# Patient Record
Sex: MALE | Race: WHITE | Employment: FULL TIME | URBAN - METROPOLITAN AREA
[De-identification: names, ages, dates, MRNs, and addresses within clinical notes are randomized per-mention and may not be internally consistent; named-entity substitution may affect disease eponyms.]

---

## 2017-01-01 ENCOUNTER — HOSPITAL ENCOUNTER (OUTPATIENT)
Dept: INFUSION THERAPY | Age: 57
Discharge: HOME | End: 2017-01-31
Attending: PSYCHIATRY & NEUROLOGY | Admitting: PSYCHIATRY & NEUROLOGY

## 2017-01-30 ENCOUNTER — HOSPITAL ENCOUNTER (OUTPATIENT)
Dept: INFUSION THERAPY | Age: 57
Discharge: HOME | End: 2017-01-30
Attending: PSYCHIATRY & NEUROLOGY | Admitting: PSYCHIATRY & NEUROLOGY

## 2017-01-30 VITALS
HEART RATE: 74 BPM | RESPIRATION RATE: 17 BRPM | SYSTOLIC BLOOD PRESSURE: 123 MMHG | WEIGHT: 233 LBS | HEIGHT: 71 IN | TEMPERATURE: 97.7 F | BODY MASS INDEX: 32.62 KG/M2 | DIASTOLIC BLOOD PRESSURE: 82 MMHG | OXYGEN SATURATION: 96 %

## 2017-01-30 DIAGNOSIS — G70.00 MYASTHENIA GRAVIS (HCC): ICD-10-CM

## 2017-01-30 RX ORDER — 0.9 % SODIUM CHLORIDE 0.9 %
10 VIAL (ML) INJECTION PRN
Status: CANCELLED | OUTPATIENT
Start: 2017-01-31

## 2017-01-30 RX ORDER — 0.9 % SODIUM CHLORIDE 0.9 %
10 VIAL (ML) INJECTION PRN
Status: DISCONTINUED | OUTPATIENT
Start: 2017-01-30 | End: 2017-01-31 | Stop reason: HOSPADM

## 2017-01-31 ENCOUNTER — HOSPITAL ENCOUNTER (OUTPATIENT)
Dept: INFUSION THERAPY | Age: 57
Discharge: HOME | End: 2017-01-31
Attending: PSYCHIATRY & NEUROLOGY | Admitting: PSYCHIATRY & NEUROLOGY

## 2017-01-31 VITALS
SYSTOLIC BLOOD PRESSURE: 127 MMHG | OXYGEN SATURATION: 97 % | RESPIRATION RATE: 18 BRPM | DIASTOLIC BLOOD PRESSURE: 78 MMHG | HEART RATE: 82 BPM

## 2017-01-31 RX ORDER — 0.9 % SODIUM CHLORIDE 0.9 %
10 VIAL (ML) INJECTION PRN
Status: ACTIVE | OUTPATIENT
Start: 2017-01-31 | End: 2017-01-31

## 2017-02-01 ENCOUNTER — HOSPITAL ENCOUNTER (OUTPATIENT)
Dept: INFUSION THERAPY | Age: 57
Discharge: HOME | End: 2017-02-03
Attending: PSYCHIATRY & NEUROLOGY | Admitting: PSYCHIATRY & NEUROLOGY

## 2017-02-01 ENCOUNTER — HOSPITAL ENCOUNTER (OUTPATIENT)
Dept: INFUSION THERAPY | Age: 57
Discharge: HOME | End: 2017-02-01
Attending: PSYCHIATRY & NEUROLOGY | Admitting: PSYCHIATRY & NEUROLOGY

## 2017-02-01 VITALS
RESPIRATION RATE: 18 BRPM | TEMPERATURE: 98.5 F | DIASTOLIC BLOOD PRESSURE: 67 MMHG | HEIGHT: 71 IN | BODY MASS INDEX: 32.62 KG/M2 | HEART RATE: 64 BPM | WEIGHT: 233 LBS | SYSTOLIC BLOOD PRESSURE: 117 MMHG

## 2017-02-01 DIAGNOSIS — G70.00 MYASTHENIA GRAVIS (HCC): ICD-10-CM

## 2017-02-01 RX ORDER — 0.9 % SODIUM CHLORIDE 0.9 %
10 VIAL (ML) INJECTION PRN
Status: CANCELLED | OUTPATIENT
Start: 2017-02-02

## 2017-02-01 RX ORDER — 0.9 % SODIUM CHLORIDE 0.9 %
10 VIAL (ML) INJECTION PRN
Status: DISCONTINUED | OUTPATIENT
Start: 2017-02-01 | End: 2017-02-02 | Stop reason: HOSPADM

## 2017-02-01 RX ADMIN — Medication 10 ML: at 14:00

## 2017-02-01 RX ADMIN — Medication 10 ML: at 10:50

## 2017-02-02 ENCOUNTER — HOSPITAL ENCOUNTER (OUTPATIENT)
Dept: INFUSION THERAPY | Age: 57
Discharge: HOME | End: 2017-02-02
Attending: PSYCHIATRY & NEUROLOGY | Admitting: PSYCHIATRY & NEUROLOGY

## 2017-02-02 VITALS
DIASTOLIC BLOOD PRESSURE: 75 MMHG | OXYGEN SATURATION: 97 % | HEART RATE: 56 BPM | TEMPERATURE: 99 F | RESPIRATION RATE: 18 BRPM | SYSTOLIC BLOOD PRESSURE: 102 MMHG

## 2017-02-02 DIAGNOSIS — G70.00 MYASTHENIA GRAVIS (HCC): ICD-10-CM

## 2017-02-02 RX ORDER — 0.9 % SODIUM CHLORIDE 0.9 %
10 VIAL (ML) INJECTION PRN
Status: DISCONTINUED | OUTPATIENT
Start: 2017-02-02 | End: 2017-02-03 | Stop reason: HOSPADM

## 2017-02-02 RX ORDER — 0.9 % SODIUM CHLORIDE 0.9 %
10 VIAL (ML) INJECTION PRN
Status: CANCELLED | OUTPATIENT
Start: 2017-02-03

## 2017-02-02 RX ADMIN — Medication 10 ML: at 14:11

## 2017-02-02 RX ADMIN — Medication 10 ML: at 10:55

## 2017-02-03 ENCOUNTER — HOSPITAL ENCOUNTER (OUTPATIENT)
Dept: INFUSION THERAPY | Age: 57
Discharge: HOME | End: 2017-02-03
Attending: PSYCHIATRY & NEUROLOGY | Admitting: PSYCHIATRY & NEUROLOGY

## 2017-02-03 VITALS
RESPIRATION RATE: 16 BRPM | HEART RATE: 60 BPM | DIASTOLIC BLOOD PRESSURE: 70 MMHG | SYSTOLIC BLOOD PRESSURE: 104 MMHG | TEMPERATURE: 98.1 F | OXYGEN SATURATION: 97 %

## 2017-02-03 DIAGNOSIS — G70.00 MYASTHENIA GRAVIS (HCC): ICD-10-CM

## 2017-02-03 RX ORDER — 0.9 % SODIUM CHLORIDE 0.9 %
10 VIAL (ML) INJECTION PRN
Status: CANCELLED | OUTPATIENT
Start: 2017-02-03

## 2017-02-03 RX ORDER — 0.9 % SODIUM CHLORIDE 0.9 %
10 VIAL (ML) INJECTION PRN
Status: DISCONTINUED | OUTPATIENT
Start: 2017-02-03 | End: 2017-02-04 | Stop reason: HOSPADM

## 2017-02-03 RX ADMIN — Medication 10 ML: at 10:42

## 2017-02-03 RX ADMIN — Medication 10 ML: at 13:27

## 2017-04-03 ENCOUNTER — HOSPITAL ENCOUNTER (OUTPATIENT)
Dept: INFUSION THERAPY | Age: 57
Discharge: HOME | End: 2017-04-03
Attending: PSYCHIATRY & NEUROLOGY | Admitting: PSYCHIATRY & NEUROLOGY

## 2017-04-03 VITALS
DIASTOLIC BLOOD PRESSURE: 71 MMHG | OXYGEN SATURATION: 95 % | RESPIRATION RATE: 18 BRPM | TEMPERATURE: 98.4 F | BODY MASS INDEX: 32.5 KG/M2 | SYSTOLIC BLOOD PRESSURE: 132 MMHG | HEART RATE: 62 BPM | WEIGHT: 233 LBS

## 2017-04-03 DIAGNOSIS — G70.00 MYASTHENIA GRAVIS (HCC): ICD-10-CM

## 2017-04-03 RX ORDER — 0.9 % SODIUM CHLORIDE 0.9 %
10 VIAL (ML) INJECTION PRN
Status: DISCONTINUED | OUTPATIENT
Start: 2017-04-03 | End: 2017-04-04 | Stop reason: HOSPADM

## 2017-04-03 RX ORDER — SODIUM CHLORIDE 0.9 % (FLUSH) 0.9 %
10 SYRINGE (ML) INJECTION PRN
Status: DISCONTINUED | OUTPATIENT
Start: 2017-04-03 | End: 2017-04-04 | Stop reason: HOSPADM

## 2017-04-03 RX ORDER — 0.9 % SODIUM CHLORIDE 0.9 %
10 VIAL (ML) INJECTION PRN
Status: CANCELLED | OUTPATIENT
Start: 2017-04-03

## 2017-04-03 RX ADMIN — Medication 10 ML: at 10:54

## 2017-04-03 RX ADMIN — Medication 10 ML: at 13:40

## 2017-04-04 ENCOUNTER — HOSPITAL ENCOUNTER (OUTPATIENT)
Dept: INFUSION THERAPY | Age: 57
Discharge: HOME | End: 2017-04-30
Attending: PSYCHIATRY & NEUROLOGY | Admitting: PSYCHIATRY & NEUROLOGY

## 2017-04-04 VITALS
RESPIRATION RATE: 18 BRPM | HEART RATE: 59 BPM | WEIGHT: 233 LBS | DIASTOLIC BLOOD PRESSURE: 89 MMHG | BODY MASS INDEX: 32.5 KG/M2 | OXYGEN SATURATION: 97 % | SYSTOLIC BLOOD PRESSURE: 132 MMHG

## 2017-04-04 RX ORDER — SODIUM CHLORIDE 0.9 % (FLUSH) 0.9 %
10 SYRINGE (ML) INJECTION PRN
Status: ACTIVE | OUTPATIENT
Start: 2017-04-04 | End: 2017-04-04

## 2017-04-05 ENCOUNTER — HOSPITAL ENCOUNTER (OUTPATIENT)
Dept: INFUSION THERAPY | Age: 57
Discharge: HOME | End: 2017-04-05
Attending: PSYCHIATRY & NEUROLOGY | Admitting: PSYCHIATRY & NEUROLOGY

## 2017-04-05 VITALS
TEMPERATURE: 97.9 F | HEART RATE: 60 BPM | SYSTOLIC BLOOD PRESSURE: 125 MMHG | BODY MASS INDEX: 32.5 KG/M2 | OXYGEN SATURATION: 97 % | DIASTOLIC BLOOD PRESSURE: 89 MMHG | RESPIRATION RATE: 18 BRPM | WEIGHT: 233 LBS

## 2017-04-05 DIAGNOSIS — G70.00 MYASTHENIA GRAVIS (HCC): ICD-10-CM

## 2017-04-05 RX ORDER — 0.9 % SODIUM CHLORIDE 0.9 %
10 VIAL (ML) INJECTION PRN
Status: ACTIVE | OUTPATIENT
Start: 2017-04-05 | End: 2017-04-06

## 2017-04-05 RX ORDER — 0.9 % SODIUM CHLORIDE 0.9 %
10 VIAL (ML) INJECTION PRN
Status: CANCELLED | OUTPATIENT
Start: 2017-04-05

## 2017-04-05 RX ADMIN — Medication 10 ML: at 10:31

## 2017-04-05 RX ADMIN — Medication 10 ML: at 13:22

## 2017-04-06 ENCOUNTER — HOSPITAL ENCOUNTER (OUTPATIENT)
Dept: INFUSION THERAPY | Age: 57
Discharge: HOME | End: 2017-04-06
Attending: PSYCHIATRY & NEUROLOGY | Admitting: PSYCHIATRY & NEUROLOGY

## 2017-04-06 VITALS
RESPIRATION RATE: 16 BRPM | BODY MASS INDEX: 32.5 KG/M2 | WEIGHT: 233 LBS | SYSTOLIC BLOOD PRESSURE: 125 MMHG | HEART RATE: 60 BPM | DIASTOLIC BLOOD PRESSURE: 79 MMHG | TEMPERATURE: 97.7 F

## 2017-04-06 RX ORDER — 0.9 % SODIUM CHLORIDE 0.9 %
10 VIAL (ML) INJECTION PRN
Status: DISCONTINUED | OUTPATIENT
Start: 2017-04-06 | End: 2017-04-30 | Stop reason: HOSPADM

## 2017-04-06 RX ORDER — 0.9 % SODIUM CHLORIDE 0.9 %
10 VIAL (ML) INJECTION PRN
Status: CANCELLED | OUTPATIENT
Start: 2017-04-06

## 2017-04-06 RX ADMIN — Medication 10 ML: at 10:30

## 2017-04-06 RX ADMIN — Medication 10 ML: at 13:30

## 2017-04-07 ENCOUNTER — HOSPITAL ENCOUNTER (OUTPATIENT)
Dept: INFUSION THERAPY | Age: 57
Discharge: HOME | End: 2017-04-07
Attending: PSYCHIATRY & NEUROLOGY | Admitting: PSYCHIATRY & NEUROLOGY

## 2017-04-07 VITALS
TEMPERATURE: 98 F | HEART RATE: 62 BPM | RESPIRATION RATE: 16 BRPM | DIASTOLIC BLOOD PRESSURE: 71 MMHG | SYSTOLIC BLOOD PRESSURE: 130 MMHG

## 2017-04-07 RX ORDER — 0.9 % SODIUM CHLORIDE 0.9 %
10 VIAL (ML) INJECTION PRN
Status: CANCELLED | OUTPATIENT
Start: 2017-04-07

## 2017-04-07 RX ORDER — SODIUM CHLORIDE 0.9 % (FLUSH) 0.9 %
10 SYRINGE (ML) INJECTION 2 TIMES DAILY
Status: DISCONTINUED | OUTPATIENT
Start: 2017-04-07 | End: 2017-04-30 | Stop reason: HOSPADM

## 2017-05-01 ENCOUNTER — HOSPITAL ENCOUNTER (OUTPATIENT)
Dept: INFUSION THERAPY | Age: 57
Discharge: HOME | End: 2017-05-31
Attending: PSYCHIATRY & NEUROLOGY | Admitting: PSYCHIATRY & NEUROLOGY

## 2018-09-02 PROBLEM — L03.114 CELLULITIS OF LEFT UPPER EXTREMITY: Status: ACTIVE | Noted: 2018-09-02

## 2018-09-02 PROBLEM — L03.90 CELLULITIS: Status: ACTIVE | Noted: 2018-09-02

## 2018-12-20 ENCOUNTER — HOSPITAL ENCOUNTER (EMERGENCY)
Age: 58
Discharge: HOME | End: 2018-12-20
Attending: EMERGENCY MEDICINE

## 2018-12-20 VITALS
DIASTOLIC BLOOD PRESSURE: 89 MMHG | HEART RATE: 79 BPM | OXYGEN SATURATION: 94 % | SYSTOLIC BLOOD PRESSURE: 140 MMHG | TEMPERATURE: 97.6 F | BODY MASS INDEX: 42.7 KG/M2 | WEIGHT: 305 LBS | HEIGHT: 71 IN | RESPIRATION RATE: 25 BRPM

## 2018-12-20 DIAGNOSIS — I49.1 PREMATURE ATRIAL COMPLEXES: Primary | ICD-10-CM

## 2018-12-20 PROCEDURE — 99284 EMERGENCY DEPT VISIT MOD MDM: CPT

## 2018-12-20 PROCEDURE — 93005 ELECTROCARDIOGRAM TRACING: CPT | Performed by: PHYSICIAN ASSISTANT

## 2018-12-20 RX ORDER — ALBUTEROL SULFATE 90 UG/1
2 AEROSOL, METERED RESPIRATORY (INHALATION) EVERY 4 HOURS PRN
Qty: 1 INHALER | Refills: 1 | Status: ON HOLD | OUTPATIENT
Start: 2018-12-20 | End: 2021-09-09 | Stop reason: HOSPADM

## 2018-12-20 NOTE — ED PROVIDER NOTES
Interpretation  Interpreted by me  Compared to 9/4/18  Rhythm: normal sinus  Rate: normal 83  Axis: normal  Ectopy: frequent PACs  Conduction: normal  ST Segments: no acute change  T Waves: no acute change  Q Waves: none  Clinical Impression: normal sinus rhythm with frequent PACs    Cardiac Monitor Strip Interpretation  Interpreted by me  Monitor strip interpreted for greater than 10 seconds  Rhythm: normal sinus  Rate: normal  Ectopy: PACs  ST Segments: normal      Radiology / Procedures:  None    ED COURSE & MEDICAL DECISION MAKING:  Pertinent Labs & Imaging studies reviewed. (See chart for details)  On exam, the patient is afebrile and nontoxic appearing. He is hemodynamically stable and neurologically intact. EKG shows a normal sinus rhythm with no ST elevation or depression. There are frequent PACs. The patient had a similar appearing EKG with frequent PACs on a recent EKG from September 2018. He is asymptomatic. He has no sensation of an irregular heartbeat or palpitations. I suspect that the patient has a premature atrial complexes. I have a low suspicion for STEMI, arrhythmia or concerning abnormality on the EKG. The likelihood of other entities in the differential is insufficient to justify any further testing for them at this time. This was explained to the patient and they were advised that persistent or worsening symptoms would require further evaluation. I feel that the patient is stable for outpatient management follow up in 2-3 days. The patient is given return precautions. The patient verbalized understanding, was agreeable with plan, and the patient was discharged home in stable condition. Clinical Impression:  1. Premature atrial complexes        Disposition referral (if applicable):   [facility] - ADULT  [address]  [phone]  Go in 4 days      [facility] Emergency Department  [address] [place] [address]  [phone]  Go to   If symptoms worsen      Disposition medications (if applicable):  Discharge Medication List as of 12/20/2018  5:37 PM      START taking these medications    Details   albuterol sulfate HFA (PROVENTIL HFA) 108 (90 Base) MCG/ACT inhaler Inhale 2 puffs into the lungs every 4 hours as needed for Wheezing or Shortness of Breath With spacer (and mask if indicated). Thanks. , Disp-1 Inhaler, R-1Print               Comment: Please note this report has been produced using speech recognition software and may contain errors related to that system including errors in grammar, punctuation, and spelling, as well as words and phrases that may be inappropriate. If there are any questions or concerns please feel free to contact the dictating provider for clarification.         Dejah De La Paz MD  12/20/18 2481

## 2018-12-22 LAB
EKG ATRIAL RATE: 127 BPM
EKG DIAGNOSIS: NORMAL
EKG P-R INTERVAL: 120 MS
EKG Q-T INTERVAL: 400 MS
EKG QRS DURATION: 96 MS
EKG QTC CALCULATION (BAZETT): 470 MS
EKG R AXIS: 57 DEGREES
EKG T AXIS: 60 DEGREES
EKG VENTRICULAR RATE: 83 BPM

## 2019-06-04 ENCOUNTER — TELEPHONE (OUTPATIENT)
Dept: CARDIOLOGY CLINIC | Age: 59
End: 2019-06-04

## 2019-06-04 NOTE — TELEPHONE ENCOUNTER
Emailed a Notice of Chapter 7 Bankruptcy to [facility]- case# [id number]

## 2020-02-02 ENCOUNTER — APPOINTMENT (OUTPATIENT)
Dept: CT IMAGING | Age: 60
DRG: 392 | End: 2020-02-02

## 2020-02-02 ENCOUNTER — HOSPITAL ENCOUNTER (INPATIENT)
Age: 60
LOS: 1 days | Discharge: HOME | DRG: 392 | End: 2020-02-03
Attending: EMERGENCY MEDICINE | Admitting: INTERNAL MEDICINE

## 2020-02-02 PROBLEM — A08.4 VIRAL GASTROENTERITIS: Status: ACTIVE | Noted: 2020-02-02

## 2020-02-02 LAB
ADENOVIRUS F 40 41 PCR: NOT DETECTED
ALBUMIN SERPL-MCNC: 2.3 GM/DL (ref 3.4–5)
ALBUMIN SERPL-MCNC: 3.6 GM/DL (ref 3.4–5)
ALP BLD-CCNC: 31 IU/L (ref 40–128)
ALP BLD-CCNC: 45 IU/L (ref 40–129)
ALT SERPL-CCNC: 17 U/L (ref 10–40)
ALT SERPL-CCNC: 25 U/L (ref 10–40)
ANION GAP SERPL CALCULATED.3IONS-SCNC: 14 MMOL/L (ref 4–16)
ANION GAP SERPL CALCULATED.3IONS-SCNC: 14 MMOL/L (ref 4–16)
ANION GAP SERPL CALCULATED.3IONS-SCNC: 15 MMOL/L (ref 4–16)
AST SERPL-CCNC: 12 IU/L (ref 15–37)
AST SERPL-CCNC: 16 IU/L (ref 15–37)
ASTROVIRUS PCR: NOT DETECTED
BASE EXCESS: ABNORMAL (ref 0–3.3)
BASOPHILS ABSOLUTE: 0 K/CU MM
BASOPHILS ABSOLUTE: 0.1 K/CU MM
BASOPHILS RELATIVE PERCENT: 0.3 % (ref 0–1)
BASOPHILS RELATIVE PERCENT: 0.7 % (ref 0–1)
BILIRUB SERPL-MCNC: 0.4 MG/DL (ref 0–1)
BILIRUB SERPL-MCNC: 0.4 MG/DL (ref 0–1)
BUN BLDV-MCNC: 14 MG/DL (ref 6–23)
BUN BLDV-MCNC: 17 MG/DL (ref 6–23)
BUN BLDV-MCNC: 19 MG/DL (ref 6–23)
CALCIUM IONIZED: 4.8 MG/DL (ref 4.48–5.28)
CALCIUM SERPL-MCNC: 8.9 MG/DL (ref 8.3–10.6)
CALCIUM SERPL-MCNC: 9 MG/DL (ref 8.3–10.6)
CALCIUM SERPL-MCNC: ABNORMAL MG/DL (ref 8.3–10.6)
CAMPYLOBACTER PCR: NOT DETECTED
CHLORIDE BLD-SCNC: 100 MMOL/L (ref 99–110)
CHLORIDE BLD-SCNC: 104 MMOL/L (ref 99–110)
CHLORIDE BLD-SCNC: 115 MMOL/L (ref 99–110)
CLOSTRIDIUM DIFFICILE, PCR: NORMAL
CLOSTRIDIUM DIFFICILE, PCR: NORMAL
CO2: 12 MMOL/L (ref 21–32)
CO2: 17 MMOL/L (ref 21–32)
CO2: 18 MMOL/L (ref 21–32)
COMMENT: ABNORMAL
CREAT SERPL-MCNC: 0.9 MG/DL (ref 0.9–1.3)
CREAT SERPL-MCNC: 1.2 MG/DL (ref 0.9–1.3)
CREAT SERPL-MCNC: 1.3 MG/DL (ref 0.9–1.3)
CRYPTOSPORIDIUM PCR: NOT DETECTED
CYCLOSPORA CAYETANENSIS PCR: NOT DETECTED
DIFFERENTIAL TYPE: ABNORMAL
DIFFERENTIAL TYPE: ABNORMAL
E COLI 0157 PCR: NOT DETECTED
E COLI ENTEROAGGREGATIVE PCR: NOT DETECTED
E COLI ENTEROPATHOGENIC PCR: NOT DETECTED
E COLI ENTEROTOXIGENIC PCR: NOT DETECTED
E COLI SHIGA LIKE TOXIN PCR: NOT DETECTED
E COLI SHIGELLA/ENTEROINVASIVE PCR: NOT DETECTED
ENTAMOEBA HISTOLYTICA PCR: NOT DETECTED
EOSINOPHILS ABSOLUTE: 0.1 K/CU MM
EOSINOPHILS ABSOLUTE: 0.1 K/CU MM
EOSINOPHILS RELATIVE PERCENT: 0.9 % (ref 0–3)
EOSINOPHILS RELATIVE PERCENT: 1.6 % (ref 0–3)
GFR AFRICAN AMERICAN: >60 ML/MIN/1.73M2
GFR NON-AFRICAN AMERICAN: 56 ML/MIN/1.73M2
GFR NON-AFRICAN AMERICAN: >60 ML/MIN/1.73M2
GFR NON-AFRICAN AMERICAN: >60 ML/MIN/1.73M2
GIARDIA LAMBLIA PCR: NOT DETECTED
GLUCOSE BLD-MCNC: 198 MG/DL (ref 70–99)
GLUCOSE BLD-MCNC: 209 MG/DL (ref 70–99)
GLUCOSE BLD-MCNC: 244 MG/DL (ref 70–99)
HCO3 VENOUS: 16.4 MMOL/L (ref 19–25)
HCT VFR BLD CALC: 52.6 % (ref 42–52)
HCT VFR BLD CALC: 60.4 % (ref 42–52)
HEMOGLOBIN: 17 GM/DL (ref 13.5–18)
HEMOGLOBIN: 19.6 GM/DL (ref 13.5–18)
IMMATURE NEUTROPHIL %: 0.5 % (ref 0–0.43)
IMMATURE NEUTROPHIL %: 0.5 % (ref 0–0.43)
IONIZED CA: 1.2 MMOL/L (ref 1.12–1.32)
LACTATE: 2.2 MMOL/L (ref 0.4–2)
LACTATE: ABNORMAL MMOL/L (ref 0.4–2)
LYMPHOCYTES ABSOLUTE: 0.6 K/CU MM
LYMPHOCYTES ABSOLUTE: 0.9 K/CU MM
LYMPHOCYTES RELATIVE PERCENT: 6.3 % (ref 24–44)
LYMPHOCYTES RELATIVE PERCENT: 7.8 % (ref 24–44)
MAGNESIUM: 0.9 MG/DL (ref 1.8–2.4)
MAGNESIUM: 1.7 MG/DL (ref 1.8–2.4)
MCH RBC QN AUTO: 30.9 PG (ref 27–31)
MCH RBC QN AUTO: 31 PG (ref 27–31)
MCHC RBC AUTO-ENTMCNC: 32.3 % (ref 32–36)
MCHC RBC AUTO-ENTMCNC: 32.5 % (ref 32–36)
MCV RBC AUTO: 95.6 FL (ref 78–100)
MCV RBC AUTO: 95.6 FL (ref 78–100)
MONOCYTES ABSOLUTE: 0.7 K/CU MM
MONOCYTES ABSOLUTE: 0.8 K/CU MM
MONOCYTES RELATIVE PERCENT: 10.2 % (ref 0–4)
MONOCYTES RELATIVE PERCENT: 5.1 % (ref 0–4)
NOROVIRUS GI GII PCR: NOT DETECTED
NUCLEATED RBC %: 0 %
NUCLEATED RBC %: 0 %
O2 SAT, VEN: 95.1 % (ref 50–70)
PCO2, VEN: 35 MMHG (ref 38–52)
PDW BLD-RTO: 12.6 % (ref 11.7–14.9)
PDW BLD-RTO: 12.9 % (ref 11.7–14.9)
PH VENOUS: 7.28 (ref 7.32–7.42)
PHOSPHORUS: 3.7 MG/DL (ref 2.5–4.9)
PLATELET # BLD: 248 K/CU MM (ref 140–440)
PLATELET # BLD: 293 K/CU MM (ref 140–440)
PLESIOMONAS SHIGELLOIDES PCR: NOT DETECTED
PMV BLD AUTO: 10.1 FL (ref 7.5–11.1)
PMV BLD AUTO: 9.6 FL (ref 7.5–11.1)
PO2, VEN: 112 MMHG (ref 28–48)
POTASSIUM SERPL-SCNC: 4.7 MMOL/L (ref 3.5–5.1)
POTASSIUM SERPL-SCNC: 5.3 MMOL/L (ref 3.5–5.1)
POTASSIUM SERPL-SCNC: ABNORMAL MMOL/L (ref 3.5–5.1)
RBC # BLD: 5.5 M/CU MM (ref 4.6–6.2)
RBC # BLD: 6.32 M/CU MM (ref 4.6–6.2)
ROTAVIRUS A PCR: NOT DETECTED
SALMONELLA PCR: NOT DETECTED
SAPOVIRUS PCR: NOT DETECTED
SEGMENTED NEUTROPHILS ABSOLUTE COUNT: 12 K/CU MM
SEGMENTED NEUTROPHILS ABSOLUTE COUNT: 5.9 K/CU MM
SEGMENTED NEUTROPHILS RELATIVE PERCENT: 79.6 % (ref 36–66)
SEGMENTED NEUTROPHILS RELATIVE PERCENT: 86.5 % (ref 36–66)
SODIUM BLD-SCNC: 132 MMOL/L (ref 135–145)
SODIUM BLD-SCNC: 135 MMOL/L (ref 135–145)
SODIUM BLD-SCNC: 142 MMOL/L (ref 135–145)
TOTAL IMMATURE NEUTOROPHIL: 0.04 K/CU MM
TOTAL IMMATURE NEUTOROPHIL: 0.07 K/CU MM
TOTAL NUCLEATED RBC: 0 K/CU MM
TOTAL NUCLEATED RBC: 0 K/CU MM
TOTAL PROTEIN: 4.2 GM/DL (ref 6.4–8.2)
TOTAL PROTEIN: 6 GM/DL (ref 6.4–8.2)
VIBRIO CHOLERAE PCR: NOT DETECTED
VIBRIO PCR: NOT DETECTED
WBC # BLD: 13.8 K/CU MM (ref 4–10.5)
WBC # BLD: 7.4 K/CU MM (ref 4–10.5)
YERSINIA ENTEROCOLITICA PCR: NOT DETECTED

## 2020-02-02 PROCEDURE — 99285 EMERGENCY DEPT VISIT HI MDM: CPT

## 2020-02-02 PROCEDURE — 6370000000 HC RX 637 (ALT 250 FOR IP): Performed by: INTERNAL MEDICINE

## 2020-02-02 PROCEDURE — 36415 COLL VENOUS BLD VENIPUNCTURE: CPT

## 2020-02-02 PROCEDURE — 80048 BASIC METABOLIC PNL TOTAL CA: CPT

## 2020-02-02 PROCEDURE — 96365 THER/PROPH/DIAG IV INF INIT: CPT

## 2020-02-02 PROCEDURE — 80053 COMPREHEN METABOLIC PANEL: CPT

## 2020-02-02 PROCEDURE — 2580000003 HC RX 258: Performed by: EMERGENCY MEDICINE

## 2020-02-02 PROCEDURE — 83735 ASSAY OF MAGNESIUM: CPT

## 2020-02-02 PROCEDURE — 74177 CT ABD & PELVIS W/CONTRAST: CPT

## 2020-02-02 PROCEDURE — 94664 DEMO&/EVAL PT USE INHALER: CPT

## 2020-02-02 PROCEDURE — 6360000002 HC RX W HCPCS: Performed by: NURSE PRACTITIONER

## 2020-02-02 PROCEDURE — 87324 CLOSTRIDIUM AG IA: CPT

## 2020-02-02 PROCEDURE — 83605 ASSAY OF LACTIC ACID: CPT

## 2020-02-02 PROCEDURE — 87507 IADNA-DNA/RNA PROBE TQ 12-25: CPT

## 2020-02-02 PROCEDURE — 6360000002 HC RX W HCPCS: Performed by: EMERGENCY MEDICINE

## 2020-02-02 PROCEDURE — 6370000000 HC RX 637 (ALT 250 FOR IP): Performed by: FAMILY MEDICINE

## 2020-02-02 PROCEDURE — 2060000000 HC ICU INTERMEDIATE R&B

## 2020-02-02 PROCEDURE — 82805 BLOOD GASES W/O2 SATURATION: CPT

## 2020-02-02 PROCEDURE — 84100 ASSAY OF PHOSPHORUS: CPT

## 2020-02-02 PROCEDURE — 2580000003 HC RX 258: Performed by: INTERNAL MEDICINE

## 2020-02-02 PROCEDURE — 85025 COMPLETE CBC W/AUTO DIFF WBC: CPT

## 2020-02-02 PROCEDURE — 82330 ASSAY OF CALCIUM: CPT

## 2020-02-02 PROCEDURE — 96366 THER/PROPH/DIAG IV INF ADDON: CPT

## 2020-02-02 PROCEDURE — 93010 ELECTROCARDIOGRAM REPORT: CPT | Performed by: INTERNAL MEDICINE

## 2020-02-02 PROCEDURE — 6360000004 HC RX CONTRAST MEDICATION: Performed by: EMERGENCY MEDICINE

## 2020-02-02 PROCEDURE — 93005 ELECTROCARDIOGRAM TRACING: CPT | Performed by: EMERGENCY MEDICINE

## 2020-02-02 PROCEDURE — 6360000002 HC RX W HCPCS: Performed by: INTERNAL MEDICINE

## 2020-02-02 PROCEDURE — 2500000003 HC RX 250 WO HCPCS: Performed by: INTERNAL MEDICINE

## 2020-02-02 PROCEDURE — 6370000000 HC RX 637 (ALT 250 FOR IP): Performed by: EMERGENCY MEDICINE

## 2020-02-02 PROCEDURE — 96375 TX/PRO/DX INJ NEW DRUG ADDON: CPT

## 2020-02-02 RX ORDER — ACETAMINOPHEN 325 MG/1
650 TABLET ORAL EVERY 4 HOURS PRN
Status: DISCONTINUED | OUTPATIENT
Start: 2020-02-02 | End: 2020-02-03 | Stop reason: HOSPADM

## 2020-02-02 RX ORDER — POTASSIUM CHLORIDE 7.45 MG/ML
10 INJECTION INTRAVENOUS PRN
Status: DISCONTINUED | OUTPATIENT
Start: 2020-02-02 | End: 2020-02-03 | Stop reason: HOSPADM

## 2020-02-02 RX ORDER — POTASSIUM CHLORIDE 20 MEQ/1
40 TABLET, EXTENDED RELEASE ORAL 2 TIMES DAILY WITH MEALS
Status: DISPENSED | OUTPATIENT
Start: 2020-02-02 | End: 2020-02-03

## 2020-02-02 RX ORDER — POTASSIUM CHLORIDE 20 MEQ/1
60 TABLET, EXTENDED RELEASE ORAL ONCE
Status: COMPLETED | OUTPATIENT
Start: 2020-02-02 | End: 2020-02-02

## 2020-02-02 RX ORDER — MAGNESIUM SULFATE IN WATER 40 MG/ML
2 INJECTION, SOLUTION INTRAVENOUS ONCE
Status: COMPLETED | OUTPATIENT
Start: 2020-02-02 | End: 2020-02-02

## 2020-02-02 RX ORDER — LANOLIN ALCOHOL/MO/W.PET/CERES
1000 CREAM (GRAM) TOPICAL DAILY
Status: DISCONTINUED | OUTPATIENT
Start: 2020-02-02 | End: 2020-02-03 | Stop reason: HOSPADM

## 2020-02-02 RX ORDER — SODIUM CHLORIDE 0.9 % (FLUSH) 0.9 %
10 SYRINGE (ML) INJECTION PRN
Status: DISCONTINUED | OUTPATIENT
Start: 2020-02-02 | End: 2020-02-03 | Stop reason: HOSPADM

## 2020-02-02 RX ORDER — 0.9 % SODIUM CHLORIDE 0.9 %
1000 INTRAVENOUS SOLUTION INTRAVENOUS ONCE
Status: COMPLETED | OUTPATIENT
Start: 2020-02-02 | End: 2020-02-02

## 2020-02-02 RX ORDER — SODIUM CHLORIDE 0.9 % (FLUSH) 0.9 %
10 SYRINGE (ML) INJECTION EVERY 12 HOURS SCHEDULED
Status: DISCONTINUED | OUTPATIENT
Start: 2020-02-02 | End: 2020-02-03 | Stop reason: HOSPADM

## 2020-02-02 RX ORDER — DIPHENHYDRAMINE HCL 25 MG
50 TABLET ORAL ONCE
Status: COMPLETED | OUTPATIENT
Start: 2020-02-02 | End: 2020-02-02

## 2020-02-02 RX ORDER — ONDANSETRON 2 MG/ML
8 INJECTION INTRAMUSCULAR; INTRAVENOUS ONCE
Status: COMPLETED | OUTPATIENT
Start: 2020-02-02 | End: 2020-02-02

## 2020-02-02 RX ORDER — POTASSIUM CHLORIDE 20 MEQ/1
40 TABLET, EXTENDED RELEASE ORAL PRN
Status: DISCONTINUED | OUTPATIENT
Start: 2020-02-02 | End: 2020-02-03 | Stop reason: HOSPADM

## 2020-02-02 RX ORDER — ONDANSETRON 2 MG/ML
4 INJECTION INTRAMUSCULAR; INTRAVENOUS EVERY 6 HOURS PRN
Status: DISCONTINUED | OUTPATIENT
Start: 2020-02-02 | End: 2020-02-03 | Stop reason: HOSPADM

## 2020-02-02 RX ORDER — PYRIDOSTIGMINE BROMIDE 60 MG/1
60 TABLET ORAL
Status: DISCONTINUED | OUTPATIENT
Start: 2020-02-02 | End: 2020-02-02

## 2020-02-02 RX ORDER — PREDNISONE 10 MG/1
15 TABLET ORAL DAILY
Status: DISCONTINUED | OUTPATIENT
Start: 2020-02-08 | End: 2020-02-03 | Stop reason: HOSPADM

## 2020-02-02 RX ORDER — PYRIDOSTIGMINE BROMIDE 60 MG/1
60 TABLET ORAL EVERY 6 HOURS SCHEDULED
Status: DISCONTINUED | OUTPATIENT
Start: 2020-02-02 | End: 2020-02-03 | Stop reason: HOSPADM

## 2020-02-02 RX ORDER — PREDNISONE 20 MG/1
20 TABLET ORAL DAILY
Status: DISCONTINUED | OUTPATIENT
Start: 2020-02-06 | End: 2020-02-03 | Stop reason: HOSPADM

## 2020-02-02 RX ADMIN — MAGNESIUM SULFATE HEPTAHYDRATE 2 G: 40 INJECTION, SOLUTION INTRAVENOUS at 09:35

## 2020-02-02 RX ADMIN — ONDANSETRON 8 MG: 2 INJECTION INTRAMUSCULAR; INTRAVENOUS at 02:21

## 2020-02-02 RX ADMIN — SODIUM CHLORIDE 1000 ML: 9 INJECTION, SOLUTION INTRAVENOUS at 04:39

## 2020-02-02 RX ADMIN — POTASSIUM CHLORIDE 10 MEQ: 7.46 INJECTION, SOLUTION INTRAVENOUS at 07:03

## 2020-02-02 RX ADMIN — SODIUM BICARBONATE: 84 INJECTION, SOLUTION INTRAVENOUS at 11:14

## 2020-02-02 RX ADMIN — SODIUM CHLORIDE 1000 ML: 9 INJECTION, SOLUTION INTRAVENOUS at 02:21

## 2020-02-02 RX ADMIN — SODIUM CHLORIDE, PRESERVATIVE FREE 10 ML: 5 INJECTION INTRAVENOUS at 09:36

## 2020-02-02 RX ADMIN — POTASSIUM CHLORIDE 60 MEQ: 1500 TABLET, EXTENDED RELEASE ORAL at 12:50

## 2020-02-02 RX ADMIN — PYRIDOSTIGMINE BROMIDE 60 MG: 60 TABLET ORAL at 17:22

## 2020-02-02 RX ADMIN — POTASSIUM CHLORIDE 40 MEQ: 1500 TABLET, EXTENDED RELEASE ORAL at 09:35

## 2020-02-02 RX ADMIN — PREDNISONE 30 MG: 20 TABLET ORAL at 09:35

## 2020-02-02 RX ADMIN — CALCIUM GLUCONATE 2 G: 98 INJECTION, SOLUTION INTRAVENOUS at 04:45

## 2020-02-02 RX ADMIN — ENOXAPARIN SODIUM 40 MG: 40 INJECTION SUBCUTANEOUS at 09:36

## 2020-02-02 RX ADMIN — SODIUM CHLORIDE, PRESERVATIVE FREE 10 ML: 5 INJECTION INTRAVENOUS at 20:31

## 2020-02-02 RX ADMIN — PYRIDOSTIGMINE BROMIDE 60 MG: 60 TABLET ORAL at 07:03

## 2020-02-02 RX ADMIN — PYRIDOSTIGMINE BROMIDE 60 MG: 60 TABLET ORAL at 12:50

## 2020-02-02 RX ADMIN — IOPAMIDOL 75 ML: 755 INJECTION, SOLUTION INTRAVENOUS at 03:47

## 2020-02-02 RX ADMIN — DIPHENHYDRAMINE HCL 50 MG: 25 TABLET ORAL at 04:39

## 2020-02-02 RX ADMIN — CYANOCOBALAMIN TAB 1000 MCG 1000 MCG: 1000 TAB at 09:35

## 2020-02-02 RX ADMIN — FAMOTIDINE 20 MG: 10 INJECTION INTRAVENOUS at 20:31

## 2020-02-02 RX ADMIN — FAMOTIDINE 20 MG: 10 INJECTION INTRAVENOUS at 07:03

## 2020-02-02 RX ADMIN — SODIUM BICARBONATE: 84 INJECTION, SOLUTION INTRAVENOUS at 23:13

## 2020-02-02 ASSESSMENT — PULMONARY FUNCTION TESTS
PEFR_L/MIN: 300
PEFR_L/MIN: 450

## 2020-02-02 NOTE — PROGRESS NOTES
Peripheral pulses equal bilaterally and palpable. No peripheral edema. GI the ostomy bag with green watery stool, abdomen is soft without significant tenderness, masses, or guarding. Bowel sounds are normoactive. Rectal exam deferred.  Andres catheter is not present. MSK No gross joint deformities. Spontaneous movement of all extremities  SKIN Normal coloration, warm, dry.     Medications:   Medications:    vitamin B-12  1,000 mcg Oral Daily    sodium chloride flush  10 mL Intravenous 2 times per day    enoxaparin  40 mg Subcutaneous Daily    predniSONE  30 mg Oral Daily    Followed by   Pedro Bob ON 2/4/2020] predniSONE  25 mg Oral Daily    Followed by   Pedro Bob ON 2/6/2020] predniSONE  20 mg Oral Daily    Followed by   Pedro Bob ON 2/8/2020] predniSONE  15 mg Oral Daily    [START ON 2/10/2020] predniSONE  14 mg Oral Daily    famotidine (PEPCID) injection  20 mg Intravenous BID    magnesium sulfate  2 g Intravenous Once    potassium chloride  40 mEq Oral BID WC    pyridostigmine  60 mg Oral 4 times per day      Infusions:    sodium bicarbonate infusion       PRN Meds: potassium chloride, 40 mEq, PRN    Or  potassium alternative oral replacement, 40 mEq, PRN    Or  potassium chloride, 10 mEq, PRN  sodium chloride flush, 10 mL, PRN  magnesium hydroxide, 30 mL, Daily PRN  ondansetron, 4 mg, Q6H PRN  acetaminophen, 650 mg, Q4H PRN          Electronically signed by Trinidad Funes MD on 2/2/2020 at 10:35 AM

## 2020-02-02 NOTE — H&P
status:      Spouse name: None    Number of children: None    Years of education: None    Highest education level: None   Occupational History    None   Social Needs    Financial resource strain: None    Food insecurity:     Worry: None     Inability: None    Transportation needs:     Medical: None     Non-medical: None   Tobacco Use    Smoking status: Current Every Day Smoker     Packs/day: 0.25     Types: Cigarettes    Smokeless tobacco: Never Used   Substance and Sexual Activity    Alcohol use: Yes     Comment: occ    Drug use: No    Sexual activity: Yes     Partners: Female   Lifestyle    Physical activity:     Days per week: None     Minutes per session: None    Stress: None   Relationships    Social connections:     Talks on phone: None     Gets together: None     Attends Yarsani service: None     Active member of club or organization: None     Attends meetings of clubs or organizations: None     Relationship status: None    Intimate partner violence:     Fear of current or ex partner: None     Emotionally abused: None     Physically abused: None     Forced sexual activity: None   Other Topics Concern    None   Social History Narrative    None       MEDICATIONS   Medications Prior to Admission  Not in a hospital admission.     Current Medications  Current Facility-Administered Medications   Medication Dose Route Frequency Provider Last Rate Last Dose    0.9 % sodium chloride bolus  1,000 mL Intravenous Once Cuong Gonzales MD 1,000 mL/hr at 02/02/20 0439 1,000 mL at 02/02/20 0439    calcium gluconate 2 g in dextrose 5 % 100 mL IVPB  2 g Intravenous Once Cuong Gonzales MD 60 mL/hr at 02/02/20 0445 2 g at 02/02/20 0445    potassium chloride (KLOR-CON M) extended release tablet 40 mEq  40 mEq Oral PRN Cuong Gonzales MD        Or    potassium bicarb-citric acid (EFFER-K) effervescent tablet 40 mEq  40 mEq Oral PRN Cuong Gonzales MD        Or    potassium chloride 10 mEq/100 mL IVPB (Peripheral

## 2020-02-02 NOTE — ED NOTES
Pt arrived via EMS for n&v and for hives. Pt started to have n&v at 200 yesterday. Pt states he is not sure how he got the hives, states he has not changed any habits. Pt took two 50mg benadryl at home for the hives. Pt has ileostomy bag, pt states has had the bag since he was 11yrs old due to ulcerative colitis.      Sergio Iglesias RN  02/02/20 0064

## 2020-02-02 NOTE — ED PROVIDER NOTES
MCV 95.6 78 - 100 FL    MCH 31.0 27 - 31 PG    MCHC 32.5 32.0 - 36.0 %    RDW 12.6 11.7 - 14.9 %    Platelets 728 278 - 996 K/CU MM    MPV 9.6 7.5 - 11.1 FL    Differential Type AUTOMATED DIFFERENTIAL     Segs Relative 86.5 (H) 36 - 66 %    Lymphocytes % 6.3 (L) 24 - 44 %    Monocytes % 5.1 (H) 0 - 4 %    Eosinophils % 0.9 0 - 3 %    Basophils % 0.7 0 - 1 %    Segs Absolute 12.0 K/CU MM    Lymphocytes Absolute 0.9 K/CU MM    Monocytes Absolute 0.7 K/CU MM    Eosinophils Absolute 0.1 K/CU MM    Basophils Absolute 0.1 K/CU MM    Nucleated RBC % 0.0 %    Total Nucleated RBC 0.0 K/CU MM    Total Immature Neutrophil 0.07 K/CU MM    Immature Neutrophil % 0.5 (H) 0 - 0.43 %   Comprehensive Metabolic Panel w/ Reflex to MG   Result Value Ref Range    Sodium 142 135 - 145 MMOL/L    Potassium (LL) 3.5 - 5.1 MMOL/L     2.4  K CALLED TO CINDY FLETCHER 2/2 0311 BY YANELI VALLEJO   RESULTS READ BACK      Chloride 115 (H) 99 - 110 mMol/L    CO2 12 (L) 21 - 32 MMOL/L    BUN 14 6 - 23 MG/DL    CREATININE 0.9 0.9 - 1.3 MG/DL    Glucose 209 (H) 70 - 99 MG/DL    Calcium (LL) 8.3 - 10.6 MG/DL     5.6  CA CALLED TO CINDY FLETCHER 2/2 0311 BY YANELI VALLEJO   RESULTS READ BACK      Alb 2.3 (L) 3.4 - 5.0 GM/DL    Total Protein 4.2 (L) 6.4 - 8.2 GM/DL    Total Bilirubin 0.4 0.0 - 1.0 MG/DL    ALT 17 10 - 40 U/L    AST 12 (L) 15 - 37 IU/L    Alkaline Phosphatase 31 (L) 40 - 128 IU/L    GFR Non-African American >60 >60 mL/min/1.73m2    GFR African American >60 >60 mL/min/1.73m2    Anion Gap 15 4 - 16   Lactic Acid, Plasma   Result Value Ref Range    Lactate (HH) 0.4 - 2.0 mMOL/L     4.5  CALLED TO CINDY FLETCHER 2/2 0312 BY YANELI VALLEJO   RESULTS READ BACK     EKG 12 Lead   Result Value Ref Range    Ventricular Rate 104 BPM    Atrial Rate 104 BPM    P-R Interval 144 ms    QRS Duration 118 ms    Q-T Interval 364 ms    QTc Calculation (Bazett) 478 ms    P Axis 104 degrees    R Axis 111 degrees    T Axis 41 degrees    Diagnosis       **

## 2020-02-03 VITALS
HEIGHT: 71 IN | OXYGEN SATURATION: 95 % | HEART RATE: 84 BPM | TEMPERATURE: 98.7 F | RESPIRATION RATE: 35 BRPM | WEIGHT: 295.2 LBS | DIASTOLIC BLOOD PRESSURE: 86 MMHG | SYSTOLIC BLOOD PRESSURE: 143 MMHG | BODY MASS INDEX: 41.33 KG/M2

## 2020-02-03 LAB
ALBUMIN SERPL-MCNC: 3.7 GM/DL (ref 3.4–5)
ALP BLD-CCNC: 42 IU/L (ref 40–128)
ALT SERPL-CCNC: 22 U/L (ref 10–40)
ANION GAP SERPL CALCULATED.3IONS-SCNC: 11 MMOL/L (ref 4–16)
AST SERPL-CCNC: 17 IU/L (ref 15–37)
BASOPHILS ABSOLUTE: 0 K/CU MM
BASOPHILS RELATIVE PERCENT: 0.1 % (ref 0–1)
BILIRUB SERPL-MCNC: 0.3 MG/DL (ref 0–1)
BUN BLDV-MCNC: 14 MG/DL (ref 6–23)
CALCIUM SERPL-MCNC: 9 MG/DL (ref 8.3–10.6)
CHLORIDE BLD-SCNC: 107 MMOL/L (ref 99–110)
CO2: 22 MMOL/L (ref 21–32)
CREAT SERPL-MCNC: 1.1 MG/DL (ref 0.9–1.3)
DIFFERENTIAL TYPE: ABNORMAL
EOSINOPHILS ABSOLUTE: 0.3 K/CU MM
EOSINOPHILS RELATIVE PERCENT: 5.1 % (ref 0–3)
GFR AFRICAN AMERICAN: >60 ML/MIN/1.73M2
GFR NON-AFRICAN AMERICAN: >60 ML/MIN/1.73M2
GLUCOSE BLD-MCNC: 191 MG/DL (ref 70–99)
HCT VFR BLD CALC: 46.8 % (ref 42–52)
HEMOGLOBIN: 14.9 GM/DL (ref 13.5–18)
IMMATURE NEUTROPHIL %: 0.4 % (ref 0–0.43)
LYMPHOCYTES ABSOLUTE: 1.3 K/CU MM
LYMPHOCYTES RELATIVE PERCENT: 19.5 % (ref 24–44)
MAGNESIUM: 2.2 MG/DL (ref 1.8–2.4)
MCH RBC QN AUTO: 31 PG (ref 27–31)
MCHC RBC AUTO-ENTMCNC: 31.8 % (ref 32–36)
MCV RBC AUTO: 97.5 FL (ref 78–100)
MONOCYTES ABSOLUTE: 0.8 K/CU MM
MONOCYTES RELATIVE PERCENT: 12.2 % (ref 0–4)
NUCLEATED RBC %: 0 %
PDW BLD-RTO: 13.1 % (ref 11.7–14.9)
PLATELET # BLD: 214 K/CU MM (ref 140–440)
PMV BLD AUTO: 9.6 FL (ref 7.5–11.1)
POTASSIUM SERPL-SCNC: 4.7 MMOL/L (ref 3.5–5.1)
RBC # BLD: 4.8 M/CU MM (ref 4.6–6.2)
SEGMENTED NEUTROPHILS ABSOLUTE COUNT: 4.2 K/CU MM
SEGMENTED NEUTROPHILS RELATIVE PERCENT: 62.7 % (ref 36–66)
SODIUM BLD-SCNC: 140 MMOL/L (ref 135–145)
TOTAL IMMATURE NEUTOROPHIL: 0.03 K/CU MM
TOTAL NUCLEATED RBC: 0 K/CU MM
TOTAL PROTEIN: 6.1 GM/DL (ref 6.4–8.2)
WBC # BLD: 6.7 K/CU MM (ref 4–10.5)

## 2020-02-03 PROCEDURE — 6360000002 HC RX W HCPCS: Performed by: INTERNAL MEDICINE

## 2020-02-03 PROCEDURE — 2500000003 HC RX 250 WO HCPCS: Performed by: INTERNAL MEDICINE

## 2020-02-03 PROCEDURE — 85025 COMPLETE CBC W/AUTO DIFF WBC: CPT

## 2020-02-03 PROCEDURE — 80053 COMPREHEN METABOLIC PANEL: CPT

## 2020-02-03 PROCEDURE — 6370000000 HC RX 637 (ALT 250 FOR IP): Performed by: INTERNAL MEDICINE

## 2020-02-03 PROCEDURE — 6370000000 HC RX 637 (ALT 250 FOR IP): Performed by: EMERGENCY MEDICINE

## 2020-02-03 PROCEDURE — 83735 ASSAY OF MAGNESIUM: CPT

## 2020-02-03 PROCEDURE — 2580000003 HC RX 258: Performed by: INTERNAL MEDICINE

## 2020-02-03 RX ADMIN — PREDNISONE 30 MG: 20 TABLET ORAL at 09:50

## 2020-02-03 RX ADMIN — FAMOTIDINE 20 MG: 10 INJECTION INTRAVENOUS at 09:48

## 2020-02-03 RX ADMIN — ENOXAPARIN SODIUM 40 MG: 40 INJECTION SUBCUTANEOUS at 09:47

## 2020-02-03 RX ADMIN — POTASSIUM CHLORIDE 40 MEQ: 1500 TABLET, EXTENDED RELEASE ORAL at 09:48

## 2020-02-03 RX ADMIN — SODIUM CHLORIDE, PRESERVATIVE FREE 10 ML: 5 INJECTION INTRAVENOUS at 09:50

## 2020-02-03 RX ADMIN — CYANOCOBALAMIN TAB 1000 MCG 1000 MCG: 1000 TAB at 09:48

## 2020-02-03 RX ADMIN — PYRIDOSTIGMINE BROMIDE 60 MG: 60 TABLET ORAL at 06:33

## 2020-02-03 RX ADMIN — PYRIDOSTIGMINE BROMIDE 60 MG: 60 TABLET ORAL at 00:54

## 2020-02-03 ASSESSMENT — PAIN SCALES - GENERAL
PAINLEVEL_OUTOF10: 0
PAINLEVEL_OUTOF10: 0

## 2020-02-03 NOTE — DISCHARGE INSTR - COC
41.17 kg/m²     Last documented pain score (0-10 scale): Pain Level: 0  Last Weight:   Wt Readings from Last 1 Encounters:   02/03/20 295 lb 3.1 oz (133.9 kg)     Mental Status:  {IP PT MENTAL STATUS:20030}    IV Access:  { ERI IV ACCESS:939791128}    Nursing Mobility/ADLs:  Walking   {P DME HVPD:965834883}  Transfer  {P DME WSHD:989958978}  Bathing  {P DME MZQH:136863363}  Dressing  {CHP DME OCWW:809414928}  Toileting  {CHP DME OJRJ:739754091}  Feeding  {P DME RYRM:419074109}  Med Admin  {P DME JCJN:910920522}  Med Delivery   { ERI MED Delivery:103451941}    Wound Care Documentation and Therapy:        Elimination:  Continence:   · Bowel: {YES / BC:71902}  · Bladder: {YES / YC:66874}  Urinary Catheter: {Urinary Catheter:370280573}   Colostomy/Ileostomy/Ileal Conduit: {YES / NA:34985}  Ileostomy Ileostomy RLQ-Stomal Appliance: Clean, Dry, Intact  Ileostomy Ileostomy RLQ-Stoma  Assessment: Red  Ileostomy Ileostomy RLQ-Mucocutaneous Junction: Intact  Ileostomy Ileostomy RLQ-Peristomal Assessment: Intact, Pink, Red  Ileostomy Ileostomy RLQ-Stool Appearance: Loose  Ileostomy Ileostomy RLQ-Stool Color: Brown  Ileostomy Ileostomy RLQ-Stool Amount: Medium  Ileostomy Ileostomy RLQ-Output (mL): 400 ml    Date of Last BM: ***    Intake/Output Summary (Last 24 hours) at 2/3/2020 1141  Last data filed at 2/3/2020 0959  Gross per 24 hour   Intake 1345 ml   Output 400 ml   Net 945 ml     I/O last 3 completed shifts:   In: 1105 [I.V.:1105]  Out: 400 [Stool:400]    Safety Concerns:     [address] Safety Concerns:986564018}    Impairments/Disabilities:      [address] Impairments/Disabilities:112463474}    Nutrition Therapy:  Current Nutrition Therapy:   [address] Diet List:841040046}    Routes of Feeding: {CHP DME Other Feedings:590875028}  Liquids: {Slp liquid thickness:94094}  Daily Fluid Restriction: {CHP DME Yes amt example:977086518}  Last Modified Barium Swallow with Video (Video Swallowing Test): {Done Not Done

## 2020-02-05 LAB
EKG ATRIAL RATE: 104 BPM
EKG DIAGNOSIS: NORMAL
EKG P AXIS: 104 DEGREES
EKG P-R INTERVAL: 144 MS
EKG Q-T INTERVAL: 364 MS
EKG QRS DURATION: 118 MS
EKG QTC CALCULATION (BAZETT): 478 MS
EKG R AXIS: 111 DEGREES
EKG T AXIS: 41 DEGREES
EKG VENTRICULAR RATE: 104 BPM

## 2020-12-12 ENCOUNTER — APPOINTMENT (OUTPATIENT)
Dept: GENERAL RADIOLOGY | Age: 60
DRG: 174 | End: 2020-12-12
Payer: MEDICAID

## 2020-12-12 ENCOUNTER — HOSPITAL ENCOUNTER (INPATIENT)
Age: 60
LOS: 3 days | Discharge: HOME | DRG: 174 | End: 2020-12-15
Attending: EMERGENCY MEDICINE | Admitting: INTERNAL MEDICINE
Payer: MEDICAID

## 2020-12-12 PROBLEM — R06.02 SOB (SHORTNESS OF BREATH): Status: ACTIVE | Noted: 2020-12-12

## 2020-12-12 LAB
ADENOVIRUS DETECTION BY PCR: NOT DETECTED
ALBUMIN SERPL-MCNC: 3.8 GM/DL (ref 3.4–5)
ALP BLD-CCNC: 42 IU/L (ref 40–128)
ALT SERPL-CCNC: 109 U/L (ref 10–40)
ANION GAP SERPL CALCULATED.3IONS-SCNC: 12 MMOL/L (ref 4–16)
APTT: 35.8 SECONDS (ref 25.1–37.1)
AST SERPL-CCNC: 74 IU/L (ref 15–37)
BASOPHILS ABSOLUTE: 0.1 K/CU MM
BASOPHILS RELATIVE PERCENT: 0.7 % (ref 0–1)
BILIRUB SERPL-MCNC: 0.2 MG/DL (ref 0–1)
BORDETELLA PARAPERTUSSIS BY PCR: NOT DETECTED
BORDETELLA PERTUSSIS PCR: NOT DETECTED
BUN BLDV-MCNC: 10 MG/DL (ref 6–23)
CALCIUM SERPL-MCNC: 8.6 MG/DL (ref 8.3–10.6)
CHLAMYDOPHILA PNEUMONIA PCR: NOT DETECTED
CHLORIDE BLD-SCNC: 105 MMOL/L (ref 99–110)
CO2: 19 MMOL/L (ref 21–32)
CORONAVIRUS 229E PCR: NOT DETECTED
CORONAVIRUS HKU1 PCR: NOT DETECTED
CORONAVIRUS NL63 PCR: NOT DETECTED
CORONAVIRUS OC43 PCR: NOT DETECTED
CREAT SERPL-MCNC: 1 MG/DL (ref 0.9–1.3)
DIFFERENTIAL TYPE: ABNORMAL
EKG ATRIAL RATE: 104 BPM
EKG ATRIAL RATE: 187 BPM
EKG DIAGNOSIS: NORMAL
EKG DIAGNOSIS: NORMAL
EKG Q-T INTERVAL: 290 MS
EKG Q-T INTERVAL: 302 MS
EKG QRS DURATION: 108 MS
EKG QRS DURATION: 124 MS
EKG QTC CALCULATION (BAZETT): 447 MS
EKG QTC CALCULATION (BAZETT): 490 MS
EKG R AXIS: 215 DEGREES
EKG R AXIS: 262 DEGREES
EKG T AXIS: -1 DEGREES
EKG T AXIS: -21 DEGREES
EKG VENTRICULAR RATE: 132 BPM
EKG VENTRICULAR RATE: 172 BPM
EOSINOPHILS ABSOLUTE: 0.5 K/CU MM
EOSINOPHILS RELATIVE PERCENT: 6 % (ref 0–3)
GFR AFRICAN AMERICAN: >60 ML/MIN/1.73M2
GFR NON-AFRICAN AMERICAN: >60 ML/MIN/1.73M2
GLUCOSE BLD-MCNC: 164 MG/DL (ref 70–99)
HCT VFR BLD CALC: 45.4 % (ref 42–52)
HCT VFR BLD CALC: 46.9 % (ref 42–52)
HEMOGLOBIN: 14.5 GM/DL (ref 13.5–18)
HEMOGLOBIN: 15.3 GM/DL (ref 13.5–18)
HUMAN METAPNEUMOVIRUS PCR: NOT DETECTED
IMMATURE NEUTROPHIL %: 0.1 % (ref 0–0.43)
INFLUENZA A BY PCR: NOT DETECTED
INFLUENZA A H1 (2009) PCR: NOT DETECTED
INFLUENZA A H1 PANDEMIC PCR: NOT DETECTED
INFLUENZA A H3 PCR: NOT DETECTED
INFLUENZA B BY PCR: NOT DETECTED
LYMPHOCYTES ABSOLUTE: 2 K/CU MM
LYMPHOCYTES RELATIVE PERCENT: 26.3 % (ref 24–44)
MAGNESIUM: 2.1 MG/DL (ref 1.8–2.4)
MCH RBC QN AUTO: 31.6 PG (ref 27–31)
MCH RBC QN AUTO: 31.9 PG (ref 27–31)
MCHC RBC AUTO-ENTMCNC: 31.9 % (ref 32–36)
MCHC RBC AUTO-ENTMCNC: 32.6 % (ref 32–36)
MCV RBC AUTO: 96.9 FL (ref 78–100)
MCV RBC AUTO: 99.8 FL (ref 78–100)
MONOCYTES ABSOLUTE: 0.6 K/CU MM
MONOCYTES RELATIVE PERCENT: 7.6 % (ref 0–4)
MYCOPLASMA PNEUMONIAE PCR: NOT DETECTED
NUCLEATED RBC %: 0 %
PARAINFLUENZA 1 PCR: NOT DETECTED
PARAINFLUENZA 2 PCR: NOT DETECTED
PARAINFLUENZA 3 PCR: NOT DETECTED
PARAINFLUENZA 4 PCR: NOT DETECTED
PDW BLD-RTO: 12.8 % (ref 11.7–14.9)
PDW BLD-RTO: 13 % (ref 11.7–14.9)
PLATELET # BLD: 197 K/CU MM (ref 140–440)
PLATELET # BLD: 209 K/CU MM (ref 140–440)
PMV BLD AUTO: 10.5 FL (ref 7.5–11.1)
PMV BLD AUTO: 9.8 FL (ref 7.5–11.1)
POTASSIUM SERPL-SCNC: 4.2 MMOL/L (ref 3.5–5.1)
PRO-BNP: 1722 PG/ML
PROCALCITONIN: 0.05
RBC # BLD: 4.55 M/CU MM (ref 4.6–6.2)
RBC # BLD: 4.84 M/CU MM (ref 4.6–6.2)
RHINOVIRUS ENTEROVIRUS PCR: NOT DETECTED
RSV PCR: NOT DETECTED
SARS-COV-2, NAAT: NOT DETECTED
SARS-COV-2: NOT DETECTED
SEGMENTED NEUTROPHILS ABSOLUTE COUNT: 4.4 K/CU MM
SEGMENTED NEUTROPHILS RELATIVE PERCENT: 59.3 % (ref 36–66)
SODIUM BLD-SCNC: 136 MMOL/L (ref 135–145)
SOURCE: NORMAL
T4 FREE: 0.96 NG/DL (ref 0.9–1.8)
TOTAL IMMATURE NEUTOROPHIL: 0.01 K/CU MM
TOTAL NUCLEATED RBC: 0 K/CU MM
TOTAL PROTEIN: 6.3 GM/DL (ref 6.4–8.2)
TROPONIN T: 0.16 NG/ML
TROPONIN T: 0.23 NG/ML
TSH HIGH SENSITIVITY: 1 UIU/ML (ref 0.27–4.2)
WBC # BLD: 7.1 K/CU MM (ref 4–10.5)
WBC # BLD: 7.5 K/CU MM (ref 4–10.5)

## 2020-12-12 PROCEDURE — 6360000002 HC RX W HCPCS: Performed by: EMERGENCY MEDICINE

## 2020-12-12 PROCEDURE — 84443 ASSAY THYROID STIM HORMONE: CPT

## 2020-12-12 PROCEDURE — 6370000000 HC RX 637 (ALT 250 FOR IP): Performed by: INTERNAL MEDICINE

## 2020-12-12 PROCEDURE — 80053 COMPREHEN METABOLIC PANEL: CPT

## 2020-12-12 PROCEDURE — 2580000003 HC RX 258: Performed by: INTERNAL MEDICINE

## 2020-12-12 PROCEDURE — 0202U NFCT DS 22 TRGT SARS-COV-2: CPT

## 2020-12-12 PROCEDURE — 85025 COMPLETE CBC W/AUTO DIFF WBC: CPT

## 2020-12-12 PROCEDURE — 83735 ASSAY OF MAGNESIUM: CPT

## 2020-12-12 PROCEDURE — 6360000002 HC RX W HCPCS: Performed by: INTERNAL MEDICINE

## 2020-12-12 PROCEDURE — 96376 TX/PRO/DX INJ SAME DRUG ADON: CPT

## 2020-12-12 PROCEDURE — 99285 EMERGENCY DEPT VISIT HI MDM: CPT

## 2020-12-12 PROCEDURE — 6370000000 HC RX 637 (ALT 250 FOR IP): Performed by: HOSPITALIST

## 2020-12-12 PROCEDURE — 84439 ASSAY OF FREE THYROXINE: CPT

## 2020-12-12 PROCEDURE — 2140000000 HC CCU INTERMEDIATE R&B

## 2020-12-12 PROCEDURE — 93005 ELECTROCARDIOGRAM TRACING: CPT | Performed by: PHYSICIAN ASSISTANT

## 2020-12-12 PROCEDURE — U0002 COVID-19 LAB TEST NON-CDC: HCPCS

## 2020-12-12 PROCEDURE — 84484 ASSAY OF TROPONIN QUANT: CPT

## 2020-12-12 PROCEDURE — 85730 THROMBOPLASTIN TIME PARTIAL: CPT

## 2020-12-12 PROCEDURE — 71045 X-RAY EXAM CHEST 1 VIEW: CPT

## 2020-12-12 PROCEDURE — 36415 COLL VENOUS BLD VENIPUNCTURE: CPT

## 2020-12-12 PROCEDURE — 93010 ELECTROCARDIOGRAM REPORT: CPT | Performed by: INTERNAL MEDICINE

## 2020-12-12 PROCEDURE — 84145 PROCALCITONIN (PCT): CPT

## 2020-12-12 PROCEDURE — 2500000003 HC RX 250 WO HCPCS: Performed by: EMERGENCY MEDICINE

## 2020-12-12 PROCEDURE — 6360000002 HC RX W HCPCS: Performed by: HOSPITALIST

## 2020-12-12 PROCEDURE — 6370000000 HC RX 637 (ALT 250 FOR IP): Performed by: EMERGENCY MEDICINE

## 2020-12-12 PROCEDURE — 93005 ELECTROCARDIOGRAM TRACING: CPT | Performed by: EMERGENCY MEDICINE

## 2020-12-12 PROCEDURE — 94761 N-INVAS EAR/PLS OXIMETRY MLT: CPT

## 2020-12-12 PROCEDURE — 83880 ASSAY OF NATRIURETIC PEPTIDE: CPT

## 2020-12-12 PROCEDURE — 2580000003 HC RX 258: Performed by: EMERGENCY MEDICINE

## 2020-12-12 PROCEDURE — 96365 THER/PROPH/DIAG IV INF INIT: CPT

## 2020-12-12 PROCEDURE — 99254 IP/OBS CNSLTJ NEW/EST MOD 60: CPT | Performed by: INTERNAL MEDICINE

## 2020-12-12 PROCEDURE — 85027 COMPLETE CBC AUTOMATED: CPT

## 2020-12-12 PROCEDURE — 2500000003 HC RX 250 WO HCPCS: Performed by: HOSPITALIST

## 2020-12-12 RX ORDER — HEPARIN SODIUM 1000 [USP'U]/ML
2000 INJECTION, SOLUTION INTRAVENOUS; SUBCUTANEOUS PRN
Status: DISCONTINUED | OUTPATIENT
Start: 2020-12-12 | End: 2020-12-15

## 2020-12-12 RX ORDER — ASPIRIN 81 MG/1
81 TABLET, CHEWABLE ORAL DAILY
Status: DISCONTINUED | OUTPATIENT
Start: 2020-12-13 | End: 2020-12-14 | Stop reason: SDUPTHER

## 2020-12-12 RX ORDER — PYRIDOSTIGMINE BROMIDE 60 MG/1
60 TABLET ORAL
Status: DISCONTINUED | OUTPATIENT
Start: 2020-12-12 | End: 2020-12-15 | Stop reason: HOSPADM

## 2020-12-12 RX ORDER — HEPARIN SODIUM 1000 [USP'U]/ML
4000 INJECTION, SOLUTION INTRAVENOUS; SUBCUTANEOUS PRN
Status: DISCONTINUED | OUTPATIENT
Start: 2020-12-12 | End: 2020-12-15

## 2020-12-12 RX ORDER — DILTIAZEM HYDROCHLORIDE 5 MG/ML
10 INJECTION INTRAVENOUS ONCE
Status: COMPLETED | OUTPATIENT
Start: 2020-12-12 | End: 2020-12-12

## 2020-12-12 RX ORDER — PROMETHAZINE HYDROCHLORIDE 25 MG/1
12.5 TABLET ORAL EVERY 6 HOURS PRN
Status: DISCONTINUED | OUTPATIENT
Start: 2020-12-12 | End: 2020-12-15 | Stop reason: HOSPADM

## 2020-12-12 RX ORDER — SODIUM CHLORIDE 0.9 % (FLUSH) 0.9 %
10 SYRINGE (ML) INJECTION PRN
Status: DISCONTINUED | OUTPATIENT
Start: 2020-12-12 | End: 2020-12-15 | Stop reason: HOSPADM

## 2020-12-12 RX ORDER — DIGOXIN 0.25 MG/ML
500 INJECTION INTRAMUSCULAR; INTRAVENOUS ONCE
Status: COMPLETED | OUTPATIENT
Start: 2020-12-12 | End: 2020-12-12

## 2020-12-12 RX ORDER — SODIUM CHLORIDE 0.9 % (FLUSH) 0.9 %
10 SYRINGE (ML) INJECTION EVERY 12 HOURS SCHEDULED
Status: DISCONTINUED | OUTPATIENT
Start: 2020-12-12 | End: 2020-12-15 | Stop reason: HOSPADM

## 2020-12-12 RX ORDER — ATORVASTATIN CALCIUM 40 MG/1
40 TABLET, FILM COATED ORAL NIGHTLY
Status: DISCONTINUED | OUTPATIENT
Start: 2020-12-12 | End: 2020-12-14 | Stop reason: SDUPTHER

## 2020-12-12 RX ORDER — 0.9 % SODIUM CHLORIDE 0.9 %
1000 INTRAVENOUS SOLUTION INTRAVENOUS ONCE
Status: COMPLETED | OUTPATIENT
Start: 2020-12-12 | End: 2020-12-12

## 2020-12-12 RX ORDER — LANOLIN ALCOHOL/MO/W.PET/CERES
1000 CREAM (GRAM) TOPICAL DAILY
Status: DISCONTINUED | OUTPATIENT
Start: 2020-12-12 | End: 2020-12-15 | Stop reason: HOSPADM

## 2020-12-12 RX ORDER — ACETAMINOPHEN 500 MG
1000 TABLET ORAL ONCE
Status: COMPLETED | OUTPATIENT
Start: 2020-12-12 | End: 2020-12-12

## 2020-12-12 RX ORDER — HEPARIN SODIUM 10000 [USP'U]/100ML
1000 INJECTION, SOLUTION INTRAVENOUS CONTINUOUS
Status: DISCONTINUED | OUTPATIENT
Start: 2020-12-12 | End: 2020-12-15

## 2020-12-12 RX ORDER — ACETAMINOPHEN 325 MG/1
650 TABLET ORAL EVERY 6 HOURS PRN
Status: DISCONTINUED | OUTPATIENT
Start: 2020-12-12 | End: 2020-12-14 | Stop reason: SDUPTHER

## 2020-12-12 RX ORDER — HEPARIN SODIUM 1000 [USP'U]/ML
4000 INJECTION, SOLUTION INTRAVENOUS; SUBCUTANEOUS ONCE
Status: COMPLETED | OUTPATIENT
Start: 2020-12-12 | End: 2020-12-12

## 2020-12-12 RX ORDER — ALBUTEROL SULFATE 90 UG/1
2 AEROSOL, METERED RESPIRATORY (INHALATION) EVERY 4 HOURS PRN
Status: DISCONTINUED | OUTPATIENT
Start: 2020-12-12 | End: 2020-12-15 | Stop reason: HOSPADM

## 2020-12-12 RX ORDER — POLYETHYLENE GLYCOL 3350 17 G/17G
17 POWDER, FOR SOLUTION ORAL DAILY PRN
Status: DISCONTINUED | OUTPATIENT
Start: 2020-12-12 | End: 2020-12-15 | Stop reason: HOSPADM

## 2020-12-12 RX ORDER — ACETAMINOPHEN 650 MG/1
650 SUPPOSITORY RECTAL EVERY 6 HOURS PRN
Status: DISCONTINUED | OUTPATIENT
Start: 2020-12-12 | End: 2020-12-15 | Stop reason: HOSPADM

## 2020-12-12 RX ORDER — ONDANSETRON 2 MG/ML
4 INJECTION INTRAMUSCULAR; INTRAVENOUS EVERY 6 HOURS PRN
Status: DISCONTINUED | OUTPATIENT
Start: 2020-12-12 | End: 2020-12-15 | Stop reason: HOSPADM

## 2020-12-12 RX ADMIN — PREDNISONE 13 MG: 1 TABLET ORAL at 13:43

## 2020-12-12 RX ADMIN — HEPARIN SODIUM 4000 UNITS: 1000 INJECTION INTRAVENOUS; SUBCUTANEOUS at 08:59

## 2020-12-12 RX ADMIN — DEXTROSE MONOHYDRATE 5 MG/HR: 50 INJECTION, SOLUTION INTRAVENOUS at 05:51

## 2020-12-12 RX ADMIN — PYRIDOSTIGMINE BROMIDE 60 MG: 60 TABLET ORAL at 11:36

## 2020-12-12 RX ADMIN — ATORVASTATIN CALCIUM 40 MG: 40 TABLET, FILM COATED ORAL at 22:16

## 2020-12-12 RX ADMIN — DEXTROSE MONOHYDRATE 15 MG/HR: 50 INJECTION, SOLUTION INTRAVENOUS at 19:06

## 2020-12-12 RX ADMIN — ACETAMINOPHEN 1000 MG: 500 TABLET ORAL at 07:17

## 2020-12-12 RX ADMIN — PYRIDOSTIGMINE BROMIDE 60 MG: 60 TABLET ORAL at 19:00

## 2020-12-12 RX ADMIN — CYANOCOBALAMIN TAB 1000 MCG 1000 MCG: 1000 TAB at 13:43

## 2020-12-12 RX ADMIN — SODIUM CHLORIDE, PRESERVATIVE FREE 10 ML: 5 INJECTION INTRAVENOUS at 22:16

## 2020-12-12 RX ADMIN — SODIUM CHLORIDE 1000 ML: 9 INJECTION, SOLUTION INTRAVENOUS at 05:55

## 2020-12-12 RX ADMIN — DIGOXIN 500 MCG: 0.25 INJECTION INTRAMUSCULAR; INTRAVENOUS at 11:36

## 2020-12-12 RX ADMIN — HEPARIN SODIUM 2000 UNITS: 1000 INJECTION INTRAVENOUS; SUBCUTANEOUS at 16:32

## 2020-12-12 RX ADMIN — DEXTROSE MONOHYDRATE 15 MG/HR: 50 INJECTION, SOLUTION INTRAVENOUS at 06:59

## 2020-12-12 RX ADMIN — PYRIDOSTIGMINE BROMIDE 60 MG: 60 TABLET ORAL at 13:43

## 2020-12-12 RX ADMIN — PYRIDOSTIGMINE BROMIDE 60 MG: 60 TABLET ORAL at 22:16

## 2020-12-12 RX ADMIN — DILTIAZEM HYDROCHLORIDE 10 MG: 5 INJECTION INTRAVENOUS at 05:44

## 2020-12-12 RX ADMIN — DIGOXIN 500 MCG: 0.25 INJECTION INTRAMUSCULAR; INTRAVENOUS at 07:22

## 2020-12-12 RX ADMIN — HEPARIN SODIUM 10 ML/HR: 10000 INJECTION, SOLUTION INTRAVENOUS at 08:59

## 2020-12-12 ASSESSMENT — PAIN SCALES - GENERAL
PAINLEVEL_OUTOF10: 3
PAINLEVEL_OUTOF10: 6

## 2020-12-12 ASSESSMENT — PAIN DESCRIPTION - PAIN TYPE: TYPE: ACUTE PAIN

## 2020-12-12 ASSESSMENT — PAIN DESCRIPTION - ORIENTATION: ORIENTATION: LEFT

## 2020-12-12 ASSESSMENT — PAIN DESCRIPTION - LOCATION: LOCATION: CHEST

## 2020-12-12 ASSESSMENT — PAIN DESCRIPTION - DESCRIPTORS: DESCRIPTORS: PRESSURE

## 2020-12-12 NOTE — ED PROVIDER NOTES
colitis [facility])      Past Surgical History:   Procedure Laterality Date    CARDIAC SURGERY      ILEOSTOMY OR JEJUNOSTOMY      TONSILLECTOMY       No family history on file.   Social History     Socioeconomic History    Marital status:      Spouse name: Not on file    Number of children: Not on file    Years of education: Not on file    Highest education level: Not on file   Occupational History    Not on file   Social Needs    Financial resource strain: Not on file    Food insecurity     Worry: Not on file     Inability: Not on file    Transportation needs     Medical: Not on file     Non-medical: Not on file   Tobacco Use    Smoking status: Current Every Day Smoker     Packs/day: 0.25     Types: Cigarettes    Smokeless tobacco: Never Used   Substance and Sexual Activity    Alcohol use: Yes     Comment: occ    Drug use: No    Sexual activity: Yes     Partners: Female   Lifestyle    Physical activity     Days per week: Not on file     Minutes per session: Not on file    Stress: Not on file   Relationships    Social connections     Talks on phone: Not on file     Gets together: Not on file     Attends Mandaen service: Not on file     Active member of club or organization: Not on file     Attends meetings of clubs or organizations: Not on file     Relationship status: Not on file    Intimate partner violence     Fear of current or ex partner: Not on file     Emotionally abused: Not on file     Physically abused: Not on file     Forced sexual activity: Not on file   Other Topics Concern    Not on file   Social History Narrative    Not on file     Current Facility-Administered Medications   Medication Dose Route Frequency Provider Last Rate Last Admin    0.9 % sodium chloride bolus  1,000 mL Intravenous Once Daryn Monterroso  mL/hr at 12/12/20 0555 1,000 mL at 12/12/20 0555    dilTIAZem 100 mg in dextrose 5 % 100 mL infusion ([facility])  5 mg/hr Intravenous Continuous Daryn Monterroso MD 15 mL/hr at 12/12/20 0659 15 mg/hr at 12/12/20 0659    heparin (porcine) injection 8,170 Units  60 Units/kg Intravenous Once Aaron Ch MD        heparin (porcine) injection 8,170 Units  60 Units/kg Intravenous PRN Aaron hC MD        heparin (porcine) injection 4,080 Units  30 Units/kg Intravenous PRN Aaron Ch MD        heparin 25,000 units in dextrose 5% 250 mL infusion  12 Units/kg/hr Intravenous Continuous Aaron Ch MD        digoxin Luisito Person) injection 500 mcg  500 mcg Intravenous Once Aaron Ch MD         Current Outpatient Medications   Medication Sig Dispense Refill    albuterol sulfate HFA (PROVENTIL HFA) 108 (90 Base) MCG/ACT inhaler Inhale 2 puffs into the lungs every 4 hours as needed for Wheezing or Shortness of Breath With spacer (and mask if indicated). Thanks.  1 Inhaler 1    vitamin B-12 (CYANOCOBALAMIN) 500 MCG tablet Take 1,000 mcg by mouth daily Indications: 8am       predniSONE (DELTASONE) 5 MG tablet Take 10 mg by mouth daily Indications: Takes at  8am       methocarbamol (ROBAXIN) 750 MG tablet Take 750 mg by mouth daily Indications: Can have more than 1 if neck is stiff- uses when not driving       pyridostigmine (MESTINON) 60 MG tablet Take 1 tablet by mouth 4 times daily (Patient taking differently: Take 60 mg by mouth 6 times daily Indications: Takes at 0800, 1200, 1600, 2000 ) 120 tablet 0    acetaminophen (TYLENOL) 325 MG tablet Take 650 mg by mouth every 4 hours as needed for Pain or Fever       Facility-Administered Medications Ordered in Other Encounters   Medication Dose Route Frequency Provider Last Rate Last Admin    Immune Globulin (Human) solution 20 g  20 g Intravenous Once Adrienne Ponce MD        And    Immune Globulin (Human) solution 20 g  20 g Intravenous Once Adrienne Ponce MD        Immune Globulin (Human) solution 20 g  20 g Intravenous Once Adrienne Ponce MD        And    Immune Globulin (Human) solution 20 g  20 g Intravenous Once Jose Cobb MD         No Known Allergies    Nursing Notes Reviewed    Physical Exam:  ED Triage Vitals   Enc Vitals Group      BP 12/12/20 0528 (!) 144/101      Pulse 12/12/20 0528 175      Resp 12/12/20 0528 18      Temp --       Temp src --       SpO2 12/12/20 0528 96 %      Weight 12/12/20 0527 300 lb (136.1 kg)      Height 12/12/20 0527 5' 11\" (1.803 m)      Head Circumference --       Peak Flow --       Pain Score --       Pain Loc --       Pain Edu? --       Excl. in [address]? --        My pulse ox interpretation is -oxygen saturation in the mid 90s on 2 L per nasal cannula. General appearance: Appears deconditioned and older than stated age. Skin:  Warm. Dry. Eye:  Extraocular movements intact. Ears, nose, mouth and throat:  Oral mucosa moist   Neck:  Trachea midline. Extremity:  Normal ROM. No bilateral lower extremity edema. No calf tenderness to palpation. Heart: Tachycardic and irregular, normal S1 & S2, no extra heart sounds. Perfusion:  Intact. Strong symmetric bilateral radial pulses. Respiratory:  Lungs clear to auscultation bilaterally. Speaking clearly in full sentences. Some increased work of breathing is noted with patient being tachypneic. Abdominal:  Normal bowel sounds. Soft. Nontender. Non distended. Back:  No CVA tenderness to palpation     Neurological:  Alert and oriented times 3. No focal neuro deficits.              Psychiatric:  Appropriate    I have reviewed and interpreted all of the currently available lab results from this visit (if applicable):  Results for orders placed or performed during the hospital encounter of 12/12/20   CBC Auto Differential   Result Value Ref Range    WBC 7.5 4.0 - 10.5 K/CU MM    RBC 4.84 4.6 - 6.2 M/CU MM    Hemoglobin 15.3 13.5 - 18.0 GM/DL    Hematocrit 46.9 42 - 52 %    MCV 96.9 78 - 100 FL    MCH 31.6 (H) 27 - 31 PG    MCHC 32.6 32.0 - 36.0 %    RDW 12.8 11.7 - 14.9 %    Platelets 690 087 - 137 K/CU MM    MPV 9.8 7.5 - 11.1 FL    Differential Type AUTOMATED DIFFERENTIAL     Segs Relative 59.3 36 - 66 %    Lymphocytes % 26.3 24 - 44 %    Monocytes % 7.6 (H) 0 - 4 %    Eosinophils % 6.0 (H) 0 - 3 %    Basophils % 0.7 0 - 1 %    Segs Absolute 4.4 K/CU MM    Lymphocytes Absolute 2.0 K/CU MM    Monocytes Absolute 0.6 K/CU MM    Eosinophils Absolute 0.5 K/CU MM    Basophils Absolute 0.1 K/CU MM    Nucleated RBC % 0.0 %    Total Nucleated RBC 0.0 K/CU MM    Total Immature Neutrophil 0.01 K/CU MM    Immature Neutrophil % 0.1 0 - 0.43 %   Comprehensive Metabolic Panel w/ Reflex to MG   Result Value Ref Range    Sodium 136 135 - 145 MMOL/L    Potassium 4.2 3.5 - 5.1 MMOL/L    Chloride 105 99 - 110 mMol/L    CO2 19 (L) 21 - 32 MMOL/L    BUN 10 6 - 23 MG/DL    CREATININE 1.0 0.9 - 1.3 MG/DL    Glucose 164 (H) 70 - 99 MG/DL    Calcium 8.6 8.3 - 10.6 MG/DL    Alb 3.8 3.4 - 5.0 GM/DL    Total Protein 6.3 (L) 6.4 - 8.2 GM/DL    Total Bilirubin 0.2 0.0 - 1.0 MG/DL     (H) 10 - 40 U/L    AST 74 (H) 15 - 37 IU/L    Alkaline Phosphatase 42 40 - 128 IU/L    GFR Non-African American >60 >60 mL/min/1.73m2    GFR African American >60 >60 mL/min/1.73m2    Anion Gap 12 4 - 16   Brain Natriuretic Peptide   Result Value Ref Range    Pro-BNP 1,722 (H) <300 PG/ML   Troponin   Result Value Ref Range    Troponin T 0.164 (HH) <0.01 NG/ML   TSH without Reflex   Result Value Ref Range    TSH, High Sensitivity 0.996 0.270 - 4.20 uIu/ml   T4, free   Result Value Ref Range    T4 Free 0.96 0.9 - 1.8 NG/DL   Magnesium   Result Value Ref Range    Magnesium 2.1 1.8 - 2.4 mg/dl   COVID-19    Specimen: Nasopharynx/Oropharynx   Result Value Ref Range    Source UNKNOWN     SARS-CoV-2, NAAT NOT DETECTED       Radiographs (if obtained):  [] The following radiograph was interpreted by myself in the absence of a radiologist:   [x] Radiologist's Report Reviewed:  XR CHEST PORTABLE   Preliminary Result   Patchy parenchymal opacities are seen at the lung bases. Findings could be   related atelectasis versus infiltrates. EKG (if obtained): (All EKG's are interpreted by myself in the absence of a cardiologist)  12 lead EKG per my interpretation:  Atrial Fibrillation with RVR at 172  [place] is   Rightward axis  QTc is  slightly prolonged at 490  There is no specific T wave changes appreciated. There is specific ST wave changes appreciated; ST depressions in the anterior and lateral leads. No STEMI    Prior EKG to compare with was available and sinus tachycardia seen on prior with no clinically significant change in overall morphology. Chart review shows recent radiographs:  No results found. MDM:  Pt presents as above. Emergent conditions considered. Presentation prompted initial labs, EKG and imaging as above. EKG with atrial fibrillation with RVR as above. IVs established and IV fluids are initiated. IV Cardizem bolus is given and infusion started for rate control. CBC without clinically significant derangement. CMP is with mild metabolic acidosis and mild hyperglycemia mild transaminitis. BNP is elevated suggestive of some degree of volume overload. Troponin is abnormal and consistent with a non-ST elevation MI.  TSH is within normal limits. Free T4 within normal limits. Magnesium within normal meds. COVID-19 swab is sent and negative. Chest x-ray demonstrating patchy parenchymal opacities in the lung bases that I do believe are secondary to volume overload. I did consult cardiology in the emergency department and spoke with Dr. Koreen Sandifer who evaluated the patient in the ED. He would like digoxin bolus to be added on as patient's heart rate is still elevated into the 140s on the Cardizem infusion. Digoxin boluses given. Patient is also heparinized in the emergency department for his non-ST elevation MI and his atrial fibrillation. Patient is discussed with hospitalist and patient is admitted to medicine.     Questions sought and answered with the patient. They voice understanding and agree with plan. CRITICAL CARE NOTE:  There was a high probability of clinically significant life-threatening deterioration of the patient's condition requiring my urgent intervention due to AFIB with RVR and NSTEMI. IV heparinization, IV Cardizem bolus and infusion management, subspecialty consultation, telemetry monitoring and frequent reassessments for prolonged ED course was performed to address this. Total critical care time is  45 minutes. This includes vital sign monitoring, pulse oximetry monitoring, telemetry monitoring, clinical response to the IV medications, reviewing the nursing notes, consultation time, dictation/documentation time, and interpretation of the lab work. This time excludes time spent performing procedures and separately billable procedures and family discussion time. Clinical Impression:  1. Atrial fibrillation with rapid ventricular response ([address]    2. NSTEMI (non-ST elevated myocardial infarction) [facility])      Disposition referral (if applicable):  No follow-up provider specified. Disposition medications (if applicable):  New Prescriptions    No medications on file       Comment: Please note this report has been produced using speech recognition software and may contain errors related to that system including errors in grammar, punctuation, and spelling, as well as words and phrases that may be inappropriate. If there are any questions or concerns please feel free to contact the dictating provider for clarification.        Ruel Abraham MD  12/12/20 6907

## 2020-12-12 NOTE — CONSULTS
Rate Last Admin    0.9 % sodium chloride bolus  1,000 mL Intravenous Once Jorge Bowden  mL/hr at 12/12/20 0555 1,000 mL at 12/12/20 0555    dilTIAZem 100 mg in dextrose 5 % 100 mL infusion ([facility])  5 mg/hr Intravenous Continuous Jorge Bowden MD 15 mL/hr at 12/12/20 0659 15 mg/hr at 12/12/20 0659    heparin (porcine) injection 4,000 Units  4,000 Units Intravenous Once Jorge Bowden MD        heparin (porcine) injection 4,000 Units  4,000 Units Intravenous PRN Jorge Bowden MD        heparin (porcine) injection 2,000 Units  2,000 Units Intravenous PRN Jorge Bowden MD        heparin 25,000 units in dextrose 5% 250 mL infusion  1,000 Units/hr Intravenous Continuous Jorge Bowden MD         Current Outpatient Medications   Medication Sig Dispense Refill    albuterol sulfate HFA (PROVENTIL HFA) 108 (90 Base) MCG/ACT inhaler Inhale 2 puffs into the lungs every 4 hours as needed for Wheezing or Shortness of Breath With spacer (and mask if indicated). Thanks.  1 Inhaler 1    vitamin B-12 (CYANOCOBALAMIN) 500 MCG tablet Take 1,000 mcg by mouth daily Indications: 8am       predniSONE (DELTASONE) 5 MG tablet Take 10 mg by mouth daily Indications: Takes at  8am       methocarbamol (ROBAXIN) 750 MG tablet Take 750 mg by mouth daily Indications: Can have more than 1 if neck is stiff- uses when not driving       pyridostigmine (MESTINON) 60 MG tablet Take 1 tablet by mouth 4 times daily (Patient taking differently: Take 60 mg by mouth 6 times daily Indications: Takes at 0800, 1200, 1600, 2000 ) 120 tablet 0    acetaminophen (TYLENOL) 325 MG tablet Take 650 mg by mouth every 4 hours as needed for Pain or Fever       Facility-Administered Medications Ordered in Other Encounters   Medication Dose Route Frequency Provider Last Rate Last Admin    Immune Globulin (Human) solution 20 g  20 g Intravenous Once Fiona Yeager MD        And    Immune Globulin (Human) solution 20 g  20 g Intravenous Once Jessica SAAVEDRA MD Enio Siddiqui Immune Globulin (Human) solution 20 g  20 g Intravenous Once Roula Dick MD        And    Immune Globulin (Human) solution 20 g  20 g Intravenous Once Roula Dick MD         Review of Systems:   · Constitutional: No Fever or Weight Loss   · Eyes: No Decreased Vision  · ENT: No Headaches, Hearing Loss or Vertigo  · Cardiovascular: + chest pain, dyspnea on exertion, palpitations or loss of consciousness  · Respiratory: No cough or wheezing    · Gastrointestinal: No abdominal pain, appetite loss, blood in stools, constipation, diarrhea or heartburn  · Genitourinary: No dysuria, trouble voiding, or hematuria  · Musculoskeletal:  No gait disturbance, weakness or joint complaints  · Integumentary: No rash or pruritis  · Neurological: No TIA or stroke symptoms  · Psychiatric: No anxiety or depression  · Endocrine: No malaise, fatigue or temperature intolerance  · Hematologic/Lymphatic: No bleeding problems, blood clots or swollen lymph nodes  · Allergic/Immunologic: No nasal congestion or hives  All systems negative except as marked. ·   ·      Physical Examination:    Vitals:    12/12/20 0602   BP: 113/89   Pulse: 149   Resp: 13   Temp:    SpO2: 97%      Wt Readings from Last 3 Encounters:   12/12/20 300 lb (136.1 kg)   02/03/20 295 lb 3.1 oz (133.9 kg)   12/20/18 (!) 305 lb (138.3 kg)     Body mass index is 41.84 kg/m². General Appearance:  No distress, conversant    Constitutional:  Well developed, Well nourished, No acute distress, Non-toxic appearance. HENT:  Normocephalic, Atraumatic, Bilateral external ears normal, Oropharynx moist, No oral exudates, Nose normal. Neck- Normal range of motion, No tenderness, Supple, No stridor,no apical-carotid delay, no carotid bruit  Eyes:  PERRL, EOMI, Conjunctiva normal, No discharge. Respiratory:  Normal breath sounds, No respiratory distress, No wheezing, No chest tenderness. ,no use of accessory muscles, diaphragm movement is normal  Cardiovascular: (PMI) apex non displaced,no lifts no thrills, no s3,no s4, Normal heart rate, Normal rhythm, No murmurs, No rubs, No gallops. Carotid arteries pulse and amplitude are normal no bruit, no abdominal bruit noted ( normal abdominal aorta ausculation), femoral arteries pulse and amplitude are normal no bruit, pedal pulses are normal  GI:  Bowel sounds normal, Soft, No tenderness, No masses, No pulsatile masses, no hepatosplenomegally, no bruits  : External genitalia appear normal, No masses or lesions. No discharge. No CVA tenderness. Musculoskeletal:  Intact distal pulses, No edema, No tenderness, No cyanosis, No clubbing. Good range of motion in all major joints. No tenderness to palpation or major deformities noted. Back- No tenderness. Integument:  Warm, Dry, No erythema, No rash. Skin: no rash, no ulcers  Lymphatic:  No lymphadenopathy noted. Neurologic:  Alert & oriented x 3, Normal motor function, Normal sensory function, No focal deficits noted. Psychiatric:  Affect normal, Judgment normal, Mood normal.   Lab Review   Recent Labs     12/12/20  0530   WBC 7.5   HGB 15.3   HCT 46.9         Recent Labs     12/12/20  0530      K 4.2      CO2 19*   BUN 10   CREATININE 1.0     Recent Labs     12/12/20  0530   AST 74*   *   BILITOT 0.2   ALKPHOS 42     No results for input(s): TROPONINI in the last 72 hours. No results found for: BNP  No results found for: INR, PROTIME      EKG:afib RVR    Chest Xray:PATCHY opacities in lung bases    ECHO: pending  Labs, echo, meds reviewed  Assessment: 63 y. o.year old with PMH of  has a past medical history of Heart attack ([facility] [address] Heart disease, History of blood transfusion, Hx of blood clots, Hypertension, Myasthenia gravis ([address] TIA (transient ischemic attack), and Ulcerative colitis ([address] Recommendations:    1. Chest pain, elevated trop, AFIB RVR, ?  Lung opacities in the lung bases, will recommend to r/o COVID 19 also, admit to hosptial, will rate control his afib, with cardizem, dig, once his symptoms improve, will need ischemia work up ( stress test or LHC), HE WILL also need full dose anticoagulation  2. Sleep apnea: recommend treatment  3. H/o TIA  4. CAD: h/o stent in past, patient does not know details, he appears non complaint. 5. Myasthenia gravis\": stable  6. HTN: stable  7. Ulcerative colitis:s table  8. Health maintenance: exerise and diet  All labs, medications and tests reviewed, continue all other medications of all above medical condition listed as is.          Antonina Peacock MD, 12/12/2020 7:31 AM

## 2020-12-12 NOTE — ED TRIAGE NOTES
Pt. Presents to the ER from home with c/o of chest pain since last night at 10pm. Pt. States that it got slightly better with rest. Pt. Was given 324 baby aspirin in route as well as 2 nitro with relief from 10/10 to 6/10. Pt. Also has hx of myasthenia gravis.

## 2020-12-12 NOTE — H&P
[address] [place] [address]  HOSPITALIST HISTORY AND PHYSICAL     Name:  Ester Garcia /Age/Sex: 1960  (63 y.o. male)   MRN & CSN:  [id number] & [id number] Admission Date/Time: 2020  5:22 AM   Location:  01TR- Attending: Denys Hunter MD                                                  Chief compaint-Chest Pain (began at 10pm; slightly better with rest)    HPI  Ester Garcia is a 63 y.o. male who presents with substernal chest pain and worsening shortness of breath since last night. Denies any fever/chills/cough/nausea/vomiting. No abdominal pain. Work up in ED does show new onset atrial fib with RVR for which started on cardizem drip. Also elevated troponin for which started on heparin drip. Reports history of myasthenia gravis for which has chronic shortness of breath- takes prednisone, mestinon and new clinical trial medication from [facility]      10 point review of systems reviewed and negative unless noted above. ALLERGIES PCP    No Known Allergies No primary care provider on file.    PAST MEDICAL HISTORY SURGICAL HISTORY   Past Medical History:   Diagnosis Date    Heart attack ([address]     Heart disease     stint    History of blood transfusion     Hx of blood clots     Hypertension     [facility]henia gravis ([facility] [address]     TIA (transient ischemic attack)     Ulcerative colitis ([facility] [address]     Past Surgical History:   Procedure Laterality Date    CARDIAC SURGERY      ILEOSTOMY OR JEJUNOSTOMY      TONSILLECTOMY        SOCIAL HISTORY FAMILY HISTORY   Social History     Socioeconomic History    Marital status:      Spouse name: Not on file    Number of children: Not on file    Years of education: Not on file    Highest education level: Not on file   Occupational History    Not on file   Social Needs    Financial resource strain: Not on file    Food insecurity     Worry: Not on file     Inability: Not on file    Transportation needs     Medical: Not on file     Non-medical: Not on file Tobacco Use    Smoking status: Current Every Day Smoker     Packs/day: 0.25     Types: Cigarettes    Smokeless tobacco: Never Used   Substance and Sexual Activity    Alcohol use: Yes     Comment: occ    Drug use: No    Sexual activity: Yes     Partners: Female   Lifestyle    Physical activity     Days per week: Not on file     Minutes per session: Not on file    Stress: Not on file   Relationships    Social connections     Talks on phone: Not on file     Gets together: Not on file     Attends Yazdanism service: Not on file     Active member of club or organization: Not on file     Attends meetings of clubs or organizations: Not on file     Relationship status: Not on file    Intimate partner violence     Fear of current or ex partner: Not on file     Emotionally abused: Not on file     Physically abused: Not on file     Forced sexual activity: Not on file   Other Topics Concern    Not on file   Social History Narrative    Not on file    HTN    HOME MEDICATIONS    Prior to Admission medications    Medication Sig Start Date End Date Taking? Authorizing Provider   albuterol sulfate HFA (PROVENTIL HFA) 108 (90 Base) MCG/ACT inhaler Inhale 2 puffs into the lungs every 4 hours as needed for Wheezing or Shortness of Breath With spacer (and mask if indicated). Thanks.  12/20/18 1/19/19  Kaycee Palomo MD   vitamin B-12 (CYANOCOBALAMIN) 500 MCG tablet Take 1,000 mcg by mouth daily Indications: 8am     Historical Provider, MD   predniSONE (DELTASONE) 5 MG tablet Take 10 mg by mouth daily Indications: Takes at  [address] Provider, MD   methocarbamol (ROBAXIN) 750 MG tablet Take 750 mg by mouth daily Indications: Can have more than 1 if neck is stiff- uses when not driving     Historical Provider, MD   pyridostigmine (MESTINON) 60 MG tablet Take 1 tablet by mouth 4 times daily  Patient taking differently: Take 60 mg by mouth 6 times daily Indications: Takes at 0800, 1200, 1600, 2000  7/20/16   Eneida TRUJILLO Birdie Baker MD   acetaminophen (TYLENOL) 325 MG tablet Take 650 mg by mouth every 4 hours as needed for Pain or Fever    Historical Provider, MD          OBJECTIVE  Vitals:    12/12/20 0730   BP: (!) 121/93   Pulse: 142   Resp: 19   Temp:    SpO2: 96%     No intake or output data in the 24 hours ending 12/12/20 0920  No intake or output data in the 24 hours ending 12/12/20 0920          No intake/output data recorded. PHYSICAL EXAM   GEN Awake male, sitting upright in bed in no apparent distress. EYES Pupils are equally round. No scleral erythema, discharge, or conjunctivitis. HENT Mucous membranes are moist. Oral pharynx without exudates, no evidence of thrush. NECK Supple, no apparent thyromegaly or masses. RESP Clear to auscultation, no wheezes, rales or rhonchi. Symmetric chest movement  CARDIO/VASC S1/S2 auscultated. Regular rate without appreciable murmurs, rubs, or gallops. No JVD or carotid bruits. Peripheral pulses equal bilaterally and palpable. No peripheral edema. GI Abdomen is soft without significant tenderness, masses, or guarding. Bowel sounds are normoactive. Rectal exam deferred.  No costovertebral angle tenderness. Normal appearing external genitalia. F  HEME/LYMPH No palpable cervical lymphadenopathy and no hepatosplenomegaly. No petechiae or ecchymoses. MSK Spontaneous movement of all extremities. No gross joint deformities. SKIN Normal coloration, warm, dry. NEURO Cranial nerves appear grossly intact, normal speech, no lateralizing weakness. PSYCH Awake, alert, oriented x 4. Affect appropriate. LABS  Recent Labs     12/12/20  0530   WBC 7.5   HGB 15.3   HCT 46.9         Recent Labs     12/12/20  0530      K 4.2      CO2 19*   BUN 10   CREATININE 1.0     Recent Labs     12/12/20  0530   AST 74*   *   BILITOT 0.2   ALKPHOS 42     No results for input(s): INR in the last 72 hours.   Recent Labs     12/12/20  0530   TROPONINT 0.164* IMAGING      MEDS  Scheduled Meds:  Continuous Infusions:   diltiazem (CARDIZEM) 100 mg in dextrose 5% 100 mL (ADD-[place]) 15 mg/hr (12/12/20 0659)    heparin (PORCINE) Infusion 10 mL/hr (12/12/20 0859)     PRN Meds:heparin (porcine), heparin (porcine)    ASSESSMENT and [facility] Day: 1  1-New onset atrial fib with RVT with probable NSTEMI- started on cardizem drip and heparin drip in ED, on room air.  However, CXR showing some opacities- rapid covid negative, will get respiratory panel with PCR  2-Elevated AST/ALT- mild, monitor for now  3-Myasthenia gravis- he feels his symptoms at baseline, despite worsening shortness of breath which is due to #1- which now at baseline after controlling HR- confirmed home medications with him- mestinon, prednisone and new clinical trial medication which wife will bring it as not available on formulary yet        Diet No diet orders on file   DVT Prophylaxis [] Lovenox, []  Heparin, [] SCDs, [] Ambulation   GI Prophylaxis [] PPI,  [] H2 Blocker,  [] Carafate,  [] Diet/Tube Feeds   Code Status Prior   Disposition Patient requires continued admission due to atrial fib with RVR   CMS Level of Risk [] Low, [] Moderate,[x]  High  Patient's risk as above due to NSTEMI/atrial fib with RVR       Dr. Jason Tao MD 12/12/2020 9:20 AM

## 2020-12-13 LAB
ALBUMIN SERPL-MCNC: 4 GM/DL (ref 3.4–5)
ALP BLD-CCNC: 42 IU/L (ref 40–128)
ALT SERPL-CCNC: 78 U/L (ref 10–40)
ANION GAP SERPL CALCULATED.3IONS-SCNC: 12 MMOL/L (ref 4–16)
APTT: 34.5 SECONDS (ref 25.1–37.1)
APTT: 39.6 SECONDS (ref 25.1–37.1)
APTT: 40.8 SECONDS (ref 25.1–37.1)
APTT: 43.6 SECONDS (ref 25.1–37.1)
AST SERPL-CCNC: 33 IU/L (ref 15–37)
BILIRUB SERPL-MCNC: 0.4 MG/DL (ref 0–1)
BUN BLDV-MCNC: 7 MG/DL (ref 6–23)
CALCIUM SERPL-MCNC: 9.6 MG/DL (ref 8.3–10.6)
CHLORIDE BLD-SCNC: 103 MMOL/L (ref 99–110)
CO2: 21 MMOL/L (ref 21–32)
CREAT SERPL-MCNC: 1 MG/DL (ref 0.9–1.3)
GFR AFRICAN AMERICAN: >60 ML/MIN/1.73M2
GFR NON-AFRICAN AMERICAN: >60 ML/MIN/1.73M2
GLUCOSE BLD-MCNC: 160 MG/DL (ref 70–99)
HCT VFR BLD CALC: 44 % (ref 42–52)
HEMOGLOBIN: 14.7 GM/DL (ref 13.5–18)
MCH RBC QN AUTO: 32 PG (ref 27–31)
MCHC RBC AUTO-ENTMCNC: 33.4 % (ref 32–36)
MCV RBC AUTO: 95.7 FL (ref 78–100)
PDW BLD-RTO: 12.8 % (ref 11.7–14.9)
PLATELET # BLD: 203 K/CU MM (ref 140–440)
PMV BLD AUTO: 9.8 FL (ref 7.5–11.1)
POTASSIUM SERPL-SCNC: 4.3 MMOL/L (ref 3.5–5.1)
RBC # BLD: 4.6 M/CU MM (ref 4.6–6.2)
SODIUM BLD-SCNC: 136 MMOL/L (ref 135–145)
TOTAL PROTEIN: 6.3 GM/DL (ref 6.4–8.2)
WBC # BLD: 8.2 K/CU MM (ref 4–10.5)

## 2020-12-13 PROCEDURE — 6360000002 HC RX W HCPCS: Performed by: INTERNAL MEDICINE

## 2020-12-13 PROCEDURE — 94761 N-INVAS EAR/PLS OXIMETRY MLT: CPT

## 2020-12-13 PROCEDURE — 2580000003 HC RX 258: Performed by: INTERNAL MEDICINE

## 2020-12-13 PROCEDURE — 6370000000 HC RX 637 (ALT 250 FOR IP): Performed by: HOSPITALIST

## 2020-12-13 PROCEDURE — 85027 COMPLETE CBC AUTOMATED: CPT

## 2020-12-13 PROCEDURE — 2500000003 HC RX 250 WO HCPCS: Performed by: HOSPITALIST

## 2020-12-13 PROCEDURE — 85730 THROMBOPLASTIN TIME PARTIAL: CPT

## 2020-12-13 PROCEDURE — 80053 COMPREHEN METABOLIC PANEL: CPT

## 2020-12-13 PROCEDURE — 6360000002 HC RX W HCPCS: Performed by: HOSPITALIST

## 2020-12-13 PROCEDURE — 6370000000 HC RX 637 (ALT 250 FOR IP): Performed by: INTERNAL MEDICINE

## 2020-12-13 PROCEDURE — 36415 COLL VENOUS BLD VENIPUNCTURE: CPT

## 2020-12-13 PROCEDURE — 2140000000 HC CCU INTERMEDIATE R&B

## 2020-12-13 PROCEDURE — 99233 SBSQ HOSP IP/OBS HIGH 50: CPT | Performed by: INTERNAL MEDICINE

## 2020-12-13 RX ORDER — DIGOXIN 0.25 MG/ML
500 INJECTION INTRAMUSCULAR; INTRAVENOUS ONCE
Status: COMPLETED | OUTPATIENT
Start: 2020-12-13 | End: 2020-12-13

## 2020-12-13 RX ORDER — DIGOXIN 125 MCG
125 TABLET ORAL DAILY
Status: DISCONTINUED | OUTPATIENT
Start: 2020-12-14 | End: 2020-12-15 | Stop reason: HOSPADM

## 2020-12-13 RX ORDER — METOPROLOL SUCCINATE 25 MG/1
25 TABLET, EXTENDED RELEASE ORAL DAILY
Status: DISCONTINUED | OUTPATIENT
Start: 2020-12-13 | End: 2020-12-14 | Stop reason: SDUPTHER

## 2020-12-13 RX ORDER — SODIUM CHLORIDE 9 MG/ML
INJECTION, SOLUTION INTRAVENOUS CONTINUOUS
Status: DISCONTINUED | OUTPATIENT
Start: 2020-12-13 | End: 2020-12-15 | Stop reason: HOSPADM

## 2020-12-13 RX ADMIN — HEPARIN SODIUM 14.1 ML/HR: 10000 INJECTION, SOLUTION INTRAVENOUS at 06:01

## 2020-12-13 RX ADMIN — PYRIDOSTIGMINE BROMIDE 60 MG: 60 TABLET ORAL at 06:01

## 2020-12-13 RX ADMIN — PYRIDOSTIGMINE BROMIDE 60 MG: 60 TABLET ORAL at 20:32

## 2020-12-13 RX ADMIN — SODIUM CHLORIDE, PRESERVATIVE FREE 10 ML: 5 INJECTION INTRAVENOUS at 08:38

## 2020-12-13 RX ADMIN — PYRIDOSTIGMINE BROMIDE 60 MG: 60 TABLET ORAL at 08:38

## 2020-12-13 RX ADMIN — SODIUM CHLORIDE: 9 INJECTION, SOLUTION INTRAVENOUS at 21:25

## 2020-12-13 RX ADMIN — PREDNISONE 13 MG: 1 TABLET ORAL at 08:38

## 2020-12-13 RX ADMIN — ATORVASTATIN CALCIUM 40 MG: 40 TABLET, FILM COATED ORAL at 20:32

## 2020-12-13 RX ADMIN — ASPIRIN 81 MG CHEWABLE TABLET 81 MG: 81 TABLET CHEWABLE at 08:38

## 2020-12-13 RX ADMIN — PYRIDOSTIGMINE BROMIDE 60 MG: 60 TABLET ORAL at 18:14

## 2020-12-13 RX ADMIN — HEPARIN SODIUM 4000 UNITS: 1000 INJECTION, SOLUTION INTRAVENOUS; SUBCUTANEOUS at 20:36

## 2020-12-13 RX ADMIN — DEXTROSE MONOHYDRATE 15 MG/HR: 50 INJECTION, SOLUTION INTRAVENOUS at 10:55

## 2020-12-13 RX ADMIN — SODIUM CHLORIDE: 9 INJECTION, SOLUTION INTRAVENOUS at 11:44

## 2020-12-13 RX ADMIN — CYANOCOBALAMIN TAB 1000 MCG 1000 MCG: 1000 TAB at 08:38

## 2020-12-13 RX ADMIN — DEXTROSE MONOHYDRATE 15 MG/HR: 50 INJECTION, SOLUTION INTRAVENOUS at 02:45

## 2020-12-13 RX ADMIN — HEPARIN SODIUM 1810 UNITS/HR: 10000 INJECTION, SOLUTION INTRAVENOUS at 20:36

## 2020-12-13 RX ADMIN — DIGOXIN 500 MCG: 0.25 INJECTION INTRAMUSCULAR; INTRAVENOUS at 11:44

## 2020-12-13 RX ADMIN — PYRIDOSTIGMINE BROMIDE 60 MG: 60 TABLET ORAL at 15:52

## 2020-12-13 RX ADMIN — METOPROLOL SUCCINATE 25 MG: 25 TABLET, EXTENDED RELEASE ORAL at 11:45

## 2020-12-13 ASSESSMENT — PAIN SCALES - GENERAL
PAINLEVEL_OUTOF10: 0
PAINLEVEL_OUTOF10: 0

## 2020-12-13 NOTE — PROGRESS NOTES
Perfect serve dr. Flo Mckenzie about the trop went from 0.164 at 0530 am to 0.232 at 1459 pm. No new orders

## 2020-12-13 NOTE — PROGRESS NOTES
[address] [place] [address]  HOSPITALIST PROGRESS NOTE                       Name:  Norma Brooks /Age/Sex: 1960  (63 y.o. male)   MRN & CSN:  [id number] & [id number] Admission Date/Time: 2020  5:22 AM   Location:  [address] [place] [address] Attending:  Cristhian Barreto MD                                                  HPI  Norma Brooks is a 63 y.o. male who presents with atrial fib with RVR    SUBJECTIVE  - no chest pain, chronic shortness of breath at baseline    10 point review of systems reviewed and negative unless noted above. ALLERGIES: No Known Allergies    PCP: No primary care provider on file. PAST MEDICAL HISTORY, SURGICAL HISTORY, SOCIAL HISTORY and  HOME MEDICATIONS all reviewed. OBJECTIVE  Vitals:    20 1700 20 1800 20 2200 20 0837   BP: (!) 151/103 (!) 142/97     Pulse: 132 112     Resp: 24 15     Temp:   97.9 °F (36.6 °C) 98.5 °F (36.9 °C)   TempSrc:   Oral Oral   SpO2:   93%    Weight:       Height:           PHYSICAL EXAM   GEN Awake male, sitting upright in bed in no apparent distress. EYES Pupils are equally round. No scleral erythema, discharge, or conjunctivitis. HENT Mucous membranes are moist. Oral pharynx without exudates, no evidence of thrush. NECK Supple, no apparent thyromegaly or masses. RESP Clear to auscultation, no wheezes, rales or rhonchi. Symmetric chest movement while on room air. CARDIO/VASC S1/S2 auscultated. Regular rate without appreciable murmurs, rubs, or gallops. No JVD or carotid bruits. Peripheral pulses equal bilaterally and palpable. No peripheral edema. GI Abdomen is soft without significant tenderness, masses, or guarding. Bowel sounds are normoactive. Rectal exam deferred.  No costovertebral angle tenderness. Normal appearing external genitalia. Andres catheter is not present. HEME/LYMPH No palpable cervical lymphadenopathy and no hepatosplenomegaly. No petechiae or ecchymoses. MSK Spontaneous movement of all extremities.   No controlling regimen being adjusted, awaiting cardiac cath-   -echo pending  2-Elevated AST/ALT- mild, monitor for now  3-Myasthenia gravis- he feels his symptoms at baseline, on mestinon, prednisone and zirucuplan    Today's labs pending                Disp:     Diet DIET CARDIAC; No Caffeine   DVT Prophylaxis [] Lovenox, []  Heparin, [] SCDs, [] Ambulation   GI Prophylaxis [] PPI,  [] H2 Blocker,  [] Carafate,  [] Diet/Tube Feeds   Code Status Full Code   Disposition Patient requires continued admission due to NSTEMI   CMS Level of Risk [] Low, [] Moderate,[x]  High  Patient's risk as above due to NSTEMI     OBI CALVERT MD 12/13/2020 11:40 AM

## 2020-12-13 NOTE — PROGRESS NOTES
Today's plan: add 0.5mg IVP digoxin, start Oral toprol XL 25mg daily, start oral dig 01.25mg daily, start IVF 100cc/HR, ECHO ordered, will need [facility] once afib is controlled,       Admit Date:  12/12/2020    Subjective: no chest pain      Chief complaints on admission  Chief Complaint   Patient presents with    Chest Pain     began at 10pm; slightly better with rest         History of present illness:Aramis is a 63 y. o.year old who  presents with had concerns including Chest Pain (began at 10pm; slightly better with rest). Past medical history:    has a past medical history of Heart attack ([address] Heart disease, History of blood transfusion, Hx of blood clots, Hypertension, Myasthenia gravis ([address] TIA (transient ischemic attack), and Ulcerative colitis ([address] Past surgical history:   has a past surgical history that includes ileostomy or jejunostomy; Tonsillectomy; and Cardiac surgery. Social History:   reports that he has been smoking cigarettes. He has been smoking about 0.25 packs per day. He has never used smokeless tobacco. He reports current alcohol use. He reports that he does not use drugs. Family history:  family history is not on file. No Known Allergies      Objective:   BP (!) 142/97   Pulse 112   Temp 98.5 °F (36.9 °C) (Oral)   Resp 15   Ht 5' 11\" (1.803 m)   Wt 300 lb (136.1 kg)   SpO2 93%   BMI 41.84 kg/m²       Intake/Output Summary (Last 24 hours) at 12/13/2020 1105  Last data filed at 12/13/2020 0601  Gross per 24 hour   Intake 1569.99 ml   Output 925 ml   Net 644.99 ml       TELEMETRY: afib RVR    Physical Exam:  Constitutional:  Well developed, Well nourished, No acute distress, Non-toxic appearance. HENT:  Normocephalic, Atraumatic, Bilateral external ears normal, Oropharynx moist, No oral exudates, Nose normal. Neck- Normal range of motion, No tenderness, Supple, No stridor. Eyes:  PERRL, EOMI, Conjunctiva normal, No discharge.    Respiratory:  Normal breath sounds, No respiratory distress, No wheezing, No chest tenderness. ,no use of accessory muscles, diaphragm movement is normal  Cardiovascular: (PMI) apex non displaced,no lifts no thrills, no s3,no s4, Normal heart rate, Normal rhythm, No murmurs, No rubs, No gallops. Carotid arteries pulse and amplitude are normal no bruit, no abdominal bruit noted ( normal abdominal aorta ausculation), femoral arteries pulse and amplitude are normal no bruit, pedal pulses are normal  GI:  Bowel sounds normal, Soft, No tenderness, No masses, No pulsatile masses. : External genitalia appear normal, No masses or lesions. No discharge. No CVA tenderness. Musculoskeletal:  Intact distal pulses, No edema, No tenderness, No cyanosis, No clubbing. Good range of motion in all major joints. No tenderness to palpation or major deformities noted. Back- No tenderness. Integument:  Warm, Dry, No erythema, No rash. Lymphatic:  No lymphadenopathy noted. Neurologic:  Alert & oriented x 3, Normal motor function, Normal sensory function, No focal deficits noted.    Psychiatric:  Affect normal, Judgment normal, Mood normal.     Medications:    digoxin  500 mcg Intravenous Once    digoxin  125 mcg Oral Daily    metoprolol succinate  25 mg Oral Daily    sodium chloride flush  10 mL Intravenous 2 times per day    aspirin  81 mg Oral Daily    atorvastatin  40 mg Oral Nightly    predniSONE  13 mg Oral Daily    pyridostigmine  60 mg Oral 6x Daily    vitamin B-12  1,000 mcg Oral Daily    Zilucoplan 40mg Injection SQ (patient supplied)  40 mg Subcutaneous QPM      sodium chloride      diltiazem (CARDIZEM) 100 mg in dextrose 5% 100 mL ([facility]) 15 mg/hr (12/13/20 1055)    heparin (PORCINE) Infusion 14.1 mL/hr (12/13/20 0601)     heparin (porcine), heparin (porcine), albuterol sulfate HFA, sodium chloride flush, promethazine **OR** ondansetron, acetaminophen **OR** acetaminophen, polyethylene glycol    Lab Data:  CBC:   Recent Labs 12/12/20  0530 12/12/20  1459 12/13/20  0400   WBC 7.5 7.1 8.2   HGB 15.3 14.5 14.7   HCT 46.9 45.4 44.0   MCV 96.9 99.8 95.7    197 203     BMP:   Recent Labs     12/12/20  0530      K 4.2      CO2 19*   BUN 10   CREATININE 1.0     LIVER PROFILE:   Recent Labs     12/12/20  0530   AST 74*   *   BILITOT 0.2   ALKPHOS 42     PT/INR: No results for input(s): PROTIME, INR in the last 72 hours. APTT:   Recent Labs     12/12/20  2309 12/13/20  0411 12/13/20  0927   APTT 43.6* 40.8* 39.6*     BNP:  No results for input(s): BNP in the last 72 hours. TROPONIN: @TROPONINI:3@      Assessment:  63 y. o.year old who is admitted for          Plan:  nstemi AND CAD: Continue aspirin, continue statins, betablockers, WILL GET [facility] once afib is controlled  Paroxysmal afib: continue IV hep, and add 0.5mg IVP digoxin, start Oral toprol XL 25mg daily, start oral dig 01.25mg daily  Dyslipidemia: continue statins  HTN: stable,   H/o TIA  CAD: h/o stent in past, patient does not know details, he appears non complaint  myasthenia gravis\": stable  Ulcerative colitis:s table  Health maintenance: exerise and diet  All labs, medications and tests reviewed, continue all other medications of all above medical condition listed as is.       Poncho Julio MD 12/13/2020 11:05 AM

## 2020-12-14 LAB
ACTIVATED CLOTTING TIME, LOW RANGE: 266 SEC
ALBUMIN SERPL-MCNC: 3.6 GM/DL (ref 3.4–5)
ALP BLD-CCNC: 40 IU/L (ref 40–129)
ALT SERPL-CCNC: 59 U/L (ref 10–40)
ANION GAP SERPL CALCULATED.3IONS-SCNC: 8 MMOL/L (ref 4–16)
APTT: 30.8 SECONDS (ref 25.1–37.1)
APTT: 31 SECONDS (ref 25.1–37.1)
APTT: 60.5 SECONDS (ref 25.1–37.1)
APTT: 65.4 SECONDS (ref 25.1–37.1)
AST SERPL-CCNC: 22 IU/L (ref 15–37)
BILIRUB SERPL-MCNC: 0.4 MG/DL (ref 0–1)
BUN BLDV-MCNC: 6 MG/DL (ref 6–23)
CALCIUM SERPL-MCNC: 9.1 MG/DL (ref 8.3–10.6)
CHLORIDE BLD-SCNC: 109 MMOL/L (ref 99–110)
CO2: 21 MMOL/L (ref 21–32)
CREAT SERPL-MCNC: 0.9 MG/DL (ref 0.9–1.3)
GFR AFRICAN AMERICAN: >60 ML/MIN/1.73M2
GFR NON-AFRICAN AMERICAN: >60 ML/MIN/1.73M2
GLUCOSE BLD-MCNC: 118 MG/DL (ref 70–99)
HCT VFR BLD CALC: 45.5 % (ref 42–52)
HEMOGLOBIN: 14.9 GM/DL (ref 13.5–18)
LV EF: 43 %
LVEF MODALITY: NORMAL
MCH RBC QN AUTO: 31.4 PG (ref 27–31)
MCHC RBC AUTO-ENTMCNC: 32.7 % (ref 32–36)
MCV RBC AUTO: 96 FL (ref 78–100)
PDW BLD-RTO: 12.7 % (ref 11.7–14.9)
PLATELET # BLD: 212 K/CU MM (ref 140–440)
PMV BLD AUTO: 9.8 FL (ref 7.5–11.1)
POTASSIUM SERPL-SCNC: 4.1 MMOL/L (ref 3.5–5.1)
RBC # BLD: 4.74 M/CU MM (ref 4.6–6.2)
SODIUM BLD-SCNC: 138 MMOL/L (ref 135–145)
TOTAL PROTEIN: 6 GM/DL (ref 6.4–8.2)
WBC # BLD: 8.8 K/CU MM (ref 4–10.5)

## 2020-12-14 PROCEDURE — 2580000003 HC RX 258: Performed by: INTERNAL MEDICINE

## 2020-12-14 PROCEDURE — 85730 THROMBOPLASTIN TIME PARTIAL: CPT

## 2020-12-14 PROCEDURE — B2111ZZ FLUOROSCOPY OF MULTIPLE CORONARY ARTERIES USING LOW OSMOLAR CONTRAST: ICD-10-PCS | Performed by: INTERNAL MEDICINE

## 2020-12-14 PROCEDURE — 6360000002 HC RX W HCPCS: Performed by: HOSPITALIST

## 2020-12-14 PROCEDURE — 92928 PRQ TCAT PLMT NTRAC ST 1 LES: CPT | Performed by: INTERNAL MEDICINE

## 2020-12-14 PROCEDURE — 6370000000 HC RX 637 (ALT 250 FOR IP): Performed by: INTERNAL MEDICINE

## 2020-12-14 PROCEDURE — 36415 COLL VENOUS BLD VENIPUNCTURE: CPT

## 2020-12-14 PROCEDURE — C1887 CATHETER, GUIDING: HCPCS

## 2020-12-14 PROCEDURE — 80053 COMPREHEN METABOLIC PANEL: CPT

## 2020-12-14 PROCEDURE — 6360000004 HC RX CONTRAST MEDICATION

## 2020-12-14 PROCEDURE — 6360000002 HC RX W HCPCS

## 2020-12-14 PROCEDURE — 93306 TTE W/DOPPLER COMPLETE: CPT

## 2020-12-14 PROCEDURE — 6370000000 HC RX 637 (ALT 250 FOR IP)

## 2020-12-14 PROCEDURE — 93454 CORONARY ARTERY ANGIO S&I: CPT

## 2020-12-14 PROCEDURE — 99233 SBSQ HOSP IP/OBS HIGH 50: CPT | Performed by: INTERNAL MEDICINE

## 2020-12-14 PROCEDURE — 027034Z DILATION OF CORONARY ARTERY, ONE ARTERY WITH DRUG-ELUTING INTRALUMINAL DEVICE, PERCUTANEOUS APPROACH: ICD-10-PCS | Performed by: INTERNAL MEDICINE

## 2020-12-14 PROCEDURE — 94761 N-INVAS EAR/PLS OXIMETRY MLT: CPT

## 2020-12-14 PROCEDURE — 2140000000 HC CCU INTERMEDIATE R&B

## 2020-12-14 PROCEDURE — C1874 STENT, COATED/COV W/DEL SYS: HCPCS

## 2020-12-14 PROCEDURE — 92928 PRQ TCAT PLMT NTRAC ST 1 LES: CPT

## 2020-12-14 PROCEDURE — 6370000000 HC RX 637 (ALT 250 FOR IP): Performed by: HOSPITALIST

## 2020-12-14 PROCEDURE — 93454 CORONARY ARTERY ANGIO S&I: CPT | Performed by: INTERNAL MEDICINE

## 2020-12-14 PROCEDURE — 2709999900 HC NON-CHARGEABLE SUPPLY

## 2020-12-14 PROCEDURE — C1769 GUIDE WIRE: HCPCS

## 2020-12-14 PROCEDURE — C1894 INTRO/SHEATH, NON-LASER: HCPCS

## 2020-12-14 PROCEDURE — 85347 COAGULATION TIME ACTIVATED: CPT

## 2020-12-14 PROCEDURE — 85027 COMPLETE CBC AUTOMATED: CPT

## 2020-12-14 RX ORDER — ACETAMINOPHEN 325 MG/1
650 TABLET ORAL EVERY 4 HOURS PRN
Status: DISCONTINUED | OUTPATIENT
Start: 2020-12-14 | End: 2020-12-15 | Stop reason: HOSPADM

## 2020-12-14 RX ORDER — ASPIRIN 81 MG/1
81 TABLET ORAL DAILY
Status: DISCONTINUED | OUTPATIENT
Start: 2020-12-15 | End: 2020-12-15 | Stop reason: HOSPADM

## 2020-12-14 RX ORDER — METOPROLOL SUCCINATE 25 MG/1
25 TABLET, EXTENDED RELEASE ORAL DAILY
Status: DISCONTINUED | OUTPATIENT
Start: 2020-12-14 | End: 2020-12-15 | Stop reason: HOSPADM

## 2020-12-14 RX ORDER — SODIUM CHLORIDE 0.9 % (FLUSH) 0.9 %
10 SYRINGE (ML) INJECTION PRN
Status: DISCONTINUED | OUTPATIENT
Start: 2020-12-14 | End: 2020-12-15 | Stop reason: HOSPADM

## 2020-12-14 RX ORDER — CLOPIDOGREL BISULFATE 75 MG/1
75 TABLET ORAL DAILY
Status: DISCONTINUED | OUTPATIENT
Start: 2020-12-15 | End: 2020-12-15 | Stop reason: HOSPADM

## 2020-12-14 RX ORDER — SODIUM CHLORIDE 0.9 % (FLUSH) 0.9 %
10 SYRINGE (ML) INJECTION EVERY 12 HOURS SCHEDULED
Status: DISCONTINUED | OUTPATIENT
Start: 2020-12-14 | End: 2020-12-15 | Stop reason: HOSPADM

## 2020-12-14 RX ORDER — ATORVASTATIN CALCIUM 80 MG/1
80 TABLET, FILM COATED ORAL NIGHTLY
Status: DISCONTINUED | OUTPATIENT
Start: 2020-12-14 | End: 2020-12-15 | Stop reason: HOSPADM

## 2020-12-14 RX ADMIN — CYANOCOBALAMIN TAB 1000 MCG 1000 MCG: 1000 TAB at 09:17

## 2020-12-14 RX ADMIN — PYRIDOSTIGMINE BROMIDE 60 MG: 60 TABLET ORAL at 17:03

## 2020-12-14 RX ADMIN — SODIUM CHLORIDE, PRESERVATIVE FREE 10 ML: 5 INJECTION INTRAVENOUS at 20:12

## 2020-12-14 RX ADMIN — ATORVASTATIN CALCIUM 80 MG: 80 TABLET, FILM COATED ORAL at 20:11

## 2020-12-14 RX ADMIN — PYRIDOSTIGMINE BROMIDE 60 MG: 60 TABLET ORAL at 23:30

## 2020-12-14 RX ADMIN — SODIUM CHLORIDE: 9 INJECTION, SOLUTION INTRAVENOUS at 11:10

## 2020-12-14 RX ADMIN — ASPIRIN 81 MG CHEWABLE TABLET 81 MG: 81 TABLET CHEWABLE at 09:17

## 2020-12-14 RX ADMIN — PYRIDOSTIGMINE BROMIDE 60 MG: 60 TABLET ORAL at 09:17

## 2020-12-14 RX ADMIN — PREDNISONE 13 MG: 1 TABLET ORAL at 09:17

## 2020-12-14 RX ADMIN — METOPROLOL SUCCINATE 25 MG: 25 TABLET, EXTENDED RELEASE ORAL at 09:17

## 2020-12-14 RX ADMIN — METOPROLOL SUCCINATE 25 MG: 25 TABLET, EXTENDED RELEASE ORAL at 15:04

## 2020-12-14 RX ADMIN — HEPARIN SODIUM 1810 UNITS/HR: 10000 INJECTION, SOLUTION INTRAVENOUS at 00:15

## 2020-12-14 RX ADMIN — PYRIDOSTIGMINE BROMIDE 60 MG: 60 TABLET ORAL at 20:11

## 2020-12-14 RX ADMIN — DIGOXIN 125 MCG: 125 TABLET ORAL at 09:17

## 2020-12-14 ASSESSMENT — PAIN SCALES - GENERAL: PAINLEVEL_OUTOF10: 0

## 2020-12-14 NOTE — PROGRESS NOTES
Today's plan: [facility] perfromed today, patient had ramus PCI with HILARY, patient converted to sinus rhythm during [facility], loaded with plavix and aspirin, will change IV hep to xarelto    Admit Date:  12/12/2020    Subjective: no chest pain      Chief complaints on admission  Chief Complaint   Patient presents with    Chest Pain     began at 10pm; slightly better with rest         History of present illness:Aramis is a 63 y. o.year old who  presents with had concerns including Chest Pain (began at 10pm; slightly better with rest). Past medical history:    has a past medical history of Heart attack ([facility] [address] Heart disease, History of blood transfusion, Hx of blood clots, Hypertension, Myasthenia gravis ([facility] [address] TIA (transient ischemic attack), and Ulcerative colitis ([facility] [address] Past surgical history:   has a past surgical history that includes ileostomy or jejunostomy; Tonsillectomy; and Cardiac surgery. Social History:   reports that he has been smoking cigarettes. He has been smoking about 0.25 packs per day. He has never used smokeless tobacco. He reports current alcohol use. He reports that he does not use drugs. Family history:  family history is not on file. No Known Allergies      Objective:   BP (!) 136/106   Pulse 74   Temp 97.4 °F (36.3 °C) (Oral)   Resp 22   Ht 5' 11\" (1.803 m)   Wt 288 lb 12.8 oz (131 kg)   SpO2 97%   BMI 40.28 kg/m²       Intake/Output Summary (Last 24 hours) at 12/14/2020 1226  Last data filed at 12/14/2020 0421  Gross per 24 hour   Intake 0 ml   Output 0 ml   Net 0 ml       TELEMETRY: sinus    Physical Exam:  Constitutional:  Well developed, Well nourished, No acute distress, Non-toxic appearance. HENT:  Normocephalic, Atraumatic, Bilateral external ears normal, Oropharynx moist, No oral exudates, Nose normal. Neck- Normal range of motion, No tenderness, Supple, No stridor. Eyes:  PERRL, EOMI, Conjunctiva normal, No discharge.    Respiratory:  Normal breath sounds, No respiratory distress, No wheezing, No chest tenderness. ,no use of accessory muscles, diaphragm movement is normal  Cardiovascular: (PMI) apex non displaced,no lifts no thrills, no s3,no s4, Normal heart rate, Normal rhythm, No murmurs, No rubs, No gallops. Carotid arteries pulse and amplitude are normal no bruit, no abdominal bruit noted ( normal abdominal aorta ausculation), femoral arteries pulse and amplitude are normal no bruit, pedal pulses are normal  GI:  Bowel sounds normal, Soft, No tenderness, No masses, No pulsatile masses. : External genitalia appear normal, No masses or lesions. No discharge. No CVA tenderness. Musculoskeletal:  Intact distal pulses, No edema, No tenderness, No cyanosis, No clubbing. Good range of motion in all major joints. No tenderness to palpation or major deformities noted. Back- No tenderness. Integument:  Warm, Dry, No erythema, No rash. Lymphatic:  No lymphadenopathy noted. Neurologic:  Alert & oriented x 3, Normal motor function, Normal sensory function, No focal deficits noted.    Psychiatric:  Affect normal, Judgment normal, Mood normal.     Medications:    digoxin  125 mcg Oral Daily    metoprolol succinate  25 mg Oral Daily    sodium chloride flush  10 mL Intravenous 2 times per day    aspirin  81 mg Oral Daily    atorvastatin  40 mg Oral Nightly    predniSONE  13 mg Oral Daily    pyridostigmine  60 mg Oral 6x Daily    vitamin B-12  1,000 mcg Oral Daily    Zilucoplan 40mg Injection SQ (patient supplied)  40 mg Subcutaneous QPM      sodium chloride 100 mL/hr at 12/14/20 1110    heparin (PORCINE) Infusion 1,810 Units/hr (12/14/20 0601)     heparin (porcine), heparin (porcine), albuterol sulfate HFA, sodium chloride flush, promethazine **OR** ondansetron, acetaminophen **OR** acetaminophen, polyethylene glycol    Lab Data:  CBC:   Recent Labs     12/12/20  1459 12/13/20  0400 12/14/20  0220   WBC 7.1 8.2 8.8   HGB 14.5 14.7 14.9   HCT 45.4 44.0 45.5   MCV 99.8 95.7 96.0    203 212     BMP:   Recent Labs     12/12/20  0530 12/13/20  1358 12/14/20  0220    136 138   K 4.2 4.3 4.1    103 109   CO2 19* 21 21   BUN 10 7 6   CREATININE 1.0 1.0 0.9     LIVER PROFILE:   Recent Labs     12/12/20  0530 12/13/20  1358 12/14/20  0220   AST 74* 33 22   * 78* 59*   BILITOT 0.2 0.4 0.4   ALKPHOS 42 42 40     PT/INR: No results for input(s): PROTIME, INR in the last 72 hours. APTT:   Recent Labs     12/13/20  1835 12/14/20  0219 12/14/20  1051   APTT 34.5 60.5* 65.4*     BNP:  No results for input(s): BNP in the last 72 hours. TROPONIN: @TROPONINI:3@      Assessment:  63 y. o.year old who is admitted for          Plan:  nstemi AND CAD: as above, S/P PCI OF RAMUS  Paroxysmal afib NOW SINUS, will start xarelto  Dyslipidemia: continue statins  HTN: stable,   H/o TIA  CAD: h/o stent in past, patient does not know details, he appears non complaint  myasthenia gravis\": stable  Ulcerative colitis:s table  Health maintenance: exerise and diet  All labs, medications and tests reviewed, continue all other medications of all above medical condition listed as is.       Hayes Piedra MD 12/14/2020 12:26 PM

## 2020-12-14 NOTE — PROGRESS NOTES
This RN removed 4cc air from the TR band. Right radial site free from any active bleeding, oozing, or hematoma. Arm board intact. Fresh water given to the patient. New dressing applied to the peripheral IV site at the right forearm.

## 2020-12-14 NOTE — PROGRESS NOTES
hepatosplenomegaly. No petechiae or ecchymoses. MSK Spontaneous movement of all extremities. No gross joint deformities. SKIN Normal coloration, warm, dry. NEURO Cranial nerves appear grossly intact, normal speech, no lateralizing weakness. PSYCH Awake, alert, oriented x 4. Affect appropriate. INTAKE: No intake/output data recorded. OUTPUT: No intake/output data recorded. LABS  Recent Labs     12/12/20  1459 12/13/20  0400 12/14/20  0220   WBC 7.1 8.2 8.8   HGB 14.5 14.7 14.9   HCT 45.4 44.0 45.5    203 212      Recent Labs     12/12/20  0530 12/13/20  1358 12/14/20  0220    136 138   K 4.2 4.3 4.1    103 109   CO2 19* 21 21   BUN 10 7 6   CREATININE 1.0 1.0 0.9     Recent Labs     12/12/20  0530 12/13/20  1358 12/14/20  0220   AST 74* 33 22   * 78* 59*   BILITOT 0.2 0.4 0.4   ALKPHOS 42 42 40     No results for input(s): INR in the last 72 hours.   Recent Labs     12/12/20  0530 12/12/20  1459   TROPONINT 0.164* 0.232*          Abnormal labs for today      Imaging:     ECHO:    Microbiology:  Blood culture:    Urine culture:    Sputum culture:    Procedures done this admission:    MEDS  Scheduled Meds:   digoxin  125 mcg Oral Daily    metoprolol succinate  25 mg Oral Daily    sodium chloride flush  10 mL Intravenous 2 times per day    aspirin  81 mg Oral Daily    atorvastatin  40 mg Oral Nightly    predniSONE  13 mg Oral Daily    pyridostigmine  60 mg Oral 6x Daily    vitamin B-12  1,000 mcg Oral Daily    Zilucoplan 40mg Injection SQ (patient supplied)  40 mg Subcutaneous QPM     Continuous Infusions:   sodium chloride 100 mL/hr at 12/13/20 2125    heparin (PORCINE) Infusion 1,810 Units/hr (12/14/20 0601)     PRN Meds:heparin (porcine), heparin (porcine), albuterol sulfate HFA, sodium chloride flush, promethazine **OR** ondansetron, acetaminophen **OR** acetaminophen, polyethylene glycol        ASSESSMENT and [address] [facility] Day: 3    1-New onset atrial fib with RVT with probable NSTEMI- on cardizem drip and heparin drip.  On RA, covid ruled out  -rate controlling regimen being adjusted, awaiting cardiac cath-   -echo pending  2-Elevated AST/ALT- mild, monitor for now  3-Myasthenia gravis- he feels his symptoms at baseline, on mestinon, prednisone and zirucuplan    Awaiting cath and further cardio input                Disp:     Diet DIET CARDIAC; No Caffeine   DVT Prophylaxis [] Lovenox, []  Heparin, [] SCDs, [] Ambulation   GI Prophylaxis [] PPI,  [] H2 Blocker,  [] Carafate,  [] Diet/Tube Feeds   Code Status Full Code   Disposition Patient requires continued admission due to NSTEMI   CMS Level of Risk [] Low, [] Moderate,[x]  High  Patient's risk as above due to NSTEMI     OBI CALVERT MD 12/14/2020 11:05 AM

## 2020-12-14 NOTE — PROGRESS NOTES
This RN removed 4cc air from the TR band. Right radial site free from any active bleeding, oozing, or hematoma. Arm board intact.

## 2020-12-15 VITALS
OXYGEN SATURATION: 95 % | SYSTOLIC BLOOD PRESSURE: 129 MMHG | WEIGHT: 286.6 LBS | HEIGHT: 71 IN | HEART RATE: 65 BPM | TEMPERATURE: 98 F | BODY MASS INDEX: 40.12 KG/M2 | DIASTOLIC BLOOD PRESSURE: 89 MMHG | RESPIRATION RATE: 17 BRPM

## 2020-12-15 LAB
ALBUMIN SERPL-MCNC: 3.8 GM/DL (ref 3.4–5)
ALP BLD-CCNC: 38 IU/L (ref 40–128)
ALT SERPL-CCNC: 49 U/L (ref 10–40)
ANION GAP SERPL CALCULATED.3IONS-SCNC: 10 MMOL/L (ref 4–16)
AST SERPL-CCNC: 22 IU/L (ref 15–37)
BILIRUB SERPL-MCNC: 0.5 MG/DL (ref 0–1)
BUN BLDV-MCNC: 10 MG/DL (ref 6–23)
CALCIUM SERPL-MCNC: 9 MG/DL (ref 8.3–10.6)
CHLORIDE BLD-SCNC: 105 MMOL/L (ref 99–110)
CO2: 23 MMOL/L (ref 21–32)
CREAT SERPL-MCNC: 1 MG/DL (ref 0.9–1.3)
GFR AFRICAN AMERICAN: >60 ML/MIN/1.73M2
GFR NON-AFRICAN AMERICAN: >60 ML/MIN/1.73M2
GLUCOSE BLD-MCNC: 120 MG/DL (ref 70–99)
HCT VFR BLD CALC: 45.8 % (ref 42–52)
HEMOGLOBIN: 15.2 GM/DL (ref 13.5–18)
MCH RBC QN AUTO: 31.9 PG (ref 27–31)
MCHC RBC AUTO-ENTMCNC: 33.2 % (ref 32–36)
MCV RBC AUTO: 96.2 FL (ref 78–100)
PDW BLD-RTO: 12.8 % (ref 11.7–14.9)
PLATELET # BLD: 201 K/CU MM (ref 140–440)
PMV BLD AUTO: 9.6 FL (ref 7.5–11.1)
POTASSIUM SERPL-SCNC: 4.3 MMOL/L (ref 3.5–5.1)
RBC # BLD: 4.76 M/CU MM (ref 4.6–6.2)
SODIUM BLD-SCNC: 138 MMOL/L (ref 135–145)
TOTAL PROTEIN: 6.1 GM/DL (ref 6.4–8.2)
WBC # BLD: 8.8 K/CU MM (ref 4–10.5)

## 2020-12-15 PROCEDURE — 85027 COMPLETE CBC AUTOMATED: CPT

## 2020-12-15 PROCEDURE — 36415 COLL VENOUS BLD VENIPUNCTURE: CPT

## 2020-12-15 PROCEDURE — 85730 THROMBOPLASTIN TIME PARTIAL: CPT

## 2020-12-15 PROCEDURE — 2580000003 HC RX 258: Performed by: INTERNAL MEDICINE

## 2020-12-15 PROCEDURE — 99232 SBSQ HOSP IP/OBS MODERATE 35: CPT | Performed by: INTERNAL MEDICINE

## 2020-12-15 PROCEDURE — 80048 BASIC METABOLIC PNL TOTAL CA: CPT

## 2020-12-15 PROCEDURE — 6370000000 HC RX 637 (ALT 250 FOR IP): Performed by: HOSPITALIST

## 2020-12-15 PROCEDURE — 80053 COMPREHEN METABOLIC PANEL: CPT

## 2020-12-15 PROCEDURE — 6370000000 HC RX 637 (ALT 250 FOR IP): Performed by: INTERNAL MEDICINE

## 2020-12-15 RX ORDER — CLOPIDOGREL BISULFATE 75 MG/1
75 TABLET ORAL DAILY
Qty: 30 TABLET | Refills: 3 | Status: SHIPPED | OUTPATIENT
Start: 2020-12-16 | End: 2021-04-13 | Stop reason: SDUPTHER

## 2020-12-15 RX ORDER — DIGOXIN 125 MCG
125 TABLET ORAL DAILY
Qty: 30 TABLET | Refills: 3 | Status: SHIPPED | OUTPATIENT
Start: 2020-12-16 | End: 2021-04-13 | Stop reason: SDUPTHER

## 2020-12-15 RX ORDER — METOPROLOL SUCCINATE 25 MG/1
25 TABLET, EXTENDED RELEASE ORAL DAILY
Qty: 30 TABLET | Refills: 3 | Status: SHIPPED | OUTPATIENT
Start: 2020-12-16 | End: 2021-04-13 | Stop reason: SDUPTHER

## 2020-12-15 RX ORDER — ASPIRIN 81 MG/1
81 TABLET ORAL DAILY
Qty: 30 TABLET | Refills: 3 | Status: SHIPPED | OUTPATIENT
Start: 2020-12-16 | End: 2021-06-11

## 2020-12-15 RX ORDER — ATORVASTATIN CALCIUM 80 MG/1
80 TABLET, FILM COATED ORAL NIGHTLY
Qty: 30 TABLET | Refills: 3 | Status: SHIPPED | OUTPATIENT
Start: 2020-12-15 | End: 2021-06-11

## 2020-12-15 RX ORDER — HEPARIN SODIUM 10000 [USP'U]/100ML
1000 INJECTION, SOLUTION INTRAVENOUS CONTINUOUS
Status: DISCONTINUED | OUTPATIENT
Start: 2020-12-15 | End: 2020-12-15

## 2020-12-15 RX ADMIN — CYANOCOBALAMIN TAB 1000 MCG 1000 MCG: 1000 TAB at 09:09

## 2020-12-15 RX ADMIN — SODIUM CHLORIDE, PRESERVATIVE FREE 10 ML: 5 INJECTION INTRAVENOUS at 09:09

## 2020-12-15 RX ADMIN — PYRIDOSTIGMINE BROMIDE 60 MG: 60 TABLET ORAL at 12:09

## 2020-12-15 RX ADMIN — RIVAROXABAN 20 MG: 20 TABLET, FILM COATED ORAL at 09:09

## 2020-12-15 RX ADMIN — PYRIDOSTIGMINE BROMIDE 60 MG: 60 TABLET ORAL at 07:26

## 2020-12-15 RX ADMIN — METOPROLOL SUCCINATE 25 MG: 25 TABLET, EXTENDED RELEASE ORAL at 09:09

## 2020-12-15 RX ADMIN — DIGOXIN 125 MCG: 125 TABLET ORAL at 09:09

## 2020-12-15 RX ADMIN — ASPIRIN 81 MG: 81 TABLET, COATED ORAL at 09:09

## 2020-12-15 RX ADMIN — PREDNISONE 13 MG: 1 TABLET ORAL at 09:09

## 2020-12-15 RX ADMIN — CLOPIDOGREL BISULFATE 75 MG: 75 TABLET ORAL at 09:09

## 2020-12-15 NOTE — PROGRESS NOTES
Today's plan: start xarelto 20mg DAILY for PAF, continue aspirin for one month, and continue plavix, ok for discharge home    Admit Date:  12/12/2020    Subjective: no chest pain      Chief complaints on admission  Chief Complaint   Patient presents with    Chest Pain     began at 10pm; slightly better with rest         History of present illness:Aramis is a 63 y. o.year old who  presents with had concerns including Chest Pain (began at 10pm; slightly better with rest). Past medical history:    has a past medical history of Heart attack ([address] Heart disease, History of blood transfusion, Hx of blood clots, Hypertension, Myasthenia gravis ([address] TIA (transient ischemic attack), and Ulcerative colitis ([address] Past surgical history:   has a past surgical history that includes ileostomy or jejunostomy; Tonsillectomy; and Cardiac surgery. Social History:   reports that he has been smoking cigarettes. He has been smoking about 0.25 packs per day. He has never used smokeless tobacco. He reports current alcohol use. He reports that he does not use drugs. Family history:  family history is not on file. No Known Allergies      Objective:   /76   Pulse 59   Temp 98.2 °F (36.8 °C) (Oral)   Resp 20   Ht 5' 11\" (1.803 m)   Wt 286 lb 9.6 oz (130 kg)   SpO2 95%   BMI 39.97 kg/m²     No intake or output data in the 24 hours ending 12/15/20 0617    TELEMETRY: sinus    Physical Exam:  Constitutional:  Well developed, Well nourished, No acute distress, Non-toxic appearance. HENT:  Normocephalic, Atraumatic, Bilateral external ears normal, Oropharynx moist, No oral exudates, Nose normal. Neck- Normal range of motion, No tenderness, Supple, No stridor. Eyes:  PERRL, EOMI, Conjunctiva normal, No discharge. Respiratory:  Normal breath sounds, No respiratory distress, No wheezing, No chest tenderness. ,no use of accessory muscles, diaphragm movement is normal  Cardiovascular: (PMI) apex non displaced,no lifts no thrills, no s3,no s4, Normal heart rate, Normal rhythm, No murmurs, No rubs, No gallops. Carotid arteries pulse and amplitude are normal no bruit, no abdominal bruit noted ( normal abdominal aorta ausculation), femoral arteries pulse and amplitude are normal no bruit, pedal pulses are normal  GI:  Bowel sounds normal, Soft, No tenderness, No masses, No pulsatile masses. : External genitalia appear normal, No masses or lesions. No discharge. No CVA tenderness. Musculoskeletal:  Intact distal pulses, No edema, No tenderness, No cyanosis, No clubbing. Good range of motion in all major joints. No tenderness to palpation or major deformities noted. Back- No tenderness. Integument:  Warm, Dry, No erythema, No rash. Lymphatic:  No lymphadenopathy noted. Neurologic:  Alert & oriented x 3, Normal motor function, Normal sensory function, No focal deficits noted.    Psychiatric:  Affect normal, Judgment normal, Mood normal.     Medications:    sodium chloride flush  10 mL Intravenous 2 times per day    metoprolol succinate  25 mg Oral Daily    atorvastatin  80 mg Oral Nightly    clopidogrel  75 mg Oral Daily    aspirin  81 mg Oral Daily    digoxin  125 mcg Oral Daily    sodium chloride flush  10 mL Intravenous 2 times per day    predniSONE  13 mg Oral Daily    pyridostigmine  60 mg Oral 6x Daily    vitamin B-12  1,000 mcg Oral Daily    Zilucoplan 40mg Injection SQ (patient supplied)  40 mg Subcutaneous QPM      sodium chloride 100 mL/hr at 12/14/20 1110    heparin (PORCINE) Infusion 1,810 Units/hr (12/14/20 0601)     sodium chloride flush, acetaminophen, heparin (porcine), heparin (porcine), albuterol sulfate HFA, sodium chloride flush, promethazine **OR** ondansetron, [DISCONTINUED] acetaminophen **OR** acetaminophen, polyethylene glycol    Lab Data:  CBC:   Recent Labs     12/12/20  1459 12/13/20  0400 12/14/20  0220   WBC 7.1 8.2 8.8   HGB 14.5 14.7 14.9   HCT 45.4 44.0 45.5   MCV 99.8 95.7 96.0  203 212     BMP:   Recent Labs     12/13/20  1358 12/14/20  0220    138   K 4.3 4.1    109   CO2 21 21   BUN 7 6   CREATININE 1.0 0.9     LIVER PROFILE:   Recent Labs     12/13/20  1358 12/14/20  0220   AST 33 22   ALT 78* 59*   BILITOT 0.4 0.4   ALKPHOS 42 40     PT/INR: No results for input(s): PROTIME, INR in the last 72 hours. APTT:   Recent Labs     12/14/20  1051 12/14/20  1623 12/14/20  2217   APTT 65.4* 31.0 30.8     BNP:  No results for input(s): BNP in the last 72 hours. TROPONIN: @TROPONINI:3@      Assessment:  63 y. o.year old who is admitted for          Plan:  nstemi AND CAD: as above, S/P PCI OF RAMUS  Paroxysmal afib NOW SINUS, will start xarelto  Dyslipidemia: continue statins  HTN: stable,   H/o TIA  CAD: h/o stent in past, patient does not know details, he appears non complaint  myasthenia gravis\": stable  Ulcerative colitis:s table  Health maintenance: exerise and diet  All labs, medications and tests reviewed, continue all other medications of all above medical condition listed as is.       Gurwinder Sherwood MD 12/15/2020 6:17 AM

## 2020-12-15 NOTE — DISCHARGE SUMMARY
Discharge Summary    Name:  Tara Francis /Age/Sex: 1960  (63 y.o. male)   MRN & CSN:  [id number] & [id number] Admission Date/Time: 2020  5:22 AM   Attending:  No att. providers found Discharging Physician: Faustina Yo MD         Hospital Course:   Tara Francis is a 63 y.o.  male  who presents with chest pain, SOB and palpitations. · New onset atrial fib with RVT with probable NSTEMI- on cardizem drip and heparin drip.   -Echo shows EF 40-45%, hypokinetic lateral wall, with grade II diastolic dydfunction  -Cardiac cath- single vessel CAD of ramus, medical management  -Cardiology on board, started on xarelto, continue aspirin, plavix, follow up as OP    · Elevated AST/ALT- mild, monitor   · Myasthenia gravis- he feels his symptoms at baseline, on mestinon, prednisone and zirucuplan    Chronic medical condition  -CAD, on ASA, stating, plavix, BB  -HTN, on  Bb  -HLD , statin  -Morbid obesity, encourage , weight loss and exercise    The patient expressed appropriate understanding of and agreement with the discharge recommendations, medications, and plan. Consults this admission:  IP CONSULT TO [address] TO HOSPITALIST    Discharge Instruction:   Follow up appointments: Cardiology  Primary care physician:  within 2 weeks    Diet:  cardiac diet   Activity: activity as tolerated  Disposition: Discharged to:   [x[facility]Home, [][facility], []SNF, []Acute Rehab, []Hospice   Condition on discharge: Stable    Discharge Medications:      Helena Rdz   Home Medication Instructions IPD:[id number]    Printed on:12/15/20 1815   Medication Information                      acetaminophen (TYLENOL) 325 MG tablet  Take 650 mg by mouth every 4 hours as needed for Pain or Fever             albuterol sulfate HFA (PROVENTIL HFA) 108 (90 Base) MCG/ACT inhaler  Inhale 2 puffs into the lungs every 4 hours as needed for Wheezing or Shortness of Breath With spacer (and mask if indicated). Thanks. aspirin 81 MG EC tablet  Take 1 tablet by mouth daily             atorvastatin (LIPITOR) 80 MG tablet  Take 1 tablet by mouth nightly             clopidogrel (PLAVIX) 75 MG tablet  Take 1 tablet by mouth daily             digoxin (LANOXIN) 125 MCG tablet  Take 1 tablet by mouth daily             methocarbamol (ROBAXIN) 750 MG tablet  Take 750 mg by mouth daily Indications: Can have more than 1 if neck is stiff- uses when not driving              metoprolol succinate (TOPROL XL) 25 MG extended release tablet  Take 1 tablet by mouth daily             NONFORMULARY  Inject 40 mg/L into the skin every evening Zilucoplan  - patient provided experimental drug from [place] for Myasthenia Gravis             predniSONE (DELTASONE) 5 MG tablet  Take 10 mg by mouth daily Indications: Takes at  8am              pyridostigmine (MESTINON) 60 MG tablet  Take 1 tablet by mouth 4 times daily             rivaroxaban (XARELTO) 20 MG TABS tablet  Take 1 tablet by mouth daily (with breakfast)             vitamin B-12 (CYANOCOBALAMIN) 500 MCG tablet  Take 1,000 mcg by mouth daily Indications: 8am                  Objective Findings at Discharge:   /89   Pulse 65   Temp 98 °F (36.7 °C) (Oral)   Resp 17   Ht 5' 11\" (1.803 m)   Wt 286 lb 9.6 oz (130 kg)   SpO2 95%   BMI 39.97 kg/m²            PHYSICAL EXAM   GEN Awake male, sitting upright in bed in no apparent distress. Appears given age. EYES Pupils are equally round. No scleral erythema, discharge, or conjunctivitis. HENT Mucous membranes are moist.   NECK No apparent thyromegaly or masses. RESP Clear to auscultation, no wheezes, rales or rhonchi. Symmetric chest movement while on room air. CARDIO/VASC S1/S2 auscultated. Regular rate without appreciable murmurs, rubs, or gallops. Peripheral pulses equal bilaterally and palpable. No peripheral edema. GI Abdomen is soft without significant tenderness, masses, or guarding. Bowel sounds are normoactive.  Rectal exam hypokinetic. Mild left ventricular hypertrophy. Grade II diastolic dysfunction. Mild mitral regurgitation. No evidence of any pericardial effusion. Pericardial fat pad visualized. Signature   ------------------------------------------------------------------  Electronically signed by Aramis Vallejo MD  (Interpreting physician) on 12/14/2020 at 12:32 PM  ------------------------------------------------------------------   Findings   Left Ventricle  Left ventricular systolic function is Abnormal.  Ejection fraction is visually estimated at 40-45%. Lateral wall segments appear hypokinetic. Mild left ventricular hypertrophy. Grade II diastolic dysfunction. Left Atrium  Essentially normal left atrium. Right Atrium  Essentially normal right atrium. Right Ventricle  Essentially normal right ventricle. Aortic Valve  Structurally normal aortic valve. Mitral Valve  Mild mitral regurgitation. Tricuspid Valve  Trace tricuspid regurgitation; normal RVSP. Pulmonic Valve  The pulmonic valve was not well visualized. Pericardial Effusion  No evidence of any pericardial effusion. Pleural Effusion  No evidence of pleural effusion. Miscellaneous  Pericardial fat pad visualized.   M-Mode/2D Measurements & Calculations   LV Diastolic Dimension:  LV Systolic Dimension:  LA Dimension: 3.2 cmAO Root  5.42 cm                  4.19 cm                 Dimension: 3.3 cmLA Area:  LV FS:22.7 %             LV Volume Diastolic: 66 49.6 cm2  LV PW Diastolic: 3.19 cm ml  LV PW Systolic: 0.97 cm  LV Volume Systolic: 28  Septum Diastolic: 9.93   ml  cm                       LV EDV/LV EDV Index: 66  Septum Systolic: 7.09 cm XZ/87 Q1LJ ESV/LV ESV   LA/Aorta: 0.97  CO: 3.38 l/min           Index: 28 ml/11 m2  CI: 1.37 l/m*m2          EF Calculated (A4C):    LA volume/Index: 21 ml /9m2                           57.6 %  LV Area Diastolic: 86.0  EF Calculated (2D):  cm2                      45.4 %  LV Area Systolic: 15.1  cm2                      LV Length: 7.65 cm                            LVOT: 2.3 cm  Doppler Measurements & Calculations   MV Peak E-Wave: 96.7    AV Peak Velocity: 124 cm/s   LVOT Peak Velocity:  cm/s                    AV Peak Gradient: 6.15 mmHg  94.7 cm/s  MV Peak A-Wave: 36.5    AV Mean Velocity: 98.5 cm/s  LVOT Mean Velocity:  cm/s                    AV Mean Gradient: 4 mmHg     70.1 cm/s  MV E/A Ratio: 2.65      AV VTI: 21.5 cm              LVOT Peak Gradient: 4  MV Peak Gradient: 3.74  AV Area (Continuity):3.15    mmHgLVOT Mean Gradient:  mmHg                    cm2                          2 mmHg   MV P1/2t: 92 msec       LVOT VTI: 16.3 cm  MVA by PHT:2.39 cm2   MV E' Septal Velocity:  5.48 cm/s  MV E' Lateral Velocity:  6.58 cm/s  MV E/E' septal: 17.65  MV E/E' lateral: 14.7      Xr Chest Portable    Result Date: 12/12/2020  EXAMINATION: ONE XRAY VIEW OF THE CHEST 12/12/2020 6:08 am COMPARISON: Chest radiograph dated 11/1/2016 HISTORY: ORDERING SYSTEM PROVIDED HISTORY: cp TECHNOLOGIST PROVIDED HISTORY: Reason for exam:->cp Reason for Exam: CP/SOB Acuity: Acute Type of Exam: Initial FINDINGS: The cardiomediastinal silhouette is stable. Patchy parenchymal opacities are seen at the lung bases. There is no pneumothorax. There is no pleural effusion. Patchy parenchymal opacities are seen at the lung bases. Findings could be related to atelectasis versus infiltrates.      Discharge Time of 28 minutes    Electronically signed by Airam Perdomo MD on 12/15/2020 at 6:13 PM

## 2020-12-15 NOTE — PROGRESS NOTES
CLINICAL PHARMACY NOTE: MEDS TO [address] Select Patient?: No  Total # of Prescriptions Filled: 5   The following medications were delivered to the patient:  · Digoxin 125mcg  · Metoprolol succinate ER 25mg  · Xarelto 20mg  · Clopidogrel 75mg  · Atorvastatin 80mg  Total # of Interventions Completed: 2  Time Spent (min): 45    Additional Documentation:  We used a 1 time  coupon to cover the Xarelto today.  Med assist contacted and covered other medications today

## 2020-12-15 NOTE — CARE COORDINATION
Received call from [facility]. Patient ready for discharge and has no insurance. Approved 1x $100 voucher and faxed.   Patient placed on flagged list.

## 2021-02-01 ENCOUNTER — OFFICE VISIT (OUTPATIENT)
Dept: CARDIOLOGY CLINIC | Age: 61
End: 2021-02-01
Payer: MEDICAID

## 2021-02-01 VITALS
SYSTOLIC BLOOD PRESSURE: 150 MMHG | BODY MASS INDEX: 41.02 KG/M2 | WEIGHT: 293 LBS | HEART RATE: 64 BPM | DIASTOLIC BLOOD PRESSURE: 74 MMHG | HEIGHT: 71 IN

## 2021-02-01 DIAGNOSIS — Z95.820 S/P ANGIOPLASTY WITH STENT: Primary | ICD-10-CM

## 2021-02-01 PROCEDURE — 93000 ELECTROCARDIOGRAM COMPLETE: CPT | Performed by: INTERNAL MEDICINE

## 2021-02-01 PROCEDURE — 99214 OFFICE O/P EST MOD 30 MIN: CPT | Performed by: INTERNAL MEDICINE

## 2021-02-01 NOTE — LETTER
Huma Hunt  1960  [id number]    Have you had any Chest Pain that is not new? - No          Have you had any Shortness of Breath - Yes  If Yes - exertion and at rest    Have you had any dizziness - Yes  If Yes DO ORTHOSTATIC BP - when do you feel dizzy with position change   How long does it last .5  seconds     ? Sitting wait 5 minutes do supine (laying down) wait 5 minutes then do standing - log each in \"vitals\" area in Epic  ? Be sure to ask what symptoms they are having if they get dizzy while completing ortho stats such as room spinning, nausea, etc.    Have you had any palpitations that are not new?  - No    Is the patient on any of the following medications - no    Do you have any edema - swelling in No      Do you have a surgery or procedure scheduled in the near future - No

## 2021-02-01 NOTE — PROGRESS NOTES
Dandre Espinoza MD        OFFICE  FOLLOWUP NOTE    Chief complaints: patient is here for management of CAD, HTN, AFIB, DYSLPIDEMIA    Subjective: patient feels tired no chest pain, no shortness of breath, no dizziness, no palpitations    HPI Stella Jurado is a 57 y. o.year old who  has a past medical history of Heart attack ([facility] [address] Heart disease, History of blood transfusion, Hx of blood clots, Hypertension, Myasthenia gravis ([facility] [address] TIA (transient ischemic attack), and Ulcerative colitis ([facility] [address] and presents for management of CAD, DM, HTN, AFIB, which are well controlled    He had RAMUS 99% stenosis in the proximal segment, he presented 1 week after his symptoms started, lad mild L dysfunction of 40-45%  Current Outpatient Medications   Medication Sig Dispense Refill    clopidogrel (PLAVIX) 75 MG tablet Take 1 tablet by mouth daily 30 tablet 3    digoxin (LANOXIN) 125 MCG tablet Take 1 tablet by mouth daily 30 tablet 3    metoprolol succinate (TOPROL XL) 25 MG extended release tablet Take 1 tablet by mouth daily 30 tablet 3    atorvastatin (LIPITOR) 80 MG tablet Take 1 tablet by mouth nightly 30 tablet 3    NONFORMULARY Inject 40 mg/L into the skin every evening Zilucoplan  - patient provided experimental drug from [facility] for Myasthenia Gravis      albuterol sulfate HFA (PROVENTIL HFA) 108 (90 Base) MCG/ACT inhaler Inhale 2 puffs into the lungs every 4 hours as needed for Wheezing or Shortness of Breath With spacer (and mask if indicated). Thanks.  1 Inhaler 1    vitamin B-12 (CYANOCOBALAMIN) 500 MCG tablet Take 1,000 mcg by mouth daily Indications: 8am       predniSONE (DELTASONE) 5 MG tablet Take 10 mg by mouth daily Indications: Takes at  8am wtakes 1 1/2 tabs daily      pyridostigmine (MESTINON) 60 MG tablet Take 1 tablet by mouth 4 times daily (Patient taking differently: Take 60 mg by mouth 5 times daily Indications: Takes at 0800, 1200, 1600, 2000 ) 120 tablet 0    acetaminophen (TYLENOL) 325 MG tablet Take 650 mg by mouth every 4 hours as needed for Pain or Fever      rivaroxaban (XARELTO) 20 MG TABS tablet Take 1 tablet by mouth daily (with breakfast) (Patient not taking: Reported on 2/1/2021) 30 tablet 0    aspirin 81 MG EC tablet Take 1 tablet by mouth daily (Patient not taking: Reported on 2/1/2021) 30 tablet 3    methocarbamol (ROBAXIN) 750 MG tablet Take 750 mg by mouth daily Indications: Can have more than 1 if neck is stiff- uses when not driving        No current facility-administered medications for this visit. Facility-Administered Medications Ordered in Other Visits   Medication Dose Route Frequency Provider Last Rate Last Admin    Immune Globulin (Human) solution 20 g  20 g Intravenous Once Linda Lewis MD        And    Immune Globulin (Human) solution 20 g  20 g Intravenous Once Linda Lewis MD        Immune Globulin (Human) solution 20 g  20 g Intravenous Once Linda Lewis MD        And    Immune Globulin (Human) solution 20 g  20 g Intravenous Once Linda Lewis MD         Allergies: Patient has no known allergies. Past Medical History:   Diagnosis Date    Heart attack ([facility] [address]     Heart disease     stint    History of blood transfusion     Hx of blood clots     Hypertension     Myasthenia gravis ([facility] [address]     TIA (transient ischemic attack)     Ulcerative colitis ([facility] [address]      Past Surgical History:   Procedure Laterality Date    CARDIAC SURGERY      ILEOSTOMY OR JEJUNOSTOMY      TONSILLECTOMY       History reviewed. No pertinent family history. Social History     Tobacco Use    Smoking status: Current Every Day Smoker     Packs/day: 0.25     Types: Cigarettes    Smokeless tobacco: Never Used   Substance Use Topics    Alcohol use:  Yes     Alcohol/week: 1.0 standard drinks     Types: 1 Cans of beer per week     Comment: occ      [unfilled]  Review of Systems:   · Constitutional: No Fever or Weight Loss   · Eyes: No Decreased Vision  · ENT: No Headaches, Hearing Loss or Vertigo  · Cardiovascular: No chest pain, dyspnea on exertion, palpitations or loss of consciousness  · Respiratory: No cough or wheezing    · Gastrointestinal: No abdominal pain, appetite loss, blood in stools, constipation, diarrhea or heartburn  · Genitourinary: No dysuria, trouble voiding, or hematuria  · Musculoskeletal:  No gait disturbance, weakness or joint complaints  · Integumentary: No rash or pruritis  · Neurological: No TIA or stroke symptoms  · Psychiatric: No anxiety or depression  · Endocrine: No malaise, fatigue or temperature intolerance  · Hematologic/Lymphatic: No bleeding problems, blood clots or swollen lymph nodes  · Allergic/Immunologic: No nasal congestion or hives  All systems negative except as marked. Objective:  BP (!) 150/74 (Position: Supine)   Pulse 64   Ht 5' 11\" (1.803 m)   Wt 293 lb (132.9 kg)   BMI 40.87 kg/m²   Wt Readings from Last 3 Encounters:   02/01/21 293 lb (132.9 kg)   12/15/20 286 lb 9.6 oz (130 kg)   02/03/20 295 lb 3.1 oz (133.9 kg)     Body mass index is 40.87 kg/m². GENERAL - Alert, oriented, pleasant, in no apparent distress,normal grooming  HEENT - pupils are intact, cornea intact, conjunctive normal color, ears are normal in exam,  Neck - Supple. No jugular venous distention noted. No carotid bruits, no apical -carotid delay  Respiratory:  Normal breath sounds, No respiratory distress, No wheezing, No chest tenderness. ,no use of accessory muscles, diaphragm movement is normal  Cardiovascular: (PMI) apex non displaced,no lifts no thrills, no s3,no s4, Normal heart rate, Normal rhythm, No murmurs, No rubs, No gallops.  Carotid arteries pulse and amplitude are normal no bruit, no abdominal bruit noted ( normal abdominal aorta ausculation),   Extremities - No cyanosis, clubbing, or significant edema, no varicose veins    Abdomen - No masses, tenderness, or organomegaly, no hepato-splenomegally, no bruits  Musculoskeletal - No significant edema, no kyphosis or scoliosis, no deformity in any extremity noted, muscle strength and tone are normal  Skin: no ulcer,no scar,no stasis dermatitis   Neurologic - alert oriented times 3,Cranial nerves II through XII are grossly intact. There were no gross focal neurologic abnormalities. Psychiatric: normal mood and affect    No results found for: CKTOTAL, CKMB, CKMBINDEX, TROPONINI  BNP:  No results found for: BNP  PT/INR:  No results found for: PTINR  No results found for: LABA1C  No results found for: CHOL, TRIG, HDL, LDLCALC, LDLDIRECT  Lab Results   Component Value Date    ALT 49 (H) 12/15/2020    AST 22 12/15/2020     TSH:  No results found for: TSH    Impression:  Amanda Amor is a 57 y. o.year old who  has a past medical history of Heart attack ([facility] [address] Heart disease, History of blood transfusion, Hx of blood clots, Hypertension, Myasthenia gravis ([facility] [address] TIA (transient ischemic attack), and Ulcerative colitis ([facility] [address] and presents with     Plan:  1. nstemi AND CAD: as above, S/P PCI OF SHELIAUS, he does not have time for cardiac rehab, will get stress test to check for now, he was non compliant before  2. Tiredness: could be due to medications and combination of Low LVEF AND MYASTHENIS GRAVIS. 3. LV dysfinction with NSTEMI, recheck echo to follow up on LVEF, will need to add ace inhibitor or ARB if LVEF is 40-45%  4. Paroxysmal afib NOW SINUS, will refill xarelto, continue plavix, and ok to to stop aspirin( he is not on aspirin)  5. Dyslipidemia: continue statins  6. HTN: stable,   7. H/o TIA  8. myasthenia gravis\": stable  9. Ulcerative colitis:stable  10. Health maintenance: exerise and diet  All labs, medications and tests reviewed, continue all other medications of all above medical condition listed as is.     @[facility]@

## 2021-02-02 ENCOUNTER — PROCEDURE VISIT (OUTPATIENT)
Dept: CARDIOLOGY CLINIC | Age: 61
End: 2021-02-02
Payer: MEDICAID

## 2021-02-02 DIAGNOSIS — Z95.820 S/P ANGIOPLASTY WITH STENT: ICD-10-CM

## 2021-02-02 PROCEDURE — 93308 TTE F-UP OR LMTD: CPT | Performed by: INTERNAL MEDICINE

## 2021-02-04 ENCOUNTER — TELEPHONE (OUTPATIENT)
Dept: CARDIOLOGY CLINIC | Age: 61
End: 2021-02-04

## 2021-02-04 NOTE — TELEPHONE ENCOUNTER
Left VM for pt to call me for results. F/U 2/26/21 @ 11:30. Summary   Limited study due to patients body habitus. This is a limited echo to assess ejection fraction and regional wall motion. Left ventricular function is normal, EF is estimated at 50%. presence of PVC   affected LVEF estimation   Lateral wall segments appear hypokinetic . Mild tricuspid regurgitation with RVSP 24mmHg. No evidence of any pericardial effusion.

## 2021-03-05 ENCOUNTER — PROCEDURE VISIT (OUTPATIENT)
Dept: CARDIOLOGY CLINIC | Age: 61
End: 2021-03-05
Payer: MEDICAID

## 2021-03-05 VITALS
HEIGHT: 71 IN | RESPIRATION RATE: 16 BRPM | DIASTOLIC BLOOD PRESSURE: 78 MMHG | BODY MASS INDEX: 41.02 KG/M2 | HEART RATE: 65 BPM | WEIGHT: 293 LBS | SYSTOLIC BLOOD PRESSURE: 136 MMHG

## 2021-03-05 DIAGNOSIS — Z95.820 S/P ANGIOPLASTY WITH STENT: ICD-10-CM

## 2021-03-05 PROCEDURE — 93015 CV STRESS TEST SUPVJ I&R: CPT | Performed by: INTERNAL MEDICINE

## 2021-03-15 ENCOUNTER — OFFICE VISIT (OUTPATIENT)
Dept: CARDIOLOGY CLINIC | Age: 61
End: 2021-03-15
Payer: MEDICAID

## 2021-03-15 VITALS
HEART RATE: 64 BPM | SYSTOLIC BLOOD PRESSURE: 120 MMHG | BODY MASS INDEX: 40.6 KG/M2 | DIASTOLIC BLOOD PRESSURE: 70 MMHG | HEIGHT: 71 IN | WEIGHT: 290 LBS

## 2021-03-15 DIAGNOSIS — M79.89 LEG SWELLING: Primary | ICD-10-CM

## 2021-03-15 PROCEDURE — G8484 FLU IMMUNIZE NO ADMIN: HCPCS | Performed by: INTERNAL MEDICINE

## 2021-03-15 PROCEDURE — 3017F COLORECTAL CA SCREEN DOC REV: CPT | Performed by: INTERNAL MEDICINE

## 2021-03-15 PROCEDURE — G8417 CALC BMI ABV UP PARAM F/U: HCPCS | Performed by: INTERNAL MEDICINE

## 2021-03-15 PROCEDURE — G8427 DOCREV CUR MEDS BY ELIG CLIN: HCPCS | Performed by: INTERNAL MEDICINE

## 2021-03-15 PROCEDURE — 4004F PT TOBACCO SCREEN RCVD TLK: CPT | Performed by: INTERNAL MEDICINE

## 2021-03-15 PROCEDURE — 99214 OFFICE O/P EST MOD 30 MIN: CPT | Performed by: INTERNAL MEDICINE

## 2021-03-15 NOTE — LETTER
Gaby Forman  1960  [id number]    Have you had any Chest Pain that is not new? - No    Have you had any Shortness of Breath - Yes, but states always has had that    Have you had any dizziness - No    Have you had any palpitations that are not new? - No    Is the patient on any of the following medications -no    Do you have any edema - swelling in No      Vein \"LEG PROBLEM Questionnaire\"  1. Do you have prominent leg veins? No   2. Do you have any skin discoloration? Yes  3. Do you have any healed or active sores? How Long  4. Do you have swelling of the legs? No  5. Do you have a family history of varicose veins? No  6. Does your profession involve pro-longed        standing or heavy lifting? No  7. Have you been fighting overweight problems? Yes  8. Do you have restless legs? Yes  9. Do you have any night time cramps? Yes  10.  Do you have any of the following in your legs:        Aching     Do you have a surgery or procedure scheduled in the near future - No

## 2021-03-15 NOTE — PATIENT INSTRUCTIONS
Please be informed that if you contact our office outside of normal business hours the physician on call cannot help with any scheduling or rescheduling issues, procedure instruction questions or any type of medication issue. We advise you for any urgent/emergency that you go to the nearest emergency room!     PLEASE CALL OUR OFFICE DURING NORMAL BUSINESS HOURS    Monday - Friday   8 am to 5 pm    [place]: [address] [address]: [phone]    [place]:  [phone]

## 2021-03-15 NOTE — PROGRESS NOTES
Alvaro Wheeler MD        OFFICE  FOLLOWUP NOTE    Chief complaints: patient is here for management of CAD, HTN, AFIB, DYSLPIDEMIA, myasthenia gravis    Subjective: patient feels tired, + LEG SWELLING no chest pain, no shortness of breath, no dizziness, no palpitations    HPI Ashley Bullock is a 57 y. o.year old who  has a past medical history of H/O echocardiogram, Heart attack ([facility] [address] Heart disease, History of blood transfusion, Hx of blood clots, Hypertension, Myasthenia gravis ([facility] [address] TIA (transient ischemic attack), and Ulcerative colitis ([facility] [address] and presents for management of CAD, HTN, AFIB, DYSLPIDEMIA which are well controlled  He had 1.51 seconds treadmill and did not reach target heart rate, had increase ventricular ectopy, he feels tired from myasthenia gravis, his [facility] reviewed with him, HE FEEL LEG SWELLINGS    Current Outpatient Medications   Medication Sig Dispense Refill    clopidogrel (PLAVIX) 75 MG tablet Take 1 tablet by mouth daily 30 tablet 3    digoxin (LANOXIN) 125 MCG tablet Take 1 tablet by mouth daily 30 tablet 3    metoprolol succinate (TOPROL XL) 25 MG extended release tablet Take 1 tablet by mouth daily 30 tablet 3    atorvastatin (LIPITOR) 80 MG tablet Take 1 tablet by mouth nightly 30 tablet 3    NONFORMULARY Inject 40 mg/L into the skin every evening Zilucoplan  - patient provided experimental drug from [facility] for Myasthenia Gravis      albuterol sulfate HFA (PROVENTIL HFA) 108 (90 Base) MCG/ACT inhaler Inhale 2 puffs into the lungs every 4 hours as needed for Wheezing or Shortness of Breath With spacer (and mask if indicated). Thanks.  1 Inhaler 1    vitamin B-12 (CYANOCOBALAMIN) 500 MCG tablet Take 1,000 mcg by mouth daily Indications: 8am       predniSONE (DELTASONE) 5 MG tablet Take 10 mg by mouth daily Indications: Takes at  8am wtakes 1 1/2 tabs daily      pyridostigmine (MESTINON) 60 MG tablet Take 1 tablet by mouth 4 times daily (Patient taking differently: Take 60 mg by mouth 5 times daily Indications: Takes at 0800, 1200, 1600, 2000 ) 120 tablet 0    acetaminophen (TYLENOL) 325 MG tablet Take 650 mg by mouth every 4 hours as needed for Pain or Fever      rivaroxaban (XARELTO) 20 MG TABS tablet Take 1 tablet by mouth daily (with breakfast) (Patient not taking: Reported on 3/15/2021) 30 tablet 0    aspirin 81 MG EC tablet Take 1 tablet by mouth daily (Patient not taking: Reported on 2/1/2021) 30 tablet 3    methocarbamol (ROBAXIN) 750 MG tablet Take 750 mg by mouth daily Indications: Can have more than 1 if neck is stiff- uses when not driving        No current facility-administered medications for this visit. Facility-Administered Medications Ordered in Other Visits   Medication Dose Route Frequency Provider Last Rate Last Admin    Immune Globulin (Human) solution 20 g  20 g Intravenous Once Coni Choudhary MD        And    Immune Globulin (Human) solution 20 g  20 g Intravenous Once Coni Choudhary MD        Immune Globulin (Human) solution 20 g  20 g Intravenous Once Coni Choudhary MD        And    Immune Globulin (Human) solution 20 g  20 g Intravenous Once Coni Choudhary MD         Allergies: Patient has no known allergies. Past Medical History:   Diagnosis Date    H/O echocardiogram 02/02/2021    EF is estimated at 50%. presence of PVC affected LVEF estimation, Mild TR.    Heart attack ([address]     Heart disease     stint    History of blood transfusion     Hx of blood clots     Hypertension     Myasthenia gravis ([address]     TIA (transient ischemic attack)     Ulcerative colitis ([address]      Past Surgical History:   Procedure Laterality Date    CARDIAC SURGERY      ILEOSTOMY OR JEJUNOSTOMY      TONSILLECTOMY       History reviewed. No pertinent family history.   Social History     Tobacco Use    Smoking status: Current Some Day Smoker     Packs/day: 0.25     Types: Cigarettes    Smokeless tobacco: Never Used   Substance Use Topics    Alcohol use: Yes     Alcohol/week: 1.0 standard drinks     Types: 1 Cans of beer per week     Comment: occ      [unfilled]  Review of Systems:   · Constitutional: No Fever or Weight Loss   · Eyes: No Decreased Vision  · ENT: No Headaches, Hearing Loss or Vertigo  · Cardiovascular: No chest pain, dyspnea on exertion, palpitations or loss of consciousness  · Respiratory: No cough or wheezing    · Gastrointestinal: No abdominal pain, appetite loss, blood in stools, constipation, diarrhea or heartburn  · Genitourinary: No dysuria, trouble voiding, or hematuria  · Musculoskeletal:  No gait disturbance, weakness or joint complaints  · Integumentary: No rash or pruritis  · Neurological: No TIA or stroke symptoms  · Psychiatric: No anxiety or depression  · Endocrine: No malaise, fatigue or temperature intolerance  · Hematologic/Lymphatic: No bleeding problems, blood clots or swollen lymph nodes  · Allergic/Immunologic: No nasal congestion or hives  All systems negative except as marked. Objective:  /70   Pulse 64   Ht 5' 11\" (1.803 m)   Wt 290 lb (131.5 kg)   BMI 40.45 kg/m²   Wt Readings from Last 3 Encounters:   03/15/21 290 lb (131.5 kg)   03/05/21 293 lb (132.9 kg)   02/01/21 293 lb (132.9 kg)     Body mass index is 40.45 kg/m². GENERAL - Alert, oriented, pleasant, in no apparent distress,normal grooming  HEENT - pupils are intact, cornea intact, conjunctive normal color, ears are normal in exam,  Neck - Supple. No jugular venous distention noted. No carotid bruits, no apical -carotid delay  Respiratory:  Normal breath sounds, No respiratory distress, No wheezing, No chest tenderness. ,no use of accessory muscles, diaphragm movement is normal  Cardiovascular: (PMI) apex non displaced,no lifts no thrills, no s3,no s4, Normal heart rate, Normal rhythm, No murmurs, No rubs, No gallops.  Carotid arteries pulse and amplitude are normal no bruit, no abdominal bruit noted ( normal abdominal aorta ausculation),   Extremities - No cyanosis, clubbing, or significant edema, no varicose veins    Abdomen - No masses, tenderness, or organomegaly, no hepato-splenomegally, no bruits  Musculoskeletal + edema, no kyphosis or scoliosis, no deformity in any extremity noted, muscle strength and tone are normal  Skin: no ulcer,no scar,+ stasis dermatitis   Neurologic - alert oriented times 3,Cranial nerves II through XII are grossly intact. There were no gross focal neurologic abnormalities. Psychiatric: normal mood and affect    No results found for: CKTOTAL, CKMB, CKMBINDEX, TROPONINI  BNP:  No results found for: BNP  PT/INR:  No results found for: PTINR  No results found for: LABA1C  No results found for: CHOL, TRIG, HDL, LDLCALC, LDLDIRECT  Lab Results   Component Value Date    ALT 49 (H) 12/15/2020    AST 22 12/15/2020     TSH:  No results found for: TSH    Impression:  Julia Brown is a 57 y. o.year old who  has a past medical history of H/O echocardiogram, Heart attack ([facility] [address] Heart disease, History of blood transfusion, Hx of blood clots, Hypertension, Myasthenia gravis ([facility] [address] TIA (transient ischemic attack), and Ulcerative colitis ([facility] [address] and presents with     Plan:  1. nstemi AND CAD: as above, S/P PCI OF RAMUS, he does not have time for cardiac rehab, stress test was fot 1.51 sec and he was tired and did not reach THR, HE did have increased ectopies, he is reluctant to have cardiac rehab,  2. Tiredness: could be due to medications and combination of Low LVEF AND MYASTHENIS GRAVIS. 3. LV dysfinction with NSTEMI, echo is checked and LVEF is 50% now,karen hold off on ace inhibitor or ARB  4. Paroxysmal afib NOW SINUS, will refill xarelto, continue plavix, and ok to to stop aspirin( he is not on aspirin)  5. Dyslipidemia: continue statins  6. LEG SWELLING and stasis dermatitis: venous doppler ordered, recommend compression socks  7. HTN: stable,   8. H/o TIA  9. myasthenia gravis\": stable  1.  Ulcerative colitis:stableHealth maintenance: exerise and diet  All labs, medications and tests reviewed, continue all other medications of all above medical condition listed as is.     [unfilled]

## 2021-04-09 ENCOUNTER — PROCEDURE VISIT (OUTPATIENT)
Dept: CARDIOLOGY CLINIC | Age: 61
End: 2021-04-09
Payer: MEDICAID

## 2021-04-09 DIAGNOSIS — M79.89 LEG SWELLING: ICD-10-CM

## 2021-04-09 PROCEDURE — 93970 EXTREMITY STUDY: CPT | Performed by: INTERNAL MEDICINE

## 2021-04-13 RX ORDER — METOPROLOL SUCCINATE 25 MG/1
25 TABLET, EXTENDED RELEASE ORAL DAILY
Qty: 90 TABLET | Refills: 3 | Status: ON HOLD | OUTPATIENT
Start: 2021-04-13 | End: 2021-07-09 | Stop reason: SDUPTHER

## 2021-04-13 RX ORDER — DIGOXIN 125 MCG
125 TABLET ORAL DAILY
Qty: 90 TABLET | Refills: 3 | Status: ON HOLD | OUTPATIENT
Start: 2021-04-13 | End: 2021-09-09 | Stop reason: HOSPADM

## 2021-04-13 RX ORDER — CLOPIDOGREL BISULFATE 75 MG/1
75 TABLET ORAL DAILY
Qty: 90 TABLET | Refills: 3 | Status: ON HOLD | OUTPATIENT
Start: 2021-04-13 | End: 2021-07-09 | Stop reason: HOSPADM

## 2021-04-14 ENCOUNTER — TELEPHONE (OUTPATIENT)
Dept: CARDIOLOGY CLINIC | Age: 61
End: 2021-04-14

## 2021-04-14 NOTE — TELEPHONE ENCOUNTER
Conclusions        Summary        Left distal SSV shows occlusive chronic SVT.    No evidence of significant venous insufficiency noted in the bilateral lower    extremities .        Recommendations        COMPRESSION socks recommended     Spoke with patient regarding results of lower extremity doppler. Pt voiced understanding.

## 2021-04-19 ENCOUNTER — OFFICE VISIT (OUTPATIENT)
Dept: CARDIOLOGY CLINIC | Age: 61
End: 2021-04-19
Payer: MEDICAID

## 2021-04-19 VITALS
BODY MASS INDEX: 40.6 KG/M2 | SYSTOLIC BLOOD PRESSURE: 120 MMHG | DIASTOLIC BLOOD PRESSURE: 86 MMHG | HEART RATE: 63 BPM | WEIGHT: 290 LBS | HEIGHT: 71 IN

## 2021-04-19 DIAGNOSIS — R06.02 SHORTNESS OF BREATH: Primary | ICD-10-CM

## 2021-04-19 PROCEDURE — G8427 DOCREV CUR MEDS BY ELIG CLIN: HCPCS | Performed by: INTERNAL MEDICINE

## 2021-04-19 PROCEDURE — 3017F COLORECTAL CA SCREEN DOC REV: CPT | Performed by: INTERNAL MEDICINE

## 2021-04-19 PROCEDURE — 1036F TOBACCO NON-USER: CPT | Performed by: INTERNAL MEDICINE

## 2021-04-19 PROCEDURE — G8417 CALC BMI ABV UP PARAM F/U: HCPCS | Performed by: INTERNAL MEDICINE

## 2021-04-19 PROCEDURE — 99214 OFFICE O/P EST MOD 30 MIN: CPT | Performed by: INTERNAL MEDICINE

## 2021-04-19 NOTE — PROGRESS NOTES
Marielos Queen, MD        OFFICE  FOLLOWUP NOTE    Chief complaints: patient is here for management of CAD, HTN, AFIB, DYSLPIDEMIA, myasthenia gravis    Subjective: patient has dyspnea on exertion    HPI Juan Laureano is a 57 y. o.year old who  has a past medical history of H/O echocardiogram, Heart attack ([facility] [address] Heart disease, History of blood transfusion, Hx of blood clots, Hx of Doppler ultrasound, Hypertension, Myasthenia gravis ([facility] [address] TIA (transient ischemic attack), and Ulcerative colitis ([facility] [address] and presents for management of CAD, HTN, AFIB, DYSLPIDEMIA, myasthenia gravis, which are well controlled    He felt better after ramus Pci and started to feel worse again in January, he never felt full of energy despite PCI, he quit smoking, has myasthenia gravis, he gets mad as he could not do much.   His LVEF is improved to 50%  Current Outpatient Medications   Medication Sig Dispense Refill    clopidogrel (PLAVIX) 75 MG tablet Take 1 tablet by mouth daily 90 tablet 3    metoprolol succinate (TOPROL XL) 25 MG extended release tablet Take 1 tablet by mouth daily 90 tablet 3    rivaroxaban (XARELTO) 20 MG TABS tablet Take 1 tablet by mouth daily (with breakfast) 90 tablet 3    digoxin (LANOXIN) 125 MCG tablet Take 1 tablet by mouth daily 90 tablet 3    vitamin B-12 (CYANOCOBALAMIN) 500 MCG tablet Take 1,000 mcg by mouth daily Indications: 8am       predniSONE (DELTASONE) 5 MG tablet Take 10 mg by mouth daily Indications: Takes at  8am wtakes 1 1/2 tabs daily      pyridostigmine (MESTINON) 60 MG tablet Take 1 tablet by mouth 4 times daily (Patient taking differently: Take 60 mg by mouth 5 times daily Indications: Takes at 0800, 1200, 1600, 2000 ) 120 tablet 0    Elastic Bandages & Supports (MEDICAL COMPRESSION THIGH HIGH) MISC Wear daily and remove nightly 20 - 30 mmgh 2 each 0    atorvastatin (LIPITOR) 80 MG tablet Take 1 tablet by mouth nightly (Patient not taking: Reported on 4/19/2021) 30 tablet 3  aspirin 81 MG EC tablet Take 1 tablet by mouth daily (Patient not taking: Reported on 2/1/2021) 30 tablet 3    NONFORMULARY Inject 40 mg/L into the skin every evening Zilucoplan  - patient provided experimental drug from [place] for Myasthenia Gravis      albuterol sulfate HFA (PROVENTIL HFA) 108 (90 Base) MCG/ACT inhaler Inhale 2 puffs into the lungs every 4 hours as needed for Wheezing or Shortness of Breath With spacer (and mask if indicated). Thanks. 1 Inhaler 1    acetaminophen (TYLENOL) 325 MG tablet Take 650 mg by mouth every 4 hours as needed for Pain or Fever       No current facility-administered medications for this visit. Facility-Administered Medications Ordered in Other Visits   Medication Dose Route Frequency Provider Last Rate Last Admin    Immune Globulin (Human) solution 20 g  20 g Intravenous Once Loney Duverney, MD        And    Immune Globulin (Human) solution 20 g  20 g Intravenous Once Loney Duverney, MD        Immune Globulin (Human) solution 20 g  20 g Intravenous Once Loney Duverney, MD        And    Immune Globulin (Human) solution 20 g  20 g Intravenous Once Loney Duverney, MD         Allergies: Patient has no known allergies. Past Medical History:   Diagnosis Date    H/O echocardiogram 02/02/2021    EF is estimated at 50%. presence of PVC affected LVEF estimation, Mild TR.    Heart attack ([address]     Heart disease     stint    History of blood transfusion     Hx of blood clots     Hx of Doppler ultrasound 04/09/2021    Left distal SSV shows occlusive chronic SVT.  Hypertension     Myasthenia gravis ([address]     TIA (transient ischemic attack)     Ulcerative colitis ([address]      Past Surgical History:   Procedure Laterality Date    CARDIAC SURGERY      ILEOSTOMY OR JEJUNOSTOMY      TONSILLECTOMY       History reviewed. No pertinent family history.   Social History     Tobacco Use    Smoking status: Former Smoker     Packs/day: 0.25     Types: Carotid arteries pulse and amplitude are normal no bruit, no abdominal bruit noted ( normal abdominal aorta ausculation),   Extremities - No cyanosis, clubbing, or significant edema, no varicose veins    Abdomen - No masses, tenderness, or organomegaly, no hepato-splenomegally, no bruits  Musculoskeletal - No significant edema, no kyphosis or scoliosis, no deformity in any extremity noted, muscle strength and tone are normal  Skin: no ulcer,no scar,no stasis dermatitis   Neurologic - alert oriented times 3,Cranial nerves II through XII are grossly intact. There were no gross focal neurologic abnormalities. Psychiatric: normal mood and affect    No results found for: CKTOTAL, CKMB, CKMBINDEX, TROPONINI  BNP:  No results found for: BNP  PT/INR:  No results found for: PTINR  No results found for: LABA1C  No results found for: CHOL, TRIG, HDL, LDLCALC, LDLDIRECT  Lab Results   Component Value Date    ALT 49 (H) 12/15/2020    AST 22 12/15/2020     TSH:  No results found for: TSH    Impression:  Todd Montero is a 57 y. o.year old who  has a past medical history of H/O echocardiogram, Heart attack ([facility] [address] Heart disease, History of blood transfusion, Hx of blood clots, Hx of Doppler ultrasound, Hypertension, Myasthenia gravis ([address] TIA (transient ischemic attack), and Ulcerative colitis ([address] and presents with     Plan:  1. Dyspnea on exerion, with history ofnstemi AND CAD:will get stress test. Has h/oPCI OF RAMUS,he did  Not do cardiac rehab,  2. Tiredness: could be due to medications and combination of Low LVEF AND MYASTHENIS GRAVIS, recommend to get TSH  3. LV dysfinction with NSTEMI, echo is checked and LVEF is 50% now,karen hold off on ace inhibitor or ARB  4. Paroxysmal afib NOW SINUS, will refill xarelto, continue plavix, and ok to to stop aspirin( he is not on aspirin)  5. Dyslipidemia: continue statins  6. LEG SWELLING and stasis dermatitis: venous doppler ordered, recommend compression socks  7. HTN: stable,   8.  H/o TIA  9. myasthenia gravis\": stable  10. Ulcerative colitis:stable  11. Health maintenance: exerise and diet  All labs, medications and tests reviewed, continue all other medications of all above medical condition listed as is.     [unfilled]

## 2021-04-19 NOTE — LETTER
[employer] GRIFFITHS Álvaro Smallwoodde  1960  [id number]    Have you had any Chest Pain that is not new? - No     DO EKG IF: Patient has a Heart Rate above 100 or below 40     CAD (Coronary Artery Disease) patient should have one on file every 6 months        Have you had any Shortness of Breath - Yes  If Yes - When at rest and on exertion    Have you had any dizziness - No       Sitting wait 5 minutes do supine (laying down) wait 5 minutes then do standing - log each in \"vitals\" area in Epic   Be sure to ask what symptoms they are having if they get dizzy while completing ortho stats such as: room spinning, nausea, etc.    Have you had any palpitations that are not new?  - No      Is the patient on any of the following medications -    Do you have any edema - swelling in No      Do you have a surgery or procedure scheduled in the near future - No per pt

## 2021-04-26 ENCOUNTER — TELEPHONE (OUTPATIENT)
Dept: CARDIOLOGY CLINIC | Age: 61
End: 2021-04-26

## 2021-04-26 NOTE — TELEPHONE ENCOUNTER
Dr mendez and states saw pt for first time on 4/23/21. Pt states he was not on juanis meds, but is is listed in chart. She would like pt meds to be checked and let pt know he is on a juanis med.

## 2021-05-06 ENCOUNTER — TELEPHONE (OUTPATIENT)
Dept: CARDIOLOGY CLINIC | Age: 61
End: 2021-05-06

## 2021-05-12 NOTE — TELEPHONE ENCOUNTER
Patient advised that no PA is needed and that the RX is ready for pick per the pharmacy. He voiced understanding.

## 2021-06-03 ENCOUNTER — TELEPHONE (OUTPATIENT)
Dept: CARDIOLOGY CLINIC | Age: 61
End: 2021-06-03

## 2021-06-11 ENCOUNTER — APPOINTMENT (OUTPATIENT)
Dept: GENERAL RADIOLOGY | Age: 61
DRG: 201 | End: 2021-06-11
Payer: MEDICAID

## 2021-06-11 ENCOUNTER — TELEPHONE (OUTPATIENT)
Dept: CARDIOLOGY CLINIC | Age: 61
End: 2021-06-11

## 2021-06-11 ENCOUNTER — HOSPITAL ENCOUNTER (INPATIENT)
Age: 61
LOS: 5 days | Discharge: HOME | DRG: 201 | End: 2021-06-16
Attending: EMERGENCY MEDICINE | Admitting: INTERNAL MEDICINE
Payer: MEDICAID

## 2021-06-11 ENCOUNTER — CLINICAL DOCUMENTATION (OUTPATIENT)
Dept: CARDIOLOGY CLINIC | Age: 61
End: 2021-06-11

## 2021-06-11 ENCOUNTER — PROCEDURE VISIT (OUTPATIENT)
Dept: CARDIOLOGY CLINIC | Age: 61
End: 2021-06-11
Payer: MEDICAID

## 2021-06-11 DIAGNOSIS — R06.02 SHORTNESS OF BREATH: ICD-10-CM

## 2021-06-11 DIAGNOSIS — R07.9 CHEST PAIN, UNSPECIFIED TYPE: Primary | ICD-10-CM

## 2021-06-11 DIAGNOSIS — E87.5 HYPERKALEMIA: ICD-10-CM

## 2021-06-11 DIAGNOSIS — I48.91 ATRIAL FIBRILLATION WITH RVR (HCC): Primary | ICD-10-CM

## 2021-06-11 PROBLEM — I21.4 NSTEMI (NON-ST ELEVATED MYOCARDIAL INFARCTION) (HCC): Status: ACTIVE | Noted: 2020-12-01

## 2021-06-11 PROBLEM — Z98.61 POST PTCA: Status: ACTIVE | Noted: 2020-12-14

## 2021-06-11 LAB
ALBUMIN SERPL-MCNC: 3.8 GM/DL (ref 3.4–5)
ALP BLD-CCNC: 56 IU/L (ref 40–129)
ALT SERPL-CCNC: 38 U/L (ref 10–40)
ANION GAP SERPL CALCULATED.3IONS-SCNC: 15 MMOL/L (ref 4–16)
AST SERPL-CCNC: 31 IU/L (ref 15–37)
BASOPHILS ABSOLUTE: 0 K/CU MM
BASOPHILS RELATIVE PERCENT: 0.3 % (ref 0–1)
BILIRUB SERPL-MCNC: 0.5 MG/DL (ref 0–1)
BUN BLDV-MCNC: 14 MG/DL (ref 6–23)
CALCIUM SERPL-MCNC: 8.7 MG/DL (ref 8.3–10.6)
CHLORIDE BLD-SCNC: 108 MMOL/L (ref 99–110)
CO2: 17 MMOL/L (ref 21–32)
CREAT SERPL-MCNC: 1.5 MG/DL (ref 0.9–1.3)
DIFFERENTIAL TYPE: ABNORMAL
DIGOXIN LEVEL: 0.6 NG/ML (ref 0.8–2)
EKG DIAGNOSIS: NORMAL
EKG Q-T INTERVAL: 314 MS
EKG QRS DURATION: 120 MS
EKG QTC CALCULATION (BAZETT): 512 MS
EKG R AXIS: 250 DEGREES
EKG T AXIS: -19 DEGREES
EKG VENTRICULAR RATE: 160 BPM
EOSINOPHILS ABSOLUTE: 0.1 K/CU MM
EOSINOPHILS RELATIVE PERCENT: 1.3 % (ref 0–3)
GFR AFRICAN AMERICAN: 57 ML/MIN/1.73M2
GFR NON-AFRICAN AMERICAN: 47 ML/MIN/1.73M2
GLUCOSE BLD-MCNC: 171 MG/DL (ref 70–99)
HCT VFR BLD CALC: 43.9 % (ref 42–52)
HEMOGLOBIN: 13.9 GM/DL (ref 13.5–18)
IMMATURE NEUTROPHIL %: 0.3 % (ref 0–0.43)
LYMPHOCYTES ABSOLUTE: 0.7 K/CU MM
LYMPHOCYTES RELATIVE PERCENT: 7.7 % (ref 24–44)
MAGNESIUM: 2.1 MG/DL (ref 1.8–2.4)
MCH RBC QN AUTO: 30.4 PG (ref 27–31)
MCHC RBC AUTO-ENTMCNC: 31.7 % (ref 32–36)
MCV RBC AUTO: 96.1 FL (ref 78–100)
MONOCYTES ABSOLUTE: 0.6 K/CU MM
MONOCYTES RELATIVE PERCENT: 6.1 % (ref 0–4)
NUCLEATED RBC %: 0 %
PDW BLD-RTO: 14.6 % (ref 11.7–14.9)
PLATELET # BLD: 272 K/CU MM (ref 140–440)
PMV BLD AUTO: 10.1 FL (ref 7.5–11.1)
POTASSIUM SERPL-SCNC: 4.8 MMOL/L (ref 3.5–5.1)
PRO-BNP: 2814 PG/ML
RBC # BLD: 4.57 M/CU MM (ref 4.6–6.2)
SEGMENTED NEUTROPHILS ABSOLUTE COUNT: 7.6 K/CU MM
SEGMENTED NEUTROPHILS RELATIVE PERCENT: 84.3 % (ref 36–66)
SODIUM BLD-SCNC: 140 MMOL/L (ref 135–145)
TOTAL IMMATURE NEUTOROPHIL: 0.03 K/CU MM
TOTAL NUCLEATED RBC: 0 K/CU MM
TOTAL PROTEIN: 5.5 GM/DL (ref 6.4–8.2)
TROPONIN T: 0.01 NG/ML
TROPONIN T: 0.02 NG/ML
TROPONIN T: 0.02 NG/ML
TSH HIGH SENSITIVITY: 0.64 UIU/ML (ref 0.27–4.2)
WBC # BLD: 9.1 K/CU MM (ref 4–10.5)

## 2021-06-11 PROCEDURE — 2500000003 HC RX 250 WO HCPCS: Performed by: INTERNAL MEDICINE

## 2021-06-11 PROCEDURE — 80162 ASSAY OF DIGOXIN TOTAL: CPT

## 2021-06-11 PROCEDURE — 78452 HT MUSCLE IMAGE SPECT MULT: CPT | Performed by: INTERNAL MEDICINE

## 2021-06-11 PROCEDURE — A9500 TC99M SESTAMIBI: HCPCS | Performed by: INTERNAL MEDICINE

## 2021-06-11 PROCEDURE — 83880 ASSAY OF NATRIURETIC PEPTIDE: CPT

## 2021-06-11 PROCEDURE — 2500000003 HC RX 250 WO HCPCS: Performed by: EMERGENCY MEDICINE

## 2021-06-11 PROCEDURE — 71045 X-RAY EXAM CHEST 1 VIEW: CPT

## 2021-06-11 PROCEDURE — 84443 ASSAY THYROID STIM HORMONE: CPT

## 2021-06-11 PROCEDURE — 80053 COMPREHEN METABOLIC PANEL: CPT

## 2021-06-11 PROCEDURE — 6370000000 HC RX 637 (ALT 250 FOR IP): Performed by: EMERGENCY MEDICINE

## 2021-06-11 PROCEDURE — 93005 ELECTROCARDIOGRAM TRACING: CPT | Performed by: EMERGENCY MEDICINE

## 2021-06-11 PROCEDURE — 85025 COMPLETE CBC W/AUTO DIFF WBC: CPT

## 2021-06-11 PROCEDURE — 2140000000 HC CCU INTERMEDIATE R&B

## 2021-06-11 PROCEDURE — 6370000000 HC RX 637 (ALT 250 FOR IP): Performed by: INTERNAL MEDICINE

## 2021-06-11 PROCEDURE — 83735 ASSAY OF MAGNESIUM: CPT

## 2021-06-11 PROCEDURE — 93010 ELECTROCARDIOGRAM REPORT: CPT | Performed by: INTERNAL MEDICINE

## 2021-06-11 PROCEDURE — 99285 EMERGENCY DEPT VISIT HI MDM: CPT

## 2021-06-11 PROCEDURE — 2580000003 HC RX 258: Performed by: INTERNAL MEDICINE

## 2021-06-11 PROCEDURE — 93015 CV STRESS TEST SUPVJ I&R: CPT | Performed by: INTERNAL MEDICINE

## 2021-06-11 PROCEDURE — 84484 ASSAY OF TROPONIN QUANT: CPT

## 2021-06-11 PROCEDURE — 36415 COLL VENOUS BLD VENIPUNCTURE: CPT

## 2021-06-11 PROCEDURE — 96374 THER/PROPH/DIAG INJ IV PUSH: CPT

## 2021-06-11 RX ORDER — DIGOXIN 125 MCG
125 TABLET ORAL DAILY
Status: DISCONTINUED | OUTPATIENT
Start: 2021-06-12 | End: 2021-06-14

## 2021-06-11 RX ORDER — METOPROLOL SUCCINATE 25 MG/1
25 TABLET, EXTENDED RELEASE ORAL DAILY
Status: DISCONTINUED | OUTPATIENT
Start: 2021-06-12 | End: 2021-06-16 | Stop reason: HOSPADM

## 2021-06-11 RX ORDER — DILTIAZEM HYDROCHLORIDE 5 MG/ML
10 INJECTION INTRAVENOUS ONCE
Status: COMPLETED | OUTPATIENT
Start: 2021-06-11 | End: 2021-06-11

## 2021-06-11 RX ORDER — SODIUM CHLORIDE 0.9 % (FLUSH) 0.9 %
5-40 SYRINGE (ML) INJECTION EVERY 12 HOURS SCHEDULED
Status: DISCONTINUED | OUTPATIENT
Start: 2021-06-11 | End: 2021-06-13 | Stop reason: SDUPTHER

## 2021-06-11 RX ORDER — ACETAMINOPHEN 500 MG
1000 TABLET ORAL ONCE
Status: COMPLETED | OUTPATIENT
Start: 2021-06-11 | End: 2021-06-11

## 2021-06-11 RX ORDER — PYRIDOSTIGMINE BROMIDE 60 MG/1
60 TABLET ORAL
Status: DISCONTINUED | OUTPATIENT
Start: 2021-06-11 | End: 2021-06-12

## 2021-06-11 RX ORDER — SODIUM CHLORIDE 0.9 % (FLUSH) 0.9 %
5-40 SYRINGE (ML) INJECTION PRN
Status: DISCONTINUED | OUTPATIENT
Start: 2021-06-11 | End: 2021-06-13 | Stop reason: SDUPTHER

## 2021-06-11 RX ORDER — POLYETHYLENE GLYCOL 3350 17 G/17G
17 POWDER, FOR SOLUTION ORAL DAILY PRN
Status: DISCONTINUED | OUTPATIENT
Start: 2021-06-11 | End: 2021-06-16 | Stop reason: HOSPADM

## 2021-06-11 RX ORDER — ATORVASTATIN CALCIUM 40 MG/1
40 TABLET, FILM COATED ORAL NIGHTLY
Status: DISCONTINUED | OUTPATIENT
Start: 2021-06-11 | End: 2021-06-16 | Stop reason: HOSPADM

## 2021-06-11 RX ORDER — ALBUTEROL SULFATE 90 UG/1
2 AEROSOL, METERED RESPIRATORY (INHALATION) EVERY 4 HOURS PRN
Status: DISCONTINUED | OUTPATIENT
Start: 2021-06-11 | End: 2021-06-16 | Stop reason: HOSPADM

## 2021-06-11 RX ORDER — SODIUM CHLORIDE 9 MG/ML
INJECTION, SOLUTION INTRAVENOUS CONTINUOUS
Status: ACTIVE | OUTPATIENT
Start: 2021-06-11 | End: 2021-06-12

## 2021-06-11 RX ORDER — ACETAMINOPHEN 325 MG/1
650 TABLET ORAL EVERY 6 HOURS PRN
Status: DISCONTINUED | OUTPATIENT
Start: 2021-06-11 | End: 2021-06-16 | Stop reason: HOSPADM

## 2021-06-11 RX ORDER — MECLIZINE HCL 12.5 MG/1
12.5 TABLET ORAL 3 TIMES DAILY PRN
Status: DISCONTINUED | OUTPATIENT
Start: 2021-06-11 | End: 2021-06-16 | Stop reason: HOSPADM

## 2021-06-11 RX ORDER — LANOLIN ALCOHOL/MO/W.PET/CERES
1000 CREAM (GRAM) TOPICAL DAILY
Status: DISCONTINUED | OUTPATIENT
Start: 2021-06-12 | End: 2021-06-16 | Stop reason: HOSPADM

## 2021-06-11 RX ORDER — ACETAMINOPHEN 650 MG/1
650 SUPPOSITORY RECTAL EVERY 6 HOURS PRN
Status: DISCONTINUED | OUTPATIENT
Start: 2021-06-11 | End: 2021-06-16 | Stop reason: HOSPADM

## 2021-06-11 RX ORDER — SODIUM CHLORIDE 9 MG/ML
25 INJECTION, SOLUTION INTRAVENOUS PRN
Status: DISCONTINUED | OUTPATIENT
Start: 2021-06-11 | End: 2021-06-13 | Stop reason: SDUPTHER

## 2021-06-11 RX ORDER — CLOPIDOGREL BISULFATE 75 MG/1
75 TABLET ORAL DAILY
Status: DISCONTINUED | OUTPATIENT
Start: 2021-06-12 | End: 2021-06-16 | Stop reason: HOSPADM

## 2021-06-11 RX ADMIN — DEXTROSE MONOHYDRATE 15 MG/HR: 50 INJECTION, SOLUTION INTRAVENOUS at 17:35

## 2021-06-11 RX ADMIN — ACETAMINOPHEN 1000 MG: 500 TABLET, FILM COATED ORAL at 12:46

## 2021-06-11 RX ADMIN — ATORVASTATIN CALCIUM 40 MG: 40 TABLET, FILM COATED ORAL at 21:10

## 2021-06-11 RX ADMIN — PYRIDOSTIGMINE BROMIDE 60 MG: 60 TABLET ORAL at 15:55

## 2021-06-11 RX ADMIN — DILTIAZEM HYDROCHLORIDE 10 MG: 5 INJECTION INTRAVENOUS at 12:02

## 2021-06-11 RX ADMIN — PYRIDOSTIGMINE BROMIDE 60 MG: 60 TABLET ORAL at 21:10

## 2021-06-11 RX ADMIN — PYRIDOSTIGMINE BROMIDE 60 MG: 60 TABLET ORAL at 23:58

## 2021-06-11 RX ADMIN — SODIUM CHLORIDE: 9 INJECTION, SOLUTION INTRAVENOUS at 14:49

## 2021-06-11 RX ADMIN — DEXTROSE MONOHYDRATE 5 MG/HR: 50 INJECTION, SOLUTION INTRAVENOUS at 12:02

## 2021-06-11 ASSESSMENT — PAIN SCALES - GENERAL: PAINLEVEL_OUTOF10: 8

## 2021-06-11 NOTE — TELEPHONE ENCOUNTER
Called [facility] to inform them that this pt's HR was elevated prior to him having his NM stress test done this am. This nurse was transferred twice & call was disconnected.

## 2021-06-11 NOTE — PROGRESS NOTES
12/20/18 3/15/21  Jimmy Hernandez MD   acetaminophen (TYLENOL) 325 MG tablet Take 650 mg by mouth every 4 hours as needed for Pain or Fever    Historical Provider, MD     Medications added or changed (ex. new medication, dosage change, interval change, formulation change):  Prednisone dosage clarified    Medications removed from list (include reason, ex. noncompliance, medication cost, therapy complete etc.):   Aspirin no longer taking  Atorvastatin no longer taking    Comments:  Medication list reviewed with patient and insurance claims verified. Patient states he has taken first dose of medications today. Patient receives an experimental injection at . Johnny Still and family to supply.     To my knowledge the above medication history is accurate as of 6/11/2021 11:20 AM.   Betty Wang CPhT   6/11/2021 11:20 AM

## 2021-06-11 NOTE — H&P
Hospital Medicine History and Physical    6/11/2021    Date of Admission: 6/11/2021    Date of Service: Pt seen/examined on 6/11/2021 and admitted to inpatient. Assessment/plan:  1. Atrial fibrillation with rapid ventricular response. TSH is normal.  Continue Cardizem infusion. Continue home dose of Xarelto, digoxin, Toprol-XL. Monitor on telemetry. Cardiology has been consulted from the emergency room. 2. Acute kidney injury. Likely prerenal azotemia secondary to hypoperfusion from rapid heart rate. Start gentle normal saline infusion and monitor renal function closely. Avoid nephrotoxic medications. 3. Exertional dyspnea. Suspect etiology is multifactorial - LENIN, myasthenia gravis, morbid obesity. If no clear findings on stress test, consider neurology evaluation. 4. Nonzero troponin/NSTEMI type II in the setting of rapid atrial fibrillation. Likely demand ischemia. Continue serial troponin. Continue antiplatelet therapy, beta-blocker, statin. Monitor on telemetry. Patient had stress test today, result pending. Await cardiology evaluation and recommendation. 5. Non-anion gap metabolic acidosis. Likely secondary to underlying kidney injury. Continue intravenous fluid and recheck electrolytes in the morning. 6. Prolonged QTc. Monitor on telemetry. Correct underlying electrolyte disorders. Avoid QT-prolonging medications. 7. Other comorbidities:  history of CAD status post PCI (on Plavix, beta-blocker), atrial fibrillation (on Xarelto, Toprol-XL, digoxin), history of thrombosis (on Xarelto), myasthenia gravis (on Mestinon), ulcerative colitis (on prednisone), TIA. Activities: Up with assist  Prophylaxis: Xarelto  Code status: Full code    ==========================================================  Chief complaint:  Chief Complaint   Patient presents with    Irregular Heart Beat     afib after doing a stress test       History of Presenting Illness: This is a pleasant 57 y.o. male with history of CAD status post PCI (on Plavix, beta-blocker), atrial fibrillation (on Xarelto, Toprol-XL, digoxin), history of thrombosis (on Xarelto), myasthenia gravis (on Mestinon), ulcerative colitis (on prednisone), TIA, who presents to cardiology office for outpatient stress test (for evaluation of shortness of breath), noted to have atrial fibrillation with rate in the 140s prior to stress test.  He completed stress test, remained in persistent atrial fibrillation he was sent to the emergency room for further evaluation where he was noted to have EKG with heart rate in the 160s. He does not have any chest pain. He continues to have exertional dyspnea, has difficulty ambulating more than a few steps before getting winded. He does have orthopnea but also states he gets short of breath even with sitting up. He received IV Cardizem bolus and infusion stated in the emergency room. Patient has been admitted for further evaluation. Troponin in the emergency room is 0.017, bicarb 17, anion gap of 15, creatinine 1.5. Past Medical History:      Diagnosis Date    Atrial fibrillation, chronic ([address] 12/2020    H/O echocardiogram 02/02/2021    EF is estimated at 50%. presence of PVC affected LVEF estimation, Mild TR.    Heart disease     stint    History of blood transfusion     Hx of blood clots     Hx of Doppler ultrasound 04/09/2021    Left distal SSV shows occlusive chronic SVT.  Hypertension     Myasthenia gravis ([address]     NSTEMI (non-ST elevated myocardial infarction) ([address] 12/2020    Post PTCA 12/14/2020    Ramus Stented.  TIA (transient ischemic attack)     Ulcerative colitis ([address]        Past Surgical History:      Procedure Laterality Date    CARDIAC SURGERY      ILEOSTOMY OR JEJUNOSTOMY      PTCA  12/14/2020    Ramus Stented.  TONSILLECTOMY         Medications (prior to admission):  Prior to Admission medications    Medication Sig Start Date End Date Taking?  Authorizing Provider   clopidogrel (PLAVIX) 75 MG tablet Take 1 tablet by mouth daily 4/13/21  Yes JAMIE Harley CNP   metoprolol succinate (TOPROL XL) 25 MG extended release tablet Take 1 tablet by mouth daily 4/13/21  Yes JAMIE Harley CNP   rivaroxaban (XARELTO) 20 MG TABS tablet Take 1 tablet by mouth daily (with breakfast) 4/13/21 6/11/21 Yes JAMIE Harley CNP   digoxin (LANOXIN) 125 MCG tablet Take 1 tablet by mouth daily 4/13/21  Yes JAMIE Harley CNP   NONFORMULARY Inject 40 mg/L into the skin every evening Zilucoplan  - patient provided experimental drug from [place] for Myasthenia Gravis   Yes Historical Provider, MD   vitamin B-12 (CYANOCOBALAMIN) 500 MCG tablet Take 1,000 mcg by mouth daily Indications: 8am    Yes Historical Provider, MD   PREDNISONE PO Take 13 mg by mouth daily Indications: Takes at  8am    Yes Historical Provider, MD   pyridostigmine (MESTINON) 60 MG tablet Take 1 tablet by mouth 4 times daily  Patient taking differently: Take 60 mg by mouth 5 times daily Indications: Takes at 0800, 1200, 1600, 2000  7/20/16  Yes Lyle Moore MD   Elastic Bandages & Supports (MEDICAL COMPRESSION THIGH HIGH) MISC Wear daily and remove nightly 20 - 30 mmgh 4/14/21   Shaun Castro MD   albuterol sulfate HFA (PROVENTIL HFA) 108 (90 Base) MCG/ACT inhaler Inhale 2 puffs into the lungs every 4 hours as needed for Wheezing or Shortness of Breath With spacer (and mask if indicated). Thanks. 12/20/18 3/15/21  Yogi Fuller MD   acetaminophen (TYLENOL) 325 MG tablet Take 650 mg by mouth every 4 hours as needed for Pain or Fever    Historical Provider, MD       Allergy(ies):  Patient has no known allergies. Social History:  TOBACCO:  reports that he quit smoking about 2 months ago. His smoking use included cigarettes. He smoked 0.25 packs per day.  He has never used smokeless tobacco.  ETOH:  reports current alcohol use of about 1.0 standard drinks of alcohol per week.    Family History:      Family history unknown: Yes       Review of Systems:  Pertinent positives are listed in HPI. At least 10-point ROS reviewed and were negative. Vitals and physical examination:  /82   Pulse 119   Temp 98.1 °F (36.7 °C) (Oral)   Resp 20   Ht 5' 11\" (1.803 m)   Wt 285 lb (129.3 kg)   SpO2 96%   BMI 39.75 kg/m²   Gen/overall appearance: Not in acute distress. Alert. Oriented x3. Head: Normocephalic, atraumatic  Eyes: EOMI, good acuity  ENT: Oral mucosa moist  Neck: No JVD, thyromegaly  CVS: Nml S1S2, no MRG. Irregular, tachycardic. Pulm: Clear bilaterally. No crackles/wheezes  Gastrointestinal: Soft, NT/ND, +BS  Musculoskeletal: No edema. Warm  Neuro: No focal deficit. Moves extremity spontaneously. Psychiatry: Appropriate affect. Not agitated. Skin: Warm, dry with normal turgor. No rash  Capillary refill: Brisk,< 3 seconds   Peripheral Pulses: +2 palpable, equal bilaterally       Labs/imaging/EKG:  CBC:   Recent Labs     06/11/21  1040   WBC 9.1   HGB 13.9        BMP:    Recent Labs     06/11/21  1040      K 4.8      CO2 17*   BUN 14   CREATININE 1.5*   GLUCOSE 171*     Hepatic:   Recent Labs     06/11/21  1040   AST 31   ALT 38   BILITOT 0.5   ALKPHOS 56       XR CHEST PORTABLE    Result Date: 6/11/2021  EXAMINATION: ONE XRAY VIEW OF THE CHEST 6/11/2021 9:42 am COMPARISON: December 12, 2020. HISTORY: ORDERING SYSTEM PROVIDED HISTORY: chest pain TECHNOLOGIST PROVIDED HISTORY: Reason for exam:->chest pain Reason for Exam: chest pain Acuity: Acute Type of Exam: Initial FINDINGS: A portable upright frontal view chest radiograph was obtained. The heart is enlarged. The mediastinal contour and pleural spaces are otherwise within normal limits. The lungs are grossly clear. There is no focal consolidation or pneumothorax. The pulmonary vascular pattern is within normal limits. No acute thoracic osseous abnormality. Clear lungs. Cardiomegaly.  No

## 2021-06-11 NOTE — ED PROVIDER NOTES
Triage Chief Complaint:   Irregular Heart Beat (afib after doing a stress test)    [place]:  Pradeep Grace is a 57 y.o. male that presents with A. fib with RVR. States he was in Dr. Chong Story office today, was getting a stress test done. States during the stress test his heart rate went up, he went into A. fib with RVR and it did not return to normal.  They sent him here for further evaluation. He does not feel the palpitations, has no feeling of heart racing or chest pain. He states he has had some chest pain recently, none currently. Does admit to being very fatigued, and short of breath recently. Does state that is one of the reasons they were doing the stress test.  States he has had lower extremity edema, but reports that is normal for him. No change from typical.  Denies cough, fever, chills. No recent illness. ROS:  General:  No fevers, no chills  Eyes:  no vision change. ENT:  No sore throat, no nasal congestion  Cardiovascular:  + chest pain, no palpitations  Respiratory:  + shortness of breath, no cough, no wheezing  Gastrointestinal:  No pain, no nausea, no vomiting, no diarrhea  Musculoskeletal:  No muscle pain, no joint pain  Skin:  No rash, no pruritis, no easy bruising  Neurologic:  No speech problems, no headache, no weakness, numbness or tingling. Psychiatric:  No anxiety  Extremities:  no edema, no muscle pain    Past Medical History:   Diagnosis Date    Atrial fibrillation, chronic ([address] 12/2020    H/O echocardiogram 02/02/2021    EF is estimated at 50%. presence of PVC affected LVEF estimation, Mild TR.    Heart disease     stint    History of blood transfusion     Hx of blood clots     Hx of Doppler ultrasound 04/09/2021    Left distal SSV shows occlusive chronic SVT.  Hypertension     Myasthenia gravis ([address]     NSTEMI (non-ST elevated myocardial infarction) ([address] 12/2020    Post PTCA 12/14/2020    Ramus Stented.     TIA (transient ischemic attack)     Ulcerative colitis [facility])      Past Surgical History:   Procedure Laterality Date    CARDIAC SURGERY      ILEOSTOMY OR JEJUNOSTOMY      PTCA  2020    Ramus Stented.  TONSILLECTOMY       Family History   Family history unknown: Yes     Social History     Socioeconomic History    Marital status:      Spouse name: Not on file    Number of children: Not on file    Years of education: Not on file    Highest education level: Not on file   Occupational History    Not on file   Tobacco Use    Smoking status: Former Smoker     Packs/day: 0.25     Types: Cigarettes     Quit date: 2021     Years since quittin.1    Smokeless tobacco: Never Used   Substance and Sexual Activity    Alcohol use: Yes     Alcohol/week: 1.0 standard drinks     Types: 1 Cans of beer per week     Comment: occ    Drug use: No    Sexual activity: Yes     Partners: Female   Other Topics Concern    Not on file   Social History Narrative    Not on file     Social Determinants of Health     Financial Resource Strain:     Difficulty of Paying Living Expenses:    Food Insecurity:     Worried About Running Out of Food in the Last Year:     [address] in the Last Year:    Transportation Needs:     Lack of Transportation (Medical):      Lack of Transportation (Non-Medical):    Physical Activity:     Days of Exercise per Week:     Minutes of Exercise per Session:    Stress:     Feeling of Stress :    Social Connections:     Frequency of Communication with Friends and Family:     Frequency of Social Gatherings with Friends and Family:     Attends Jainism Services:     Active Member of Clubs or Organizations:     Attends Club or Organization Meetings:     Marital Status:    Intimate Partner Violence:     Fear of Current or Ex-Partner:     Emotionally Abused:     Physically Abused:     Sexually Abused:      Current Facility-Administered Medications   Medication Dose Route Frequency Provider Last Rate Last Admin    dilTIAZem MM    RBC 4.57 (L) 4.6 - 6.2 M/CU MM    Hemoglobin 13.9 13.5 - 18.0 GM/DL    Hematocrit 43.9 42 - 52 %    MCV 96.1 78 - 100 FL    MCH 30.4 27 - 31 PG    MCHC 31.7 (L) 32.0 - 36.0 %    RDW 14.6 11.7 - 14.9 %    Platelets 036 100 - 379 K/CU MM    MPV 10.1 7.5 - 11.1 FL    Differential Type AUTOMATED DIFFERENTIAL     Segs Relative 84.3 (H) 36 - 66 %    Lymphocytes % 7.7 (L) 24 - 44 %    Monocytes % 6.1 (H) 0 - 4 %    Eosinophils % 1.3 0 - 3 %    Basophils % 0.3 0 - 1 %    Segs Absolute 7.6 K/CU MM    Lymphocytes Absolute 0.7 K/CU MM    Monocytes Absolute 0.6 K/CU MM    Eosinophils Absolute 0.1 K/CU MM    Basophils Absolute 0.0 K/CU MM    Nucleated RBC % 0.0 %    Total Nucleated RBC 0.0 K/CU MM    Total Immature Neutrophil 0.03 K/CU MM    Immature Neutrophil % 0.3 0 - 0.43 %   CMP   Result Value Ref Range    Sodium 140 135 - 145 MMOL/L    Potassium 4.8 3.5 - 5.1 MMOL/L    Chloride 108 99 - 110 mMol/L    CO2 17 (L) 21 - 32 MMOL/L    BUN 14 6 - 23 MG/DL    CREATININE 1.5 (H) 0.9 - 1.3 MG/DL    Glucose 171 (H) 70 - 99 MG/DL    Calcium 8.7 8.3 - 10.6 MG/DL    Albumin 3.8 3.4 - 5.0 GM/DL    Total Protein 5.5 (L) 6.4 - 8.2 GM/DL    Total Bilirubin 0.5 0.0 - 1.0 MG/DL    ALT 38 10 - 40 U/L    AST 31 15 - 37 IU/L    Alkaline Phosphatase 56 40 - 129 IU/L    GFR Non- 47 (L) >60 mL/min/1.73m2    GFR  57 (L) >60 mL/min/1.73m2    Anion Gap 15 4 - 16   Brain Natriuretic Peptide   Result Value Ref Range    Pro-BNP 2,814 (H) <300 PG/ML   Magnesium   Result Value Ref Range    Magnesium 2.1 1.8 - 2.4 mg/dl   TSH without Reflex   Result Value Ref Range    TSH, High Sensitivity 0.635 0.270 - 4.20 uIu/ml   Digoxin Level   Result Value Ref Range    Digoxin Lvl 0.6 (L) 0.8 - 2.0 ng/mL   Troponin   Result Value Ref Range    Troponin T 0.017 (H) <0.01 NG/ML   EKG 12 Lead   Result Value Ref Range    Ventricular Rate 160 BPM    Atrial Rate 133 BPM    QRS Duration 120 ms    Q-T Interval 314 ms    QTc Calculation (Bazett) 512 ms    R Axis 250 degrees    T Axis -19 degrees    Diagnosis       Atrial fibrillation with rapid ventricular response with premature ventricular or aberrantly conducted complexes  Low voltage QRS  Right bundle branch block  Abnormal ECG  When compared with ECG of 12-DEC-2020 08:57,  No significant change was found            EKG (if obtained): (All EKG's are interpreted by myself in the absence of a cardiologist) This EKG was interpreted by me. Rate is 160, rhythm is A. fib with RVR. Bundle branch block present. [place] and QT intervals are within normal limits. There is no ST segment or T wave changes. Chart review shows recent radiographs:  XR CHEST PORTABLE    Result Date: 6/11/2021  EXAMINATION: ONE XRAY VIEW OF THE CHEST 6/11/2021 9:42 am COMPARISON: December 12, 2020. HISTORY: ORDERING SYSTEM PROVIDED HISTORY: chest pain TECHNOLOGIST PROVIDED HISTORY: Reason for exam:->chest pain Reason for Exam: chest pain Acuity: Acute Type of Exam: Initial FINDINGS: A portable upright frontal view chest radiograph was obtained. The heart is enlarged. The mediastinal contour and pleural spaces are otherwise within normal limits. The lungs are grossly clear. There is no focal consolidation or pneumothorax. The pulmonary vascular pattern is within normal limits. No acute thoracic osseous abnormality. Clear lungs. Cardiomegaly. No acute cardiopulmonary abnormality. MDM:  Patient presented with chest pain. Presentation does not appear consistent with aortic dissection or pulmonary embolus. Given history and presentation cardiac workup initiated. He was found to be in A. fib with RVR. Heart rate in the 160s. He was started on Cardizem, given a bolus, then a drip. Heart rate came down into the 1 teens. He seemed to respond well to the Cardizem. Given the A. fib with RVR we will plan for admission to the hospital for further evaluation and care.     Due to the immediate potential for life-threatening deterioration due to a fib with rvr, I spent 30 minutes providing critical care. This time is excluding time spent performing procedures. Clinical Impression:  1.  Atrial fibrillation with RVR ([address]          (Please note that portions of this note may have been completed with a voice recognition program. Efforts were made to edit the dictations but occasionally words are mis-transcribed.)      Elouise Spurling,   06/11/21 4952

## 2021-06-11 NOTE — ED NOTES
Called and gave report to Javi Kuo on [facility]; pt will be going to room [facility]9 [facility].  Herson Porras RN  06/11/21 8112

## 2021-06-11 NOTE — LETTER
[facility] [address]  [address] [place] [address]  Phone: [phone]    No name on file. June 16, 2021     Patient: Fatoumata Valdovinos   YOB: 1960   Date of Visit: 6/11/2021       To Whom It May Concern: It is my medical opinion that Olga Knutson may return to work on 06/21/21. If you have any questions or concerns, please don't hesitate to call. Sincerely,        No name on file.

## 2021-06-11 NOTE — PROGRESS NOTES
Pt was here for scheduled [address] stress test @ 0830. His Resting-baseline HR was @ 146 bpm-A. Fib prior to his test. Pt does have a H/O A.Fib. Notified his cardiologist  of pt's increased HR. Per Michaelle Garibay, wants pt to still have his test done today, along w/his first set of NM pictures. If pt's HR is still elevated, to send pt out to ER. Informed pt & his wife about 's orders. They both voiced understanding. Pt became upset & emotional support given. Stress test completed & pt's HR was 146-176 bpm during testing. Did give Amiophylline d/t pt's BP dropped to 90/60 & was having some chest tightness. After pictures completed, pt's HR remains in the 130-140's. Dialed 911 @ 1001 & informed dispatch about above. Again informed pt & wife that this nurse called the squad. They again voiced understanding. Squad arrived & report given, along w/pt's demographic sheet & EKG's from [address] [place] [address],[facility]- stress test. Again gave emotional support to pt & his wife.

## 2021-06-11 NOTE — LETTER
[address] [address] [place] [address]  Phone: [phone]    No name on file. 2021     Patient: Prabhakar Cavanaugh   YOB: 1960   Date of Visit: 2021       To Whom It May Concern: It is my medical opinion that Patricia Chávez may return to work on 21. If you have any questions or concerns, please don't hesitate to call. Sincerely,        No name on file.

## 2021-06-11 NOTE — ED NOTES
Bed: ED-21  Expected date:   Expected time:   Means of arrival:   Comments:  EMS- high heart rate after stress test, afib     Sil Hunter RN  06/11/21 9164

## 2021-06-12 LAB
ALBUMIN SERPL-MCNC: 3.2 GM/DL (ref 3.4–5)
ALP BLD-CCNC: 42 IU/L (ref 40–128)
ALT SERPL-CCNC: 34 U/L (ref 10–40)
ANION GAP SERPL CALCULATED.3IONS-SCNC: 9 MMOL/L (ref 4–16)
AST SERPL-CCNC: 23 IU/L (ref 15–37)
BASOPHILS ABSOLUTE: 0 K/CU MM
BASOPHILS RELATIVE PERCENT: 0.4 % (ref 0–1)
BILIRUB SERPL-MCNC: 0.7 MG/DL (ref 0–1)
BUN BLDV-MCNC: 14 MG/DL (ref 6–23)
CALCIUM SERPL-MCNC: 8.3 MG/DL (ref 8.3–10.6)
CHLORIDE BLD-SCNC: 111 MMOL/L (ref 99–110)
CO2: 21 MMOL/L (ref 21–32)
CREAT SERPL-MCNC: 1 MG/DL (ref 0.9–1.3)
DIFFERENTIAL TYPE: ABNORMAL
EOSINOPHILS ABSOLUTE: 0.2 K/CU MM
EOSINOPHILS RELATIVE PERCENT: 2.4 % (ref 0–3)
GFR AFRICAN AMERICAN: >60 ML/MIN/1.73M2
GFR NON-AFRICAN AMERICAN: >60 ML/MIN/1.73M2
GLUCOSE BLD-MCNC: 122 MG/DL (ref 70–99)
HCT VFR BLD CALC: 41.7 % (ref 42–52)
HEMOGLOBIN: 13 GM/DL (ref 13.5–18)
IMMATURE NEUTROPHIL %: 0.3 % (ref 0–0.43)
LYMPHOCYTES ABSOLUTE: 2 K/CU MM
LYMPHOCYTES RELATIVE PERCENT: 22 % (ref 24–44)
MAGNESIUM: 2 MG/DL (ref 1.8–2.4)
MCH RBC QN AUTO: 30.6 PG (ref 27–31)
MCHC RBC AUTO-ENTMCNC: 31.2 % (ref 32–36)
MCV RBC AUTO: 98.1 FL (ref 78–100)
MONOCYTES ABSOLUTE: 0.7 K/CU MM
MONOCYTES RELATIVE PERCENT: 7.9 % (ref 0–4)
NUCLEATED RBC %: 0 %
PDW BLD-RTO: 14.6 % (ref 11.7–14.9)
PHOSPHORUS: 3.1 MG/DL (ref 2.5–4.9)
PLATELET # BLD: 224 K/CU MM (ref 140–440)
PMV BLD AUTO: 9.8 FL (ref 7.5–11.1)
POTASSIUM SERPL-SCNC: 4.5 MMOL/L (ref 3.5–5.1)
RBC # BLD: 4.25 M/CU MM (ref 4.6–6.2)
SEGMENTED NEUTROPHILS ABSOLUTE COUNT: 6.2 K/CU MM
SEGMENTED NEUTROPHILS RELATIVE PERCENT: 67 % (ref 36–66)
SODIUM BLD-SCNC: 141 MMOL/L (ref 135–145)
TOTAL IMMATURE NEUTOROPHIL: 0.03 K/CU MM
TOTAL NUCLEATED RBC: 0 K/CU MM
TOTAL PROTEIN: 5.2 GM/DL (ref 6.4–8.2)
WBC # BLD: 9.3 K/CU MM (ref 4–10.5)

## 2021-06-12 PROCEDURE — 99254 IP/OBS CNSLTJ NEW/EST MOD 60: CPT | Performed by: INTERNAL MEDICINE

## 2021-06-12 PROCEDURE — 84100 ASSAY OF PHOSPHORUS: CPT

## 2021-06-12 PROCEDURE — 2580000003 HC RX 258: Performed by: INTERNAL MEDICINE

## 2021-06-12 PROCEDURE — 85025 COMPLETE CBC W/AUTO DIFF WBC: CPT

## 2021-06-12 PROCEDURE — 2500000003 HC RX 250 WO HCPCS: Performed by: INTERNAL MEDICINE

## 2021-06-12 PROCEDURE — 94761 N-INVAS EAR/PLS OXIMETRY MLT: CPT

## 2021-06-12 PROCEDURE — 6370000000 HC RX 637 (ALT 250 FOR IP): Performed by: INTERNAL MEDICINE

## 2021-06-12 PROCEDURE — 6360000002 HC RX W HCPCS: Performed by: INTERNAL MEDICINE

## 2021-06-12 PROCEDURE — 83735 ASSAY OF MAGNESIUM: CPT

## 2021-06-12 PROCEDURE — 36415 COLL VENOUS BLD VENIPUNCTURE: CPT

## 2021-06-12 PROCEDURE — 6370000000 HC RX 637 (ALT 250 FOR IP): Performed by: FAMILY MEDICINE

## 2021-06-12 PROCEDURE — 2140000000 HC CCU INTERMEDIATE R&B

## 2021-06-12 PROCEDURE — 80053 COMPREHEN METABOLIC PANEL: CPT

## 2021-06-12 RX ORDER — PYRIDOSTIGMINE BROMIDE 60 MG/1
60 TABLET ORAL
Status: DISCONTINUED | OUTPATIENT
Start: 2021-06-12 | End: 2021-06-16 | Stop reason: HOSPADM

## 2021-06-12 RX ADMIN — METOPROLOL SUCCINATE 25 MG: 25 TABLET, EXTENDED RELEASE ORAL at 09:20

## 2021-06-12 RX ADMIN — SODIUM CHLORIDE, PRESERVATIVE FREE 10 ML: 5 INJECTION INTRAVENOUS at 09:23

## 2021-06-12 RX ADMIN — AMIODARONE HYDROCHLORIDE 150 MG: 50 INJECTION, SOLUTION INTRAVENOUS at 14:07

## 2021-06-12 RX ADMIN — DEXTROSE MONOHYDRATE 15 MG/HR: 50 INJECTION, SOLUTION INTRAVENOUS at 10:53

## 2021-06-12 RX ADMIN — PYRIDOSTIGMINE BROMIDE 60 MG: 60 TABLET ORAL at 20:52

## 2021-06-12 RX ADMIN — CYANOCOBALAMIN TAB 1000 MCG 1000 MCG: 1000 TAB at 09:21

## 2021-06-12 RX ADMIN — CLOPIDOGREL BISULFATE 75 MG: 75 TABLET ORAL at 09:21

## 2021-06-12 RX ADMIN — ATORVASTATIN CALCIUM 40 MG: 40 TABLET, FILM COATED ORAL at 20:52

## 2021-06-12 RX ADMIN — ACETAMINOPHEN 650 MG: 325 TABLET ORAL at 23:26

## 2021-06-12 RX ADMIN — PYRIDOSTIGMINE BROMIDE 60 MG: 60 TABLET ORAL at 10:51

## 2021-06-12 RX ADMIN — DIGOXIN 125 MCG: 125 TABLET ORAL at 09:21

## 2021-06-12 RX ADMIN — PYRIDOSTIGMINE BROMIDE 60 MG: 60 TABLET ORAL at 23:26

## 2021-06-12 RX ADMIN — PREDNISONE 8 MG: 1 TABLET ORAL at 09:21

## 2021-06-12 RX ADMIN — PYRIDOSTIGMINE BROMIDE 60 MG: 60 TABLET ORAL at 06:16

## 2021-06-12 RX ADMIN — SODIUM CHLORIDE: 9 INJECTION, SOLUTION INTRAVENOUS at 06:18

## 2021-06-12 RX ADMIN — AMIODARONE HYDROCHLORIDE 1 MG/MIN: 50 INJECTION, SOLUTION INTRAVENOUS at 14:26

## 2021-06-12 RX ADMIN — RIVAROXABAN 20 MG: 20 TABLET, FILM COATED ORAL at 09:21

## 2021-06-12 RX ADMIN — PYRIDOSTIGMINE BROMIDE 60 MG: 60 TABLET ORAL at 16:40

## 2021-06-12 ASSESSMENT — PAIN SCALES - GENERAL: PAINLEVEL_OUTOF10: 3

## 2021-06-12 NOTE — PROGRESS NOTES
Hospitalist Progress Note      Name:  Rosa Luna /Age/Sex: 1960  (57 y.o. male)   MRN & CSN:  [id number] & [id number] Admission Date/Time: 2021 10:40 AM   [facility]  [facility] PCP: No primary care provider on file. Hospital Day: 2    Assessment and Plan:   Rosa Luna is a 57 y.o.  male  who presents with <principal problem not specified>    1. ATRIAL FIBRILLATION WITH RVR  -recurrent in patient with established Afib. Patient states he has been having ongoing issues with this. He is currently comfortable and hemodynamically stable. Will continue diltiazem drip and his other medications and await cardiology evaluation    2. ELEVATED TROPONIN  -Only mildly elevated and most likely represents strain from Afib. This is not NSTEMI. Further deferral to cardiology    3. ACUTE KIDNEY INJURY  -suspect sec to above illness, appears to have already resolved, creat 1.5-1.0. will monitor    4. CAD  -see above will monitor    5. MYASTHENIA GRAVIS  -no issues, continue home medication    Diet ADULT DIET; Regular; Low Fat/Low Chol/High Fiber/2 gm Na   DVT Prophylaxis [] Lovenox, []  Heparin, [] SCDs, [] Ambulation   GI Prophylaxis [] PPI,  [] H2 Blocker,  [] Carafate,  [] Diet/Tube Feeds   Code Status Full Code   Disposition Patient requires continued admission due to afib   afib [] Low, [x] Moderate,[]  High  Patient's risk as above due to afib     History of Present Illness:     Chief Complaint: <principal problem not specified>  Rosa Luna is a 57 y.o.  male  who presents with H/O Afib who comes in with increasing SOB. He was at cardiology office for stress test and prior noted to have afib with HR in 140s. He was sent in for admission and treatment. There were no reported issues overnight. Currently he is lying in bed comfortable in no distress. He states that he has been getting dyspnea with any type of exertion, going on for awhile.  Currently denies chest pain or N/V/D or SOB at rest. Cardiac monitor HR ranginf from 110s-130s based on his activity during our visit. Ten point ROS reviewed negative, unless as noted above    Objective: Intake/Output Summary (Last 24 hours) at 6/12/2021 1048  Last data filed at 6/12/2021 5590  Gross per 24 hour   Intake 1241 ml   Output --   Net 1241 ml      Vitals:   Vitals:    06/12/21 0920   BP: (!) 145/115   Pulse: 132   Resp:    Temp:    SpO2:      Physical Exam:   GEN Awake male, sitting upright in bed in no apparent distress. Appears given age. EYES Pupils are equally round. No scleral erythema, discharge, or conjunctivitis. HENT Mucous membranes are moist. Oral pharynx without exudates, no evidence of thrush. NECK Supple, no apparent thyromegaly or masses. RESP Clear to auscultation, no wheezes, rales or rhonchi. Symmetric chest movement while on room air. CARDIO/VASC S1/S2 auscultated. Iregular rate without appreciable murmurs, rubs, or gallops. No JVD or carotid bruits. Peripheral pulses equal bilaterally and palpable. No peripheral edema. GI Abdomen is soft without significant tenderness, masses, or guarding. Bowel sounds are normoactive. Rectal exam deferred. HEME/LYMPH No palpable cervical lymphadenopathy and no hepatosplenomegaly. No petechiae or ecchymoses. MSK No gross joint deformities. SKIN Normal coloration, warm, dry. NEURO Cranial nerves appear grossly intact, normal speech, no lateralizing weakness. PSYCH Awake, alert, oriented x 4. Affect appropriate.     Medications:   Medications:    clopidogrel  75 mg Oral Daily    digoxin  125 mcg Oral Daily    metoprolol succinate  25 mg Oral Daily    pyridostigmine  60 mg Oral 5x Daily    rivaroxaban  20 mg Oral Daily with breakfast    vitamin B-12  1,000 mcg Oral Daily    sodium chloride flush  5-40 mL Intravenous 2 times per day    atorvastatin  40 mg Oral Nightly    predniSONE  8 mg Oral Daily      Infusions:    dilTIAZem (CARDIZEM) 100 mg in dextrose 5% 100 mL (ADD-[place]) 15 mg/hr (06/12/21 0920)    sodium chloride      sodium chloride 75 mL/hr at 06/12/21 0618     PRN Meds: albuterol sulfate HFA, 2 puff, Q4H PRN  sodium chloride flush, 5-40 mL, PRN  sodium chloride, 25 mL, PRN  polyethylene glycol, 17 g, Daily PRN  acetaminophen, 650 mg, Q6H PRN   Or  acetaminophen, 650 mg, Q6H PRN  meclizine, 12.5 mg, TID PRN

## 2021-06-12 NOTE — PROGRESS NOTES
Patient got up to the bathroom and became dizzy, he stated that this is the 2nd time he got dizzy getting up and this only started after we started the amiodarone gtt. B/P 133/98, HR 97  Dr. Mis paulino, no new orders at this time, observe patient.

## 2021-06-12 NOTE — CONSULTS
INPATIENT CARDIOLOGY CONSULT NOTE       Reason for consultation:  afib  Referring physician:  Shemar Benavidez MD      Dear Shemar Benavidez MD Thank you for the consult    Chief Complaint   Patient presents with    Irregular Heart Beat     afib after doing a stress test       History of present illness:Armais is a 57 y. o.year old who  presents with  Chief Complaint   Patient presents with    Irregular Heart Beat     afib after doing a stress test     Patient is a pleasant 60-year-old gentleman with prior medical history significant for coronary disease status post PCI to LAD and recent PCI to ramus intermedius, history of morbid obesity, hypertension, hyperlipidemia, history of paroxysmal atrial fibrillation has been feeling fatigued and tired over the past several months. Patient underwent exercise stress test in March which showed poor functional capacity. Patient was then scheduled for stress MPI yesterday however during the procedure patient was noted to be in A. fib RVR for which she was sent to the ER. Patient was subsequently admitted for atrial fibrillation with rapid ventricular response    Patient denies any current chest pain palpitations. EKG shows A. fib RVR  Telemetry shows atrial fibrillation with RVR, heart rate variable between 110 to 160 bpm      Past medical history:    has a past medical history of Atrial fibrillation, chronic ([address] H/O echocardiogram, Heart disease, History of blood transfusion, Hx of blood clots, Hx of Doppler ultrasound, Hypertension, Myasthenia gravis ([address] NSTEMI (non-ST elevated myocardial infarction) ([address] Post PTCA, TIA (transient ischemic attack), and Ulcerative colitis ([address] Past surgical history:   has a past surgical history that includes ileostomy or jejunostomy; Tonsillectomy; Cardiac surgery; and Percutaneous Transluminal Coronary Angio (12/14/2020). Social History:   reports that he quit smoking about 2 months ago.  His smoking use included cigarettes. He smoked 0.25 packs per day. He has never used smokeless tobacco. He reports current alcohol use of about 1.0 standard drinks of alcohol per week. He reports that he does not use drugs.   Family history:   no family history of CAD, STROKE of DM    No Known Allergies    pyridostigmine (MESTINON) tablet 60 mg, 6 times per day  [START ON 6/13/2021] predniSONE (DELTASONE) tablet 13 mg, Daily  amiodarone (CORDARONE) 150 mg in dextrose 5 % 100 mL bolus, Once   Followed by  amiodarone (CORDARONE) 450 mg in dextrose 5 % 250 mL infusion, Continuous   Followed by  amiodarone (CORDARONE) 450 mg in dextrose 5 % 250 mL infusion, Continuous  dilTIAZem 100 mg in dextrose 5 % 100 mL infusion (ADD-[place]), Continuous  albuterol sulfate  (90 Base) MCG/ACT inhaler 2 puff, Q4H PRN  clopidogrel (PLAVIX) tablet 75 mg, Daily  digoxin (LANOXIN) tablet 125 mcg, Daily  metoprolol succinate (TOPROL XL) extended release tablet 25 mg, Daily  rivaroxaban (XARELTO) tablet 20 mg, Daily with breakfast  vitamin B-12 (CYANOCOBALAMIN) tablet 1,000 mcg, Daily  sodium chloride flush 0.9 % injection 5-40 mL, 2 times per day  sodium chloride flush 0.9 % injection 5-40 mL, PRN  0.9 % sodium chloride infusion, PRN  polyethylene glycol (GLYCOLAX) packet 17 g, Daily PRN  acetaminophen (TYLENOL) tablet 650 mg, Q6H PRN   Or  acetaminophen (TYLENOL) suppository 650 mg, Q6H PRN  atorvastatin (LIPITOR) tablet 40 mg, Nightly  0.9 % sodium chloride infusion, Continuous  meclizine (ANTIVERT) tablet 12.5 mg, TID PRN    Immune Globulin (Human) solution 20 g, Once   And  Immune Globulin (Human) solution 20 g, Once  Immune Globulin (Human) solution 20 g, Once   And  Immune Globulin (Human) solution 20 g, Once      Current Facility-Administered Medications   Medication Dose Route Frequency Provider Last Rate Last Admin    pyridostigmine (MESTINON) tablet 60 mg  60 mg Oral 6 times per day Lesleigh Boast, MD        [START ON 6/13/2021] predniSONE (DELTASONE) tablet 13 mg  13 mg Oral Daily Sandra Fung MD        amiodarone (CORDARONE) 150 mg in dextrose 5 % 100 mL bolus  150 mg Intravenous Once Kailey Colon MD        Followed by   Kane Cartagena amiodarone (CORDARONE) 450 mg in dextrose 5 % 250 mL infusion  1 mg/min Intravenous Continuous Kailey Colon MD        Followed by   Kane Cartagena amiodarone (CORDARONE) 450 mg in dextrose 5 % 250 mL infusion  0.5 mg/min Intravenous Continuous Kailey Colon MD        dilTIAZem 100 mg in dextrose 5 % 100 mL infusion (ADD-[place])  5-15 mg/hr Intravenous Continuous Michelle Steward MD 15 mL/hr at 06/12/21 1053 15 mg/hr at 06/12/21 1053    albuterol sulfate  (90 Base) MCG/ACT inhaler 2 puff  2 puff Inhalation Q4H PRN Michelle Steward MD        clopidogrel (PLAVIX) tablet 75 mg  75 mg Oral Daily Michelle Steward MD   75 mg at 06/12/21 1995    digoxin (LANOXIN) tablet 125 mcg  125 mcg Oral Daily Michelle Steward MD   125 mcg at 06/12/21 5405    metoprolol succinate (TOPROL XL) extended release tablet 25 mg  25 mg Oral Daily Michelle Steward MD   25 mg at 06/12/21 0920    rivaroxaban (XARELTO) tablet 20 mg  20 mg Oral Daily with breakfast Michelle Steward MD   20 mg at 06/12/21 0431    vitamin B-12 (CYANOCOBALAMIN) tablet 1,000 mcg  1,000 mcg Oral Daily Michelle Steward MD   1,000 mcg at 06/12/21 0270    sodium chloride flush 0.9 % injection 5-40 mL  5-40 mL Intravenous 2 times per day Michelle Steward MD   10 mL at 06/12/21 9737    sodium chloride flush 0.9 % injection 5-40 mL  5-40 mL Intravenous PRN Michelle Steward MD        0.9 % sodium chloride infusion  25 mL Intravenous PRN Michelle Steward MD        polyethylene glycol (GLYCOLAX) packet 17 g  17 g Oral Daily PRN Michelle Steward MD        acetaminophen (TYLENOL) tablet 650 mg  650 mg Oral Q6H PRN Michelle Steward MD        Or    acetaminophen (TYLENOL) suppository 650 mg  650 mg Rectal Q6H PRN Michelle Steward MD        atorvastatin (LIPITOR) tablet 40 mg  40 mg Oral Nightly Willie Hilton MD   40 mg at 06/11/21 2110    0.9 % sodium chloride infusion   Intravenous Continuous Willie Hilton MD 75 mL/hr at 06/12/21 0618 New Bag at 06/12/21 0618    meclizine (ANTIVERT) tablet 12.5 mg  12.5 mg Oral TID PRN Willie Hilton MD         Facility-Administered Medications Ordered in Other Encounters   Medication Dose Route Frequency Provider Last Rate Last Admin    Immune Globulin (Human) solution 20 g  20 g Intravenous Once Lorena Haynes MD        And    Immune Globulin (Human) solution 20 g  20 g Intravenous Once Lorena Haynes MD        Immune Globulin (Human) solution 20 g  20 g Intravenous Once Lorena Haynes MD        And    Immune Globulin (Human) solution 20 g  20 g Intravenous Once Lorena Haynes MD             Review of Systems:     · Constitutional: No Fever or Weight Loss   · Eyes: No Decreased Vision  · ENT: No Headaches, Hearing Loss or Vertigo  · Cardiovascular: No chest pain, dyspnea on exertion, + palpitations or loss of consciousness  · Respiratory: No cough or wheezing    · Gastrointestinal: No abdominal pain, appetite loss, blood in stools, constipation, diarrhea or heartburn  · Genitourinary: No dysuria, trouble voiding, or hematuria  · Musculoskeletal:  No gait disturbance, weakness or joint complaints  · Integumentary: No rash or pruritis  · Neurological: No TIA or stroke symptoms  · Psychiatric: No anxiety or depression  · Endocrine: No malaise, fatigue or temperature intolerance  · Hematologic/Lymphatic: No bleeding problems, blood clots or swollen lymph nodes  · Allergic/Immunologic: No nasal congestion or hives    All other systems were reviewed and were negative otherwise.          Physical Examination:      Vitals:    06/12/21 1203   BP: 109/69   Pulse: 139   Resp: 18   Temp: 97.7 °F (36.5 °C)   SpO2:       Wt Readings from Last 3 Encounters:   06/12/21 290 lb 11.2 oz (131.9 kg) not seem to respond well to IV Cardizem. Start patient on IV amiodarone drip after bolus. Continue with digoxin/metoprolol. We will consider TONIO/cardioversion Monday if the patient does not convert spontaneously. Continue with Xarelto for oral anticoagulation/stroke prophylaxis    2. History of coronary disease s/p PCI. Left heart cath images reviewed personally. Stable. Denies any current chest pain. Troponins are trivially elevated, likely secondary to A. fib. We will schedule patient for stress MPI prior to discharge. Continue with Xarelto/Plavix/atorvastatin/beta-blocker. 3. Essential hypertension: Blood pressure stable. Continue with metoprolol. 25 mg p.o. daily  4. History of myasthenia gravis: Patient is on Mestinon and prednisone.   Management per primary team.  5. Morbid obesity: Advised low-salt low-fat diet, respect modification      Thank you for the consult    Dr. Magnolia Campos  6/12/2021 12:12 PM

## 2021-06-12 NOTE — PROGRESS NOTES
Patient is on Cardizem gtt at 15mg/hr. While in bed his heart rate is afib in the low 100's but when he gets up to the restroom or with any movement his heart rate goes up to 160's b/p 145/111. He received PO metoprolol and digoxin this am, Dr. Dennis Rutledge paged.

## 2021-06-13 LAB
ALBUMIN SERPL-MCNC: 3.1 GM/DL (ref 3.4–5)
ALP BLD-CCNC: 42 IU/L (ref 40–128)
ALT SERPL-CCNC: 39 U/L (ref 10–40)
ANION GAP SERPL CALCULATED.3IONS-SCNC: 8 MMOL/L (ref 4–16)
AST SERPL-CCNC: 29 IU/L (ref 15–37)
BASOPHILS ABSOLUTE: 0 K/CU MM
BASOPHILS RELATIVE PERCENT: 0.4 % (ref 0–1)
BILIRUB SERPL-MCNC: 0.8 MG/DL (ref 0–1)
BUN BLDV-MCNC: 18 MG/DL (ref 6–23)
CALCIUM SERPL-MCNC: 8.5 MG/DL (ref 8.3–10.6)
CHLORIDE BLD-SCNC: 106 MMOL/L (ref 99–110)
CO2: 19 MMOL/L (ref 21–32)
CREAT SERPL-MCNC: 0.9 MG/DL (ref 0.9–1.3)
DIFFERENTIAL TYPE: ABNORMAL
EOSINOPHILS ABSOLUTE: 0.1 K/CU MM
EOSINOPHILS RELATIVE PERCENT: 0.9 % (ref 0–3)
GFR AFRICAN AMERICAN: >60 ML/MIN/1.73M2
GFR NON-AFRICAN AMERICAN: >60 ML/MIN/1.73M2
GLUCOSE BLD-MCNC: 133 MG/DL (ref 70–99)
HCT VFR BLD CALC: 37.8 % (ref 42–52)
HEMOGLOBIN: 11.8 GM/DL (ref 13.5–18)
IMMATURE NEUTROPHIL %: 0.4 % (ref 0–0.43)
LYMPHOCYTES ABSOLUTE: 1.6 K/CU MM
LYMPHOCYTES RELATIVE PERCENT: 16.9 % (ref 24–44)
MAGNESIUM: 2 MG/DL (ref 1.8–2.4)
MCH RBC QN AUTO: 30.4 PG (ref 27–31)
MCHC RBC AUTO-ENTMCNC: 31.2 % (ref 32–36)
MCV RBC AUTO: 97.4 FL (ref 78–100)
MONOCYTES ABSOLUTE: 0.8 K/CU MM
MONOCYTES RELATIVE PERCENT: 8 % (ref 0–4)
NUCLEATED RBC %: 0.2 %
PDW BLD-RTO: 14.5 % (ref 11.7–14.9)
PHOSPHORUS: 3 MG/DL (ref 2.5–4.9)
PLATELET # BLD: 248 K/CU MM (ref 140–440)
PMV BLD AUTO: 10 FL (ref 7.5–11.1)
POTASSIUM SERPL-SCNC: 4.6 MMOL/L (ref 3.5–5.1)
RBC # BLD: 3.88 M/CU MM (ref 4.6–6.2)
SEGMENTED NEUTROPHILS ABSOLUTE COUNT: 7 K/CU MM
SEGMENTED NEUTROPHILS RELATIVE PERCENT: 73.4 % (ref 36–66)
SODIUM BLD-SCNC: 133 MMOL/L (ref 135–145)
TOTAL IMMATURE NEUTOROPHIL: 0.04 K/CU MM
TOTAL NUCLEATED RBC: 0 K/CU MM
TOTAL PROTEIN: 5.4 GM/DL (ref 6.4–8.2)
WBC # BLD: 9.5 K/CU MM (ref 4–10.5)

## 2021-06-13 PROCEDURE — 2140000000 HC CCU INTERMEDIATE R&B

## 2021-06-13 PROCEDURE — 6370000000 HC RX 637 (ALT 250 FOR IP): Performed by: FAMILY MEDICINE

## 2021-06-13 PROCEDURE — G0328 FECAL BLOOD SCRN IMMUNOASSAY: HCPCS

## 2021-06-13 PROCEDURE — 2580000003 HC RX 258: Performed by: NURSE PRACTITIONER

## 2021-06-13 PROCEDURE — 6360000002 HC RX W HCPCS: Performed by: INTERNAL MEDICINE

## 2021-06-13 PROCEDURE — 6370000000 HC RX 637 (ALT 250 FOR IP): Performed by: INTERNAL MEDICINE

## 2021-06-13 PROCEDURE — 36415 COLL VENOUS BLD VENIPUNCTURE: CPT

## 2021-06-13 PROCEDURE — 80053 COMPREHEN METABOLIC PANEL: CPT

## 2021-06-13 PROCEDURE — 94761 N-INVAS EAR/PLS OXIMETRY MLT: CPT

## 2021-06-13 PROCEDURE — 2500000003 HC RX 250 WO HCPCS: Performed by: INTERNAL MEDICINE

## 2021-06-13 PROCEDURE — 85025 COMPLETE CBC W/AUTO DIFF WBC: CPT

## 2021-06-13 PROCEDURE — 83735 ASSAY OF MAGNESIUM: CPT

## 2021-06-13 PROCEDURE — 99233 SBSQ HOSP IP/OBS HIGH 50: CPT | Performed by: INTERNAL MEDICINE

## 2021-06-13 PROCEDURE — 2580000003 HC RX 258: Performed by: INTERNAL MEDICINE

## 2021-06-13 PROCEDURE — 84100 ASSAY OF PHOSPHORUS: CPT

## 2021-06-13 RX ORDER — SODIUM CHLORIDE 0.9 % (FLUSH) 0.9 %
5-40 SYRINGE (ML) INJECTION PRN
Status: DISCONTINUED | OUTPATIENT
Start: 2021-06-13 | End: 2021-06-16 | Stop reason: HOSPADM

## 2021-06-13 RX ORDER — SODIUM CHLORIDE 9 MG/ML
INJECTION, SOLUTION INTRAVENOUS CONTINUOUS
Status: DISCONTINUED | OUTPATIENT
Start: 2021-06-13 | End: 2021-06-16

## 2021-06-13 RX ORDER — FAMOTIDINE 20 MG/1
20 TABLET, FILM COATED ORAL 2 TIMES DAILY
Status: DISCONTINUED | OUTPATIENT
Start: 2021-06-13 | End: 2021-06-14

## 2021-06-13 RX ORDER — SODIUM CHLORIDE 9 MG/ML
25 INJECTION, SOLUTION INTRAVENOUS PRN
Status: DISCONTINUED | OUTPATIENT
Start: 2021-06-13 | End: 2021-06-16 | Stop reason: HOSPADM

## 2021-06-13 RX ORDER — SODIUM CHLORIDE 0.9 % (FLUSH) 0.9 %
5-40 SYRINGE (ML) INJECTION EVERY 12 HOURS SCHEDULED
Status: DISCONTINUED | OUTPATIENT
Start: 2021-06-13 | End: 2021-06-16 | Stop reason: HOSPADM

## 2021-06-13 RX ADMIN — AMIODARONE HYDROCHLORIDE 0.5 MG/MIN: 50 INJECTION, SOLUTION INTRAVENOUS at 16:54

## 2021-06-13 RX ADMIN — FAMOTIDINE 20 MG: 20 TABLET ORAL at 20:52

## 2021-06-13 RX ADMIN — DIGOXIN 125 MCG: 125 TABLET ORAL at 08:25

## 2021-06-13 RX ADMIN — FAMOTIDINE 20 MG: 20 TABLET ORAL at 17:31

## 2021-06-13 RX ADMIN — DEXTROSE MONOHYDRATE 12.5 MG/HR: 50 INJECTION, SOLUTION INTRAVENOUS at 16:54

## 2021-06-13 RX ADMIN — DEXTROSE MONOHYDRATE 15 MG/HR: 50 INJECTION, SOLUTION INTRAVENOUS at 02:33

## 2021-06-13 RX ADMIN — CLOPIDOGREL BISULFATE 75 MG: 75 TABLET ORAL at 08:25

## 2021-06-13 RX ADMIN — ATORVASTATIN CALCIUM 40 MG: 40 TABLET, FILM COATED ORAL at 20:52

## 2021-06-13 RX ADMIN — PYRIDOSTIGMINE BROMIDE 60 MG: 60 TABLET ORAL at 16:09

## 2021-06-13 RX ADMIN — CYANOCOBALAMIN TAB 1000 MCG 1000 MCG: 1000 TAB at 08:26

## 2021-06-13 RX ADMIN — AMIODARONE HYDROCHLORIDE 0.5 MG/MIN: 50 INJECTION, SOLUTION INTRAVENOUS at 02:33

## 2021-06-13 RX ADMIN — PYRIDOSTIGMINE BROMIDE 60 MG: 60 TABLET ORAL at 20:52

## 2021-06-13 RX ADMIN — PREDNISONE 13 MG: 1 TABLET ORAL at 08:25

## 2021-06-13 RX ADMIN — PYRIDOSTIGMINE BROMIDE 60 MG: 60 TABLET ORAL at 08:26

## 2021-06-13 RX ADMIN — METOPROLOL SUCCINATE 25 MG: 25 TABLET, EXTENDED RELEASE ORAL at 08:25

## 2021-06-13 RX ADMIN — PYRIDOSTIGMINE BROMIDE 60 MG: 60 TABLET ORAL at 03:48

## 2021-06-13 RX ADMIN — PYRIDOSTIGMINE BROMIDE 60 MG: 60 TABLET ORAL at 12:24

## 2021-06-13 RX ADMIN — RIVAROXABAN 20 MG: 20 TABLET, FILM COATED ORAL at 08:26

## 2021-06-13 RX ADMIN — Medication 10 ML: at 20:53

## 2021-06-13 ASSESSMENT — PAIN SCALES - GENERAL
PAINLEVEL_OUTOF10: 0
PAINLEVEL_OUTOF10: 0

## 2021-06-13 NOTE — PROGRESS NOTES
Patient is feeling extremely weak, and \"worn out\" and states he has not slept in a couple of days. He has been going to the bathroom to urinate and to empty his ileostomy. He just reported to this nurse that his ileostomy is draining black stool, he states that it is normally brown depending on what he eats. Vital signs are stable and he continues to be on Amiodarone and Cardizem gtts. Dr. Karen Keith paged to see if will come and evaluate patient.

## 2021-06-13 NOTE — PROGRESS NOTES
CARDIOLOGY PROGRESS NOTE                                                  Name:  Armani Adam /Age/Sex: 1960  (57 y.o. male)   MRN & CSN:  [id number] & [id number] Admission Date/Time: 2021 10:40 AM   Location:  [address] [place] [address] PCP: No primary care provider on file. Admit Date:  2021  Hospital Day: 3      SUBJECTIVE:   Seen patient as follow up as consultation for AFIB     No chest pain. No shortness of breath  No palpations    TELEMETRY: Atrial fibrillation       Intake/Output Summary (Last 24 hours) at 2021 1035  Last data filed at 2021 0834  Gross per 24 hour   Intake 1576 ml   Output 300 ml   Net 1276 ml       Assessment/Plan:         1. A. fib RVR. Heart rate  overnight  2. Continue with IV Cardizem drip. Continue IV amiodarone drip after bolus. Continue with digoxin/metoprolol. TONIO/cardioversion Monday   Continue with Xarelto for oral anticoagulation/stroke prophylaxis  Discussed with nursing staff    2. History of coronary disease s/p PCI. Left heart cath images reviewed personally. Stable. Denies any current chest pain. Troponins are trivially elevated, likely secondary to A. fib. We will schedule patient for stress MPI prior to discharge. Continue with Xarelto/Plavix/atorvastatin/beta-blocker. 3. Essential hypertension: Blood pressure stable. Continue with metoprolol. 25 mg p.o. daily  4. History of myasthenia gravis: Patient is on Mestinon and prednisone. Management per primary team.  5. Morbid obesity: Advised low-salt low-fat diet, respect modification          Past medical history:    has a past medical history of Atrial fibrillation, chronic ([facility] [address] H/O echocardiogram, Heart disease, History of blood transfusion, Hx of blood clots, Hx of Doppler ultrasound, Hypertension, Myasthenia gravis ([facility] [address] NSTEMI (non-ST elevated myocardial infarction) ([facility] [address] Post PTCA, TIA (transient ischemic attack), and Ulcerative colitis ([facility] [address]   Past surgical history:   has a past surgical history that includes ileostomy or jejunostomy; Tonsillectomy; Cardiac surgery; and Percutaneous Transluminal Coronary Angio (12/14/2020). Social History:   reports that he quit smoking about 2 months ago. His smoking use included cigarettes. He smoked 0.25 packs per day. He has never used smokeless tobacco. He reports current alcohol use of about 1.0 standard drinks of alcohol per week. He reports that he does not use drugs. Family history:  Family history is unknown by patient. OBJECTIVE:     BP (!) 125/105   Pulse 114   Temp 97.6 °F (36.4 °C) (Oral)   Resp 22   Ht 5' 11\" (1.803 m)   Wt 290 lb 11.2 oz (131.9 kg)   SpO2 98%   BMI 40.54 kg/m²       Intake/Output Summary (Last 24 hours) at 6/13/2021 1035  Last data filed at 6/13/2021 0834  Gross per 24 hour   Intake 1576 ml   Output 300 ml   Net 1276 ml       Physical Exam:    Constitutional:  Well developed, Well nourished, No acute distress, Non-toxic appearance. HENT:  Normocephalic, Atraumatic, Bilateral external ears normal, Oropharynx moist, No oral exudates, Nose normal. Neck- Normal range of motion, No tenderness, Supple, No stridor. Eyes:  EOMI, Conjunctiva normal, No discharge. Respiratory:  Normal breath sounds, No respiratory distress, No wheezing, No chest tenderness. ,no use of accessory muscles, diaphragm movement is normal  Cardiovascular S1-S2 No Murmurs, added sounds. Normal rate. Irregularly irregular rhythm. No rubs gallops. Carotid pulses and amplitude are normal no bruit noted. Pedal pulses normal femoral pulses normal.  No pedal edema  GI:  Bowel sounds normal, Soft, No tenderness  : No CVA tenderness. Musculoskeletal: No edema, No tenderness, No cyanosis, No clubbing. Back- No tenderness. Integument:  Warm, Dry, No erythema, No rash. Lymphatic:  No lymphadenopathy noted. Neurologic:  Alert & oriented x 3, No focal deficits noted.    Psychiatric:  Affect normal, Judgment normal, Mood normal.           MEDICATIONS:     pyridostigmine  60 mg Oral 6 times per day    predniSONE  13 mg Oral Daily    clopidogrel  75 mg Oral Daily    digoxin  125 mcg Oral Daily    metoprolol succinate  25 mg Oral Daily    rivaroxaban  20 mg Oral Daily with breakfast    vitamin B-12  1,000 mcg Oral Daily    sodium chloride flush  5-40 mL Intravenous 2 times per day    atorvastatin  40 mg Oral Nightly      amiodarone 0.5 mg/min (06/13/21 0233)    dilTIAZem (CARDIZEM) 100 mg in dextrose 5% 100 mL (ADD-[place]) 15 mg/hr (06/13/21 0233)    sodium chloride       albuterol sulfate HFA, sodium chloride flush, sodium chloride, polyethylene glycol, acetaminophen **OR** acetaminophen, meclizine  No Known Allergies    Lab Data:  CBC:   Recent Labs     06/11/21  1040 06/12/21  0704 06/13/21  0559   WBC 9.1 9.3 9.5   HGB 13.9 13.0* 11.8*   HCT 43.9 41.7* 37.8*   MCV 96.1 98.1 97.4    224 248     BMP:   Recent Labs     06/11/21  1040 06/12/21  0704 06/13/21  0559    141 133*   K 4.8 4.5 4.6    111* 106   CO2 17* 21 19*   PHOS  --  3.1 3.0   BUN 14 14 18   CREATININE 1.5* 1.0 0.9     LIVER PROFILE:   Recent Labs     06/11/21  1040 06/12/21  0704 06/13/21  0559   AST 31 23 29   ALT 38 34 39   BILITOT 0.5 0.7 0.8   ALKPHOS 56 [id number] Castillo Mcgraw MD, MD 6/13/2021 10:35 AM

## 2021-06-13 NOTE — PROGRESS NOTES
Temp: 97.6 °F (36.4 °C)   SpO2:      Physical Exam:   GEN Awake male, sitting upright in bed in no apparent distress. Appears given age. EYES Pupils are equally round. No scleral erythema, discharge, or conjunctivitis. HENT Mucous membranes are moist. Oral pharynx without exudates, no evidence of thrush. NECK Supple, no apparent thyromegaly or masses. RESP Bibasilar crackles, no wheezes or rhonchi. Symmetric chest movement while on room air. CARDIO/VASC S1/S2 auscultated. Iregular rate without appreciable murmurs, rubs, or gallops. No JVD or carotid bruits. Peripheral pulses equal bilaterally and palpable. No peripheral edema. GI Abdomen is soft without significant tenderness, masses, or guarding. Bowel sounds are normoactive. Rectal exam deferred. HEME/LYMPH No palpable cervical lymphadenopathy and no hepatosplenomegaly. No petechiae or ecchymoses. MSK No gross joint deformities. SKIN Normal coloration, warm, dry. NEURO Cranial nerves appear grossly intact, normal speech, no lateralizing weakness. PSYCH Awake, alert, oriented x 4. Affect appropriate.     Medications:   Medications:    pyridostigmine  60 mg Oral 6 times per day    predniSONE  13 mg Oral Daily    clopidogrel  75 mg Oral Daily    digoxin  125 mcg Oral Daily    metoprolol succinate  25 mg Oral Daily    rivaroxaban  20 mg Oral Daily with breakfast    vitamin B-12  1,000 mcg Oral Daily    sodium chloride flush  5-40 mL Intravenous 2 times per day    atorvastatin  40 mg Oral Nightly      Infusions:    amiodarone 0.5 mg/min (06/13/21 0233)    dilTIAZem (CARDIZEM) 100 mg in dextrose 5% 100 mL (ADD-[place]) 15 mg/hr (06/13/21 0233)    sodium chloride       PRN Meds: albuterol sulfate HFA, 2 puff, Q4H PRN  sodium chloride flush, 5-40 mL, PRN  sodium chloride, 25 mL, PRN  polyethylene glycol, 17 g, Daily PRN  acetaminophen, 650 mg, Q6H PRN   Or  acetaminophen, 650 mg, Q6H PRN  meclizine, 12.5 mg, TID PRN

## 2021-06-14 ENCOUNTER — TELEPHONE (OUTPATIENT)
Dept: CARDIOLOGY CLINIC | Age: 61
End: 2021-06-14

## 2021-06-14 ENCOUNTER — ANESTHESIA EVENT (OUTPATIENT)
Dept: ENDOSCOPY | Age: 61
DRG: 201 | End: 2021-06-14
Payer: MEDICAID

## 2021-06-14 LAB
ALBUMIN SERPL-MCNC: 3.3 GM/DL (ref 3.4–5)
ALP BLD-CCNC: 42 IU/L (ref 40–128)
ALT SERPL-CCNC: 53 U/L (ref 10–40)
ANION GAP SERPL CALCULATED.3IONS-SCNC: 9 MMOL/L (ref 4–16)
AST SERPL-CCNC: 40 IU/L (ref 15–37)
BASOPHILS ABSOLUTE: 0.1 K/CU MM
BASOPHILS RELATIVE PERCENT: 0.5 % (ref 0–1)
BILIRUB SERPL-MCNC: 1 MG/DL (ref 0–1)
BUN BLDV-MCNC: 15 MG/DL (ref 6–23)
CALCIUM SERPL-MCNC: 8.6 MG/DL (ref 8.3–10.6)
CHLORIDE BLD-SCNC: 109 MMOL/L (ref 99–110)
CO2: 19 MMOL/L (ref 21–32)
CREAT SERPL-MCNC: 0.8 MG/DL (ref 0.9–1.3)
DIFFERENTIAL TYPE: ABNORMAL
EOSINOPHILS ABSOLUTE: 0.1 K/CU MM
EOSINOPHILS RELATIVE PERCENT: 1.1 % (ref 0–3)
GFR AFRICAN AMERICAN: >60 ML/MIN/1.73M2
GFR NON-AFRICAN AMERICAN: >60 ML/MIN/1.73M2
GLUCOSE BLD-MCNC: 139 MG/DL (ref 70–99)
HCT VFR BLD CALC: 35.9 % (ref 42–52)
HEMOCCULT SP1 STL QL: POSITIVE
HEMOGLOBIN: 11.2 GM/DL (ref 13.5–18)
IMMATURE NEUTROPHIL %: 0.5 % (ref 0–0.43)
LYMPHOCYTES ABSOLUTE: 1.5 K/CU MM
LYMPHOCYTES RELATIVE PERCENT: 15.5 % (ref 24–44)
MAGNESIUM: 1.9 MG/DL (ref 1.8–2.4)
MCH RBC QN AUTO: 29.3 PG (ref 27–31)
MCHC RBC AUTO-ENTMCNC: 31.2 % (ref 32–36)
MCV RBC AUTO: 94 FL (ref 78–100)
MONOCYTES ABSOLUTE: 0.9 K/CU MM
MONOCYTES RELATIVE PERCENT: 8.5 % (ref 0–4)
NUCLEATED RBC %: 0 %
PDW BLD-RTO: 14.4 % (ref 11.7–14.9)
PHOSPHORUS: 2.7 MG/DL (ref 2.5–4.9)
PLATELET # BLD: 248 K/CU MM (ref 140–440)
PMV BLD AUTO: 10.4 FL (ref 7.5–11.1)
POTASSIUM SERPL-SCNC: 4.7 MMOL/L (ref 3.5–5.1)
RBC # BLD: 3.82 M/CU MM (ref 4.6–6.2)
SARS-COV-2, NAAT: NOT DETECTED
SEGMENTED NEUTROPHILS ABSOLUTE COUNT: 7.4 K/CU MM
SEGMENTED NEUTROPHILS RELATIVE PERCENT: 73.9 % (ref 36–66)
SODIUM BLD-SCNC: 137 MMOL/L (ref 135–145)
SOURCE: NORMAL
TOTAL IMMATURE NEUTOROPHIL: 0.05 K/CU MM
TOTAL NUCLEATED RBC: 0 K/CU MM
TOTAL PROTEIN: 5.3 GM/DL (ref 6.4–8.2)
WBC # BLD: 10 K/CU MM (ref 4–10.5)

## 2021-06-14 PROCEDURE — 6370000000 HC RX 637 (ALT 250 FOR IP): Performed by: INTERNAL MEDICINE

## 2021-06-14 PROCEDURE — 36415 COLL VENOUS BLD VENIPUNCTURE: CPT

## 2021-06-14 PROCEDURE — 85025 COMPLETE CBC W/AUTO DIFF WBC: CPT

## 2021-06-14 PROCEDURE — 7100000001 HC PACU RECOVERY - ADDTL 15 MIN

## 2021-06-14 PROCEDURE — 6360000002 HC RX W HCPCS: Performed by: INTERNAL MEDICINE

## 2021-06-14 PROCEDURE — 80053 COMPREHEN METABOLIC PANEL: CPT

## 2021-06-14 PROCEDURE — 2580000003 HC RX 258: Performed by: NURSE PRACTITIONER

## 2021-06-14 PROCEDURE — B24BZZ4 ULTRASONOGRAPHY OF HEART WITH AORTA, TRANSESOPHAGEAL: ICD-10-PCS | Performed by: INTERNAL MEDICINE

## 2021-06-14 PROCEDURE — 2140000000 HC CCU INTERMEDIATE R&B

## 2021-06-14 PROCEDURE — 87635 SARS-COV-2 COVID-19 AMP PRB: CPT

## 2021-06-14 PROCEDURE — 93312 ECHO TRANSESOPHAGEAL: CPT | Performed by: INTERNAL MEDICINE

## 2021-06-14 PROCEDURE — 6370000000 HC RX 637 (ALT 250 FOR IP): Performed by: FAMILY MEDICINE

## 2021-06-14 PROCEDURE — 2500000003 HC RX 250 WO HCPCS: Performed by: INTERNAL MEDICINE

## 2021-06-14 PROCEDURE — 99233 SBSQ HOSP IP/OBS HIGH 50: CPT | Performed by: INTERNAL MEDICINE

## 2021-06-14 PROCEDURE — C9113 INJ PANTOPRAZOLE SODIUM, VIA: HCPCS | Performed by: FAMILY MEDICINE

## 2021-06-14 PROCEDURE — 83735 ASSAY OF MAGNESIUM: CPT

## 2021-06-14 PROCEDURE — 92960 CARDIOVERSION ELECTRIC EXT: CPT | Performed by: INTERNAL MEDICINE

## 2021-06-14 PROCEDURE — 7100000000 HC PACU RECOVERY - FIRST 15 MIN

## 2021-06-14 PROCEDURE — APPSS60 APP SPLIT SHARED TIME 46-60 MINUTES: Performed by: NURSE PRACTITIONER

## 2021-06-14 PROCEDURE — 5A2204Z RESTORATION OF CARDIAC RHYTHM, SINGLE: ICD-10-PCS | Performed by: INTERNAL MEDICINE

## 2021-06-14 PROCEDURE — 84100 ASSAY OF PHOSPHORUS: CPT

## 2021-06-14 PROCEDURE — 93005 ELECTROCARDIOGRAM TRACING: CPT | Performed by: INTERNAL MEDICINE

## 2021-06-14 PROCEDURE — 99253 IP/OBS CNSLTJ NEW/EST LOW 45: CPT | Performed by: INTERNAL MEDICINE

## 2021-06-14 PROCEDURE — 93312 ECHO TRANSESOPHAGEAL: CPT

## 2021-06-14 PROCEDURE — 92960 CARDIOVERSION ELECTRIC EXT: CPT

## 2021-06-14 PROCEDURE — 94761 N-INVAS EAR/PLS OXIMETRY MLT: CPT

## 2021-06-14 PROCEDURE — 6360000002 HC RX W HCPCS: Performed by: FAMILY MEDICINE

## 2021-06-14 PROCEDURE — 93325 DOPPLER ECHO COLOR FLOW MAPG: CPT | Performed by: INTERNAL MEDICINE

## 2021-06-14 RX ORDER — PANTOPRAZOLE SODIUM 40 MG/10ML
40 INJECTION, POWDER, LYOPHILIZED, FOR SOLUTION INTRAVENOUS DAILY
Status: DISCONTINUED | OUTPATIENT
Start: 2021-06-14 | End: 2021-06-15

## 2021-06-14 RX ORDER — NALOXONE HYDROCHLORIDE 0.4 MG/ML
0.4 INJECTION, SOLUTION INTRAMUSCULAR; INTRAVENOUS; SUBCUTANEOUS ONCE
Status: COMPLETED | OUTPATIENT
Start: 2021-06-14 | End: 2021-06-14

## 2021-06-14 RX ORDER — HEPARIN SODIUM 5000 [USP'U]/ML
5000 INJECTION, SOLUTION INTRAVENOUS; SUBCUTANEOUS ONCE
Status: DISCONTINUED | OUTPATIENT
Start: 2021-06-14 | End: 2021-06-14

## 2021-06-14 RX ORDER — FLUMAZENIL 0.1 MG/ML
0.2 INJECTION, SOLUTION INTRAVENOUS ONCE
Status: COMPLETED | OUTPATIENT
Start: 2021-06-14 | End: 2021-06-14

## 2021-06-14 RX ORDER — HEPARIN SODIUM 5000 [USP'U]/ML
5000 INJECTION, SOLUTION INTRAVENOUS; SUBCUTANEOUS ONCE
Status: COMPLETED | OUTPATIENT
Start: 2021-06-14 | End: 2021-06-14

## 2021-06-14 RX ORDER — AMIODARONE HYDROCHLORIDE 200 MG/1
200 TABLET ORAL 2 TIMES DAILY
Status: DISCONTINUED | OUTPATIENT
Start: 2021-06-14 | End: 2021-06-15

## 2021-06-14 RX ADMIN — METOPROLOL SUCCINATE 25 MG: 25 TABLET, EXTENDED RELEASE ORAL at 09:55

## 2021-06-14 RX ADMIN — CYANOCOBALAMIN TAB 1000 MCG 1000 MCG: 1000 TAB at 14:34

## 2021-06-14 RX ADMIN — PANTOPRAZOLE SODIUM 40 MG: 40 INJECTION, POWDER, FOR SOLUTION INTRAVENOUS at 14:37

## 2021-06-14 RX ADMIN — ENOXAPARIN SODIUM 135 MG: 150 INJECTION SUBCUTANEOUS at 14:38

## 2021-06-14 RX ADMIN — ENOXAPARIN SODIUM 135 MG: 150 INJECTION SUBCUTANEOUS at 22:20

## 2021-06-14 RX ADMIN — NALOXONE HYDROCHLORIDE 0.4 MG: 0.4 INJECTION, SOLUTION INTRAMUSCULAR; INTRAVENOUS; SUBCUTANEOUS at 11:35

## 2021-06-14 RX ADMIN — PYRIDOSTIGMINE BROMIDE 60 MG: 60 TABLET ORAL at 01:47

## 2021-06-14 RX ADMIN — PYRIDOSTIGMINE BROMIDE 60 MG: 60 TABLET ORAL at 04:30

## 2021-06-14 RX ADMIN — SODIUM CHLORIDE: 9 INJECTION, SOLUTION INTRAVENOUS at 01:48

## 2021-06-14 RX ADMIN — PYRIDOSTIGMINE BROMIDE 60 MG: 60 TABLET ORAL at 22:19

## 2021-06-14 RX ADMIN — AMIODARONE HYDROCHLORIDE 200 MG: 200 TABLET ORAL at 14:37

## 2021-06-14 RX ADMIN — DEXTROSE MONOHYDRATE 10 MG/HR: 50 INJECTION, SOLUTION INTRAVENOUS at 01:47

## 2021-06-14 RX ADMIN — ATORVASTATIN CALCIUM 40 MG: 40 TABLET, FILM COATED ORAL at 22:19

## 2021-06-14 RX ADMIN — FLUMAZENIL 0.2 MG: 0.1 INJECTION, SOLUTION INTRAVENOUS at 11:35

## 2021-06-14 RX ADMIN — PREDNISONE 13 MG: 1 TABLET ORAL at 14:34

## 2021-06-14 RX ADMIN — HEPARIN SODIUM 5000 UNITS: 5000 INJECTION, SOLUTION INTRAVENOUS; SUBCUTANEOUS at 11:30

## 2021-06-14 RX ADMIN — PYRIDOSTIGMINE BROMIDE 60 MG: 60 TABLET ORAL at 09:55

## 2021-06-14 RX ADMIN — AMIODARONE HYDROCHLORIDE 200 MG: 200 TABLET ORAL at 22:19

## 2021-06-14 RX ADMIN — PYRIDOSTIGMINE BROMIDE 60 MG: 60 TABLET ORAL at 14:37

## 2021-06-14 RX ADMIN — DIGOXIN 125 MCG: 125 TABLET ORAL at 09:55

## 2021-06-14 RX ADMIN — Medication 10 ML: at 14:37

## 2021-06-14 ASSESSMENT — PAIN SCALES - GENERAL
PAINLEVEL_OUTOF10: 0

## 2021-06-14 NOTE — CARE COORDINATION
.CM met with pt's wife for d/c planning d/t pt is sleeping. Introduced self and updated white board. Pt lives with spouse and is independent with ADL's. Pt drives, has a PCP, has insurance, and is able to afford his medication. Pt does not use any DME. [facility] offered and pt's wife declined. Pt's wife denies any d/c needs at this time. D/c plan is home with spouse, no needs. Notify CM if any new d/c needs arise.   TE

## 2021-06-14 NOTE — PLAN OF CARE
Problem: Pain:  Description: Pain management should include both nonpharmacologic and pharmacologic interventions.   Goal: Pain level will decrease  Description: Pain level will decrease  1/13/1516 [id number] by Ramez Montelongo RN  Outcome: Ongoing  6/13/2021 0819 by Anirudh Gerardo RN  Outcome: Ongoing  Goal: Control of acute pain  Description: Control of acute pain  9/09/3040 9010 by Ramez Montelongo RN  Outcome: Ongoing  6/13/2021 0819 by Anirudh Gerardo RN  Outcome: Ongoing  Goal: Control of chronic pain  Description: Control of chronic pain  3/69/2426 7631 by Ramez Montelongo RN  Outcome: Ongoing  6/13/2021 0819 by Anirudh Gerardo RN  Outcome: Ongoing

## 2021-06-14 NOTE — PROGRESS NOTES
Hospitalist Progress Note      Name:  Shilo Henderson /Age/Sex: 1960  (57 y.o. male)   MRN & CSN:  [id number] & [id number] Admission Date/Time: 2021 10:40 AM   Location:  [facility] PCP: No primary care provider on file. Hospital Day: 4    Assessment and Plan:   Shilo Henderson is a 57 y.o.  male  who presents with <principal problem not specified>    1. ATRIAL FIBRILLATION WITH RVR  -No change, patient still symptomatic and HR has been up and down. Continue medications, For TONIO cardioversion today    2. MELENA  Stool was dark, sample from ileostomy was occult blood positive however his H/H has remained stable and vitals are stable. He was originally placed on pepcid last night, will change to protonix IV and request GI consult requested , will monitor until then Secure texted cardiology to discuss. Given current situation he is at increased risk for stroke which is greater than GI bleed. Will continue heparin for now and closely monitor GI status, can restart apixaban once GI issues resolved. 3. ELEVATED TROPONIN  -no issues suspect strain from above. Monitor     4. ACUTE KIDNEY INJURY  -resolved will monitor as needed     5. CAD  -see above will monitor     6. MYASTHENIA GRAVIS  -stable no issues    Diet Diet NPO Exceptions are: Sips of Water with Meds   DVT Prophylaxis [] Lovenox, []  Heparin, [] SCDs, [] Ambulation   GI Prophylaxis [] PPI,  [] H2 Blocker,  [] Carafate,  [] Diet/Tube Feeds   Code Status Full Code   Disposition Patient requires continued admission due to Afib    [] Low, [] Moderate,[]  High  Patient's risk as above due to Afib     History of Present Illness:     Chief Complaint: <principal problem not specified>  Shilo Henderson is a 57 y.o.  male  who presents with Afib RVR. No issues overnight. Yesterday late afternoon I was contacted because noted that stool in ileostomy bag was dark. I evaluated patient he was stable and started PO pepcid.  Stool occult blood was ordered and positive, his H/H remains stable. His BP has been stable, he is still unchanged with same symptoms. 2 family members are present at bedside at the time of my visit, he has no other specific complaints       Ten point ROS reviewed negative, unless as noted above    Objective: Intake/Output Summary (Last 24 hours) at 6/14/2021 1024  Last data filed at 6/14/2021 0746  Gross per 24 hour   Intake 1364 ml   Output 750 ml   Net 614 ml      Vitals:   Vitals:    06/14/21 0707   BP:    Pulse:    Resp:    Temp:    SpO2: 97%     Physical Exam:   GEN Awake male, sitting upright in bed in no apparent distress. Appears given age. EYES Pupils are equally round. No scleral erythema, discharge, or conjunctivitis. HENT Mucous membranes are moist. Oral pharynx without exudates, no evidence of thrush. NECK Supple, no apparent thyromegaly or masses. RESP Clear to auscultation, no wheezes, rales or rhonchi. Symmetric chest movement while on room air. CARDIO/VASC S1/S2 auscultated. Iregular rate without appreciable murmurs, rubs, or gallops. No JVD or carotid bruits. Peripheral pulses equal bilaterally and palpable. No peripheral edema. GI Abdomen is soft without significant tenderness, masses, or guarding. Bowel sounds are normoactive. Rectal exam deferred. Ileostomy bag with dark stool   No costovertebral angle tenderness. Normal appearing external genitalia. Andres catheter is not present. HEME/LYMPH No palpable cervical lymphadenopathy and no hepatosplenomegaly. No petechiae or ecchymoses. MSK No gross joint deformities. SKIN Normal coloration, warm, dry. NEURO Cranial nerves appear grossly intact, normal speech, no lateralizing weakness. PSYCH Awake, alert, oriented x 4. Affect appropriate.     Medications:   Medications:    pantoprazole  40 mg Intravenous Daily    sodium chloride flush  5-40 mL Intravenous 2 times per day    pyridostigmine  60 mg Oral 6 times per day    predniSONE  13 mg Oral Daily    clopidogrel  75 mg Oral Daily    digoxin  125 mcg Oral Daily    metoprolol succinate  25 mg Oral Daily    rivaroxaban  20 mg Oral Daily with breakfast    vitamin B-12  1,000 mcg Oral Daily    atorvastatin  40 mg Oral Nightly      Infusions:    sodium chloride 75 mL/hr at 06/14/21 0148    sodium chloride      amiodarone 0.5 mg/min (06/13/21 1654)    dilTIAZem (CARDIZEM) 100 mg in dextrose 5% 100 mL (ADD-[place]) 10 mg/hr (06/14/21 0147)     PRN Meds: sodium chloride flush, 5-40 mL, PRN  sodium chloride, 25 mL, PRN  albuterol sulfate HFA, 2 puff, Q4H PRN  polyethylene glycol, 17 g, Daily PRN  acetaminophen, 650 mg, Q6H PRN   Or  acetaminophen, 650 mg, Q6H PRN  meclizine, 12.5 mg, TID PRN

## 2021-06-14 NOTE — PROGRESS NOTES
Cardiology Progress Note     Today's Plan:TONIO/cardioversion     Admit Date:  6/11/2021    Consult reason/ Seen today for: Afib RVR     Subjective and  Overnight Events: Continues to deny any chest pain or SOB. Assessment / Plan / Recommendation:       1. Afib RVR: TONIO/cardioversion today, continue with Xarelto for anticoagulation and digoxin, Toprol for rate control. Remains on amiodarone and cardizem gtt. 2. History of coronary disease s/p PCI.  Left heart cath images reviewed personally. Mariela Rojas  Denies any current chest pain.  Troponins are trivially elevated, likely secondary to A. fib.  We will schedule patient for stress MPI prior to discharge.  Continue with Xarelto/Plavix/atorvastatin/beta-blocker. 3. Essential hypertension: Blood pressure stable.  Continue with metoprolol.  25 mg p.o. daily  4. History of myasthenia gravis: Patient is on Mestinon and prednisone.  Management per primary team.  5. Morbid obesity: Advised low-salt low-fat diet, respect modification      History of Presenting Illness:    Chief complain on admission : 57 y. o.year old who is admitted for  Chief Complaint   Patient presents with    Irregular Heart Beat     afib after doing a stress test        Past medical history:    has a past medical history of Atrial fibrillation, chronic ([address] H/O echocardiogram, Heart disease, History of blood transfusion, Hx of blood clots, Hx of Doppler ultrasound, Hypertension, Myasthenia gravis ([address] NSTEMI (non-ST elevated myocardial infarction) ([facility] [address] Post PTCA, TIA (transient ischemic attack), and Ulcerative colitis ([facility] [address] Past surgical history:   has a past surgical history that includes ileostomy or jejunostomy; Tonsillectomy; Cardiac surgery; and Percutaneous Transluminal Coronary Angio (12/14/2020). Social History:   reports that he quit smoking about 2 months ago. His smoking use included cigarettes. 1,000 mcg Oral Daily    atorvastatin  40 mg Oral Nightly      sodium chloride 75 mL/hr at 06/14/21 0148    sodium chloride      amiodarone 0.5 mg/min (06/13/21 1654)    dilTIAZem (CARDIZEM) 100 mg in dextrose 5% 100 mL (ADD-[place]) 10 mg/hr (06/14/21 0147)     sodium chloride flush, sodium chloride, albuterol sulfate HFA, polyethylene glycol, acetaminophen **OR** acetaminophen, meclizine    Lab Data:  CBC:   Recent Labs     06/12/21  0704 06/13/21  0559 06/14/21  0631   WBC 9.3 9.5 10.0   HGB 13.0* 11.8* 11.2*   HCT 41.7* 37.8* 35.9*   MCV 98.1 97.4 94.0    248 248     BMP:   Recent Labs     06/12/21  0704 06/13/21  0559 06/14/21  0631    133* 137   K 4.5 4.6 4.7   * 106 109   CO2 21 19* 19*   PHOS 3.1 3.0 2.7   BUN 14 18 15   CREATININE 1.0 0.9 0.8*     PT/INR: No results for input(s): PROTIME, INR in the last 72 hours. BNP:    Recent Labs     06/11/21  1040   PROBNP 2,814*     TROPONIN:   Recent Labs     06/11/21  1040 06/11/21  1728 06/11/21  2131   TROPONINT 0.017* 0.013* 0.016*            Impression:  Active Problems:    Atrial fibrillation with rapid ventricular response (HCC)  Resolved Problems:    * No resolved hospital problems. *       All labs, medications and tests reviewed by myself, continue all other medications of all above medical condition listed as is except for changes mentioned above. Thank you   Please call with questions. Electronically signed by Talya Simpson. JAMIE Mehta CNP on 6/14/2021 at 9:20 AM           CARDIOLOGY ATTENDING ADDENDUM    I have seen, spoken to and examined this patient personally, independently of the nurse practitioner. I have reviewed the hospital care given to date and reviewed all pertinent labs and imaging. The plan was developed mutually at the time of the visit with the patient,  NP   and myself. I have spoken with patient, nursing staff and provided written and verbal instructions . The above note has been reviewed and I agree with the assessment, diagnosis, and treatment plan with changes made by me as follows       HPI:  I have reviewed the above HPI  And agree with above   Please review addendum/changes made to note above     Interval history: For TONIO Cardioversion today      Physical Exam:  General:  Awake, alert, NAD  Head:normal  Eye:normal  Neck:  No JVD   Chest:  Clear to auscultation, respiration easy  Cardiovascular:  s1s2 IRIR  Abdomen:   nontender  Extremities:  no edema  Pulses; palpable  Neuro: grossly normal      MEDICAL DECISION MAKING;    I agree with the above plan, which was planned by myself and discussed with NP.         For TONIO Cardioversion today    Dr. Lavell Matias MD

## 2021-06-14 NOTE — TELEPHONE ENCOUNTER
Summary    Supervising physician Dr. Ranjana Swann stress test, Myocardial perfusion scan shows small size,    severeintensity,perfusion defect in apical wall, rest images were not    available to compare    When obtaining rest EKG for Nuclear Stress Test patients heart rate was    elevated.  Tracy checked with Dr Ezekiel Renae and he stated if the patients heart    rate did not come down by the end of the stress test to send him to the ER.    Stress pictures were done before patient went to the ER.        Recommendation    recommend office visit and rest images if possible to compare perfusion    images     Left message on voice mail for patient to return call regarding results of stress test.

## 2021-06-14 NOTE — CONSULTS
Department of Internal Medicine  Gastroenterology Consult Note  Yazan Chan MD      Reason for Consult:  Melena    Primary Care Physician:  No primary care provider on file. History Obtained From:  patient    HISTORY OF PRESENT ILLNESS:              The patient is a 57 y.o.  male who was admitted with atrial fibrillation, on xarelto. He noted melena the day after he was admitted and it has continued. He has a history of ulcerative colitis and did undergo a total colectomy in the past.  He denies ever having had a ulcer before. He denies abdominal pain. He denies the use of anti-inflammatory drugs. Past Medical History:        Diagnosis Date    Atrial fibrillation, chronic ([address] 12/2020    H/O echocardiogram 02/02/2021    EF is estimated at 50%. presence of PVC affected LVEF estimation, Mild TR.    Heart disease     stint    History of blood transfusion     Hx of blood clots     Hx of cardiovascular stress test 06/11/2021    Small size severe intensity,perfusion defect in apical wall.  Hx of Doppler ultrasound 04/09/2021    Left distal SSV shows occlusive chronic SVT.  Hypertension     Myasthenia gravis ([address]     NSTEMI (non-ST elevated myocardial infarction) ([address] 12/2020    Post PTCA 12/14/2020    Ramus Stented.  TIA (transient ischemic attack)     Ulcerative colitis ([address]        Past Surgical History:        Procedure Laterality Date    CARDIAC SURGERY      ILEOSTOMY OR JEJUNOSTOMY      PTCA  12/14/2020    Ramus Stented.  TONSILLECTOMY         Medications Prior to Admission:    Prior to Admission medications    Medication Sig Start Date End Date Taking? Authorizing Provider   clopidogrel (PLAVIX) 75 MG tablet Take 1 tablet by mouth daily 4/13/21  Yes JAMIE Harley - CNP   metoprolol succinate (TOPROL XL) 25 MG extended release tablet Take 1 tablet by mouth daily 4/13/21  Yes JAMIE Harley - CNP   rivaroxaban (XARELTO) 20 MG TABS tablet Take 1 tablet by mouth daily (with breakfast) 4/13/21 6/11/21 Yes JAMIE Harley CNP   digoxin (LANOXIN) 125 MCG tablet Take 1 tablet by mouth daily 4/13/21  Yes JAMIE Harley CNP   NONFORMULARY Inject 40 mg/L into the skin every evening Zilucoplan  - patient provided experimental drug from [place] for Myasthenia Gravis   Yes Historical Provider, MD   vitamin B-12 (CYANOCOBALAMIN) 500 MCG tablet Take 1,000 mcg by mouth daily Indications: 8am    Yes Historical Provider, MD   PREDNISONE PO Take 13 mg by mouth daily Indications: Takes at  8am    Yes Historical Provider, MD   pyridostigmine (MESTINON) 60 MG tablet Take 1 tablet by mouth 4 times daily  Patient taking differently: Take 60 mg by mouth 5 times daily Indications: Takes at 0800, 1200, 1600, 2000  7/20/16  Yes Heather Farah MD   Elastic Bandages & Supports (MEDICAL COMPRESSION THIGH HIGH) MISC Wear daily and remove nightly 20 - 30 mmgh 4/14/21   Bonnie Christian MD   albuterol sulfate HFA (PROVENTIL HFA) 108 (90 Base) MCG/ACT inhaler Inhale 2 puffs into the lungs every 4 hours as needed for Wheezing or Shortness of Breath With spacer (and mask if indicated). Thanks. 12/20/18 3/15/21  Kasey Murillo MD   acetaminophen (TYLENOL) 325 MG tablet Take 650 mg by mouth every 4 hours as needed for Pain or Fever    Historical Provider, MD       Allergies:  No Known Allergies. Social History:    TOBACCO:  No  ETOH:  No    Family History:   Family History   Family history unknown: Yes       REVIEW OF SYSTEMS: (POSITIVES WILL BE UNDERLINED)  CONSTITUTIONAL:    Weight change,fatigue, fever, chills  EYES:  Diplopia, change in vision  EARS:  hearing loss, tinnitus, vertigo  NOSE:   epistaxis  MOUTH/THROAT:     hoarseness, sore throat. RESPIRATORY:  SOB,  cough, sputum, hemoptysis  CARDIOVASCULAR : chest pain,palpitations, dyspnea exertion, orthopnea, paroxysmal nocturnal dyspnea, pedal edema.   GASTROINTESTINAL:  See HPI  GENITOURINARY:   dysuria, hematuria,

## 2021-06-14 NOTE — PROCEDURES
TONIO/cardioversion    Indication: Atrial fibrillation  ASA Mallampati 3/3    After obtaining the informed consent pt was sedated  With  Versed   and  Fentanyl     . A    200 J synchronised  shock was given. Pt converted to  Sinus rythym       Pt remained clinically and hemodynamically stable. TONIO before procedure did not show any atrial or appendage clot . Patient was given 5000Units of IV heparin before procedure. Cont with present medical therapy.         Successful cardioversion    Plan:    Continue with metoprolol   Stop IV amiodarone  Stop IV Cardizem drip  Start patient on oral amiodarone 200 twice daily  Start patient on therapeutic Lovenox and hold off Eliquis, patient being worked up for possible melena with stable hemoglobin hematocrit    Cont present therapy  F/U in 2 weeks

## 2021-06-14 NOTE — ANESTHESIA PRE PROCEDURE
Department of Anesthesiology  Preprocedure Note       Name:  Shelley Roberson   Age:  57 y.o.  :  1960                                          MRN:  [id number]         Date:  2021      Surgeon: Sujey Rodriguez):  Skyler Foster MD    Procedure: Procedure(s):  EGD ESOPHAGOGASTRODUODENOSCOPY    Medications prior to admission:   Prior to Admission medications    Medication Sig Start Date End Date Taking? Authorizing Provider   clopidogrel (PLAVIX) 75 MG tablet Take 1 tablet by mouth daily 21  Yes JAMIE Harley CNP   metoprolol succinate (TOPROL XL) 25 MG extended release tablet Take 1 tablet by mouth daily 21  Yes JAMIE Harley CNP   rivaroxaban (XARELTO) 20 MG TABS tablet Take 1 tablet by mouth daily (with breakfast) 21 Yes JAMIE Harley CNP   digoxin (LANOXIN) 125 MCG tablet Take 1 tablet by mouth daily 21  Yes JAMIE Harley CNP   NONFORMULARY Inject 40 mg/L into the skin every evening Zilucoplan  - patient provided experimental drug from [place] for Myasthenia Gravis   Yes Historical Provider, MD   vitamin B-12 (CYANOCOBALAMIN) 500 MCG tablet Take 1,000 mcg by mouth daily Indications: 8am    Yes Historical Provider, MD   PREDNISONE PO Take 13 mg by mouth daily Indications: Takes at  8am    Yes Historical Provider, MD   pyridostigmine (MESTINON) 60 MG tablet Take 1 tablet by mouth 4 times daily  Patient taking differently: Take 60 mg by mouth 5 times daily Indications: Takes at 0800, 1200, 1600, 2000  16  Yes Shemar Benavidez MD   Elastic Bandages & Supports (MEDICAL COMPRESSION THIGH HIGH) MISC Wear daily and remove nightly 20 - 30 mmgh 21   Manjinder Resendiz MD   albuterol sulfate HFA (PROVENTIL HFA) 108 (90 Base) MCG/ACT inhaler Inhale 2 puffs into the lungs every 4 hours as needed for Wheezing or Shortness of Breath With spacer (and mask if indicated). Thanks.  12/20/18 3/15/21  Prakash Bolivar MD   acetaminophen (TYLENOL) 325 MG tablet Take 650 mg by mouth every 4 hours as needed for Pain or Fever    Historical Provider, MD       Current medications:    Current Facility-Administered Medications   Medication Dose Route Frequency Provider Last Rate Last Admin    pantoprazole (PROTONIX) injection 40 mg  40 mg Intravenous Daily Rosalie Sargent MD   40 mg at 06/14/21 1437    enoxaparin (LOVENOX) injection 135 mg  1 mg/kg Subcutaneous BID Dilshad Reilly MD   135 mg at 06/14/21 1438    amiodarone (CORDARONE) tablet 200 mg  200 mg Oral BID Dilshad Reilly MD   200 mg at 06/14/21 1437    0.9 % sodium chloride infusion   Intravenous Continuous JAMIE Harley - CNP 75 mL/hr at 06/14/21 0148 New Bag at 06/14/21 0148    sodium chloride flush 0.9 % injection 5-40 mL  5-40 mL Intravenous 2 times per day Cirilo Coleman APRN - CNP   10 mL at 06/14/21 1437    sodium chloride flush 0.9 % injection 5-40 mL  5-40 mL Intravenous PRN Cirilo Coleman APRN - CNP        0.9 % sodium chloride infusion  25 mL Intravenous PRN Cirilo Coleman, APRN - CNP        pyridostigmine (MESTINON) tablet 60 mg  60 mg Oral 6 times per day Rosalie Sargent MD   60 mg at 06/14/21 1437    predniSONE (DELTASONE) tablet 13 mg  13 mg Oral Daily Rosalie Sargent MD   13 mg at 06/14/21 1434    albuterol sulfate  (90 Base) MCG/ACT inhaler 2 puff  2 puff Inhalation Q4H PRN Willie Hilton MD        clopidogrel (PLAVIX) tablet 75 mg  75 mg Oral Daily Willie Hilton MD   75 mg at 06/13/21 0825    metoprolol succinate (TOPROL XL) extended release tablet 25 mg  25 mg Oral Daily Willie Hilton MD   25 mg at 06/14/21 0955    [Held by provider] rivaroxaban (XARELTO) tablet 20 mg  20 mg Oral Daily with breakfast Willie Hilton MD   20 mg at 06/13/21 0826    vitamin B-12 (CYANOCOBALAMIN) tablet 1,000 mcg  1,000 mcg Oral Daily Willie Hilton MD   1,000 mcg at 06/14/21 1434    polyethylene glycol (GLYCOLAX) packet 17 g  17 g Oral Hypertension     Myasthenia gravis ([facility]     NSTEMI (non-ST elevated myocardial infarction) ([facility] 2020    Post PTCA 2020    Ramus Stented.  TIA (transient ischemic attack)     Ulcerative colitis ([facility]        Past Surgical History:        Procedure Laterality Date    CARDIAC SURGERY      ILEOSTOMY OR JEJUNOSTOMY      PTCA  2020    Ramus Stented.  TONSILLECTOMY         Social History:    Social History     Tobacco Use    Smoking status: Former Smoker     Packs/day: 0.25     Types: Cigarettes     Quit date: 2021     Years since quittin.1    Smokeless tobacco: Never Used   Substance Use Topics    Alcohol use: Yes     Alcohol/week: 1.0 standard drinks     Types: 1 Cans of beer per week     Comment: occ                                Counseling given: Not Answered      Vital Signs (Current):   Vitals:    21 1140 21 1153 21 1210 21 1300   BP: 130/74 120/76 117/63 121/79   Pulse: 52 51 80 51   Resp: 24 20 16 17   Temp:    94.5 °F (34.7 °C)   TempSrc:    Axillary   SpO2: 95% 95% 98% 96%   Weight:       Height:                                                  BP Readings from Last 3 Encounters:   21 121/79   21 120/86   03/15/21 120/70       NPO Status:                                                                                 BMI:   Wt Readings from Last 3 Encounters:   21 292 lb 12.8 oz (132.8 kg)   21 290 lb (131.5 kg)   03/15/21 290 lb (131.5 kg)     Body mass index is 40.84 kg/m².     CBC:   Lab Results   Component Value Date    WBC 10.0 2021    RBC 3.82 2021    HGB 11.2 2021    HCT 35.9 2021    MCV 94.0 2021    RDW 14.4 2021     2021       CMP:   Lab Results   Component Value Date     2021    K 4.7 2021     2021    CO2 19 2021    BUN 15 2021    CREATININE 0.8 2021    GFRAA >60 2021    LABGLOM >60 2021    GLUCOSE 139 06/14/2021    PROT 5.3 06/14/2021    CALCIUM 8.6 06/14/2021    BILITOT 1.0 06/14/2021    ALKPHOS 42 06/14/2021    AST 40 06/14/2021    ALT 53 06/14/2021       POC Tests: No results for input(s): POCGLU, POCNA, POCK, POCCL, POCBUN, POCHEMO, POCHCT in the last 72 hours. Coags:   Lab Results   Component Value Date    APTT 30.8 12/14/2020       HCG (If Applicable): No results found for: PREGTESTUR, PREGSERUM, HCG, HCGQUANT     ABGs:   Lab Results   Component Value Date    PO2ART 72 11/02/2016    HAI6DND 33.0 11/02/2016    ZNF8VFA 24.6 11/02/2016        Type & Screen (If Applicable):  No results found for: LABABO, LABRH    Drug/Infectious Status (If Applicable):  No results found for: HIV, HEPCAB    COVID-19 Screening (If Applicable):   Lab Results   Component Value Date    COVID19 NOT DETECTED 06/14/2021    COVID19 NOT DETECTED 12/12/2020           Anesthesia Evaluation  Patient summary reviewed  Airway:         Dental:          Pulmonary:                             ROS comment: Former smoker   Cardiovascular:    (+) hypertension:, past MI:, dysrhythmias: atrial fibrillation,                   Neuro/Psych:   (+) neuromuscular disease: myasthenia gravis, TIA,             GI/Hepatic/Renal:   (+) PUD, morbid obesity (bmi 40.9)         ROS comment: Ulcerative colitis. Endo/Other:    (+) blood dyscrasia: anticoagulation therapy and anemia:., .                 Abdominal:           Vascular:   + DVT, . Anesthesia Plan      general     ASA 3       Induction: intravenous. MIPS: Postoperative opioids intended and Prophylactic antiemetics administered. Pre Anesthesia Assessment complete. Chart reviewed on 6/14/2021. This is a chart review only.         Tamika Real DO   6/14/2021

## 2021-06-15 ENCOUNTER — ANESTHESIA (OUTPATIENT)
Dept: ENDOSCOPY | Age: 61
DRG: 201 | End: 2021-06-15
Payer: MEDICAID

## 2021-06-15 ENCOUNTER — APPOINTMENT (OUTPATIENT)
Dept: ULTRASOUND IMAGING | Age: 61
DRG: 201 | End: 2021-06-15
Payer: MEDICAID

## 2021-06-15 VITALS
OXYGEN SATURATION: 91 % | RESPIRATION RATE: 20 BRPM | SYSTOLIC BLOOD PRESSURE: 127 MMHG | DIASTOLIC BLOOD PRESSURE: 87 MMHG

## 2021-06-15 PROBLEM — J44.9 COPD (CHRONIC OBSTRUCTIVE PULMONARY DISEASE) (HCC): Status: ACTIVE | Noted: 2021-06-15

## 2021-06-15 PROBLEM — K26.9 DUODENAL ULCER: Status: ACTIVE | Noted: 2021-06-15

## 2021-06-15 PROBLEM — I21.A1 TYPE 2 MI (MYOCARDIAL INFARCTION) (HCC): Status: ACTIVE | Noted: 2021-06-15

## 2021-06-15 PROBLEM — K29.90 GASTRITIS AND DUODENITIS: Status: ACTIVE | Noted: 2021-06-15

## 2021-06-15 PROBLEM — E87.5 HYPERKALEMIA: Status: ACTIVE | Noted: 2021-06-15

## 2021-06-15 PROBLEM — I25.10 CORONARY ARTERY DISEASE INVOLVING NATIVE CORONARY ARTERY OF NATIVE HEART WITHOUT ANGINA PECTORIS: Status: ACTIVE | Noted: 2021-06-15

## 2021-06-15 LAB
ALBUMIN SERPL-MCNC: 3.3 GM/DL (ref 3.4–5)
ALP BLD-CCNC: 44 IU/L (ref 40–128)
ALT SERPL-CCNC: 233 U/L (ref 10–40)
ANION GAP SERPL CALCULATED.3IONS-SCNC: 11 MMOL/L (ref 4–16)
AST SERPL-CCNC: 225 IU/L (ref 15–37)
BASOPHILS ABSOLUTE: 0 K/CU MM
BASOPHILS RELATIVE PERCENT: 0.4 % (ref 0–1)
BILIRUB SERPL-MCNC: 1.1 MG/DL (ref 0–1)
BUN BLDV-MCNC: 16 MG/DL (ref 6–23)
CALCIUM SERPL-MCNC: 8.5 MG/DL (ref 8.3–10.6)
CHLORIDE BLD-SCNC: 108 MMOL/L (ref 99–110)
CO2: 19 MMOL/L (ref 21–32)
CREAT SERPL-MCNC: 1.1 MG/DL (ref 0.9–1.3)
DIFFERENTIAL TYPE: ABNORMAL
EOSINOPHILS ABSOLUTE: 0 K/CU MM
EOSINOPHILS RELATIVE PERCENT: 0.2 % (ref 0–3)
GFR AFRICAN AMERICAN: >60 ML/MIN/1.73M2
GFR NON-AFRICAN AMERICAN: >60 ML/MIN/1.73M2
GLUCOSE BLD-MCNC: 132 MG/DL (ref 70–99)
HAV IGM SER IA-ACNC: NON REACTIVE
HCT VFR BLD CALC: 36.7 % (ref 42–52)
HCT VFR BLD CALC: 37 % (ref 42–52)
HCT VFR BLD CALC: 38.1 % (ref 42–52)
HCT VFR BLD CALC: 39.4 % (ref 42–52)
HEMOGLOBIN: 10.8 GM/DL (ref 13.5–18)
HEMOGLOBIN: 11.1 GM/DL (ref 13.5–18)
HEMOGLOBIN: 11.2 GM/DL (ref 13.5–18)
HEMOGLOBIN: 11.6 GM/DL (ref 13.5–18)
HEPATITIS B CORE IGM ANTIBODY: NON REACTIVE
HEPATITIS B SURFACE ANTIGEN: ABNORMAL
HEPATITIS C ANTIBODY: NON REACTIVE
IMMATURE NEUTROPHIL %: 0.5 % (ref 0–0.43)
LACTATE: 1.4 MMOL/L (ref 0.4–2)
LYMPHOCYTES ABSOLUTE: 1.2 K/CU MM
LYMPHOCYTES RELATIVE PERCENT: 11.4 % (ref 24–44)
MAGNESIUM: 2 MG/DL (ref 1.8–2.4)
MCH RBC QN AUTO: 30.4 PG (ref 27–31)
MCHC RBC AUTO-ENTMCNC: 30.5 % (ref 32–36)
MCV RBC AUTO: 99.5 FL (ref 78–100)
MONOCYTES ABSOLUTE: 0.9 K/CU MM
MONOCYTES RELATIVE PERCENT: 9.1 % (ref 0–4)
NUCLEATED RBC %: 0.2 %
PDW BLD-RTO: 14.7 % (ref 11.7–14.9)
PHOSPHORUS: 3.3 MG/DL (ref 2.5–4.9)
PLATELET # BLD: 236 K/CU MM (ref 140–440)
PMV BLD AUTO: 10.2 FL (ref 7.5–11.1)
POTASSIUM SERPL-SCNC: 5.2 MMOL/L (ref 3.5–5.1)
RBC # BLD: 3.69 M/CU MM (ref 4.6–6.2)
SEGMENTED NEUTROPHILS ABSOLUTE COUNT: 8 K/CU MM
SEGMENTED NEUTROPHILS RELATIVE PERCENT: 78.4 % (ref 36–66)
SODIUM BLD-SCNC: 138 MMOL/L (ref 135–145)
TOTAL IMMATURE NEUTOROPHIL: 0.05 K/CU MM
TOTAL NUCLEATED RBC: 0 K/CU MM
TOTAL PROTEIN: 5.2 GM/DL (ref 6.4–8.2)
WBC # BLD: 10.2 K/CU MM (ref 4–10.5)

## 2021-06-15 PROCEDURE — 76705 ECHO EXAM OF ABDOMEN: CPT

## 2021-06-15 PROCEDURE — 94664 DEMO&/EVAL PT USE INHALER: CPT

## 2021-06-15 PROCEDURE — 2140000000 HC CCU INTERMEDIATE R&B

## 2021-06-15 PROCEDURE — 87341 HEP B SURFACE AG NEUTRLZJ IA: CPT

## 2021-06-15 PROCEDURE — 80074 ACUTE HEPATITIS PANEL: CPT

## 2021-06-15 PROCEDURE — 84100 ASSAY OF PHOSPHORUS: CPT

## 2021-06-15 PROCEDURE — 94660 CPAP INITIATION&MGMT: CPT

## 2021-06-15 PROCEDURE — 85014 HEMATOCRIT: CPT

## 2021-06-15 PROCEDURE — 83735 ASSAY OF MAGNESIUM: CPT

## 2021-06-15 PROCEDURE — 6360000002 HC RX W HCPCS: Performed by: INTERNAL MEDICINE

## 2021-06-15 PROCEDURE — C9113 INJ PANTOPRAZOLE SODIUM, VIA: HCPCS | Performed by: INTERNAL MEDICINE

## 2021-06-15 PROCEDURE — 3700000000 HC ANESTHESIA ATTENDED CARE: Performed by: INTERNAL MEDICINE

## 2021-06-15 PROCEDURE — 6370000000 HC RX 637 (ALT 250 FOR IP): Performed by: FAMILY MEDICINE

## 2021-06-15 PROCEDURE — 94640 AIRWAY INHALATION TREATMENT: CPT

## 2021-06-15 PROCEDURE — 2580000003 HC RX 258: Performed by: INTERNAL MEDICINE

## 2021-06-15 PROCEDURE — 85018 HEMOGLOBIN: CPT

## 2021-06-15 PROCEDURE — 6370000000 HC RX 637 (ALT 250 FOR IP): Performed by: INTERNAL MEDICINE

## 2021-06-15 PROCEDURE — 99233 SBSQ HOSP IP/OBS HIGH 50: CPT | Performed by: INTERNAL MEDICINE

## 2021-06-15 PROCEDURE — 85025 COMPLETE CBC W/AUTO DIFF WBC: CPT

## 2021-06-15 PROCEDURE — 83605 ASSAY OF LACTIC ACID: CPT

## 2021-06-15 PROCEDURE — 94761 N-INVAS EAR/PLS OXIMETRY MLT: CPT

## 2021-06-15 PROCEDURE — 3609017100 HC EGD: Performed by: INTERNAL MEDICINE

## 2021-06-15 PROCEDURE — 3700000001 HC ADD 15 MINUTES (ANESTHESIA): Performed by: INTERNAL MEDICINE

## 2021-06-15 PROCEDURE — 0DJ08ZZ INSPECTION OF UPPER INTESTINAL TRACT, VIA NATURAL OR ARTIFICIAL OPENING ENDOSCOPIC: ICD-10-PCS | Performed by: INTERNAL MEDICINE

## 2021-06-15 PROCEDURE — 36415 COLL VENOUS BLD VENIPUNCTURE: CPT

## 2021-06-15 PROCEDURE — 94010 BREATHING CAPACITY TEST: CPT

## 2021-06-15 PROCEDURE — 99222 1ST HOSP IP/OBS MODERATE 55: CPT | Performed by: INTERNAL MEDICINE

## 2021-06-15 PROCEDURE — 6360000002 HC RX W HCPCS: Performed by: NURSE ANESTHETIST, CERTIFIED REGISTERED

## 2021-06-15 PROCEDURE — 94150 VITAL CAPACITY TEST: CPT

## 2021-06-15 PROCEDURE — 2580000003 HC RX 258: Performed by: NURSE PRACTITIONER

## 2021-06-15 PROCEDURE — 2500000003 HC RX 250 WO HCPCS: Performed by: NURSE ANESTHETIST, CERTIFIED REGISTERED

## 2021-06-15 PROCEDURE — 2709999900 HC NON-CHARGEABLE SUPPLY: Performed by: INTERNAL MEDICINE

## 2021-06-15 PROCEDURE — APPSS60 APP SPLIT SHARED TIME 46-60 MINUTES: Performed by: NURSE PRACTITIONER

## 2021-06-15 PROCEDURE — 80053 COMPREHEN METABOLIC PANEL: CPT

## 2021-06-15 RX ORDER — BUDESONIDE 0.25 MG/2ML
0.5 INHALANT ORAL 2 TIMES DAILY
Status: DISCONTINUED | OUTPATIENT
Start: 2021-06-15 | End: 2021-06-15 | Stop reason: SDUPTHER

## 2021-06-15 RX ORDER — PROPOFOL 10 MG/ML
INJECTION, EMULSION INTRAVENOUS PRN
Status: DISCONTINUED | OUTPATIENT
Start: 2021-06-15 | End: 2021-06-15 | Stop reason: SDUPTHER

## 2021-06-15 RX ORDER — IPRATROPIUM BROMIDE AND ALBUTEROL SULFATE 2.5; .5 MG/3ML; MG/3ML
1 SOLUTION RESPIRATORY (INHALATION)
Status: DISCONTINUED | OUTPATIENT
Start: 2021-06-15 | End: 2021-06-16

## 2021-06-15 RX ORDER — METHYLPREDNISOLONE SODIUM SUCCINATE 40 MG/ML
40 INJECTION, POWDER, LYOPHILIZED, FOR SOLUTION INTRAMUSCULAR; INTRAVENOUS EVERY 8 HOURS
Status: DISCONTINUED | OUTPATIENT
Start: 2021-06-15 | End: 2021-06-16 | Stop reason: HOSPADM

## 2021-06-15 RX ORDER — IPRATROPIUM BROMIDE AND ALBUTEROL SULFATE 2.5; .5 MG/3ML; MG/3ML
1 SOLUTION RESPIRATORY (INHALATION) EVERY 4 HOURS PRN
Status: DISCONTINUED | OUTPATIENT
Start: 2021-06-15 | End: 2021-06-16 | Stop reason: HOSPADM

## 2021-06-15 RX ORDER — LIDOCAINE HYDROCHLORIDE 20 MG/ML
INJECTION, SOLUTION EPIDURAL; INFILTRATION; INTRACAUDAL; PERINEURAL PRN
Status: DISCONTINUED | OUTPATIENT
Start: 2021-06-15 | End: 2021-06-15 | Stop reason: SDUPTHER

## 2021-06-15 RX ORDER — BUDESONIDE AND FORMOTEROL FUMARATE DIHYDRATE 160; 4.5 UG/1; UG/1
2 AEROSOL RESPIRATORY (INHALATION) 2 TIMES DAILY
Status: DISCONTINUED | OUTPATIENT
Start: 2021-06-15 | End: 2021-06-16 | Stop reason: HOSPADM

## 2021-06-15 RX ORDER — PANTOPRAZOLE SODIUM 40 MG/10ML
40 INJECTION, POWDER, LYOPHILIZED, FOR SOLUTION INTRAVENOUS 2 TIMES DAILY
Status: DISCONTINUED | OUTPATIENT
Start: 2021-06-15 | End: 2021-06-16 | Stop reason: HOSPADM

## 2021-06-15 RX ADMIN — LIDOCAINE HYDROCHLORIDE 100 MG: 20 INJECTION, SOLUTION EPIDURAL; INFILTRATION; INTRACAUDAL; PERINEURAL at 08:14

## 2021-06-15 RX ADMIN — AMIODARONE HYDROCHLORIDE 200 MG: 200 TABLET ORAL at 09:53

## 2021-06-15 RX ADMIN — IPRATROPIUM BROMIDE AND ALBUTEROL SULFATE 1 AMPULE: .5; 3 SOLUTION RESPIRATORY (INHALATION) at 20:29

## 2021-06-15 RX ADMIN — PROPOFOL 20 MG: 10 INJECTION, EMULSION INTRAVENOUS at 08:18

## 2021-06-15 RX ADMIN — IPRATROPIUM BROMIDE AND ALBUTEROL SULFATE 1 AMPULE: .5; 3 SOLUTION RESPIRATORY (INHALATION) at 14:14

## 2021-06-15 RX ADMIN — PANTOPRAZOLE SODIUM 40 MG: 40 INJECTION, POWDER, FOR SOLUTION INTRAVENOUS at 20:04

## 2021-06-15 RX ADMIN — CYANOCOBALAMIN TAB 1000 MCG 1000 MCG: 1000 TAB at 09:53

## 2021-06-15 RX ADMIN — PYRIDOSTIGMINE BROMIDE 60 MG: 60 TABLET ORAL at 23:45

## 2021-06-15 RX ADMIN — PROPOFOL 40 MG: 10 INJECTION, EMULSION INTRAVENOUS at 08:14

## 2021-06-15 RX ADMIN — PROPOFOL 20 MG: 10 INJECTION, EMULSION INTRAVENOUS at 08:19

## 2021-06-15 RX ADMIN — ENOXAPARIN SODIUM 135 MG: 150 INJECTION SUBCUTANEOUS at 20:03

## 2021-06-15 RX ADMIN — PYRIDOSTIGMINE BROMIDE 60 MG: 60 TABLET ORAL at 20:04

## 2021-06-15 RX ADMIN — METHYLPREDNISOLONE SODIUM SUCCINATE 40 MG: 40 INJECTION, POWDER, FOR SOLUTION INTRAMUSCULAR; INTRAVENOUS at 23:45

## 2021-06-15 RX ADMIN — PANTOPRAZOLE SODIUM 40 MG: 40 INJECTION, POWDER, FOR SOLUTION INTRAVENOUS at 09:54

## 2021-06-15 RX ADMIN — ENOXAPARIN SODIUM 135 MG: 150 INJECTION SUBCUTANEOUS at 09:53

## 2021-06-15 RX ADMIN — PYRIDOSTIGMINE BROMIDE 60 MG: 60 TABLET ORAL at 09:53

## 2021-06-15 RX ADMIN — Medication 10 ML: at 09:54

## 2021-06-15 RX ADMIN — PYRIDOSTIGMINE BROMIDE 60 MG: 60 TABLET ORAL at 00:49

## 2021-06-15 RX ADMIN — SODIUM CHLORIDE: 9 INJECTION, SOLUTION INTRAVENOUS at 17:52

## 2021-06-15 RX ADMIN — CLOPIDOGREL BISULFATE 75 MG: 75 TABLET ORAL at 09:53

## 2021-06-15 RX ADMIN — PROPOFOL 20 MG: 10 INJECTION, EMULSION INTRAVENOUS at 08:16

## 2021-06-15 RX ADMIN — Medication 10 ML: at 20:04

## 2021-06-15 RX ADMIN — METHYLPREDNISOLONE SODIUM SUCCINATE 40 MG: 40 INJECTION, POWDER, FOR SOLUTION INTRAMUSCULAR; INTRAVENOUS at 14:51

## 2021-06-15 RX ADMIN — PREDNISONE 13 MG: 1 TABLET ORAL at 09:53

## 2021-06-15 RX ADMIN — ATORVASTATIN CALCIUM 40 MG: 40 TABLET, FILM COATED ORAL at 20:04

## 2021-06-15 RX ADMIN — PYRIDOSTIGMINE BROMIDE 60 MG: 60 TABLET ORAL at 04:01

## 2021-06-15 RX ADMIN — PYRIDOSTIGMINE BROMIDE 60 MG: 60 TABLET ORAL at 17:52

## 2021-06-15 RX ADMIN — BUDESONIDE AND FORMOTEROL FUMARATE DIHYDRATE 2 PUFF: 160; 4.5 AEROSOL RESPIRATORY (INHALATION) at 20:29

## 2021-06-15 RX ADMIN — PYRIDOSTIGMINE BROMIDE 60 MG: 60 TABLET ORAL at 12:01

## 2021-06-15 ASSESSMENT — PAIN SCALES - GENERAL
PAINLEVEL_OUTOF10: 0

## 2021-06-15 NOTE — RT PROTOCOL NOTE

## 2021-06-15 NOTE — ANESTHESIA PRE PROCEDURE
Department of Anesthesiology  Preprocedure Note       Name:  Vinh Roque   Age:  57 y.o.  :  1960                                          MRN:  [id number]         Date:  6/15/2021      Surgeon: Keturah Ames):  Todd Urban MD    Procedure: Procedure(s):  EGD ESOPHAGOGASTRODUODENOSCOPY    Medications prior to admission:   Prior to Admission medications    Medication Sig Start Date End Date Taking? Authorizing Provider   Elastic Bandages & Supports (MEDICAL COMPRESSION THIGH HIGH) MISC Wear daily and remove nightly 20 - 30 mmgh 21   Sherron Snell MD   clopidogrel (PLAVIX) 75 MG tablet Take 1 tablet by mouth daily 21   JAMIE Harley CNP   metoprolol succinate (TOPROL XL) 25 MG extended release tablet Take 1 tablet by mouth daily 21   JAMIE Harley CNP   rivaroxaban (XARELTO) 20 MG TABS tablet Take 1 tablet by mouth daily (with breakfast) 21  JAMIE Harley CNP   digoxin (LANOXIN) 125 MCG tablet Take 1 tablet by mouth daily 21   JAMIE Harley CNP   NONFORMULARY Inject 40 mg/L into the skin every evening Zilucoplan  - patient provided experimental drug from [facility] for Myasthenia Gravis    Historical Provider, MD   albuterol sulfate HFA (PROVENTIL HFA) 108 (90 Base) MCG/ACT inhaler Inhale 2 puffs into the lungs every 4 hours as needed for Wheezing or Shortness of Breath With spacer (and mask if indicated). Thanks.  12/20/18 3/15/21  Rachna Villela MD   vitamin B-12 (CYANOCOBALAMIN) 500 MCG tablet Take 1,000 mcg by mouth daily Indications: 8am     Historical Provider, MD   PREDNISONE PO Take 13 mg by mouth daily Indications: Takes at  [facility] Provider, MD   pyridostigmine (MESTINON) 60 MG tablet Take 1 tablet by mouth 4 times daily  Patient taking differently: Take 60 mg by mouth 5 times daily Indications: Takes at 0800, 1200, 1600, 2000  16   Courtney Sen MD   acetaminophen (TYLENOL) 325 MG tablet Take 650 mg by mouth every 4 hours as needed for Pain or Fever    Historical Provider, MD       Current medications:    No current facility-administered medications for this visit. No current outpatient medications on file. Facility-Administered Medications Ordered in Other Visits   Medication Dose Route Frequency Provider Last Rate Last Admin    pantoprazole (PROTONIX) injection 40 mg  40 mg Intravenous Daily Amanda Pinto MD   40 mg at 06/14/21 1437    enoxaparin (LOVENOX) injection 135 mg  1 mg/kg Subcutaneous BID Savi Samson MD   135 mg at 06/14/21 2220    amiodarone (CORDARONE) tablet 200 mg  200 mg Oral BID Savi Samson MD   200 mg at 06/14/21 2219    0.9 % sodium chloride infusion   Intravenous Continuous JAMIE Harley - CNP 75 mL/hr at 06/14/21 0148 New Bag at 06/14/21 0148    sodium chloride flush 0.9 % injection 5-40 mL  5-40 mL Intravenous 2 times per day JAMIE Harley - CNP   10 mL at 06/14/21 1437    sodium chloride flush 0.9 % injection 5-40 mL  5-40 mL Intravenous PRN JAMIE Harley - CNP        0.9 % sodium chloride infusion  25 mL Intravenous PRN Cirilo Coleman APRN - CNP        pyridostigmine (MESTINON) tablet 60 mg  60 mg Oral 6 times per day Amanda Pinto MD   60 mg at 06/15/21 0401    predniSONE (DELTASONE) tablet 13 mg  13 mg Oral Daily Amanda Pinto MD   13 mg at 06/14/21 1434    albuterol sulfate  (90 Base) MCG/ACT inhaler 2 puff  2 puff Inhalation Q4H PRN Kedar Andersen MD        clopidogrel (PLAVIX) tablet 75 mg  75 mg Oral Daily Kedar Andersen MD   75 mg at 06/13/21 0825    metoprolol succinate (TOPROL XL) extended release tablet 25 mg  25 mg Oral Daily Kedar Andersen MD   25 mg at 06/14/21 0955    [Held by provider] rivaroxaban (XARELTO) tablet 20 mg  20 mg Oral Daily with breakfast Kedar Andersen MD   20 mg at 06/13/21 0826    vitamin B-12 (CYANOCOBALAMIN) tablet 1,000 mcg  1,000 mcg Oral Daily Va Patton MD   1,000 mcg at 06/14/21 1434    polyethylene glycol (GLYCOLAX) packet 17 g  17 g Oral Daily PRN Va Patton MD        acetaminophen (TYLENOL) tablet 650 mg  650 mg Oral Q6H PRN Va Patton MD   650 mg at 06/12/21 2326    Or    acetaminophen (TYLENOL) suppository 650 mg  650 mg Rectal Q6H PRN Va Patton MD        atorvastatin (LIPITOR) tablet 40 mg  40 mg Oral Nightly Va Patton MD   40 mg at 06/14/21 2219    meclizine (ANTIVERT) tablet 12.5 mg  12.5 mg Oral TID PRN Va Patton MD        Immune Globulin (Human) solution 20 g  20 g Intravenous Once Romayne Rothman, MD        And    Immune Globulin (Human) solution 20 g  20 g Intravenous Once Romayne Rothman, MD        Immune Globulin (Human) solution 20 g  20 g Intravenous Once Romayne Rothman, MD        And    Immune Globulin (Human) solution 20 g  20 g Intravenous Once Romayne Rothman, MD           Allergies:  No Known Allergies    Problem List:    Patient Active Problem List   Diagnosis Code    [facility]asthenia gravis ([facility] G70.00    Unspecified ptosis of bilateral eyelids H02.403    Cellulitis of left hand L03.114    Viral gastroenteritis A08.4    SOB (shortness of breath) R06.02    Atrial fibrillation, chronic (HCC) I48.20    Post PTCA Z98.61    NSTEMI (non-ST elevated myocardial infarction) ([address] I21.4    Atrial fibrillation with rapid ventricular response ([address] I48.91       Past Medical History:        Diagnosis Date    Atrial fibrillation, chronic ([address] 12/2020    H/O echocardiogram 02/02/2021    EF is estimated at 50%. presence of PVC affected LVEF estimation, Mild TR.    Heart disease     stint    History of blood transfusion     Hx of blood clots     Hx of cardiovascular stress test 06/11/2021    Small size severe intensity,perfusion defect in apical wall.  Hx of Doppler ultrasound 04/09/2021    Left distal SSV shows occlusive chronic SVT.     Hypertension     [facility]     NSTEMI (non-ST elevated myocardial infarction) ([facility] 2020    Post PTCA 2020    Ramus Stented.  TIA (transient ischemic attack)     Ulcerative colitis ([facility]        Past Surgical History:        Procedure Laterality Date    CARDIAC SURGERY      ILEOSTOMY OR JEJUNOSTOMY      PTCA  2020    Ramus Stented.  TONSILLECTOMY         Social History:    Social History     Tobacco Use    Smoking status: Former Smoker     Packs/day: 0.25     Types: Cigarettes     Quit date: 2021     Years since quittin.1    Smokeless tobacco: Never Used   Substance Use Topics    Alcohol use: Yes     Alcohol/week: 1.0 standard drinks     Types: 1 Cans of beer per week     Comment: occ                                Counseling given: Not Answered      Vital Signs (Current): There were no vitals filed for this visit.                                            BP Readings from Last 3 Encounters:   06/15/21 (!) 157/108   21 120/86   03/15/21 120/70       NPO Status:                                                                                 BMI:   Wt Readings from Last 3 Encounters:   21 292 lb 12.8 oz (132.8 kg)   21 290 lb (131.5 kg)   03/15/21 290 lb (131.5 kg)     There is no height or weight on file to calculate BMI.    CBC:   Lab Results   Component Value Date    WBC 10.2 06/15/2021    RBC 3.69 06/15/2021    HGB 11.2 06/15/2021    HCT 36.7 06/15/2021    MCV 99.5 06/15/2021    RDW 14.7 06/15/2021     06/15/2021       CMP:   Lab Results   Component Value Date     06/15/2021    K 5.2 06/15/2021     06/15/2021    CO2 19 06/15/2021    BUN 16 06/15/2021    CREATININE 1.1 06/15/2021    GFRAA >60 06/15/2021    LABGLOM >60 06/15/2021    GLUCOSE 132 06/15/2021    PROT 5.2 06/15/2021    CALCIUM 8.5 06/15/2021    BILITOT 1.1 06/15/2021    ALKPHOS 44 06/15/2021     06/15/2021     06/15/2021       POC Tests: No results for input(s): POCGLU, POCNA, POCK, POCCL, POCBUN, POCHEMO, POCHCT in the last 72 hours. Coags:   Lab Results   Component Value Date    APTT 30.8 12/14/2020       HCG (If Applicable): No results found for: PREGTESTUR, PREGSERUM, HCG, HCGQUANT     ABGs:   Lab Results   Component Value Date    PO2ART 72 11/02/2016    ECP6DQJ 33.0 11/02/2016    [id number] 24.6 11/02/2016        Type & Screen (If Applicable):  No results found for: LABABO, LABRH    Drug/Infectious Status (If Applicable):  No results found for: HIV, HEPCAB    COVID-19 Screening (If Applicable):   Lab Results   Component Value Date    COVID19 NOT DETECTED 06/14/2021    COVID19 NOT DETECTED 12/12/2020           Anesthesia Evaluation  Patient summary reviewed  Airway: Mallampati: III  TM distance: >3 FB   Neck ROM: full  Mouth opening: > = 3 FB Dental:          Pulmonary:normal exam                              ROS comment: Former smoker   Cardiovascular:  Exercise tolerance: poor (<4 METS),   (+) hypertension:, past MI:, dysrhythmias: atrial fibrillation,         Rhythm: regular  Rate: normal                    Neuro/Psych:   (+) neuromuscular disease: myasthenia gravis, TIA,             GI/Hepatic/Renal:   (+) PUD, morbid obesity (bmi 40.9)         ROS comment: Ulcerative colitis. Endo/Other:    (+) blood dyscrasia: anticoagulation therapy and anemia:., .                 Abdominal:   (+) obese,         Vascular:   + DVT, . Anesthesia Plan      TIVA and MAC     ASA 3       Induction: intravenous. Anesthetic plan and risks discussed with patient. Plan discussed with CRNA and attending. Pre Anesthesia Assessment complete. Chart reviewed on 6/15/2021. This is a chart review only.         JAMIE Sheehan - CRNA   6/15/2021

## 2021-06-15 NOTE — PROGRESS NOTES
Patient instructed and educated on [employer]. Patient able to do 1000 ml. Vital capacity  Patient's goal is 3000ml.  Electronically signed by Eagle Bunch RCP on 6/15/2021 at 2:07 PM

## 2021-06-15 NOTE — BRIEF OP NOTE
Brief Postoperative Note      Patient: Fuad Roth  YOB: 1960  MRN: [id number]    Date of Procedure: 6/15/2021    Pre-Op Diagnosis: melena    Post-Op Diagnosis: Gastritis of stomach, small duodenal bulb ulcer, no active bleeding. Procedure(s):  EGD ESOPHAGOGASTRODUODENOSCOPY    Surgeon(s):  Baron Mahajan MD    Assistant:  * No surgical staff found *    Anesthesia: Monitor Anesthesia Care    Estimated Blood Loss (mL): Minimal    Complications: None    Specimens:   * No specimens in log *    Implants:  * No implants in log *      Drains:   Ileostomy Ileostomy RLQ (Active)   Stomal Appliance 2 piece 06/14/21 2015   Stoma  Assessment Pink;Protrudes 06/15/21 0200   Peristomal Assessment Intact 06/15/21 0200   Stool Color Brown 06/15/21 0605   Stool Appearance Loose 06/15/21 0605   Stool Amount Large 06/15/21 0605       Findings: As above, patient became apneic during procedure so could not do biopsy to check for  h pylori. Should be ok to resume all of anticoagulants. Would check stool for h pylori antigen.      Electronically signed by Baron Mahajan MD on 6/15/2021 at 8:24 AM

## 2021-06-15 NOTE — CONSULTS
Pulmonary Consult Note      Reason for Consult: h/o smoking with worsening SOB and wheezing  Requesting Physician: Skylar Ballard  Subjective:   CHIEF COMPLAINT :SOB    Patient Active Problem List    Diagnosis Date Noted    Atrial fibrillation with rapid ventricular response ([facility] 06/11/2021    Atrial fibrillation, chronic ([facility]     Post PTCA 12/14/2020     Overview Note:     Ramus Stented.  SOB (shortness of breath) 12/12/2020    NSTEMI (non-ST elevated myocardial infarction) ([facility] 12/2020    Viral gastroenteritis 02/02/2020    Cellulitis of left hand 09/02/2018    Unspecified ptosis of bilateral eyelids 07/19/2016    Myasthenia gravis ([facility] 07/16/2016        HPI:                The patient is a 57 y.o. male with significant past medical history of afib,HTN, Myasthenia Gravis, Morbid obesity, CAD, TIA, Anemia, Gastric ulcer, Ulcerative Colitis  presents with complaints of SOB and Rapid afib from Cardiology office to ED. His CXR shwoed no acute abnormality. His ECHO showed MR. He was found to have non AG metabolic acidosis. He has h/o smoking 1 pk x 2 weeks x 15 yrs which he quit in December 2020 and drinks 1 standard drink per week. His LFT's were elevated though. At this time he is lying in the bed. He is in mild resp distress. He snores and not sure if he stops breathing. He wakes up many times in the night to go to the bathroom. He never woke up choking or gasping for air, woke up with dry mouth, no palpitations. He is tired on waking up sometimes. He is less sleepy during the day time. Past Medical History:      Diagnosis Date    Atrial fibrillation, chronic ([address] 12/2020    H/O echocardiogram 02/02/2021    EF is estimated at 50%. presence of PVC affected LVEF estimation, Mild TR.    Heart disease     stint    History of blood transfusion     Hx of blood clots     Hx of cardiovascular stress test 06/11/2021    Small size severe intensity,perfusion defect in apical wall.     Hx of Doppler ultrasound 04/09/2021    Left distal SSV shows occlusive chronic SVT.  Hypertension     Myasthenia gravis ([address]     NSTEMI (non-ST elevated myocardial infarction) ([address] 12/2020    Post PTCA 12/14/2020    Ramus Stented.  TIA (transient ischemic attack)     Ulcerative colitis ([address]       Past Surgical History:        Procedure Laterality Date    CARDIAC SURGERY      ILEOSTOMY OR JEJUNOSTOMY      PTCA  12/14/2020    Ramus Stented.  TONSILLECTOMY       Current Medications:     pantoprazole  40 mg Intravenous BID    ipratropium-albuterol  1 ampule Inhalation Q4H WA    budesonide-formoterol  2 puff Inhalation BID    enoxaparin  1 mg/kg Subcutaneous BID    amiodarone  200 mg Oral BID    sodium chloride flush  5-40 mL Intravenous 2 times per day    pyridostigmine  60 mg Oral 6 times per day    predniSONE  13 mg Oral Daily    clopidogrel  75 mg Oral Daily    metoprolol succinate  25 mg Oral Daily    [Held by provider] rivaroxaban  20 mg Oral Daily with breakfast    vitamin B-12  1,000 mcg Oral Daily    atorvastatin  40 mg Oral Nightly     Allergies:    Social History:    TOBACCO:   reports that he quit smoking about 2 months ago. His smoking use included cigarettes. He smoked 0.25 packs per day. He has never used smokeless tobacco.  ETOH:   reports current alcohol use of about 1.0 standard drinks of alcohol per week. Patient currently lives independently    Family History:       Family history unknown: Yes       REVIEW OF SYSTEMS:    CONSTITUTIONAL:  negative for fevers, chills, diaphoresis, activity change, appetite change, fatigue, night sweats and unexpected weight change.    EYES:  negative for blurred vision, eye discharge, visual disturbance and icterus  HEENT:  negative for hearing loss, tinnitus, ear drainage, sinus pressure, nasal congestion, epistaxis and snoring  RESPIRATORY:  See HPI  CARDIOVASCULAR:  negative for chest pain, palpitations, exertional chest pressure/discomfort, edema, syncope  GASTROINTESTINAL:  negative for nausea, vomiting, diarrhea, constipation, blood in stool and abdominal pain  GENITOURINARY:  negative for frequency, dysuria, urinary incontinence, decreased urine volume, and hematuria  HEMATOLOGIC/LYMPHATIC:  negative for easy bruising, bleeding and lymphadenopathy  ALLERGIC/IMMUNOLOGIC:  negative for recurrent infections, angioedema, anaphylaxis and drug reactions  ENDOCRINE:  negative for weight changes and diabetic symptoms including polyuria, polydipsia and polyphagia  MUSCULOSKELETAL:  negative for  pain, joint swelling, decreased range of motion and muscle weakness  NEUROLOGICAL:  negative for headaches, slurred speech, unilateral weakness  PSYCHIATRIC/BEHAVIORAL: negative for hallucinations, behavioral problems, confusion and agitation. Objective:   PHYSICAL EXAM:      VITALS:  BP (!) 139/98   Pulse 54   Temp 97.4 °F (36.3 °C) (Oral)   Resp 23   Ht 5' 11\" (1.803 m)   Wt 292 lb 12.8 oz (132.8 kg)   SpO2 93%   BMI 40.84 kg/m²   24HR INTAKE/OUTPUT:      Intake/Output Summary (Last 24 hours) at 6/15/2021 1324  Last data filed at 6/15/2021 0390  Gross per 24 hour   Intake 1872 ml   Output --   Net 1872 ml     CURRENT PULSE OXIMETRY:  SpO2: 93 %  24HR PULSE OXIMETRY RANGE:  SpO2  Av.5 %  Min: 74 %  Max: 100 %    CONSTITUTIONAL:  awake, alert, cooperative, no apparent distress, and appears stated age  NECK:  Supple, symmetrical, trachea midline, no adenopathy, thyroid symmetric, not enlarged and no tenderness, skin normal  LUNGS: Occasional basal crackles  CARDIOVASCULAR:  normal S1 and S2, no edema and no JVD  ABDOMEN:  normal bowel sounds, non-distended and no masses palpated, and no tenderness to palpation. No hepatospleenomegaly  LYMPHADENOPATHY:  no axillary or supraclavicular adenopathy. No cervical adnenopathy  PSYCHIATRIC: Oriented to person place and time. No obvious depression or anxiety.   MUSCULOSKELETAL: No obvious misalignment or effusion of the joints. No clubbing, cyanosis of the digits. SKIN:  normal skin color, texture, turgor and no redness, warmth, or swelling. No palpable nodules    DATA:    Old records have been reviewed  CBC with Differential:    Lab Results   Component Value Date    WBC 10.2 06/15/2021    RBC 3.69 06/15/2021    HGB 10.8 06/15/2021    HCT 37.0 06/15/2021     06/15/2021    MCV 99.5 06/15/2021    MCH 30.4 06/15/2021    MCHC 30.5 06/15/2021    RDW 14.7 06/15/2021    SEGSPCT 78.4 06/15/2021    LYMPHOPCT 11.4 06/15/2021    MONOPCT 9.1 06/15/2021    BASOPCT 0.4 06/15/2021    MONOSABS 0.9 06/15/2021    LYMPHSABS 1.2 06/15/2021    EOSABS 0.0 06/15/2021    BASOSABS 0.0 06/15/2021    DIFFTYPE AUTOMATED DIFFERENTIAL 06/15/2021     BMP:    Lab Results   Component Value Date     06/15/2021    K 5.2 06/15/2021     06/15/2021    CO2 19 06/15/2021    BUN 16 06/15/2021    CREATININE 1.1 06/15/2021    CALCIUM 8.5 06/15/2021    GFRAA >60 06/15/2021    LABGLOM >60 06/15/2021    GLUCOSE 132 06/15/2021     Hepatic Function Panel:    Lab Results   Component Value Date    ALKPHOS 44 06/15/2021     06/15/2021     06/15/2021    PROT 5.2 06/15/2021    BILITOT 1.1 06/15/2021     ABG:    Lab Results   Component Value Date    YFW5SHO 24.6 11/02/2016    UZJ8NXX 33.0 11/02/2016    PO2ART 72 11/02/2016       Cultures:   Pending    Radiology Review:    Clear lungs. Cardiomegaly. No acute cardiopulmonary abnormality      Assessment/Plan       Patient Active Problem List    Diagnosis Date Noted    Atrial fibrillation with rapid ventricular response ([address] 06/11/2021    Atrial fibrillation, chronic ([address]     Post PTCA 12/14/2020     Overview Note:     Ramus Stented.       SOB (shortness of breath) 12/12/2020    NSTEMI (non-ST elevated myocardial infarction) ([facility] 12/2020    Viral gastroenteritis 02/02/2020    Cellulitis of left hand 09/02/2018    Unspecified ptosis of bilateral eyelids 07/19/2016    Myasthenia gravis ([facility] 07/16/2016     Morbid Obesity  LENIN  Moderate to Severe MR  Increased LFT's  Afib  Non AG Metabolic Acidosis  Ex smoker      PLAN  1. IV fluids  2. CMP in am  3. Inhalers  4. BIPAP qhs and prn  5. Keep sats > 92%  6. DVT and GI Prophylaxis  7.c/w present management  8. OP PFT's  9. ICS  10.  OOB      Electronically signed by Dori Moraes MD on 6/15/2021 at 1:24 PM

## 2021-06-15 NOTE — PLAN OF CARE
Problem: Pain:  Goal: Pain level will decrease  Description: Pain level will decrease  6/14/2021 2021 by Kelby Hart RN  Outcome: Ongoing  6/14/2021 2021 by Kelby Hart RN  Outcome: Ongoing  Goal: Control of acute pain  Description: Control of acute pain  6/14/2021 2021 by Kelby Hart RN  Outcome: Ongoing  6/14/2021 2021 by Kelby Hart RN  Outcome: Ongoing  Goal: Control of chronic pain  Description: Control of chronic pain  6/14/2021 2021 by Kelby Hart RN  Outcome: Ongoing  6/14/2021 2021 by Kelby Hart RN  Outcome: Ongoing     Problem: Pain:  Goal: Pain level will decrease  Description: Pain level will decrease  6/14/2021 2021 by Kelby Hart RN  Outcome: Ongoing  6/14/2021 2021 by Kelby Hart RN  Outcome: Ongoing  Goal: Control of acute pain  Description: Control of acute pain  6/14/2021 2021 by Kelby Hart RN  Outcome: Ongoing  6/14/2021 2021 by Kelby Hart RN  Outcome: Ongoing  Goal: Control of chronic pain  Description: Control of chronic pain  6/14/2021 2021 by Kelby Hart RN  Outcome: Ongoing  6/14/2021 2021 by Kelby Hart RN  Outcome: Ongoing

## 2021-06-15 NOTE — PROGRESS NOTES
Calcium:  Recent Labs     06/15/21  0508   CALCIUM 8.5     Ionized Calcium:No results for input(s): IONCA in the last 72 hours. Magnesium:  Recent Labs     06/15/21  0508   MG 2.0     Phosphorus:  Recent Labs     06/15/21  0508   PHOS 3.3     BNP:No results for input(s): BNP in the last 72 hours. Glucose:No results for input(s): POCGLU in the last 72 hours. HgbA1C: No results for input(s): LABA1C in the last 72 hours. INR: No results for input(s): INR in the last 72 hours. Hepatic:   Recent Labs     06/15/21  0508   ALKPHOS 44   *   *   PROT 5.2*   BILITOT 1.1*   LABALBU 3.3*     Amylase and Lipase:No results for input(s): LACTA, AMYLASE in the last 72 hours. Lactic Acid: No results for input(s): LACTA in the last 72 hours. Troponin: No results for input(s): CKTOTAL, CKMB, TROPONINT in the last 72 hours. BNP: No results for input(s): BNP in the last 72 hours. Lipids: No results for input(s): CHOL, TRIG, HDL, LDLCALC in the last 72 hours. Invalid input(s): LDL  ABGs:   Lab Results   Component Value Date    PH 7.48 11/02/2016    O2SAT 94.0 11/02/2016       Radiology reports as per the Radiologist  Radiology: XR CHEST PORTABLE    Result Date: 6/11/2021  EXAMINATION: ONE XRAY VIEW OF THE CHEST 6/11/2021 9:42 am COMPARISON: December 12, 2020. HISTORY: ORDERING SYSTEM PROVIDED HISTORY: chest pain TECHNOLOGIST PROVIDED HISTORY: Reason for exam:->chest pain Reason for Exam: chest pain Acuity: Acute Type of Exam: Initial FINDINGS: A portable upright frontal view chest radiograph was obtained. The heart is enlarged. The mediastinal contour and pleural spaces are otherwise within normal limits. The lungs are grossly clear. There is no focal consolidation or pneumothorax. The pulmonary vascular pattern is within normal limits. No acute thoracic osseous abnormality. Clear lungs. Cardiomegaly. No acute cardiopulmonary abnormality.      NM MYOCARDIAL SPECT REST EXERCISE OR RX    Result Date: 6/14/2021  Cardiac Perfusion Imaging   Demographics   Patient Name      Rimma GRIFFITHS     Date of study        06/11/2021   Date of Birth     1960         Gender               Male   Age               57 year(s)         Race                    Patient Number    [id number]           Room Number   Visit Number      [id number]          Height               71 inches   Corporate ID      [id number]           Weight               290 pounds   Accession Number  [id number]                                        NM Technologist      Vini Garcia, [facility]   Ordering          Og Baker   Interpreting         Pietro Arndt, [id number]         Inocencia Weeks MD      Cardiologist         Inocencia Weeks MD   The procedure was explained in detail to the patient. Risks,  complications and alternative treatments were reviewed. Written consent  was obtained. Conclusions   Summary  Supervising physician Dr. Pietro Arndt .  abnromal stress test, Myocardial perfusion scan shows small size,  severeintensity,perfusion defect in apical wall, rest images were not  available to compare  When obtaining rest EKG for Nuclear Stress Test patients heart rate was  elevated. Tracy checked with Dr Pietro Arndt and he stated if the patients heart  rate did not come down by the end of the stress test to send him to the ER. Stress pictures were done before patient went to the ER. Recommendation  recommend office visit and rest images if possible to compare perfusion  images   Signatures   ------------------------------------------------------------------  Electronically signed by Sindy Gracia MD  (Interpreting cardiologist) on 06/14/2021 at 09:32  ------------------------------------------------------------------  Procedure Procedure Type:   Limited Nuclear Stress Test  Indications: Dyspnea, Chest pain, abnormal rest ECG and CAD.   Risk Factors   The patient risk factors include:prior PCI;obesity, former tobacco use,  family history of premature CAD and prior MI . Stress Protocols   Resting ECG  Abnormal @ Rest.;  Atrial fibrillation; Resting HR:146 bpm  Resting BP:120/76 mmHg  Stress Protocol:Pharmacologic - Lexiscan  Peak HR:176 bpm                           HR response: Appropriate  Peak BP:90/60 mmHg                        BP response: Normal resting BP -  Predicted HR: 158 bpm                     appropriate response  % of predicted HR: 111                    HR/BP product:[id number]   Exercise duration: 01:01 min  Reason for termination:Reached diagnostic  endpoint   ECG Findings  afib RVR   Arrhythmias  ventricular premature beats-isolated; Symptoms  Shortness of breath. Dizziness. Chest pain. 5 out of 10   Complications  Procedure complication was none. Procedure Medications   - Lexiscan I.V. bolus (over 15sec.) 0.4 mg admininstered @ 06/11/2021 08:55.   - Aminophylline I.V. 50 mg admininstered @ 06/11/2021 08:57. Imaging Protocols           Stress           Isotope: Sestamibi 99mTc          Isotope dose:9.6 mCi          Administration route: I.V. Rt. arm          Injection Date:06/11/2021 08:56          Scan Date:06/11/2021 09:26           Technique:          SPECT   Perfusion Interpretation   Myocardial perfusion scan shows small size, severeintensity,perfusion defect  in apical wall, rest images were not available to compare  Imaging Results Visualization: Good Medical History   Accession#:  [id number]  Admission Data Admission date: 06/11/2021 Admission Time: 08:24 Hospital Status: Outpatient. Physical Exam:  Vitals: BP (!) 157/108   Pulse 61   Temp 97.4 °F (36.3 °C) (Oral)   Resp 19   Ht 5' 11\" (1.803 m)   Wt 292 lb 12.8 oz (132.8 kg)   SpO2 93%   BMI 40.84 kg/m²   24 hour intake/output:    Intake/Output Summary (Last 24 hours) at 6/15/2021 1015  Last data filed at 6/15/2021 9382  Gross per 24 hour   Intake 1872 ml   Output --   Net 1872 ml     Last 3 weights:   Wt Readings from Last 3 Encounters:   06/14/21 292 lb 12.8

## 2021-06-15 NOTE — PROGRESS NOTES
Cardiology Progress Note     Today's Plan: stop amiodarone     Admit Date:  6/11/2021    Consult reason/ Seen today for: Afib RVR     Subjective and  Overnight Events: Patient remains in NSR. Assessment / Plan / Recommendation:       1. Afib RVR: TONIO/cardioversion today, continue with Xarelto for anticoagulation and digoxin, Toprol for rate control. LFTs have more than tripled in last 24 hours, stop amiodarone. 2. History of coronary disease s/p PCI.  Left heart cath images reviewed personally. Kristina Esperanza.  Denies any current chest pain.  Troponins are trivially elevated, likely secondary to A. fib. Continue with Xarelto/Plavix/atorvastatin/beta-blocker. Will get stress test tomorrow  3. Essential hypertension: Blood pressure stable.  Continue with metoprolol.  25 mg p.o. daily  4. History of myasthenia gravis: Patient is on Mestinon and prednisone.  Management per primary team.  5. Morbid obesity: Advised low-salt low-fat diet, respect modification      History of Presenting Illness:    Chief complain on admission : 57 y. o.year old who is admitted for  Chief Complaint   Patient presents with    Irregular Heart Beat     afib after doing a stress test        Past medical history:    has a past medical history of Atrial fibrillation, chronic ([address] COPD (chronic obstructive pulmonary disease) ([address] Coronary artery disease involving native coronary artery of native heart without angina pectoris, H/O echocardiogram, Heart disease, History of blood transfusion, Hx of blood clots, Hx of cardiovascular stress test, Hx of Doppler ultrasound, Hypertension, Myasthenia gravis ([address] NSTEMI (non-ST elevated myocardial infarction) ([address] Post PTCA, TIA (transient ischemic attack), and Ulcerative colitis ([address] Past surgical history:   has a past surgical history that includes ileostomy or jejunostomy;  Tonsillectomy; Cardiac surgery; and Percutaneous Transluminal Coronary Angio (12/14/2020). Social History:   reports that he quit smoking about 2 months ago. His smoking use included cigarettes. He smoked 0.25 packs per day. He has never used smokeless tobacco. He reports current alcohol use of about 1.0 standard drinks of alcohol per week. He reports that he does not use drugs. Family history:  Family history is unknown by patient. No Known Allergies    Review of Systems:  Review of Systems   Cardiovascular: Negative for chest pain, palpitations and leg swelling. Musculoskeletal: Negative. Skin: Negative. Neurological: Negative for dizziness and weakness. All other systems reviewed and are negative. BP (!) 139/98   Pulse 54   Temp 97.4 °F (36.3 °C) (Oral)   Resp 23   Ht 5' 11\" (1.803 m)   Wt 292 lb 12.8 oz (132.8 kg)   SpO2 93%   BMI 40.84 kg/m²       Intake/Output Summary (Last 24 hours) at 6/15/2021 1401  Last data filed at 6/15/2021 6888  Gross per 24 hour   Intake 1872 ml   Output --   Net 1872 ml       Physical Exam:  Constitutional:  Well developed, Well nourished, No acute distress  HENT:  Normocephalic, Atraumatic, Bilateral external ears normal,  Nose normal.   Neck- trachea is midline  Eyes:  PERRL, Conjunctiva normal  Respiratory:  Normal breath sounds, No respiratory distress, No wheezing, No chest tenderness. Cardiovascular:  Normal heart rate, Normal rhythm, no murmurs appreciated, No rubs appreciated, No gallops appreciated, JVP not elevated  Abdomen/GI:  Soft, No tenderness  Musculoskeletal:  Intact distal pulses, no edema, No tenderness, No cyanosis, No clubbing. Integument:  Warm, Dry  Lymphatic:  No lymphadenopathy noted.    Neurologic:  Alert & oriented  Psychiatric:  Affect and Mood :pleasant     Medications:    pantoprazole  40 mg Intravenous BID    ipratropium-albuterol  1 ampule Inhalation Q4H WA    budesonide-formoterol  2 puff Inhalation BID    budesonide  0.5 mg Nebulization BID    methylPREDNISolone  40 mg Intravenous Q8H    enoxaparin  1 mg/kg Subcutaneous BID    sodium chloride flush  5-40 mL Intravenous 2 times per day    pyridostigmine  60 mg Oral 6 times per day    predniSONE  13 mg Oral Daily    clopidogrel  75 mg Oral Daily    metoprolol succinate  25 mg Oral Daily    [Held by provider] rivaroxaban  20 mg Oral Daily with breakfast    vitamin B-12  1,000 mcg Oral Daily    atorvastatin  40 mg Oral Nightly      sodium chloride 125 mL/hr at 06/15/21 0950    sodium chloride       ipratropium-albuterol, sodium chloride flush, sodium chloride, albuterol sulfate HFA, polyethylene glycol, acetaminophen **OR** acetaminophen, meclizine    Lab Data:  CBC:   Recent Labs     06/13/21  0559 06/14/21  0631 06/15/21  0508 06/15/21  1120   WBC 9.5 10.0 10.2  --    HGB 11.8* 11.2* 11.2* 10.8*   HCT 37.8* 35.9* 36.7* 37.0*   MCV 97.4 94.0 99.5  --     248 236  --      BMP:   Recent Labs     06/13/21  0559 06/14/21  0631 06/15/21  0508   * 137 138   K 4.6 4.7 5.2*    109 108   CO2 19* 19* 19*   PHOS 3.0 2.7 3.3   BUN 18 15 16   CREATININE 0.9 0.8* 1.1     PT/INR: No results for input(s): PROTIME, INR in the last 72 hours. BNP:    No results for input(s): PROBNP in the last 72 hours. TROPONIN:   No results for input(s): TROPONINT in the last 72 hours. Impression:  Principal Problem:    Atrial fibrillation with RVR ([facility])  Active Problems:    Myasthenia gravis ([facility])    Hyperkalemia    Type 2 MI (myocardial infarction) ([facility])    Gastritis and duodenitis    Duodenal ulcer    Coronary artery disease involving native coronary artery of native heart without angina pectoris    COPD (chronic obstructive pulmonary disease) ([facility])  Resolved Problems:    * No resolved hospital problems. *       All labs, medications and tests reviewed by myself, continue all other medications of all above medical condition listed as is except for changes mentioned above.     Thank you   Please call with questions. Electronically signed by Annabel Avitia. Laurence Salena, APRN - CNP on 6/15/2021 at 2:01 PM         [address] [facility]    I have seen, spoken to and examined this patient personally, independently of the nurse practitioner. I have reviewed the hospital care given to date and reviewed all pertinent labs and imaging. The plan was developed mutually at the time of the visit with the patient,  NP   and myself. I have spoken with patient, nursing staff and provided written and verbal instructions . The above note has been reviewed and I agree with the assessment, diagnosis, and treatment plan with changes made by me as follows       HPI:  I have reviewed the above HPI  And agree with above   Please review addendum/changes made to note above     Interval history:      Patient doing well  Denies any chest pain shortness of breath or palpitation  Telemetry shows patient remains in sinus rhythm        Physical Exam:  General:  Awake, alert, NAD  Head:normal  Eye:normal  Neck:  No JVD   Chest:  Clear to auscultation, respiration easy  Cardiovascular:  s1s2  Abdomen:   nontender  Extremities:  0 edema  Pulses; palpable  Neuro: grossly normal      MEDICAL DECISION MAKING;    I agree with the above plan, which was planned by myself and discussed with NP. Stop amiodarone due to deranged LFTs. Follow-up LFTs closely  History of CAD. Elevated troponin with chest discomfort, plan for stress MPI in the morning. Patient was due to have stress test as outpatient however was put off due to A. fib RVR.       Dr. Geo Ulloa MD

## 2021-06-15 NOTE — ANESTHESIA POSTPROCEDURE EVALUATION
Department of Anesthesiology  Postprocedure Note    Patient: Fatoumata Valdovinos  MRN: [id number]  YOB: 1960  Date of evaluation: 6/15/2021  Time:  8:33 AM     Procedure Summary     Date: 06/15/21 Room / Location: [address] [place] [address]    Anesthesia Start: 3378 Anesthesia Stop: 3942    Procedure: EGD ESOPHAGOGASTRODUODENOSCOPY (N/A ) Diagnosis: (melena)    Surgeons: Shyann Moss MD Responsible Provider: Kristyn Avila MD    Anesthesia Type: TIVA, MAC ASA Status: 3          Anesthesia Type: TIVA, MAC    Miguel Phase I:      Miguel Phase II:      Last vitals: Reviewed and per EMR flowsheets.        Anesthesia Post Evaluation    Patient location during evaluation: bedside  Patient participation: complete - patient participated  Level of consciousness: awake and alert  Pain score: 0  Airway patency: patent  Nausea & Vomiting: no vomiting and no nausea  Complications: no  Cardiovascular status: blood pressure returned to baseline and hemodynamically stable  Respiratory status: acceptable, room air, spontaneous ventilation and nonlabored ventilation  Hydration status: stable

## 2021-06-16 ENCOUNTER — APPOINTMENT (OUTPATIENT)
Dept: NUCLEAR MEDICINE | Age: 61
DRG: 201 | End: 2021-06-16
Payer: MEDICAID

## 2021-06-16 VITALS
BODY MASS INDEX: 40.77 KG/M2 | HEART RATE: 61 BPM | DIASTOLIC BLOOD PRESSURE: 82 MMHG | SYSTOLIC BLOOD PRESSURE: 132 MMHG | RESPIRATION RATE: 17 BRPM | WEIGHT: 291.2 LBS | OXYGEN SATURATION: 96 % | TEMPERATURE: 97.9 F | HEIGHT: 71 IN

## 2021-06-16 LAB
ALBUMIN SERPL-MCNC: 3.3 GM/DL (ref 3.4–5)
ALP BLD-CCNC: 45 IU/L (ref 40–128)
ALT SERPL-CCNC: 203 U/L (ref 10–40)
ANION GAP SERPL CALCULATED.3IONS-SCNC: 9 MMOL/L (ref 4–16)
AST SERPL-CCNC: 101 IU/L (ref 15–37)
BASOPHILS ABSOLUTE: 0 K/CU MM
BASOPHILS RELATIVE PERCENT: 0 % (ref 0–1)
BILIRUB SERPL-MCNC: 0.6 MG/DL (ref 0–1)
BUN BLDV-MCNC: 13 MG/DL (ref 6–23)
CALCIUM SERPL-MCNC: 8.3 MG/DL (ref 8.3–10.6)
CHLORIDE BLD-SCNC: 110 MMOL/L (ref 99–110)
CO2: 21 MMOL/L (ref 21–32)
CREAT SERPL-MCNC: 0.9 MG/DL (ref 0.9–1.3)
DIFFERENTIAL TYPE: ABNORMAL
EOSINOPHILS ABSOLUTE: 0 K/CU MM
EOSINOPHILS RELATIVE PERCENT: 0 % (ref 0–3)
GFR AFRICAN AMERICAN: >60 ML/MIN/1.73M2
GFR NON-AFRICAN AMERICAN: >60 ML/MIN/1.73M2
GLUCOSE BLD-MCNC: 186 MG/DL (ref 70–99)
HCT VFR BLD CALC: 37.4 % (ref 42–52)
HCT VFR BLD CALC: 37.4 % (ref 42–52)
HCT VFR BLD CALC: 39.8 % (ref 42–52)
HEMOGLOBIN: 11 GM/DL (ref 13.5–18)
HEMOGLOBIN: 11.5 GM/DL (ref 13.5–18)
HEMOGLOBIN: 11.6 GM/DL (ref 13.5–18)
IMMATURE NEUTROPHIL %: 0.4 % (ref 0–0.43)
LV EF: 44 %
LVEF MODALITY: NORMAL
LYMPHOCYTES ABSOLUTE: 0.4 K/CU MM
LYMPHOCYTES RELATIVE PERCENT: 5.3 % (ref 24–44)
MAGNESIUM: 2.2 MG/DL (ref 1.8–2.4)
MCH RBC QN AUTO: 30.7 PG (ref 27–31)
MCHC RBC AUTO-ENTMCNC: 30.7 % (ref 32–36)
MCV RBC AUTO: 99.7 FL (ref 78–100)
MONOCYTES ABSOLUTE: 0.2 K/CU MM
MONOCYTES RELATIVE PERCENT: 2.7 % (ref 0–4)
NUCLEATED RBC %: 0 %
PDW BLD-RTO: 14.7 % (ref 11.7–14.9)
PHOSPHORUS: 2.4 MG/DL (ref 2.5–4.9)
PLATELET # BLD: 223 K/CU MM (ref 140–440)
PMV BLD AUTO: 9.8 FL (ref 7.5–11.1)
POTASSIUM SERPL-SCNC: 4.4 MMOL/L (ref 3.5–5.1)
POTASSIUM SERPL-SCNC: 5.2 MMOL/L (ref 3.5–5.1)
POTASSIUM SERPL-SCNC: 5.7 MMOL/L (ref 3.5–5.1)
RBC # BLD: 3.75 M/CU MM (ref 4.6–6.2)
SEGMENTED NEUTROPHILS ABSOLUTE COUNT: 7.1 K/CU MM
SEGMENTED NEUTROPHILS RELATIVE PERCENT: 91.6 % (ref 36–66)
SODIUM BLD-SCNC: 140 MMOL/L (ref 135–145)
TOTAL IMMATURE NEUTOROPHIL: 0.03 K/CU MM
TOTAL NUCLEATED RBC: 0 K/CU MM
TOTAL PROTEIN: 5.5 GM/DL (ref 6.4–8.2)
WBC # BLD: 7.7 K/CU MM (ref 4–10.5)

## 2021-06-16 PROCEDURE — 6360000002 HC RX W HCPCS: Performed by: INTERNAL MEDICINE

## 2021-06-16 PROCEDURE — 6370000000 HC RX 637 (ALT 250 FOR IP): Performed by: INTERNAL MEDICINE

## 2021-06-16 PROCEDURE — 87338 HPYLORI STOOL AG IA: CPT

## 2021-06-16 PROCEDURE — 94660 CPAP INITIATION&MGMT: CPT

## 2021-06-16 PROCEDURE — 84100 ASSAY OF PHOSPHORUS: CPT

## 2021-06-16 PROCEDURE — 36415 COLL VENOUS BLD VENIPUNCTURE: CPT

## 2021-06-16 PROCEDURE — A9500 TC99M SESTAMIBI: HCPCS | Performed by: NURSE PRACTITIONER

## 2021-06-16 PROCEDURE — 99232 SBSQ HOSP IP/OBS MODERATE 35: CPT | Performed by: INTERNAL MEDICINE

## 2021-06-16 PROCEDURE — 99233 SBSQ HOSP IP/OBS HIGH 50: CPT | Performed by: INTERNAL MEDICINE

## 2021-06-16 PROCEDURE — 85018 HEMOGLOBIN: CPT

## 2021-06-16 PROCEDURE — 2580000003 HC RX 258: Performed by: INTERNAL MEDICINE

## 2021-06-16 PROCEDURE — 94640 AIRWAY INHALATION TREATMENT: CPT

## 2021-06-16 PROCEDURE — 6370000000 HC RX 637 (ALT 250 FOR IP): Performed by: FAMILY MEDICINE

## 2021-06-16 PROCEDURE — 85014 HEMATOCRIT: CPT

## 2021-06-16 PROCEDURE — 78452 HT MUSCLE IMAGE SPECT MULT: CPT

## 2021-06-16 PROCEDURE — APPSS60 APP SPLIT SHARED TIME 46-60 MINUTES: Performed by: NURSE PRACTITIONER

## 2021-06-16 PROCEDURE — 94761 N-INVAS EAR/PLS OXIMETRY MLT: CPT

## 2021-06-16 PROCEDURE — 80053 COMPREHEN METABOLIC PANEL: CPT

## 2021-06-16 PROCEDURE — C9113 INJ PANTOPRAZOLE SODIUM, VIA: HCPCS | Performed by: INTERNAL MEDICINE

## 2021-06-16 PROCEDURE — 93308 TTE F-UP OR LMTD: CPT

## 2021-06-16 PROCEDURE — 3430000000 HC RX DIAGNOSTIC RADIOPHARMACEUTICAL: Performed by: NURSE PRACTITIONER

## 2021-06-16 PROCEDURE — 93017 CV STRESS TEST TRACING ONLY: CPT

## 2021-06-16 PROCEDURE — 85025 COMPLETE CBC W/AUTO DIFF WBC: CPT

## 2021-06-16 PROCEDURE — 94150 VITAL CAPACITY TEST: CPT

## 2021-06-16 PROCEDURE — 84132 ASSAY OF SERUM POTASSIUM: CPT

## 2021-06-16 PROCEDURE — 83735 ASSAY OF MAGNESIUM: CPT

## 2021-06-16 RX ORDER — LACTULOSE 10 G/15ML
30 SOLUTION ORAL ONCE
Status: COMPLETED | OUTPATIENT
Start: 2021-06-16 | End: 2021-06-16

## 2021-06-16 RX ORDER — CALCIUM GLUCONATE 20 MG/ML
2000 INJECTION, SOLUTION INTRAVENOUS ONCE
Status: COMPLETED | OUTPATIENT
Start: 2021-06-16 | End: 2021-06-16

## 2021-06-16 RX ORDER — DEXTROSE MONOHYDRATE 25 G/50ML
25 INJECTION, SOLUTION INTRAVENOUS PRN
Status: DISCONTINUED | OUTPATIENT
Start: 2021-06-16 | End: 2021-06-16 | Stop reason: HOSPADM

## 2021-06-16 RX ORDER — ATORVASTATIN CALCIUM 40 MG/1
40 TABLET, FILM COATED ORAL NIGHTLY
Qty: 30 TABLET | Refills: 3 | Status: SHIPPED | OUTPATIENT
Start: 2021-06-16 | End: 2022-09-12 | Stop reason: SDUPTHER

## 2021-06-16 RX ORDER — FUROSEMIDE 10 MG/ML
40 INJECTION INTRAMUSCULAR; INTRAVENOUS ONCE
Status: COMPLETED | OUTPATIENT
Start: 2021-06-16 | End: 2021-06-16

## 2021-06-16 RX ORDER — BUDESONIDE AND FORMOTEROL FUMARATE DIHYDRATE 160; 4.5 UG/1; UG/1
2 AEROSOL RESPIRATORY (INHALATION) 2 TIMES DAILY
Qty: 1 INHALER | Refills: 3 | Status: SHIPPED | OUTPATIENT
Start: 2021-06-16 | End: 2022-09-20 | Stop reason: SDUPTHER

## 2021-06-16 RX ORDER — PANTOPRAZOLE SODIUM 40 MG/1
40 TABLET, DELAYED RELEASE ORAL 2 TIMES DAILY
Qty: 60 TABLET | Refills: 1 | Status: SHIPPED | OUTPATIENT
Start: 2021-06-16 | End: 2022-09-20 | Stop reason: SDUPTHER

## 2021-06-16 RX ORDER — TIOTROPIUM BROMIDE 18 UG/1
18 CAPSULE ORAL; RESPIRATORY (INHALATION) DAILY
Qty: 90 CAPSULE | Refills: 1 | Status: SHIPPED | OUTPATIENT
Start: 2021-06-16 | End: 2022-09-20 | Stop reason: SDUPTHER

## 2021-06-16 RX ADMIN — PREDNISONE 13 MG: 1 TABLET ORAL at 10:42

## 2021-06-16 RX ADMIN — PANTOPRAZOLE SODIUM 40 MG: 40 INJECTION, POWDER, FOR SOLUTION INTRAVENOUS at 10:42

## 2021-06-16 RX ADMIN — KIT FOR THE PREPARATION OF TECHNETIUM TC99M SESTAMIBI 30 MILLICURIE: 1 INJECTION, POWDER, LYOPHILIZED, FOR SOLUTION PARENTERAL at 09:15

## 2021-06-16 RX ADMIN — CLOPIDOGREL BISULFATE 75 MG: 75 TABLET ORAL at 10:42

## 2021-06-16 RX ADMIN — PREDNISONE 13 MG: 1 TABLET ORAL at 12:09

## 2021-06-16 RX ADMIN — REGADENOSON 0.4 MG: 0.08 INJECTION, SOLUTION INTRAVENOUS at 09:12

## 2021-06-16 RX ADMIN — INSULIN HUMAN 10 UNITS: 100 INJECTION, SOLUTION PARENTERAL at 15:16

## 2021-06-16 RX ADMIN — CALCIUM GLUCONATE 2000 MG: 20 INJECTION, SOLUTION INTRAVENOUS at 15:16

## 2021-06-16 RX ADMIN — ENOXAPARIN SODIUM 135 MG: 150 INJECTION SUBCUTANEOUS at 10:42

## 2021-06-16 RX ADMIN — PYRIDOSTIGMINE BROMIDE 60 MG: 60 TABLET ORAL at 05:32

## 2021-06-16 RX ADMIN — PYRIDOSTIGMINE BROMIDE 60 MG: 60 TABLET ORAL at 17:14

## 2021-06-16 RX ADMIN — PYRIDOSTIGMINE BROMIDE 60 MG: 60 TABLET ORAL at 10:41

## 2021-06-16 RX ADMIN — BUDESONIDE AND FORMOTEROL FUMARATE DIHYDRATE 2 PUFF: 160; 4.5 AEROSOL RESPIRATORY (INHALATION) at 07:07

## 2021-06-16 RX ADMIN — METHYLPREDNISOLONE SODIUM SUCCINATE 40 MG: 40 INJECTION, POWDER, FOR SOLUTION INTRAMUSCULAR; INTRAVENOUS at 17:13

## 2021-06-16 RX ADMIN — METOPROLOL SUCCINATE 25 MG: 25 TABLET, EXTENDED RELEASE ORAL at 10:43

## 2021-06-16 RX ADMIN — LACTULOSE 30 G: 10 SOLUTION ORAL at 15:04

## 2021-06-16 RX ADMIN — IPRATROPIUM BROMIDE AND ALBUTEROL SULFATE 1 AMPULE: .5; 3 SOLUTION RESPIRATORY (INHALATION) at 07:07

## 2021-06-16 RX ADMIN — KIT FOR THE PREPARATION OF TECHNETIUM TC99M SESTAMIBI 10 MILLICURIE: 1 INJECTION, POWDER, LYOPHILIZED, FOR SOLUTION PARENTERAL at 07:20

## 2021-06-16 RX ADMIN — SODIUM CHLORIDE: 9 INJECTION, SOLUTION INTRAVENOUS at 02:30

## 2021-06-16 RX ADMIN — CYANOCOBALAMIN TAB 1000 MCG 1000 MCG: 1000 TAB at 10:41

## 2021-06-16 RX ADMIN — METHYLPREDNISOLONE SODIUM SUCCINATE 40 MG: 40 INJECTION, POWDER, FOR SOLUTION INTRAMUSCULAR; INTRAVENOUS at 05:33

## 2021-06-16 RX ADMIN — PYRIDOSTIGMINE BROMIDE 60 MG: 60 TABLET ORAL at 15:05

## 2021-06-16 RX ADMIN — SODIUM ZIRCONIUM CYCLOSILICATE 10 G: 10 POWDER, FOR SUSPENSION ORAL at 15:15

## 2021-06-16 RX ADMIN — FUROSEMIDE 40 MG: 10 INJECTION, SOLUTION INTRAVENOUS at 10:41

## 2021-06-16 ASSESSMENT — PAIN SCALES - GENERAL
PAINLEVEL_OUTOF10: 0

## 2021-06-16 ASSESSMENT — PAIN SCALES - WONG BAKER
WONGBAKER_NUMERICALRESPONSE: 0

## 2021-06-16 NOTE — PROGRESS NOTES
Pt potassium level 5.7. Dr Caterina Villarreal notified and orders placed. Re-draw will be at 5pm to return to normal levels and  Time of discharge. Will continue to monitor. Funmilayo Dickinson RN.

## 2021-06-16 NOTE — PLAN OF CARE
Problem: Pain:  Goal: Pain level will decrease  Description: Pain level will decrease  6/16/2021 0840 by Oneyda Foote RN  Outcome: Ongoing  6/16/2021 0157 by Mario Powers LPN  Outcome: Ongoing  Goal: Control of acute pain  Description: Control of acute pain  6/16/2021 0840 by Oneyda Foote RN  Outcome: Ongoing  6/16/2021 0157 by Mario Powers LPN  Outcome: Ongoing  Goal: Control of chronic pain  Description: Control of chronic pain  6/16/2021 0840 by Oneyda Foote RN  Outcome: Ongoing  6/16/2021 0157 by Mario Powers LPN  Outcome: Ongoing

## 2021-06-16 NOTE — PROGRESS NOTES
Cardiology Progress Note     Today's Plan: stress test/echo     Admit Date:  6/11/2021    Consult reason/ Seen today for: Afib RVR     Subjective and  Overnight Events: Patient remains in NSR. Assessment / Plan / Recommendation:       1. Afib RVR: TONIO/cardioversion on 6/14/21, continue with Xarelto for anticoagulation and digoxin, Toprol for rate control. LFTs have more than tripled and amiodarone stopped. Slight decrease in LFTs today. 2. History of coronary disease s/p PCI.  Left heart cath images reviewed personally. Gabby Bains  Denies any current chest pain.  Troponins are trivially elevated, likely secondary to A. fib. Continue with Xarelto/Plavix/atorvastatin/beta-blocker. Stress test today shows decrease EF and diaphragmatic artifact. Repeat echo. 3. Essential hypertension: Blood pressure stable.  Continue with metoprolol.  25 mg p.o. daily  4. History of myasthenia gravis: Patient is on Mestinon and prednisone.  Management per primary team.  5. Morbid obesity: Advised low-salt low-fat diet, respect modification      History of Presenting Illness:    Chief complain on admission : 57 y. o.year old who is admitted for  Chief Complaint   Patient presents with    Irregular Heart Beat     afib after doing a stress test        Past medical history:    has a past medical history of Atrial fibrillation, chronic ([address] COPD (chronic obstructive pulmonary disease) ([address] Coronary artery disease involving native coronary artery of native heart without angina pectoris, H/O echocardiogram, Heart disease, History of blood transfusion, Hx of blood clots, Hx of cardiovascular stress test, Hx of Doppler ultrasound, Hypertension, Myasthenia gravis ([address] NSTEMI (non-ST elevated myocardial infarction) ([address] Post PTCA, TIA (transient ischemic attack), and Ulcerative colitis ([address]   Past surgical history:   has a past surgical history that Inhalation BID    methylPREDNISolone  40 mg Intravenous Q8H    enoxaparin  1 mg/kg Subcutaneous BID    sodium chloride flush  5-40 mL Intravenous 2 times per day    pyridostigmine  60 mg Oral 6 times per day    predniSONE  13 mg Oral Daily    clopidogrel  75 mg Oral Daily    metoprolol succinate  25 mg Oral Daily    [Held by provider] rivaroxaban  20 mg Oral Daily with breakfast    vitamin B-12  1,000 mcg Oral Daily    atorvastatin  40 mg Oral Nightly      sodium chloride       ipratropium-albuterol, sodium chloride flush, sodium chloride, albuterol sulfate HFA, polyethylene glycol, acetaminophen **OR** acetaminophen, meclizine    Lab Data:  CBC:   Recent Labs     06/14/21  0631 06/15/21  0508 06/15/21  2247 06/16/21  0504 06/16/21  1110   WBC 10.0 10.2  --  7.7  --    HGB 11.2* 11.2* 11.1* 11.5* 11.6*   HCT 35.9* 36.7* 38.1* 37.4* 39.8*   MCV 94.0 99.5  --  99.7  --     236  --  223  --      BMP:   Recent Labs     06/14/21  0631 06/15/21  0508 06/16/21  0504    138 140   K 4.7 5.2* 5.2*    108 110   CO2 19* 19* 21   PHOS 2.7 3.3 2.4*   BUN 15 16 13   CREATININE 0.8* 1.1 0.9     PT/INR: No results for input(s): PROTIME, INR in the last 72 hours. BNP:    No results for input(s): PROBNP in the last 72 hours. TROPONIN:   No results for input(s): TROPONINT in the last 72 hours. Impression:  Principal Problem:    Atrial fibrillation with rapid ventricular response ([facility])  Active Problems:    Myasthenia gravis ([facility])    Hyperkalemia    Type 2 MI (myocardial infarction) ([facility])    Gastritis and duodenitis    Duodenal ulcer    Coronary artery disease involving native coronary artery of native heart without angina pectoris    COPD (chronic obstructive pulmonary disease) ([facility])  Resolved Problems:    * No resolved hospital problems.  *       All labs, medications and tests reviewed by myself, continue all other medications of all above medical condition listed as is except for changes mentioned above. Thank you   Please call with questions. Electronically signed by Aye Downs. Keke Comer, APRN - CNP on 6/16/2021 at 12:05 PM         [address] [facility]    I have seen, spoken to and examined this patient personally, independently of the nurse practitioner. I have reviewed the hospital care given to date and reviewed all pertinent labs and imaging. The plan was developed mutually at the time of the visit with the patient,  NP   and myself. I have spoken with patient, nursing staff and provided written and verbal instructions . The above note has been reviewed and I agree with the assessment, diagnosis, and treatment plan with changes made by me as follows       HPI:  I have reviewed the above HPI  And agree with above   Please review addendum/changes made to note above     Interval history:            Physical Exam:  General:  Awake, alert, NAD  Head:normal  Eye:normal  Neck:  No JVD   Chest:  Clear to auscultation, respiration easy  Cardiovascular:  s1s2  Abdomen:   nontender  Extremities:  no edema  Pulses; palpable  Neuro: grossly normal      MEDICAL DECISION MAKING;    I agree with the above plan, which was planned by myself and discussed with NP. Stress test does not show any ischemia, borderline low EF. Repeat limited echocardiogram shows preserved EF.   Patient remains in sinus rhythm  Patient can be discharged home in stable condition, follow-up with cardiology as outpatient      Dr. Cordell Boogie MD

## 2021-06-16 NOTE — PROGRESS NOTES
**See Addendum**
History of Present Illness
 
General
Chief Complaint: ETOH/Drug Related Complaint
Stated Complaint: OPIATE WITHDRAWLS
Source: patient, old records
Exam Limitations: no limitations
 
Vital Signs & Intake/Output
Vital Signs & Intake/Output
 Vital Signs
 
 
Date Time Temp Pulse Resp B/P B/P Pulse O2 O2 Flow FiO2
 
     Mean Ox Delivery Rate 
 
04/29 1728    148/92     
 
04/29 1556 98.2 86 16   100   
 
 
 
Allergies
Coded Allergies:
NO KNOWN ALLERGIES (06/13/11)
 
Reconcile Medications
Ondansetron (Zofran Odt) 4 MG TAB.RAPDIS   1 TAB SL Q8HR PRN NAUSEA
 
Triage Note:
PT STATES HE IS HERE HAVING WITHDRAWS FROM PAIN
 KILLERS.  PT STATES HE TAKES 10 TRAMADOL TABS
 EVERY 2 HOURS FOR LAST 1-2 YEARS.. PT STATES THAT
 HE BOUGHT THEM ONLINE.. PT STATES HE STOLE FRIENDS
 BENZO'S AND STARTED TO "WACK OUT"  PT JITTERY IN
 TRIAGE.  PT STATES LAST TIME HE TOOK ANY PILLS WAS
 2 DAYS AGO.  PT STATES I FEEL LIKE I HAVE THE FLU
 REALLY BAD.
 PT STATES HE HAS THOUGHTS OF WANTING TO HARM
 HIMSELF, PT STATES HE WAS LOOKING UP HOW TO KILL
 HIMSELF ONLINE.. PT STATES HE WAS GOING TO FILL
 WATER BOTTLE WITH GRAIN ALCOHOL AND INJECT IT UP
 HIS ANUS, HE READ THAT IT WILL MAKE YOU DIE ON THE
 SPOT.  PT DENIES HI..
Triage Nurses Notes Reviewed? yes
Onset: several days
Duration: day(s):
Timing: recent history
Severity: moderate
Associated Symptoms: impaired concentration, insomnia, suicidal ideation
HPI:
Several days prior to admission patient decided he wanted to stop abusing 
tramadol.
1 day prior to admission he was seen at [facility] and discharged with 
outpatient referrals.
He now considers suicide by gregoria injection of alcohol into his anus.
He feels these withdrawing from tramadol with body aches nausea chills.
He denies fever vomiting diarrhea chest pain cough shortness breath headache 
dysuria rash bleeding.
(Ely CALHOUN,Nba)
 
Past History
 
Travel History
Traveled to [place] past 21 day No
 
Medical History
Any Pertinent Medical History? see below for history
Neurological: NONE
EENT: NONE
Cardiovascular: NONE
Respiratory: NONE
Gastrointestinal: NONE
Hepatic: NONE
Renal: KIDNEY STONES
Musculoskeletal: NONE
Psychiatric: NONE
Endocrine: NONE
Blood Disorders: NONE
Cancer(s): NONE
GYN/Reproductive: NONE
Tetanus Vaccine: 11/01/03
 
Surgical History
Surgical History: non-contributory
 
Psychosocial History
Who do you live with Family
What is your primary language English
Tobacco Use: Current Daily Use
Daily Tobacco Use Amount/Type: => 5 Cigarettes daily
 
Family History
Hx Contributory? No
(Nba Graves MD)
 
Review of Systems
 
Review of Systems
Constitutional:
Reports: see HPI, chills. 
EENTM:
Reports: no symptoms. 
Respiratory:
Reports: no symptoms. 
Cardiovascular:
Reports: no symptoms. 
GI:
Reports: no symptoms. 
Genitourinary:
Reports: no symptoms. 
Musculoskeletal:
Reports: see HPI, muscle pain. 
Skin:
Reports: no symptoms. 
Neurological/Psychological:
Reports: no symptoms. 
Hematologic/Endocrine:
Reports: no symptoms. 
Immunologic/Allergic:
Reports: no symptoms. 
All Other Systems: Reviewed and Negative
(Nba Graves MD)
 
Physical Exam
 
Physical Exam
General Appearance: well developed/nourished, alert, awake, anxious, mild 
distress
Head: atraumatic, normal appearance
Eyes:
Bilateral: PERRL, EOMI. 
Ears, Nose, Throat: normal pharynx, normal ENT inspection, hearing grossly 
normal
Neck: normal inspection, supple
Respiratory: normal breath sounds
Cardiovascular: regular rate/rhythm
Gastrointestinal: soft, non-tender
Extremities: normal range of motion
Neurological/Psychiatric: no motor/sensory deficits, awake, alert, anxious, CNs 
II-XII nml as tested
Appearance/Memory/Insight: disheveled, impaired insight
Behavoir/Eye Contact/Speech: cooperative, normal speech
Thoughts/Hallucinations: no apparent hallucination
Skin: intact, normal color, warm/dry
SAD PERSONS
 
 
SAD PERSONS Response Value
 
Male Sex? yes 1
 
Age <19 or >45 years? yes 1
 
Depression/Hopelessness? yes 2
 
Previous Attempts/Psych Care yes 1
 
Excessive Ethanol/Drug Use? yes 1
 
Rational Thinking Loss? yes 2
 
Single//? yes 1
 
Social Support? has no support 1
 
Stated Future Intent? yes 2
 
Total   12
 
 
SAD PERSONS Done? yes
(Nba Graves MD)
 
Progress
Differential Diagnosis: drug intoxication, drug overdose, drug withdrawal, 
electrolyte abnormality, hypoglycemia
Plan of Care:
 Orders
 
 
Procedure Date/time Status
 
Regular Diet 04/30 B Active
 
ED CRISIS PSYCH CONSULT 04/29 1625 Active
 
Continuous Observation Monitor 04/29 1614 Active
 
URINE DRUG SCREEN FOR ER ONLY 04/29 1614 Complete
 
ETHANOL 04/29 1614 Active
 
COMPREHENSIVE METABOLIC PANEL 04/29 1614 Active
 
CBC WITHOUT DIFFERENTIAL 04/29 1614 Complete
 
 
 Current Medications
 
 
  Sig/Husam Start time  Last
 
Medication Dose  Stop Time Status Admin
 
Acetaminophen 650 MG Q4P PRN 04/29 2145 UNVr 
 
(Tylenol)     
 
Ibuprofen 600 MG Q6P PRN 04/29 1630 AC 04/29
 
(Motrin)     1728
 
Clonidine 0.1 MG TID 04/29 1624 UNVr 04/29
 
(Catapres)     1728
 
Gabapentin 300 MG Q8 04/29 1624 UNVr 04/29
 
(Neurontin)     1728
 
 
 Laboratory Tests
 
 
 
04/29/18 2107:
Sodium Pending, Potassium Pending, Chloride Pending, Carbon Dioxide Pending, 
Anion Gap Pending, BUN Pending, Creatinine Pending, BUN/Creatinine Ratio Pending
, Glucose Pending, Calcium Pending, Total Bilirubin Pending, AST Pending, ALT 
Pending, Alkaline Phosphatase Pending, Total Protein Pending, Albumin Pending, 
Globulin Pending, Albumin/Globulin Ratio Pending, CBC w Diff NO MAN DIFF REQ, 
RBC 4.80, MCV 95.0  H, MCH 31.8  H, MCHC 33.4, RDW 14.2, MPV 8.1, Gran % 67.7, 
Lymphocytes % 19.9  L, Monocytes % 11.3  H, Eosinophils % 0.7, Basophils % 0.4, 
Absolute Granulocytes 7.3  H, Absolute Lymphocytes 2.1, Absolute Monocytes 1.2  
H, Absolute Eosinophils 0.1, Absolute Basophils 0, Serum Alcohol Pending
 
04/29/18 1653:
Urine Opiates Screen < 100, Methadone Screen < 40, Barbiturate Screen < 60, Ur 
Phencyclidine Scrn < 6.00, Amphetamines Screen 105, U Benzodiazepines Scrn 514  
H, Urine Cocaine Screen < 50, Urine Cannabis Screen < 5.00
 
Hand-Off
   Endorsed To:
Philippe Walter MD
   Endorsed Time: 1920
   Pending: consult, labs
(Nba Graves MD)
Hand-Off
   Endorsed To:
Bradley Ellison MD
   Endorsed Time: 0700
   Pending: consult
(Philippe Walter MD)
 
Departure
 
Departure
Disposition: STILL A PATIENT
Condition: Stable
Clinical Impression
Primary Impression: Suicidal ideation
Secondary Impressions: Drug abuse and dependence
Referrals:
George Simmons MD (PCP/Family)
 
Departure Forms:
Customer Survey
General Discharge Information
(Nba Graves MD) Pulmonary and Critical Care  Progress Note      VITALS:  BP (!) 143/87   Pulse 61   Temp 97 °F (36.1 °C) (Oral)   Resp 17   Ht 5' 11\" (1.803 m)   Wt 291 lb 3.2 oz (132.1 kg)   SpO2 96%   BMI 40.61 kg/m²     Subjective:   CHIEF COMPLAINT :SOB     HPI:                The patient is lying in the bed. He is in mild reps distress    Objective:   PHYSICAL EXAM:    LUNGS:Decreased air entry bilateral bases  Abd-distended, BS+,NT  Ext- 1 + pedal edema  CVS-s1s2, no murmurs      DATA:    CBC:  Recent Labs     06/14/21  0631 06/15/21  0508 06/15/21  2247 06/16/21  0504 06/16/21  1110   WBC 10.0 10.2  --  7.7  --    RBC 3.82* 3.69*  --  3.75*  --    HGB 11.2* 11.2* 11.1* 11.5* 11.6*   HCT 35.9* 36.7* 38.1* 37.4* 39.8*    236  --  223  --    MCV 94.0 99.5  --  99.7  --    MCH 29.3 30.4  --  30.7  --    MCHC 31.2* 30.5*  --  30.7*  --    RDW 14.4 14.7  --  14.7  --    SEGSPCT 73.9* 78.4*  --  91.6*  --       BMP:  Recent Labs     06/14/21  0631 06/15/21  0508 06/16/21  0504    138 140   K 4.7 5.2* 5.2*    108 110   CO2 19* 19* 21   BUN 15 16 13   CREATININE 0.8* 1.1 0.9   CALCIUM 8.6 8.5 8.3   GLUCOSE 139* 132* 186*      ABG:  No results for input(s): PH, PO2ART, KMR5SSR, HCO3, BEART, O2SAT in the last 72 hours. BNP  No results found for: BNP   D-Dimer:  No results found for: DDIMER   1. Radiology: None      Assessment/Plan     Patient Active Problem List    Diagnosis Date Noted    Hyperkalemia 06/15/2021    Type 2 MI (myocardial infarction) ([facility] 06/15/2021    Gastritis and duodenitis 06/15/2021    Duodenal ulcer 06/15/2021    Coronary artery disease involving native coronary artery of native heart without angina pectoris 06/15/2021    COPD (chronic obstructive pulmonary disease) ([address] 06/15/2021    Atrial fibrillation with rapid ventricular response ([address] 06/11/2021    Atrial fibrillation, chronic ([address]     Post PTCA 12/14/2020     Overview Note:     Ramus Stented.       SOB (shortness of breath) 12/12/2020    NSTEMI (non-ST elevated myocardial infarction) ([facility] 12/2020    Viral gastroenteritis 02/02/2020    Cellulitis of left hand 09/02/2018    Unspecified ptosis of bilateral eyelids 07/19/2016    Myasthenia gravis ([facility] 07/16/2016   Morbid Obesity  LENIN  Moderate to Severe MR  Increased LFT's- improving  Afib  Non AG Metabolic Acidosis  Ex smoker       1. CMP in am  2. Inhalers  3. BIPAP  4. ICS  5. OOB  6. Keep sats > 92%  7. C.w present management  No follow-ups on file.     Electronically signed by Bennie Corral MD on 6/16/2021 at 12:29 PM

## 2021-06-16 NOTE — FLOWSHEET NOTE
Attempted visit. Patient was not in the room at the time of visit. Pastoral care will continue to follow up as needed.

## 2021-06-16 NOTE — DISCHARGE SUMMARY
HOSPITALIST DISCHARGE SUMMARY     Patient ID: Ann Marie Ho 1960                 [id number]      PCP:  No primary care provider on file. Admit date: 6/11/2021   Discharge date: 6/16/2021      Admitting Physician: Aaron Mart MD  Attending Physician(s): Trinidad Naik MD, MD;    Discharge Physician: Trinidad Naik MD, MD.  Consultant(s): Delfina Suarez MD; Kym German MD; Meghna Hickey MD.    Admitting Diagnoses: Atrial fibrillation with RVR  Discharge Diagnoses: Principal Problem:    Atrial fibrillation with RVR ([facility]  Active Problems:    Myasthenia gravis ([facility]    Hyperkalemia    Type 2 MI (myocardial infarction) ([facility]    Gastritis and duodenitis    Duodenal ulcer    Coronary artery disease involving native coronary artery of native heart without angina pectoris    COPD (chronic obstructive pulmonary disease) ([facility]  Resolved Problems:    * No resolved hospital problems. *    Discharged Condition: good  Disposition: home    Procedures: EGD by GI consult  Complications: None    Brief History: This is a pleasant 57 y.o. male with history of CAD status post PCI (on Plavix, beta-blocker), atrial fibrillation (on Xarelto, Toprol-XL, digoxin), history of thrombosis (on Xarelto), myasthenia gravis (on Mestinon), ulcerative colitis (on prednisone), TIA, who presents to cardiology office for outpatient stress test (for evaluation of shortness of breath), noted to have atrial fibrillation with rate in the 140s prior to stress test.  He completed stress test, remained in persistent atrial fibrillation he was sent to the emergency room for further evaluation where he was noted to have EKG with heart rate in the 160s. He does not have any chest pain. He continues to have exertional dyspnea, has difficulty ambulating more than a few steps before getting winded. He does have orthopnea but also states he gets short of breath even with sitting up.   He received IV Cardizem bolus and infusion stated in the emergency room. Patient has been admitted for further evaluation.     Hospital Course: Admitted as above, Xarelto kept on hold due to melena, patient maintained on heparin and Cardizem drip. Patient was seen by cardiology consult with further recommendation with reference to adjustment of his rate controlling agents. GI consult was called in and patient had EGD which showed gastritis and a small duodenal ulcer which was not bleeding. However biopsy could not be obtained because patient had become apneic and therefore was scheduled for stool H. pylori. Patient had been short winded, examination had shown scattered wheezes and patient did have a long history of tobacco abuse. Pulmonology consult was called and patient was seen with recommendations. He has been cleared for discharge by all consultants, however he was found to have worsening hyperkalemia and a repeat potassium prior to discharge was 5.7. Discharge was therefore kept on hold and patient was given a dose of each of the following: Regular insulin 10 units IV, D50 25 g, calcium gluconate 2 g and Lokelma 10 g as well as lactulose 30 g. Repeat potassium is 4.4. Patient is therefore being discharged home in a stable state and will be following up with GI for further recommendations, cardiology for their input and as well pulmonology who will schedule the patient for sleep study to rule out LENIN, and as well PFTs to rule in COPD. Patient has been discharged in a stable state.   Prognosis: Good    Physical Examination:    General appearance - oriented to person, place, and time, in mild to moderate distress and ill-appearing  HEENT: Normocephalic, Atraumatic, Conjuctiva pink, PERRL, Oral mucosa normal, Lips, teeth and gums normal, Trachea midline, Thyroid normal and No noted lymphadenopathy  Chest - wheezing noted in all lung fields with no crackles  Cardiovascular - normal rate and regular rhythm, S1 and S2 normal  Abdomen - soft, nontender, nondistended, no masses or organomegaly   Neurological - Alert and oriented, Normal speech, No focal findings or movement disorder noted and Motor and sensory grossly normal bilaterally  Integumentary - Skin color, texture, turgor normal. No Rashes or lesions  Musculoskeletal -Full ROM times 4 extremities, No clubbing or cyanosis and No peripheral edema    Significant Diagnostic Studies: None  Pending Investigation/labs: None    Recent Labs:  CBC with Differential:    Lab Results   Component Value Date    WBC 7.7 06/16/2021    RBC 3.75 06/16/2021    HGB 11.0 06/16/2021    HCT 37.4 06/16/2021     06/16/2021    MCV 99.7 06/16/2021    MCH 30.7 06/16/2021    MCHC 30.7 06/16/2021    RDW 14.7 06/16/2021    SEGSPCT 91.6 06/16/2021    LYMPHOPCT 5.3 06/16/2021    MONOPCT 2.7 06/16/2021    BASOPCT 0.0 06/16/2021    MONOSABS 0.2 06/16/2021    LYMPHSABS 0.4 06/16/2021    EOSABS 0.0 06/16/2021    BASOSABS 0.0 06/16/2021    DIFFTYPE AUTOMATED DIFFERENTIAL 06/16/2021     CMP:    Lab Results   Component Value Date     06/16/2021    K 4.4 06/16/2021     06/16/2021    CO2 21 06/16/2021    BUN 13 06/16/2021    CREATININE 0.9 06/16/2021    GFRAA >60 06/16/2021    LABGLOM >60 06/16/2021    GLUCOSE 186 06/16/2021    PROT 5.5 06/16/2021    LABALBU 3.3 06/16/2021    CALCIUM 8.3 06/16/2021    BILITOT 0.6 06/16/2021    ALKPHOS 45 06/16/2021     06/16/2021     06/16/2021     Magnesium:    Lab Results   Component Value Date    MG 2.2 06/16/2021     Phosphorus:    Lab Results   Component Value Date    PHOS 2.4 06/16/2021       Patient Instructions  Medications:   Katarzyna Hanley   Home Medication Instructions Z:[id number]    Printed on:06/16/21 2006   Medication Information                      acetaminophen (TYLENOL) 325 MG tablet  Take 650 mg by mouth every 4 hours as needed for Pain or Fever             albuterol sulfate HFA (PROVENTIL HFA) 108 (90 Base) MCG/ACT inhaler  Inhale 2 puffs into the lungs every 4 hours as needed for Wheezing or Shortness of Breath With spacer (and mask if indicated). Thanks. atorvastatin (LIPITOR) 40 MG tablet  Take 1 tablet by mouth nightly START 6/25/2021             budesonide-formoterol (SYMBICORT) 160-4.5 MCG/ACT AERO  Inhale 2 puffs into the lungs 2 times daily             clopidogrel (PLAVIX) 75 MG tablet  Take 1 tablet by mouth daily             digoxin (LANOXIN) 125 MCG tablet  Take 1 tablet by mouth daily             Elastic Bandages & Supports (MEDICAL COMPRESSION THIGH HIGH) MISC  Wear daily and remove nightly 20 - 30 mmgh             metoprolol succinate (TOPROL XL) 25 MG extended release tablet  Take 1 tablet by mouth daily             NONFORMULARY  Inject 40 mg/L into the skin every evening Zilucoplan  - patient provided experimental drug from [facility] for Myasthenia Gravis             pantoprazole (PROTONIX) 40 MG tablet  Take 1 tablet by mouth 2 times daily             PREDNISONE PO  Take 13 mg by mouth daily Indications: Takes at  8am              pyridostigmine (MESTINON) 60 MG tablet  Take 1 tablet by mouth 4 times daily             rivaroxaban (XARELTO) 20 MG TABS tablet  Take 1 tablet by mouth daily (with breakfast)             tiotropium (SPIRIVA HANDIHALER) 18 MCG inhalation capsule  Inhale 1 capsule into the lungs daily             vitamin B-12 (CYANOCOBALAMIN) 500 MCG tablet  Take 1,000 mcg by mouth daily Indications: 8am                   Activity: activity as tolerated  Diet: cardiac diet  Wound Care: none needed  Follow-up with: Cardiology consult, pulmonology consult, GI consult.   Services: None      Electronically Signed by: Vero Sharp MD, MD.    Time spent on discharge/dictation: 50 minutes

## 2021-06-16 NOTE — RT PROTOCOL NOTE
RT Inhaler-Nebulizer Bronchodilator Protocol Note    There is a bronchodilator order in the chart from a provider indicating to follow the RT Bronchodilator Protocol and there is an Initiate RT Bronchodilator Protocol order as well (see protocol at bottom of note). The findings from the last RT Protocol Assessment were as follows:  Smoking: >15 Pack years  Surgical Status: No surgery  Xray: Clear  Respiratory Pattern: RR 12-20  Mental Status: Alert and Oriented  Breath Sounds: Diminished and/or crackles  Cough: Strong, spontaneous, non-productive  Activity Level: Walking unassisted  Oxygen Requirement: Room Air - 2LNC/28% or home setting  Indication for Bronchodilator Therapy:    Bronchodilator Assessment Score: 3    Aerosolized bronchodilator medication orders have been revised according to the RT Bronchodilator Protocol. RT Inhaler-Nebulizer Bronchodilator Protocol:    Respiratory Therapist to perform RT Therapy Protocol Assessment then follow the protocol. No Indications - adjust the frequency to every 6 hours PRN wheezing or bronchospasm, if no treatments needed after 48 hours then discontinue using Per Protocol order mode. If indication present, adjust the RT bronchodilator orders based on the Bronchodilator Assessment Score as follows:    0-6 - enter or revise RT bronchodilator order to Albuterol Inhaler order with frequency of every 2 hours PRN for wheezing or increased work of breathing using Per Protocol order mode. If Albuterol Inhaler not tolerated or not effective, then discontinue the Albuterol Inhaler order and enter Albuterol Nebulizer order with same frequency and PRN reasons. Repeat RT Therapy Protocol Assessment as needed.     7-10 - discontinue any other Inpatient aerosolized bronchodilator medication orders and enter or revise two Albuterol Inhaler orders, one with BID frequency and one with frequency of every 2 hours PRN wheezing or increased work of breathing using Per Protocol no arthritis

## 2021-06-17 LAB
EKG ATRIAL RATE: 60 BPM
EKG DIAGNOSIS: NORMAL
EKG Q-T INTERVAL: 466 MS
EKG QRS DURATION: 128 MS
EKG QTC CALCULATION (BAZETT): 424 MS
EKG R AXIS: 11 DEGREES
EKG T AXIS: -71 DEGREES
EKG VENTRICULAR RATE: 50 BPM
HEPATITIS B SURFACE ANTIGEN CONFIRMATION: NORMAL

## 2021-06-17 PROCEDURE — 93010 ELECTROCARDIOGRAM REPORT: CPT | Performed by: INTERNAL MEDICINE

## 2021-06-18 LAB — H PYLORI ANTIGEN STOOL: NEGATIVE

## 2021-07-02 ENCOUNTER — OUTSIDE SERVICES (OUTPATIENT)
Dept: CARDIOLOGY CLINIC | Age: 61
End: 2021-07-02

## 2021-07-02 NOTE — PROGRESS NOTES
Received call from Dr. Brianna Jerome @ [facility] neurology research. Patient in her office with -150. Advised patient needs to be admitted back to hospital.   She discussed with patient and patient is going to come to [facility] for evaluation.   ED nurse made aware patient will be coming in

## 2021-07-03 ENCOUNTER — HOSPITAL ENCOUNTER (INPATIENT)
Age: 61
LOS: 6 days | Discharge: HOME | DRG: 171 | End: 2021-07-09
Attending: EMERGENCY MEDICINE | Admitting: INTERNAL MEDICINE
Payer: MEDICAID

## 2021-07-03 ENCOUNTER — APPOINTMENT (OUTPATIENT)
Dept: GENERAL RADIOLOGY | Age: 61
DRG: 171 | End: 2021-07-03
Payer: MEDICAID

## 2021-07-03 DIAGNOSIS — R53.1 GENERALIZED WEAKNESS: ICD-10-CM

## 2021-07-03 DIAGNOSIS — D64.9 ANEMIA, UNSPECIFIED TYPE: Primary | ICD-10-CM

## 2021-07-03 DIAGNOSIS — K92.2 GASTROINTESTINAL HEMORRHAGE, UNSPECIFIED GASTROINTESTINAL HEMORRHAGE TYPE: ICD-10-CM

## 2021-07-03 LAB
ANION GAP SERPL CALCULATED.3IONS-SCNC: 11 MMOL/L (ref 4–16)
BASOPHILS ABSOLUTE: 0 K/CU MM
BASOPHILS RELATIVE PERCENT: 0.3 % (ref 0–1)
BUN BLDV-MCNC: 19 MG/DL (ref 6–23)
CALCIUM SERPL-MCNC: 8.5 MG/DL (ref 8.3–10.6)
CHLORIDE BLD-SCNC: 106 MMOL/L (ref 99–110)
CO2: 21 MMOL/L (ref 21–32)
CREAT SERPL-MCNC: 1.1 MG/DL (ref 0.9–1.3)
DIFFERENTIAL TYPE: ABNORMAL
DIGOXIN LEVEL: 0.8 NG/ML (ref 0.8–2)
DOSE AMOUNT: NORMAL
DOSE TIME: NORMAL
EKG ATRIAL RATE: 136 BPM
EKG DIAGNOSIS: NORMAL
EKG P AXIS: 47 DEGREES
EKG P-R INTERVAL: 208 MS
EKG Q-T INTERVAL: 294 MS
EKG QRS DURATION: 114 MS
EKG QTC CALCULATION (BAZETT): 442 MS
EKG R AXIS: -10 DEGREES
EKG T AXIS: -54 DEGREES
EKG VENTRICULAR RATE: 136 BPM
EOSINOPHILS ABSOLUTE: 0.1 K/CU MM
EOSINOPHILS RELATIVE PERCENT: 2 % (ref 0–3)
GFR AFRICAN AMERICAN: >60 ML/MIN/1.73M2
GFR NON-AFRICAN AMERICAN: >60 ML/MIN/1.73M2
GLUCOSE BLD-MCNC: 201 MG/DL (ref 70–99)
HCT VFR BLD CALC: 18.5 % (ref 42–52)
HEMOGLOBIN: 5.2 GM/DL (ref 13.5–18)
IMMATURE NEUTROPHIL %: 0.3 % (ref 0–0.43)
INR BLD: 1.64 INDEX
LYMPHOCYTES ABSOLUTE: 0.8 K/CU MM
LYMPHOCYTES RELATIVE PERCENT: 11.6 % (ref 24–44)
MCH RBC QN AUTO: 26.5 PG (ref 27–31)
MCHC RBC AUTO-ENTMCNC: 28.1 % (ref 32–36)
MCV RBC AUTO: 94.4 FL (ref 78–100)
MONOCYTES ABSOLUTE: 0.5 K/CU MM
MONOCYTES RELATIVE PERCENT: 6.8 % (ref 0–4)
NUCLEATED RBC %: 0.4 %
PDW BLD-RTO: 16.6 % (ref 11.7–14.9)
PLATELET # BLD: 266 K/CU MM (ref 140–440)
PMV BLD AUTO: 9.9 FL (ref 7.5–11.1)
POTASSIUM SERPL-SCNC: 4.5 MMOL/L (ref 3.5–5.1)
PRO-BNP: 1879 PG/ML
PROTHROMBIN TIME: 21.3 SECONDS (ref 11.7–14.5)
RBC # BLD: 1.96 M/CU MM (ref 4.6–6.2)
SARS-COV-2, NAAT: NOT DETECTED
SEGMENTED NEUTROPHILS ABSOLUTE COUNT: 5.7 K/CU MM
SEGMENTED NEUTROPHILS RELATIVE PERCENT: 79 % (ref 36–66)
SODIUM BLD-SCNC: 138 MMOL/L (ref 135–145)
SOURCE: NORMAL
TOTAL IMMATURE NEUTOROPHIL: 0.02 K/CU MM
TOTAL NUCLEATED RBC: 0 K/CU MM
TROPONIN T: 0.02 NG/ML
WBC # BLD: 7.2 K/CU MM (ref 4–10.5)

## 2021-07-03 PROCEDURE — 83880 ASSAY OF NATRIURETIC PEPTIDE: CPT

## 2021-07-03 PROCEDURE — 36415 COLL VENOUS BLD VENIPUNCTURE: CPT

## 2021-07-03 PROCEDURE — 2500000003 HC RX 250 WO HCPCS: Performed by: INTERNAL MEDICINE

## 2021-07-03 PROCEDURE — 36430 TRANSFUSION BLD/BLD COMPNT: CPT

## 2021-07-03 PROCEDURE — 99223 1ST HOSP IP/OBS HIGH 75: CPT | Performed by: SPECIALIST

## 2021-07-03 PROCEDURE — 2580000003 HC RX 258: Performed by: INTERNAL MEDICINE

## 2021-07-03 PROCEDURE — 2000000000 HC ICU R&B

## 2021-07-03 PROCEDURE — 86901 BLOOD TYPING SEROLOGIC RH(D): CPT

## 2021-07-03 PROCEDURE — 6360000002 HC RX W HCPCS: Performed by: INTERNAL MEDICINE

## 2021-07-03 PROCEDURE — 99285 EMERGENCY DEPT VISIT HI MDM: CPT

## 2021-07-03 PROCEDURE — 96374 THER/PROPH/DIAG INJ IV PUSH: CPT

## 2021-07-03 PROCEDURE — 87635 SARS-COV-2 COVID-19 AMP PRB: CPT

## 2021-07-03 PROCEDURE — C9132 KCENTRA, PER I.U.: HCPCS | Performed by: EMERGENCY MEDICINE

## 2021-07-03 PROCEDURE — 6370000000 HC RX 637 (ALT 250 FOR IP): Performed by: INTERNAL MEDICINE

## 2021-07-03 PROCEDURE — 86900 BLOOD TYPING SEROLOGIC ABO: CPT

## 2021-07-03 PROCEDURE — 6360000002 HC RX W HCPCS: Performed by: EMERGENCY MEDICINE

## 2021-07-03 PROCEDURE — 94640 AIRWAY INHALATION TREATMENT: CPT

## 2021-07-03 PROCEDURE — 86850 RBC ANTIBODY SCREEN: CPT

## 2021-07-03 PROCEDURE — 93010 ELECTROCARDIOGRAM REPORT: CPT | Performed by: INTERNAL MEDICINE

## 2021-07-03 PROCEDURE — 96361 HYDRATE IV INFUSION ADD-ON: CPT

## 2021-07-03 PROCEDURE — 85610 PROTHROMBIN TIME: CPT

## 2021-07-03 PROCEDURE — 80048 BASIC METABOLIC PNL TOTAL CA: CPT

## 2021-07-03 PROCEDURE — 2580000003 HC RX 258: Performed by: EMERGENCY MEDICINE

## 2021-07-03 PROCEDURE — 84484 ASSAY OF TROPONIN QUANT: CPT

## 2021-07-03 PROCEDURE — 93005 ELECTROCARDIOGRAM TRACING: CPT | Performed by: EMERGENCY MEDICINE

## 2021-07-03 PROCEDURE — 85027 COMPLETE CBC AUTOMATED: CPT

## 2021-07-03 PROCEDURE — 80162 ASSAY OF DIGOXIN TOTAL: CPT

## 2021-07-03 PROCEDURE — P9016 RBC LEUKOCYTES REDUCED: HCPCS

## 2021-07-03 PROCEDURE — 85025 COMPLETE CBC W/AUTO DIFF WBC: CPT

## 2021-07-03 PROCEDURE — C9113 INJ PANTOPRAZOLE SODIUM, VIA: HCPCS | Performed by: EMERGENCY MEDICINE

## 2021-07-03 PROCEDURE — 71045 X-RAY EXAM CHEST 1 VIEW: CPT

## 2021-07-03 PROCEDURE — 86922 COMPATIBILITY TEST ANTIGLOB: CPT

## 2021-07-03 RX ORDER — SODIUM CHLORIDE 0.9 % (FLUSH) 0.9 %
5-40 SYRINGE (ML) INJECTION PRN
Status: DISCONTINUED | OUTPATIENT
Start: 2021-07-03 | End: 2021-07-04 | Stop reason: SDUPTHER

## 2021-07-03 RX ORDER — PANTOPRAZOLE SODIUM 40 MG/10ML
40 INJECTION, POWDER, LYOPHILIZED, FOR SOLUTION INTRAVENOUS EVERY 12 HOURS
Status: DISCONTINUED | OUTPATIENT
Start: 2021-07-04 | End: 2021-07-03

## 2021-07-03 RX ORDER — IPRATROPIUM BROMIDE AND ALBUTEROL SULFATE 2.5; .5 MG/3ML; MG/3ML
1 SOLUTION RESPIRATORY (INHALATION)
Status: DISCONTINUED | OUTPATIENT
Start: 2021-07-03 | End: 2021-07-06

## 2021-07-03 RX ORDER — SODIUM CHLORIDE 9 MG/ML
INJECTION, SOLUTION INTRAVENOUS PRN
Status: DISCONTINUED | OUTPATIENT
Start: 2021-07-03 | End: 2021-07-06

## 2021-07-03 RX ORDER — PANTOPRAZOLE SODIUM 40 MG/10ML
40 INJECTION, POWDER, LYOPHILIZED, FOR SOLUTION INTRAVENOUS ONCE
Status: COMPLETED | OUTPATIENT
Start: 2021-07-03 | End: 2021-07-03

## 2021-07-03 RX ORDER — ACETAMINOPHEN 650 MG/1
650 SUPPOSITORY RECTAL EVERY 6 HOURS PRN
Status: DISCONTINUED | OUTPATIENT
Start: 2021-07-03 | End: 2021-07-09 | Stop reason: HOSPADM

## 2021-07-03 RX ORDER — ONDANSETRON 2 MG/ML
4 INJECTION INTRAMUSCULAR; INTRAVENOUS EVERY 6 HOURS PRN
Status: DISCONTINUED | OUTPATIENT
Start: 2021-07-03 | End: 2021-07-09 | Stop reason: HOSPADM

## 2021-07-03 RX ORDER — SODIUM CHLORIDE 9 MG/ML
25 INJECTION, SOLUTION INTRAVENOUS PRN
Status: DISCONTINUED | OUTPATIENT
Start: 2021-07-03 | End: 2021-07-06

## 2021-07-03 RX ORDER — SODIUM CHLORIDE 9 MG/ML
50 INJECTION, SOLUTION INTRAVENOUS ONCE
Status: COMPLETED | OUTPATIENT
Start: 2021-07-03 | End: 2021-07-03

## 2021-07-03 RX ORDER — SODIUM CHLORIDE 9 MG/ML
10 INJECTION INTRAVENOUS EVERY 12 HOURS
Status: DISCONTINUED | OUTPATIENT
Start: 2021-07-04 | End: 2021-07-03

## 2021-07-03 RX ORDER — SODIUM CHLORIDE 0.9 % (FLUSH) 0.9 %
5-40 SYRINGE (ML) INJECTION EVERY 12 HOURS SCHEDULED
Status: DISCONTINUED | OUTPATIENT
Start: 2021-07-03 | End: 2021-07-04 | Stop reason: SDUPTHER

## 2021-07-03 RX ORDER — SODIUM CHLORIDE 9 MG/ML
INJECTION, SOLUTION INTRAVENOUS CONTINUOUS
Status: DISCONTINUED | OUTPATIENT
Start: 2021-07-03 | End: 2021-07-03

## 2021-07-03 RX ORDER — PANTOPRAZOLE SODIUM 40 MG/10ML
40 INJECTION, POWDER, LYOPHILIZED, FOR SOLUTION INTRAVENOUS DAILY
Status: DISCONTINUED | OUTPATIENT
Start: 2021-07-03 | End: 2021-07-03

## 2021-07-03 RX ORDER — PANTOPRAZOLE SODIUM 40 MG/10ML
40 INJECTION, POWDER, LYOPHILIZED, FOR SOLUTION INTRAVENOUS DAILY
Status: DISCONTINUED | OUTPATIENT
Start: 2021-07-04 | End: 2021-07-08

## 2021-07-03 RX ORDER — 0.9 % SODIUM CHLORIDE 0.9 %
500 INTRAVENOUS SOLUTION INTRAVENOUS ONCE
Status: COMPLETED | OUTPATIENT
Start: 2021-07-03 | End: 2021-07-03

## 2021-07-03 RX ORDER — MORPHINE SULFATE 2 MG/ML
2 INJECTION, SOLUTION INTRAMUSCULAR; INTRAVENOUS EVERY 4 HOURS PRN
Status: DISCONTINUED | OUTPATIENT
Start: 2021-07-03 | End: 2021-07-09 | Stop reason: HOSPADM

## 2021-07-03 RX ORDER — ONDANSETRON 4 MG/1
4 TABLET, ORALLY DISINTEGRATING ORAL EVERY 8 HOURS PRN
Status: DISCONTINUED | OUTPATIENT
Start: 2021-07-03 | End: 2021-07-09 | Stop reason: HOSPADM

## 2021-07-03 RX ORDER — ACETAMINOPHEN 325 MG/1
650 TABLET ORAL EVERY 6 HOURS PRN
Status: DISCONTINUED | OUTPATIENT
Start: 2021-07-03 | End: 2021-07-09 | Stop reason: HOSPADM

## 2021-07-03 RX ORDER — DIGOXIN 0.25 MG/ML
125 INJECTION INTRAMUSCULAR; INTRAVENOUS DAILY
Status: DISCONTINUED | OUTPATIENT
Start: 2021-07-03 | End: 2021-07-05

## 2021-07-03 RX ORDER — DILTIAZEM HYDROCHLORIDE 5 MG/ML
5 INJECTION INTRAVENOUS ONCE
Status: DISCONTINUED | OUTPATIENT
Start: 2021-07-03 | End: 2021-07-03

## 2021-07-03 RX ADMIN — SODIUM CHLORIDE, PRESERVATIVE FREE 10 ML: 5 INJECTION INTRAVENOUS at 20:55

## 2021-07-03 RX ADMIN — PROTHROMBIN, COAGULATION FACTOR VII HUMAN, COAGULATION FACTOR IX HUMAN, COAGULATION FACTOR X HUMAN, PROTEIN C, PROTEIN S HUMAN, AND WATER 5000 UNITS: KIT at 20:52

## 2021-07-03 RX ADMIN — METOPROLOL TARTRATE 5 MG: 1 INJECTION, SOLUTION INTRAVENOUS at 18:54

## 2021-07-03 RX ADMIN — SODIUM CHLORIDE 50 ML: 9 INJECTION, SOLUTION INTRAVENOUS at 20:59

## 2021-07-03 RX ADMIN — PANTOPRAZOLE SODIUM 40 MG: 40 INJECTION, POWDER, FOR SOLUTION INTRAVENOUS at 15:26

## 2021-07-03 RX ADMIN — DIGOXIN 125 MCG: 0.25 INJECTION INTRAMUSCULAR; INTRAVENOUS at 18:47

## 2021-07-03 RX ADMIN — ACETAMINOPHEN 650 MG: 325 TABLET ORAL at 20:03

## 2021-07-03 RX ADMIN — IPRATROPIUM BROMIDE AND ALBUTEROL SULFATE 1 AMPULE: .5; 3 SOLUTION RESPIRATORY (INHALATION) at 20:13

## 2021-07-03 RX ADMIN — SODIUM CHLORIDE 500 ML: 9 INJECTION, SOLUTION INTRAVENOUS at 14:08

## 2021-07-03 ASSESSMENT — PAIN SCALES - GENERAL
PAINLEVEL_OUTOF10: 0
PAINLEVEL_OUTOF10: 3
PAINLEVEL_OUTOF10: 0

## 2021-07-03 NOTE — PROGRESS NOTES
Pt arrived to room at this time. Pt alert and oriented x4 with complaints of fatigue and mild shortness of breath. States he has not had dark stools in at least 5 days. Denies nausea, vomiting. Ileostomy present which pt emptied on arrival. Medium brown soft stool noted. Pt has 1 unit PRBCs infusing, 2 additional ordered. 2 RN skin assessment with Matti Snell RN shows blanchable redness to buttocks and scattered bruising. Pt HR and BP elevated. Notified Dr. Bejarano Smoker. See MAR orders. Oriented pt to room and call light.

## 2021-07-03 NOTE — CARE COORDINATION
Pt identified as potential readmission. Last admission 6/11/2021 - 6/16/2021 for Atrial fibrillation with RVR. Pt here today for Fatigue. Abnormal Labs: Hemoglobin 5.2  Hematocrit: 18.5    Pt is requiring readmission and there were no alternatives to admission to explore at this time.

## 2021-07-03 NOTE — ED PROVIDER NOTES
CHIEF COMPLAINT  Chief Complaint   Patient presents with    Fatigue     told yesterday he is back in a-fib, denies SOB or chest pain        HPI  Norma Brooks is a 57 y.o. male with history of CAD on Plavix, atrial fibrillation on Xarelto, myasthenia gravis on trial medication who presents fatigue over the past week. He was seen at neurologist yesterday and they referred him to the department at that time because they thought he was back in A. fib secondary to his heart rate. He states he was \"just too tired of being stuck by doctors\" to come in at that time. He presents today as he is not feeling any improvement. He denies any chest pain but intermittently feels fatigued, feels like even walking across the room causes him to be short of breath. He had been having dark, black stool ostomy output earlier in the week, states that improved. He denies any evidence of blood in his ostomy bag, yesterday he had blue discoloration after eating a slushy. He denies any fevers, chills, change in other medications. Symptoms are moderate and persistent. He has had generalized weakness that is been present over the past week and he wonders if it is related to recent cardiac evaluation as he was in the hospital last month.       REVIEW OF SYSTEMS  Review of Systems   History obtained from chart review, the patient and wife at bedside  General ROS: positive for  - fatigue, negative for fever chills  Ophthalmic ROS: negative for - double vision  ENT ROS: negative for - headaches  Hematological and Lymphatic ROS: positive for -on Plavix  Endocrine ROS: negative for - unexpected weight changes  Respiratory ROS: positive for - shortness of breath  Cardiovascular ROS: positive for - dyspnea on exertion  Gastrointestinal ROS: positive for -diverting colostomy after surgery at age 14  Genito-Urinary ROS: no dysuria, trouble voiding, or hematuria  Musculoskeletal ROS: negative for - joint pain or joint stiffness  Neurological ROS: no TIA or stroke symptoms      PAST MEDICAL HISTORY  Past Medical History:   Diagnosis Date    Atrial fibrillation, chronic ([facility] 2020    COPD (chronic obstructive pulmonary disease) ([facility] 6/15/2021    Coronary artery disease involving native coronary artery of native heart without angina pectoris 6/15/2021    H/O echocardiogram 2021    EF is estimated at 50%. presence of PVC affected LVEF estimation, Mild TR.    Heart disease     stint    History of blood transfusion     Hx of blood clots     Hx of cardiovascular stress test 2021    Small size severe intensity,perfusion defect in apical wall.  Hx of Doppler ultrasound 2021    Left distal SSV shows occlusive chronic SVT.  Hypertension     Myasthenia gravis ([facility]     NSTEMI (non-ST elevated myocardial infarction) ([facility] 2020    Post PTCA 2020    Ramus Stented.  TIA (transient ischemic attack)     Ulcerative colitis ([facility]        FAMILY HISTORY  Family History   Family history unknown: Yes       SOCIAL HISTORY  Social History     Socioeconomic History    Marital status:      Spouse name: None    Number of children: None    Years of education: None    Highest education level: None   Occupational History    None   Tobacco Use    Smoking status: Former Smoker     Packs/day: 0.25     Types: Cigarettes     Quit date: 2021     Years since quittin.2    Smokeless tobacco: Never Used   Substance and Sexual Activity    Alcohol use:  Yes     Alcohol/week: 1.0 standard drinks     Types: 1 Cans of beer per week     Comment: occ    Drug use: No    Sexual activity: Yes     Partners: Female   Other Topics Concern    None   Social History Narrative    None     Social Determinants of Health     Financial Resource Strain:     Difficulty of Paying Living Expenses:    Food Insecurity:     Worried About Running Out of Food in the Last Year:     Ran Out of Food in the Last Year:    Transportation Needs:     Lack of Transportation (Medical):  Lack of Transportation (Non-Medical):    Physical Activity:     Days of Exercise per Week:     Minutes of Exercise per Session:    Stress:     Feeling of Stress :    Social Connections:     Frequency of Communication with Friends and Family:     Frequency of Social Gatherings with Friends and Family:     Attends [employer] Services:     Active Member of Clubs or Organizations:     Attends Club or Organization Meetings:     Marital Status:    Intimate Partner Violence:     Fear of Current or Ex-Partner:     Emotionally Abused:     Physically Abused:     Sexually Abused:        SURGICAL HISTORY  Past Surgical History:   Procedure Laterality Date    [place] PTCA  12/14/2020    Ramus Stented.     TONSILLECTOMY      UPPER GASTROINTESTINAL ENDOSCOPY N/A 6/15/2021    EGD DIAGNOSTIC ONLY performed by Tiffanie Ventura MD at [address] [place] [address]  Current Facility-Administered Medications on File Prior to Encounter   Medication Dose Route Frequency Provider Last Rate Last Admin    Immune Globulin (Human) solution 20 g  20 g Intravenous Once Victoria Colón MD        And    Immune Globulin (Human) solution 20 g  20 g Intravenous Once Victoria Colón MD        Immune Globulin (Human) solution 20 g  20 g Intravenous Once Victoria Colón MD        And    Immune Globulin (Human) solution 20 g  20 g Intravenous Once Victoria Colón MD         Current Outpatient Medications on File Prior to Encounter   Medication Sig Dispense Refill    budesonide-formoterol (SYMBICORT) 160-4.5 MCG/ACT AERO Inhale 2 puffs into the lungs 2 times daily 1 Inhaler 3    atorvastatin (LIPITOR) 40 MG tablet Take 1 tablet by mouth nightly START 6/25/2021 30 tablet 3    tiotropium (SPIRIVA HANDIHALER) 18 MCG inhalation capsule Inhale 1 capsule into the lungs daily 90 capsule 1    pantoprazole (PROTONIX) 40 MG tablet Take 1 tablet by mouth 2 times daily 60 tablet 1    Elastic Bandages & Supports (MEDICAL COMPRESSION THIGH HIGH) MISC Wear daily and remove nightly 20 - 30 mmgh 2 each 0    clopidogrel (PLAVIX) 75 MG tablet Take 1 tablet by mouth daily 90 tablet 3    metoprolol succinate (TOPROL XL) 25 MG extended release tablet Take 1 tablet by mouth daily 90 tablet 3    rivaroxaban (XARELTO) 20 MG TABS tablet Take 1 tablet by mouth daily (with breakfast) 90 tablet 3    digoxin (LANOXIN) 125 MCG tablet Take 1 tablet by mouth daily 90 tablet 3    NONFORMULARY Inject 40 mg/L into the skin every evening Zilucoplan  - patient provided experimental drug from [facility] for Myasthenia Gravis      vitamin B-12 (CYANOCOBALAMIN) 500 MCG tablet Take 1,000 mcg by mouth daily Indications: 8am       PREDNISONE PO Take 13 mg by mouth daily Indications: Takes at  8am       pyridostigmine (MESTINON) 60 MG tablet Take 1 tablet by mouth 4 times daily (Patient taking differently: Take 60 mg by mouth 5 times daily Indications: Takes at 0800, 1200, 1600, 2000 ) 120 tablet 0    acetaminophen (TYLENOL) 325 MG tablet Take 650 mg by mouth every 4 hours as needed for Pain or Fever      GLUCOSAMINE-CALCIUM-VIT D PO Take by mouth daily      albuterol sulfate HFA (PROVENTIL HFA) 108 (90 Base) MCG/ACT inhaler Inhale 2 puffs into the lungs every 4 hours as needed for Wheezing or Shortness of Breath With spacer (and mask if indicated). Thanks. 1 Inhaler 1         ALLERGIES  No Known Allergies    PHYSICAL EXAM  VITAL SIGNS: /71   Pulse 134   Temp 97.7 °F (36.5 °C) (Oral)   Resp 18   Ht 5' 11\" (1.803 m)   Wt 278 lb (126.1 kg)   SpO2 98%   BMI 38.77 kg/m²   Constitutional: Resting in bed, family at bedside  HENT: Normocephalic, Atraumatic, Bilateral external ears normal, Oropharynx moist, No oral exudates, Nose normal.   Eyes: PERRL, EOMI, Conjunctiva normal, No discharge. Neck: Normal range of motion, Supple, No stridor.    Cardiovascular: Tachycardic heart rate, slightly irregular rhythm, No murmurs, No rubs, No gallops. Thorax & Lungs: Normal breath sounds, No respiratory distress, No wheezing, No chest tenderness. Abdomen: Bowel sounds normal, Soft, No tenderness, no guarding, no rebound, No masses, No pulsatile masses. Diverting ostomy with jana brown stool, no evidence of blood  Skin: Warm, Dry, No erythema, No rash. Extremities: Intact distal pulses, No edema, No tenderness, No cyanosis, No clubbing. Musculoskeletal: Good gross range of motion in all major joints. No major deformities noted. Neurologic: Alert & oriented x 3, Normal gross motor function, Normal gross sensory function, No focal deficits noted. Psychiatric: Affect normal    EKG  EKG Interpretation    Interpreted by emergency department physician from July 3 at 1204    Rhythm: Sinus tachycardia  Rate: tachycardia  Axis: normal  Ectopy: none  Conduction: nonspecific interventricular conduction block  ST Segments: nonspecific changes  T Waves: non specific changes  Q Waves: none    Clinical Impression: Sinus tachycardia with a rate of 136 and diffuse ST changes, likely rate related, inversions and depressions diffusely    Raymundo Swift MD      RADIOLOGY/PROCEDURES/LABS  Last Imaging results   XR CHEST PORTABLE   Final Result   No acute abnormality.              Imaging reviewed by myself    Labs Reviewed   CBC WITH AUTO DIFFERENTIAL - Abnormal; Notable for the following components:       Result Value    RBC 1.96 (*)     Hemoglobin 5.2 (*)     Hematocrit 18.5 (*)     MCH 26.5 (*)     MCHC 28.1 (*)     RDW 16.6 (*)     Segs Relative 79.0 (*)     Lymphocytes % 11.6 (*)     Monocytes % 6.8 (*)     All other components within normal limits   BASIC METABOLIC PANEL W/ REFLEX TO MG FOR LOW K - Abnormal; Notable for the following components:    Glucose 201 (*)     All other components within normal limits   TROPONIN - Abnormal; Notable for the following components:    Troponin T laboratory studies was performed to address this. Total critical care time is 32 minutes. This includes vital sign monitoring, pulse oximetry monitoring, telemetry monitoring, clinical response to the IV medications, reviewing the nursing notes, consultation time, dictation/documentation time, and interpretation of the lab work. This time excludes time spent performing procedures and separately billable procedures and family discussion time. FINAL IMPRESSION  Problem List Items Addressed This Visit     None      Visit Diagnoses     Anemia, unspecified type    -  Primary    Gastrointestinal hemorrhage, unspecified gastrointestinal hemorrhage type        Generalized weakness          1.    2.   3.    Patient gave me permission to discuss medical history, care, and plan with those present in the room.   Electronically signed by: Tashi Cavanaugh MD, 7/4/2021  MD Tashi Dickerson MD  07/04/21 MD Chet[place]  07/04/21 1015

## 2021-07-03 NOTE — H&P
Hospitalist H&P Note:   Date of Service: 7/3/2021   ? CHIEF COMPLAINT:   Shortness of breath, generalized weakness    HISTORY OF PRESENT ILLNESS (HPI):   Mr. Fernando Ventura, a 57y.o. year old male who  has a past medical history of Atrial fibrillation, chronic (HCC), COPD (chronic obstructive pulmonary disease) ([facility] Coronary artery disease involving native coronary artery of native heart without angina pectoris, H/O echocardiogram, Heart disease, History of blood transfusion, Hx of blood clots, Hx of cardiovascular stress test, Hx of Doppler ultrasound, Hypertension, Myasthenia gravis ([facility] NSTEMI (non-ST elevated myocardial infarction) ([facility] Post PTCA, TIA (transient ischemic attack), and Ulcerative colitis ([facility] Presented with complaints of generalized weakness and shortness of breath. Patient states 2 weeks back he was discharged from the hospital after cardiac issues were being addressed. Immediately post discharge he noticed black tarry stools in his ileostomy bag which became normal colored after 4 days. Currently he does not have any concerns with stool color but however he continues to have worsening shortness of breath, generalized weakness and lightheadedness. He complains of being pale and some very nonspecific abdominal pain right at ileostomy bag. Otherwise denied any chest pain, fever/chills, PND/orthopnea. However he endorses for on and off leg swellings. Average appetite. Denies nausea,vomiting, diarrhea or constipation. Denies headache,vision changes, numbness or tingling in extremities. Denies dysuria, frequent urination. On presentation, Blood pressure (!) 161/143, pulse 132, temperature 97.7 °F (36.5 °C), temperature source Oral, resp. rate 16, height 5' 11\" (1.803 m), weight 271 lb 9.7 oz (123.2 kg), SpO2 98 %.      PAST MEDICAL HISTORY   Past Medical History:   Diagnosis Date    Atrial fibrillation, chronic ([facility] 12/2020    COPD (chronic obstructive pulmonary disease) ([facility] 6/15/2021    Coronary artery disease involving native coronary artery of native heart without angina pectoris 6/15/2021    H/O echocardiogram 02/02/2021    EF is estimated at 50%. presence of PVC affected LVEF estimation, Mild TR.    Heart disease     stint    History of blood transfusion     Hx of blood clots     Hx of cardiovascular stress test 06/11/2021    Small size severe intensity,perfusion defect in apical wall.  Hx of Doppler ultrasound 04/09/2021    Left distal SSV shows occlusive chronic SVT.  Hypertension     Myasthenia gravis ([facility]     NSTEMI (non-ST elevated myocardial infarction) ([facility] 12/2020    Post PTCA 12/14/2020    Ramus Stented.  TIA (transient ischemic attack)     Ulcerative colitis ([facility]           SURGICAL HISTORY:   Past Surgical History:   Procedure Laterality Date    CARDIAC SURGERY      ILEOSTOMY OR JEJUNOSTOMY      PTCA  12/14/2020    Ramus Stented.  TONSILLECTOMY      UPPER GASTROINTESTINAL ENDOSCOPY N/A 6/15/2021    EGD DIAGNOSTIC ONLY performed by Berenice Kessler MD at [address]:   Patient has no known allergies. ?     SOCIAL HISTORY:    reports that he quit smoking about 2 months ago. His smoking use included cigarettes. He smoked 0.25 packs per day. He has never used smokeless tobacco. He reports current alcohol use of about 1.0 standard drinks of alcohol per week. He reports that he does not use drugs. ?     FAMILY HISTORY:   Family History   Family history unknown: Yes      ?     MEDICATIONS:   Current Facility-Administered Medications   Medication Dose Route Frequency Provider Last Rate Last Admin    0.9 % sodium chloride infusion   Intravenous PRN Nimco Bhakta MD        prothrombin complex concentrate (human) (KCENTRA) infusion 5,000 Units  5,000 Units Intravenous Once Nimco Bhakta MD        0.9 % sodium chloride infusion  50 mL Intravenous Once Nimco Bhakta MD        0.9 % sodium chloride infusion   Intravenous PRN JAMIE Pollack CNP         Facility-Administered Medications Ordered in Other Encounters   Medication Dose Route Frequency Provider Last Rate Last Admin    Immune Globulin (Human) solution 20 g  20 g Intravenous Once Jose Cobb MD        And    Immune Globulin (Human) solution 20 g  20 g Intravenous Once Jose Cobb MD        Immune Globulin (Human) solution 20 g  20 g Intravenous Once Jose Cobb MD        And    Immune Globulin (Human) solution 20 g  20 g Intravenous Once Jose Cobb MD          ?   ? REVIEW OF SYSTEMS:   All systems were reviewed and all were negative except for those mentioned in HPI. PHYSICAL EXAM:   Blood pressure (!) 161/143, pulse 132, temperature 97.7 °F (36.5 °C), temperature source Oral, resp. rate 16, height 5' 11\" (1.803 m), weight 271 lb 9.7 oz (123.2 kg), SpO2 98 %. . Body mass index is 37.88 kg/m². CONSTITUTIONAL: Mild respiratory distress +, pallor +  HENT: NC/AT Ear: normal, patent without effusion Nose: no deformities, nares patent  EYES:Conjunctiva normal. No discharge. NECK: Neck supple,No JVD /Thyromegaly/LAD   RESP:No chest wall deformities or tenderness. No wheezing or rales. B/L air entry positive+  CVS: Tachycardia, RRR, S1 and S2 normal, no murmurs, clicks, gallops or rubs. GI: Soft, ND/NT, BS +, ileostomy bag +  MUSCULAR/EXT:  no pedal edema, no clubbing or cyanosis,Pulses 2+ B/L  CNS: Awake, alert. Cranial nerves intact, no focal neurological deficits.    MOOD/PSYCH: Normal mood and affect  SKIN: Warm and dry,No rashes    Lab results:   Results for orders placed or performed during the hospital encounter of 07/03/21   CBC Auto Differential   Result Value Ref Range    WBC 7.2 4.0 - 10.5 K/CU MM    RBC 1.96 (L) 4.6 - 6.2 M/CU MM    Hemoglobin 5.2 (LL) 13.5 - 18.0 GM/DL    Hematocrit 18.5 (LL) 42 - 52 %    MCV 94.4 78 - 100 FL    MCH 26.5 (L) 27 - 31 PG    MCHC 28.1 (L) 32.0 - 36.0 %    RDW 16.6 (H) 11.7 - 14.9 %    Platelets 924 489 - 535 K/CU MM    MPV 9.9 7.5 - 11.1 FL    Differential Type AUTOMATED DIFFERENTIAL     Segs Relative 79.0 (H) 36 - 66 %    Lymphocytes % 11.6 (L) 24 - 44 %    Monocytes % 6.8 (H) 0 - 4 %    Eosinophils % 2.0 0 - 3 %    Basophils % 0.3 0 - 1 %    Segs Absolute 5.7 K/CU MM    Lymphocytes Absolute 0.8 K/CU MM    Monocytes Absolute 0.5 K/CU MM    Eosinophils Absolute 0.1 K/CU MM    Basophils Absolute 0.0 K/CU MM    Nucleated RBC % 0.4 %    Total Nucleated RBC 0.0 K/CU MM    Total Immature Neutrophil 0.02 K/CU MM    Immature Neutrophil % 0.3 0 - 0.43 %   Basic Metabolic Panel w/ Reflex to MG   Result Value Ref Range    Sodium 138 135 - 145 MMOL/L    Potassium 4.5 3.5 - 5.1 MMOL/L    Chloride 106 99 - 110 mMol/L    CO2 21 21 - 32 MMOL/L    Anion Gap 11 4 - 16    BUN 19 6 - 23 MG/DL    CREATININE 1.1 0.9 - 1.3 MG/DL    Glucose 201 (H) 70 - 99 MG/DL    Calcium 8.5 8.3 - 10.6 MG/DL    GFR Non-African American >60 >60 mL/min/1.73m2    GFR African American >60 >60 mL/min/1.73m2   Troponin   Result Value Ref Range    Troponin T 0.021 (H) <0.01 NG/ML   Brain Natriuretic Peptide   Result Value Ref Range    Pro-BNP 1,879 (H) <300 PG/ML   Digoxin Level   Result Value Ref Range    Digoxin Lvl 0.8 0.8 - 2.0 ng/mL    DOSE AMOUNT DOSE AMT.  GIVEN - UNKNOWN     DOSE TIME DOSE TIME GIVEN - UNKNOWN    PT - INR   Result Value Ref Range    Protime 21.3 (H) 11.7 - 14.5 SECONDS    INR 1.64 INDEX   EKG 12 Lead   Result Value Ref Range    Ventricular Rate 136 BPM    Atrial Rate 136 BPM    P-R Interval 208 ms    QRS Duration 114 ms    Q-T Interval 294 ms    QTc Calculation (Bazett) 442 ms    P Axis 47 degrees    R Axis -10 degrees    T Axis -54 degrees    Diagnosis       Sinus tachycardia  Incomplete right bundle branch block  Septal infarct , age undetermined  Marked ST abnormality, possible inferior subendocardial injury  Abnormal ECG  When compared with ECG of 14-JUN-2021 11:36,  Significant changes have occurred TYPE AND SCREEN   Result Value Ref Range    ABO/Rh A POSITIVE     Antibody Screen NEGATIVE     Unit Number [id number]     Component LEUKO-POOR RED CELLS     Unit Divison 00     Status ALLOCATED     Transfusion Status OK TO TRANSFUSE     Crossmatch Result COMPATIBLE     Unit Number [id number]     Component LEUKO-POOR RED CELLS     Unit Divison 00     Status ISSUED     Transfusion Status OK TO TRANSFUSE     Crossmatch Result COMPATIBLE      XR CHEST PORTABLE   Final Result   No acute abnormality. ASSESSMENT/IMPRESSION:     -Acute blood loss anemia and possible upper GI bleed: Protonix IV, IVF and NPO. Hold home Xarelto and administer Kcentra. Will transfuse 3 units PRBC total.   Trend H&H and transfuse to maintain Hb 8-9. Admit to ICU. -CAD/atrial fibrillation continue hold home Plavix and Xarelto. Patient is currently in sinus tachycardia. Metoprolol IV ordered. Holding digoxin p.o. and consult cardiology for additional help. -COPD exacerbation continue duo nebs. Monitor  -Myasthenia gravis holding p.o. meds-Mestinon. Restart when appropriate. -Morbid obesity diet and exercise counseling  ? DVT prophylaxis: SCDs only due to bleeding risk    Old records reviewed. Medications reviewed with patient. Patient is a Full Code-discussed     All questions and concerns addressed at this time, and patient is in agreement with current treatment plan.      Liliana Lee MD   Hospitalist at [facility]

## 2021-07-03 NOTE — PROGRESS NOTES
Telephone orders from Dr. Albin Chacon for clear liquid diet until midnight and then NPO after midnight.

## 2021-07-03 NOTE — CONSULTS
Department of Internal Medicine  Gastroenterology Consult Note  Aminta Schaeffer. Rossy DUMONT      Reason for Consult:  Severe anemia    Primary Care Physician:  No primary care provider on file. History Obtained From:  patient    HISTORY OF PRESENT ILLNESS:              The patient is a 57 y.o.  male with a past history of CAD S/P PCI, atrial fib on Xarelto, TIA, and prior ulcerative colitis S/P total colectomy. He was recently admitted with melena and found to have a clean-based duodenal ulcer on EGD by Dr Yeison Julian. At the time of discharge 6/16/21 his Hb was 11.0. His stool was negative for H pylori antigen. At that time his initial HBsAg was positive but was negative on confirmation. His transaminases were normal on his admission 6/11/21 but did rise during the admission. After discharge he states that his ileostomy output remained dark for almost a weak, then cleared up a few days ago. He was weak and orthostatic, which he attributed to his myasthenia gravis, but when seeing his neurologist he was referred to the ED where his Hb was 5. 2. he has noted some RLQ abd pain that is positional. He denies upper abd pain, nausea or vomiting    Past Medical History:        Diagnosis Date    Atrial fibrillation, chronic ([address] 12/2020    COPD (chronic obstructive pulmonary disease) ([address] 6/15/2021    Coronary artery disease involving native coronary artery of native heart without angina pectoris 6/15/2021    H/O echocardiogram 02/02/2021    EF is estimated at 50%. presence of PVC affected LVEF estimation, Mild TR.    Heart disease     stint    History of blood transfusion     Hx of blood clots     Hx of cardiovascular stress test 06/11/2021    Small size severe intensity,perfusion defect in apical wall.  Hx of Doppler ultrasound 04/09/2021    Left distal SSV shows occlusive chronic SVT.     Hypertension     Myasthenia gravis ([address]     NSTEMI (non-ST elevated myocardial infarction) ([address] 12/2020    Post PTCA 12/14/2020 Ramus Stented.  TIA (transient ischemic attack)     Ulcerative colitis ([facility]        Past Surgical History:        Procedure Laterality Date    CARDIAC SURGERY      ILEOSTOMY OR JEJUNOSTOMY      PTCA  12/14/2020    Ramus Stented.  TONSILLECTOMY      UPPER GASTROINTESTINAL ENDOSCOPY N/A 6/15/2021    EGD DIAGNOSTIC ONLY performed by Devante Rosales MD at [facility] ENDOSCOPY       Medications Prior to Admission:    Prior to Admission medications    Medication Sig Start Date End Date Taking? Authorizing Provider   budesonide-formoterol (SYMBICORT) 160-4.5 MCG/ACT AERO Inhale 2 puffs into the lungs 2 times daily 6/16/21   William Lino MD   atorvastatin (LIPITOR) 40 MG tablet Take 1 tablet by mouth nightly START 6/25/2021 6/16/21   William Lino MD   tiotropium (SPIRIVA HANDIHALER) 18 MCG inhalation capsule Inhale 1 capsule into the lungs daily 6/16/21   William Lino MD   pantoprazole (PROTONIX) 40 MG tablet Take 1 tablet by mouth 2 times daily 6/16/21   William Lino MD   Elastic Bandages & Supports (MEDICAL COMPRESSION THIGH HIGH) MISC Wear daily and remove nightly 20 - 30 mmgh 4/14/21   Carol Dent MD   clopidogrel (PLAVIX) 75 MG tablet Take 1 tablet by mouth daily 4/13/21   JAMIE Harley CNP   metoprolol succinate (TOPROL XL) 25 MG extended release tablet Take 1 tablet by mouth daily 4/13/21   JAMIE Harley CNP   rivaroxaban (XARELTO) 20 MG TABS tablet Take 1 tablet by mouth daily (with breakfast) 4/13/21 6/11/21  JAMIE Harley CNP   digoxin (LANOXIN) 125 MCG tablet Take 1 tablet by mouth daily 4/13/21   JAMIE Harley CNP   NONFORMULARY Inject 40 mg/L into the skin every evening Zilucoplan  - patient provided experimental drug from [place] for Myasthenia Gravis    Historical Provider, MD   albuterol sulfate HFA (PROVENTIL HFA) 108 (90 Base) MCG/ACT inhaler Inhale 2 puffs into the lungs every 4 hours as needed for Wheezing or Shortness of Breath With spacer (and mask if indicated). Thanks. 12/20/18 3/15/21  Michael Aquino MD   vitamin B-12 (CYANOCOBALAMIN) 500 MCG tablet Take 1,000 mcg by mouth daily Indications: 8am     Historical Provider, MD   PREDNISONE PO Take 13 mg by mouth daily Indications: Takes at  [address] [facility] MD Aurea   pyridostigmine (MESTINON) 60 MG tablet Take 1 tablet by mouth 4 times daily  Patient taking differently: Take 60 mg by mouth 5 times daily Indications: Takes at 0800, 1200, 1600, 2000  7/20/16   Sandrita Clifford MD   acetaminophen (TYLENOL) 325 MG tablet Take 650 mg by mouth every 4 hours as needed for Pain or Fever    Historical Provider, MD       Allergies:  No Known Allergies. Social History:    TOBACCO:  No  ETOH:  Yes    Family History:   Family History   Family history unknown: Yes       REVIEW OF SYSTEMS: (POSITIVES WILL BE UNDERLINED)  CONSTITUTIONAL:    Weight change,fatigue, fever, chills  EYES:  Diplopia, change in vision  EARS:  hearing loss, tinnitus, vertigo  NOSE:   epistaxis  MOUTH/THROAT:     hoarseness, sore throat. RESPIRATORY:  SOB,  cough, sputum, hemoptysis  CARDIOVASCULAR : chest pain,palpitations, dyspnea exertion, orthopnea, paroxysmal nocturnal dyspnea, pedal edema. GASTROINTESTINAL:  See HPI  GENITOURINARY:   dysuria, hematuria, . HEMATOLOGIC/LYMPHATIC:   Anemia, bleeding tendencies.   MUSCULOSKELETAL:    myalgias,  joint pain  NEUROLOGICAL:   Loss of Consciousness, paresthesias, anesthesias, focal weakness  SKIN :   History of dermatitis, rashes  PSYCHIATRIC:  depression, , anxiety past psychosis  ENDOCRINE:  History of diabetes, thyroid disease  ALL/IMM : allergies, rashes    PHYSICAL EXAM:    Vitals:  BP 98/68   Pulse 132   Temp 98 °F (36.7 °C) (Oral)   Resp 18   Ht 5' 11\" (1.803 m)   Wt 278 lb (126.1 kg)   SpO2 100%   BMI 38.77 kg/m²   CONSTITUTIONAL: alert, cooperative, no apparent distress,   EYES:  pupils equal, round and reactive to light and sclera clear  ENT: normocepalic, without obvious abnormality  NECK:  supple, symmetrical, trachea midline  HEMATOLOGIC/LYMPHATICS:  no cervical lymphadenopathy and no supraclavicular lymphadenopathy  LUNGS:  clear to auscultation  CARDIOVASCULAR:  regular rate and rhythm and no murmur noted  ABDOMEN:  Soft, non-tender with normal bowel sounds. No organomegaly or masses  NEUROLOGIC: no focal deficit detected  SKIN:  no lesions  EXTREMITIES: no clubbing, cyanosis, or edema    IMPRESSION:  1) history of total colectomy with ileostomy  2) recent bleeding duodenal ulcer- prob with re-bleed now resolved  3) history of myasthenia gravis, CAD,PCI,COPD    RECOMMENDATIONS:  1) EGD today        Erik Hernandez M.D

## 2021-07-04 ENCOUNTER — ANESTHESIA EVENT (OUTPATIENT)
Dept: ENDOSCOPY | Age: 61
DRG: 171 | End: 2021-07-04
Payer: MEDICAID

## 2021-07-04 ENCOUNTER — ANESTHESIA (OUTPATIENT)
Dept: ENDOSCOPY | Age: 61
DRG: 171 | End: 2021-07-04
Payer: MEDICAID

## 2021-07-04 VITALS — DIASTOLIC BLOOD PRESSURE: 76 MMHG | OXYGEN SATURATION: 97 % | SYSTOLIC BLOOD PRESSURE: 100 MMHG

## 2021-07-04 LAB
ANION GAP SERPL CALCULATED.3IONS-SCNC: 9 MMOL/L (ref 4–16)
BUN BLDV-MCNC: 13 MG/DL (ref 6–23)
CALCIUM SERPL-MCNC: 8.8 MG/DL (ref 8.3–10.6)
CHLORIDE BLD-SCNC: 107 MMOL/L (ref 99–110)
CO2: 22 MMOL/L (ref 21–32)
CREAT SERPL-MCNC: 1 MG/DL (ref 0.9–1.3)
GFR AFRICAN AMERICAN: >60 ML/MIN/1.73M2
GFR NON-AFRICAN AMERICAN: >60 ML/MIN/1.73M2
GLUCOSE BLD-MCNC: 102 MG/DL (ref 70–99)
HCT VFR BLD CALC: 24.1 % (ref 42–52)
HCT VFR BLD CALC: 25.6 % (ref 42–52)
HCT VFR BLD CALC: 28.3 % (ref 42–52)
HEMOGLOBIN: 7.6 GM/DL (ref 13.5–18)
HEMOGLOBIN: 7.8 GM/DL (ref 13.5–18)
HEMOGLOBIN: 8.8 GM/DL (ref 13.5–18)
IRON: 23 UG/DL (ref 59–158)
PCT TRANSFERRIN: 5 % (ref 10–44)
POTASSIUM SERPL-SCNC: 4.4 MMOL/L (ref 3.5–5.1)
SODIUM BLD-SCNC: 138 MMOL/L (ref 135–145)
TOTAL IRON BINDING CAPACITY: 450 UG/DL (ref 250–450)
UNSATURATED IRON BINDING CAPACITY: 427 UG/DL (ref 110–370)

## 2021-07-04 PROCEDURE — 94761 N-INVAS EAR/PLS OXIMETRY MLT: CPT

## 2021-07-04 PROCEDURE — 6360000002 HC RX W HCPCS: Performed by: INTERNAL MEDICINE

## 2021-07-04 PROCEDURE — 85014 HEMATOCRIT: CPT

## 2021-07-04 PROCEDURE — 2580000003 HC RX 258: Performed by: INTERNAL MEDICINE

## 2021-07-04 PROCEDURE — C9113 INJ PANTOPRAZOLE SODIUM, VIA: HCPCS | Performed by: SPECIALIST

## 2021-07-04 PROCEDURE — 2000000000 HC ICU R&B

## 2021-07-04 PROCEDURE — 94664 DEMO&/EVAL PT USE INHALER: CPT

## 2021-07-04 PROCEDURE — 83540 ASSAY OF IRON: CPT

## 2021-07-04 PROCEDURE — 3700000001 HC ADD 15 MINUTES (ANESTHESIA): Performed by: SPECIALIST

## 2021-07-04 PROCEDURE — 80048 BASIC METABOLIC PNL TOTAL CA: CPT

## 2021-07-04 PROCEDURE — 83550 IRON BINDING TEST: CPT

## 2021-07-04 PROCEDURE — 43235 EGD DIAGNOSTIC BRUSH WASH: CPT | Performed by: SPECIALIST

## 2021-07-04 PROCEDURE — 2500000003 HC RX 250 WO HCPCS: Performed by: INTERNAL MEDICINE

## 2021-07-04 PROCEDURE — 6360000002 HC RX W HCPCS: Performed by: NURSE ANESTHETIST, CERTIFIED REGISTERED

## 2021-07-04 PROCEDURE — 3609017100 HC EGD: Performed by: SPECIALIST

## 2021-07-04 PROCEDURE — 89220 SPUTUM SPECIMEN COLLECTION: CPT

## 2021-07-04 PROCEDURE — 6370000000 HC RX 637 (ALT 250 FOR IP): Performed by: INTERNAL MEDICINE

## 2021-07-04 PROCEDURE — 99254 IP/OBS CNSLTJ NEW/EST MOD 60: CPT | Performed by: INTERNAL MEDICINE

## 2021-07-04 PROCEDURE — P9016 RBC LEUKOCYTES REDUCED: HCPCS

## 2021-07-04 PROCEDURE — 6360000002 HC RX W HCPCS: Performed by: SPECIALIST

## 2021-07-04 PROCEDURE — 3700000000 HC ANESTHESIA ATTENDED CARE: Performed by: SPECIALIST

## 2021-07-04 PROCEDURE — 2709999900 HC NON-CHARGEABLE SUPPLY: Performed by: SPECIALIST

## 2021-07-04 PROCEDURE — 2500000003 HC RX 250 WO HCPCS: Performed by: NURSE ANESTHETIST, CERTIFIED REGISTERED

## 2021-07-04 PROCEDURE — 94640 AIRWAY INHALATION TREATMENT: CPT

## 2021-07-04 PROCEDURE — 85018 HEMOGLOBIN: CPT

## 2021-07-04 PROCEDURE — 36415 COLL VENOUS BLD VENIPUNCTURE: CPT

## 2021-07-04 PROCEDURE — 0DJ08ZZ INSPECTION OF UPPER INTESTINAL TRACT, VIA NATURAL OR ARTIFICIAL OPENING ENDOSCOPIC: ICD-10-PCS | Performed by: SPECIALIST

## 2021-07-04 RX ORDER — LIDOCAINE HYDROCHLORIDE 20 MG/ML
INJECTION, SOLUTION EPIDURAL; INFILTRATION; INTRACAUDAL; PERINEURAL PRN
Status: DISCONTINUED | OUTPATIENT
Start: 2021-07-04 | End: 2021-07-04 | Stop reason: SDUPTHER

## 2021-07-04 RX ORDER — SODIUM CHLORIDE 0.9 % (FLUSH) 0.9 %
5-40 SYRINGE (ML) INJECTION EVERY 12 HOURS SCHEDULED
Status: DISCONTINUED | OUTPATIENT
Start: 2021-07-04 | End: 2021-07-09 | Stop reason: HOSPADM

## 2021-07-04 RX ORDER — SODIUM CHLORIDE 9 MG/ML
25 INJECTION, SOLUTION INTRAVENOUS PRN
Status: DISCONTINUED | OUTPATIENT
Start: 2021-07-04 | End: 2021-07-09 | Stop reason: HOSPADM

## 2021-07-04 RX ORDER — SODIUM CHLORIDE 9 MG/ML
INJECTION, SOLUTION INTRAVENOUS PRN
Status: DISCONTINUED | OUTPATIENT
Start: 2021-07-04 | End: 2021-07-09 | Stop reason: HOSPADM

## 2021-07-04 RX ORDER — PYRIDOSTIGMINE BROMIDE 60 MG/1
60 TABLET ORAL
Status: DISCONTINUED | OUTPATIENT
Start: 2021-07-04 | End: 2021-07-09 | Stop reason: HOSPADM

## 2021-07-04 RX ORDER — SODIUM CHLORIDE 9 MG/ML
INJECTION, SOLUTION INTRAVENOUS CONTINUOUS PRN
Status: DISCONTINUED | OUTPATIENT
Start: 2021-07-04 | End: 2021-07-04 | Stop reason: SDUPTHER

## 2021-07-04 RX ORDER — LIDOCAINE HYDROCHLORIDE 10 MG/ML
5 INJECTION, SOLUTION EPIDURAL; INFILTRATION; INTRACAUDAL; PERINEURAL ONCE
Status: DISCONTINUED | OUTPATIENT
Start: 2021-07-04 | End: 2021-07-09 | Stop reason: HOSPADM

## 2021-07-04 RX ORDER — PROPOFOL 10 MG/ML
INJECTION, EMULSION INTRAVENOUS PRN
Status: DISCONTINUED | OUTPATIENT
Start: 2021-07-04 | End: 2021-07-04 | Stop reason: SDUPTHER

## 2021-07-04 RX ORDER — SODIUM CHLORIDE 0.9 % (FLUSH) 0.9 %
5-40 SYRINGE (ML) INJECTION PRN
Status: DISCONTINUED | OUTPATIENT
Start: 2021-07-04 | End: 2021-07-09 | Stop reason: HOSPADM

## 2021-07-04 RX ADMIN — METOPROLOL TARTRATE 5 MG: 1 INJECTION, SOLUTION INTRAVENOUS at 12:37

## 2021-07-04 RX ADMIN — IPRATROPIUM BROMIDE AND ALBUTEROL SULFATE 1 AMPULE: .5; 3 SOLUTION RESPIRATORY (INHALATION) at 16:00

## 2021-07-04 RX ADMIN — PROPOFOL 50 MG: 10 INJECTION, EMULSION INTRAVENOUS at 10:48

## 2021-07-04 RX ADMIN — PROPOFOL 30 MG: 10 INJECTION, EMULSION INTRAVENOUS at 10:55

## 2021-07-04 RX ADMIN — METOPROLOL TARTRATE 5 MG: 1 INJECTION, SOLUTION INTRAVENOUS at 18:32

## 2021-07-04 RX ADMIN — PROPOFOL 30 MG: 10 INJECTION, EMULSION INTRAVENOUS at 10:49

## 2021-07-04 RX ADMIN — METOPROLOL TARTRATE 5 MG: 1 INJECTION, SOLUTION INTRAVENOUS at 05:21

## 2021-07-04 RX ADMIN — AMIODARONE HYDROCHLORIDE 1 MG/MIN: 50 INJECTION, SOLUTION INTRAVENOUS at 11:44

## 2021-07-04 RX ADMIN — PYRIDOSTIGMINE BROMIDE 60 MG: 60 TABLET ORAL at 15:35

## 2021-07-04 RX ADMIN — IPRATROPIUM BROMIDE AND ALBUTEROL SULFATE 1 AMPULE: .5; 3 SOLUTION RESPIRATORY (INHALATION) at 12:10

## 2021-07-04 RX ADMIN — SODIUM CHLORIDE, PRESERVATIVE FREE 10 ML: 5 INJECTION INTRAVENOUS at 20:33

## 2021-07-04 RX ADMIN — LIDOCAINE HYDROCHLORIDE 100 MG: 20 INJECTION, SOLUTION EPIDURAL; INFILTRATION; INTRACAUDAL; PERINEURAL at 10:48

## 2021-07-04 RX ADMIN — AMIODARONE HYDROCHLORIDE 150 MG: 50 INJECTION, SOLUTION INTRAVENOUS at 11:19

## 2021-07-04 RX ADMIN — PROPOFOL 30 MG: 10 INJECTION, EMULSION INTRAVENOUS at 10:58

## 2021-07-04 RX ADMIN — IPRATROPIUM BROMIDE AND ALBUTEROL SULFATE 1 AMPULE: .5; 3 SOLUTION RESPIRATORY (INHALATION) at 20:08

## 2021-07-04 RX ADMIN — PROPOFOL 30 MG: 10 INJECTION, EMULSION INTRAVENOUS at 10:52

## 2021-07-04 RX ADMIN — DIGOXIN 125 MCG: 0.25 INJECTION INTRAMUSCULAR; INTRAVENOUS at 08:15

## 2021-07-04 RX ADMIN — SODIUM CHLORIDE: 9 INJECTION, SOLUTION INTRAVENOUS at 10:48

## 2021-07-04 RX ADMIN — PANTOPRAZOLE SODIUM 40 MG: 40 INJECTION, POWDER, FOR SOLUTION INTRAVENOUS at 05:18

## 2021-07-04 RX ADMIN — AMIODARONE HYDROCHLORIDE 0.5 MG/MIN: 50 INJECTION, SOLUTION INTRAVENOUS at 21:37

## 2021-07-04 RX ADMIN — PYRIDOSTIGMINE BROMIDE 60 MG: 60 TABLET ORAL at 21:49

## 2021-07-04 RX ADMIN — SODIUM CHLORIDE, PRESERVATIVE FREE 10 ML: 5 INJECTION INTRAVENOUS at 08:15

## 2021-07-04 RX ADMIN — IPRATROPIUM BROMIDE AND ALBUTEROL SULFATE 1 AMPULE: .5; 3 SOLUTION RESPIRATORY (INHALATION) at 08:08

## 2021-07-04 RX ADMIN — METOPROLOL TARTRATE 5 MG: 1 INJECTION, SOLUTION INTRAVENOUS at 00:04

## 2021-07-04 RX ADMIN — PROPOFOL 30 MG: 10 INJECTION, EMULSION INTRAVENOUS at 10:50

## 2021-07-04 RX ADMIN — PYRIDOSTIGMINE BROMIDE 60 MG: 60 TABLET ORAL at 18:32

## 2021-07-04 ASSESSMENT — PAIN SCALES - GENERAL
PAINLEVEL_OUTOF10: 0

## 2021-07-04 NOTE — H&P
I have examined the patient within 24 hours  before the procedure and there is no change in the previous history and physical exam,which has been reviewed. There is no history of sleep apnea, snoring, or stridor. There has been no  previous adverse experience with sedation/anesthesia. There is no increased risk for aspiration of gastric contents. The patient has been instructed that all resuscitative measures (during the operative and immediate perioperative period) will be instituted in the unlikely event that they will be needed. The patient has no pertinent past surgical or FH other than listed in the original H&P.     ASA Class: 4E  AIRWAY Class: 1    Consent form signed, witnessed and in soft chart

## 2021-07-04 NOTE — PROGRESS NOTES
Hospitalist ICU Progress Note      Name:  Yumiko Rios /Age/Sex: 1960  (57 y.o. male)   MRN & CSN:  [id number] & [id number] Admission Date/Time: 7/3/2021 12:10 PM   Location:  -A PCP: No primary care provider on file. Hospital Day: 2    Assessment and Plan:   58 y/o M that presented with acute blood loss anemia    Acute blood loss anemia  Hx of Duodenal Ulcer  Hx Ulcerative Colitis s/p Total Abdominal Colectomy  - Hgb stable this AM; slight drop from earlier this morning  - s/p 3 upRBC overnight from 5.2 to 7.8 on 7/3; was also given [facility] as patient is on Xarelto at home for atrial fibrillation  - Likely Upper GIB based on patient history  - Holding Xarelto  - Trending Hgb  - Protonix  - NPO; planning for EGD with GI today; appreciate GI recs    Hx of CAD  Hx of Paroxysmal atrial fibrillation  Sinus Tachycardia  - Started on Metoprolol IV yesterday q6; holding Dig PO for now  - Holding Xarelto and Plavix  - Cardiology following, appreciate recs    Hx of COPD  - Continue neb treatments  - Resting comfortably on room air currently    Myasthenia gravis   - Holding p.o. meds-Mestinon; will restart when able    Morbid obesity diet and exercise counseling  ? Diet Diet NPO Exceptions are: Ice Chips   DVT Prophylaxis [] Lovenox, []  Heparin, [] SCDs, [] Ambulation   GI Prophylaxis [] PPI,  [] H2 Blocker,  [] Carafate,  [] Diet/Tube Feeds   Code Status Full Code   Disposition Patient requires continued admission due to    MDM [] Low, [] Moderate,[]  High  Patient's risk as above due to      History of Present Illness:     Remains tachycardic; denies any abdominal pain or dark output from ostomy bag; no chills, no chest pain or SOB this morning    Objective:        Intake/Output Summary (Last 24 hours) at 2021 0936  Last data filed at 2021 0602  Gross per 24 hour   Intake 1492.92 ml   Output --   Net 1492.92 ml      Vitals:   Vitals:    21 0808   BP:    Pulse:    Resp: 17 Temp:    SpO2:      Physical Exam:   GEN Awake male, sitting upright in bed in no apparent distress. Appears given age. EYES Pupils are equally round. No scleral erythema, discharge, or conjunctivitis. NECK Supple, no apparent thyromegaly or masses. RESP Clear to auscultation anteriorly; no wheezing this morning  CARDIO/VASC S1/S2 auscultated. Regular rate without appreciable murmurs, rubs, or gallops. GI Abdomen is soft without significant tenderness, masses, or guarding. Ostomy pink and viable, no dark output noted in ostomy bag this morning   HEME/LYMPH No petechiae or ecchymoses. MSK No gross joint deformities. SKIN Normal coloration, warm, dry. NEURO Cranial nerves appear grossly intact, normal speech  PSYCH Awake, alert, oriented x 4. Affect appropriate.     Medications:   Medications:    ipratropium-albuterol  1 ampule Inhalation Q4H WA    sodium chloride flush  5-40 mL Intravenous 2 times per day    digoxin  125 mcg Intravenous Daily    metoprolol (LOPRESSOR) ivpb  5 mg Intravenous Q6H    pantoprazole  40 mg Intravenous Daily      Infusions:    sodium chloride      sodium chloride      sodium chloride       PRN Meds: sodium chloride, , PRN  sodium chloride flush, 5-40 mL, PRN  sodium chloride, 25 mL, PRN  ondansetron, 4 mg, Q8H PRN   Or  ondansetron, 4 mg, Q6H PRN  acetaminophen, 650 mg, Q6H PRN   Or  acetaminophen, 650 mg, Q6H PRN  morphine, 2 mg, Q4H PRN  sodium chloride, , PRN          Electronically signed by Benjamin Gonzalez MD on 7/4/2021 at 9:36 AM

## 2021-07-04 NOTE — PROGRESS NOTES
REPORT GIVEN TO [address] [place] [address] RN, PT STABLE, ALERT AND ORIENTED, FINDINGS GIVEN

## 2021-07-04 NOTE — ANESTHESIA POSTPROCEDURE EVALUATION
Department of Anesthesiology  Postprocedure Note    Patient: Brea Cano  MRN: [id number]  YOB: 1960  Date of evaluation: 7/4/2021  Time:  11:10 AM     Procedure Summary     Date: 07/04/21 Room / Location: [address]    Anesthesia Start: 3447 Anesthesia Stop: 1110    Procedure: EGD DIAGNOSTIC ONLY (N/A ) Diagnosis: (GI BLEED)    Surgeons: Irving Begum MD Responsible Provider: Eric Adan MD    Anesthesia Type: TIVA, MAC, general ASA Status: 3          Anesthesia Type: TIVA, MAC, general    Miguel Phase I:      Miguel Phase II:      Last vitals: Reviewed and per EMR flowsheets.        Anesthesia Post Evaluation    Patient location during evaluation: bedside  Patient participation: complete - patient participated  Level of consciousness: awake and alert  Pain score: 0  Airway patency: patent  Nausea & Vomiting: no vomiting and no nausea  Complications: no  Cardiovascular status: blood pressure returned to baseline and hemodynamically stable  Respiratory status: acceptable, room air, spontaneous ventilation and nonlabored ventilation  Hydration status: stable

## 2021-07-04 NOTE — PLAN OF CARE
Problem: Bleeding:  Goal: Will show no signs and symptoms of excessive bleeding  Description: Will show no signs and symptoms of excessive bleeding  7/3/2021 2128 by Ada Werner RN  Outcome: Ongoing  7/3/2021 2001 by Fantasma Abad RN  Outcome: Ongoing     Problem: Falls - Risk of:  Goal: Will remain free from falls  Description: Will remain free from falls  Outcome: Ongoing  Goal: Absence of physical injury  Description: Absence of physical injury  Outcome: Ongoing

## 2021-07-04 NOTE — PLAN OF CARE
Problem: Bleeding:  Goal: Will show no signs and symptoms of excessive bleeding  Description: Will show no signs and symptoms of excessive bleeding  Outcome: Ongoing

## 2021-07-04 NOTE — BRIEF OP NOTE
BRIEF EGD REPORT:     Photos and full EGD report available by going to [facility] review\" then \"procedures\" then  \"EGD\" then \"View Endoscopy Report\"     IMPRESSION :   1) no fresh or old blood noted at any point  2) 6 mm superficial, clean-based ulcer in the duodenal bulb at site of previous ulcer- no viosible vessel, adherent clot, or active bleeding  3 ) large gurpreet-ampullary divertic  4) otherwise normal    PLAN :  1) advance diet   2) monitor in-house today- if stable discharge tomorrow

## 2021-07-04 NOTE — PROGRESS NOTES
RECEIVED REPORT FROM [facility] RN PRIOR TO TRANSFER TO UNIT.  PT ALERT AND ORIENTED, NPO SINCE MIDNIGHT

## 2021-07-04 NOTE — CONSULTS
drinks of alcohol per week. He reports that he does not use drugs.   Family history:   no family history of CAD, STROKE of DM at early age    No Known Allergies    0.9 % sodium chloride infusion, PRN  lidocaine PF 1 % injection 5 mL, Once  sodium chloride flush 0.9 % injection 5-40 mL, 2 times per day  sodium chloride flush 0.9 % injection 5-40 mL, PRN  0.9 % sodium chloride infusion, PRN  amiodarone (CORDARONE) 450 mg in dextrose 5 % 250 mL infusion, Continuous   Followed by  amiodarone (CORDARONE) 450 mg in dextrose 5 % 250 mL infusion, Continuous  0.9 % sodium chloride infusion, PRN  ipratropium-albuterol (DUONEB) nebulizer solution 1 ampule, Q4H WA  0.9 % sodium chloride infusion, PRN  ondansetron (ZOFRAN-ODT) disintegrating tablet 4 mg, Q8H PRN   Or  ondansetron (ZOFRAN) injection 4 mg, Q6H PRN  acetaminophen (TYLENOL) tablet 650 mg, Q6H PRN   Or  acetaminophen (TYLENOL) suppository 650 mg, Q6H PRN  morphine (PF) injection 2 mg, Q4H PRN  0.9 % sodium chloride infusion, PRN  digoxin (LANOXIN) injection 125 mcg, Daily  metoprolol (LOPRESSOR) 5 mg in sodium chloride 0.9 % 50 mL IVPB, Q6H  pantoprazole (PROTONIX) injection 40 mg, Daily    Immune Globulin (Human) solution 20 g, Once   And  Immune Globulin (Human) solution 20 g, Once  Immune Globulin (Human) solution 20 g, Once   And  Immune Globulin (Human) solution 20 g, Once      Current Facility-Administered Medications   Medication Dose Route Frequency Provider Last Rate Last Admin    0.9 % sodium chloride infusion   Intravenous PRN Reece Aceves MD        lidocaine PF 1 % injection 5 mL  5 mL Intradermal Once Reece Aceves MD        sodium chloride flush 0.9 % injection 5-40 mL  5-40 mL Intravenous 2 times per day Reece Aceves MD        sodium chloride flush 0.9 % injection 5-40 mL  5-40 mL Intravenous PRN Reece Aceves MD        0.9 % sodium chloride infusion  25 mL Intravenous PRN Reece Aceves MD        amiodarone (CORDARONE) 450 mg in dextrose 5 % 250 mL infusion  1 mg/min Intravenous Continuous Hans Soriano MD 33.3 mL/hr at 07/04/21 1144 1 mg/min at 07/04/21 1144    Followed by   Diana Hilton amiodarone (CORDARONE) 450 mg in dextrose 5 % 250 mL infusion  0.5 mg/min Intravenous Continuous Hans Soriano MD        0.9 % sodium chloride infusion   Intravenous PRN Ramya Jules MD        ipratropium-albuterol (DUONEB) nebulizer solution 1 ampule  1 ampule Inhalation Q4H WA Danae Holland MD   1 ampule at 07/04/21 1210    0.9 % sodium chloride infusion  25 mL Intravenous PRN Danae Holland MD        ondansetron (ZOFRAN-ODT) disintegrating tablet 4 mg  4 mg Oral Q8H PRN Danae Holland MD        Or    ondansetron [facility]) injection 4 mg  4 mg Intravenous Q6H PRN Danae Cough, MD        acetaminophen (TYLENOL) tablet 650 mg  650 mg Oral Q6H PRN Danae Holland MD   650 mg at 07/03/21 2003    Or    acetaminophen (TYLENOL) suppository 650 mg  650 mg Rectal Q6H PRN Danae Cough, MD        morphine (PF) injection 2 mg  2 mg Intravenous Q4H PRN Bonnyocaanthony Holland MD        0.9 % sodium chloride infusion   Intravenous PRN Eufemia Chen APRN - CNP        digoxin (LANOXIN) injection 125 mcg  125 mcg Intravenous Daily Danae Holland MD   125 mcg at 07/04/21 0815    metoprolol (LOPRESSOR) 5 mg in sodium chloride 0.9 % 50 mL IVPB  5 mg Intravenous Q6H Danae Holland MD   Stopped at 07/04/21 0551    pantoprazole (PROTONIX) injection 40 mg  40 mg Intravenous Daily Karolina Lauren MD   40 mg at 07/04/21 0518     Facility-Administered Medications Ordered in Other Encounters   Medication Dose Route Frequency Provider Last Rate Last Admin    Immune Globulin (Human) solution 20 g  20 g Intravenous Once Mariola Ivory MD        And    Immune Globulin (Human) solution 20 g  20 g Intravenous Once Mariola Ivory MD        Immune Globulin (Human) solution 20 g  20 g Intravenous Once Mariola Ivory MD        And Written consent  was obtained. Procedure Type of Study   TONIO procedure:ECHOCARDIOGRAM TRANSESOPHAGEAL. Procedure Date Date: 06/14/2021 Start: 11:08 AM Study Location: [address] [place] [address] - Echo Lab Technical Quality: Fair visualization Indications:Atrial fibrillation. Patient Status: Routine Height: 71 inches Weight: 290 pounds BSA: 2.47 m2 BMI: 40.45 kg/m2 HR: 128 bpm BP: 147/118 mmHg TONIO Performed By: the attending and the sonographer  Procedure Informed Consent  Procedure consent form obtained. Type of Anesthesia: Moderate sedation   Conclusions   Summary  No evidence of thrombus within the left atrial appendage. Negative bubble study; no ASD or PFO noted. Sclerotic, but non-stenotic aortic valve. Moderate to severe mitral regurgitation. Signature   ------------------------------------------------------------------  Electronically signed by Domenic Hill MD (Interpreting  physician) on 06/14/2021 at 01:53 PM  ------------------------------------------------------------------   Procedure Description   This is a 57year old male patient brought to the non-invasive laboratory  today. Informed consent was obtained. Xylocaine viscous local anesthesia was  utilized. Sedation was given through IV. The patient received 3 mg of versed  and 25 mcg of fentanyl. A mouth guard was placed. Transesophageal  echocardiogram probe with lubricant was advanced to the posterior pharynx,  mid esophagus without difficulty. Transesophageal echocardiographic study  was performed utilizing the standard technique with an omniplane probe. Echocardiographic images were obtained in the upper, middle and lower  esophagus. There was not a thrombus seen in the SUSANNE, therefore we proceeded with the  cardioversion. The patient was shocked once using 200 synchronized joules and was  successfully converted to sinus rhythm.    Findings   Left Ventricle  Left ventricular systolic function is normal.   Left Atrium  No evidence of thrombus within the left atrial appendage. Negative bubble study; no ASD or PFO noted. Right Atrium  No evidence of thrombus or mass in the right atrium. Right Ventricle  Normal right ventricular size and function. Aortic Valve  Sclerotic, but non-stenotic aortic valve. Mitral Valve  Moderate to severe mitral regurgitation. Tricuspid Valve  Mild tricuspid regurgitation. Pulmonic Valve  No evidence of any pulmonic regurgitation. Pericardial Effusion  There is no evidence of right atrial or right ventricular collapse. Pleural Effusion  No evidence of pleural effusion. Miscellaneous  Shadowing and dropout due to patients COPD. Echocardiogram limited    Result Date: 6/16/2021  Transthoracic Echocardiography Report (TTE)  Demographics   Patient Name       Catherine GRIFFITHS     Date of Study       06/16/2021   Date of Birth      1960         Gender              Male   Age                57 year(s)         Race                   Patient Number     [id number]         Room Number         [address] [place] [address]   Visit Number       [id number]   Corporate ID       [id number]   Accession Number   [id number]         Ted Dent,                                                            RVT, RDMS   Ordering Physician Junior Bobby MD                 Physician           MD  Procedure Type of Study   TTE procedure:ECHOCARDIOGRAM LIMITED. Procedure Date Date: 06/16/2021 Start: 12:10 PM Study Location: Portable Technical Quality: Technically difficult study Indications:Atrial fibrillation. Patient Status: Routine Height: 71 inches Weight: 290 pounds BSA: 2.47 m2 BMI: 40.45 kg/m2 HR: 65 bpm BP: 132/82 mmHg  Conclusions   Summary  This is a limited echocardiogram.  Left ventricular systolic function is normal  Ejection fraction is visually estimated at 50%. Moderate mitral regurgitation.   Mild tricuspid regurgitation; RVSP: 45 mmHg consistent with mild to moderate  PHTN. No evidence of any pericardial effusion. Signature   ------------------------------------------------------------------  Electronically signed by Gustavus Scheuermann MD (Interpreting  physician) on 06/16/2021 at 02:02 PM  ------------------------------------------------------------------   Findings   Left Ventricle  Left ventricular systolic function is normal  Ejection fraction is visually estimated at 50%. Mild left ventricular hypertrophy. Mitral Valve  Moderate mitral regurgitation. Tricuspid Valve  Mild tricuspid regurgitation; RVSP: 45 mmHg consistent with mild to moderate  PHTN. Pericardial Effusion  No evidence of any pericardial effusion. Pleural Effusion  No evidence of pleural effusion. M-Mode/2D Measurements & Calculations   LV Diastolic Dimension: 5.9 cm LV Systolic Dimension: 3.22 cm  LV FS:26.1 %                   LV Volume Diastolic: 335 ml  LV PW Diastolic: 9.72 cm       LV Volume Systolic: 79 ml  LV PW Systolic: 7.99 cm        LV EDV/LV EDV Index: 186 ml/75 m2LV ESV/LV  Septum Diastolic: 6.56 cm      ESV Index: 79 ml/32 m2  Septum Systolic: 1.43 cm       EF Calculated (A4C): 57.5 %                                 EF Calculated (2D): 50.4 %   LV Area Diastolic: 97.9 cm2    LV Length: 9.8 cm  LV Area Systolic: 43.2 cm2  Doppler Measurements & Calculations                               Estimated RVSP: 40 mmHg                              Estimated RAP:3 mmHg    Estimated PASP: 30.46 mmHg TR Velocity:262 cm/s                              TR Gradient:27.46 mmHg      XR CHEST PORTABLE    Result Date: 7/3/2021  EXAMINATION: ONE XRAY VIEW OF THE CHEST 7/3/2021 1:57 pm COMPARISON: 06/11/2021 HISTORY: ORDERING SYSTEM PROVIDED HISTORY: sob TECHNOLOGIST PROVIDED HISTORY: Reason for exam:->sob Reason for Exam: SOB fatigue Acuity: Acute Type of Exam: Initial FINDINGS: Exam is rotated. Mild stable cardiomegaly. No lung infiltrate or consolidation.  No definite pneumothorax or pleural effusion. No acute abnormality. XR CHEST PORTABLE    Result Date: 6/11/2021  EXAMINATION: ONE XRAY VIEW OF THE CHEST 6/11/2021 9:42 am COMPARISON: December 12, 2020. HISTORY: ORDERING SYSTEM PROVIDED HISTORY: chest pain TECHNOLOGIST PROVIDED HISTORY: Reason for exam:->chest pain Reason for Exam: chest pain Acuity: Acute Type of Exam: Initial FINDINGS: A portable upright frontal view chest radiograph was obtained. The heart is enlarged. The mediastinal contour and pleural spaces are otherwise within normal limits. The lungs are grossly clear. There is no focal consolidation or pneumothorax. The pulmonary vascular pattern is within normal limits. No acute thoracic osseous abnormality. Clear lungs. Cardiomegaly. No acute cardiopulmonary abnormality. US ABDOMEN LIMITED Specify organ? LIVER    Result Date: 6/15/2021  EXAMINATION: RIGHT UPPER QUADRANT ULTRASOUND 6/15/2021 10:24 am COMPARISON: None. HISTORY: ORDERING SYSTEM PROVIDED HISTORY: transaminitis TECHNOLOGIST PROVIDED HISTORY: Reason for exam:->transaminitis Specify organ?->LIVER Reason for Exam: abnormal labs Type of Exam: Subsequent/Follow-up FINDINGS: LIVER:  The liver demonstrates normal echogenicity without evidence of intrahepatic biliary ductal dilatation. BILIARY SYSTEM:  Gallbladder is unremarkable without evidence of pericholecystic fluid, wall thickening or stones. Negative sonographic Lamas's sign. Common bile duct is within normal limits measuring 3 mm. RIGHT KIDNEY: The right kidney is grossly unremarkable without evidence of hydronephrosis. Right kidney measures 11.4 cm in length. PANCREAS:  Visualized portions of the pancreas are unremarkable. OTHER: No evidence of right upper quadrant ascites. Unremarkable right upper quadrant ultrasound.      NM MYOCARDIAL SPECT REST EXERCISE OR RX    Result Date: 6/16/2021  Cardiac Perfusion Imaging   Demographics   Patient Name      Familia GRIFFITHS     Date of study        06/16/2021 Date of Birth     1960         Gender               Male   Age               57 year(s)         Race                    Patient Number    [id number]         Room Number          0532   Visit Number      [id number]          Height               71 inches   Corporate ID      [id number]           Weight               290 pounds   Accession Number  [id number]                                        [address] [facility]   Ordering          Molly Mcdaniels KANIKA Interpreting         Abi Jones  Physician                            Cardiologist         MD   Conclusions   Summary  Decreased uptake inferiorly due to diaphragmatic artifact, otherwise  Normal tracer uptake in all segments of myocardium on stress and rest  images. Apical thinning noted  No infarct or ischemia noted. EF 44 %   Recommendation  Borderline low LVEF  Will obtain TTE  Normal Stress test otherwise  Medical Management   Signatures   ------------------------------------------------------------------  Electronically signed by Abi Jones MD (Interpreting  cardiologist) on 06/16/2021 at 11:55  ------------------------------------------------------------------  Procedure Procedure Type:   Nuclear Stress Test:Pharmacological, Myocardial Perfusion Imaging with  Pharm, NM MYOCARDIAL SPECT REST EXERCISE OR RX  Indications: Abnormal rest ECG. Risk Factors   The patient risk factors include:prior PCI;obesity, hypertension, diabetes  mellitus and chronic lung disease. Stress Protocols   Resting ECG  sinus rhythm with premature supraventricular complexes   Resting HR:77 bpm         Resting BP:148/85 mmHg  Stress Protocol:Pharmacologic - Lexiscan  Peak HR:81 bpm                   HR/BP product:73042  Peak BP:129/90 mmHg  Predicted HR: 158 bpm  % of predicted HR: 51   Exercise duration: 02:39 min  Reason for termination:Completed   ECG Findings  Normal sinus rhythm. Symptoms  No symptoms with Lexiscan infusion.    Stress Interpretation  Appropriate hemodynamic response to Lexiscan. No  significant ST T wave changes with adenosine. ECG  portion is negative for ischemia by diagnostic  criteria. Procedure Medications   - Lexiscan I.V. bolus (over 15sec.) 0.4 mg admininstered @ 06/16/2021 09:15. Imaging Protocols   Rest                             Stress   Isotope:Sestamibi 99mTc          Isotope: Sestamibi 99mTc  Isotope dose:11 mCi              Isotope dose:32.2 mCi  Administration route: I.V. Administration route: I.V. Injection Date:06/16/2021 07:20  Injection Date:06/16/2021 09:15  Scan Date:06/16/2021 08:05       Scan Date:06/16/2021 10:00   Technique:        SPECT          Technique:        Gated                                                     SPECT   Procedure Description   Upon patient arrival, the patient is identified using two identifiers and  the physician order is verified. An IV is established and 8-11mCi of 99mTc  Sestamibi is intravenously injected and followed with 10mL 0.9% Normal  Saline flush. A circulation period of 45 minutes occurs prior to resting  SPECT imaging. After imaging is complete the patient is escorted to the  stress lab. The patient is connected to the ECG and blood pressure is  measured. The RN starts the stress portion of the exam and rapidly  intravenously injects Lexiscan (regadenosine) 0.4mg over a period of 10 to15  seconds and follows with 5mL 0.9% Normal Saline flush. Immediately following  the Nuclear Technologist intravenously injects 22-33mCi of 99mTc Sestamibi  and 5mL 0.9% Normal Saline flush. After completion, recovery, and removal of  the IV, the patient rests during the second circulation period of 45  minutes. Final stress SPECT gated imaging is performed. The patient may  return home or to their room after stress imaging. The images are processed  and final charting is completed and sent to the appropriate cardiologist for  interpretation and reporting.    Perfusion Interpretation   Decreased uptake inferiorly due to diaphragmatic artifact, otherwise  Normal tracer uptake in all segments of myocardium on stress and rest  images. No infarct or ischemia noted. EF 44 %  Imaging Results    Summed scores     - Summed stress score: 14     - Summed rest score: 7     - Summed difference score:    5   Rest ejection  Ejection fraction:44 %  EDV :187 ml  ESV :104 ml  Stroke volume :83 ml  Medical History   Accession#:  [id number]  Admission Data Admission date: 06/11/2021 Admission Time: 10:40 Hospital Status: Inpatient. NM MYOCARDIAL SPECT REST EXERCISE OR RX    Result Date: 6/14/2021  Cardiac Perfusion Imaging   Demographics   Patient Name      Oz GRIFFITHS     Date of study        06/11/2021   Date of Birth     1960         Gender               Male   Age               57 year(s)         Race                    Patient Number    [id number]           Room Number   Visit Number      [id number]          Height               71 inches   Corporate ID      [id number]           Weight               290 pounds   Accession Number  [id number]                                        NM Technologist      Vesna Medley, VIOLETTAMT   Ordering          [facility]  Physician         Damien Roberts MD      Cardiologist         Damien Roberts MD   The procedure was explained in detail to the patient. Risks,  complications and alternative treatments were reviewed. Written consent  was obtained. Conclusions   Summary  Supervising physician Dr. Steffen Abreu .  abnromal stress test, Myocardial perfusion scan shows small size,  severeintensity,perfusion defect in apical wall, rest images were not  available to compare  When obtaining rest EKG for Nuclear Stress Test patients heart rate was  elevated. Tracy checked with Dr Steffen Abreu and he stated if the patients heart  rate did not come down by the end of the stress test to send him to the ER.   Stress pictures were done before patient went to the ER. Recommendation  recommend office visit and rest images if possible to compare perfusion  images   Signatures   ------------------------------------------------------------------  Electronically signed by Mac Palma MD  (Interpreting cardiologist) on 06/14/2021 at 09:32  ------------------------------------------------------------------  Procedure Procedure Type:   Limited Nuclear Stress Test  Indications: Dyspnea, Chest pain, abnormal rest ECG and CAD. Risk Factors   The patient risk factors include:prior PCI;obesity, former tobacco use,  family history of premature CAD and prior MI . Stress Protocols   Resting ECG  Abnormal @ Rest.;  Atrial fibrillation; Resting HR:146 bpm  Resting BP:120/76 mmHg  Stress Protocol:Pharmacologic - Lexiscan  Peak HR:176 bpm                           HR response: Appropriate  Peak BP:90/60 mmHg                        BP response: Normal resting BP -  Predicted HR: 158 bpm                     appropriate response  % of predicted HR: 111                    HR/BP product:22351   Exercise duration: 01:01 min  Reason for termination:Reached diagnostic  endpoint   ECG Findings  afib RVR   Arrhythmias  ventricular premature beats-isolated; Symptoms  Shortness of breath. Dizziness. Chest pain. 5 out of 10   Complications  Procedure complication was none. Procedure Medications   - Lexiscan I.V. bolus (over 15sec.) 0.4 mg admininstered @ 06/11/2021 08:55.   - Aminophylline I.V. 50 mg admininstered @ 06/11/2021 08:57.   Imaging Protocols           Stress           Isotope: Sestamibi 99mTc          Isotope dose:9.6 mCi          Administration route: I.V. Rt. arm          Injection Date:06/11/2021 08:56          Scan Date:06/11/2021 09:26           Technique:          SPECT   Perfusion Interpretation   Myocardial perfusion scan shows small size, severeintensity,perfusion defect  in apical wall, rest images were not available to compare Imaging Results Visualization: Good Medical History   Accession#:  [id number]  Admission Data Admission date: 06/11/2021 Admission Time: 08:24 Hospital Status: Outpatient. All labs, medications and tests reviewed by myself including data  from outside source , patient and available family . Continue all other medications of all above medical condition listed as is. Impression:  Active Problems:    Atrial fibrillation with rapid ventricular response (HCC)    Coronary artery disease involving native coronary artery of native heart without angina pectoris    GI bleed    History of myasthenia gravis  Resolved Problems:    * No resolved hospital problems. *      Assessment: 57 y. o.year old with PMH of  has a past medical history of Atrial fibrillation, chronic (HCC), COPD (chronic obstructive pulmonary disease) ([facility] [address] Coronary artery disease involving native coronary artery of native heart without angina pectoris, H/O echocardiogram, Heart disease, History of blood transfusion, Hx of blood clots, Hx of cardiovascular stress test, Hx of Doppler ultrasound, Hypertension, Myasthenia gravis ([facility] [address] NSTEMI (non-ST elevated myocardial infarction) ([facility] [address] Post PTCA, TIA (transient ischemic attack), and Ulcerative colitis ([facility] [address] Plan and Recommendations:    A. fib continue rate control for now start amiodarone drip continue IV digoxin and metoprolol hold anticoagulation due to concerns for GI bleeding, give 1 more unit of blood to get hemoglobin above 8  Start amiodarone drip may convert into sinus rhythm? Coronary artery disease history of stent 6 more than 6 months ago , now presenting with GI bleeding anemia stop Plavix and switch to aspirin once his hematocrit is stable and cleared by CV surgery  Myasthenia gravis work-up as per primary team  Start on iron replacement  DVT prophylaxis if no contraindication  6.    Dyslipidemia: continue statins           Thank you  much for consult and giving us the opportunity in contributing in the care of this patient. Please feel free to call me for any questions.        Gifty Mcneil MD, 7/4/2021 12:28 PM

## 2021-07-05 ENCOUNTER — APPOINTMENT (OUTPATIENT)
Dept: CT IMAGING | Age: 61
DRG: 171 | End: 2021-07-05
Payer: MEDICAID

## 2021-07-05 LAB
ABO/RH: NORMAL
ANION GAP SERPL CALCULATED.3IONS-SCNC: 10 MMOL/L (ref 4–16)
ANION GAP SERPL CALCULATED.3IONS-SCNC: 11 MMOL/L (ref 4–16)
ANTIBODY SCREEN: NEGATIVE
BACTERIA: NEGATIVE /HPF
BASOPHILS ABSOLUTE: 0 K/CU MM
BASOPHILS RELATIVE PERCENT: 0.5 % (ref 0–1)
BILIRUBIN URINE: NEGATIVE MG/DL
BLOOD, URINE: ABNORMAL
BUN BLDV-MCNC: 10 MG/DL (ref 6–23)
BUN BLDV-MCNC: 8 MG/DL (ref 6–23)
CALCIUM SERPL-MCNC: 8.5 MG/DL (ref 8.3–10.6)
CALCIUM SERPL-MCNC: 8.9 MG/DL (ref 8.3–10.6)
CHLORIDE BLD-SCNC: 106 MMOL/L (ref 99–110)
CHLORIDE BLD-SCNC: 109 MMOL/L (ref 99–110)
CLARITY: CLEAR
CO2: 21 MMOL/L (ref 21–32)
CO2: 22 MMOL/L (ref 21–32)
COLOR: YELLOW
COMPONENT: NORMAL
CREAT SERPL-MCNC: 1 MG/DL (ref 0.9–1.3)
CREAT SERPL-MCNC: 1 MG/DL (ref 0.9–1.3)
CROSSMATCH RESULT: NORMAL
DIFFERENTIAL TYPE: ABNORMAL
EKG ATRIAL RATE: 78 BPM
EKG DIAGNOSIS: NORMAL
EKG Q-T INTERVAL: 336 MS
EKG QRS DURATION: 130 MS
EKG QTC CALCULATION (BAZETT): 496 MS
EKG R AXIS: 55 DEGREES
EKG T AXIS: 4 DEGREES
EKG VENTRICULAR RATE: 131 BPM
EOSINOPHILS ABSOLUTE: 0.2 K/CU MM
EOSINOPHILS RELATIVE PERCENT: 2.5 % (ref 0–3)
GFR AFRICAN AMERICAN: >60 ML/MIN/1.73M2
GFR AFRICAN AMERICAN: >60 ML/MIN/1.73M2
GFR NON-AFRICAN AMERICAN: >60 ML/MIN/1.73M2
GFR NON-AFRICAN AMERICAN: >60 ML/MIN/1.73M2
GLUCOSE BLD-MCNC: 139 MG/DL (ref 70–99)
GLUCOSE BLD-MCNC: 159 MG/DL (ref 70–99)
GLUCOSE, URINE: NEGATIVE MG/DL
HCT VFR BLD CALC: 28.1 % (ref 42–52)
HCT VFR BLD CALC: 29.2 % (ref 42–52)
HCT VFR BLD CALC: 29.6 % (ref 42–52)
HEMOGLOBIN: 8.8 GM/DL (ref 13.5–18)
HEMOGLOBIN: 9 GM/DL (ref 13.5–18)
HEMOGLOBIN: 9.3 GM/DL (ref 13.5–18)
HYALINE CASTS: 0 /LPF
IMMATURE NEUTROPHIL %: 0.3 % (ref 0–0.43)
KETONES, URINE: NEGATIVE MG/DL
LEUKOCYTE ESTERASE, URINE: NEGATIVE
LYMPHOCYTES ABSOLUTE: 1 K/CU MM
LYMPHOCYTES RELATIVE PERCENT: 13.8 % (ref 24–44)
MCH RBC QN AUTO: 27.4 PG (ref 27–31)
MCH RBC QN AUTO: 27.8 PG (ref 27–31)
MCHC RBC AUTO-ENTMCNC: 30.8 % (ref 32–36)
MCHC RBC AUTO-ENTMCNC: 31.3 % (ref 32–36)
MCV RBC AUTO: 88.6 FL (ref 78–100)
MCV RBC AUTO: 88.8 FL (ref 78–100)
MONOCYTES ABSOLUTE: 0.6 K/CU MM
MONOCYTES RELATIVE PERCENT: 8.6 % (ref 0–4)
MUCUS: ABNORMAL HPF
NITRITE URINE, QUANTITATIVE: NEGATIVE
NUCLEATED RBC %: 0.3 %
PDW BLD-RTO: 17.2 % (ref 11.7–14.9)
PDW BLD-RTO: 17.3 % (ref 11.7–14.9)
PH, URINE: 5 (ref 5–8)
PLATELET # BLD: 241 K/CU MM (ref 140–440)
PLATELET # BLD: 241 K/CU MM (ref 140–440)
PMV BLD AUTO: 9.5 FL (ref 7.5–11.1)
PMV BLD AUTO: 9.7 FL (ref 7.5–11.1)
POTASSIUM SERPL-SCNC: 4.1 MMOL/L (ref 3.5–5.1)
POTASSIUM SERPL-SCNC: 4.1 MMOL/L (ref 3.5–5.1)
PROTEIN UA: NEGATIVE MG/DL
RBC # BLD: 3.17 M/CU MM (ref 4.6–6.2)
RBC # BLD: 3.29 M/CU MM (ref 4.6–6.2)
RBC URINE: <1 /HPF (ref 0–3)
SEGMENTED NEUTROPHILS ABSOLUTE COUNT: 5.5 K/CU MM
SEGMENTED NEUTROPHILS RELATIVE PERCENT: 74.3 % (ref 36–66)
SODIUM BLD-SCNC: 137 MMOL/L (ref 135–145)
SODIUM BLD-SCNC: 142 MMOL/L (ref 135–145)
SPECIFIC GRAVITY UA: 1.02 (ref 1–1.03)
SQUAMOUS EPITHELIAL: <1 /HPF
STATUS: NORMAL
TOTAL IMMATURE NEUTOROPHIL: 0.02 K/CU MM
TOTAL NUCLEATED RBC: 0 K/CU MM
TRANSFUSION STATUS: NORMAL
TRICHOMONAS: ABNORMAL /HPF
UNIT DIVISION: 0
UNIT NUMBER: NORMAL
UROBILINOGEN, URINE: NEGATIVE MG/DL (ref 0.2–1)
WBC # BLD: 7.1 K/CU MM (ref 4–10.5)
WBC # BLD: 7.5 K/CU MM (ref 4–10.5)
WBC UA: 1 /HPF (ref 0–2)

## 2021-07-05 PROCEDURE — 2500000003 HC RX 250 WO HCPCS: Performed by: INTERNAL MEDICINE

## 2021-07-05 PROCEDURE — C9113 INJ PANTOPRAZOLE SODIUM, VIA: HCPCS | Performed by: SPECIALIST

## 2021-07-05 PROCEDURE — 74176 CT ABD & PELVIS W/O CONTRAST: CPT

## 2021-07-05 PROCEDURE — 94640 AIRWAY INHALATION TREATMENT: CPT

## 2021-07-05 PROCEDURE — 85014 HEMATOCRIT: CPT

## 2021-07-05 PROCEDURE — 99231 SBSQ HOSP IP/OBS SF/LOW 25: CPT | Performed by: SPECIALIST

## 2021-07-05 PROCEDURE — 6370000000 HC RX 637 (ALT 250 FOR IP): Performed by: INTERNAL MEDICINE

## 2021-07-05 PROCEDURE — 80048 BASIC METABOLIC PNL TOTAL CA: CPT

## 2021-07-05 PROCEDURE — 94761 N-INVAS EAR/PLS OXIMETRY MLT: CPT

## 2021-07-05 PROCEDURE — 85027 COMPLETE CBC AUTOMATED: CPT

## 2021-07-05 PROCEDURE — 6360000002 HC RX W HCPCS: Performed by: SPECIALIST

## 2021-07-05 PROCEDURE — 2580000003 HC RX 258: Performed by: INTERNAL MEDICINE

## 2021-07-05 PROCEDURE — 93010 ELECTROCARDIOGRAM REPORT: CPT | Performed by: INTERNAL MEDICINE

## 2021-07-05 PROCEDURE — 76937 US GUIDE VASCULAR ACCESS: CPT

## 2021-07-05 PROCEDURE — 99255 IP/OBS CONSLTJ NEW/EST HI 80: CPT | Performed by: SURGERY

## 2021-07-05 PROCEDURE — 6360000002 HC RX W HCPCS: Performed by: INTERNAL MEDICINE

## 2021-07-05 PROCEDURE — 36410 VNPNXR 3YR/> PHY/QHP DX/THER: CPT

## 2021-07-05 PROCEDURE — C1751 CATH, INF, PER/CENT/MIDLINE: HCPCS

## 2021-07-05 PROCEDURE — 36415 COLL VENOUS BLD VENIPUNCTURE: CPT

## 2021-07-05 PROCEDURE — 2060000000 HC ICU INTERMEDIATE R&B

## 2021-07-05 PROCEDURE — 85018 HEMOGLOBIN: CPT

## 2021-07-05 PROCEDURE — 85025 COMPLETE CBC W/AUTO DIFF WBC: CPT

## 2021-07-05 PROCEDURE — 93005 ELECTROCARDIOGRAM TRACING: CPT | Performed by: INTERNAL MEDICINE

## 2021-07-05 PROCEDURE — 81001 URINALYSIS AUTO W/SCOPE: CPT

## 2021-07-05 PROCEDURE — 99233 SBSQ HOSP IP/OBS HIGH 50: CPT | Performed by: INTERNAL MEDICINE

## 2021-07-05 RX ORDER — METOPROLOL SUCCINATE 25 MG/1
25 TABLET, EXTENDED RELEASE ORAL 2 TIMES DAILY
Status: DISCONTINUED | OUTPATIENT
Start: 2021-07-05 | End: 2021-07-08

## 2021-07-05 RX ORDER — DIGOXIN 125 MCG
125 TABLET ORAL DAILY
Status: DISCONTINUED | OUTPATIENT
Start: 2021-07-06 | End: 2021-07-09 | Stop reason: HOSPADM

## 2021-07-05 RX ADMIN — PREDNISONE 13 MG: 1 TABLET ORAL at 17:06

## 2021-07-05 RX ADMIN — PANTOPRAZOLE SODIUM 40 MG: 40 INJECTION, POWDER, FOR SOLUTION INTRAVENOUS at 05:55

## 2021-07-05 RX ADMIN — PYRIDOSTIGMINE BROMIDE 60 MG: 60 TABLET ORAL at 22:00

## 2021-07-05 RX ADMIN — IPRATROPIUM BROMIDE AND ALBUTEROL SULFATE 1 AMPULE: .5; 3 SOLUTION RESPIRATORY (INHALATION) at 20:42

## 2021-07-05 RX ADMIN — SODIUM CHLORIDE, PRESERVATIVE FREE 10 ML: 5 INJECTION INTRAVENOUS at 08:25

## 2021-07-05 RX ADMIN — DEXTROSE MONOHYDRATE 5 MG/HR: 50 INJECTION, SOLUTION INTRAVENOUS at 17:42

## 2021-07-05 RX ADMIN — METOPROLOL TARTRATE 5 MG: 1 INJECTION, SOLUTION INTRAVENOUS at 00:25

## 2021-07-05 RX ADMIN — PYRIDOSTIGMINE BROMIDE 60 MG: 60 TABLET ORAL at 11:13

## 2021-07-05 RX ADMIN — METOPROLOL TARTRATE 5 MG: 1 INJECTION, SOLUTION INTRAVENOUS at 05:55

## 2021-07-05 RX ADMIN — IPRATROPIUM BROMIDE AND ALBUTEROL SULFATE 1 AMPULE: .5; 3 SOLUTION RESPIRATORY (INHALATION) at 11:51

## 2021-07-05 RX ADMIN — RIVAROXABAN 20 MG: 20 TABLET, FILM COATED ORAL at 18:32

## 2021-07-05 RX ADMIN — IPRATROPIUM BROMIDE AND ALBUTEROL SULFATE 1 AMPULE: .5; 3 SOLUTION RESPIRATORY (INHALATION) at 15:52

## 2021-07-05 RX ADMIN — DIGOXIN 125 MCG: 0.25 INJECTION INTRAMUSCULAR; INTRAVENOUS at 08:26

## 2021-07-05 RX ADMIN — METOPROLOL TARTRATE 5 MG: 1 INJECTION, SOLUTION INTRAVENOUS at 13:10

## 2021-07-05 RX ADMIN — PYRIDOSTIGMINE BROMIDE 60 MG: 60 TABLET ORAL at 16:30

## 2021-07-05 RX ADMIN — PYRIDOSTIGMINE BROMIDE 60 MG: 60 TABLET ORAL at 05:55

## 2021-07-05 RX ADMIN — METOPROLOL SUCCINATE 25 MG: 25 TABLET, EXTENDED RELEASE ORAL at 22:00

## 2021-07-05 RX ADMIN — AMIODARONE HYDROCHLORIDE 0.5 MG/MIN: 50 INJECTION, SOLUTION INTRAVENOUS at 16:30

## 2021-07-05 RX ADMIN — IPRATROPIUM BROMIDE AND ALBUTEROL SULFATE 1 AMPULE: .5; 3 SOLUTION RESPIRATORY (INHALATION) at 07:46

## 2021-07-05 ASSESSMENT — PAIN SCALES - GENERAL
PAINLEVEL_OUTOF10: 0

## 2021-07-05 NOTE — PROGRESS NOTES
Ileostomy output not bloody  Still complaining of RLQ pain when he bends over, etc  Abdomen soft, non-tender, non-distended- RLQ palpated - no hernia, mass, or focal tenderness  H/H and BUN stable  IMPRESSION:   1) history of total colectomy with ileostomy   2) recent drop in Hb but no overt bleeding noted now-- ? Duodenal ulcer bled previously and rebled but now stopped? --  no poor prognostic signs at EGD      3) RLQ pain- ?  Hernia that I cant feel  Plan:   1) prob can go back on cautious anticoag   2) continue PPI   3) may want surgical consult to exclude hernia RLQ

## 2021-07-05 NOTE — PROGRESS NOTES
Hospitalist ICU Progress Note      Name:  Peter Fernandes /Age/Sex: 1960  (57 y.o. male)   MRN & CSN:  [id number] & [id number] Admission Date/Time: 7/3/2021 12:10 PM   Location:  -A PCP: No primary care provider on file. Hospital Day: 3    Assessment and Plan:   58 y/o M that presented with acute blood loss anemia    Acute blood loss anemia  Hx of Duodenal Ulcer  Hx Ulcerative Colitis s/p Total Abdominal Colectomy  - Hgb stable this AM  - s/p 3 upRBC overnight from 5.2 to 7.8 on 7/3; was also given [facility] as patient is on Xarelto at home for atrial fibrillation  - EGD on  with no active bleeding or visible vessel  - Trending Hgb  - Protonix  - Restart Xarelto today and monitor Hgb closely    Hx of CAD  Afib with RVR  Hx of Paroxysmal Atrial Fibrillatin  - Noted to be in afib/flutter in office prior to coming to ED  - Continue IV Amiodarone, Metoprolol and Digoxin  - Holding Plavix; plan to restart ASA if hgb remains stable after Xarelto resumed this morning  - Cardiology following, appreciate recs; may need TONIO Cardioversion; failed cardioversion less than a month ago    Hx of COPD  - Continue neb treatments  - Resting comfortably on room air currently    Hernia?  - Patient states he has a history of hernia; CT in feb showed a parastomal hernia  - Today told both GI and myself that he has been having continued pain around this site since admission (did not mention yesterday). General Surgery consulted, appreciate recs    Myasthenia gravis   - Home meds resumed including IM med that is undergoing trials at [facility]; patient takes daily at home so sent to pharmacy; follows closely with [facility]    Morbid obesity diet and exercise counseling  ? Diet ADULT DIET;  Regular   DVT Prophylaxis [] Lovenox, []  Heparin, [] SCDs, [] Ambulation   GI Prophylaxis [] PPI,  [] H2 Blocker,  [] Carafate,  [] Diet/Tube Feeds   Code Status Full Code   Disposition Patient requires continued admission due to    MDM

## 2021-07-05 NOTE — PROGRESS NOTES
0915: Perfect serve consult sent to general surgery Navdeep Miller MD \"Dr. Nani Grady consulted you after GI assessed patient, consult for hx parastomal hernia per patient; with continued RLQ abdominal pain\"    1025: Navdeep Miller MD at bedside, orders CT w/o contrast to r/o hernia    1029: \"Patient continues to be tachycardic, he appears to be in sinus tach. Digoxin was given and had no effect on the heart rate. Patient still receiving IV amio and metoprolol. Is there anything else you would like to try? \" perfect serve sent to Brenda Conway MD, no orders received at this time     [id number]: [phone]: Called CT to see if we can get patient into CT, Shy Casanova in CT states \"swamped, attempting to get ER CT completed and will call when we have a time slot for patient\"

## 2021-07-05 NOTE — PROGRESS NOTES
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 o.year old who is admitted for  Chief Complaint   Patient presents with    Fatigue     told yesterday he is back in a-fib, denies SOB or chest pain       Assessment / Plan:  A. fib flutter heart rate in 130s s/p cardioversion but failed therapy restart anticoagulation now if cleared by gastroenterology may need TONIO cardioversion again we will request EPS evaluation  Failed cardioversion he was cardioverted less than a month ago. Heart rate in 130s noted at neurology office but he is not symptomatic  Elevated Troponin : will continue medical management for now.    Echo shows EF of 50% with moderate MR  S/p PCI in December stop Plavix can start aspirin if he is tolerating anticoagulation  GI bleeding has gastric ulcer but not actively bleeding on Protonix  History of myasthenia gravis on prednisone and pyridostigmine  HTN: stable, continue To titrate up medication as needed  DVT Prophylaxis if no contraindication    Past medical history:    has a past medical history of Atrial fibrillation, chronic (HCC), COPD (chronic obstructive pulmonary disease) ([facility] [address] Coronary artery disease involving native coronary artery of native heart without angina pectoris, H/O echocardiogram, Heart disease, History of blood transfusion, Hx of blood clots, Hx of cardiovascular stress test, Hx of Doppler ultrasound, Hypertension, Myasthenia gravis ([facility] [address] NSTEMI (non-ST elevated myocardial deformities noted. Back- No tenderness. Integument:  Warm, Dry, No erythema, No rash. Lymphatic:  No lymphadenopathy noted. Neurologic:  Alert & oriented x 3, Normal motor function, Normal sensory function, No focal deficits noted. Psychiatric:  Affect  and  Mood :no change    Medications:    predniSONE  13 mg Oral Daily    lidocaine PF  5 mL Intradermal Once    sodium chloride flush  5-40 mL Intravenous 2 times per day    pyridostigmine  60 mg Oral 5x Daily    Investigational - Injection  32.4 mg Subcutaneous Daily    ipratropium-albuterol  1 ampule Inhalation Q4H WA    digoxin  125 mcg Intravenous Daily    metoprolol (LOPRESSOR) ivpb  5 mg Intravenous Q6H    pantoprazole  40 mg Intravenous Daily      sodium chloride      sodium chloride      sodium chloride      sodium chloride      sodium chloride       sodium chloride, sodium chloride flush, sodium chloride, sodium chloride, sodium chloride, ondansetron **OR** ondansetron, acetaminophen **OR** acetaminophen, morphine, sodium chloride    Lab Data:  CBC:   Recent Labs     07/03/21  1238 07/04/21  0210 07/04/21  1540 07/05/21  0012 07/05/21  0945   WBC 7.2  --   --  7.1 7.5   HGB 5.2*   < > 8.8* 8.8* 9.0*   HCT 18.5*   < > 28.3* 28.1* 29.2*   MCV 94.4  --   --  88.6 88.8     --   --  241 241    < > = values in this interval not displayed. BMP:   Recent Labs     07/04/21  0845 07/05/21  0012 07/05/21  0945    137 142   K 4.4 4.1 4.1    106 109   CO2 22 21 22   BUN 13 10 8   CREATININE 1.0 1.0 1.0     PT/INR:   Recent Labs     07/03/21  1238   PROTIME 21.3*   INR 1.64     BNP:    Recent Labs     07/03/21  1238   PROBNP 1,879*     TROPONIN:   Recent Labs     07/03/21  1238   TROPONINT 0.021*        ECHO :   echocardiogram    Assessment:  57 y. o.year old who is admitted for  Chief Complaint   Patient presents with    Fatigue     told yesterday he is back in a-fib, denies SOB or chest pain     , active issues as noted below:  Impression:  Active Problems:    Atrial fibrillation with rapid ventricular response (HCC)    Coronary artery disease involving native coronary artery of native heart without angina pectoris    GI bleed    History of myasthenia gravis  Resolved Problems:    * No resolved hospital problems. *            All labs, medications and tests reviewed by myself , continue all other medications of all above medical condition listed as is except for changes mentioned above. Thank you very much for consult , please call with questions.     Praneeth Estrada MD, MD 7/5/2021 3:10 PM

## 2021-07-06 LAB
ANION GAP SERPL CALCULATED.3IONS-SCNC: 10 MMOL/L (ref 4–16)
BASOPHILS ABSOLUTE: 0 K/CU MM
BASOPHILS RELATIVE PERCENT: 0.3 % (ref 0–1)
BUN BLDV-MCNC: 6 MG/DL (ref 6–23)
CALCIUM SERPL-MCNC: 8.8 MG/DL (ref 8.3–10.6)
CHLORIDE BLD-SCNC: 108 MMOL/L (ref 99–110)
CO2: 22 MMOL/L (ref 21–32)
CREAT SERPL-MCNC: 0.9 MG/DL (ref 0.9–1.3)
DIFFERENTIAL TYPE: ABNORMAL
EKG ATRIAL RATE: 44 BPM
EKG DIAGNOSIS: NORMAL
EKG Q-T INTERVAL: 478 MS
EKG QRS DURATION: 128 MS
EKG QTC CALCULATION (BAZETT): 408 MS
EKG R AXIS: 144 DEGREES
EKG T AXIS: 269 DEGREES
EKG VENTRICULAR RATE: 44 BPM
EOSINOPHILS ABSOLUTE: 0.1 K/CU MM
EOSINOPHILS RELATIVE PERCENT: 1.6 % (ref 0–3)
GFR AFRICAN AMERICAN: >60 ML/MIN/1.73M2
GFR NON-AFRICAN AMERICAN: >60 ML/MIN/1.73M2
GLUCOSE BLD-MCNC: 144 MG/DL (ref 70–99)
HCT VFR BLD CALC: 29.1 % (ref 42–52)
HCT VFR BLD CALC: 31.4 % (ref 42–52)
HCT VFR BLD CALC: 35.2 % (ref 42–52)
HEMOGLOBIN: 10.7 GM/DL (ref 13.5–18)
HEMOGLOBIN: 8.9 GM/DL (ref 13.5–18)
HEMOGLOBIN: 9.5 GM/DL (ref 13.5–18)
IMMATURE NEUTROPHIL %: 0.3 % (ref 0–0.43)
LV EF: 50 %
LVEF MODALITY: NORMAL
LYMPHOCYTES ABSOLUTE: 1 K/CU MM
LYMPHOCYTES RELATIVE PERCENT: 11.3 % (ref 24–44)
MCH RBC QN AUTO: 27.4 PG (ref 27–31)
MCHC RBC AUTO-ENTMCNC: 30.6 % (ref 32–36)
MCV RBC AUTO: 89.5 FL (ref 78–100)
MONOCYTES ABSOLUTE: 0.7 K/CU MM
MONOCYTES RELATIVE PERCENT: 8.4 % (ref 0–4)
NUCLEATED RBC %: 0 %
PDW BLD-RTO: 17.1 % (ref 11.7–14.9)
PLATELET # BLD: 231 K/CU MM (ref 140–440)
PMV BLD AUTO: 9.6 FL (ref 7.5–11.1)
POTASSIUM SERPL-SCNC: 4.5 MMOL/L (ref 3.5–5.1)
RBC # BLD: 3.25 M/CU MM (ref 4.6–6.2)
SEGMENTED NEUTROPHILS ABSOLUTE COUNT: 6.7 K/CU MM
SEGMENTED NEUTROPHILS RELATIVE PERCENT: 78.1 % (ref 36–66)
SODIUM BLD-SCNC: 140 MMOL/L (ref 135–145)
TOTAL IMMATURE NEUTOROPHIL: 0.03 K/CU MM
TOTAL NUCLEATED RBC: 0 K/CU MM
WBC # BLD: 8.6 K/CU MM (ref 4–10.5)

## 2021-07-06 PROCEDURE — 93005 ELECTROCARDIOGRAM TRACING: CPT | Performed by: INTERNAL MEDICINE

## 2021-07-06 PROCEDURE — 7100000000 HC PACU RECOVERY - FIRST 15 MIN

## 2021-07-06 PROCEDURE — 6360000002 HC RX W HCPCS: Performed by: INTERNAL MEDICINE

## 2021-07-06 PROCEDURE — 94761 N-INVAS EAR/PLS OXIMETRY MLT: CPT

## 2021-07-06 PROCEDURE — 85018 HEMOGLOBIN: CPT

## 2021-07-06 PROCEDURE — 92960 CARDIOVERSION ELECTRIC EXT: CPT

## 2021-07-06 PROCEDURE — 2500000003 HC RX 250 WO HCPCS: Performed by: INTERNAL MEDICINE

## 2021-07-06 PROCEDURE — 99231 SBSQ HOSP IP/OBS SF/LOW 25: CPT | Performed by: SPECIALIST

## 2021-07-06 PROCEDURE — 80048 BASIC METABOLIC PNL TOTAL CA: CPT

## 2021-07-06 PROCEDURE — 6370000000 HC RX 637 (ALT 250 FOR IP): Performed by: INTERNAL MEDICINE

## 2021-07-06 PROCEDURE — 5A2204Z RESTORATION OF CARDIAC RHYTHM, SINGLE: ICD-10-PCS | Performed by: INTERNAL MEDICINE

## 2021-07-06 PROCEDURE — 6370000000 HC RX 637 (ALT 250 FOR IP): Performed by: HOSPITALIST

## 2021-07-06 PROCEDURE — 85025 COMPLETE CBC W/AUTO DIFF WBC: CPT

## 2021-07-06 PROCEDURE — 93312 ECHO TRANSESOPHAGEAL: CPT

## 2021-07-06 PROCEDURE — 93010 ELECTROCARDIOGRAM REPORT: CPT | Performed by: INTERNAL MEDICINE

## 2021-07-06 PROCEDURE — 99233 SBSQ HOSP IP/OBS HIGH 50: CPT | Performed by: SURGERY

## 2021-07-06 PROCEDURE — 85027 COMPLETE CBC AUTOMATED: CPT

## 2021-07-06 PROCEDURE — 93312 ECHO TRANSESOPHAGEAL: CPT | Performed by: INTERNAL MEDICINE

## 2021-07-06 PROCEDURE — 2060000000 HC ICU INTERMEDIATE R&B

## 2021-07-06 PROCEDURE — 99291 CRITICAL CARE FIRST HOUR: CPT | Performed by: INTERNAL MEDICINE

## 2021-07-06 PROCEDURE — 7100000001 HC PACU RECOVERY - ADDTL 15 MIN

## 2021-07-06 PROCEDURE — 36415 COLL VENOUS BLD VENIPUNCTURE: CPT

## 2021-07-06 PROCEDURE — 6360000002 HC RX W HCPCS: Performed by: SPECIALIST

## 2021-07-06 PROCEDURE — 2580000003 HC RX 258: Performed by: INTERNAL MEDICINE

## 2021-07-06 PROCEDURE — C9113 INJ PANTOPRAZOLE SODIUM, VIA: HCPCS | Performed by: SPECIALIST

## 2021-07-06 PROCEDURE — 92960 CARDIOVERSION ELECTRIC EXT: CPT | Performed by: INTERNAL MEDICINE

## 2021-07-06 PROCEDURE — 94640 AIRWAY INHALATION TREATMENT: CPT

## 2021-07-06 PROCEDURE — 6360000002 HC RX W HCPCS

## 2021-07-06 PROCEDURE — 85014 HEMATOCRIT: CPT

## 2021-07-06 RX ORDER — DOPAMINE HYDROCHLORIDE 160 MG/100ML
INJECTION, SOLUTION INTRAVENOUS
Status: COMPLETED
Start: 2021-07-06 | End: 2021-07-06

## 2021-07-06 RX ORDER — DOPAMINE HYDROCHLORIDE 160 MG/100ML
2-20 INJECTION, SOLUTION INTRAVENOUS CONTINUOUS
Status: DISCONTINUED | OUTPATIENT
Start: 2021-07-06 | End: 2021-07-08

## 2021-07-06 RX ORDER — ASPIRIN 81 MG/1
81 TABLET, CHEWABLE ORAL DAILY
Status: DISCONTINUED | OUTPATIENT
Start: 2021-07-06 | End: 2021-07-09 | Stop reason: HOSPADM

## 2021-07-06 RX ORDER — NALOXONE HYDROCHLORIDE 0.4 MG/ML
0.4 INJECTION, SOLUTION INTRAMUSCULAR; INTRAVENOUS; SUBCUTANEOUS PRN
Status: DISCONTINUED | OUTPATIENT
Start: 2021-07-06 | End: 2021-07-09 | Stop reason: HOSPADM

## 2021-07-06 RX ORDER — IPRATROPIUM BROMIDE AND ALBUTEROL SULFATE 2.5; .5 MG/3ML; MG/3ML
1 SOLUTION RESPIRATORY (INHALATION) EVERY 4 HOURS PRN
Status: DISCONTINUED | OUTPATIENT
Start: 2021-07-06 | End: 2021-07-09 | Stop reason: HOSPADM

## 2021-07-06 RX ORDER — FLUMAZENIL 0.1 MG/ML
0.2 INJECTION, SOLUTION INTRAVENOUS ONCE
Status: COMPLETED | OUTPATIENT
Start: 2021-07-06 | End: 2021-07-06

## 2021-07-06 RX ADMIN — PYRIDOSTIGMINE BROMIDE 60 MG: 60 TABLET ORAL at 10:55

## 2021-07-06 RX ADMIN — ASPIRIN 81 MG: 81 TABLET, CHEWABLE ORAL at 19:02

## 2021-07-06 RX ADMIN — DOPAMINE HYDROCHLORIDE IN DEXTROSE 10 MCG/KG/MIN: 1.6 INJECTION, SOLUTION INTRAVENOUS at 17:33

## 2021-07-06 RX ADMIN — PYRIDOSTIGMINE BROMIDE 60 MG: 60 TABLET ORAL at 04:58

## 2021-07-06 RX ADMIN — DOPAMINE HYDROCHLORIDE IN DEXTROSE 5 MCG/KG/MIN: 1.6 INJECTION, SOLUTION INTRAVENOUS at 13:40

## 2021-07-06 RX ADMIN — PREDNISONE 13 MG: 1 TABLET ORAL at 08:01

## 2021-07-06 RX ADMIN — PYRIDOSTIGMINE BROMIDE 60 MG: 60 TABLET ORAL at 22:59

## 2021-07-06 RX ADMIN — METOPROLOL SUCCINATE 25 MG: 25 TABLET, EXTENDED RELEASE ORAL at 08:01

## 2021-07-06 RX ADMIN — DOPAMINE HYDROCHLORIDE IN DEXTROSE 10 MCG/KG/MIN: 1.6 INJECTION, SOLUTION INTRAVENOUS at 23:02

## 2021-07-06 RX ADMIN — IPRATROPIUM BROMIDE AND ALBUTEROL SULFATE 1 AMPULE: .5; 3 SOLUTION RESPIRATORY (INHALATION) at 07:43

## 2021-07-06 RX ADMIN — DIGOXIN 125 MCG: 125 TABLET ORAL at 08:01

## 2021-07-06 RX ADMIN — SODIUM CHLORIDE, PRESERVATIVE FREE 10 ML: 5 INJECTION INTRAVENOUS at 08:03

## 2021-07-06 RX ADMIN — AMIODARONE HYDROCHLORIDE 0.5 MG/MIN: 50 INJECTION, SOLUTION INTRAVENOUS at 04:52

## 2021-07-06 RX ADMIN — DEXTROSE MONOHYDRATE 15 MG/HR: 50 INJECTION, SOLUTION INTRAVENOUS at 01:00

## 2021-07-06 RX ADMIN — FLUMAZENIL 0.2 MG: 0.1 INJECTION, SOLUTION INTRAVENOUS at 13:28

## 2021-07-06 RX ADMIN — RIVAROXABAN 20 MG: 20 TABLET, FILM COATED ORAL at 08:01

## 2021-07-06 RX ADMIN — PYRIDOSTIGMINE BROMIDE 60 MG: 60 TABLET ORAL at 15:36

## 2021-07-06 RX ADMIN — PYRIDOSTIGMINE BROMIDE 60 MG: 60 TABLET ORAL at 19:02

## 2021-07-06 RX ADMIN — DEXTROSE MONOHYDRATE 15 MG/HR: 50 INJECTION, SOLUTION INTRAVENOUS at 07:53

## 2021-07-06 RX ADMIN — NALXONE HYDROCHLORIDE 0.4 MG: 0.4 INJECTION INTRAMUSCULAR; INTRAVENOUS; SUBCUTANEOUS at 13:28

## 2021-07-06 RX ADMIN — PANTOPRAZOLE SODIUM 40 MG: 40 INJECTION, POWDER, FOR SOLUTION INTRAVENOUS at 04:51

## 2021-07-06 ASSESSMENT — PAIN SCALES - GENERAL
PAINLEVEL_OUTOF10: 0

## 2021-07-06 NOTE — FLOWSHEET NOTE
Upon entering patient's room to assess, patient's wife up out of seat and over by Alaris pump. She then states that the machine was beeping therefore she turned it off. Informed wife that she is never to touch machine and the appropriate thing to do is to let RN know. Informed patient to put on his call light if the machine is beeping and nurse does not come in to assess. Pt voices understanding. New Dopamine bag hung at previous rate. HR rebounding. RN to continue to monitor.

## 2021-07-06 NOTE — RT PROTOCOL NOTE
RT Inhaler-Nebulizer Bronchodilator Protocol Note    There is a bronchodilator order in the chart from a provider indicating to follow the RT Bronchodilator Protocol and there is an Initiate RT Bronchodilator Protocol order as well (see protocol at bottom of note). The findings from the last RT Protocol Assessment were as follows:  Smoking: <15 Pack years  Surgical Status: No surgery  Xray: Clear  Respiratory Pattern: RR 12-20  Mental Status: Alert and Oriented  Breath Sounds: Diminished and/or crackles  Cough: Strong, productive  Activity Level: Walking unassisted  Oxygen Requirement: Room Air - 2LNC/28% or home setting  Indication for Bronchodilator Therapy: On home bronchodilators  Bronchodilator Assessment Score: 3    Aerosolized bronchodilator medication orders have been revised according to the RT Bronchodilator Protocol. RT Inhaler-Nebulizer Bronchodilator Protocol:    Respiratory Therapist to perform RT Therapy Protocol Assessment then follow the protocol. No Indications - adjust the frequency to every 6 hours PRN wheezing or bronchospasm, if no treatments needed after 48 hours then discontinue using Per Protocol order mode. If indication present, adjust the RT bronchodilator orders based on the Bronchodilator Assessment Score as follows:    0-6 - enter or revise RT bronchodilator order to Albuterol Inhaler order with frequency of every 2 hours PRN for wheezing or increased work of breathing using Per Protocol order mode. If Albuterol Inhaler not tolerated or not effective, then discontinue the Albuterol Inhaler order and enter Albuterol Nebulizer order with same frequency and PRN reasons. Repeat RT Therapy Protocol Assessment as needed.     7-10 - discontinue any other Inpatient aerosolized bronchodilator medication orders and enter or revise two Albuterol Inhaler orders, one with BID frequency and one with frequency of every 2 hours PRN wheezing or increased work of breathing using Per

## 2021-07-06 NOTE — PROGRESS NOTES
07/06/21 0747   RT Protocol   Smoking Status 1   Surgical status 0   Xray 0   Respiratory pattern 0   Breath sounds 1   Cough 1   Activity level 0   Oxygen Requirement 0   Indications for Bronchodilator Therapy On home bronchodilators   Bronchodilator Assessment Score 3   Indications for Bronchial Hygiene None   Bronchial Hygiene Assessment Score 3   Indications for Volume Expansion None   pt takes MDI PRN

## 2021-07-06 NOTE — PLAN OF CARE
Problem: Bleeding:  Goal: Will show no signs and symptoms of excessive bleeding  Description: Will show no signs and symptoms of excessive bleeding  Outcome: Ongoing     Problem: Falls - Risk of:  Goal: Will remain free from falls  Description: Will remain free from falls  Outcome: Ongoing  Goal: Absence of physical injury  Description: Absence of physical injury  Outcome: Ongoing     Problem: Cardiac Output - Decreased:  Goal: Hemodynamic stability will improve  Description: Hemodynamic stability will improve  Outcome: Ongoing     Problem: Infection:  Goal: Will remain free from infection  Description: Will remain free from infection  Outcome: Ongoing     Problem: Safety:  Goal: Free from accidental physical injury  Description: Free from accidental physical injury  Outcome: Ongoing  Goal: Free from intentional harm  Description: Free from intentional harm  Outcome: Ongoing     Problem: Daily Care:  Goal: Daily care needs are met  Description: Daily care needs are met  Outcome: Ongoing     Problem: Pain:  Goal: Patient's pain/discomfort is manageable  Description: Patient's pain/discomfort is manageable  Outcome: Ongoing     Problem: Skin Integrity:  Goal: Skin integrity will stabilize  Description: Skin integrity will stabilize  Outcome: Ongoing     Problem: Discharge Planning:  Goal: Patients continuum of care needs are met  Description: Patients continuum of care needs are met  Outcome: Ongoing

## 2021-07-06 NOTE — PROCEDURES
Indication: Atrial fibrillation      After obtaining the informed consent pt was sedated  With  Versed 3mg and  Fentanyl   100mcg  . A    200 J synchronised  shock was given. Pt converted to escape junctional rhythm after prolonged pauses of more than 3 seconds      Pt remained clinically and hemodynamically stable. But blood pressure is in 90s TONIO before procedure did not show any atrial or appendage clot .     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

## 2021-07-06 NOTE — FLOWSHEET NOTE
Pt s/p TONIO with cardioversion and now pending pacemaker placement with Cardiothoracic surgeon. Dr. Stovall Letters at bedside orders for Dopamine received.

## 2021-07-06 NOTE — PROGRESS NOTES
Hospitalist ICU Progress Note      Name:  Cathy Mora /Age/Sex: 1960  (57 y.o. male)   MRN & CSN:  [id number] & [id number] Admission Date/Time: 7/3/2021 12:10 PM   Location:  -A PCP: No primary care provider on file. Hospital Day: 4    Assessment and Plan:   56 y/o M that presented with acute blood loss anemia    Acute blood loss anemia  Hx of Duodenal Ulcer  Hx Ulcerative Colitis s/p Total Abdominal Colectomy  - Hgb stable this AM  - s/p 3 upRBC overnight from 5.2 to 7.8 on 7/3; was also given [facility] as patient is on Xarelto at home for atrial fibrillation  - EGD on  with no active bleeding or visible vessel  - Trending Hgb  - Protonix-  -restarted on Xarelto. Hemoglobin 8.9. CT A/P: No acute findings. Hx of CAD  Afib with RVR  Hx of Paroxysmal Atrial Fibrillatin  Sick sinus syndrome  - Noted to be in afib/flutter in office prior to coming to ED  - Continue IV Amiodarone, Metoprolol and Digoxin  - Holding Plavix;    - Cardiology following  -Resume aspirin since hemoglobin stable. Patient received cardioversion and is now bradycardic in junctional rhythm. Started on dopamine. Holding amiodarone and Cardizem. May need pacemaker. CT surgery holding Xarelto for possible procedure. Hx of COPD  - Continue neb treatments  - Resting comfortably on room air currently    Hernia?  - Patient states he has a history of hernia; CT in feb showed a parastomal hernia  - he has been having continued pain around this site since admission (did not mention yesterday). General Surgery consulted, appreciate recs  -CT abdomen pelvis: Moderate-sized fat-containing parastomal hernia. Pain appears improved as long as patient is not bending. Myasthenia gravis   - Home meds resumed including IM med that is undergoing trials at [facility]; patient takes daily at home so sent to pharmacy; follows closely with [facility]    Morbid obesity diet and exercise counseling      ?       Diet Diet NPO Exceptions are: Sips of Water with Meds   DVT Prophylaxis [] Lovenox, []  Heparin, [] SCDs, [] Ambulation   GI Prophylaxis [] PPI,  [] H2 Blocker,  [] Carafate,  [] Diet/Tube Feeds   Code Status Full Code   Disposition  patient needs heart rate controlled. May need pacemaker. MDM [] Low, [] Moderate,[]  High  Patient's risk as above due to      History of Present Illness:     Going for TONIO cardioversion. No abdominal pain right now. He says he mostly feels it when bending in his RLQ. Since getting blood he has been feeling better. He denies any chest pain, palpitations, lightheadedness or dizziness right now. Objective: Intake/Output Summary (Last 24 hours) at 7/6/2021 1035  Last data filed at 7/6/2021 0612  Gross per 24 hour   Intake 808.48 ml   Output --   Net 808.48 ml      Vitals:   Vitals:    07/06/21 0900   BP: 85/61   Pulse: 89   Resp: 15   Temp:    SpO2:      Physical Exam:   GEN Awake male, laying in bed. EYES No discharge. .  RESP CTA b/l bs+  CARDIO/VASC S1/S2 tachycardic  GI Soft, nontender, has ostomey in RLQ  MSK No gross joint deformities. SKIN Normal coloration, warm, dry. NEURO Cranial nerves appear grossly intact, normal speech  PSYCH Awake, alert, oriented. Affect appropriate.     Medications:   Medications:    predniSONE  13 mg Oral Daily    metoprolol succinate  25 mg Oral BID    digoxin  125 mcg Oral Daily    rivaroxaban  20 mg Oral Daily with breakfast    lidocaine PF  5 mL Intradermal Once    sodium chloride flush  5-40 mL Intravenous 2 times per day    pyridostigmine  60 mg Oral 5x Daily    Investigational - Injection  32.4 mg Subcutaneous Daily    ipratropium-albuterol  1 ampule Inhalation Q4H WA    pantoprazole  40 mg Intravenous Daily      Infusions:    dilTIAZem (CARDIZEM) 100 mg in dextrose 5% 100 mL (ADD-Burt) 15 mg/hr (07/06/21 3029)    amiodarone 450mg/250ml D5W infusion 0.5 mg/min (07/06/21 9179)    sodium chloride      sodium chloride      sodium chloride  sodium chloride      sodium chloride       PRN Meds: sodium chloride, , PRN  sodium chloride flush, 5-40 mL, PRN  sodium chloride, 25 mL, PRN  sodium chloride, , PRN  sodium chloride, 25 mL, PRN  ondansetron, 4 mg, Q8H PRN   Or  ondansetron, 4 mg, Q6H PRN  acetaminophen, 650 mg, Q6H PRN   Or  acetaminophen, 650 mg, Q6H PRN  morphine, 2 mg, Q4H PRN  sodium chloride, , PRN      Recent Labs     07/05/21  0012 07/05/21  0012 07/05/21  0945 07/05/21  1815 07/06/21  0632 07/06/21  1159 07/06/21  1647   WBC 7.1  --  7.5  --  8.6  --   --    HGB 8.8*   < > 9.0*   < > 8.9* 9.5* 10.7*   HCT 28.1*   < > 29.2*   < > 29.1* 31.4* 35.2*     --  241  --  231  --   --     < > = values in this interval not displayed. Recent Labs     07/05/21  0012 07/05/21  0945 07/06/21  0632    142 140   K 4.1 4.1 4.5    109 108   CO2 21 22 22   BUN 10 8 6   CREATININE 1.0 1.0 0.9     No results for input(s): AST, ALT, ALB, BILIDIR, BILITOT, ALKPHOS in the last 72 hours. No results for input(s): INR in the last 72 hours. No results for input(s): CKTOTAL, CKMB, CKMBINDEX, TROPONINT in the last 72 hours.       Electronically signed by Belva Najjar, MD on 7/6/2021 at 10:35 AM

## 2021-07-06 NOTE — PROGRESS NOTES
Hb and BUN are stable on anticoag so far  Abdomen soft, non-tender, non-distended  CT showed para-stomal hernia- likely the source of his chronic RLQ pain but likely not symptomatic enough for surgery ( which likely would entail creating new stoma)  Continue to follow H/H

## 2021-07-06 NOTE — CONSULTS
Years of education: Not on file    Highest education level: Not on file   Occupational History    Not on file   Tobacco Use    Smoking status: Former Smoker     Packs/day: 0.25     Types: Cigarettes     Quit date: 2021     Years since quittin.2    Smokeless tobacco: Never Used   Substance and Sexual Activity    Alcohol use: Yes     Alcohol/week: 1.0 standard drinks     Types: 1 Cans of beer per week     Comment: occ    Drug use: No    Sexual activity: Yes     Partners: Female   Other Topics Concern    Not on file   Social History Narrative    Not on file     Social Determinants of Health     Financial Resource Strain:     Difficulty of Paying Living Expenses:    Food Insecurity:     Worried About Running Out of Food in the Last Year:     [address] in the Last Year:    Transportation Needs:     Lack of Transportation (Medical):      Lack of Transportation (Non-Medical):    Physical Activity:     Days of Exercise per Week:     Minutes of Exercise per Session:    Stress:     Feeling of Stress :    Social Connections:     Frequency of Communication with Friends and Family:     Frequency of Social Gatherings with Friends and Family:     Attends [employer] Services:     Active Member of Clubs or Organizations:     Attends Club or Organization Meetings:     Marital Status:    Intimate Partner Violence:     Fear of Current or Ex-Partner:     Emotionally Abused:     Physically Abused:     Sexually Abused:        No Known Allergies    Current Facility-Administered Medications on File Prior to Encounter   Medication Dose Route Frequency Provider Last Rate Last Admin    Immune Globulin (Human) solution 20 g  20 g Intravenous Once Sonja Bee MD        And    Immune Globulin (Human) solution 20 g  20 g Intravenous Once Sonja Bee MD        Immune Globulin (Human) solution 20 g  20 g Intravenous Once Sonja Bee MD        And    Immune Globulin (Human) solution 20 g  20 g Intravenous Once Chantel García MD         Current Outpatient Medications on File Prior to Encounter   Medication Sig Dispense Refill    budesonide-formoterol (SYMBICORT) 160-4.5 MCG/ACT AERO Inhale 2 puffs into the lungs 2 times daily 1 Inhaler 3    atorvastatin (LIPITOR) 40 MG tablet Take 1 tablet by mouth nightly START 6/25/2021 30 tablet 3    tiotropium (SPIRIVA HANDIHALER) 18 MCG inhalation capsule Inhale 1 capsule into the lungs daily 90 capsule 1    pantoprazole (PROTONIX) 40 MG tablet Take 1 tablet by mouth 2 times daily 60 tablet 1    Elastic Bandages & Supports (MEDICAL COMPRESSION THIGH HIGH) MISC Wear daily and remove nightly 20 - 30 mmgh 2 each 0    clopidogrel (PLAVIX) 75 MG tablet Take 1 tablet by mouth daily 90 tablet 3    metoprolol succinate (TOPROL XL) 25 MG extended release tablet Take 1 tablet by mouth daily 90 tablet 3    rivaroxaban (XARELTO) 20 MG TABS tablet Take 1 tablet by mouth daily (with breakfast) 90 tablet 3    digoxin (LANOXIN) 125 MCG tablet Take 1 tablet by mouth daily 90 tablet 3    NONFORMULARY Inject 40 mg/L into the skin every evening Zilucoplan  - patient provided experimental drug from [facility] for Myasthenia Gravis      vitamin B-12 (CYANOCOBALAMIN) 500 MCG tablet Take 1,000 mcg by mouth daily Indications: 8am       PREDNISONE PO Take 13 mg by mouth daily Indications: Takes at  8am       pyridostigmine (MESTINON) 60 MG tablet Take 1 tablet by mouth 4 times daily (Patient taking differently: Take 60 mg by mouth 5 times daily Indications: Takes at 0800, 1200, 1600, 2000 ) 120 tablet 0    acetaminophen (TYLENOL) 325 MG tablet Take 650 mg by mouth every 4 hours as needed for Pain or Fever      GLUCOSAMINE-CALCIUM-VIT D PO Take by mouth daily      albuterol sulfate HFA (PROVENTIL HFA) 108 (90 Base) MCG/ACT inhaler Inhale 2 puffs into the lungs every 4 hours as needed for Wheezing or Shortness of Breath With spacer (and mask if indicated). Thanks. 1 Inhaler 1       ROS: Is as noted above in HPI. Complete system review is done and is negative except as noted above. EXAM:  Blood pressure 104/69, pulse 93, temperature 98 °F (36.7 °C), temperature source Oral, resp. rate 16, height 5' 11\" (1.803 m), weight 270 lb 15.1 oz (122.9 kg), SpO2 99 %. General appearance: No apparent distress, appears stated age and cooperative. Skin: unremarkable  HEENT Normocephalic, atraumatic without obvious deformity. Neck: Supple, Trachea midline   Lungs: Good respiratory effort. Clear to auscultation, bilaterally  Heart: bradycardic   Abdomen: Soft, non-tender or non-distended   Extremities: min edema warm well perfused  Neurologic: Alert, grossly intact  Mental status: normal affect    Diagnostic Testing: Labs/EKG     IMPRESSION:  Sick sinus syndrome     PLAN: Discussed permanent pacemaker at length including risks, benefits, and alternatives of the procedures. All questions were answered. Patient verbalizes understanding and consents to procedure. Starting on dopamine gtt now. Hopefully can hold off on PPM until tomorrow to allow xarelto to washout. Maintain NPO for now.

## 2021-07-06 NOTE — PROGRESS NOTES
Plan TONIO cardioversion patient's heart rate is in 120s 130s in spite of being on amiodarone drip, Cardizem drip blood pressure in 90s getting digoxin and metoprolol  Alternates and risk of the procedure were dicussed in detail cleared by gastroenterology to be on anticoagulation tolerating Xarelto  Patient is in agreement to proceed  Mallampati is 3 ASA is 3

## 2021-07-06 NOTE — PROGRESS NOTES
Cardiology Progress Note     Admit Date:  7/3/2021    Consult reason/ Seen today for :   Afib  CAD  Cardiomyopathy     Subjective and  Overnight Events :  Afib/ flutter Hr has been in 130's in spite of being on amio gtt , digoxin metoprolol , Cardizem hence he was cardioverted today post cardioversion heart rate is in 40s with junctional rhythm concerning for sick sinus and tachybradycardia syndrome  Blood pressure has been in 90s and 80s  He would literally like to return back to work as soon as possible he is a [facility]      Chief complain on admission : 57 y. o.year old who is admitted for  Chief Complaint   Patient presents with    Fatigue     told yesterday he is back in a-fib, denies SOB or chest pain       Assessment / Plan:  A. fib flutter heart rate in 130s s/p cardioversion but failed therapy restart anticoagulation now if cleared by gastroenterology , cardioverted today but developed junctional rhythm this is second time he is failed cardioversion tachybradycardia syndrome  Discussed with CTS surgery about urgently needing a pacemaker. Elevated Troponin : will continue medical management for now.    Echo shows EF of 50% with moderate MR  S/p PCI in December stop Plavix can start aspirin if he is tolerating anticoagulation  GI bleeding has gastric ulcer but not actively bleeding on Protonix  History of myasthenia gravis on prednisone and pyridostigmine  HTN: stable, continue To titrate up medication as needed  DVT Prophylaxis if no contraindication  Proceed with pacemaker placement hold amiodarone hold Cardizem until heart rate is better    Past medical history:    has a past medical history of Atrial fibrillation, chronic ([address] COPD (chronic obstructive pulmonary disease) ([facility] [address] Coronary artery disease involving native coronary artery of native heart without angina pectoris, H/O echocardiogram, Heart disease, History of blood transfusion, Hx of blood clots, Hx of cardiovascular stress test, Hx of Doppler ultrasound, Hypertension, Myasthenia gravis ([facility] [address] NSTEMI (non-ST elevated myocardial infarction) ([facility] [address] Post PTCA, TIA (transient ischemic attack), and Ulcerative colitis ([facility] [address] Past surgical history:   has a past surgical history that includes ileostomy or jejunostomy; Tonsillectomy; Cardiac surgery; Percutaneous Transluminal Coronary Angio (12/14/2020); Upper gastrointestinal endoscopy (N/A, 6/15/2021); and Upper gastrointestinal endoscopy (N/A, 7/4/2021). Social History:   reports that he quit smoking about 3 months ago. His smoking use included cigarettes. He smoked 0.25 packs per day. He has never used smokeless tobacco. He reports current alcohol use of about 1.0 standard drinks of alcohol per week. He reports that he does not use drugs. Family history:  Family history is unknown by patient. No Known Allergies    Review of Systems:    All 14 systems were reviewed and are negative  Except for the positive findings  which as documented     BP 80/62   Pulse (!) 48   Temp 98 °F (36.7 °C) (Oral)   Resp 15   Ht 5' 11\" (1.803 m)   Wt 270 lb 15.1 oz (122.9 kg)   SpO2 100%   BMI 37.79 kg/m²       Intake/Output Summary (Last 24 hours) at 7/6/2021 1350  Last data filed at 7/6/2021 6058  Gross per 24 hour   Intake 808.48 ml   Output --   Net 808.48 ml     Physical Exam:  Constitutional:  Well developed, Well nourished, No acute distress, Non-toxic appearance. HENT:  Normocephalic, Atraumatic, Bilateral external ears normal, Oropharynx moist, No oral exudates, Nose normal. Neck- Normal range of motion, No tenderness, Supple, No stridor. Eyes:  PERRL, EOMI, Conjunctiva normal, No discharge. Respiratory:  Normal breath sounds, No respiratory distress, No wheezing, No chest tenderness.    Cardiovascular:  Normal heart rate, Normal rhythm, No murmurs, No rubs, No gallops, JVP not elevated  Abdomen/GI:  Bowel sounds normal, Soft, No tenderness, No masses, No pulsatile masses. Musculoskeletal:  Intact distal pulses, No edema, No tenderness, No cyanosis, No clubbing. Good range of motion in all major joints. No tenderness to palpation or major deformities noted. Back- No tenderness. Integument:  Warm, Dry, No erythema, No rash. Lymphatic:  No lymphadenopathy noted. Neurologic:  Alert & oriented x 3, Normal motor function, Normal sensory function, No focal deficits noted. Psychiatric:  Affect  and  Mood :no change    Medications:    predniSONE  13 mg Oral Daily    metoprolol succinate  25 mg Oral BID    digoxin  125 mcg Oral Daily    [Held by provider] rivaroxaban  20 mg Oral Daily with breakfast    lidocaine PF  5 mL Intradermal Once    sodium chloride flush  5-40 mL Intravenous 2 times per day    pyridostigmine  60 mg Oral 5x Daily    Investigational - Injection  32.4 mg Subcutaneous Daily    pantoprazole  40 mg Intravenous Daily      DOPamine 5 mcg/kg/min (07/06/21 1340)    dilTIAZem (CARDIZEM) 100 mg in dextrose 5% 100 mL ([facility]) Stopped (07/06/21 1341)    amiodarone 450mg/250ml D5W infusion Stopped (07/06/21 1341)    sodium chloride      sodium chloride       ipratropium-albuterol, naloxone, sodium chloride, sodium chloride flush, sodium chloride, ondansetron **OR** ondansetron, acetaminophen **OR** acetaminophen, morphine    Lab Data:  CBC:   Recent Labs     07/05/21  0012 07/05/21  0012 07/05/21  0945 07/05/21  0945 07/05/21  1815 07/06/21  0632 07/06/21  1159   WBC 7.1  --  7.5  --   --  8.6  --    HGB 8.8*   < > 9.0*   < > 9.3* 8.9* 9.5*   HCT 28.1*   < > 29.2*   < > 29.6* 29.1* 31.4*   MCV 88.6  --  88.8  --   --  89.5  --      --  241  --   --  231  --     < > = values in this interval not displayed.      BMP:   Recent Labs     07/05/21  0012 07/05/21  0945 07/06/21  0632    142 140   K 4.1 4.1 4.5    109 108   CO2 21 22 22   BUN 10 8 6   CREATININE 1.0 1.0 0.9 PT/INR:   No results for input(s): PROTIME, INR in the last 72 hours. BNP:    No results for input(s): PROBNP in the last 72 hours. TROPONIN:   No results for input(s): TROPONINT in the last 72 hours. ECHO :   echocardiogram    Assessment:  57 y. o.year old who is admitted for  Chief Complaint   Patient presents with    Fatigue     told yesterday he is back in a-fib, denies SOB or chest pain     , active issues as noted below:  Impression:  Active Problems:    Atrial fibrillation with rapid ventricular response (HCC)    Coronary artery disease involving native coronary artery of native heart without angina pectoris    GI bleed    History of myasthenia gravis    Parastomal hernia without obstruction or gangrene  Resolved Problems:    * No resolved hospital problems. *    Critical care time managing patient 45 minutes besides procedure time        All labs, medications and tests reviewed by myself , continue all other medications of all above medical condition listed as is except for changes mentioned above. Thank you very much for consult , please call with questions.     Shine Booker MD, MD 7/6/2021 1:49 PM

## 2021-07-07 ENCOUNTER — ANESTHESIA (OUTPATIENT)
Dept: OPERATING ROOM | Age: 61
DRG: 171 | End: 2021-07-07
Payer: MEDICAID

## 2021-07-07 ENCOUNTER — ANESTHESIA EVENT (OUTPATIENT)
Dept: OPERATING ROOM | Age: 61
DRG: 171 | End: 2021-07-07
Payer: MEDICAID

## 2021-07-07 ENCOUNTER — APPOINTMENT (OUTPATIENT)
Dept: GENERAL RADIOLOGY | Age: 61
DRG: 171 | End: 2021-07-07
Payer: MEDICAID

## 2021-07-07 VITALS — SYSTOLIC BLOOD PRESSURE: 98 MMHG | DIASTOLIC BLOOD PRESSURE: 57 MMHG | OXYGEN SATURATION: 99 %

## 2021-07-07 LAB
ANION GAP SERPL CALCULATED.3IONS-SCNC: 10 MMOL/L (ref 4–16)
BASOPHILS ABSOLUTE: 0 K/CU MM
BASOPHILS RELATIVE PERCENT: 0.4 % (ref 0–1)
BUN BLDV-MCNC: 11 MG/DL (ref 6–23)
CALCIUM SERPL-MCNC: 9.4 MG/DL (ref 8.3–10.6)
CHLORIDE BLD-SCNC: 106 MMOL/L (ref 99–110)
CO2: 23 MMOL/L (ref 21–32)
CREAT SERPL-MCNC: 1.1 MG/DL (ref 0.9–1.3)
DIFFERENTIAL TYPE: ABNORMAL
EKG ATRIAL RATE: 58 BPM
EKG DIAGNOSIS: NORMAL
EKG P AXIS: 75 DEGREES
EKG P-R INTERVAL: 150 MS
EKG Q-T INTERVAL: 436 MS
EKG QRS DURATION: 130 MS
EKG QTC CALCULATION (BAZETT): 428 MS
EKG R AXIS: 56 DEGREES
EKG T AXIS: -7 DEGREES
EKG VENTRICULAR RATE: 58 BPM
EOSINOPHILS ABSOLUTE: 0.2 K/CU MM
EOSINOPHILS RELATIVE PERCENT: 2.5 % (ref 0–3)
GFR AFRICAN AMERICAN: >60 ML/MIN/1.73M2
GFR NON-AFRICAN AMERICAN: >60 ML/MIN/1.73M2
GLUCOSE BLD-MCNC: 139 MG/DL (ref 70–99)
HCT VFR BLD CALC: 27.2 % (ref 42–52)
HCT VFR BLD CALC: 28.3 % (ref 42–52)
HCT VFR BLD CALC: 30.1 % (ref 42–52)
HEMOGLOBIN: 8.2 GM/DL (ref 13.5–18)
HEMOGLOBIN: 8.2 GM/DL (ref 13.5–18)
HEMOGLOBIN: 8.8 GM/DL (ref 13.5–18)
IMMATURE NEUTROPHIL %: 0.4 % (ref 0–0.43)
LYMPHOCYTES ABSOLUTE: 1.5 K/CU MM
LYMPHOCYTES RELATIVE PERCENT: 15.2 % (ref 24–44)
MCH RBC QN AUTO: 27 PG (ref 27–31)
MCHC RBC AUTO-ENTMCNC: 29.2 % (ref 32–36)
MCV RBC AUTO: 92.3 FL (ref 78–100)
MONOCYTES ABSOLUTE: 0.7 K/CU MM
MONOCYTES RELATIVE PERCENT: 7.1 % (ref 0–4)
NUCLEATED RBC %: 0 %
PDW BLD-RTO: 17.1 % (ref 11.7–14.9)
PLATELET # BLD: 256 K/CU MM (ref 140–440)
PMV BLD AUTO: 9.9 FL (ref 7.5–11.1)
POTASSIUM SERPL-SCNC: 4.9 MMOL/L (ref 3.5–5.1)
RBC # BLD: 3.26 M/CU MM (ref 4.6–6.2)
SEGMENTED NEUTROPHILS ABSOLUTE COUNT: 7.1 K/CU MM
SEGMENTED NEUTROPHILS RELATIVE PERCENT: 74.4 % (ref 36–66)
SODIUM BLD-SCNC: 139 MMOL/L (ref 135–145)
TOTAL IMMATURE NEUTOROPHIL: 0.04 K/CU MM
TOTAL NUCLEATED RBC: 0 K/CU MM
WBC # BLD: 9.6 K/CU MM (ref 4–10.5)

## 2021-07-07 PROCEDURE — 2580000003 HC RX 258: Performed by: PHYSICIAN ASSISTANT

## 2021-07-07 PROCEDURE — 93010 ELECTROCARDIOGRAM REPORT: CPT | Performed by: INTERNAL MEDICINE

## 2021-07-07 PROCEDURE — 2500000003 HC RX 250 WO HCPCS: Performed by: THORACIC SURGERY (CARDIOTHORACIC VASCULAR SURGERY)

## 2021-07-07 PROCEDURE — 6360000002 HC RX W HCPCS: Performed by: NURSE ANESTHETIST, CERTIFIED REGISTERED

## 2021-07-07 PROCEDURE — 80048 BASIC METABOLIC PNL TOTAL CA: CPT

## 2021-07-07 PROCEDURE — 76000 FLUOROSCOPY <1 HR PHYS/QHP: CPT

## 2021-07-07 PROCEDURE — 3700000001 HC ADD 15 MINUTES (ANESTHESIA): Performed by: THORACIC SURGERY (CARDIOTHORACIC VASCULAR SURGERY)

## 2021-07-07 PROCEDURE — 6360000002 HC RX W HCPCS: Performed by: INTERNAL MEDICINE

## 2021-07-07 PROCEDURE — P9045 ALBUMIN (HUMAN), 5%, 250 ML: HCPCS | Performed by: THORACIC SURGERY (CARDIOTHORACIC VASCULAR SURGERY)

## 2021-07-07 PROCEDURE — 02HK3JZ INSERTION OF PACEMAKER LEAD INTO RIGHT VENTRICLE, PERCUTANEOUS APPROACH: ICD-10-PCS | Performed by: INTERNAL MEDICINE

## 2021-07-07 PROCEDURE — 2580000003 HC RX 258: Performed by: SPECIALIST

## 2021-07-07 PROCEDURE — 6360000002 HC RX W HCPCS: Performed by: PHYSICIAN ASSISTANT

## 2021-07-07 PROCEDURE — 3600000003 HC SURGERY LEVEL 3 BASE: Performed by: THORACIC SURGERY (CARDIOTHORACIC VASCULAR SURGERY)

## 2021-07-07 PROCEDURE — 2500000003 HC RX 250 WO HCPCS: Performed by: NURSE ANESTHETIST, CERTIFIED REGISTERED

## 2021-07-07 PROCEDURE — 99231 SBSQ HOSP IP/OBS SF/LOW 25: CPT | Performed by: SPECIALIST

## 2021-07-07 PROCEDURE — 2580000003 HC RX 258: Performed by: THORACIC SURGERY (CARDIOTHORACIC VASCULAR SURGERY)

## 2021-07-07 PROCEDURE — C1898 LEAD, PMKR, OTHER THAN TRANS: HCPCS | Performed by: THORACIC SURGERY (CARDIOTHORACIC VASCULAR SURGERY)

## 2021-07-07 PROCEDURE — 6360000002 HC RX W HCPCS: Performed by: THORACIC SURGERY (CARDIOTHORACIC VASCULAR SURGERY)

## 2021-07-07 PROCEDURE — 2580000003 HC RX 258: Performed by: NURSE ANESTHETIST, CERTIFIED REGISTERED

## 2021-07-07 PROCEDURE — 2709999900 HC NON-CHARGEABLE SUPPLY: Performed by: THORACIC SURGERY (CARDIOTHORACIC VASCULAR SURGERY)

## 2021-07-07 PROCEDURE — 71045 X-RAY EXAM CHEST 1 VIEW: CPT

## 2021-07-07 PROCEDURE — 85014 HEMATOCRIT: CPT

## 2021-07-07 PROCEDURE — 85018 HEMOGLOBIN: CPT

## 2021-07-07 PROCEDURE — 36415 COLL VENOUS BLD VENIPUNCTURE: CPT

## 2021-07-07 PROCEDURE — 3600000013 HC SURGERY LEVEL 3 ADDTL 15MIN: Performed by: THORACIC SURGERY (CARDIOTHORACIC VASCULAR SURGERY)

## 2021-07-07 PROCEDURE — 99233 SBSQ HOSP IP/OBS HIGH 50: CPT | Performed by: INTERNAL MEDICINE

## 2021-07-07 PROCEDURE — C9113 INJ PANTOPRAZOLE SODIUM, VIA: HCPCS | Performed by: SPECIALIST

## 2021-07-07 PROCEDURE — 6370000000 HC RX 637 (ALT 250 FOR IP): Performed by: PHYSICIAN ASSISTANT

## 2021-07-07 PROCEDURE — 0JH606Z INSERTION OF PACEMAKER, DUAL CHAMBER INTO CHEST SUBCUTANEOUS TISSUE AND FASCIA, OPEN APPROACH: ICD-10-PCS | Performed by: INTERNAL MEDICINE

## 2021-07-07 PROCEDURE — 6360000002 HC RX W HCPCS: Performed by: SPECIALIST

## 2021-07-07 PROCEDURE — 02H63JZ INSERTION OF PACEMAKER LEAD INTO RIGHT ATRIUM, PERCUTANEOUS APPROACH: ICD-10-PCS | Performed by: INTERNAL MEDICINE

## 2021-07-07 PROCEDURE — 2060000000 HC ICU INTERMEDIATE R&B

## 2021-07-07 PROCEDURE — 3700000000 HC ANESTHESIA ATTENDED CARE: Performed by: THORACIC SURGERY (CARDIOTHORACIC VASCULAR SURGERY)

## 2021-07-07 PROCEDURE — C1785 PMKR, DUAL, RATE-RESP: HCPCS | Performed by: THORACIC SURGERY (CARDIOTHORACIC VASCULAR SURGERY)

## 2021-07-07 PROCEDURE — 94761 N-INVAS EAR/PLS OXIMETRY MLT: CPT

## 2021-07-07 PROCEDURE — 85025 COMPLETE CBC W/AUTO DIFF WBC: CPT

## 2021-07-07 DEVICE — LEAD PACE 6FR L52CM RNG ELECTRD DIA2MM PLATINIZED HELIX SIL: Type: IMPLANTABLE DEVICE | Site: CHEST | Status: FUNCTIONAL

## 2021-07-07 DEVICE — PACEMAKER CARD 22.5GM W50.8XH46.6MM D7.4MM TI POLYUR SIL: Type: IMPLANTABLE DEVICE | Site: CHEST | Status: FUNCTIONAL

## 2021-07-07 DEVICE — CABLE PACE LNG L12IN CHN A OR V SM CLP EPG TEMP DISP: Type: IMPLANTABLE DEVICE | Site: CHEST | Status: FUNCTIONAL

## 2021-07-07 RX ORDER — KETAMINE HCL 50MG/ML(1)
SYRINGE (ML) INTRAVENOUS PRN
Status: DISCONTINUED | OUTPATIENT
Start: 2021-07-07 | End: 2021-07-07 | Stop reason: SDUPTHER

## 2021-07-07 RX ORDER — PROPOFOL 10 MG/ML
INJECTION, EMULSION INTRAVENOUS CONTINUOUS PRN
Status: DISCONTINUED | OUTPATIENT
Start: 2021-07-07 | End: 2021-07-07 | Stop reason: SDUPTHER

## 2021-07-07 RX ORDER — BUPIVACAINE HYDROCHLORIDE AND EPINEPHRINE 5; 5 MG/ML; UG/ML
INJECTION, SOLUTION EPIDURAL; INTRACAUDAL; PERINEURAL
Status: COMPLETED | OUTPATIENT
Start: 2021-07-07 | End: 2021-07-07

## 2021-07-07 RX ORDER — SODIUM CHLORIDE 0.9 % (FLUSH) 0.9 %
5-40 SYRINGE (ML) INJECTION PRN
Status: DISCONTINUED | OUTPATIENT
Start: 2021-07-07 | End: 2021-07-09 | Stop reason: HOSPADM

## 2021-07-07 RX ORDER — SODIUM CHLORIDE 0.9 % (FLUSH) 0.9 %
5-40 SYRINGE (ML) INJECTION EVERY 12 HOURS SCHEDULED
Status: DISCONTINUED | OUTPATIENT
Start: 2021-07-07 | End: 2021-07-09 | Stop reason: HOSPADM

## 2021-07-07 RX ORDER — ALBUMIN, HUMAN INJ 5% 5 %
12.5 SOLUTION INTRAVENOUS ONCE
Status: COMPLETED | OUTPATIENT
Start: 2021-07-07 | End: 2021-07-07

## 2021-07-07 RX ORDER — SODIUM CHLORIDE 9 MG/ML
25 INJECTION, SOLUTION INTRAVENOUS PRN
Status: DISCONTINUED | OUTPATIENT
Start: 2021-07-07 | End: 2021-07-09 | Stop reason: HOSPADM

## 2021-07-07 RX ORDER — MIDODRINE HYDROCHLORIDE 5 MG/1
5 TABLET ORAL
Status: DISCONTINUED | OUTPATIENT
Start: 2021-07-07 | End: 2021-07-08

## 2021-07-07 RX ORDER — SODIUM CHLORIDE 9 MG/ML
INJECTION, SOLUTION INTRAVENOUS CONTINUOUS PRN
Status: DISCONTINUED | OUTPATIENT
Start: 2021-07-07 | End: 2021-07-07 | Stop reason: SDUPTHER

## 2021-07-07 RX ORDER — CEFAZOLIN SODIUM 2 G/50ML
SOLUTION INTRAVENOUS PRN
Status: DISCONTINUED | OUTPATIENT
Start: 2021-07-07 | End: 2021-07-07 | Stop reason: SDUPTHER

## 2021-07-07 RX ORDER — LIDOCAINE HYDROCHLORIDE 20 MG/ML
INJECTION, SOLUTION INTRAVENOUS PRN
Status: DISCONTINUED | OUTPATIENT
Start: 2021-07-07 | End: 2021-07-07

## 2021-07-07 RX ADMIN — ALBUMIN (HUMAN) 12.5 G: 12.5 INJECTION, SOLUTION INTRAVENOUS at 10:57

## 2021-07-07 RX ADMIN — PYRIDOSTIGMINE BROMIDE 60 MG: 60 TABLET ORAL at 23:39

## 2021-07-07 RX ADMIN — ASPIRIN 81 MG: 81 TABLET, CHEWABLE ORAL at 10:29

## 2021-07-07 RX ADMIN — DEXTROSE MONOHYDRATE 2000 MG: 50 INJECTION, SOLUTION INTRAVENOUS at 23:39

## 2021-07-07 RX ADMIN — PANTOPRAZOLE SODIUM 40 MG: 40 INJECTION, POWDER, FOR SOLUTION INTRAVENOUS at 06:45

## 2021-07-07 RX ADMIN — DEXTROSE MONOHYDRATE 2000 MG: 50 INJECTION, SOLUTION INTRAVENOUS at 16:41

## 2021-07-07 RX ADMIN — SODIUM CHLORIDE, PRESERVATIVE FREE 10 ML: 5 INJECTION INTRAVENOUS at 20:07

## 2021-07-07 RX ADMIN — SODIUM CHLORIDE: 900 INJECTION INTRAVENOUS at 07:46

## 2021-07-07 RX ADMIN — METOPROLOL SUCCINATE 25 MG: 25 TABLET, EXTENDED RELEASE ORAL at 20:01

## 2021-07-07 RX ADMIN — CEFAZOLIN SODIUM 2000 MG: 2 SOLUTION INTRAVENOUS at 08:07

## 2021-07-07 RX ADMIN — PYRIDOSTIGMINE BROMIDE 60 MG: 60 TABLET ORAL at 10:29

## 2021-07-07 RX ADMIN — PREDNISONE 13 MG: 1 TABLET ORAL at 10:29

## 2021-07-07 RX ADMIN — SODIUM CHLORIDE, PRESERVATIVE FREE 10 ML: 5 INJECTION INTRAVENOUS at 10:49

## 2021-07-07 RX ADMIN — DOPAMINE HYDROCHLORIDE IN DEXTROSE 5 MCG/KG/MIN: 1.6 INJECTION, SOLUTION INTRAVENOUS at 07:34

## 2021-07-07 RX ADMIN — PYRIDOSTIGMINE BROMIDE 60 MG: 60 TABLET ORAL at 20:01

## 2021-07-07 RX ADMIN — PYRIDOSTIGMINE BROMIDE 60 MG: 60 TABLET ORAL at 15:37

## 2021-07-07 RX ADMIN — Medication 25 MG: at 07:52

## 2021-07-07 RX ADMIN — PROPOFOL 60 MCG/KG/MIN: 10 INJECTION, EMULSION INTRAVENOUS at 07:52

## 2021-07-07 RX ADMIN — PROPOFOL 50 MG: 10 INJECTION, EMULSION INTRAVENOUS at 07:50

## 2021-07-07 RX ADMIN — SODIUM CHLORIDE, PRESERVATIVE FREE 10 ML: 5 INJECTION INTRAVENOUS at 10:50

## 2021-07-07 ASSESSMENT — PULMONARY FUNCTION TESTS
PIF_VALUE: 1
PIF_VALUE: 0
PIF_VALUE: 2
PIF_VALUE: 1
PIF_VALUE: 0
PIF_VALUE: 1
PIF_VALUE: 0
PIF_VALUE: 1
PIF_VALUE: 0
PIF_VALUE: 1
PIF_VALUE: 1
PIF_VALUE: 0
PIF_VALUE: 1
PIF_VALUE: 0
PIF_VALUE: 1
PIF_VALUE: 0
PIF_VALUE: 1
PIF_VALUE: 0
PIF_VALUE: 1
PIF_VALUE: 1

## 2021-07-07 ASSESSMENT — PAIN SCALES - WONG BAKER
WONGBAKER_NUMERICALRESPONSE: 0
WONGBAKER_NUMERICALRESPONSE: 0

## 2021-07-07 ASSESSMENT — ENCOUNTER SYMPTOMS
EYE REDNESS: 0
STRIDOR: 0
PHOTOPHOBIA: 0
ANAL BLEEDING: 0
BACK PAIN: 0
APNEA: 0
RECTAL PAIN: 0
EYE ITCHING: 0
ABDOMINAL PAIN: 1
SORE THROAT: 0
CHOKING: 0
COLOR CHANGE: 0
CONSTIPATION: 0

## 2021-07-07 ASSESSMENT — PAIN SCALES - GENERAL
PAINLEVEL_OUTOF10: 0

## 2021-07-07 NOTE — PROGRESS NOTES
Had cardioversion yesterday  BUN went up a few points ad Hb down a little- but no melena or hematochezia  Abdomen soft, non-tender, non-distended    For pacemaker insertion today

## 2021-07-07 NOTE — ANESTHESIA PRE PROCEDURE
Department of Anesthesiology  Preprocedure Note       Name:  Mu Richard   Age:  57 y.o.  :  1960                                          MRN:  [id number]         Date:  2021      Surgeon: Juan Castillo):  Neyda Morin MD    Procedure: Procedure(s):  PACEMAKER INSERTION PERMANENT    Medications prior to admission:   Prior to Admission medications    Medication Sig Start Date End Date Taking? Authorizing Provider   GLUCOSAMINE-CALCIUM-VIT D PO Take by mouth daily    Historical Provider, MD   budesonide-formoterol (SYMBICORT) 160-4.5 MCG/ACT AERO Inhale 2 puffs into the lungs 2 times daily 21   William Lino MD   atorvastatin (LIPITOR) 40 MG tablet Take 1 tablet by mouth nightly START 2021   William Lino MD   tiotropium (SPIRIVA HANDIHALER) 18 MCG inhalation capsule Inhale 1 capsule into the lungs daily 21   William Lino MD   pantoprazole (PROTONIX) 40 MG tablet Take 1 tablet by mouth 2 times daily 21   William Lino MD   Elastic Bandages & Supports (MEDICAL COMPRESSION THIGH HIGH) MISC Wear daily and remove nightly 20 - 30 mmgh 21   Patricia Seth MD   clopidogrel (PLAVIX) 75 MG tablet Take 1 tablet by mouth daily 21   JAMIE Harley CNP   metoprolol succinate (TOPROL XL) 25 MG extended release tablet Take 1 tablet by mouth daily 21   JAMIE Harley CNP   rivaroxaban (XARELTO) 20 MG TABS tablet Take 1 tablet by mouth daily (with breakfast) 4/13/21 7/3/21  JAMIE Harley CNP   digoxin (LANOXIN) 125 MCG tablet Take 1 tablet by mouth daily 21   JAMIE Harley CNP   NONFORMULARY Inject 40 mg/L into the skin every evening Zilucoplan  - patient provided experimental drug from [place] for Myasthenia Gravis    Historical Provider, MD   albuterol sulfate HFA (PROVENTIL HFA) 108 (90 Base) MCG/ACT inhaler Inhale 2 puffs into the lungs every 4 hours as needed for Wheezing or Shortness of Breath With spacer (and mask if indicated). Thanks. 12/20/18 3/15/21  Korey Garcia MD   vitamin B-12 (CYANOCOBALAMIN) 500 MCG tablet Take 1,000 mcg by mouth daily Indications: 8am     Historical Provider, MD   PREDNISONE PO Take 13 mg by mouth daily Indications: Takes at  [address] [facility] MD Aurea   pyridostigmine (MESTINON) 60 MG tablet Take 1 tablet by mouth 4 times daily  Patient taking differently: Take 60 mg by mouth 5 times daily Indications: Takes at 0800, 1200, 1600, 2000  7/20/16   Kyree Israel MD   acetaminophen (TYLENOL) 325 MG tablet Take 650 mg by mouth every 4 hours as needed for Pain or Fever    Historical Provider, MD       Current medications:    No current facility-administered medications for this visit. No current outpatient medications on file.      Facility-Administered Medications Ordered in Other Visits   Medication Dose Route Frequency Provider Last Rate Last Admin    ipratropium-albuterol (DUONEB) nebulizer solution 1 ampule  1 ampule Inhalation Q4H PRN Daniela Saunders MD        naloxone [facility]) injection 0.4 mg  0.4 mg Intravenous PRN Robert Cohen MD   0.4 mg at 07/06/21 1328    DOPamine (INTROPIN) 400 mg in dextrose 5 % 250 mL infusion  2-20 mcg/kg/min Intravenous Continuous Robert Cohen MD 23 mL/hr at 07/07/21 0053 5 mcg/kg/min at 07/07/21 0053    aspirin chewable tablet 81 mg  81 mg Oral Daily Shanthi Langston MD   81 mg at 07/06/21 1902    predniSONE (DELTASONE) tablet 13 mg  13 mg Oral Daily Martha Null MD   13 mg at 07/06/21 0801    metoprolol succinate (TOPROL XL) extended release tablet 25 mg  25 mg Oral BID Robert Cohen MD   25 mg at 07/06/21 0801    digoxin (LANOXIN) tablet 125 mcg  125 mcg Oral Daily Robert Cohen MD   125 mcg at 07/06/21 0801    [Held by provider] rivaroxaban (XARELTO) tablet 20 mg  20 mg Oral Daily with breakfast Martha Null MD   20 mg at 07/06/21 0801    0.9 % sodium chloride infusion   Intravenous PRN MD Stella Dale lidocaine PF 1 % injection 5 mL  5 mL Intradermal Once Deneen Morris MD        sodium chloride flush 0.9 % injection 5-40 mL  5-40 mL Intravenous 2 times per day Deneen Morris MD   10 mL at 07/06/21 0803    sodium chloride flush 0.9 % injection 5-40 mL  5-40 mL Intravenous PRN Deneen Morris MD        0.9 % sodium chloride infusion  25 mL Intravenous PRN Deneen Morris MD        pyridostigmine (MESTINON) tablet 60 mg  60 mg Oral 5x Daily Ruth Gilbert MD   60 mg at 07/06/21 2259    Investigational - Injection  32.4 mg Subcutaneous Daily Ruth Gilbert MD   32.4 mg at 07/06/21 1536    ondansetron (ZOFRAN-ODT) disintegrating tablet 4 mg  4 mg Oral Q8H PRN Nathalia Faye MD        Or    ondansetron [facility]) injection 4 mg  4 mg Intravenous Q6H PRN Nathalia Faye MD        acetaminophen (TYLENOL) tablet 650 mg  650 mg Oral Q6H PRN Nathalia Faye MD   650 mg at 07/03/21 2003    Or    acetaminophen (TYLENOL) suppository 650 mg  650 mg Rectal Q6H PRN Nathalia Faye MD        morphine (PF) injection 2 mg  2 mg Intravenous Q4H PRN Nathalia Faye MD        pantoprazole (PROTONIX) injection 40 mg  40 mg Intravenous Daily Carlos Rowland MD   40 mg at 07/07/21 0645    Immune Globulin (Human) solution 20 g  20 g Intravenous Once Bobby Rhodes MD        And    Immune Globulin (Human) solution 20 g  20 g Intravenous Once Bobby Rhodes MD        Immune Globulin (Human) solution 20 g  20 g Intravenous Once Bobby Rhodes MD        And    Immune Globulin (Human) solution 20 g  20 g Intravenous Once Bobby Rhodes MD           Allergies:  No Known Allergies    Problem List:    Patient Active Problem List   Diagnosis Code    Myasthenia gravis ([facility] [address] G70.00    Unspecified ptosis of bilateral eyelids H02.403    Cellulitis of left hand L03.114    Viral gastroenteritis A08.4    SOB (shortness of breath) R06.02    Atrial fibrillation, chronic (HCC) I48.20    Post PTCA Z80.64    NSTEMI (non-ST elevated myocardial infarction) ([facility]) I21.4    Atrial fibrillation with rapid ventricular response ([facility]) I48.91    Hyperkalemia E87.5    Type 2 MI (myocardial infarction) ([facility] [address] I21. A1    Gastritis and duodenitis K29.90    Duodenal ulcer K26.9    Coronary artery disease involving native coronary artery of native heart without angina pectoris I25.10    COPD (chronic obstructive pulmonary disease) ([facility]) J44.9    GI bleed K92.2    History of myasthenia gravis Z86.69    Parastomal hernia without obstruction or gangrene K43.5       Past Medical History:        Diagnosis Date    Atrial fibrillation, chronic ([facility]) 12/2020    COPD (chronic obstructive pulmonary disease) ([facility] [address] 6/15/2021    Coronary artery disease involving native coronary artery of native heart without angina pectoris 6/15/2021    H/O echocardiogram 02/02/2021    EF is estimated at 50%. presence of PVC affected LVEF estimation, Mild TR.    Heart disease     stint    History of blood transfusion     Hx of blood clots     Hx of cardiovascular stress test 06/11/2021    Small size severe intensity,perfusion defect in apical wall.  Hx of Doppler ultrasound 04/09/2021    Left distal SSV shows occlusive chronic SVT.  Hypertension     Myasthenia gravis ([address]     NSTEMI (non-ST elevated myocardial infarction) ([address] 12/2020    Post PTCA 12/14/2020    Ramus Stented.  TIA (transient ischemic attack)     Ulcerative colitis ([address]        Past Surgical History:        Procedure Laterality Date    CARDIAC SURGERY      ILEOSTOMY OR JEJUNOSTOMY      PTCA  12/14/2020    Ramus Stented.     TONSILLECTOMY      UPPER GASTROINTESTINAL ENDOSCOPY N/A 6/15/2021    EGD DIAGNOSTIC ONLY performed by Sujit Merrill MD at [address] N/A 7/4/2021    EGD DIAGNOSTIC ONLY performed by Monda Koyanagi, MD at [facility] ENDOSCOPY       Social History:    Social History     Tobacco Use    Smoking status: Former Smoker     Packs/day: 0.25     Types: Cigarettes     Quit date: 2021     Years since quittin.2    Smokeless tobacco: Never Used   Substance Use Topics    Alcohol use: Yes     Alcohol/week: 1.0 standard drinks     Types: 1 Cans of beer per week     Comment: occ                                Counseling given: Not Answered      Vital Signs (Current): There were no vitals filed for this visit. BP Readings from Last 3 Encounters:   21 111/73   21 100/76   21 132/82       NPO Status:                                                                                 BMI:   Wt Readings from Last 3 Encounters:   21 270 lb 15.1 oz (122.9 kg)   21 291 lb 3.2 oz (132.1 kg)   21 290 lb (131.5 kg)     There is no height or weight on file to calculate BMI.    CBC:   Lab Results   Component Value Date    WBC 9.6 2021    RBC 3.26 2021    HGB 8.8 2021    HCT 30.1 2021    MCV 92.3 2021    RDW 17.1 2021     2021       CMP:   Lab Results   Component Value Date     2021    K 4.9 2021     2021    CO2 23 2021    BUN 11 2021    CREATININE 1.1 2021    GFRAA >60 2021    LABGLOM >60 2021    GLUCOSE 139 2021    PROT 5.5 2021    CALCIUM 9.4 2021    BILITOT 0.6 2021    ALKPHOS 45 2021     2021     2021       POC Tests: No results for input(s): POCGLU, POCNA, POCK, POCCL, POCBUN, POCHEMO, POCHCT in the last 72 hours. Coags:   Lab Results   Component Value Date    PROTIME 21.3 2021    INR 1.64 2021    APTT 30.8 2020       HCG (If Applicable): No results found for: PREGTESTUR, PREGSERUM, HCG, HCGQUANT     ABGs:   Lab Results   Component Value Date    PO2ART 72 2016    HRT3JTW 33.0 2016    IIS6JCA 24.6 2016        Type & Screen (If Applicable):   No results found for: Prasanna Boles    Drug/Infectious Status (If Applicable):  Lab Results   Component Value Date    HEPCAB NON REACTIVE 06/15/2021       COVID-19 Screening (If Applicable):   Lab Results   Component Value Date    COVID19 NOT DETECTED 07/03/2021    COVID19 NOT DETECTED 12/12/2020           Anesthesia Evaluation  Patient summary reviewed  Airway: Mallampati: III  TM distance: >3 FB   Neck ROM: full  Mouth opening: > = 3 FB Dental:          Pulmonary:normal exam    (+) COPD:                            ROS comment: Former smoker   Cardiovascular:  Exercise tolerance: poor (<4 METS),   (+) hypertension:, past MI:, dysrhythmias: atrial fibrillation,         Rhythm: irregular  Rate: abnormal                   PE comment: afib rvr   Neuro/Psych:   (+) neuromuscular disease: myasthenia gravis, TIA,             GI/Hepatic/Renal:   (+) PUD, morbid obesity (bmi 40.9)         ROS comment: Ulcerative colitis. Endo/Other:    (+) blood dyscrasia: anticoagulation therapy and anemia:., .                 Abdominal:   (+) obese,           Vascular:   + DVT, . Other Findings:               Anesthesia Plan      MAC     ASA 4       Induction: intravenous. Anesthetic plan and risks discussed with patient. Plan discussed with attending. Pre Anesthesia Assessment complete. Chart reviewed on 7/7/2021. This is a chart review only.         JAMIE Gomez - MINE   7/7/2021

## 2021-07-07 NOTE — ANESTHESIA POSTPROCEDURE EVALUATION
Department of Anesthesiology  Postprocedure Note    Patient: Michelle Miramontes  MRN: [id number]  YOB: 1960  Date of evaluation: 7/7/2021  Time:  10:15 AM     Procedure Summary     Date: 07/07/21 Room / Location: [address] [place] [address]    Anesthesia Start: 4150 Anesthesia Stop: 6854    Procedure: PACEMAKER INSERTION PERMANENT (N/A Chest) Diagnosis: (-)    Surgeons: Camilo Ernandez MD Responsible Provider: Earnest Adams MD    Anesthesia Type: MAC ASA Status: 4          Anesthesia Type: MAC    Miguel Phase I:      Miguel Phase II:      Last vitals: Reviewed and per EMR flowsheets.        Anesthesia Post Evaluation    Patient location during evaluation: ICU  Patient participation: complete - patient participated  Level of consciousness: awake  Pain score: 0  Airway patency: patent  Nausea & Vomiting: no nausea and no vomiting  Complications: no  Cardiovascular status: blood pressure returned to baseline  Respiratory status: acceptable  Hydration status: euvolemic

## 2021-07-07 NOTE — PROGRESS NOTES
Cardiology Progress Note     Admit Date:  7/3/2021    Consult reason/ Seen today for :   Afib  CAD  Cardiomyopathy     Subjective and  Overnight Events :  Overnight has been on dopamine gtt , In sinus now  HE was cardioverted due to  Afib/ flutter Hr has been in 130's in spite of being on amio gtt , digoxin metoprolol , Cardizem hence he was cardioverted but  post cardioversion heart rate is in 40s with junctional rhythm concerning for sick sinus and tachybradycardia syndrome  Blood pressure has been in 90s and 80s  He would literally like to return back to work as soon as possible he is a [address] [facility]      Chief complain on admission : 57 y. o.year old who is admitted for  Chief Complaint   Patient presents with    Fatigue     told yesterday he is back in a-fib, denies SOB or chest pain       Assessment / Plan:  A. fib flutter heart rate in 130s s/p cardioversion but failed therapy restart anticoagulation now if cleared by gastroenterology , cardioverted  but developed junctional rhythm this is second time he is failed cardioversion tachybradycardia syndrome  Discussed with CTS surgery about urgently needing a pacemaker. Elevated Troponin : will continue medical management for now.    Echo shows EF of 50% with moderate MR  S/p PCI in December stop Plavix can start aspirin if he is tolerating anticoagulation  GI bleeding has gastric ulcer but not actively bleeding on Protonix not tolerating Xarelto  History of myasthenia gravis on prednisone and pyridostigmine  HTN: stable, continue To titrate up medication as needed  DVT Prophylaxis if no contraindication  Proceed with pacemaker placement hold amiodarone hold Cardizem until heart rate is better    Past medical history:    has a past medical history of Atrial fibrillation, chronic ([address] COPD (chronic obstructive pulmonary disease) ([facility] [address] Coronary artery disease involving native coronary artery of native heart without angina pectoris, H/O echocardiogram, Heart disease, History of blood transfusion, Hx of blood clots, Hx of cardiovascular stress test, Hx of Doppler ultrasound, Hypertension, Myasthenia gravis ([facility] [address] NSTEMI (non-ST elevated myocardial infarction) ([facility] [address] Post PTCA, TIA (transient ischemic attack), and Ulcerative colitis ([facility] [address] Past surgical history:   has a past surgical history that includes ileostomy or jejunostomy; Tonsillectomy; Cardiac surgery; Percutaneous Transluminal Coronary Angio (12/14/2020); Upper gastrointestinal endoscopy (N/A, 6/15/2021); and Upper gastrointestinal endoscopy (N/A, 7/4/2021). Social History:   reports that he quit smoking about 3 months ago. His smoking use included cigarettes. He smoked 0.25 packs per day. He has never used smokeless tobacco. He reports current alcohol use of about 1.0 standard drinks of alcohol per week. He reports that he does not use drugs. Family history:  Family history is unknown by patient. No Known Allergies    Review of Systems:    All 14 systems were reviewed and are negative  Except for the positive findings  which as documented     /73   Pulse 71   Temp 98.1 °F (36.7 °C) (Oral)   Resp 23   Ht 5' 11\" (1.803 m)   Wt 270 lb 15.1 oz (122.9 kg)   SpO2 98%   BMI 37.79 kg/m²       Intake/Output Summary (Last 24 hours) at 7/7/2021 9042  Last data filed at 7/7/2021 0545  Gross per 24 hour   Intake 444 ml   Output 700 ml   Net -256 ml     Physical Exam:  Constitutional:  Well developed, Well nourished, No acute distress, Non-toxic appearance. HENT:  Normocephalic, Atraumatic, Bilateral external ears normal, Oropharynx moist, No oral exudates, Nose normal. Neck- Normal range of motion, No tenderness, Supple, No stridor. Eyes:  PERRL, EOMI, Conjunctiva normal, No discharge. Respiratory:  Normal breath sounds, No respiratory distress, No wheezing, No chest tenderness.    Cardiovascular:  Normal heart rate,

## 2021-07-07 NOTE — CARE COORDINATION
Cm met with pt and spouse to initiate discharge planning. Pt had a pacemaker placed today. Pt, spouse and 15 y.o grandaughter reside together. Pt works as a  and is home daily. Pt's wife does not work due to previous stroke. Debo Barillas has health concerns and is followed by [facility] regularly. Debo Barillas is home schooled. Pt as insuance to assist with cost of Rxs. No PCP listed. List of PCPs that may be accepting pt's included in pt discharge instructions. Pt's concerns are primarily financial should he be unable to work. CM advised that if he finds that he is unable to work due to his pacemaker or physician direction CM discussed how to apply for disability, foodstamps and [employer]. Plan home with no immediate needs.

## 2021-07-07 NOTE — PROGRESS NOTES
General Surgery-Dr [facility] Day: 5    ChiefComplaint on Admission: parastomal hernia      Subjective:     Ann Vincent is a 57 y.o. male with parastomal hernia. CT was consistent with this finding. No bowel incarceration is noted . Patient reports abdominal pain. Tolerating ADULT DIET; Regular; Low Sodium (2 gm). + BM.     ROS:  Review of Systems   Constitutional: Negative for chills and fever. HENT: Negative for ear pain, mouth sores, sore throat and tinnitus. Eyes: Negative for photophobia, redness and itching. Respiratory: Negative for apnea, choking and stridor. Cardiovascular: Negative for chest pain and palpitations. Gastrointestinal: Positive for abdominal pain. Negative for anal bleeding, constipation and rectal pain. Endocrine: Negative for polydipsia. Genitourinary: Negative for enuresis, flank pain and hematuria. Musculoskeletal: Negative for back pain, joint swelling and myalgias. Skin: Negative for color change and pallor. Allergic/Immunologic: Negative for environmental allergies. Neurological: Negative for syncope and speech difficulty. Psychiatric/Behavioral: Negative for confusion and hallucinations. Allergies  Patient has no known allergies. Diagnosis Date    Atrial fibrillation, chronic ([address] 12/2020    COPD (chronic obstructive pulmonary disease) ([address] 6/15/2021    Coronary artery disease involving native coronary artery of native heart without angina pectoris 6/15/2021    H/O echocardiogram 02/02/2021    EF is estimated at 50%. presence of PVC affected LVEF estimation, Mild TR.    Heart disease     stint    History of blood transfusion     Hx of blood clots     Hx of cardiovascular stress test 06/11/2021    Small size severe intensity,perfusion defect in apical wall.  Hx of Doppler ultrasound 04/09/2021    Left distal SSV shows occlusive chronic SVT.     Hypertension     Myasthenia gravis ([address]     NSTEMI (non-ST elevated [facility] infarction) [facility] 2020    Post PTCA 2020    Ramus Stented.  TIA (transient ischemic attack)     Ulcerative colitis ([facility]        Objective:     Vitals:    21 1400   BP: 98/61   Pulse: 72   Resp: 13   Temp:    SpO2: 98%       TEMPERATURE:  Current - Temp: 97.8 °F (36.6 °C); Max - Temp  Av °F (36.7 °C)  Min: 97.6 °F (36.4 °C)  Max: 98.2 °F (36.8 °C)    No intake/output data recorded. I/O last 3 completed shifts: In: 444 [I.V.:444]  Out: 600 [Urine:600]      Physical Exam:  Physical Exam  Constitutional:       Appearance: He is well-developed. HENT:      Head: Normocephalic. Eyes:      Pupils: Pupils are equal, round, and reactive to light. Cardiovascular:      Rate and Rhythm: Normal rate. Pulmonary:      Effort: Pulmonary effort is normal.   Abdominal:      General: There is no distension. Palpations: Abdomen is soft. There is no mass. Tenderness: There is abdominal tenderness. There is no guarding or rebound. Musculoskeletal:         General: Normal range of motion. Cervical back: Normal range of motion and neck supple. Skin:     General: Skin is warm. Neurological:      Mental Status: He is alert and oriented to person, place, and time.              Scheduled Meds:   sodium chloride flush  5-40 mL Intravenous 2 times per day    ceFAZolin (ANCEF) IVPB  2,000 mg Intravenous Q8H    midodrine  5 mg Oral TID     aspirin  81 mg Oral Daily    predniSONE  13 mg Oral Daily    metoprolol succinate  25 mg Oral BID    digoxin  125 mcg Oral Daily    [Held by provider] rivaroxaban  20 mg Oral Daily with breakfast    lidocaine PF  5 mL Intradermal Once    sodium chloride flush  5-40 mL Intravenous 2 times per day    pyridostigmine  60 mg Oral 5x Daily    Investigational - Injection  32.4 mg Subcutaneous Daily    pantoprazole  40 mg Intravenous Daily     Continuous Infusions:   sodium chloride      DOPamine Stopped (21 0915)    sodium chloride      sodium chloride       PRN Meds:sodium chloride flush, sodium chloride, ipratropium-albuterol, naloxone, sodium chloride, sodium chloride flush, sodium chloride, ondansetron **OR** ondansetron, acetaminophen **OR** acetaminophen, morphine      Labs/Imaging Results:   Lab Results   Component Value Date    WBC 9.6 07/07/2021    HGB 8.2 (L) 07/07/2021    HCT 28.3 (L) 07/07/2021    MCV 92.3 07/07/2021     07/07/2021     Lab Results   Component Value Date     07/07/2021    K 4.9 07/07/2021     07/07/2021    CO2 23 07/07/2021    BUN 11 07/07/2021    CREATININE 1.1 07/07/2021    GLUCOSE 139 (H) 07/07/2021    CALCIUM 9.4 07/07/2021    PROT 5.5 (L) 06/16/2021    LABALBU 3.3 (L) 06/16/2021    BILITOT 0.6 06/16/2021    ALKPHOS 45 06/16/2021     (H) 06/16/2021     (H) 06/16/2021    LABGLOM >60 07/07/2021    GFRAA >60 07/07/2021       Assessment:     Patient Active Problem List:     [facility]     Unspecified ptosis of bilateral eyelids     Cellulitis of left hand     Viral gastroenteritis     SOB (shortness of breath)     Atrial fibrillation, chronic (HCC)     Post PTCA     NSTEMI (non-ST elevated myocardial infarction) ([facility])     Atrial fibrillation with rapid ventricular response ([facility])     Hyperkalemia     Type 2 MI (myocardial infarction) ([facility]     Gastritis and duodenitis     Duodenal ulcer     Coronary artery disease involving native coronary artery of native heart without angina pectoris     COPD (chronic obstructive pulmonary disease) ([facility])     GI bleed     History of myasthenia gravis     Parastomal hernia without obstruction or gangrene     Anemia      Plan: Will f/u as out pt for the hernia repair. Pt will cardiac clearance as well.        Elizabeth Black MD

## 2021-07-07 NOTE — PROGRESS NOTES
Hospitalist ICU Progress Note      Name:  Ronit Gaxiola /Age/Sex: 1960  (57 y.o. male)   MRN & CSN:  [id number] & [id number] Admission Date/Time: 7/3/2021 12:10 PM   Location:  OR/NONE PCP: No primary care provider on file. Hospital Day: 5    Assessment and Plan:   56 y/o M that presented with acute blood loss anemia    Acute blood loss anemia  Hx of Duodenal Ulcer  Hx Ulcerative Colitis s/p Total Abdominal Colectomy  S/p 3 unit PRBC transfusion. Hemoglobin stable. EGD  no active bleeding identified. Continue Protonix and  AC with Xarelto needs cautious monitoring. Hx of CAD/Afib with RVR/hx of Paroxysmal A. fib/Tachybradycardia syndrome    S/p cardioversion-converted to escape junctional rhythm. Patient went into bradycardia with IV amiodarone, metoprolol and digoxin. Eventually started on dopamine drip to maintain heart rate. Planned pacemaker for tachybradycardia syndrome. CTS on board.    -Hx of COPD  Currently on nebs and O2 support as needed. Stable    -Hernia? CT abdomen pelvis: Moderate-sized fat-containing parastomal hernia. Pain appears improved   as long as patient is not bending. Evaluated by general surgery. -Myasthenia gravis   Home meds resumed including IM med that is undergoing trials at [facility]; patient takes daily  at home so sent to pharmacy; follows closely with [facility]    Morbid obesity diet and exercise counseling    Diet Diet NPO Exceptions are: Sips of Water with Meds   DVT Prophylaxis [] Lovenox, []  Heparin, [] SCDs, [] Ambulation   GI Prophylaxis [] PPI,  [] H2 Blocker,  [] Carafate,  [] Diet/Tube Feeds   Code Status Full Code   Disposition  patient needs heart rate controlled. May need pacemaker. MDM [] Low, [] Moderate,[]  High  Patient's risk as above due to      History of Present Illness:     Patient denied any significant complaints overnight. N.p.o. for pacemaker placement. Currently on dopamine drip for bradycardia.   No acute overnight events. Objective: Intake/Output Summary (Last 24 hours) at 7/7/2021 0809  Last data filed at 7/7/2021 0545  Gross per 24 hour   Intake 444 ml   Output 700 ml   Net -256 ml      Vitals:   Vitals:    07/07/21 0545   BP:    Pulse: 71   Resp: 23   Temp:    SpO2:      Physical Exam:     GEN awake alert, not in distress. Lungs B/L occasional wheeze +  Heart RRR, S1/S2 heard, no M/R/G  Abdominal/NT/ND/BS +, ileostomy bag +  Extremities no edema/clubbing/cyanosis  Neuro nonfocal exam    Medications:   Medications:    aspirin  81 mg Oral Daily    predniSONE  13 mg Oral Daily    metoprolol succinate  25 mg Oral BID    digoxin  125 mcg Oral Daily    [Held by provider] rivaroxaban  20 mg Oral Daily with breakfast    lidocaine PF  5 mL Intradermal Once    sodium chloride flush  5-40 mL Intravenous 2 times per day    pyridostigmine  60 mg Oral 5x Daily    Investigational - Injection  32.4 mg Subcutaneous Daily    pantoprazole  40 mg Intravenous Daily      Infusions:    DOPamine 5 mcg/kg/min (07/07/21 0734)    sodium chloride      sodium chloride       PRN Meds: ipratropium-albuterol, 1 ampule, Q4H PRN  naloxone, 0.4 mg, PRN  sodium chloride, , PRN  sodium chloride flush, 5-40 mL, PRN  sodium chloride, 25 mL, PRN  ondansetron, 4 mg, Q8H PRN   Or  ondansetron, 4 mg, Q6H PRN  acetaminophen, 650 mg, Q6H PRN   Or  acetaminophen, 650 mg, Q6H PRN  morphine, 2 mg, Q4H PRN      Recent Labs     07/05/21  0945 07/05/21  1815 07/06/21  0632 07/06/21  0632 07/06/21  1159 07/06/21  1647 07/07/21  0445   WBC 7.5  --  8.6  --   --   --  9.6   HGB 9.0*   < > 8.9*   < > 9.5* 10.7* 8.8*   HCT 29.2*   < > 29.1*   < > 31.4* 35.2* 30.1*     --  231  --   --   --  256    < > = values in this interval not displayed.       Recent Labs     07/05/21  0945 07/06/21  0632 07/07/21  0445    140 139   K 4.1 4.5 4.9    108 106   CO2 22 22 23   BUN 8 6 11   CREATININE 1.0 0.9 1.1     No results for input(s): AST, ALT, ALB, BILIDIR, BILITOT, ALKPHOS in the last 72 hours. No results for input(s): INR in the last 72 hours. No results for input(s): CKTOTAL, CKMB, CKMBINDEX, TROPONINT in the last 72 hours.       Electronically signed by Yamil Freeman MD on 7/7/2021 at 8:09 AM

## 2021-07-07 NOTE — FLOWSHEET NOTE
Pt noted to have O2 saturation in the 60's while on RA sleeping. Applied NC at 3L and pt satting 95%.

## 2021-07-07 NOTE — CONSULTS
Department of GeneralSurgery   Surgical Service Dr Kirby Patricia   Consult Note    Date of Consult: 7/5/21    Reason for Consult:  Parastomal hernia  Requesting Physician:  Dr [address]:  ***    History Obtained From:  {HISTORY SOURCE:[id number]::\"patient\"}    HISTORY OF PRESENT ILLNESS:                The patient is a 57 y.o. male who presents with ***. Pain is described as {quality:618}. Pain level is ***/10. Past Medical History:    Past Medical History:   Diagnosis Date    Atrial fibrillation, chronic ([address] 12/2020    COPD (chronic obstructive pulmonary disease) ([address] 6/15/2021    Coronary artery disease involving native coronary artery of native heart without angina pectoris 6/15/2021    H/O echocardiogram 02/02/2021    EF is estimated at 50%. presence of PVC affected LVEF estimation, Mild TR.    Heart disease     stint    History of blood transfusion     Hx of blood clots     Hx of cardiovascular stress test 06/11/2021    Small size severe intensity,perfusion defect in apical wall.  Hx of Doppler ultrasound 04/09/2021    Left distal SSV shows occlusive chronic SVT.  Hypertension     Myasthenia gravis ([address]     NSTEMI (non-ST elevated myocardial infarction) ([address] 12/2020    Post PTCA 12/14/2020    Ramus Stented.  TIA (transient ischemic attack)     Ulcerative colitis ([facility]        Past Surgical History:    Past Surgical History:   Procedure Laterality Date    CARDIAC SURGERY      ILEOSTOMY OR JEJUNOSTOMY      PTCA  12/14/2020    Ramus Stented.     TONSILLECTOMY      UPPER GASTROINTESTINAL ENDOSCOPY N/A 6/15/2021    EGD DIAGNOSTIC ONLY performed by Beto Ramachandran MD at [facility] N/A 7/4/2021    EGD DIAGNOSTIC ONLY performed by Bertrand Meigs, MD at [facility] ENDOSCOPY       Current Medications:   Current Facility-Administered Medications   Medication Dose Route Frequency Provider Last Rate Last Admin    sodium chloride flush 0.9 % injection 5-40 mL  5-40 mL Intravenous 2 times per day Talha Martin PA-C   10 mL at 07/07/21 1049    sodium chloride flush 0.9 % injection 5-40 mL  5-40 mL Intravenous PRN Talha Martin PA-C        0.9 % sodium chloride infusion  25 mL Intravenous PRN Talha Martin PA-C        ceFAZolin (ANCEF) 2000 mg in dextrose 5 % 100 mL IVPB  2,000 mg Intravenous Q8H Talha Martin PA-C        midodrine (PROAMATINE) tablet 5 mg  5 mg Oral TID  Dilshad Garibay MD        ipratropium-albuterol (DUONEB) nebulizer solution 1 ampule  1 ampule Inhalation Q4H PRN Talha Martin PA-C        naloxone [facility]) injection 0.4 mg  0.4 mg Intravenous PRN Talha Martin PA-C   0.4 mg at 07/06/21 1328    DOPamine (INTROPIN) 400 mg in dextrose 5 % 250 mL infusion  2-20 mcg/kg/min Intravenous Continuous Talha Martin PA-C   Stopped at 07/07/21 0915    aspirin chewable tablet 81 mg  81 mg Oral Daily Talha Martin PA-C   81 mg at 07/07/21 1029    predniSONE (DELTASONE) tablet 13 mg  13 mg Oral Daily Talha Martin PA-C   13 mg at 07/07/21 1029    metoprolol succinate (TOPROL XL) extended release tablet 25 mg  25 mg Oral BID Talha Martin PA-C   25 mg at 07/06/21 0801    digoxin (LANOXIN) tablet 125 mcg  125 mcg Oral Daily Talha Martin PA-C   125 mcg at 07/06/21 0801    [Held by provider] rivaroxaban (XARELTO) tablet 20 mg  20 mg Oral Daily with breakfast Talha Martin PA-C   20 mg at 07/06/21 0801    0.9 % sodium chloride infusion   Intravenous PRN Jahaira Aponte MD        lidocaine PF 1 % injection 5 mL  5 mL Intradermal Once Jahaira Aponte MD        sodium chloride flush 0.9 % injection 5-40 mL  5-40 mL Intravenous 2 times per day Jahaira Aponte MD   10 mL at 07/07/21 1050    sodium chloride flush 0.9 % injection 5-40 mL  5-40 mL Intravenous PRN Jahaira Aponte MD        0.9 % sodium chloride infusion  25 mL Intravenous PRN Jahaira Aponte MD        pyridostigmine (MESTINON) tablet 60 mg  60 mg Oral 5x Daily Talha Martin PA-C   60 mg at 21 1537    Investigational - Injection  32.4 mg Subcutaneous Daily Estrada Randhawa PA-C   32.4 mg at 21 1536    ondansetron (ZOFRAN-ODT) disintegrating tablet 4 mg  4 mg Oral Q8H PRN Irwin Gr MD        Or    ondansetron [facility]) injection 4 mg  4 mg Intravenous Q6H PRN Irwin Gr MD        acetaminophen (TYLENOL) tablet 650 mg  650 mg Oral Q6H PRN Irwin Gr MD   650 mg at 21    Or    acetaminophen (TYLENOL) suppository 650 mg  650 mg Rectal Q6H PRN Irwin Gr MD        morphine (PF) injection 2 mg  2 mg Intravenous Q4H PRN Irwin Gr MD        pantoprazole (PROTONIX) injection 40 mg  40 mg Intravenous Daily Irwin Gr MD   40 mg at 21 0645     Facility-Administered Medications Ordered in Other Encounters   Medication Dose Route Frequency Provider Last Rate Last Admin    Immune Globulin (Human) solution 20 g  20 g Intravenous Once Adrienne Ponce MD        And    Immune Globulin (Human) solution 20 g  20 g Intravenous Once Adrienne Ponce MD        Immune Globulin (Human) solution 20 g  20 g Intravenous Once Adrienne Ponce MD        And    Immune Globulin (Human) solution 20 g  20 g Intravenous Once Adrienne Ponce MD           Allergies:  Patient has no known allergies. Social History:   Social History     Socioeconomic History    Marital status:      Spouse name: None    Number of children: None    Years of education: None    Highest education level: None   Occupational History    None   Tobacco Use    Smoking status: Former Smoker     Packs/day: 0.25     Types: Cigarettes     Quit date: 2021     Years since quittin.2    Smokeless tobacco: Never Used   Substance and Sexual Activity    Alcohol use:  Yes     Alcohol/week: 1.0 standard drinks     Types: 1 Cans of beer per week     Comment: occ    Drug use: No    Sexual activity: Yes     Partners: Female   Other Topics Concern    None   Social History Narrative    None     Social Determinants of Health     Financial Resource Strain:     Difficulty of Paying Living Expenses:    Food Insecurity:     Worried About Running Out of Food in the Last Year:     [address] in the Last Year:    Transportation Needs:     Lack of Transportation (Medical):      Lack of Transportation (Non-Medical):    Physical Activity:     Days of Exercise per Week:     Minutes of Exercise per Session:    Stress:     Feeling of Stress :    Social Connections:     Frequency of Communication with Friends and Family:     Frequency of Social Gatherings with Friends and Family:     Attends Rastafarian Services:     Active Member of Clubs or Organizations:     Attends Club or Organization Meetings:     Marital Status:    Intimate Partner Violence:     Fear of Current or Ex-Partner:     Emotionally Abused:     Physically Abused:     Sexually Abused:        Family History:   Family History   Family history unknown: Yes       REVIEW OFSYSTEMS:    Review of Systems    PHYSICAL EXAM:  Vitals:    07/07/21 0954 07/07/21 1200 07/07/21 1300 07/07/21 1400   BP: (!) 90/56 101/68 88/69 98/61   Pulse: 60 70 71 72   Resp: 18 18  13   Temp: 97.6 °F (36.4 °C) 97.8 °F (36.6 °C)     TempSrc: Oral Oral     SpO2: 98% 100% 97% 98%   Weight:       Height:           Physical Exam      DATA:    {TJ:[id number]}    IMPRESSION:        Patient Active Problem List:     [facility]     Unspecified ptosis of bilateral eyelids     Cellulitis of left hand     Viral gastroenteritis     SOB (shortness of breath)     Atrial fibrillation, chronic (HCC)     Post PTCA     NSTEMI (non-ST elevated myocardial infarction) ([facility])     Atrial fibrillation with rapid ventricular response ([facility])     Hyperkalemia     Type 2 MI (myocardial infarction) ([address]     Gastritis and duodenitis     Duodenal ulcer     Coronary artery disease involving native coronary artery of native heart without angina pectoris     COPD (chronic obstructive pulmonary disease) (HCC)     GI bleed     History of myasthenia gravis     Parastomal hernia without obstruction or gangrene     Anemia      Parastomal hernia    PLAN:    Will obtain CT a/p to further evaluated parastomal hernia.          Baldomero Antoine MD

## 2021-07-08 LAB
ANION GAP SERPL CALCULATED.3IONS-SCNC: 12 MMOL/L (ref 4–16)
BASOPHILS ABSOLUTE: 0 K/CU MM
BASOPHILS RELATIVE PERCENT: 0.2 % (ref 0–1)
BUN BLDV-MCNC: 12 MG/DL (ref 6–23)
CALCIUM SERPL-MCNC: 8.6 MG/DL (ref 8.3–10.6)
CHLORIDE BLD-SCNC: 104 MMOL/L (ref 99–110)
CO2: 20 MMOL/L (ref 21–32)
CREAT SERPL-MCNC: 0.9 MG/DL (ref 0.9–1.3)
DIFFERENTIAL TYPE: ABNORMAL
EKG ATRIAL RATE: 81 BPM
EKG DIAGNOSIS: NORMAL
EKG P AXIS: 86 DEGREES
EKG P-R INTERVAL: 180 MS
EKG Q-T INTERVAL: 384 MS
EKG QRS DURATION: 114 MS
EKG QTC CALCULATION (BAZETT): 446 MS
EKG R AXIS: 64 DEGREES
EKG T AXIS: -43 DEGREES
EKG VENTRICULAR RATE: 81 BPM
EOSINOPHILS ABSOLUTE: 0.1 K/CU MM
EOSINOPHILS RELATIVE PERCENT: 1 % (ref 0–3)
GFR AFRICAN AMERICAN: >60 ML/MIN/1.73M2
GFR NON-AFRICAN AMERICAN: >60 ML/MIN/1.73M2
GLUCOSE BLD-MCNC: 180 MG/DL (ref 70–99)
HCT VFR BLD CALC: 29.2 % (ref 42–52)
HEMOGLOBIN: 8.5 GM/DL (ref 13.5–18)
IMMATURE NEUTROPHIL %: 0.4 % (ref 0–0.43)
LYMPHOCYTES ABSOLUTE: 0.6 K/CU MM
LYMPHOCYTES RELATIVE PERCENT: 7.3 % (ref 24–44)
MAGNESIUM: 2 MG/DL (ref 1.8–2.4)
MCH RBC QN AUTO: 26.2 PG (ref 27–31)
MCHC RBC AUTO-ENTMCNC: 29.1 % (ref 32–36)
MCV RBC AUTO: 90.1 FL (ref 78–100)
MONOCYTES ABSOLUTE: 0.5 K/CU MM
MONOCYTES RELATIVE PERCENT: 6.3 % (ref 0–4)
NUCLEATED RBC %: 0.2 %
PDW BLD-RTO: 16.6 % (ref 11.7–14.9)
PLATELET # BLD: 201 K/CU MM (ref 140–440)
PMV BLD AUTO: 10.2 FL (ref 7.5–11.1)
POTASSIUM SERPL-SCNC: 4.7 MMOL/L (ref 3.5–5.1)
RBC # BLD: 3.24 M/CU MM (ref 4.6–6.2)
SEGMENTED NEUTROPHILS ABSOLUTE COUNT: 6.8 K/CU MM
SEGMENTED NEUTROPHILS RELATIVE PERCENT: 84.8 % (ref 36–66)
SODIUM BLD-SCNC: 136 MMOL/L (ref 135–145)
TOTAL IMMATURE NEUTOROPHIL: 0.03 K/CU MM
TOTAL NUCLEATED RBC: 0 K/CU MM
WBC # BLD: 8.1 K/CU MM (ref 4–10.5)

## 2021-07-08 PROCEDURE — 83735 ASSAY OF MAGNESIUM: CPT

## 2021-07-08 PROCEDURE — 92960 CARDIOVERSION ELECTRIC EXT: CPT

## 2021-07-08 PROCEDURE — 93010 ELECTROCARDIOGRAM REPORT: CPT | Performed by: INTERNAL MEDICINE

## 2021-07-08 PROCEDURE — 7100000001 HC PACU RECOVERY - ADDTL 15 MIN

## 2021-07-08 PROCEDURE — 80048 BASIC METABOLIC PNL TOTAL CA: CPT

## 2021-07-08 PROCEDURE — 6370000000 HC RX 637 (ALT 250 FOR IP): Performed by: INTERNAL MEDICINE

## 2021-07-08 PROCEDURE — 85025 COMPLETE CBC W/AUTO DIFF WBC: CPT

## 2021-07-08 PROCEDURE — 2580000003 HC RX 258: Performed by: SPECIALIST

## 2021-07-08 PROCEDURE — 6370000000 HC RX 637 (ALT 250 FOR IP): Performed by: PHYSICIAN ASSISTANT

## 2021-07-08 PROCEDURE — 85014 HEMATOCRIT: CPT

## 2021-07-08 PROCEDURE — 2580000003 HC RX 258: Performed by: INTERNAL MEDICINE

## 2021-07-08 PROCEDURE — 2580000003 HC RX 258: Performed by: PHYSICIAN ASSISTANT

## 2021-07-08 PROCEDURE — 85018 HEMOGLOBIN: CPT

## 2021-07-08 PROCEDURE — 6370000000 HC RX 637 (ALT 250 FOR IP): Performed by: SPECIALIST

## 2021-07-08 PROCEDURE — 6360000002 HC RX W HCPCS: Performed by: INTERNAL MEDICINE

## 2021-07-08 PROCEDURE — 93005 ELECTROCARDIOGRAM TRACING: CPT | Performed by: INTERNAL MEDICINE

## 2021-07-08 PROCEDURE — 99231 SBSQ HOSP IP/OBS SF/LOW 25: CPT | Performed by: SPECIALIST

## 2021-07-08 PROCEDURE — 7100000000 HC PACU RECOVERY - FIRST 15 MIN

## 2021-07-08 PROCEDURE — C9113 INJ PANTOPRAZOLE SODIUM, VIA: HCPCS | Performed by: SPECIALIST

## 2021-07-08 PROCEDURE — 36415 COLL VENOUS BLD VENIPUNCTURE: CPT

## 2021-07-08 PROCEDURE — 94761 N-INVAS EAR/PLS OXIMETRY MLT: CPT

## 2021-07-08 PROCEDURE — 92960 CARDIOVERSION ELECTRIC EXT: CPT | Performed by: INTERNAL MEDICINE

## 2021-07-08 PROCEDURE — 2060000000 HC ICU INTERMEDIATE R&B

## 2021-07-08 PROCEDURE — 6360000002 HC RX W HCPCS: Performed by: SPECIALIST

## 2021-07-08 PROCEDURE — 85027 COMPLETE CBC AUTOMATED: CPT

## 2021-07-08 PROCEDURE — 99233 SBSQ HOSP IP/OBS HIGH 50: CPT | Performed by: INTERNAL MEDICINE

## 2021-07-08 RX ORDER — PANTOPRAZOLE SODIUM 40 MG/1
40 TABLET, DELAYED RELEASE ORAL
Status: DISCONTINUED | OUTPATIENT
Start: 2021-07-08 | End: 2021-07-09 | Stop reason: HOSPADM

## 2021-07-08 RX ORDER — METOPROLOL SUCCINATE 50 MG/1
50 TABLET, EXTENDED RELEASE ORAL DAILY
Status: DISCONTINUED | OUTPATIENT
Start: 2021-07-08 | End: 2021-07-08

## 2021-07-08 RX ORDER — METOPROLOL SUCCINATE 50 MG/1
50 TABLET, EXTENDED RELEASE ORAL 2 TIMES DAILY
Status: DISCONTINUED | OUTPATIENT
Start: 2021-07-08 | End: 2021-07-09 | Stop reason: HOSPADM

## 2021-07-08 RX ADMIN — SODIUM CHLORIDE, PRESERVATIVE FREE 10 ML: 5 INJECTION INTRAVENOUS at 08:44

## 2021-07-08 RX ADMIN — SODIUM CHLORIDE, PRESERVATIVE FREE 10 ML: 5 INJECTION INTRAVENOUS at 20:52

## 2021-07-08 RX ADMIN — AMIODARONE HYDROCHLORIDE 1 MG/MIN: 50 INJECTION, SOLUTION INTRAVENOUS at 03:58

## 2021-07-08 RX ADMIN — AMIODARONE HYDROCHLORIDE 150 MG: 50 INJECTION, SOLUTION INTRAVENOUS at 03:40

## 2021-07-08 RX ADMIN — AMIODARONE HYDROCHLORIDE 0.5 MG/MIN: 50 INJECTION, SOLUTION INTRAVENOUS at 10:03

## 2021-07-08 RX ADMIN — PYRIDOSTIGMINE BROMIDE 60 MG: 60 TABLET ORAL at 22:44

## 2021-07-08 RX ADMIN — PYRIDOSTIGMINE BROMIDE 60 MG: 60 TABLET ORAL at 06:47

## 2021-07-08 RX ADMIN — PANTOPRAZOLE SODIUM 40 MG: 40 INJECTION, POWDER, FOR SOLUTION INTRAVENOUS at 06:47

## 2021-07-08 RX ADMIN — PYRIDOSTIGMINE BROMIDE 60 MG: 60 TABLET ORAL at 15:20

## 2021-07-08 RX ADMIN — MORPHINE SULFATE 2 MG: 2 INJECTION, SOLUTION INTRAMUSCULAR; INTRAVENOUS at 22:44

## 2021-07-08 RX ADMIN — ASPIRIN 81 MG: 81 TABLET, CHEWABLE ORAL at 08:43

## 2021-07-08 RX ADMIN — PANTOPRAZOLE SODIUM 40 MG: 40 TABLET, DELAYED RELEASE ORAL at 08:46

## 2021-07-08 RX ADMIN — DIGOXIN 125 MCG: 125 TABLET ORAL at 08:43

## 2021-07-08 RX ADMIN — METOPROLOL SUCCINATE 50 MG: 50 TABLET, EXTENDED RELEASE ORAL at 20:49

## 2021-07-08 RX ADMIN — PYRIDOSTIGMINE BROMIDE 60 MG: 60 TABLET ORAL at 12:03

## 2021-07-08 RX ADMIN — SODIUM CHLORIDE, PRESERVATIVE FREE 10 ML: 5 INJECTION INTRAVENOUS at 20:51

## 2021-07-08 RX ADMIN — METOPROLOL SUCCINATE 50 MG: 50 TABLET, EXTENDED RELEASE ORAL at 08:43

## 2021-07-08 RX ADMIN — PYRIDOSTIGMINE BROMIDE 60 MG: 60 TABLET ORAL at 20:49

## 2021-07-08 RX ADMIN — MORPHINE SULFATE 2 MG: 2 INJECTION, SOLUTION INTRAMUSCULAR; INTRAVENOUS at 17:11

## 2021-07-08 RX ADMIN — PREDNISONE 13 MG: 1 TABLET ORAL at 08:44

## 2021-07-08 ASSESSMENT — PAIN SCALES - WONG BAKER
WONGBAKER_NUMERICALRESPONSE: 0

## 2021-07-08 ASSESSMENT — PAIN DESCRIPTION - ORIENTATION: ORIENTATION: LEFT

## 2021-07-08 ASSESSMENT — PAIN SCALES - GENERAL
PAINLEVEL_OUTOF10: 0
PAINLEVEL_OUTOF10: 2
PAINLEVEL_OUTOF10: 0
PAINLEVEL_OUTOF10: 6
PAINLEVEL_OUTOF10: 0
PAINLEVEL_OUTOF10: 0
PAINLEVEL_OUTOF10: 6
PAINLEVEL_OUTOF10: 7

## 2021-07-08 ASSESSMENT — PAIN DESCRIPTION - LOCATION: LOCATION: ARM;SHOULDER

## 2021-07-08 ASSESSMENT — PAIN - FUNCTIONAL ASSESSMENT: PAIN_FUNCTIONAL_ASSESSMENT: ACTIVITIES ARE NOT PREVENTED

## 2021-07-08 ASSESSMENT — PAIN DESCRIPTION - PROGRESSION: CLINICAL_PROGRESSION: NOT CHANGED

## 2021-07-08 ASSESSMENT — PAIN DESCRIPTION - DESCRIPTORS: DESCRIPTORS: ACHING;DISCOMFORT

## 2021-07-08 ASSESSMENT — PAIN DESCRIPTION - PAIN TYPE: TYPE: SURGICAL PAIN

## 2021-07-08 ASSESSMENT — PAIN DESCRIPTION - FREQUENCY: FREQUENCY: CONTINUOUS

## 2021-07-08 ASSESSMENT — PAIN DESCRIPTION - ONSET: ONSET: ON-GOING

## 2021-07-08 NOTE — PROGRESS NOTES
PATIENT NAME: Freda Knight    TODAY'S DATE: 07/08/21    SUBJECTIVE:    Pt is POD # 1 s/p PPM placment. Pt feeling well, minimal pain and swelling. OBJECTIVE:   VITALS:    Vitals:    07/08/21 1000   BP: 99/76   Pulse: 72   Resp: 18   Temp:    SpO2: 100%     INTAKE/OUTPUT:    Date 07/08/21 0000 - 07/08/21 2359   Shift 8150-03192 6380-9820 4535-2734 24 Hour Total   INTAKE   I.V.(mL/kg/hr)  10  10   Shift Total(mL/kg)  10(0.1)  10(0.1)   OUTPUT   Shift Total(mL/kg)       Weight (kg) 122.9 122.9 122.9 122.9      Patient Vitals for the past 96 hrs (Last 3 readings):   Weight   07/06/21 0430 270 lb 15.1 oz (122.9 kg)   07/05/21 0602 269 lb 13.5 oz (122.4 kg)       EXAM:  Blood pressure 99/76, pulse 72, temperature 98.3 °F (36.8 °C), temperature source Oral, resp. rate 18, height 5' 11\" (1.803 m), weight 270 lb 15.1 oz (122.9 kg), SpO2 100 %. General appearance: No apparent distress, appears stated age and cooperative. Skin: unremarkable  HEENT Normocephalic, atraumatic without obvious deformity. Neck: Supple, Trachea midline   Lungs: Good respiratory effort. Clear to auscultation, bilaterally  Heart: Regular rate/ rhythm inc c/d/i no hematoma noted  Abdomen: Soft, non-tender or non-distended   Extremities: min edema warm well perfused  Neurologic: Alert, grossly intact  Mental status: normal affect      Data:  CBC:   Recent Labs     07/06/21  0632 07/06/21  1159 07/07/21  0445 07/07/21  1433 07/07/21  2047   WBC 8.6  --  9.6  --   --    HGB 8.9*   < > 8.8* 8.2* 8.2*   HCT 29.1*   < > 30.1* 28.3* 27.2*     --  256  --   --     < > = values in this interval not displayed. BMP:    Recent Labs     07/06/21  0632 07/07/21  0445    139   K 4.5 4.9    106   CO2 22 23   BUN 6 11   CREATININE 0.9 1.1   GLUCOSE 144* 139*     Hepatic:   No results for input(s): AST, ALT, ALB, BILITOT, ALKPHOS in the last 72 hours. Mag:      No results for input(s): MG in the last 72 hours.    Phos:   No results for

## 2021-07-08 NOTE — PROGRESS NOTES
hemoglobin stable  ileostomy output brown   Abdomen soft, non-tender, non-distended  Impression:    1) duodenal ulcer with prior bleed- now stable    2) SEBASTIAN S/P total colectomy/ileostomy         Plan:   1) switch to oral Protonix once daily   2) I will see in follow up in the office in 1-2 months    I will sign off now- call if need to see again

## 2021-07-08 NOTE — PROGRESS NOTES
Hospitalist ICU Progress Note      Name:  Annita Real /Age/Sex: 1960  (57 y.o. male)   MRN & CSN:  [id number] & [id number] Admission Date/Time: 7/3/2021 12:10 PM   Location:  -A PCP: No primary care provider on file. Hospital Day: 6    Assessment and Plan:   58 y/o M that presented with acute blood loss anemia    Acute blood loss anemia  Hx of Duodenal Ulcer  Hx Ulcerative Colitis s/p Total Abdominal Colectomy  S/p 3 unit PRBC transfusion. Hemoglobin stable. EGD  no active bleeding identified. Continue Protonix and  AC with Xarelto needs cautious monitoring. Hx of CAD/Afib with RVR/hx of Paroxysmal A. fib/Tachybradycardia syndrome    S/p cardioversion-converted to escape junctional rhythm. Patient went into bradycardia with IV amiodarone, metoprolol and digoxin. Eventually started on dopamine drip to maintain heart rate. S/p  pacemaker for tachybradycardia syndrome on . However patient went into A. fib last night-cardioverted again and started on amio loading. EP planning for outpatient ablation. Maintain K >4 and mag >2. Continue monitor    -Hx of COPD  Currently on nebs and O2 support as needed. Stable    -Hernia? CT abdomen pelvis: Moderate-sized fat-containing parastomal hernia. Pain appears improved   as long as patient is not bending. Evaluated by general surgery. -Myasthenia gravis   Home meds resumed including IM med that is undergoing trials at [address] [place] [address]; patient takes daily  at home so sent to pharmacy; follows closely with [facility]    Morbid obesity diet and exercise counseling    Diet Diet NPO Exceptions are: Sips of Water with Meds   DVT Prophylaxis [] Lovenox, []  Heparin, [] SCDs, [] Ambulation   GI Prophylaxis [] PPI,  [] H2 Blocker,  [] Carafate,  [] Diet/Tube Feeds   Code Status Full Code   Disposition  patient needs heart rate controlled. May need pacemaker.    MDM [] Low, [] Moderate,[]  High  Patient's risk as above due to      History of Present Illness:     Noted events of A. fib again last night. Currently on amiodarone drip. Otherwise patient denied any chest pain. Currently off of O2 support. Tolerating diet. Objective:     No intake or output data in the 24 hours ending 07/08/21 0820   Vitals:   Vitals:    07/08/21 0515   BP: (!) 129/99   Pulse: 120   Resp: 18   Temp:    SpO2:      Physical Exam:     GEN awake alert, not in distress.   Chest left chest pacemaker +  Lungs mild B/L occasional wheeze +  Heart irregular rate and rhythm, S1/S2 heard, no M/R/G  Abdominal/NT/ND/BS +, ileostomy bag +  Extremities no edema/clubbing/cyanosis  Neuro nonfocal exam    Medications:   Medications:    metoprolol succinate  50 mg Oral Daily    pantoprazole  40 mg Oral QAM AC    sodium chloride flush  5-40 mL Intravenous 2 times per day    midodrine  5 mg Oral TID WC    aspirin  81 mg Oral Daily    predniSONE  13 mg Oral Daily    digoxin  125 mcg Oral Daily    [Held by provider] rivaroxaban  20 mg Oral Daily with breakfast    lidocaine PF  5 mL Intradermal Once    sodium chloride flush  5-40 mL Intravenous 2 times per day    pyridostigmine  60 mg Oral 5x Daily    Investigational - Injection  32.4 mg Subcutaneous Daily      Infusions:    amiodarone 1 mg/min (07/08/21 0358)    Followed by   Cristina Curl amiodarone      sodium chloride      DOPamine Stopped (07/07/21 0915)    sodium chloride      sodium chloride       PRN Meds: sodium chloride flush, 5-40 mL, PRN  sodium chloride, 25 mL, PRN  ipratropium-albuterol, 1 ampule, Q4H PRN  naloxone, 0.4 mg, PRN  sodium chloride, , PRN  sodium chloride flush, 5-40 mL, PRN  sodium chloride, 25 mL, PRN  ondansetron, 4 mg, Q8H PRN   Or  ondansetron, 4 mg, Q6H PRN  acetaminophen, 650 mg, Q6H PRN   Or  acetaminophen, 650 mg, Q6H PRN  morphine, 2 mg, Q4H PRN      Recent Labs     07/05/21  0945 07/05/21  1815 07/06/21  0632 07/06/21  1159 07/07/21  0445 07/07/21  1433 07/07/21  2047   WBC 7.5  --  8.6  --  9.6  --   --

## 2021-07-08 NOTE — PROCEDURES
Indication: Atrial fibrillation      After obtaining the informed consent pt was sedated  With  Versed 4m and  Fentanyl   100mcg  . A   200J synchronised  shock  X 2 was given. Pt converted to  Sinus rythym       Pt remained clinically and hemodynamically stable. Pt is on anticoagulation      Cont with present medical therapy.   Continue xarelto     I:  Successful cardioversion    Plan:  Cont present therapy  F/U in 2 weeks

## 2021-07-08 NOTE — PROGRESS NOTES
Cardiology Progress Note     Admit Date:  7/3/2021    Consult reason/ Seen today for :   Afib  CAD  Cardiomyopathy     Subjective and  Overnight Events :  UNFORTUNATELY BACK IN AFIB [facility] ELAST NIGHT  . Got a pacer yesterday   frustrated and wants to go home Hct stable  HE was cardioverted due to  Afib/ flutter Hr has been in 130's in spite of being on amio gtt , digoxin metoprolol , Cardizem hence he was cardioverted but  post cardioversion heart rate is in 40s with junctional rhythm concerning for sick sinus and tachybradycardia syndrome  Blood pressure has been in 90s and 80s  He would literally like to return back to work as soon as possible he is a [address]      Chief complain on admission : 57 y. o.year old who is admitted for  Chief Complaint   Patient presents with    Fatigue     told yesterday he is back in a-fib, denies SOB or chest pain       Assessment / Plan:  A. fib flutter heart rate in 130s s/p cardioversion but failed therapy restart anticoagulation now if cleared by gastroenterology , cardioverted  but developed junctional rhythm this is second time he is failed cardioversion tachybradycardia syndrome, back in afib since last night   Plan for cardioversion and then continue amio   S/p pacer by ct surgery, appreciate your help   Elevated Troponin : will continue medical management for now.    Echo shows EF of 50% with moderate MR  S/p PCI in December stop Plavix can start aspirin if he is tolerating anticoagulation  GI bleeding has gastric ulcer but not actively bleeding on Protonix not tolerating Xarelto  History of myasthenia gravis on prednisone and pyridostigmine  HTN: stable, continue To titrate up medication as needed  DVT Prophylaxis if no contraindication  Alternates and risk of the procedure were dicussed in detail  Patient is in agreement to proceed  Mallampati is 3 ASA is  3  Past medical history:    has a past medical history of Atrial fibrillation, chronic (HCC), COPD (chronic obstructive pulmonary disease) ([facility] Coronary artery disease involving native coronary artery of native heart without angina pectoris, H/O echocardiogram, Heart disease, History of blood transfusion, Hx of blood clots, Hx of cardiovascular stress test, Hx of Doppler ultrasound, Hypertension, Myasthenia gravis ([facility] [address] NSTEMI (non-ST elevated myocardial infarction) ([facility] Post PTCA, TIA (transient ischemic attack), and Ulcerative colitis ([facility] Past surgical history:   has a past surgical history that includes ileostomy or jejunostomy; Tonsillectomy; Cardiac surgery; Percutaneous Transluminal Coronary Angio (12/14/2020); Upper gastrointestinal endoscopy (N/A, 6/15/2021); Upper gastrointestinal endoscopy (N/A, 7/4/2021); and Pacemaker insertion (N/A, 7/7/2021). Social History:   reports that he quit smoking about 3 months ago. His smoking use included cigarettes. He smoked 0.25 packs per day. He has never used smokeless tobacco. He reports current alcohol use of about 1.0 standard drinks of alcohol per week. He reports that he does not use drugs. Family history:  Family history is unknown by patient. No Known Allergies    Review of Systems:    All 14 systems were reviewed and are negative  Except for the positive findings  which as documented     BP (!) 129/99   Pulse 120   Temp 98.1 °F (36.7 °C) (Oral)   Resp 18   Ht 5' 11\" (1.803 m)   Wt 270 lb 15.1 oz (122.9 kg)   SpO2 96%   BMI 37.79 kg/m²     No intake or output data in the 24 hours ending 07/08/21 0839  Physical Exam:  Constitutional:  Well developed, Well nourished, No acute distress, Non-toxic appearance. HENT:  Normocephalic, Atraumatic, Bilateral external ears normal, Oropharynx moist, No oral exudates, Nose normal. Neck- Normal range of motion, No tenderness, Supple, No stridor. Eyes:  PERRL, EOMI, Conjunctiva normal, No discharge.    Respiratory:  Normal breath sounds, No respiratory distress, No wheezing, No chest tenderness. Cardiovascular:  Normal heart rate, Normal rhythm, No murmurs, No rubs, No gallops, JVP not elevated  Abdomen/GI:  Bowel sounds normal, Soft, No tenderness, No masses, No pulsatile masses. Musculoskeletal:  Intact distal pulses, No edema, No tenderness, No cyanosis, No clubbing. Good range of motion in all major joints. No tenderness to palpation or major deformities noted. Back- No tenderness. Integument:  Warm, Dry, No erythema, No rash. Lymphatic:  No lymphadenopathy noted. Neurologic:  Alert & oriented x 3, Normal motor function, Normal sensory function, No focal deficits noted.    Psychiatric:  Affect  and  Mood :no change    Medications:    metoprolol succinate  50 mg Oral Daily    pantoprazole  40 mg Oral QAM AC    sodium chloride flush  5-40 mL Intravenous 2 times per day    midodrine  5 mg Oral TID WC    aspirin  81 mg Oral Daily    predniSONE  13 mg Oral Daily    digoxin  125 mcg Oral Daily    [Held by provider] rivaroxaban  20 mg Oral Daily with breakfast    lidocaine PF  5 mL Intradermal Once    sodium chloride flush  5-40 mL Intravenous 2 times per day    pyridostigmine  60 mg Oral 5x Daily    Investigational - Injection  32.4 mg Subcutaneous Daily      amiodarone 1 mg/min (07/08/21 0358)    Followed by   [facility] amiodarone      sodium chloride      DOPamine Stopped (07/07/21 0915)    sodium chloride      sodium chloride       sodium chloride flush, sodium chloride, ipratropium-albuterol, naloxone, sodium chloride, sodium chloride flush, sodium chloride, ondansetron **OR** ondansetron, acetaminophen **OR** acetaminophen, morphine    Lab Data:  CBC:   Recent Labs     07/05/21  0945 07/05/21  1815 07/06/21  0632 07/06/21  1159 07/07/21  0445 07/07/21  1433 07/07/21  2047   WBC 7.5  --  8.6  --  9.6  --   --    HGB 9.0*   < > 8.9*   < > 8.8* 8.2* 8.2*   HCT 29.2*   < > 29.1*   < > 30.1* 28.3* 27.2*   MCV 88.8  --  89.5  -- 92.3  --   --      --  231  --  256  --   --     < > = values in this interval not displayed. BMP:   Recent Labs     07/05/21  0945 07/06/21  0632 07/07/21  0445    140 139   K 4.1 4.5 4.9    108 106   CO2 22 22 23   BUN 8 6 11   CREATININE 1.0 0.9 1.1     PT/INR:   No results for input(s): PROTIME, INR in the last 72 hours. BNP:    No results for input(s): PROBNP in the last 72 hours. TROPONIN:   No results for input(s): TROPONINT in the last 72 hours. ECHO :   echocardiogram    Assessment:  57 y. o.year old who is admitted for  Chief Complaint   Patient presents with    Fatigue     told yesterday he is back in a-fib, denies SOB or chest pain     , active issues as noted below:  Impression:  Active Problems:    Atrial fibrillation with rapid ventricular response (HCC)    Coronary artery disease involving native coronary artery of native heart without angina pectoris    GI bleed    History of myasthenia gravis    Parastomal hernia without obstruction or gangrene    Anemia  Resolved Problems:    * No resolved hospital problems. *    Critical care time managing patient 45 minutes besides procedure time        All labs, medications and tests reviewed by myself , continue all other medications of all above medical condition listed as is except for changes mentioned above. Thank you very much for consult , please call with questions.     Norman Rust MD, MD 7/8/2021 8:39 AM

## 2021-07-09 VITALS
SYSTOLIC BLOOD PRESSURE: 123 MMHG | BODY MASS INDEX: 37.93 KG/M2 | HEART RATE: 70 BPM | OXYGEN SATURATION: 97 % | WEIGHT: 270.95 LBS | RESPIRATION RATE: 20 BRPM | DIASTOLIC BLOOD PRESSURE: 80 MMHG | TEMPERATURE: 98.2 F | HEIGHT: 71 IN

## 2021-07-09 LAB
ANION GAP SERPL CALCULATED.3IONS-SCNC: 10 MMOL/L (ref 4–16)
BASOPHILS ABSOLUTE: 0 K/CU MM
BASOPHILS RELATIVE PERCENT: 0.4 % (ref 0–1)
BUN BLDV-MCNC: 11 MG/DL (ref 6–23)
CALCIUM SERPL-MCNC: 8.9 MG/DL (ref 8.3–10.6)
CHLORIDE BLD-SCNC: 106 MMOL/L (ref 99–110)
CO2: 22 MMOL/L (ref 21–32)
CREAT SERPL-MCNC: 0.9 MG/DL (ref 0.9–1.3)
DIFFERENTIAL TYPE: ABNORMAL
EOSINOPHILS ABSOLUTE: 0.3 K/CU MM
EOSINOPHILS RELATIVE PERCENT: 2.9 % (ref 0–3)
GFR AFRICAN AMERICAN: >60 ML/MIN/1.73M2
GFR NON-AFRICAN AMERICAN: >60 ML/MIN/1.73M2
GLUCOSE BLD-MCNC: 103 MG/DL (ref 70–99)
HCT VFR BLD CALC: 29 % (ref 42–52)
HEMOGLOBIN: 8.8 GM/DL (ref 13.5–18)
IMMATURE NEUTROPHIL %: 0.5 % (ref 0–0.43)
LYMPHOCYTES ABSOLUTE: 1.6 K/CU MM
LYMPHOCYTES RELATIVE PERCENT: 17.2 % (ref 24–44)
MCH RBC QN AUTO: 27 PG (ref 27–31)
MCHC RBC AUTO-ENTMCNC: 30.3 % (ref 32–36)
MCV RBC AUTO: 89 FL (ref 78–100)
MONOCYTES ABSOLUTE: 0.9 K/CU MM
MONOCYTES RELATIVE PERCENT: 9.9 % (ref 0–4)
NUCLEATED RBC %: 0.2 %
PDW BLD-RTO: 16.2 % (ref 11.7–14.9)
PLATELET # BLD: 186 K/CU MM (ref 140–440)
PMV BLD AUTO: 10 FL (ref 7.5–11.1)
POTASSIUM SERPL-SCNC: 4.6 MMOL/L (ref 3.5–5.1)
RBC # BLD: 3.26 M/CU MM (ref 4.6–6.2)
SEGMENTED NEUTROPHILS ABSOLUTE COUNT: 6.4 K/CU MM
SEGMENTED NEUTROPHILS RELATIVE PERCENT: 69.1 % (ref 36–66)
SODIUM BLD-SCNC: 138 MMOL/L (ref 135–145)
TOTAL IMMATURE NEUTOROPHIL: 0.05 K/CU MM
TOTAL NUCLEATED RBC: 0 K/CU MM
WBC # BLD: 9.3 K/CU MM (ref 4–10.5)

## 2021-07-09 PROCEDURE — 6370000000 HC RX 637 (ALT 250 FOR IP): Performed by: INTERNAL MEDICINE

## 2021-07-09 PROCEDURE — 36415 COLL VENOUS BLD VENIPUNCTURE: CPT

## 2021-07-09 PROCEDURE — 2580000003 HC RX 258: Performed by: PHYSICIAN ASSISTANT

## 2021-07-09 PROCEDURE — 6370000000 HC RX 637 (ALT 250 FOR IP): Performed by: PHYSICIAN ASSISTANT

## 2021-07-09 PROCEDURE — 6360000002 HC RX W HCPCS: Performed by: SPECIALIST

## 2021-07-09 PROCEDURE — 99233 SBSQ HOSP IP/OBS HIGH 50: CPT | Performed by: INTERNAL MEDICINE

## 2021-07-09 PROCEDURE — 2580000003 HC RX 258: Performed by: SPECIALIST

## 2021-07-09 PROCEDURE — 85025 COMPLETE CBC W/AUTO DIFF WBC: CPT

## 2021-07-09 PROCEDURE — 6370000000 HC RX 637 (ALT 250 FOR IP): Performed by: SPECIALIST

## 2021-07-09 PROCEDURE — 85027 COMPLETE CBC AUTOMATED: CPT

## 2021-07-09 PROCEDURE — 85014 HEMATOCRIT: CPT

## 2021-07-09 PROCEDURE — 80048 BASIC METABOLIC PNL TOTAL CA: CPT

## 2021-07-09 PROCEDURE — 85018 HEMOGLOBIN: CPT

## 2021-07-09 RX ORDER — ASPIRIN 81 MG/1
81 TABLET, CHEWABLE ORAL DAILY
Qty: 30 TABLET | Refills: 3 | Status: ON HOLD | OUTPATIENT
Start: 2021-07-10 | End: 2022-03-09 | Stop reason: HOSPADM

## 2021-07-09 RX ORDER — AMIODARONE HYDROCHLORIDE 200 MG/1
200 TABLET ORAL DAILY
Qty: 90 TABLET | Refills: 0 | Status: ON HOLD | OUTPATIENT
Start: 2021-07-09 | End: 2022-03-09 | Stop reason: HOSPADM

## 2021-07-09 RX ORDER — LANOLIN ALCOHOL/MO/W.PET/CERES
325 CREAM (GRAM) TOPICAL 2 TIMES DAILY WITH MEALS
Qty: 90 TABLET | Refills: 1 | Status: SHIPPED | OUTPATIENT
Start: 2021-07-09 | End: 2022-06-01

## 2021-07-09 RX ORDER — AMIODARONE HYDROCHLORIDE 200 MG/1
200 TABLET ORAL DAILY
Status: DISCONTINUED | OUTPATIENT
Start: 2021-07-09 | End: 2021-07-09 | Stop reason: HOSPADM

## 2021-07-09 RX ORDER — METOPROLOL SUCCINATE 25 MG/1
50 TABLET, EXTENDED RELEASE ORAL 2 TIMES DAILY
Qty: 90 TABLET | Refills: 3 | Status: SHIPPED | OUTPATIENT
Start: 2021-07-09 | End: 2021-08-11

## 2021-07-09 RX ADMIN — MORPHINE SULFATE 2 MG: 2 INJECTION, SOLUTION INTRAMUSCULAR; INTRAVENOUS at 06:47

## 2021-07-09 RX ADMIN — METOPROLOL SUCCINATE 50 MG: 50 TABLET, EXTENDED RELEASE ORAL at 08:16

## 2021-07-09 RX ADMIN — PYRIDOSTIGMINE BROMIDE 60 MG: 60 TABLET ORAL at 06:46

## 2021-07-09 RX ADMIN — DIGOXIN 125 MCG: 125 TABLET ORAL at 08:17

## 2021-07-09 RX ADMIN — ASPIRIN 81 MG: 81 TABLET, CHEWABLE ORAL at 08:18

## 2021-07-09 RX ADMIN — SODIUM CHLORIDE, PRESERVATIVE FREE 10 ML: 5 INJECTION INTRAVENOUS at 08:19

## 2021-07-09 RX ADMIN — PANTOPRAZOLE SODIUM 40 MG: 40 TABLET, DELAYED RELEASE ORAL at 06:46

## 2021-07-09 RX ADMIN — MORPHINE SULFATE 2 MG: 2 INJECTION, SOLUTION INTRAMUSCULAR; INTRAVENOUS at 02:50

## 2021-07-09 RX ADMIN — RIVAROXABAN 20 MG: 20 TABLET, FILM COATED ORAL at 08:17

## 2021-07-09 RX ADMIN — PREDNISONE 13 MG: 1 TABLET ORAL at 08:18

## 2021-07-09 ASSESSMENT — PAIN SCALES - GENERAL
PAINLEVEL_OUTOF10: 0
PAINLEVEL_OUTOF10: 7
PAINLEVEL_OUTOF10: 7
PAINLEVEL_OUTOF10: 0

## 2021-07-09 ASSESSMENT — PAIN DESCRIPTION - ORIENTATION
ORIENTATION: LEFT
ORIENTATION: LEFT

## 2021-07-09 ASSESSMENT — PAIN - FUNCTIONAL ASSESSMENT
PAIN_FUNCTIONAL_ASSESSMENT: ACTIVITIES ARE NOT PREVENTED
PAIN_FUNCTIONAL_ASSESSMENT: ACTIVITIES ARE NOT PREVENTED

## 2021-07-09 ASSESSMENT — PAIN DESCRIPTION - PAIN TYPE
TYPE: SURGICAL PAIN
TYPE: SURGICAL PAIN

## 2021-07-09 ASSESSMENT — PAIN DESCRIPTION - ONSET
ONSET: ON-GOING
ONSET: ON-GOING

## 2021-07-09 ASSESSMENT — PAIN DESCRIPTION - LOCATION
LOCATION: ARM;SHOULDER
LOCATION: ARM;SHOULDER

## 2021-07-09 ASSESSMENT — PAIN SCALES - WONG BAKER: WONGBAKER_NUMERICALRESPONSE: 0

## 2021-07-09 ASSESSMENT — PAIN DESCRIPTION - DESCRIPTORS
DESCRIPTORS: ACHING;DISCOMFORT
DESCRIPTORS: ACHING;DISCOMFORT

## 2021-07-09 ASSESSMENT — PAIN DESCRIPTION - FREQUENCY
FREQUENCY: CONTINUOUS
FREQUENCY: CONTINUOUS

## 2021-07-09 ASSESSMENT — PAIN DESCRIPTION - PROGRESSION
CLINICAL_PROGRESSION: NOT CHANGED
CLINICAL_PROGRESSION: NOT CHANGED

## 2021-07-09 NOTE — OP NOTE
Operative Note    Date of Procedure: 07/07/2021    Surgeon: Suzette Gold MD    Anesthesia: MAC    Preoperative Diagnosis: GI bleed [K92.2]  Generalized weakness [R53.1]  Gastrointestinal hemorrhage, unspecified gastrointestinal hemorrhage type [K92.2]  Anemia, unspecified type [D64.9]  Active Hospital Problems    Diagnosis Date Noted    Anemia [D64.9]     Parastomal hernia without obstruction or gangrene [K43.5]     History of myasthenia gravis [Z86.69]     GI bleed [K92.2] 07/03/2021    Coronary artery disease involving native coronary artery of native heart without angina pectoris [I25.10] 06/15/2021    Atrial fibrillation with rapid ventricular response ([address] [I48.91] 06/11/2021   junctional rhythm after cardioversion. Tachy alirio syndrome      Postoperative Diagnosis: GI bleed [K92.2]  Generalized weakness [R53.1]  Gastrointestinal hemorrhage, unspecified gastrointestinal hemorrhage type [K92.2]  Anemia, unspecified type [D64.9]   Active Hospital Problems    Diagnosis Date Noted    Anemia [D64.9]     Parastomal hernia without obstruction or gangrene [K43.5]     History of myasthenia gravis [Z86.69]     GI bleed [K92.2] 07/03/2021    Coronary artery disease involving native coronary artery of native heart without angina pectoris [I25.10] 06/15/2021    Atrial fibrillation with rapid ventricular response ([address] [I48.91] 06/11/2021   same    Operation:  Permanent Dual Chamber Pacemaker (DDD)     Details of Procedure:  Patient was brought to the operating room after request from cardiology and preoperative evaluations and discussions with patient and family. Time out per checklist confirmed. Left infraclavicular area was prepared and draped. Local anesthesia was infiltrated with 1% Lidocaine. Incision was made below the clavicle. Left subclavian vein access was achieved using Seldinger technique and confirmed under fluoroscopy. Two 9 [place] sheaths were placed over the wire.   Both atrial and ventricle leads were introduced into the sheaths. Atrial and ventricle leads were placed in the right atrial appendage and RV apex under fluoroscopy guidance. Thresholds as recorded were obtained. There was no diaphragmatic pacing at 10V on either lead. Leads were secured to the pectoralis fascia. Leads were connected to their respective locations on the Medtronic generator. The generator was placed in a pocked under the lower skin flap and anchored to the pectoralis fascia. Wound was irrigated with antibiotic solution and closed in layers. Blood loss was minimal. Sponge and instrument count was correct before closure. The lead thresholds were rechecked after closure and a lateral view of the chest was obtained to confirm the position of the leads. Patient tolerated the procedure well.

## 2021-07-09 NOTE — PROGRESS NOTES
Cardiology Progress Note     Admit Date:  7/3/2021    Consult reason/ Seen today for :   Afib  CAD  Cardiomyopathy     Subjective and  Overnight Events : In paced rhythm now consistently in 70s feeling better      Chief complain on admission : 57 y. o.year old who is admitted for  Chief Complaint   Patient presents with    Fatigue     told yesterday he is back in a-fib, denies SOB or chest pain       Assessment / Plan:  A. fib flutter heart rate in 130s s/p cardioversion but failed therapy restart anticoagulation now if cleared by gastroenterology , cardioverted  but developed junctional rhythm this is second time he is failed cardioversion tachybradycardia syndrome, back in afib , cardioverted with second time in sinus now  Changed to p.o. amiodarone 200 mg daily  Continue Toprol-XL 50 twice daily and digoxin  S/p pacer by ct surgery, appreciate your help   Elevated Troponin : will continue medical management for now.    Echo shows EF of 50% with moderate MR  S/p PCI in December stop Plavix can start aspirin if he is tolerating anticoagulation  GI bleeding has gastric ulcer but not actively bleeding on Protonix  tolerating Xarelto  History of myasthenia gravis on prednisone and pyridostigmine  HTN: stable, continue To titrate up medication as needed  DVT Prophylaxis if no contraindication  Discharged today will sign off follow-up in office thank you  Will need A. fib flutter ablation by Dr. J Luis Prajapati  follow-up in office with Dr. J Luis Prajapati  Past medical history:    has a past medical history of Atrial fibrillation, chronic ([facility] COPD (chronic obstructive pulmonary disease) ([facility] Coronary artery disease involving native coronary artery of native heart without angina pectoris, H/O echocardiogram, Heart disease, History of blood transfusion, Hx of blood clots, Hx of cardiovascular stress test, Hx of Doppler ultrasound, Hypertension, Myasthenia gravis [facility]), NSTEMI (non-ST elevated myocardial infarction) ([facility] Post PTCA, TIA (transient ischemic attack), and Ulcerative colitis ([facility] Past surgical history:   has a past surgical history that includes ileostomy or jejunostomy; Tonsillectomy; Cardiac surgery; Percutaneous Transluminal Coronary Angio (12/14/2020); Upper gastrointestinal endoscopy (N/A, 6/15/2021); Upper gastrointestinal endoscopy (N/A, 7/4/2021); and Pacemaker insertion (N/A, 7/7/2021). Social History:   reports that he quit smoking about 3 months ago. His smoking use included cigarettes. He smoked 0.25 packs per day. He has never used smokeless tobacco. He reports current alcohol use of about 1.0 standard drinks of alcohol per week. He reports that he does not use drugs. Family history:  Family history is unknown by patient. No Known Allergies    Review of Systems:    All 14 systems were reviewed and are negative  Except for the positive findings  which as documented     /80   Pulse 70   Temp 98.2 °F (36.8 °C) (Oral)   Resp 20   Ht 5' 11\" (1.803 m)   Wt 270 lb 15.1 oz (122.9 kg)   SpO2 97%   BMI 37.79 kg/m²       Intake/Output Summary (Last 24 hours) at 7/9/2021 0912  Last data filed at 7/9/2021 0346  Gross per 24 hour   Intake 770 ml   Output --   Net 770 ml     Physical Exam:  Constitutional:  Well developed, Well nourished, No acute distress, Non-toxic appearance. HENT:  Normocephalic, Atraumatic, Bilateral external ears normal, Oropharynx moist, No oral exudates, Nose normal. Neck- Normal range of motion, No tenderness, Supple, No stridor. Eyes:  PERRL, EOMI, Conjunctiva normal, No discharge. Respiratory:  Normal breath sounds, No respiratory distress, No wheezing, No chest tenderness. Cardiovascular:  Normal heart rate, Normal rhythm, No murmurs, No rubs, No gallops, JVP not elevated  Abdomen/GI:  Bowel sounds normal, Soft, No tenderness, No masses, No pulsatile masses.      Musculoskeletal:  Intact distal pulses, No edema, No tenderness, No cyanosis, No clubbing. Good range of motion in all major joints. No tenderness to palpation or major deformities noted. Back- No tenderness. Integument:  Warm, Dry, No erythema, No rash. Lymphatic:  No lymphadenopathy noted. Neurologic:  Alert & oriented x 3, Normal motor function, Normal sensory function, No focal deficits noted. Psychiatric:  Affect  and  Mood :no change    Medications:    pantoprazole  40 mg Oral QAM AC    metoprolol succinate  50 mg Oral BID    sodium chloride flush  5-40 mL Intravenous 2 times per day    aspirin  81 mg Oral Daily    predniSONE  13 mg Oral Daily    digoxin  125 mcg Oral Daily    rivaroxaban  20 mg Oral Daily with breakfast    lidocaine PF  5 mL Intradermal Once    sodium chloride flush  5-40 mL Intravenous 2 times per day    pyridostigmine  60 mg Oral 5x Daily    Investigational - Injection  32.4 mg Subcutaneous Daily      sodium chloride      sodium chloride      sodium chloride       sodium chloride flush, sodium chloride, ipratropium-albuterol, naloxone, sodium chloride, sodium chloride flush, sodium chloride, ondansetron **OR** ondansetron, acetaminophen **OR** acetaminophen, morphine    Lab Data:  CBC:   Recent Labs     07/07/21  0445 07/07/21  1433 07/07/21  2047 07/08/21  1452 07/09/21  0630   WBC 9.6  --   --  8.1 9.3   HGB 8.8*   < > 8.2* 8.5* 8.8*   HCT 30.1*   < > 27.2* 29.2* 29.0*   MCV 92.3  --   --  90.1 89.0     --   --  201 186    < > = values in this interval not displayed. BMP:   Recent Labs     07/07/21  0445 07/08/21  1452 07/09/21  0630    136 138   K 4.9 4.7 4.6    104 106   CO2 23 20* 22   BUN 11 12 11   CREATININE 1.1 0.9 0.9     PT/INR:   No results for input(s): PROTIME, INR in the last 72 hours. BNP:    No results for input(s): PROBNP in the last 72 hours. TROPONIN:   No results for input(s): TROPONINT in the last 72 hours.      ECHO :   echocardiogram    Assessment:  61 y.o.year old who is admitted for  Chief Complaint   Patient presents with    Fatigue     told yesterday he is back in a-fib, denies SOB or chest pain     , active issues as noted below:  Impression:  Active Problems:    Atrial fibrillation with rapid ventricular response (HCC)    Coronary artery disease involving native coronary artery of native heart without angina pectoris    GI bleed    History of myasthenia gravis    Parastomal hernia without obstruction or gangrene    Anemia  Resolved Problems:    * No resolved hospital problems. *    Critical care time managing patient 45 minutes besides procedure time        All labs, medications and tests reviewed by myself , continue all other medications of all above medical condition listed as is except for changes mentioned above. Thank you very much for consult , please call with questions.     Travis Gaines MD, MD 7/9/2021 9:12 AM

## 2021-07-09 NOTE — DISCHARGE SUMMARY
Brea Cano 1960 [id number]  PCP:  No primary care provider on file. Admit date: 7/3/2021  Admitting Physician: Yamil Freeman MD    Discharge date: 7/9/2021 Discharge Physician: Yamil Freeman MD         Discharge Diagnoses. As per below    Hospital Course:  History of present illness at admission: As per H&P  Subsequent Hospital Course:     Acute blood loss anemia  Hx of Duodenal Ulcer  Hx Ulcerative Colitis s/p Total Abdominal Colectomy  S/p 3 unit PRBC transfusion. Hemoglobin stable. EGD 7/4 no active bleeding identified. Continue Protonix. Xarelto and aspirin restarted. Discontinue Plavix.     Hx of CAD/Afib with RVR/hx of Paroxysmal A. fib/Tachybradycardia syndrome     S/p cardioversion-converted to escape junctional rhythm. Patient went into bradycardia with IV amiodarone, metoprolol and digoxin. Eventually started on dopamine drip to maintain heart rate. S/p  pacemaker for tachybradycardia syndrome on 7/7. However patient went into A. fib again-cardioverted again and started on amio loading. Cardiology will consider outpatient ablation if needed. Currently stable.     -Hx of COPD  Currently on nebs and O2 support as needed. Stable     -Hernia? CT abdomen pelvis: Moderate-sized fat-containing parastomal hernia. Pain appears improved   as long as patient is not bending. Evaluated by general surgery and no specific recommendation.     -Myasthenia gravis   Home meds resumed including IM med that is undergoing trials at [address] [place] [address]; patient takes daily  at home so sent to pharmacy; follows closely with [facility].     Morbid obesity diet and exercise counseling  On the day of discharge, pt felt better. No new complaints.     Pertinent Exam Findings on Day of Discharge:  General Appearance:    Alert, cooperative, no distress, appears stated age  Head:    Normocephalic, without obvious abnormality, atraumatic  Eyes:    PERRL, conjunctiva/corneas clear, EOM's intact  Lungs:    Clear to auscultation bilaterally, respirations unlabored   Heart:    Regular rate and rhythm, S1 and S2 normal, no murmur,   rub or gallop  Abdomen:     Soft, non-tender, bowel sounds active, no masses, no organomegaly  Extremities:   Extremities normal, atraumatic, no cyanosis or edema    Consults:  IP CONSULT TO GI  IP CONSULT TO HOSPITALIST  IP CONSULT TO CARDIOLOGY  IP CONSULT TO GENERAL SURGERY    Patient Instructions:   Jayy Bruce   Home Medication Instructions TPA:[id number]    Printed on:07/09/21 0906   Medication Information                      acetaminophen (TYLENOL) 325 MG tablet  Take 650 mg by mouth every 4 hours as needed for Pain or Fever             albuterol sulfate HFA (PROVENTIL HFA) 108 (90 Base) MCG/ACT inhaler  Inhale 2 puffs into the lungs every 4 hours as needed for Wheezing or Shortness of Breath With spacer (and mask if indicated). Thanks.              amiodarone (CORDARONE) 200 MG tablet  Take 1 tablet by mouth daily             aspirin 81 MG chewable tablet  Take 1 tablet by mouth daily             atorvastatin (LIPITOR) 40 MG tablet  Take 1 tablet by mouth nightly START 6/25/2021             budesonide-formoterol (SYMBICORT) 160-4.5 MCG/ACT AERO  Inhale 2 puffs into the lungs 2 times daily             digoxin (LANOXIN) 125 MCG tablet  Take 1 tablet by mouth daily             Elastic Bandages & Supports (MEDICAL COMPRESSION THIGH HIGH) MISC  Wear daily and remove nightly 20 - 30 mmgh             ferrous sulfate (FE TABS 325) 325 (65 Fe) MG EC tablet  Take 1 tablet by mouth 2 times daily (with meals)             GLUCOSAMINE-CALCIUM-VIT D PO  Take by mouth daily             metoprolol succinate (TOPROL XL) 25 MG extended release tablet  Take 2 tablets by mouth 2 times daily             NONFORMULARY  Inject 40 mg/L into the skin every evening Zilucoplan  - patient provided experimental drug from [place] for Myasthenia Gravis             pantoprazole (PROTONIX) 40 MG tablet  Take 1 tablet by mouth 2 times daily             PREDNISONE PO  Take 13 mg by mouth daily Indications: Takes at  8am              pyridostigmine (MESTINON) 60 MG tablet  Take 1 tablet by mouth 4 times daily             rivaroxaban (XARELTO) 20 MG TABS tablet  Take 1 tablet by mouth daily (with breakfast)             tiotropium (SPIRIVA HANDIHALER) 18 MCG inhalation capsule  Inhale 1 capsule into the lungs daily             vitamin B-12 (CYANOCOBALAMIN) 500 MCG tablet  Take 1,000 mcg by mouth daily Indications: 8am                    Diet:  ADULT DIET; Regular; Low Sodium (2 gm)    Activity:   activity as tolerated     Discharge Condition:   good    Disposition:   home    Follow-up    Stefanie Scott MD  In 1 month  Post Op Check  [address] [facility] [address]  [phone]   Zakia Barajas MD  Go to  Medical Management of heart issues  [address]  [facility]  [phone]   Chitra Patel MD  In 6 weeks  Medical Management of Anemia  [address] [facility]. [phone]       Time spent on discharge in the examination, evaluation, counseling and review of medications   and discharge plan:34 minutes       Discharge Physician Signed: Alexandra Main MD

## 2021-07-09 NOTE — PLAN OF CARE
2041 by Kameron Danielle RN  Outcome: Ongoing  Goal: Pain level will decrease  Description: Pain level will decrease  7/9/2021 1000 by Kristin Farr RN  Outcome: Completed  7/8/2021 2041 by Kameron Danielle RN  Outcome: Ongoing  Goal: Control of acute pain  Description: Control of acute pain  7/9/2021 1000 by Kristin Farr RN  Outcome: Completed  7/8/2021 2041 by Kameron Danielle RN  Outcome: Ongoing  Goal: Control of chronic pain  Description: Control of chronic pain  7/9/2021 1000 by Kristin Farr RN  Outcome: Completed  7/8/2021 2041 by Kameron Danielle RN  Outcome: Ongoing     Problem: Skin Integrity:  Goal: Skin integrity will stabilize  Description: Skin integrity will stabilize  7/9/2021 1000 by Kristin Farr RN  Outcome: Completed  7/8/2021 2041 by Kameron Danielle RN  Outcome: Ongoing     Problem: Discharge Planning:  Goal: Patients continuum of care needs are met  Description: Patients continuum of care needs are met  7/9/2021 1000 by Kristin Farr RN  Outcome: Completed  7/8/2021 2041 by Kameron Danielle RN  Outcome: Ongoing

## 2021-07-09 NOTE — PLAN OF CARE
Problem: Bleeding:  Goal: Will show no signs and symptoms of excessive bleeding  Description: Will show no signs and symptoms of excessive bleeding  7/8/2021 2041 by Ellen Head RN  Outcome: Ongoing  7/8/2021 1302 by Gita Crouch RN  Outcome: Ongoing     Problem: Falls - Risk of:  Goal: Will remain free from falls  Description: Will remain free from falls  7/8/2021 2041 by Ellen Head RN  Outcome: Ongoing  7/8/2021 1302 by Gita Crouch RN  Outcome: Ongoing  Goal: Absence of physical injury  Description: Absence of physical injury  7/8/2021 2041 by Ellen Head RN  Outcome: Ongoing  7/8/2021 1302 by Gita Crouch RN  Outcome: Ongoing     Problem: Cardiac Output - Decreased:  Goal: Hemodynamic stability will improve  Description: Hemodynamic stability will improve  7/8/2021 2041 by Ellen Head RN  Outcome: Ongoing  7/8/2021 1302 by Gita Crouch RN  Outcome: Ongoing     Problem: Infection:  Goal: Will remain free from infection  Description: Will remain free from infection  7/8/2021 2041 by Ellen Head RN  Outcome: Ongoing  7/8/2021 1302 by Gita Crouch RN  Outcome: Ongoing     Problem: Safety:  Goal: Free from accidental physical injury  Description: Free from accidental physical injury  7/8/2021 2041 by Ellen Head RN  Outcome: Ongoing  7/8/2021 1302 by Gita Crouch RN  Outcome: Ongoing  Goal: Free from intentional harm  Description: Free from intentional harm  7/8/2021 2041 by Ellen Head RN  Outcome: Ongoing  7/8/2021 1302 by Gita Crouch RN  Outcome: Ongoing     Problem: Daily Care:  Goal: Daily care needs are met  Description: Daily care needs are met  7/8/2021 2041 by Ellen Head RN  Outcome: Ongoing  7/8/2021 1302 by Gita Crouch RN  Outcome: Ongoing     Problem: Pain:  Goal: Patient's pain/discomfort is manageable  Description: Patient's pain/discomfort is manageable  7/8/2021 2041 by Ellen Head RN  Outcome: Ongoing  7/8/2021 1302 by Jarett Blake Alicia Felix RN  Outcome: Ongoing  Goal: Pain level will decrease  Description: Pain level will decrease  Outcome: Ongoing  Goal: Control of acute pain  Description: Control of acute pain  Outcome: Ongoing  Goal: Control of chronic pain  Description: Control of chronic pain  Outcome: Ongoing     Problem: Skin Integrity:  Goal: Skin integrity will stabilize  Description: Skin integrity will stabilize  7/8/2021 2041 by Comfort Pacheco RN  Outcome: Ongoing  7/8/2021 1302 by Nely Madrid RN  Outcome: Ongoing     Problem: Discharge Planning:  Goal: Patients continuum of care needs are met  Description: Patients continuum of care needs are met  7/8/2021 2041 by Comfort Pacheco RN  Outcome: Ongoing  7/8/2021 1302 by Nely Madrid RN  Outcome: Ongoing

## 2021-07-12 ENCOUNTER — TELEPHONE (OUTPATIENT)
Dept: CARDIOLOGY CLINIC | Age: 61
End: 2021-07-12

## 2021-07-12 NOTE — TELEPHONE ENCOUNTER
Spoke with pharmacy, they have not released meds. Need to clarify amiodarone as patient is in Digoxin.   Also need to clarify Toprol xl ordered 25mg -2 tab BID

## 2021-07-12 NOTE — TELEPHONE ENCOUNTER
Continue with amiodarone 200 mg daily, digoxin 125 mcg daily, and Toprol-XL 50 mg twice daily. Patient now has permanent pacemaker, needs follow-up appointment.

## 2021-07-12 NOTE — TELEPHONE ENCOUNTER
Wife called  was discharged with Med changes   Metoprolol changed to twice a day , also Amiodarone  Pharmacy would not tell her what the problem was   He has not had since he was discharged   [address] [place] [address] just had a pace maker placed

## 2021-07-12 NOTE — TELEPHONE ENCOUNTER
Spoke with pharmacy to verify meds. Also spoke to wife to make her aware.  She will call back to schedule OV

## 2021-07-19 ENCOUNTER — TELEPHONE (OUTPATIENT)
Dept: CARDIOLOGY CLINIC | Age: 61
End: 2021-07-19

## 2021-07-20 NOTE — TELEPHONE ENCOUNTER
Spoke with patient. Scheduled for first post device with Diana Lacy. Device was place by Chadd Shafer. Patient also needs consult with Diana Lacy to discuss A-Fib/Flutter ablation.

## 2021-07-21 RX ORDER — METOPROLOL SUCCINATE 25 MG/1
50 TABLET, EXTENDED RELEASE ORAL 2 TIMES DAILY
Qty: 90 TABLET | Refills: 3 | OUTPATIENT
Start: 2021-07-21

## 2021-08-11 ENCOUNTER — INITIAL CONSULT (OUTPATIENT)
Dept: CARDIOLOGY CLINIC | Age: 61
End: 2021-08-11
Payer: MEDICAID

## 2021-08-11 VITALS
BODY MASS INDEX: 40.6 KG/M2 | HEIGHT: 71 IN | HEART RATE: 92 BPM | SYSTOLIC BLOOD PRESSURE: 120 MMHG | DIASTOLIC BLOOD PRESSURE: 70 MMHG | WEIGHT: 290 LBS

## 2021-08-11 DIAGNOSIS — I48.19 PERSISTENT ATRIAL FIBRILLATION (HCC): Primary | ICD-10-CM

## 2021-08-11 DIAGNOSIS — I48.20 ATRIAL FIBRILLATION, CHRONIC (HCC): ICD-10-CM

## 2021-08-11 PROCEDURE — 99244 OFF/OP CNSLTJ NEW/EST MOD 40: CPT | Performed by: INTERNAL MEDICINE

## 2021-08-11 PROCEDURE — G8417 CALC BMI ABV UP PARAM F/U: HCPCS | Performed by: INTERNAL MEDICINE

## 2021-08-11 PROCEDURE — G8427 DOCREV CUR MEDS BY ELIG CLIN: HCPCS | Performed by: INTERNAL MEDICINE

## 2021-08-11 RX ORDER — METOPROLOL SUCCINATE 100 MG/1
100 TABLET, EXTENDED RELEASE ORAL 2 TIMES DAILY
Qty: 60 TABLET | Refills: 3 | Status: SHIPPED | OUTPATIENT
Start: 2021-08-11 | End: 2022-03-07

## 2021-08-11 NOTE — LETTER
[facility]     Dr. Joe [facility]     PROCEDURE: Atrial Fibrillation/Atrial Flutter Ablation    Date of Procedure: 21  Time: 0730   Arrival Time: 0600    Patient Name: Maria Del Carmen [place]   : 1960  MRN# [id number]    HOSPITAL: [facility])    Call to Pre-[place] at [phone]  2 days before your procedure    x Please have blood work and chest-x-ray done 21 at [facility], [address] [place] [address] or [facility].     X Please have COVID-19 Test done on 21 at the [place] [address]. You will  need to Quarantine yourself until procedure. x Please do not have anything by mouth after midnight prior to or 8 hours before the procedure.    x You may take your medications with a sip of water in the morning before your procedure or take them with you unless listed below. X  If takes Xarelto the morning hold the morning of the procedure. X  Hold Digoxin the day of procedure    X  Hold all Blood Pressure Medications morning of procedure        Patient Signature:  _______________________      Staff: Jolanta Stephenson MA     Staff Given Instructions:_______________________________                         [facility]     Dr. Joe Cabezas     PROCEDURE: Atrial Fibrillation/Atrial Flutter Ablation with transesophageal echocardiogram    Date of Procedure: 21 Time: [address] Time: 06    Patient Name: Maria Del Carmen Washington  : 1960  MRN# [id number]    Day of Procedure Cath Lab Holding area Pre op Orders:    ? YOU HAVE MY PERMISSION TO TALK TO THE PATIENT-PLEASE   ? NPO  ? IF ORDERED FOR AFIB PATIENTS ONLY CARDIAC CT SCAN ANGIO (PULMONARY MAPPING)   ? Anesthesia  ? TONIO with Anesthesia  ? IV peripheral saline lock x 2 sites  (at least one in preferred left arm). ? Type & Screen STAT on arrival.  ? ANTICOAGULATION:  Hold Xarelto the morning of the procedure  ? If taking Coumadin, PT INR  STAT on arrival day of procedure. ? Insert Avilez catheter (male patients >>please use coude avilez catheter). ? Female patients <=48years of age >> urine HCG test.  ? Diabetic patients >> Accu check Blood sugar check. ? Document home medications in EPIC and include date and time of last dose. ? Notify Dr. John Laureano of abnormal lab results. ? Chest Prep> Clip hair anterior chest and posterior back (clavicle to umbilicus). ? Groin Prep> Clip hair bilateral groins for femoral access (medial). Physician Signature:________________________Date:____________Time:___________                                     *This consent is applicable for 30 days following patient signature*    [address] / PROCEDURE    I (We), Conconully Chapo authorize, Dr. Andrew Ferreira     and such assistants as may be selected by him/her, to perform the following operation/procedures:   Atrial Fibrillation/Atrial Flutter Ablation with transesophageal echocardiogram    Note: If unable to obtain consent prior to an emergent procedure, document the emergent reason in the medical record. This procedure has been explained to my (our) satisfaction and included in the explanation was:   A) the intended benefit, nature, and extent of the procedure to be performed;   B) the significant risks involved and the probability of success;   C) alternative procedures and methods of treatment;   D) the dangers and probable consequences of such alternatives (including no procedure or treatment); E) the expected consequences of the procedure on my future health;   F) whether other qualified individuals would be performing important surgical tasks and / or whether  would be present to advise or support the procedure. I (we) understand that there are other risks of infection and other serious complications in the pre-operative/procedural and postoperative/procedural stages of my (our) care.    I (we) have asked all of the questions which I (we) thought were important in deciding whether or not to undergo treatment or diagnosis. These questions have been answered to my (our) satisfaction. I (we) understand that no assurance can be given that the procedure will be a success, and no guarantee or warranty of success has been given to me (us). 2. It has been explained to me (us) that during the course of the operation/procedure, unforeseen conditions may be revealed that necessitate extension of the original procedure(s) or different procedure(s) than those set forth in Paragraph 1. I (we) authorize and request that the above-named physician, his/her assistants or his/her designees, perform procedures as necessary and desirable if deemed to be in my (our) best interest.         3. I acknowledge that other health care personnel may be observing this procedure for the purpose of medical education or other specified purposes as may be necessary as requested and/or approved by my (our) physician. 4. I (we) consent to the disposal by the hospital Pathologist of the removed tissue, parts or organs in accordance with hospital policy. 5. I do_____ do not______ consent to the use of a local infiltration pain blocking agent that will be used by my provider/surgical provider to help alleviate pain during my procedure. 6. I do_____ do not_____ consent to an emergent blood transfusion in the case of a life-threatening situation that requires blood components to be administered. This consent is valid for 24 hours from the beginning of the procedure. 7. This patient does _____ or does not ______currently have a DNR status/order. If DNR order is in place, obtain \"Addendum to the Surgical Consent for ALL Patients with a DNR Order\" to address gurpreet-operative status for limited intervention or DNR suspension.      8. I have read and fully understand the above Consent for Operation/Procedure and that all blanks were completed before I signed the consent.     ____________________________________   _____/____am/pm   Signature of Patient or legal representative Printed Name / Relationship Date / Time   ____________________________________    _____/____ am/pm   Witness to Signature Printed Name Date / Time     2nd witness to phone consent Printed name Date / Time   Informed consent:   I have provided the explanation described above in section 1 to the patient and/or legal representative. I have provided the patient and/or legal representative with an opportunity to ask any questions about the proposed operation/procedure.   __________________________________    ____/____ am/pm   Provider / Proceduralist Printed Name Date / Time   Revised 2021                                         Waldron Boast Dr. Leonetta Schmitt     PATIENT NAME:    Freda Knight                               :   1960    PROCEDURE: Atrial Fibrillation/Atrial Flutter Ablation with transesophageal echocardiogram       DATE OF PROCEDURE: 21    DIAGNOSIS:  I48.91, Z01.810, Z79.0                     X MAG    X   PHOS       X   BMP                     X CBC     X    PT    X   PTT                    X     Chest x-Ray PA & Lateral View      ? PLEASE CALL ABNORMAL RESULTS TO THE REQUESTING PHYSICIAN? ATTENTION PATIENTS:  You do not have to fast for the lab work. You must go to the [address] [place] [address] or [facility]  to have the lab work done.       Physician Signature:__________________Date:_____________Time:___________                                      Waldron Boast Dr. Kathreen Griffon TO SCHEDULE:    Atrial Fibrillation/Atrial Flutter Ablation with transesophageal echocardiogram    Patient Name: Freda Knight   : 1960  MRN# [id number]    Home Phone Number: [phone]   Weight:    Wt Readings from Last 3 Encounters:   21 290 lb (131.5 kg)   21 270 lb 15.1 oz (122.9 kg)   21 291 lb 3.2 oz (132.1 kg)      Insurance: Payor: Claire Lozada / Plan: Claire Lozada / Product Type: *No Product type* /     Date of Procedure: 9/8/21 Time: 0730 Arrival Time: 0600    Diagnosis:  I48.91 (Atrial Fibrillation)  Allergies: No Known Allergies     1) Call [address] [place] [address] scheduling [phone]) or Instant Message  CONFIRMED WITH     PHONE OR   INSTANT MESSAGE  2) PREAUTHORIZATION NUMBER:    Spoke to:      From date:     expiration date:        Melba Thomas, [address]

## 2021-08-11 NOTE — PATIENT INSTRUCTIONS
A-Fib/Flutter ablation scheduled with Slime Jones on 9/8/21 at 0730. Arrive at [facility] at 2921. Pre-testing and COVID screening to be done 9/2 at 330.

## 2021-08-11 NOTE — PROGRESS NOTES
Electrophysiology Consult Note      Reason for consultation:  Atrial fibrillation    Chief complaint : Palpitations, tiredness, arrhythmia    Referring physician: Darlene Edward      Primary care physician: No primary care provider on file. History of Present Illness:     Patient is a 42-year-old male with history of hypertension, myasthenia gravis, sick sinus syndrome s/p pacemaker, history of blood clots on anticoagulation, atrial fibrillation referred by Dr. Juan Henry for atrial fibrillation management. Patient reports palpitations on and off and feels tired and weak. Patient also has shortness of breath with exertion at times. Patient reports that he was cardioverted 4 times total.  Patient also reports that he has a history of gastric ulcer and bleeding needing 3 units of blood. He is cleared for anticoagulation now and he is on anticoagulation without any problem. Patient wants to consider ablation therapy which was recommended to him by his cardiologist.    Patient denies any chest pain, dizziness or syncope. Patient did have pauses in the hospital and had a pacemaker in July    Pastmedical history:   Past Medical History:   Diagnosis Date    Atrial fibrillation, chronic ([address] 12/2020    COPD (chronic obstructive pulmonary disease) ([address] 6/15/2021    Coronary artery disease involving native coronary artery of native heart without angina pectoris 6/15/2021    H/O echocardiogram 02/02/2021    EF is estimated at 50%. presence of PVC affected LVEF estimation, Mild TR.    Heart disease     stint    History of blood transfusion     Hx of blood clots     Hx of cardiovascular stress test 06/11/2021    Small size severe intensity,perfusion defect in apical wall.  Hx of Doppler ultrasound 04/09/2021    Left distal SSV shows occlusive chronic SVT.     Hypertension     Myasthenia gravis ([address]     NSTEMI (non-ST elevated myocardial infarction) ([address] 12/2020    Take 1 tablet by mouth 2 times daily 60 tablet 1    Elastic Bandages & Supports (MEDICAL COMPRESSION THIGH HIGH) MISC Wear daily and remove nightly 20 - 30 mmgh 2 each 0    rivaroxaban (XARELTO) 20 MG TABS tablet Take 1 tablet by mouth daily (with breakfast) 90 tablet 3    digoxin (LANOXIN) 125 MCG tablet Take 1 tablet by mouth daily 90 tablet 3    NONFORMULARY Inject 40 mg/L into the skin every evening Zilucoplan  - patient provided experimental drug from [facility] for Myasthenia Gravis      albuterol sulfate HFA (PROVENTIL HFA) 108 (90 Base) MCG/ACT inhaler Inhale 2 puffs into the lungs every 4 hours as needed for Wheezing or Shortness of Breath With spacer (and mask if indicated). Thanks. 1 Inhaler 1    vitamin B-12 (CYANOCOBALAMIN) 500 MCG tablet Take 1,000 mcg by mouth daily Indications: 8am       PREDNISONE PO Take 13 mg by mouth daily Indications: Takes at  8am       pyridostigmine (MESTINON) 60 MG tablet Take 1 tablet by mouth 4 times daily (Patient taking differently: Take 60 mg by mouth 5 times daily Indications: Takes at 0800, 1200, 1600, 2000 ) 120 tablet 0    acetaminophen (TYLENOL) 325 MG tablet Take 650 mg by mouth every 4 hours as needed for Pain or Fever       Current Facility-Administered Medications on File Prior to Visit   Medication Dose Route Frequency Provider Last Rate Last Admin    Immune Globulin (Human) solution 20 g  20 g Intravenous Once Chantel García MD        And    Immune Globulin (Human) solution 20 g  20 g Intravenous Once Chantel García MD        Immune Globulin (Human) solution 20 g  20 g Intravenous Once Chantel García MD        And    Immune Globulin (Human) solution 20 g  20 g Intravenous Once Chantel García MD           Review of Systems:   Review of Systems   Constitutional: Positive for fatigue. Negative for activity change, chills and fever. HENT: Negative for congestion, ear pain and tinnitus.     Eyes: Negative for photophobia, pain and visual disturbance. Respiratory: Positive for shortness of breath (with exertion). Negative for cough, chest tightness and wheezing. Cardiovascular: Positive for palpitations. Negative for chest pain and leg swelling. Gastrointestinal: Negative for abdominal pain, blood in stool, constipation, diarrhea, nausea and vomiting. Endocrine: Negative for cold intolerance and heat intolerance. Genitourinary: Negative for dysuria, flank pain and hematuria. Musculoskeletal: Positive for arthralgias. Negative for back pain, myalgias and neck stiffness. Skin: Negative for color change and rash. Allergic/Immunologic: Negative for food allergies. Neurological: Positive for weakness. Negative for dizziness, light-headedness, numbness and headaches. Hematological: Does not bruise/bleed easily. Psychiatric/Behavioral: Negative for agitation, behavioral problems and confusion. Examination:      Vitals:    08/11/21 1159   BP: 120/70   Site: Right Upper Arm   Position: Sitting   Cuff Size: Large Adult   Pulse: 92   Weight: 290 lb (131.5 kg)   Height: 5' 11\" (1.803 m)        Body mass index is 40.45 kg/m². Physical Exam  Constitutional:       Appearance: Normal appearance. He is not ill-appearing. HENT:      Head: Normocephalic and atraumatic. Mouth/Throat:      Mouth: Mucous membranes are moist.   Eyes:      Conjunctiva/sclera: Conjunctivae normal.   Cardiovascular:      Rate and Rhythm: Normal rate and regular rhythm. Heart sounds: No murmur heard. Pulmonary:      Effort: Pulmonary effort is normal.      Breath sounds: No rales. Abdominal:      General: Abdomen is flat. Palpations: Abdomen is soft. Musculoskeletal:         General: No tenderness. Normal range of motion. Cervical back: Normal range of motion. Right lower leg: No edema. Left lower leg: No edema. Skin:     General: Skin is warm and dry.    Neurological:      General: No focal deficit present. Mental Status: He is alert and oriented to person, place, and time. CBC:   Lab Results   Component Value Date    WBC 9.3 07/09/2021    HGB 8.8 07/09/2021    HCT 29.0 07/09/2021     07/09/2021     Lipids:No results found for: CHOL, TRIG, HDL, LDLCALC, LDLDIRECT  PT/INR:   Lab Results   Component Value Date    INR 1.64 07/03/2021        BMP:    Lab Results   Component Value Date     07/09/2021    K 4.6 07/09/2021     07/09/2021    CO2 22 07/09/2021    BUN 11 07/09/2021     CMP:   Lab Results   Component Value Date     (H) 06/16/2021    PROT 5.5 (L) 06/16/2021    BILITOT 0.6 06/16/2021    ALKPHOS 45 06/16/2021     TSH:  No results found for: TSH    EKGINTERPRETATION - EKG Interpretation:      Device Assessment:      The device is Medtronic pacemaker - Dual Chamber chamber      MRI Compatible : yes    Device interrogation was performed. Mode: AAIr --- DDDr     Sensing is normal. Impedence is normal.  Threshold is normal.     There has not been interval changes. Estimated battery life is 14.7 years     The underlying rhythm is persistent atrial fibrillation. 8.6 % atrial paced; 5.3 % ventricular paced. Atrial Arrhythmia : persistent atrial fibrillation with intermittent RVR episodes    Non sustained VT episodes : 1    Sustained VT episodes : No    Patient activity reported 3.2 hrs/day    The patient is partial pacemaker dependent. Interpreted by    Nidia Harris M.D          IMPRESSION / RECOMMENDATIONS:     Persistent atrial fibrillation - with atrial fibrillation and flutter history  History of myasthenia gravis  COPD  Obesity BMI 40  CAD  History of gastric ulcer with bleeding now cleared for anticoagulation  Chronic back pain  History of blood clots     Patient had been cardioverted multiple times. Patient had 4 times and he reverted back. Patient also has sick sinus syndrome and meanwhile he had a pacemaker in July.   Patient has been on amiodarone. Patient also has history of myasthenia gravis and is on prior to the cardioversion  Patient also has chronic back pain. Discussed with the patient about pathophysiology of atrial fibrillation and flutter and patient wants to consider ablation. His main concern was laying still after the procedure for 6hours bedrest and I discussed that probably will be needed to avoid hematoma and he voiced understanding    Discussed with the patient about risk with the procedure and patient was proceed with the procedure    Patient probably needs to have anesthesia assessment as patient has history of myasthenia gravis and sometimes general anesthesia can be a problem in this patient so I would recommend anesthesia to take the call about how to approach this patient    With history of GI bleeding so he may be assessed for appendage closure in future too if needed. Thanks again for allowing me to participate in care of this patient. Please call me if you have any questions. With best regards. Hardin Mohs, MD, 8/11/2021 12:14 PM     Please note this report has been partially produced using speech recognition software and may contain errors related to that system including errors in grammar, punctuation, and spelling, as well as words and phrases that may be inappropriate. If there are any questions or concerns please feel free to contact the dictating provider for clarification.

## 2021-08-12 ENCOUNTER — TELEPHONE (OUTPATIENT)
Dept: CARDIOLOGY CLINIC | Age: 61
End: 2021-08-12

## 2021-08-12 NOTE — LETTER
Cardiology [address]  [facility]  Phone: [phone]  Fax: [phone]    8/12/2021        Darylene Brodie  [address] [place] [address]            Dear Smooth Rosa: This is your [employer] schedule. You can maria fernanda your calendar with these dates. Remember that your device is wireless and should automatically do these checks while you are sleeping. If for any reason I do not get your transmission then I will call you and ask that you send a manual transmission. If you have any questions or concerns, please call and ask for Pavrin Kee at [phone]. Thank you.

## 2021-08-17 ENCOUNTER — TELEPHONE (OUTPATIENT)
Dept: CARDIOLOGY CLINIC | Age: 61
End: 2021-08-17

## 2021-08-19 ENCOUNTER — TELEPHONE (OUTPATIENT)
Dept: CARDIOLOGY CLINIC | Age: 61
End: 2021-08-19

## 2021-08-19 NOTE — TELEPHONE ENCOUNTER
Called and informed patient the date and time of CT Cardiac [facility] Card Only scheduled 8/20/21 at 1pm.     [facility] CT CARDIAC W C [facility]  PREPS:   Arrive 30 minutes prior to scheduled time   NPO after midnight

## 2021-08-20 ENCOUNTER — HOSPITAL ENCOUNTER (OUTPATIENT)
Dept: CT IMAGING | Age: 61
Discharge: HOME | End: 2021-08-20
Payer: MEDICAID

## 2021-08-20 ENCOUNTER — HOSPITAL ENCOUNTER (OUTPATIENT)
Age: 61
Discharge: HOME | End: 2021-08-20
Payer: MEDICAID

## 2021-08-20 DIAGNOSIS — I48.20 ATRIAL FIBRILLATION, CHRONIC (HCC): ICD-10-CM

## 2021-08-20 LAB
GFR AFRICAN AMERICAN: >60 ML/MIN/1.73M2
GFR NON-AFRICAN AMERICAN: >60 ML/MIN/1.73M2
POC CREATININE: 1.2 MG/DL (ref 0.9–1.3)

## 2021-08-20 PROCEDURE — 75572 CT HRT W/3D IMAGE: CPT

## 2021-08-20 PROCEDURE — 6360000004 HC RX CONTRAST MEDICATION: Performed by: INTERNAL MEDICINE

## 2021-08-20 RX ADMIN — IOPAMIDOL 120 ML: 755 INJECTION, SOLUTION INTRAVENOUS at 13:38

## 2021-08-24 ASSESSMENT — ENCOUNTER SYMPTOMS
DIARRHEA: 0
COUGH: 0
ABDOMINAL PAIN: 0
WHEEZING: 0
BACK PAIN: 0
BLOOD IN STOOL: 0
COLOR CHANGE: 0
NAUSEA: 0
CHEST TIGHTNESS: 0
EYE PAIN: 0
VOMITING: 0
PHOTOPHOBIA: 0
SHORTNESS OF BREATH: 1
CONSTIPATION: 0

## 2021-09-02 ENCOUNTER — HOSPITAL ENCOUNTER (OUTPATIENT)
Dept: GENERAL RADIOLOGY | Age: 61
Discharge: HOME | End: 2021-09-02
Payer: MEDICAID

## 2021-09-02 ENCOUNTER — NURSE ONLY (OUTPATIENT)
Dept: CARDIOLOGY CLINIC | Age: 61
End: 2021-09-02
Payer: MEDICAID

## 2021-09-02 ENCOUNTER — HOSPITAL ENCOUNTER (OUTPATIENT)
Age: 61
Discharge: HOME | End: 2021-09-02
Payer: MEDICAID

## 2021-09-02 ENCOUNTER — HOSPITAL ENCOUNTER (OUTPATIENT)
Age: 61
Setting detail: SPECIMEN
Discharge: HOME | End: 2021-09-02
Payer: MEDICAID

## 2021-09-02 VITALS — DIASTOLIC BLOOD PRESSURE: 70 MMHG | SYSTOLIC BLOOD PRESSURE: 120 MMHG

## 2021-09-02 DIAGNOSIS — I48.91 ATRIAL FIBRILLATION WITH RAPID VENTRICULAR RESPONSE (HCC): ICD-10-CM

## 2021-09-02 DIAGNOSIS — Z01.810 PRE-OPERATIVE CARDIOVASCULAR EXAMINATION: ICD-10-CM

## 2021-09-02 DIAGNOSIS — I48.91 ATRIAL FIBRILLATION, UNSPECIFIED TYPE (HCC): ICD-10-CM

## 2021-09-02 DIAGNOSIS — Z79.01 LONG TERM (CURRENT) USE OF ANTICOAGULANTS: ICD-10-CM

## 2021-09-02 LAB
ANION GAP SERPL CALCULATED.3IONS-SCNC: 12 MMOL/L (ref 4–16)
APTT: 47.7 SECONDS (ref 25.1–37.1)
BASOPHILS ABSOLUTE: 0.1 K/CU MM
BASOPHILS RELATIVE PERCENT: 0.5 % (ref 0–1)
BUN BLDV-MCNC: 14 MG/DL (ref 6–23)
CALCIUM SERPL-MCNC: 9.2 MG/DL (ref 8.3–10.6)
CHLORIDE BLD-SCNC: 106 MMOL/L (ref 99–110)
CO2: 23 MMOL/L (ref 21–32)
CREAT SERPL-MCNC: 1.1 MG/DL (ref 0.9–1.3)
DIFFERENTIAL TYPE: ABNORMAL
EOSINOPHILS ABSOLUTE: 0.1 K/CU MM
EOSINOPHILS RELATIVE PERCENT: 0.8 % (ref 0–3)
GFR AFRICAN AMERICAN: >60 ML/MIN/1.73M2
GFR NON-AFRICAN AMERICAN: >60 ML/MIN/1.73M2
GLUCOSE BLD-MCNC: 124 MG/DL (ref 70–99)
HCT VFR BLD CALC: 46.5 % (ref 42–52)
HEMOGLOBIN: 13.5 GM/DL (ref 13.5–18)
IMMATURE NEUTROPHIL %: 0.6 % (ref 0–0.43)
INR BLD: 0.93 INDEX
LYMPHOCYTES ABSOLUTE: 1.3 K/CU MM
LYMPHOCYTES RELATIVE PERCENT: 13.8 % (ref 24–44)
MAGNESIUM: 2.2 MG/DL (ref 1.8–2.4)
MCH RBC QN AUTO: 27.1 PG (ref 27–31)
MCHC RBC AUTO-ENTMCNC: 29 % (ref 32–36)
MCV RBC AUTO: 93.4 FL (ref 78–100)
MONOCYTES ABSOLUTE: 0.7 K/CU MM
MONOCYTES RELATIVE PERCENT: 7.2 % (ref 0–4)
NUCLEATED RBC %: 0 %
PDW BLD-RTO: 19.5 % (ref 11.7–14.9)
PHOSPHORUS: 3.7 MG/DL (ref 2.5–4.9)
PLATELET # BLD: 231 K/CU MM (ref 140–440)
PMV BLD AUTO: 9.7 FL (ref 7.5–11.1)
POTASSIUM SERPL-SCNC: 5.2 MMOL/L (ref 3.5–5.1)
PROTHROMBIN TIME: 12 SECONDS (ref 11.7–14.5)
RBC # BLD: 4.98 M/CU MM (ref 4.6–6.2)
SEGMENTED NEUTROPHILS ABSOLUTE COUNT: 7.2 K/CU MM
SEGMENTED NEUTROPHILS RELATIVE PERCENT: 77.1 % (ref 36–66)
SODIUM BLD-SCNC: 141 MMOL/L (ref 135–145)
TOTAL IMMATURE NEUTOROPHIL: 0.06 K/CU MM
TOTAL NUCLEATED RBC: 0 K/CU MM
WBC # BLD: 9.3 K/CU MM (ref 4–10.5)

## 2021-09-02 PROCEDURE — 36415 COLL VENOUS BLD VENIPUNCTURE: CPT

## 2021-09-02 PROCEDURE — 71046 X-RAY EXAM CHEST 2 VIEWS: CPT

## 2021-09-02 PROCEDURE — U0005 INFEC AGEN DETEC AMPLI PROBE: HCPCS

## 2021-09-02 PROCEDURE — 85025 COMPLETE CBC W/AUTO DIFF WBC: CPT

## 2021-09-02 PROCEDURE — 83735 ASSAY OF MAGNESIUM: CPT

## 2021-09-02 PROCEDURE — 99211 OFF/OP EST MAY X REQ PHY/QHP: CPT | Performed by: INTERNAL MEDICINE

## 2021-09-02 PROCEDURE — U0003 INFECTIOUS AGENT DETECTION BY NUCLEIC ACID (DNA OR RNA); SEVERE ACUTE RESPIRATORY SYNDROME CORONAVIRUS 2 (SARS-COV-2) (CORONAVIRUS DISEASE [COVID-19]), AMPLIFIED PROBE TECHNIQUE, MAKING USE OF HIGH THROUGHPUT TECHNOLOGIES AS DESCRIBED BY CMS-2020-01-R: HCPCS

## 2021-09-02 PROCEDURE — 85730 THROMBOPLASTIN TIME PARTIAL: CPT

## 2021-09-02 PROCEDURE — 84100 ASSAY OF PHOSPHORUS: CPT

## 2021-09-02 PROCEDURE — 85610 PROTHROMBIN TIME: CPT

## 2021-09-02 PROCEDURE — 80048 BASIC METABOLIC PNL TOTAL CA: CPT

## 2021-09-02 NOTE — PROGRESS NOTES
Patient informed of instructions and guidance for performing test.  Throat swab performed. Swab placed into labeled collection tube. Collection tube and lab order placed in plastic bag. Bag placed in biohazard bag and placed in fridge.  called for . Patient here in office and educated on A-Fib/Flutter ablation, schedule for 9/8/21 @ 3730, with arrival @ [phone], @ [facility]; risk explained; and consents signed. Also copy of orders given for labs and CXR due 9/2/21 at [facility]. Instruction given to patient to :  NPO after midnight the night before procedure; call hospital at [phone] to pre-register. May take rest of morning meds of procedure except Digoxin. Hold ALL blood pressure medications morning of procedure. Hold Xarelto the morning of the procedure. Copies of consent & info scanned in chart.

## 2021-09-03 LAB
SARS-COV-2: NOT DETECTED
SOURCE: NORMAL

## 2021-09-07 ENCOUNTER — ANESTHESIA EVENT (OUTPATIENT)
Dept: CARDIAC CATH/INVASIVE PROCEDURES | Age: 61
End: 2021-09-07
Payer: MEDICAID

## 2021-09-07 NOTE — ANESTHESIA PRE PROCEDURE
Department of Anesthesiology  Preprocedure Note       Name:  Fernando Ventura   Age:  57 y.o.  :  1960                                          MRN:  [id number]         Date:  2021      Surgeon: * Surgery not found *    Procedure:     Medications prior to admission:   Prior to Admission medications    Medication Sig Start Date End Date Taking? Authorizing Provider   metoprolol succinate (TOPROL XL) 100 MG extended release tablet Take 1 tablet by mouth 2 times daily 21   Mary Putnam MD   aspirin 81 MG chewable tablet Take 1 tablet by mouth daily 7/10/21   Mikala Metcalf MD   amiodarone (CORDARONE) 200 MG tablet Take 1 tablet by mouth daily 7/9/21 10/7/21  Mikala Metcalf MD   ferrous sulfate (FE TABS 325) 325 (65 Fe) MG EC tablet Take 1 tablet by mouth 2 times daily (with meals) 21   Mikala Metcalf MD   GLUCOSAMINE-CALCIUM-VIT D PO Take by mouth daily    Historical Provider, MD   budesonide-formoterol [facility]) 160-4.5 MCG/ACT AERO Inhale 2 puffs into the lungs 2 times daily 21   Pradip Cowan MD   atorvastatin (LIPITOR) 40 MG tablet Take 1 tablet by mouth nightly START 2021   Pradip Cowan MD   tiotropium (Jerome Banana) 18 MCG inhalation capsule Inhale 1 capsule into the lungs daily 21   Pradip Cowan MD   pantoprazole (PROTONIX) 40 MG tablet Take 1 tablet by mouth 2 times daily 21   Pradip Cowan MD   Elastic Bandages & Supports (MEDICAL COMPRESSION THIGH HIGH) MISC Wear daily and remove nightly 20 - 30 mmgh 21   Thierry Vega MD   rivaroxaban (XARELTO) 20 MG TABS tablet Take 1 tablet by mouth daily (with breakfast) 4/13/21 7/3/21  JAMIE Harley CNP   digoxin (LANOXIN) 125 MCG tablet Take 1 tablet by mouth daily 21   JAMIE Harley CNP   NONFORMULARY Inject 40 mg/L into the skin every evening Zilucoplan  - patient provided experimental drug from [facility] for Myasthenia Gravis    Historical Provider, MD   albuterol sulfate HFA (PROVENTIL HFA) 108 (90 Base) MCG/ACT inhaler Inhale 2 puffs into the lungs every 4 hours as needed for Wheezing or Shortness of Breath With spacer (and mask if indicated). Thanks.  12/20/18 3/15/21  Kaycee Palomo MD   vitamin B-12 (CYANOCOBALAMIN) 500 MCG tablet Take 1,000 mcg by mouth daily Indications: 8am     Historical Provider, MD   PREDNISONE PO Take 13 mg by mouth daily Indications: Takes at  [address] Provider, MD   pyridostigmine (MESTINON) 60 MG tablet Take 1 tablet by mouth 4 times daily  Patient taking differently: Take 60 mg by mouth 5 times daily Indications: Takes at 0800, 1200, 1600, 2000 7/20/16   Len Mayen MD   acetaminophen (TYLENOL) 325 MG tablet Take 650 mg by mouth every 4 hours as needed for Pain or Fever    Historical Provider, MD       Current medications:    Current Outpatient Medications   Medication Sig Dispense Refill    metoprolol succinate (TOPROL XL) 100 MG extended release tablet Take 1 tablet by mouth 2 times daily 60 tablet 3    aspirin 81 MG chewable tablet Take 1 tablet by mouth daily 30 tablet 3    amiodarone (CORDARONE) 200 MG tablet Take 1 tablet by mouth daily 90 tablet 0    ferrous sulfate (FE TABS 325) 325 (65 Fe) MG EC tablet Take 1 tablet by mouth 2 times daily (with meals) 90 tablet 1    GLUCOSAMINE-CALCIUM-VIT D PO Take by mouth daily      budesonide-formoterol (SYMBICORT) 160-4.5 MCG/ACT AERO Inhale 2 puffs into the lungs 2 times daily 1 Inhaler 3    atorvastatin (LIPITOR) 40 MG tablet Take 1 tablet by mouth nightly START 6/25/2021 30 tablet 3    tiotropium (SPIRIVA HANDIHALER) 18 MCG inhalation capsule Inhale 1 capsule into the lungs daily 90 capsule 1    pantoprazole (PROTONIX) 40 MG tablet Take 1 tablet by mouth 2 times daily 60 tablet 1    Elastic Bandages & Supports (MEDICAL COMPRESSION THIGH HIGH) MISC Wear daily and remove nightly 20 - 30 mmgh 2 each 0    rivaroxaban (XARELTO) 20 MG TABS tablet Take 1 tablet by mouth daily (with breakfast) 90 tablet 3    digoxin (LANOXIN) 125 MCG tablet Take 1 tablet by mouth daily 90 tablet 3    NONFORMULARY Inject 40 mg/L into the skin every evening Zilucoplan  - patient provided experimental drug from [place] for Myasthenia Gravis      albuterol sulfate HFA (PROVENTIL HFA) 108 (90 Base) MCG/ACT inhaler Inhale 2 puffs into the lungs every 4 hours as needed for Wheezing or Shortness of Breath With spacer (and mask if indicated). Thanks. 1 Inhaler 1    vitamin B-12 (CYANOCOBALAMIN) 500 MCG tablet Take 1,000 mcg by mouth daily Indications: 8am       PREDNISONE PO Take 13 mg by mouth daily Indications: Takes at  8am       pyridostigmine (MESTINON) 60 MG tablet Take 1 tablet by mouth 4 times daily (Patient taking differently: Take 60 mg by mouth 5 times daily Indications: Takes at 0800, 1200, 1600, 2000 ) 120 tablet 0    acetaminophen (TYLENOL) 325 MG tablet Take 650 mg by mouth every 4 hours as needed for Pain or Fever       No current facility-administered medications for this visit.      Facility-Administered Medications Ordered in Other Visits   Medication Dose Route Frequency Provider Last Rate Last Admin    Immune Globulin (Human) solution 20 g  20 g IntraVENous Once Adrienne Ponce MD        And    Immune Globulin (Human) solution 20 g  20 g IntraVENous Once Adrienne Ponce MD        Immune Globulin (Human) solution 20 g  20 g IntraVENous Once Adrienne Ponce MD        And    Immune Globulin (Human) solution 20 g  20 g IntraVENous Once Adrienne Ponce MD           Allergies:  No Known Allergies    Problem List:    Patient Active Problem List   Diagnosis Code    Myasthenia gravis ([facility] G70.00    Unspecified ptosis of bilateral eyelids H02.403    Cellulitis of left hand L03.114    Viral gastroenteritis A08.4    SOB (shortness of breath) R06.02    Atrial fibrillation, chronic (HCC) I48.20    Post PTCA Z98.61    NSTEMI (non-ST elevated myocardial infarction) ([facility] I21.4    Atrial fibrillation with rapid ventricular response ([facility]) I48.91    Hyperkalemia E87.5    Type 2 MI (myocardial infarction) ([address] I21. A1    Gastritis and duodenitis K29.90    Duodenal ulcer K26.9    Coronary artery disease involving native coronary artery of native heart without angina pectoris I25.10    COPD (chronic obstructive pulmonary disease) ([facility]) J44.9    GI bleed K92.2    History of myasthenia gravis Z86.69    Parastomal hernia without obstruction or gangrene K43.5    Anemia D64.9       Past Medical History:        Diagnosis Date    Atrial fibrillation, chronic ([facility] 12/2020    COPD (chronic obstructive pulmonary disease) ([facility] 6/15/2021    Coronary artery disease involving native coronary artery of native heart without angina pectoris 6/15/2021    H/O echocardiogram 02/02/2021    EF is estimated at 50%. presence of PVC affected LVEF estimation, Mild TR.    Heart disease     stint    History of blood transfusion     Hx of blood clots     Hx of cardiovascular stress test 06/11/2021    Small size severe intensity,perfusion defect in apical wall.  Hx of Doppler ultrasound 04/09/2021    Left distal SSV shows occlusive chronic SVT.  Hypertension     Myasthenia gravis ([facility]     NSTEMI (non-ST elevated myocardial infarction) ([facility] 12/2020    Post PTCA 12/14/2020    Ramus Stented.  TIA (transient ischemic attack)     Ulcerative colitis ([facility]        Past Surgical History:        Procedure Laterality Date    CARDIAC SURGERY      ILEOSTOMY OR JEJUNOSTOMY      PACEMAKER INSERTION N/A 7/7/2021    PACEMAKER INSERTION PERMANENT performed by Siva Anderson MD at [place] [address] PTCA  12/14/2020    Ramus Stented.     TONSILLECTOMY      UPPER GASTROINTESTINAL ENDOSCOPY N/A 6/15/2021    EGD DIAGNOSTIC ONLY performed by Sujit Merrill MD at [facility] N/A 7/4/2021    EGD DIAGNOSTIC ONLY performed by Monda Koyanagi, MD at [facility] 11/02/2016    AKM5VTY 24.6 11/02/2016        Type & Screen (If Applicable):  No results found for: LABABO, LABRH    Drug/Infectious Status (If Applicable):  Lab Results   Component Value Date    HEPCAB NON REACTIVE 06/15/2021       COVID-19 Screening (If Applicable):   Lab Results   Component Value Date    COVID19 NOT DETECTED 09/02/2021           Anesthesia Evaluation  Patient summary reviewed  Airway: Mallampati: II  TM distance: >3 FB   Neck ROM: full  Mouth opening: > = 3 FB Dental:          Pulmonary:   (+) COPD:  decreased breath sounds,                            ROS comment: Former smoker   Cardiovascular:  Exercise tolerance: poor (<4 METS),   (+) hypertension:, valvular problems/murmurs: MR, past MI:, dysrhythmias: atrial fibrillation,         Rhythm: irregular  Rate: normal           Beta Blocker:  Dose within 24 Hrs      ROS comment: AV dual-paced rhythm   Abnormal ECG   When compared with ECG of 06-JUL-2021 15:51,   Electronic ventricular pacemaker has replaced Sinus rhythm   Confirmed by RICHARD Booker ([id number]) on 7/8/2021 6:26:15 PM      Summary   Severe mitral regurgitation is present. Moderate tricuspid regurgitation is present. There was no thrombus noted in the left atrial appendage. Shocked using 200 joules. Converted to junctional escape rhythm. Ejection fraction is visually estimated at 50%. Signature      ------------------------------------------------------------------   Electronically signed by Jimenez Albert MD (Interpreting   physician) on 07/06/2021 at 06:32 PM    PE comment: afib rvr   Neuro/Psych:   (+) neuromuscular disease: myasthenia gravis, TIA,             GI/Hepatic/Renal:   (+) PUD, morbid obesity (bmi 40.9)         ROS comment: Ulcerative colitis. Endo/Other:    (+) blood dyscrasia: anticoagulation therapy and anemia:., .                 Abdominal:             Vascular:   + DVT, .        Other Findings:             Anesthesia Plan      general     ASA 4 Induction: intravenous. arterial line    Anesthetic plan and risks discussed with patient and spouse. Plan discussed with CRNA. Pre Anesthesia Assessment complete. Chart reviewed on 9/7/2021. This is a chart review only.         JAMIE Hayden - CRNA   9/7/2021

## 2021-09-08 ENCOUNTER — HOSPITAL ENCOUNTER (OUTPATIENT)
Dept: CARDIAC CATH/INVASIVE PROCEDURES | Age: 61
Setting detail: OBSERVATION
Discharge: ANOTHER HOSPITAL | End: 2021-09-09
Attending: INTERNAL MEDICINE | Admitting: INTERNAL MEDICINE
Payer: MEDICAID

## 2021-09-08 ENCOUNTER — ANESTHESIA (OUTPATIENT)
Dept: CARDIAC CATH/INVASIVE PROCEDURES | Age: 61
End: 2021-09-08
Payer: MEDICAID

## 2021-09-08 VITALS
RESPIRATION RATE: 9 BRPM | DIASTOLIC BLOOD PRESSURE: 41 MMHG | TEMPERATURE: 98.6 F | OXYGEN SATURATION: 97 % | SYSTOLIC BLOOD PRESSURE: 73 MMHG

## 2021-09-08 PROBLEM — Z86.79 S/P ABLATION OF ATRIAL FIBRILLATION: Status: ACTIVE | Noted: 2021-09-08

## 2021-09-08 PROBLEM — Z98.890 S/P ABLATION OF ATRIAL FIBRILLATION: Status: ACTIVE | Noted: 2021-09-08

## 2021-09-08 LAB
ABO/RH: NORMAL
ACTIVATED CLOTTING TIME, LOW RANGE: 164 SEC
ACTIVATED CLOTTING TIME, LOW RANGE: 167 SEC
ACTIVATED CLOTTING TIME, LOW RANGE: 215 SEC
ACTIVATED CLOTTING TIME, LOW RANGE: 315 SEC
ACTIVATED CLOTTING TIME, LOW RANGE: 320 SEC
ACTIVATED CLOTTING TIME, LOW RANGE: 323 SEC
ACTIVATED CLOTTING TIME, LOW RANGE: 331 SEC
ACTIVATED CLOTTING TIME, LOW RANGE: 353 SEC
ANION GAP SERPL CALCULATED.3IONS-SCNC: 10 MMOL/L (ref 4–16)
ANTIBODY SCREEN: NEGATIVE
BASE EXCESS MIXED: 2 (ref 0–1.2)
BASE EXCESS MIXED: 6.9 (ref 0–1.2)
BASE EXCESS: ABNORMAL (ref 0–3.3)
BASE EXCESS: ABNORMAL (ref 0–3.3)
BUN BLDV-MCNC: 15 MG/DL (ref 6–23)
CALCIUM SERPL-MCNC: 9.4 MG/DL (ref 8.3–10.6)
CHLORIDE BLD-SCNC: 105 MMOL/L (ref 99–110)
CO2 CONTENT: 20.7 MMOL/L (ref 19–24)
CO2: 23 MMOL/L (ref 21–32)
CO2: 27 MMOL/L (ref 21–32)
COMMENT: ABNORMAL
CREAT SERPL-MCNC: 1.1 MG/DL (ref 0.9–1.3)
GFR AFRICAN AMERICAN: >60 ML/MIN/1.73M2
GFR NON-AFRICAN AMERICAN: >60 ML/MIN/1.73M2
GLUCOSE BLD-MCNC: 138 MG/DL (ref 70–99)
GLUCOSE BLD-MCNC: 145 MG/DL (ref 70–99)
GLUCOSE BLD-MCNC: 173 MG/DL (ref 70–99)
HCO3 ARTERIAL: 19.5 MMOL/L (ref 18–23)
HCO3 ARTERIAL: 25.3 MMOL/L (ref 18–23)
HCT VFR BLD CALC: 42 % (ref 42–52)
HCT VFR BLD CALC: 42 % (ref 42–52)
HEMOGLOBIN: 14.3 GM/DL (ref 13.5–18)
HEMOGLOBIN: 14.4 GM/DL (ref 13.5–18)
O2 SATURATION: 98.9 % (ref 96–97)
O2 SATURATION: 99.7 % (ref 96–97)
PCO2 ARTERIAL: 41 MMHG (ref 32–45)
PCO2 ARTERIAL: 52 MMHG (ref 32–45)
PH BLOOD: 7.28 (ref 7.34–7.45)
PH BLOOD: 7.3 (ref 7.34–7.45)
PO2 ARTERIAL: 141.5 MMHG (ref 75–100)
PO2 ARTERIAL: 225.4 MMHG (ref 75–100)
POC CALCIUM: 1.1 MMOL/L (ref 1.12–1.32)
POC CALCIUM: 1.26 MMOL/L (ref 1.12–1.32)
POC CHLORIDE: 112 MMOL/L (ref 98–109)
POC CREATININE: 1.3 MG/DL (ref 0.9–1.3)
POTASSIUM SERPL-SCNC: 4.6 MMOL/L (ref 3.5–5.1)
POTASSIUM SERPL-SCNC: 4.8 MMOL/L (ref 3.5–4.5)
POTASSIUM SERPL-SCNC: 6.1 MMOL/L (ref 3.5–4.5)
SODIUM BLD-SCNC: 138 MMOL/L (ref 135–145)
SODIUM BLD-SCNC: 142 MMOL/L (ref 138–146)
SODIUM BLD-SCNC: 146 MMOL/L (ref 138–146)
SOURCE, BLOOD GAS: ABNORMAL
SOURCE, BLOOD GAS: ABNORMAL

## 2021-09-08 PROCEDURE — 82962 GLUCOSE BLOOD TEST: CPT

## 2021-09-08 PROCEDURE — 7100000000 HC PACU RECOVERY - FIRST 15 MIN

## 2021-09-08 PROCEDURE — 82803 BLOOD GASES ANY COMBINATION: CPT

## 2021-09-08 PROCEDURE — 2580000003 HC RX 258: Performed by: NURSE ANESTHETIST, CERTIFIED REGISTERED

## 2021-09-08 PROCEDURE — 6360000002 HC RX W HCPCS: Performed by: NURSE ANESTHETIST, CERTIFIED REGISTERED

## 2021-09-08 PROCEDURE — 80048 BASIC METABOLIC PNL TOTAL CA: CPT

## 2021-09-08 PROCEDURE — 93613 INTRACARDIAC EPHYS 3D MAPG: CPT

## 2021-09-08 PROCEDURE — 2709999900 HC NON-CHARGEABLE SUPPLY

## 2021-09-08 PROCEDURE — 7100000001 HC PACU RECOVERY - ADDTL 15 MIN

## 2021-09-08 PROCEDURE — C1732 CATH, EP, DIAG/ABL, 3D/VECT: HCPCS

## 2021-09-08 PROCEDURE — 86900 BLOOD TYPING SEROLOGIC ABO: CPT

## 2021-09-08 PROCEDURE — 2700000000 HC OXYGEN THERAPY PER DAY

## 2021-09-08 PROCEDURE — 3700000000 HC ANESTHESIA ATTENDED CARE

## 2021-09-08 PROCEDURE — C1769 GUIDE WIRE: HCPCS

## 2021-09-08 PROCEDURE — 93005 ELECTROCARDIOGRAM TRACING: CPT | Performed by: INTERNAL MEDICINE

## 2021-09-08 PROCEDURE — 93662 INTRACARDIAC ECG (ICE): CPT | Performed by: INTERNAL MEDICINE

## 2021-09-08 PROCEDURE — 85347 COAGULATION TIME ACTIVATED: CPT

## 2021-09-08 PROCEDURE — 85014 HEMATOCRIT: CPT

## 2021-09-08 PROCEDURE — 82330 ASSAY OF CALCIUM: CPT

## 2021-09-08 PROCEDURE — 84132 ASSAY OF SERUM POTASSIUM: CPT

## 2021-09-08 PROCEDURE — C1893 INTRO/SHEATH, FIXED,NON-PEEL: HCPCS

## 2021-09-08 PROCEDURE — G0378 HOSPITAL OBSERVATION PER HR: HCPCS

## 2021-09-08 PROCEDURE — 6360000002 HC RX W HCPCS

## 2021-09-08 PROCEDURE — 86901 BLOOD TYPING SEROLOGIC RH(D): CPT

## 2021-09-08 PROCEDURE — 94640 AIRWAY INHALATION TREATMENT: CPT

## 2021-09-08 PROCEDURE — 93623 PRGRMD STIMJ&PACG IV RX NFS: CPT | Performed by: INTERNAL MEDICINE

## 2021-09-08 PROCEDURE — 85018 HEMOGLOBIN: CPT

## 2021-09-08 PROCEDURE — 2500000003 HC RX 250 WO HCPCS

## 2021-09-08 PROCEDURE — 93656 COMPRE EP EVAL ABLTJ ATR FIB: CPT

## 2021-09-08 PROCEDURE — 6370000000 HC RX 637 (ALT 250 FOR IP): Performed by: NURSE PRACTITIONER

## 2021-09-08 PROCEDURE — C1894 INTRO/SHEATH, NON-LASER: HCPCS

## 2021-09-08 PROCEDURE — 86850 RBC ANTIBODY SCREEN: CPT

## 2021-09-08 PROCEDURE — 2580000003 HC RX 258

## 2021-09-08 PROCEDURE — 93308 TTE F-UP OR LMTD: CPT

## 2021-09-08 PROCEDURE — C1759 CATH, INTRA ECHOCARDIOGRAPHY: HCPCS

## 2021-09-08 PROCEDURE — 6360000002 HC RX W HCPCS: Performed by: NURSE PRACTITIONER

## 2021-09-08 PROCEDURE — 93613 INTRACARDIAC EPHYS 3D MAPG: CPT | Performed by: INTERNAL MEDICINE

## 2021-09-08 PROCEDURE — 2720000010 HC SURG SUPPLY STERILE

## 2021-09-08 PROCEDURE — 2500000003 HC RX 250 WO HCPCS: Performed by: NURSE ANESTHETIST, CERTIFIED REGISTERED

## 2021-09-08 PROCEDURE — 93312 ECHO TRANSESOPHAGEAL: CPT | Performed by: INTERNAL MEDICINE

## 2021-09-08 PROCEDURE — C1733 CATH, EP, OTHR THAN COOL-TIP: HCPCS

## 2021-09-08 PROCEDURE — 3700000001 HC ADD 15 MINUTES (ANESTHESIA)

## 2021-09-08 PROCEDURE — 94761 N-INVAS EAR/PLS OXIMETRY MLT: CPT

## 2021-09-08 PROCEDURE — 84295 ASSAY OF SERUM SODIUM: CPT

## 2021-09-08 PROCEDURE — 93656 COMPRE EP EVAL ABLTJ ATR FIB: CPT | Performed by: INTERNAL MEDICINE

## 2021-09-08 PROCEDURE — C1730 CATH, EP, 19 OR FEW ELECT: HCPCS

## 2021-09-08 PROCEDURE — 93662 INTRACARDIAC ECG (ICE): CPT

## 2021-09-08 PROCEDURE — 93312 ECHO TRANSESOPHAGEAL: CPT

## 2021-09-08 PROCEDURE — 2580000003 HC RX 258: Performed by: NURSE PRACTITIONER

## 2021-09-08 PROCEDURE — 93325 DOPPLER ECHO COLOR FLOW MAPG: CPT | Performed by: INTERNAL MEDICINE

## 2021-09-08 PROCEDURE — 93655 ICAR CATH ABLTJ DSCRT ARRHYT: CPT | Performed by: INTERNAL MEDICINE

## 2021-09-08 RX ORDER — FENTANYL CITRATE 50 UG/ML
INJECTION, SOLUTION INTRAMUSCULAR; INTRAVENOUS PRN
Status: DISCONTINUED | OUTPATIENT
Start: 2021-09-08 | End: 2021-09-08 | Stop reason: SDUPTHER

## 2021-09-08 RX ORDER — ACETAMINOPHEN 325 MG/1
650 TABLET ORAL EVERY 4 HOURS PRN
Status: DISCONTINUED | OUTPATIENT
Start: 2021-09-08 | End: 2021-09-09 | Stop reason: HOSPADM

## 2021-09-08 RX ORDER — PYRIDOSTIGMINE BROMIDE 60 MG/1
60 TABLET ORAL
Status: DISCONTINUED | OUTPATIENT
Start: 2021-09-08 | End: 2021-09-09 | Stop reason: HOSPADM

## 2021-09-08 RX ORDER — VASOPRESSIN 20 U/ML
INJECTION PARENTERAL PRN
Status: DISCONTINUED | OUTPATIENT
Start: 2021-09-08 | End: 2021-09-08 | Stop reason: SDUPTHER

## 2021-09-08 RX ORDER — ASPIRIN 81 MG/1
81 TABLET, CHEWABLE ORAL DAILY
Status: DISCONTINUED | OUTPATIENT
Start: 2021-09-08 | End: 2021-09-09 | Stop reason: HOSPADM

## 2021-09-08 RX ORDER — SODIUM CHLORIDE 0.9 % (FLUSH) 0.9 %
5-40 SYRINGE (ML) INJECTION EVERY 12 HOURS SCHEDULED
Status: DISCONTINUED | OUTPATIENT
Start: 2021-09-08 | End: 2021-09-09 | Stop reason: HOSPADM

## 2021-09-08 RX ORDER — LANOLIN ALCOHOL/MO/W.PET/CERES
1000 CREAM (GRAM) TOPICAL DAILY
Status: DISCONTINUED | OUTPATIENT
Start: 2021-09-08 | End: 2021-09-09 | Stop reason: HOSPADM

## 2021-09-08 RX ORDER — AMIODARONE HYDROCHLORIDE 200 MG/1
200 TABLET ORAL DAILY
Status: DISCONTINUED | OUTPATIENT
Start: 2021-09-08 | End: 2021-09-09 | Stop reason: HOSPADM

## 2021-09-08 RX ORDER — METOPROLOL SUCCINATE 50 MG/1
100 TABLET, EXTENDED RELEASE ORAL 2 TIMES DAILY
Status: DISCONTINUED | OUTPATIENT
Start: 2021-09-08 | End: 2021-09-09 | Stop reason: HOSPADM

## 2021-09-08 RX ORDER — ATORVASTATIN CALCIUM 40 MG/1
40 TABLET, FILM COATED ORAL NIGHTLY
Status: DISCONTINUED | OUTPATIENT
Start: 2021-09-08 | End: 2021-09-09 | Stop reason: HOSPADM

## 2021-09-08 RX ORDER — FUROSEMIDE 10 MG/ML
INJECTION INTRAMUSCULAR; INTRAVENOUS PRN
Status: DISCONTINUED | OUTPATIENT
Start: 2021-09-08 | End: 2021-09-08 | Stop reason: SDUPTHER

## 2021-09-08 RX ORDER — ROCURONIUM BROMIDE 10 MG/ML
INJECTION, SOLUTION INTRAVENOUS PRN
Status: DISCONTINUED | OUTPATIENT
Start: 2021-09-08 | End: 2021-09-08 | Stop reason: SDUPTHER

## 2021-09-08 RX ORDER — BUDESONIDE AND FORMOTEROL FUMARATE DIHYDRATE 160; 4.5 UG/1; UG/1
2 AEROSOL RESPIRATORY (INHALATION) 2 TIMES DAILY
Status: DISCONTINUED | OUTPATIENT
Start: 2021-09-08 | End: 2021-09-09 | Stop reason: HOSPADM

## 2021-09-08 RX ORDER — PANTOPRAZOLE SODIUM 40 MG/1
40 TABLET, DELAYED RELEASE ORAL 2 TIMES DAILY
Status: DISCONTINUED | OUTPATIENT
Start: 2021-09-08 | End: 2021-09-09 | Stop reason: HOSPADM

## 2021-09-08 RX ORDER — FERROUS SULFATE 325(65) MG
325 TABLET ORAL 2 TIMES DAILY WITH MEALS
Status: DISCONTINUED | OUTPATIENT
Start: 2021-09-08 | End: 2021-09-09 | Stop reason: HOSPADM

## 2021-09-08 RX ORDER — PROTAMINE SULFATE 10 MG/ML
INJECTION, SOLUTION INTRAVENOUS PRN
Status: DISCONTINUED | OUTPATIENT
Start: 2021-09-08 | End: 2021-09-08 | Stop reason: SDUPTHER

## 2021-09-08 RX ORDER — SODIUM CHLORIDE 9 MG/ML
INJECTION, SOLUTION INTRAVENOUS CONTINUOUS PRN
Status: DISCONTINUED | OUTPATIENT
Start: 2021-09-08 | End: 2021-09-08 | Stop reason: SDUPTHER

## 2021-09-08 RX ORDER — SODIUM CHLORIDE 0.9 % (FLUSH) 0.9 %
5-40 SYRINGE (ML) INJECTION PRN
Status: DISCONTINUED | OUTPATIENT
Start: 2021-09-08 | End: 2021-09-09 | Stop reason: HOSPADM

## 2021-09-08 RX ORDER — SODIUM CHLORIDE 9 MG/ML
25 INJECTION, SOLUTION INTRAVENOUS PRN
Status: DISCONTINUED | OUTPATIENT
Start: 2021-09-08 | End: 2021-09-09 | Stop reason: HOSPADM

## 2021-09-08 RX ORDER — PROPOFOL 10 MG/ML
INJECTION, EMULSION INTRAVENOUS PRN
Status: DISCONTINUED | OUTPATIENT
Start: 2021-09-08 | End: 2021-09-08 | Stop reason: SDUPTHER

## 2021-09-08 RX ADMIN — SODIUM CHLORIDE: 9 INJECTION, SOLUTION INTRAVENOUS at 07:52

## 2021-09-08 RX ADMIN — AMIODARONE HYDROCHLORIDE 200 MG: 200 TABLET ORAL at 18:59

## 2021-09-08 RX ADMIN — FERROUS SULFATE TAB 325 MG (65 MG ELEMENTAL FE) 325 MG: 325 (65 FE) TAB at 18:59

## 2021-09-08 RX ADMIN — VASOPRESSIN 1 UNITS: 20 INJECTION INTRAVENOUS at 10:43

## 2021-09-08 RX ADMIN — VASOPRESSIN 1 UNITS: 20 INJECTION INTRAVENOUS at 11:34

## 2021-09-08 RX ADMIN — PYRIDOSTIGMINE BROMIDE 60 MG: 60 TABLET ORAL at 18:58

## 2021-09-08 RX ADMIN — SUGAMMADEX 200 MG: 100 INJECTION, SOLUTION INTRAVENOUS at 11:15

## 2021-09-08 RX ADMIN — PYRIDOSTIGMINE BROMIDE 60 MG: 60 TABLET ORAL at 21:12

## 2021-09-08 RX ADMIN — FENTANYL CITRATE 100 MCG: 50 INJECTION, SOLUTION INTRAMUSCULAR; INTRAVENOUS at 07:43

## 2021-09-08 RX ADMIN — PROTAMINE SULFATE 10 MG: 10 INJECTION, SOLUTION INTRAVENOUS at 11:38

## 2021-09-08 RX ADMIN — ENOXAPARIN SODIUM 60 MG: 60 INJECTION SUBCUTANEOUS at 18:59

## 2021-09-08 RX ADMIN — PHENYLEPHRINE HYDROCHLORIDE 100 MCG: 10 INJECTION INTRAVENOUS at 08:08

## 2021-09-08 RX ADMIN — CYANOCOBALAMIN TAB 1000 MCG 1000 MCG: 1000 TAB at 18:59

## 2021-09-08 RX ADMIN — VASOPRESSIN 1 UNITS: 20 INJECTION INTRAVENOUS at 10:15

## 2021-09-08 RX ADMIN — PANTOPRAZOLE SODIUM 40 MG: 40 TABLET, DELAYED RELEASE ORAL at 21:12

## 2021-09-08 RX ADMIN — PROTAMINE SULFATE 30 MG: 10 INJECTION, SOLUTION INTRAVENOUS at 11:11

## 2021-09-08 RX ADMIN — PHENYLEPHRINE HYDROCHLORIDE 100 MCG: 10 INJECTION INTRAVENOUS at 10:15

## 2021-09-08 RX ADMIN — ATORVASTATIN CALCIUM 40 MG: 40 TABLET, FILM COATED ORAL at 21:12

## 2021-09-08 RX ADMIN — FUROSEMIDE 20 MG: 10 INJECTION, SOLUTION INTRAVENOUS at 12:11

## 2021-09-08 RX ADMIN — PHENYLEPHRINE HYDROCHLORIDE 200 MCG: 10 INJECTION INTRAVENOUS at 11:32

## 2021-09-08 RX ADMIN — PHENYLEPHRINE HYDROCHLORIDE 20 MCG/MIN: 10 INJECTION INTRAVENOUS at 08:13

## 2021-09-08 RX ADMIN — PHENYLEPHRINE HYDROCHLORIDE 100 MCG: 10 INJECTION INTRAVENOUS at 10:11

## 2021-09-08 RX ADMIN — VASOPRESSIN 1 UNITS: 20 INJECTION INTRAVENOUS at 11:10

## 2021-09-08 RX ADMIN — ROCURONIUM BROMIDE 10 MG: 10 INJECTION INTRAVENOUS at 07:56

## 2021-09-08 RX ADMIN — METOPROLOL SUCCINATE 100 MG: 50 TABLET, EXTENDED RELEASE ORAL at 21:11

## 2021-09-08 RX ADMIN — SODIUM CHLORIDE: 900 INJECTION INTRAVENOUS at 07:35

## 2021-09-08 RX ADMIN — BUDESONIDE AND FORMOTEROL FUMARATE DIHYDRATE 2 PUFF: 160; 4.5 AEROSOL RESPIRATORY (INHALATION) at 19:46

## 2021-09-08 RX ADMIN — VASOPRESSIN 1 UNITS: 20 INJECTION INTRAVENOUS at 10:23

## 2021-09-08 RX ADMIN — ASPIRIN 81 MG: 81 TABLET, CHEWABLE ORAL at 18:58

## 2021-09-08 RX ADMIN — PHENYLEPHRINE HYDROCHLORIDE 200 MCG: 10 INJECTION INTRAVENOUS at 11:39

## 2021-09-08 RX ADMIN — PROPOFOL 200 MG: 10 INJECTION, EMULSION INTRAVENOUS at 07:43

## 2021-09-08 RX ADMIN — Medication 10 ML: at 21:12

## 2021-09-08 ASSESSMENT — ENCOUNTER SYMPTOMS
BLOOD IN STOOL: 0
NAUSEA: 0
WHEEZING: 0
COLOR CHANGE: 0
PHOTOPHOBIA: 0
CHEST TIGHTNESS: 0
CONSTIPATION: 0
ABDOMINAL PAIN: 0
COUGH: 0
EYE PAIN: 0
VOMITING: 0
SHORTNESS OF BREATH: 1
BACK PAIN: 0
DIARRHEA: 0

## 2021-09-08 ASSESSMENT — PULMONARY FUNCTION TESTS
PIF_VALUE: 17
PIF_VALUE: 27
PIF_VALUE: 14
PIF_VALUE: 34
PIF_VALUE: 28
PIF_VALUE: 22
PIF_VALUE: 22
PIF_VALUE: 21
PIF_VALUE: 14
PIF_VALUE: 31
PIF_VALUE: 27
PIF_VALUE: 1
PIF_VALUE: 23
PIF_VALUE: 32
PIF_VALUE: 13
PIF_VALUE: 21
PIF_VALUE: 24
PIF_VALUE: 26
PIF_VALUE: 23
PIF_VALUE: 31
PIF_VALUE: 23
PIF_VALUE: 20
PIF_VALUE: 6
PIF_VALUE: 22
PIF_VALUE: 26
PIF_VALUE: 0
PIF_VALUE: 26
PIF_VALUE: 14
PIF_VALUE: 21
PIF_VALUE: 20
PIF_VALUE: 38
PIF_VALUE: 27
PIF_VALUE: 14
PIF_VALUE: 30
PIF_VALUE: 23
PIF_VALUE: 0
PIF_VALUE: 27
PIF_VALUE: 35
PIF_VALUE: 23
PIF_VALUE: 0
PIF_VALUE: 28
PIF_VALUE: 21
PIF_VALUE: 35
PIF_VALUE: 23
PIF_VALUE: 0
PIF_VALUE: 25
PIF_VALUE: 22
PIF_VALUE: 20
PIF_VALUE: 23
PIF_VALUE: 22
PIF_VALUE: 23
PIF_VALUE: 31
PIF_VALUE: 28
PIF_VALUE: 32
PIF_VALUE: 23
PIF_VALUE: 26
PIF_VALUE: 26
PIF_VALUE: 5
PIF_VALUE: 32
PIF_VALUE: 24
PIF_VALUE: 21
PIF_VALUE: 25
PIF_VALUE: 21
PIF_VALUE: 24
PIF_VALUE: 22
PIF_VALUE: 4
PIF_VALUE: 32
PIF_VALUE: 31
PIF_VALUE: 0
PIF_VALUE: 15
PIF_VALUE: 14
PIF_VALUE: 24
PIF_VALUE: 15
PIF_VALUE: 0
PIF_VALUE: 15
PIF_VALUE: 23
PIF_VALUE: 22
PIF_VALUE: 24
PIF_VALUE: 35
PIF_VALUE: 35
PIF_VALUE: 22
PIF_VALUE: 31
PIF_VALUE: 9
PIF_VALUE: 3
PIF_VALUE: 21
PIF_VALUE: 21
PIF_VALUE: 24
PIF_VALUE: 28
PIF_VALUE: 22
PIF_VALUE: 23
PIF_VALUE: 35
PIF_VALUE: 21
PIF_VALUE: 28
PIF_VALUE: 23
PIF_VALUE: 22
PIF_VALUE: 23
PIF_VALUE: 14
PIF_VALUE: 32
PIF_VALUE: 31
PIF_VALUE: 27
PIF_VALUE: 23
PIF_VALUE: 26
PIF_VALUE: 23
PIF_VALUE: 14
PIF_VALUE: 32
PIF_VALUE: 21
PIF_VALUE: 25
PIF_VALUE: 27
PIF_VALUE: 23
PIF_VALUE: 23
PIF_VALUE: 34
PIF_VALUE: 23
PIF_VALUE: 23
PIF_VALUE: 27
PIF_VALUE: 35
PIF_VALUE: 30
PIF_VALUE: 27
PIF_VALUE: 24
PIF_VALUE: 35
PIF_VALUE: 31
PIF_VALUE: 23
PIF_VALUE: 31
PIF_VALUE: 15
PIF_VALUE: 22
PIF_VALUE: 21
PIF_VALUE: 9
PIF_VALUE: 21
PIF_VALUE: 15
PIF_VALUE: 21
PIF_VALUE: 26
PIF_VALUE: 21
PIF_VALUE: 35
PIF_VALUE: 21
PIF_VALUE: 13
PIF_VALUE: 31
PIF_VALUE: 21
PIF_VALUE: 34
PIF_VALUE: 33
PIF_VALUE: 22
PIF_VALUE: 20
PIF_VALUE: 23
PIF_VALUE: 30
PIF_VALUE: 20
PIF_VALUE: 31
PIF_VALUE: 23
PIF_VALUE: 14
PIF_VALUE: 31
PIF_VALUE: 20
PIF_VALUE: 14
PIF_VALUE: 21
PIF_VALUE: 20
PIF_VALUE: 26
PIF_VALUE: 23
PIF_VALUE: 22
PIF_VALUE: 21
PIF_VALUE: 5
PIF_VALUE: 27
PIF_VALUE: 31
PIF_VALUE: 31
PIF_VALUE: 28
PIF_VALUE: 24
PIF_VALUE: 22
PIF_VALUE: 22
PIF_VALUE: 26
PIF_VALUE: 14
PIF_VALUE: 21
PIF_VALUE: 23
PIF_VALUE: 35
PIF_VALUE: 26
PIF_VALUE: 31
PIF_VALUE: 23
PIF_VALUE: 27
PIF_VALUE: 32
PIF_VALUE: 21
PIF_VALUE: 13
PIF_VALUE: 28
PIF_VALUE: 22
PIF_VALUE: 14
PIF_VALUE: 22
PIF_VALUE: 24
PIF_VALUE: 32
PIF_VALUE: 22
PIF_VALUE: 26
PIF_VALUE: 28
PIF_VALUE: 21
PIF_VALUE: 25
PIF_VALUE: 24
PIF_VALUE: 20
PIF_VALUE: 32
PIF_VALUE: 21
PIF_VALUE: 20
PIF_VALUE: 22
PIF_VALUE: 32
PIF_VALUE: 24
PIF_VALUE: 4
PIF_VALUE: 28
PIF_VALUE: 24
PIF_VALUE: 31
PIF_VALUE: 1
PIF_VALUE: 31
PIF_VALUE: 30
PIF_VALUE: 31
PIF_VALUE: 15
PIF_VALUE: 15
PIF_VALUE: 41
PIF_VALUE: 14
PIF_VALUE: 28
PIF_VALUE: 23
PIF_VALUE: 22
PIF_VALUE: 30
PIF_VALUE: 26
PIF_VALUE: 24
PIF_VALUE: 13
PIF_VALUE: 15
PIF_VALUE: 35
PIF_VALUE: 21
PIF_VALUE: 28
PIF_VALUE: 24
PIF_VALUE: 24
PIF_VALUE: 15
PIF_VALUE: 23
PIF_VALUE: 24
PIF_VALUE: 28
PIF_VALUE: 21
PIF_VALUE: 27
PIF_VALUE: 28
PIF_VALUE: 35
PIF_VALUE: 23
PIF_VALUE: 2
PIF_VALUE: 31
PIF_VALUE: 24
PIF_VALUE: 28
PIF_VALUE: 34
PIF_VALUE: 9
PIF_VALUE: 29
PIF_VALUE: 22
PIF_VALUE: 22
PIF_VALUE: 24
PIF_VALUE: 21
PIF_VALUE: 21
PIF_VALUE: 20
PIF_VALUE: 24
PIF_VALUE: 23
PIF_VALUE: 21
PIF_VALUE: 23
PIF_VALUE: 22
PIF_VALUE: 24
PIF_VALUE: 31
PIF_VALUE: 25
PIF_VALUE: 32
PIF_VALUE: 22
PIF_VALUE: 15
PIF_VALUE: 27
PIF_VALUE: 22
PIF_VALUE: 21
PIF_VALUE: 23
PIF_VALUE: 26
PIF_VALUE: 24

## 2021-09-08 NOTE — PROGRESS NOTES
1230: Patient arrived to PACU from Cath lab. Monitors applied, alarms on. Groin sites are clean, dry and intact. Report given via telephone from [facility]. at [phone]. Bedside by Omar Garcia CRNA. 1240: EKG done bedside. 1255: Patient tolerating ice chips at this time. 1400: Lani removed. Patient tolerated well. Patient placed in pacu hold while waiting on room assignment. 1530: Report called to [address] [place] [address] for room 3102.   1545: Patient transferred to room 3102. Sharmin Roger RN at bedside. Wife Stella Mitchell in room. Patient assisted from cart onto bed. Monitors applied.

## 2021-09-08 NOTE — H&P
occlusive chronic SVT.  Hypertension     Myasthenia gravis ([facility]     NSTEMI (non-ST elevated myocardial infarction) ([facility] 12/2020    Post PTCA 12/14/2020    Ramus Stented.  TIA (transient ischemic attack)     Ulcerative colitis ([facility]        Surgical history :   Past Surgical History:   Procedure Laterality Date    CARDIAC SURGERY      ILEOSTOMY OR JEJUNOSTOMY      PACEMAKER INSERTION N/A 7/7/2021    PACEMAKER INSERTION PERMANENT performed by Hussein Jennings MD at [facility] PTCA  12/14/2020    Ramus Stented.  TONSILLECTOMY      UPPER GASTROINTESTINAL ENDOSCOPY N/A 6/15/2021    EGD DIAGNOSTIC ONLY performed by Marie Berrios MD at [facility] N/A 7/4/2021    EGD DIAGNOSTIC ONLY performed by Salvatore Bailey MD at [facility] ENDOSCOPY       Family history:   Family History   Family history unknown: Yes       Social history :  reports that he quit smoking about 5 months ago. His smoking use included cigarettes. He smoked 0.25 packs per day. He has never used smokeless tobacco. He reports current alcohol use of about 1.0 standard drinks of alcohol per week. He reports that he does not use drugs.     No Known Allergies    Current Facility-Administered Medications on File Prior to Encounter   Medication Dose Route Frequency Provider Last Rate Last Admin    Immune Globulin (Human) solution 20 g  20 g IntraVENous Once Claudine Ly MD        And    Immune Globulin (Human) solution 20 g  20 g IntraVENous Once Claudine Ly MD        Immune Globulin (Human) solution 20 g  20 g IntraVENous Once Claudine Ly MD        And    Immune Globulin (Human) solution 20 g  20 g IntraVENous Once Claudine Ly MD         Current Outpatient Medications on File Prior to Encounter   Medication Sig Dispense Refill    metoprolol succinate (TOPROL XL) 100 MG extended release tablet Take 1 tablet by mouth 2 times daily 60 tablet 3    aspirin 81 MG chewable tablet Take 1 tablet by mouth daily 30 tablet 3    amiodarone (CORDARONE) 200 MG tablet Take 1 tablet by mouth daily 90 tablet 0    ferrous sulfate (FE TABS 325) 325 (65 Fe) MG EC tablet Take 1 tablet by mouth 2 times daily (with meals) 90 tablet 1    GLUCOSAMINE-CALCIUM-VIT D PO Take by mouth daily      budesonide-formoterol (SYMBICORT) 160-4.5 MCG/ACT AERO Inhale 2 puffs into the lungs 2 times daily 1 Inhaler 3    atorvastatin (LIPITOR) 40 MG tablet Take 1 tablet by mouth nightly START 6/25/2021 30 tablet 3    tiotropium (SPIRIVA HANDIHALER) 18 MCG inhalation capsule Inhale 1 capsule into the lungs daily 90 capsule 1    pantoprazole (PROTONIX) 40 MG tablet Take 1 tablet by mouth 2 times daily 60 tablet 1    digoxin (LANOXIN) 125 MCG tablet Take 1 tablet by mouth daily 90 tablet 3    NONFORMULARY Inject 40 mg/L into the skin every evening Zilucoplan  - patient provided experimental drug from [place] for Myasthenia Gravis      vitamin B-12 (CYANOCOBALAMIN) 500 MCG tablet Take 1,000 mcg by mouth daily Indications: 8am       PREDNISONE PO Take 13 mg by mouth daily Indications: Takes at  8am       pyridostigmine (MESTINON) 60 MG tablet Take 1 tablet by mouth 4 times daily (Patient taking differently: Take 60 mg by mouth 5 times daily Indications: Takes at 0800, 1200, 1600, 2000 ) 120 tablet 0    Elastic Bandages & Supports (MEDICAL COMPRESSION THIGH HIGH) MISC Wear daily and remove nightly 20 - 30 mmgh 2 each 0    rivaroxaban (XARELTO) 20 MG TABS tablet Take 1 tablet by mouth daily (with breakfast) 90 tablet 3    albuterol sulfate HFA (PROVENTIL HFA) 108 (90 Base) MCG/ACT inhaler Inhale 2 puffs into the lungs every 4 hours as needed for Wheezing or Shortness of Breath With spacer (and mask if indicated). Thanks.  1 Inhaler 1    acetaminophen (TYLENOL) 325 MG tablet Take 650 mg by mouth every 4 hours as needed for Pain or Fever         Review of Systems:   Review of Systems   Constitutional: Positive for fatigue. Negative for activity change, chills and fever. HENT: Negative for congestion, ear pain and tinnitus. Eyes: Negative for photophobia, pain and visual disturbance. Respiratory: Positive for shortness of breath (with exertion). Negative for cough, chest tightness and wheezing. Cardiovascular: Positive for palpitations. Negative for chest pain and leg swelling. Gastrointestinal: Negative for abdominal pain, blood in stool, constipation, diarrhea, nausea and vomiting. Endocrine: Negative for cold intolerance and heat intolerance. Genitourinary: Negative for dysuria, flank pain and hematuria. Musculoskeletal: Positive for arthralgias. Negative for back pain, myalgias and neck stiffness. Skin: Negative for color change and rash. Allergic/Immunologic: Negative for food allergies. Neurological: Positive for weakness. Negative for dizziness, light-headedness, numbness and headaches. Hematological: Does not bruise/bleed easily. Psychiatric/Behavioral: Negative for agitation, behavioral problems and confusion. Examination:      Vitals:    09/08/21 0609   BP: (!) 139/104   Pulse: 140   Resp: 22   Temp: 97.2 °F (36.2 °C)   TempSrc: Temporal   SpO2: 98%   Weight: 290 lb (131.5 kg)   Height: 5' 11\" (1.803 m)        Body mass index is 40.45 kg/m². Physical Exam  Constitutional:       Appearance: Normal appearance. He is not ill-appearing. HENT:      Head: Normocephalic and atraumatic. Mouth/Throat:      Mouth: Mucous membranes are moist.   Eyes:      Conjunctiva/sclera: Conjunctivae normal.   Cardiovascular:      Rate and Rhythm: Normal rate and regular rhythm. Heart sounds: No murmur heard. Pulmonary:      Effort: Pulmonary effort is normal.      Breath sounds: No rales. Abdominal:      General: Abdomen is flat. Palpations: Abdomen is soft. Musculoskeletal:         General: No tenderness. Normal range of motion. Cervical back: Normal range of motion. Right lower leg: No edema. Left lower leg: No edema. Skin:     General: Skin is warm and dry. Neurological:      General: No focal deficit present. Mental Status: He is alert and oriented to person, place, and time. CBC:   Lab Results   Component Value Date    WBC 9.3 09/02/2021    HGB 13.5 09/02/2021    HCT 46.5 09/02/2021     09/02/2021     Lipids:No results found for: CHOL, TRIG, HDL, LDLCALC, LDLDIRECT  PT/INR:   Lab Results   Component Value Date    INR 0.93 09/02/2021        BMP:    Lab Results   Component Value Date     09/08/2021    K 4.6 09/08/2021     09/08/2021    CO2 23 09/08/2021    BUN 15 09/08/2021     CMP:   Lab Results   Component Value Date     (H) 06/16/2021    PROT 5.5 (L) 06/16/2021    BILITOT 0.6 06/16/2021    ALKPHOS 45 06/16/2021     TSH:  No results found for: TSH    EKGINTERPRETATION - EKG Interpretation:      Device Assessment:      The device is Medtronic pacemaker - Dual Chamber chamber      MRI Compatible : yes    Device interrogation was performed. Mode: AAIr --- DDDr     Sensing is normal. Impedence is normal.  Threshold is normal.     There has not been interval changes. Estimated battery life is 14.7 years     The underlying rhythm is persistent atrial fibrillation. 8.6 % atrial paced; 5.3 % ventricular paced. Atrial Arrhythmia : persistent atrial fibrillation with intermittent RVR episodes    Non sustained VT episodes : 1    Sustained VT episodes : No    Patient activity reported 3.2 hrs/day    The patient is partial pacemaker dependent.           Interpreted by    Teressa Rodriguez M.D          IMPRESSION / RECOMMENDATIONS:     Persistent atrial fibrillation - with atrial fibrillation and flutter history  History of myasthenia gravis  COPD  Obesity BMI 40  CAD  History of gastric ulcer with bleeding now cleared for anticoagulation  Chronic back pain  History of blood clots     Patient had been cardioverted multiple times. Patient had 4 times and he reverted back. Patient also has sick sinus syndrome and meanwhile he had a pacemaker in July. Patient has been on amiodarone. Patient also has history of myasthenia gravis and is on prior to the cardioversion  Patient also has chronic back pain. Discussed with the patient about pathophysiology of atrial fibrillation and flutter and patient wants to consider ablation. His main concern was laying still after the procedure for 6hours bedrest and I discussed that probably will be needed to avoid hematoma and he voiced understanding    Discussed with the patient about risk with the procedure and patient was proceed with the procedure    Patient probably needs to have anesthesia assessment as patient has history of myasthenia gravis and sometimes general anesthesia can be a problem in this patient so I would recommend anesthesia to take the call about how to approach this patient    The risks including, but not limited to, vascular injury, bleeding, infection, radiation exposure, injury to cardiac and surrounding structures (including esophageal and pulmonary vein injury), injury to the normal conduction system of the heart (possibly requiring a pacemaker), stroke, myocardial infarction and death were all discussed. The patient considered the risks, benefits and alternatives; decided to proceed with the procedure. Also discussed about the possible Covid exposure given the pandemic situation and patient also voiced concerns regarding that. With precautions we have been doing the procedures at the hospital and so far we have been able to limit the Covid exposure in the hospital for the procedural patients and we will take atmost precautions in the same way. Patient understands the risk and decided to proceed with procedure      Thanks again for allowing me to participate in care of this patient. Please call me if you have any questions. With best regards.       Deanna Freedman Sallye Dandy, MD, 9/8/2021 7:48 AM     Please note this report has been partially produced using speech recognition software and may contain errors related to that system including errors in grammar, punctuation, and spelling, as well as words and phrases that may be inappropriate. If there are any questions or concerns please feel free to contact the dictating provider for clarification.

## 2021-09-08 NOTE — OP NOTE
PROCEDURES PERFORMED:    1. Comprehensive electrophysiologic evaluation including transseptal catheterization, insertion and repositioning of multiple electrode catheters with induction or attempted induction of an arrhythmia including left or right atrial pacing/recording when necessary, right ventricular pacing/recording when necessary, and His bundle recording when necessary with intracardiac catheter ablation of atrial fibrillation by pulmonary vein isolation  2. Intracardiac electrophysiologic three-dimensional mapping  3. Transseptal puncture through an intact septum with measurement of RA and LA pressures. 4. Intracardiac echocardiography. 5. Add on ablation - Cavotricuspid isthmus ablation for typical atrial flutter ( SVT ablation)  6. Drug testing        ATTENDING: Christiana Leiva MD.    COMPLICATIONS: None. ESTIMATED BLOOD LOSS: 30cc    ANESTHESIA: General with intubation    MEDICATIONS USED FOR INDUCTION/TESTING: Adenosine    PREOPERATIVE DIAGNOSIS:  Atrial fibrillation and atrial flutter    POSTOPERATIVE DIAGNOSIS:  Atrial fibrillation sp Pulmonary vein isolation and Typical atrial flutter ablation. INDICATIONS FOR PROCEDURE: Patient with hx of symptomatic Atrial fibrillation and Paroxysmal atrial flutter despite medical therapy here for Ablation       DETAILS OF PROCEDURE:     The patient was brought to the electrophysiology laboratory in stable condition. The patient was in a fasting, nonsedated state. The risks, benefits and alternatives of the procedure were discussed with the patient and his family. The risks including, but not limited to, the risks of vascular injury, bleeding, infection, radiation exposure, injury to cardiac and surrounding structures (including esophageal and phrenic nerve injury in addition to pulmonary vein stenosis), injury to the normal conduction system of the heart, stroke, myocardial infarction and death were discussed.  Written informed consent was obtained and placed on the chart. A timeout protocol was completed to identify the patient and the procedure being performed. TONIO was performed prior to the ablation procedure to assure the absence of any intracardiac thrombi or other contraindications to proceeding with ablation. The full TONIO report is dictated separately. The patient was prepped and draped in a sterile fashion. Lidocaine was administered to the right and left groin. Using a modified Seldinger technique, venous access was obtained and sheaths were placed as detailed below. Catheters were then placed under fluoroscopic guidance as detailed below. SHEATH AND CATHETER PLACEMENT:  Location  Sheath  Catheter  site    Left femoral vein  7F  Biosense Chris 6F Decapolar catheter  Coronary sinus    Left femoral vein  11F  Quadripolar catheter      Intracardiac Echo with sound technology  Right atrium    Right femoral vein  8F Short and SRO sheath D-F curve Smart touch irrigated [employer] Ablation catheter      Pentaray catheter  Pulmonay veins, Left atrium, RA, RV, Cavotricuspid isthmus      LA mapping    Right femoral vein  8F Short  Quadripolar catheter  RV      A small caliber arterial line in the radial artery was obtained by the anesthesia service. Comprehensive EP study    Patient presented in atrial fibrillation (coarse atrial fib) rhythm so we decided to proceed with PV isolation    Patient anatomy was difficult to maneuver with slight cardiac rotation and left veins more anterior. Intracardiac echocardiography:     A baseline intracardiac echocardiography study was performed. During the procedure, intracardiac echocardiography was used for transseptal puncture, monitoring of catheter placement during radiofrequency lesions, and monitoring for complications.     Three-dimensional electroanatomical mapping:    A three-dimensional electroanatomical mapping: Using the [employer] CARTO 3 three-dimensional electroanatomical map, a shell map of the left atrium and the pulmonary veins was created by dragging the ablation catheter and the pentaray catheter in different areas of the left atrium and in the pulmonary veins. The map was used for aiding the navigation process and to reduce fluoroscopy use. It was also used to guide ablation lesions and to maria fernanda those lesions. Trans-septal puncture:     During transseptal access, he was in atrial fibrillation rhythm,  an 0.032 inch J-tipped guidewire was advanced through the 8-Mexican sheath in the right femoral vein under fluoroscopic guidance. An SRO sheath and dilator were advanced over the guidewire into the superior vena cava under fluoroscopic guidance. The guidewire was removed. A [place] 1 needle was advanced through the dilator until the tip was slightly behind the tip of the dilator. This system was withdrawn until the apparatus was in contact with the foramen ovale. Transseptal access was obtained. Under fluoroscopic, hemodynamic and ultrasound guidance, the left atrium was cannulated and sheath advanced. The dilator and needle were removed. Intracardiac echocardiography demonstrated no acute complications. Heparin was given to the patient to keep ACT at around 350sec through out procedure with ACT checks every 15 minutes     The pentaray catheter was sequentially positioned in the ostium of each of the pulmonary veins under fluoroscopic, electroanatomic, electrophysiologic and ultrasound guidance. Examination of the Pentaray catheter signals demonstrated conduction of electrical activity in all the pulmonary veins (two left pulmonary veins and two right pulmonary veins). Ablation:     The Smart touch ablation catheter was advanced into position near the right-sided pulmonary veins. A wide circumferential ablation line was performed to encompass the ostia of both the right superior and right inferior pulmonary veins.  The settings for ablation were 35-40 [place] with a to perform cavotricuspid isthmus ablation at the same time. Ramp sheath and Ablation catheter was then was advanced into position along the ventricular septal aspect of the cavotricuspid isthmus under fluoroscopic guidance. Ablation:  Radiofrequency lesions were delivered in a linear fashion until bidirectional block was achieved - 35W was  for 30-45 second per lesion with assessment of signal reduction at the site of ablation, impedance drop, Visitag ablation mapping and also FTI of around 400 -and Ablation index of upto 550 . Patient was paced from proximal Coronary sinus during ablation and isthmus block was achieve while ablation. Pacing was performed from the proximal CS and lateral right atrium respectively to confirm bidirectional block. Bidirectional block was confirmed. EP study: post ablation     Fast pathway ERP: 600 ms/370 ms  Slow pathway ERP : 600/360ms  AERP:600 ms/ 180 ms    No VA conduction at 600ms. Tested by pacemaker. Patient pacemaker was adjusted pre and post procedure    Rapid atrial stimulation did not induce arrhythmia either. Intracardiac echocardiography was used to document the absence of any significant effusion or any other complication. Catheters were removed under fluoroscopic guidance. The patient was extubated and transferred to recovery. Sheaths were pulled and manual compression performed to achieve hemostasis in recovery  . There was no bleeding noted from any of the access sites. SUMMARY:    Successful isolation of the pulmonary veins for ablation of paroxysmal atrial fibrillation  Successful ablation of cavo-tricuspid isthmus for typical atrial flutter ablation    RECOMMENDATIONS:  1. Admit to the floor  2. Call Ora Black if the patient becomes hypotensive, febrile, unstable or if there is a change in mental status. 3.  Resume anticoagulation as recommended. 4.  Bed rest x6 hours with legs straight after sheaths are removed.   5.  Continue current medications. 6.  Follow up in the electrophysiology clinic in three weeks.

## 2021-09-08 NOTE — PROCEDURES
Michelle Miramontes   57 y.o., male  1960 9/8/2021    Attending: Sabino Lucero MD    Sedation : Anesthesia                        Indication:        Pre-atrial fibrillation ablation                                                                                                                                                  After obtaining the informed cosent. Pt brought to EP lab. Sedation per anesthesia . After proper sedation TONIO performed. US Doppler was used to assess abnormalities where appropriate. Post procedure pt is stable. Findings:  LV=  Appears mildly reduced but patient in RVR so unable to assess LVEF accurately  LA=  dilated  SUSANNE= NO CLOTS  RV= normal  RA=  normal  IAS= Normal  Bubble study=not donefor PFO or ASD  AV= tricuspid, mild thickened and mild calcified, trivial regurgitation and no stenosis  MV= moderate regurgitation, no stenosis  TV= mild regurgitation  PV= no significant regurgitation,   Aorta= mild stable plaque noted  PUL DENNY FLOW=systolic blunting noted.  In atrial fib with RVR    Impression:  No SUSANNE clot    Plan:  Proceed with ablation

## 2021-09-09 ENCOUNTER — APPOINTMENT (OUTPATIENT)
Dept: CT IMAGING | Age: 61
End: 2021-09-09
Attending: INTERNAL MEDICINE
Payer: MEDICAID

## 2021-09-09 ENCOUNTER — APPOINTMENT (OUTPATIENT)
Dept: MRI IMAGING | Age: 61
End: 2021-09-09
Attending: INTERNAL MEDICINE
Payer: MEDICAID

## 2021-09-09 VITALS
WEIGHT: 296.3 LBS | TEMPERATURE: 97.7 F | BODY MASS INDEX: 41.48 KG/M2 | SYSTOLIC BLOOD PRESSURE: 100 MMHG | RESPIRATION RATE: 11 BRPM | OXYGEN SATURATION: 97 % | HEIGHT: 71 IN | DIASTOLIC BLOOD PRESSURE: 72 MMHG | HEART RATE: 71 BPM

## 2021-09-09 LAB
ALBUMIN SERPL-MCNC: 3.8 GM/DL (ref 3.4–5)
ALP BLD-CCNC: 56 IU/L (ref 40–128)
ALT SERPL-CCNC: 36 U/L (ref 10–40)
ANION GAP SERPL CALCULATED.3IONS-SCNC: 10 MMOL/L (ref 4–16)
ANION GAP SERPL CALCULATED.3IONS-SCNC: 12 MMOL/L (ref 4–16)
APTT: 57.8 SECONDS (ref 25.1–37.1)
AST SERPL-CCNC: 43 IU/L (ref 15–37)
BASOPHILS ABSOLUTE: 0.1 K/CU MM
BASOPHILS RELATIVE PERCENT: 0.5 % (ref 0–1)
BILIRUB SERPL-MCNC: 0.9 MG/DL (ref 0–1)
BUN BLDV-MCNC: 17 MG/DL (ref 6–23)
BUN BLDV-MCNC: 17 MG/DL (ref 6–23)
CALCIUM SERPL-MCNC: 9.1 MG/DL (ref 8.3–10.6)
CALCIUM SERPL-MCNC: 9.2 MG/DL (ref 8.3–10.6)
CHLORIDE BLD-SCNC: 106 MMOL/L (ref 99–110)
CHLORIDE BLD-SCNC: 106 MMOL/L (ref 99–110)
CO2: 23 MMOL/L (ref 21–32)
CO2: 24 MMOL/L (ref 21–32)
CREAT SERPL-MCNC: 1.2 MG/DL (ref 0.9–1.3)
CREAT SERPL-MCNC: 1.3 MG/DL (ref 0.9–1.3)
DIFFERENTIAL TYPE: ABNORMAL
EKG ATRIAL RATE: 71 BPM
EKG ATRIAL RATE: 78 BPM
EKG DIAGNOSIS: NORMAL
EKG DIAGNOSIS: NORMAL
EKG P AXIS: 87 DEGREES
EKG P AXIS: 90 DEGREES
EKG P-R INTERVAL: 144 MS
EKG P-R INTERVAL: 160 MS
EKG Q-T INTERVAL: 422 MS
EKG Q-T INTERVAL: 426 MS
EKG QRS DURATION: 128 MS
EKG QRS DURATION: 132 MS
EKG QTC CALCULATION (BAZETT): 462 MS
EKG QTC CALCULATION (BAZETT): 481 MS
EKG R AXIS: 15 DEGREES
EKG R AXIS: 52 DEGREES
EKG T AXIS: 48 DEGREES
EKG T AXIS: 74 DEGREES
EKG VENTRICULAR RATE: 71 BPM
EKG VENTRICULAR RATE: 78 BPM
EOSINOPHILS ABSOLUTE: 0.1 K/CU MM
EOSINOPHILS RELATIVE PERCENT: 0.9 % (ref 0–3)
GFR AFRICAN AMERICAN: >60 ML/MIN/1.73M2
GFR AFRICAN AMERICAN: >60 ML/MIN/1.73M2
GFR NON-AFRICAN AMERICAN: 56 ML/MIN/1.73M2
GFR NON-AFRICAN AMERICAN: >60 ML/MIN/1.73M2
GLUCOSE BLD-MCNC: 141 MG/DL (ref 70–99)
GLUCOSE BLD-MCNC: 147 MG/DL (ref 70–99)
GLUCOSE BLD-MCNC: 173 MG/DL (ref 70–99)
HCT VFR BLD CALC: 42.2 % (ref 42–52)
HEMOGLOBIN: 12.4 GM/DL (ref 13.5–18)
IMMATURE NEUTROPHIL %: 0.4 % (ref 0–0.43)
INR BLD: 2.36 INDEX
LYMPHOCYTES ABSOLUTE: 1.3 K/CU MM
LYMPHOCYTES RELATIVE PERCENT: 13.2 % (ref 24–44)
MAGNESIUM: 1.9 MG/DL (ref 1.8–2.4)
MCH RBC QN AUTO: 27.5 PG (ref 27–31)
MCHC RBC AUTO-ENTMCNC: 29.4 % (ref 32–36)
MCV RBC AUTO: 93.6 FL (ref 78–100)
MONOCYTES ABSOLUTE: 0.7 K/CU MM
MONOCYTES RELATIVE PERCENT: 7.7 % (ref 0–4)
NUCLEATED RBC %: 0 %
PDW BLD-RTO: 20.3 % (ref 11.7–14.9)
PLATELET # BLD: 211 K/CU MM (ref 140–440)
PMV BLD AUTO: 9.8 FL (ref 7.5–11.1)
POTASSIUM SERPL-SCNC: 4.8 MMOL/L (ref 3.5–5.1)
POTASSIUM SERPL-SCNC: 5 MMOL/L (ref 3.5–5.1)
PROTHROMBIN TIME: 30.7 SECONDS (ref 11.7–14.5)
RBC # BLD: 4.51 M/CU MM (ref 4.6–6.2)
SEGMENTED NEUTROPHILS ABSOLUTE COUNT: 7.4 K/CU MM
SEGMENTED NEUTROPHILS RELATIVE PERCENT: 77.3 % (ref 36–66)
SODIUM BLD-SCNC: 140 MMOL/L (ref 135–145)
SODIUM BLD-SCNC: 141 MMOL/L (ref 135–145)
TOTAL IMMATURE NEUTOROPHIL: 0.04 K/CU MM
TOTAL NUCLEATED RBC: 0 K/CU MM
TOTAL PROTEIN: 5.9 GM/DL (ref 6.4–8.2)
TROPONIN T: 0.5 NG/ML
WBC # BLD: 9.6 K/CU MM (ref 4–10.5)

## 2021-09-09 PROCEDURE — 84484 ASSAY OF TROPONIN QUANT: CPT

## 2021-09-09 PROCEDURE — 85730 THROMBOPLASTIN TIME PARTIAL: CPT

## 2021-09-09 PROCEDURE — G0378 HOSPITAL OBSERVATION PER HR: HCPCS

## 2021-09-09 PROCEDURE — 93010 ELECTROCARDIOGRAM REPORT: CPT | Performed by: INTERNAL MEDICINE

## 2021-09-09 PROCEDURE — 93005 ELECTROCARDIOGRAM TRACING: CPT | Performed by: INTERNAL MEDICINE

## 2021-09-09 PROCEDURE — 6360000004 HC RX CONTRAST MEDICATION: Performed by: NURSE PRACTITIONER

## 2021-09-09 PROCEDURE — 83735 ASSAY OF MAGNESIUM: CPT

## 2021-09-09 PROCEDURE — 85025 COMPLETE CBC W/AUTO DIFF WBC: CPT

## 2021-09-09 PROCEDURE — 70450 CT HEAD/BRAIN W/O DYE: CPT

## 2021-09-09 PROCEDURE — 70498 CT ANGIOGRAPHY NECK: CPT

## 2021-09-09 PROCEDURE — 80048 BASIC METABOLIC PNL TOTAL CA: CPT

## 2021-09-09 PROCEDURE — 80053 COMPREHEN METABOLIC PANEL: CPT

## 2021-09-09 PROCEDURE — 82962 GLUCOSE BLOOD TEST: CPT

## 2021-09-09 PROCEDURE — 99217 PR OBSERVATION CARE DISCHARGE MANAGEMENT: CPT | Performed by: NURSE PRACTITIONER

## 2021-09-09 PROCEDURE — 85610 PROTHROMBIN TIME: CPT

## 2021-09-09 PROCEDURE — 6370000000 HC RX 637 (ALT 250 FOR IP): Performed by: NURSE PRACTITIONER

## 2021-09-09 PROCEDURE — 36415 COLL VENOUS BLD VENIPUNCTURE: CPT

## 2021-09-09 RX ORDER — 0.9 % SODIUM CHLORIDE 0.9 %
10 VIAL (ML) INJECTION
Status: COMPLETED | OUTPATIENT
Start: 2021-09-09 | End: 2021-09-09

## 2021-09-09 RX ADMIN — PANTOPRAZOLE SODIUM 40 MG: 40 TABLET, DELAYED RELEASE ORAL at 08:11

## 2021-09-09 RX ADMIN — RIVAROXABAN 20 MG: 20 TABLET, FILM COATED ORAL at 08:11

## 2021-09-09 RX ADMIN — PYRIDOSTIGMINE BROMIDE 60 MG: 60 TABLET ORAL at 11:16

## 2021-09-09 RX ADMIN — CYANOCOBALAMIN TAB 1000 MCG 1000 MCG: 1000 TAB at 08:11

## 2021-09-09 RX ADMIN — ASPIRIN 81 MG: 81 TABLET, CHEWABLE ORAL at 08:10

## 2021-09-09 RX ADMIN — Medication 10 ML: at 10:40

## 2021-09-09 RX ADMIN — PREDNISONE 13 MG: 1 TABLET ORAL at 08:13

## 2021-09-09 RX ADMIN — IOPAMIDOL 75 ML: 755 INJECTION, SOLUTION INTRAVENOUS at 10:39

## 2021-09-09 RX ADMIN — AMIODARONE HYDROCHLORIDE 200 MG: 200 TABLET ORAL at 08:13

## 2021-09-09 RX ADMIN — PYRIDOSTIGMINE BROMIDE 60 MG: 60 TABLET ORAL at 05:31

## 2021-09-09 RX ADMIN — METOPROLOL SUCCINATE 100 MG: 50 TABLET, EXTENDED RELEASE ORAL at 11:16

## 2021-09-09 RX ADMIN — FERROUS SULFATE TAB 325 MG (65 MG ELEMENTAL FE) 325 MG: 325 (65 FE) TAB at 08:11

## 2021-09-09 NOTE — FLOWSHEET NOTE
Per Tiki Cost okay to take pt back to 3102. CTA (per Brandon Caceres hold off on MRi at this time0 done pt to room 3102 reconnected to tele, [facility] and [facility]. Number given to Tiki Cost to contact Dr. Marie for further recommendations.

## 2021-09-09 NOTE — PROGRESS NOTES
[address] here to transport pt to [facility]. Wife at bedside and has been up dated by the PA [facility]. Pt expressive aphasia has cleared somewhat. Is able to put bore words together denies pain or numbness and tingling is able to move all four limbs.

## 2021-09-09 NOTE — PROGRESS NOTES
Called Dr. Doneta Boas concerning patient verbal and neuro assessment post procedure today. \"  pt had oscar ablation today and has history of myasthenia gravis and having a little bit of deficts with speech being a little delayed and pt unable to create smile during neruo assesssment but all other neruo checks normal. just want to see if he wants to do anything or we thimk it is just the anethshia affects? thank you\"   Spoke via telephone and agreed to continue to monitor at this time, speech was improving at this time.

## 2021-09-09 NOTE — DISCHARGE SUMMARY
[address] [place] [address]  Discharge Summary    Freda Knight  :  1960  MRN:  [id number]    Admit date:  2021  Discharge date:       Admitting Physician:  Kika Babcock MD    Discharge Diagnoses:      1. Intraluminal Thrombus of the proximal left ICA  2. Acute Cortical Infarct  3. S/P Ablation of Paroxysmal Atrial Fibrillation  4. S/P Ablation of typical atrial flutter  5. Myasthenia Gravis      Admission Condition:  fair    Discharged Condition:  critical    Hospital Course:   Patient admitted post ablation of atrial fibrillation and atrial flutter for observation. Patient tolerated the procedure well. Day after the procedure patient noted to have expressive aphasia. Unfortunately patient also has myasthenia gravis and he had speech difficulty post procedure  and which was noted with his previous procedures under anesthesia by his wife. And this seem to have improved overnight. Overnight nurse called me and said that the speech was improving he was just having difficulty smiling which is again related to the muscle relaxant but patient was able to communicate and was well resting. After discussion with the nurse and the family given that he got muscle relaxants and general anesthesia in the setting of myasthenia gravis and already received anticoagulation and his ACT was above 300 through the entire procedure we decided to watch and I explained to the nurse if there is any changes to call me immediately. Patient this morning is able to talk much better. This morning on examination we noted the patient may be having some expressive aphasia. So I called stroke alert. A CT of the head suggested and acute infarct of the left frontal lobe. [facility] neurology recommended a CTA of the head and neck. The CTA of the neck and head revealed an occlusion of the proximal left M3 superior division segment corresponding with the area of acute cortical infarct.  Linear filling defect within the proximal left ICA just distal to the bifurcation concerning for intraluminal thrombus. Findings discussed with [facility] neurology with recommendation of patient to be sent to [facility] for higher level of care. Plan discussed with patient and wife who agree with transfer. [facility] set up through [facility]. Patient was transferred via [address]. Accepting physician at [facility] ED is Dr Lorraine Eisenmenger. Per request of [facility], all CT images sent to [facility] radiology department.          Current Discharge Medication List             Details   metoprolol succinate (TOPROL XL) 100 MG extended release tablet Take 1 tablet by mouth 2 times daily  Qty: 60 tablet, Refills: 3      aspirin 81 MG chewable tablet Take 1 tablet by mouth daily  Qty: 30 tablet, Refills: 3      amiodarone (CORDARONE) 200 MG tablet Take 1 tablet by mouth daily  Qty: 90 tablet, Refills: 0      ferrous sulfate (FE TABS 325) 325 (65 Fe) MG EC tablet Take 1 tablet by mouth 2 times daily (with meals)  Qty: 90 tablet, Refills: 1      GLUCOSAMINE-CALCIUM-VIT D PO Take by mouth daily      budesonide-formoterol (SYMBICORT) 160-4.5 MCG/ACT AERO Inhale 2 puffs into the lungs 2 times daily  Qty: 1 Inhaler, Refills: 3      atorvastatin (LIPITOR) 40 MG tablet Take 1 tablet by mouth nightly START 6/25/2021  Qty: 30 tablet, Refills: 3      tiotropium (SPIRIVA HANDIHALER) 18 MCG inhalation capsule Inhale 1 capsule into the lungs daily  Qty: 90 capsule, Refills: 1      pantoprazole (PROTONIX) 40 MG tablet Take 1 tablet by mouth 2 times daily  Qty: 60 tablet, Refills: 1      digoxin (LANOXIN) 125 MCG tablet Take 1 tablet by mouth daily  Qty: 90 tablet, Refills: 3      NONFORMULARY Inject 40 mg/L into the skin every evening Zilucoplan  - patient provided experimental drug from [place] for Myasthenia Gravis      vitamin B-12 (CYANOCOBALAMIN) 500 MCG tablet Take 1,000 mcg by mouth daily Indications: 8am       PREDNISONE PO Take 13 mg by mouth daily Indications: Takes at  8am       pyridostigmine (MESTINON) 60 MG tablet Take 1 tablet by mouth 4 times daily  Qty: 120 tablet, Refills: 0      Elastic Bandages & Supports (MEDICAL COMPRESSION THIGH HIGH) MISC Wear daily and remove nightly 20 - 30 mmgh  Qty: 2 each, Refills: 0      rivaroxaban (XARELTO) 20 MG TABS tablet Take 1 tablet by mouth daily (with breakfast)  Qty: 90 tablet, Refills: 3      albuterol sulfate HFA (PROVENTIL HFA) 108 (90 Base) MCG/ACT inhaler Inhale 2 puffs into the lungs every 4 hours as needed for Wheezing or Shortness of Breath With spacer (and mask if indicated). Thanks. Qty: 1 Inhaler, Refills: 1      acetaminophen (TYLENOL) 325 MG tablet Take 650 mg by mouth every 4 hours as needed for Pain or Fever             Discharge Exam:  /72   Pulse 71   Temp 97.7 °F (36.5 °C) (Oral)   Resp 11   Ht 5' 11\" (1.803 m)   Wt 296 lb 4.8 oz (134.4 kg)   SpO2 97%   BMI 41.33 kg/m²   General appearance: alert, appears stated age and cooperative  Head: Normocephalic, without obvious abnormality, atraumatic  Eyes: conjunctivae/corneas clear. PERRL  Lungs: clear to auscultation bilaterally  Heart: regular rate and rhythm, S1, S2 normal, grade 2/6 systolic murmur, No click, rub or gallop  Extremities: extremities normal, atraumatic, no cyanosis or edema  Pulses: 2+ and symmetric  Skin: Skin color, texture, turgor normal. No rashes or lesions. Bilateral femoral groin sites soft, nontender. No hematoma. Right upper quadrant colostomy. Neurologic: expressiveaphasia. No left upper or lower extremity weakness appreciated. No deficits in sensation noted. Disposition[facility]. Accepting physician Dr Bong Woo    Signed:  JAMIE Rosas - CNP  9/9/2021, 12:18 PM    Patient seen by me and evaluated by me and I agree with above findings.      Fawn Oviedo MD 09/13/21

## 2021-09-09 NOTE — PROGRESS NOTES
Patient examined this morning he is able to speak and move all extremities and sensation is intact. But patient is having some kind of expressive aphasia? This was not present yesterday. Unfortunately patient also has myasthenia gravis and he had speech difficulty yesterday and which was noted with his previous procedures under anesthesia by his wife. And this seem to have improved overnight. Overnight nurse called me and said that the speech was improving he was just having difficulty smiling which is again related to the muscle but patient was able to communicate and was well resting. After discussion with the nurse and the family given that he got muscle relaxants and general anesthesia in the setting of myasthenia gravis and already received anticoagulation and his ACT was above 300 through the entire procedure we decided to watch and I explained to the nurse if there is any changes to call me immediately. Patient this morning is able to talk much better  but he complained about unable to put all the thought processes. I am concerned about expressive aphasia. I called the neurologist on call Dr. James Krause but I could not get her on phone and I left message    I ordered a CT scan and MRI (if bleeding is ruled out) but it was taking time for them to come so at this time (after discussing with Paradise Baig) given this acute changes I went ahead and called stroke alert to get tests faster and I also would get neurology help faster. I do not think he will be a TPA candidate despite stroke because he got Lovenox yesterday and Xarelto this morning but if there is any reversible management we can do if he did have a stroke then I would like to take care of it so I called for a stroke alert so that we can move this process faster    I discussed with the patient and patient wife and addressed their questions. I discussed with management with nursing staff.     Appreciate everyone help

## 2021-09-14 ENCOUNTER — TELEPHONE (OUTPATIENT)
Dept: CARDIOLOGY CLINIC | Age: 61
End: 2021-09-14

## 2021-09-14 NOTE — TELEPHONE ENCOUNTER
Spoke with patients wife to schedule follow up from A-Fib ablation. Notice received that patient was discharged from [facility] over the weekend. Wife states that patient has a follow up with an EP provider in [place] on 9/24/21. No follow up was scheduled with Iesha Allen.

## 2021-10-01 ENCOUNTER — HOSPITAL ENCOUNTER (OUTPATIENT)
Dept: OCCUPATIONAL THERAPY | Age: 61
Setting detail: THERAPIES SERIES
Discharge: HOME | End: 2021-10-01
Payer: MEDICAID

## 2021-10-01 PROCEDURE — 97166 OT EVAL MOD COMPLEX 45 MIN: CPT

## 2021-10-01 ASSESSMENT — 9 HOLE PEG TEST
TESTTIME_SECONDS: 23
TESTTIME_SECONDS: 23

## 2021-10-01 NOTE — PROGRESS NOTES
Occupational Therapy  Occupational Therapy Initial Assessment  Date:  10/1/2021    Patient Name: Ramez Pop  MRN: [id number]     :  1960          Restrictions; low endurance     Subjective   General  Chart Reviewed: Yes  Patient assessed for rehabilitation services?: Yes  Family / Caregiver Present: No  Referring Practitioner: Alex Reese CNP  Diagnosis: CVA  I63.9     Home Living  Social/Functional History  Lives With: Spouse  Type of Home: House  Home Layout: Two level  Home Access: Stairs to enter with rails  Bathroom Shower/Tub: Tub/Shower unit  Bathroom Toilet: Standard  ADL Assistance: Independent  Homemaking Assistance: Independent  Ambulation Assistance: Independent  Transfer Assistance: Independent  Active : No  Type of occupation:  for [employer]     Objective      ADL  Feeding: Independent  Grooming: Independent  UE Bathing: Independent  LE Bathing: Independent  UE Dressing: Independent  LE Dressing: Independent  Toileting: Independent  Tone RUE  RUE Tone: Normotonic  Tone LUE  LUE Tone: Normotonic  Coordination  Movements Are Fluid And Coordinated: Yes     Balance  Sitting Balance: Independent  Standing Balance: Independent  Functional Mobility  Functional - Mobility Device: No device  Activity: To/From therapy gym  Assist Level:  Independent           Cognition  Overall Cognitive Status: Exceptions  Cognition Comment: pt. reports word finding problems and memory difficulty     Sensation  Overall Sensation Status: WNL           LUE AROM (degrees)  LUE AROM : WNL  Left Hand AROM (degrees)  Left Hand AROM: WNL  RUE AROM (degrees)  RUE AROM : WNL  Right Hand AROM (degrees)  Right Hand AROM: WNL  LUE Strength  Gross LUE Strength: WFL  RUE Strength  Gross RUE Strength: WFL     Hand Dominance  Hand Dominance: Left  Left Hand Strength -  (lbs)  Handle Setting 2: 65.4  Left Hand Strength - Pinch (lbs)  Lateral: 20  Right Hand Strength -  (lbs)  Handle Setting 2: 61.2  Right Hand Strength - Pinch (lbs)  Lateral: 20  Fine Motor Skills  Left 9 Hole Peg Test Time (secs): 23  Right 9 Hole Peg Test Time (secs): 23             Assessment   Assessment  Performance deficits / Impairments: Decreased endurance  Prognosis: Fair  Decision Making: Medium Complexity  History: a fib, DM, COPD, arthritis, colitis, NSTEMI, myasthenia gravis, CAD, obese  Exam: 1 performance deficit  Assistance / Modification: indep. with ADL  REQUIRES OT FOLLOW UP: No  No Skilled OT: Independent with ADL's;No OT goals identified       Plan : Discharged OT        OutComes Score  See 9 hole peg test      Therapy Time   Individual Concurrent Group Co-treatment   Time In 0230         Time Out 0315         Minutes 39                 Radha Kirkpatrick OTR/L

## 2021-10-05 ENCOUNTER — PROCEDURE VISIT (OUTPATIENT)
Dept: CARDIOLOGY CLINIC | Age: 61
End: 2021-10-05
Payer: MEDICAID

## 2021-10-05 DIAGNOSIS — I44.0 AV BLOCK, 1ST DEGREE: ICD-10-CM

## 2021-10-05 DIAGNOSIS — I49.5 SINUS NODE DYSFUNCTION (HCC): ICD-10-CM

## 2021-10-05 DIAGNOSIS — Z95.0 CARDIAC PACEMAKER IN SITU: Primary | ICD-10-CM

## 2021-10-06 PROCEDURE — 93296 REM INTERROG EVL PM/IDS: CPT | Performed by: INTERNAL MEDICINE

## 2021-10-06 PROCEDURE — 93294 REM INTERROG EVL PM/LDLS PM: CPT | Performed by: INTERNAL MEDICINE

## 2021-10-11 ENCOUNTER — HOSPITAL ENCOUNTER (OUTPATIENT)
Dept: SPEECH THERAPY | Age: 61
Setting detail: THERAPIES SERIES
Discharge: HOME | End: 2021-10-11
Payer: MEDICAID

## 2021-10-11 DIAGNOSIS — I63.9 CEREBROVASCULAR ACCIDENT (CVA), UNSPECIFIED MECHANISM (HCC): Primary | ICD-10-CM

## 2021-10-11 PROCEDURE — 92523 SPEECH SOUND LANG COMPREHEN: CPT

## 2021-10-11 NOTE — PLAN OF CARE
[x][facility] [address]      [facility] [address]       Outpatient Pediatric Dept     [address] [facility] [address], [address], [address]       [address]     [phone]  Fax [phone] [phone]    [][facility] [address]               [][facility]          Outpatient Speech Dept. [address] [facility] Dept     [facility]             [address] [facility], [address] [facility],Third Floor             [facility], [address]       [phone] [phone]     Physician: CAMILLA Gonzalez    From: Feroz Lopez CCC-SLP     Patient: Rosa M Muse     : 1960  Medical Diagnosis and ICD 10:CVA, unspecified mechanism I63.9  Date: 10/11/2021  Date of Initial Eval:10-  Treatment Diagnosis and ICD10: Aphasia following unspecified cerebrovascular disease I69.920       Memory deficit following unspecified cerebrovascular disease I69.911    Speech Therapy Certification/Re-Certification Form    Dear CAMILLA Gonzalez  The following patient has been evaluated for speech therapy services and for therapy to continue, insurance requires physician review of the treatment plan initially and every 90 days. Please review the attached evaluation and/or summary of the patient's plan of care, and verify that you agree therapy should continue by signing the attached document and sending it back to our office. Plan of Care/Treatment to date:  [x] Speech-Language Evaluation/Treatment              Dates of service in current plan: 10/11/2021- 2021  # of visits: 8      Goals:          Short-term Goals  Timeframe for Short-term Goals: 2 months  Goal 1:  Will demonstrate improved word fluency skills as measured by generating 20 or more items within one minute each across three categories. Goal 2: Reading comprehension will improve to the 6-8 sentence passage level with 80% or better consistency based upon responses to yes/no questions  Goal 3: Immediate memory for a series of 7 digits will improve to 80% or better consistency. Frequency/Duration:  # Days per week: [] 1 day # Weeks: [] 1 week [] 5 weeks     [x] 2 days? [] 2 weeks [] 6 weeks     [] 3 days   [] 3 weeks [] 7 weeks     [] 4 days   [] 4 weeks [x] 8 weeks         [] 9 weeks [] 10 weeks         [] 11 weeks [] 12 weeks    Rehab Potential: [] Excellent [x] Good [] Fair  [] Poor         Electronically signed by:    Bill Jurado. MS Mateo, CCC-SLP  Speech/Language Pathologist  10/11/2021  3:54 PM    If you have any questions or concerns, please don't hesitate to call.   Thank you for your referral.      Physician Signature:__________________Date:___________ Time: __________  By signing above, therapists plan is approved by physician

## 2021-10-11 NOTE — PROGRESS NOTES
Speech Language Pathology  Facility/Department: [facility] SPEECH THERAPY  Initial Assessment    NAME: Topher Ortiz  : 1960  MRN: [id number]    Date of Eval: 10/11/2021  Evaluating Therapist: Nasra Irene MS CCC-SLP    Visit Diagnoses       Codes    Cerebrovascular accident (CVA), unspecified mechanism ([facility] [address]    -  Primary I63.9            Past Medical History: has a past medical history of Atrial fibrillation, chronic ([address] COPD (chronic obstructive pulmonary disease) ([address] Coronary artery disease involving native coronary artery of native heart without angina pectoris, H/O echocardiogram, Heart disease, History of blood transfusion, Hx of blood clots, Hx of cardiovascular stress test, Hx of Doppler ultrasound, Hypertension, Myasthenia gravis ([address] NSTEMI (non-ST elevated myocardial infarction) ([address] Post PTCA, S/P ablation of atrial fibrillation, TIA (transient ischemic attack), and Ulcerative colitis ([address] Past Surgical History:  has a past surgical history that includes ileostomy or jejunostomy; Tonsillectomy; Cardiac surgery; Percutaneous Transluminal Coronary Angio (2020); Upper gastrointestinal endoscopy (N/A, 6/15/2021); Upper gastrointestinal endoscopy (N/A, 2021); and Pacemaker insertion (N/A, 2021). Primary Complaint: \"I'm having trouble thinking of the right word to say\". Pain: no pain       Assessment:  Cognitive Diagnosis: Mild cogntive impairment effecting immediate memory skills for digits. Aphasia Diagnosis: Mild expressive/receptive aphasia impairment effecting reading comprehension and word fluency skills. Diagnosis: Kylah Kelly exhibits a mild expressive/receptive aphasia effecting reading comprehension and word fluency skills. He demonstrates a mild cognitive impairment characterized by impairment of immediate memory skills for digits. Intact problem solving. long term memory  and short term memory skills.       Subjective:   Previous level of function and limitations: independent communication and cognitive/memory skills     Vital Signs  Patient Currently in Pain: No  Social/Functional History  Lives With: Spouse  Type of Home: House  Active : No  Education: graduated high school .  school  Type of occupation:  for [id number]: No  Education: graduated high school .  school  Type of occupation:  for [employer] [address]: Impaired  Vision Exceptions:  (wore glasses infrequently. will be getting new glasses soon)  Hearing  Hearing: Within functional limits           Objective:     Oral/Motor  Oral Motor: Within functional limits  Auditory Comprehension  Comprehension: Within Functional Limits (reading comprehension at the four sentence level is inconsistent)     Expression  Primary Mode of Expression: Verbal  Verbal Expression  Verbal Expression: Exceptions to functional limits  Convergent: Mild  Divergent: Mild  Interfering Components: Impaired thought organization  Effective Techniques: Provide extra time; Word retrived strategies  Written Expression  Dominant Hand: Left  Motor Speech  Motor Speech: Within Functional Limits  Pragmatics/Social Functioning  Pragmatics: Within functional limits       Cognition  Orientation  Overall Orientation Status: Within Normal Limits  Memory  Memory: Exceptions to [facility]  Working Memory: Mild  Problem Solving  Problem Solving: Within Functional Limits  Abstract Reasoning  Abstract Reasoning: Within Functional Limits  Safety/Judgement  Safety/Judgement: Within Functional Limits      Plan:    Goals:   Short-term Goals  Timeframe for Short-term Goals: 2 months  Goal 1: Will demonstrate improved word fluency skills as measured by generating 20 or more items within one minute each across three categories.   Goal 2: Reading comprehension will improve to the 6-8 sentence passage level with 80% or better consistency based upon responses to yes/no questions  Goal 3: Immediate memory for a series of 7 digits will improve to 80% or better consistency. Long-term Goals  Timeframe for Long-term Goals: Functional expressive/receptive language and cognitive/ memory skills for personal, social and vocational environments  Speech Therapy Prognosis  Prognosis: Good  Prognosis Considerations: Age, Previous Level of Function, Severity of Impairments, Family/Community Support, Participation Level  Duration/Frequency of Treatment  Duration/Frequency of Treatment: 2x week for 8 weeks. Recommendations  Requires SLP Intervention: Yes  Patient Education: results of assessment, treatment plan and goals of treatment were discussed and Timothy Pinon and his wife expressed agreeement and understanding  Patient Education Response: Verbalizes understanding  Requires SLP Intervention: Yes  Patient/family involved in developing goals and treatment plan: yes          Follow Up: Follow up in: 3 days    Princess Galindo MS, CCC-SLP  Speech/Language Pathologist  10/11/2021  3:52 PM

## 2021-10-12 ENCOUNTER — HOSPITAL ENCOUNTER (OUTPATIENT)
Dept: PHYSICAL THERAPY | Age: 61
Setting detail: THERAPIES SERIES
Discharge: HOME | End: 2021-10-12
Payer: MEDICAID

## 2021-10-12 PROCEDURE — 97162 PT EVAL MOD COMPLEX 30 MIN: CPT

## 2021-10-12 NOTE — PROGRESS NOTES
Physical Therapy  Initial Assessment  Date: 10/12/2021  Patient Name: Humera Tovar  MRN: [id number]  : 1960     Treatment Diagnosis: CVA    Restrictions  Position Activity Restriction  Other position/activity restrictions: Ostomy    Subjective   General  Additional Pertinent Hx: COPD, CVA 21 had a carotid clot go to brain after an ablation. MI . Myasthenia gravis. States that physically he feels like he is at baseline, his only problem is with speech and memory. Referring Practitioner: Jim Verdin CNP  Referral Date : 09/10/21  Diagnosis: CVA  PT Visit Information  PT Insurance Information: [facility]  Subjective  Subjective: LE feel weak. feels off balance. Fatigues very easily. Pain Screening  Patient Currently in Pain:  (chronic back pain)  Vital Signs  Patient Currently in Pain:  (chronic back pain)    Vision/Hearing  Wfls       Orientation  Orientation  Overall Orientation Status: Within Functional Limits    Social/Functional History  Social/Functional History  Lives With: Spouse  Type of Home: House  Home Layout: Two level  Home Access: Stairs to enter with rails  Bathroom Shower/Tub: Tub/Shower unit  Bathroom Toilet: Standard  ADL Assistance: Independent  Homemaking Assistance: Independent  Ambulation Assistance: Independent  Transfer Assistance: Independent  Active : No  Type of occupation:  for [address]    Objective     Observation/Palpation  Posture: Fair    AROM RLE (degrees)  RLE AROM: WFL  AROM LLE (degrees)  LLE AROM : WFL    Strength RLE  Comment: myotomes intact  Strength LLE  Comment: myotomes intact           Ambulation  Ambulation?: Yes  Ambulation 1  Device: No Device  Quality of Gait: slowed. wide DAKSHA        Assessment   Conditions Requiring Skilled Therapeutic Intervention  Assessment: Pt is 56 yo male with history of cardiac issues and recent CVA 21. history of Myasthenia Gravis, ostomy bag.   He and his spouse state he is at his baseline level physically, but still needs help with speech and memory. His LE test strong, ROM is wfls, dermatomes intact to lt touch. Balance Kerri Savin shows mild deficient. Treatment Diagnosis: CVA  REQUIRES PT FOLLOW UP: No (Pt wishes to work on his own and follow up with neurology as needed.)         OutComes Score  Bunch Balance Score: 51 (10/12/21 1002)     Goals   defer at this time.          Esteban Kirk, PT

## 2021-10-12 NOTE — PLAN OF CARE
Outpatient Physical Therapy                  [x] Phone: [phone]   Fax: [phone]    Pediatric Therapy                                    [] Phone: [phone]   Fax: [phone]  [facility]ediatric [facility] [place]                                      [] Phone: [phone]   Fax: [phone]      To: Referring Practitioner: Leeanna Valdivia CNP    From: Dorcas Newton PT, PT     Patient: Libby Blue       : 1960  Diagnosis: CVA   Treatment Diagnosis: CVA   Date: 10/12/2021    Physical Therapy Certification/Re-Certification Form  Dear Leeanna Valdivia CMP  The following patient has been evaluated for physical therapy services and for therapy to continue, Please review the attached evaluation and/or summary of the patient's plan of care, and verify that you agree therapy should continue by signing the attached document and sending it back to our office. Patient is a  58 yo male who presents with fatigue, sob which impacts on adls;patient's goal is to go back to work ;patient reports that fatigue, SOB  limits activities including standing, walking, stairs; PT to address patient's goals, impairments and activity limitations with skilled interventions checked in plan of care;patient's level of function prior to onset of symptoms was independent; did not observe any barriers to learning during PT eval; learning preferences include demonstration, practice, and handouts; patient expressed understanding of HEP; patient appears to be motivated to participate in an active PT program and to be compliant with HEP expectations;patient assisted in developing treatment plan and goals; no DME is currently being used;      Current functional level (based on FLORENCE )   : 51/56    Assessment:  Assessment: Pt is 58 yo male with history of cardiac issues and recent CVA 21. history of Myasthenia Gravis, ostomy bag. He and his spouse state he is at his baseline level physically, but still needs help with speech and memory. His LEs test strong, ROM is wfls, dermatomes intact to lt touch. Balance Amie Octaviano shows mild deficit. Plan of Care/Treatment to date:  [] Therapeutic Exercise    [] Aquatics:  [] Therapeutic Activity    [] Ultrasound  [] Elec Stimulation  [] Gait Training     [] Cervical Traction [] Lumbar Traction  [] Neuromuscular Re-education [] Cold/hotpack [] Iontophoresis   [] Instruction in HEP       [] Manual Therapy     [] vasopneumatic            [] Self care home management        []Dry needling trigger point point/pain management          Frequency/Duration: none - Pt would like to continue with speech therapy for speech and memory. Will continue to work on his own and with neurologist with his fatigue/mysthenia gravis  Goals:  Defer        Electronically signed by:  Leandro Sifuentes PT,  10/12/2021, 2:48 PM              If you have any questions or concerns, please don't hesitate to call.   Thank you for your referral.      Physician Signature:_________________Date:____________Time: ________  By signing above, therapists plan is approved by physician

## 2021-10-18 ENCOUNTER — HOSPITAL ENCOUNTER (OUTPATIENT)
Dept: SPEECH THERAPY | Age: 61
Setting detail: THERAPIES SERIES
Discharge: HOME | End: 2021-10-18
Payer: MEDICAID

## 2021-10-18 NOTE — FLOWSHEET NOTE
Authorization from [employer] has not yet been received. Timothy Lovei has been taken off the schedule until authorization is received. Noxen Oats.  MS Mateo, CCC-SLP  Speech/Language Pathologist  10/18/2021  3:46 PM

## 2021-11-15 ENCOUNTER — HOSPITAL ENCOUNTER (OUTPATIENT)
Dept: SPEECH THERAPY | Age: 61
Setting detail: THERAPIES SERIES
Discharge: HOME | End: 2021-11-15
Payer: MEDICAID

## 2021-11-15 PROCEDURE — 92507 TX SP LANG VOICE COMM INDIV: CPT

## 2021-11-15 NOTE — FLOWSHEET NOTE
[x][facility] [address]      [facility] [address] Dept          Outpatient Rehab Dept     [address]       [facility] [address] [facility], [address]       [address]     [phone] [phone] [phone]  [][facility] [address]           Outpatient Speech Dept. [address] [address]           [phone] [phone]    Speech and Language Pathology Daily Note      Patient Name:  Laverne Ames    :  1960  Restrictions/Precautions:    Diagnosis/ICD 10 Dx Code from Physician:    CVA, unspecified mechanism I63.9  Treatment Diagnosis/ICD10 for ST:Aphasia following unspecified cerebrovascular disease I69.920                                                              Memory deficit following unspecified  ICD10 for ST:Aphasia following unspecified cerebrovascular disease I69.920                                                              Memory deficit following unspecified cerebrovascular disease V75.254  Insurance/Certification information:8 sessions provided till 2021  Referring Physician:   CAMILLA Clemons  Plan of care signed (Y/N):  Not yet     COVID screening questions were asked and patient attested that there had been no contact or symptoms    Treatment Provided: speech treatment    Date 11-    Time in/Time out 11:02-11:29AM    Total Timed Code Min 0    Total Treatment Minutes 27 minutes    Units  G Code applied: 1 speech    Subjective     Alert and cooperative    Pain level: No pain    Visit# / total visits:  /       GOALS:     Goal 1: Will demonstrate improved word fluency skills as measured by generating 20 or more items within one minute each across three categories.  Provided instruction and a handout regarding the Semantic Feature Analysis treatment strategy as the evidence based strategy to improve word retrieval skills. Carlton Mcdaniels stated opposites promptly and accurately across 10/10 trials. Production of synonyms was accurate and prompt across 8/10 trials. When given three definitions of a target word such as Bebe Dudley", Carlton Mcdaniels stated the target word across 5/5 trials. Goal 2: Reading comprehension will improve to the 6-8 sentence passage level with 80% or better consistency based upon responses to yes/no questions Reading comprehension of a 7- sentence passage was accurate with 80% consistency based upon responses to yes/no questions. Goal 3: Immediate memory for a series of 7 digits will improve to 80% or better consistency. Not yet addressed    Effective Strategies that Improve fx: Use of word retrieval strategies    Barriers to progress: none    Education completed:     Provided instruction regarding completion of home practice word retrieval activities, treatment goals and plan, progress in treatment. Understanding verbalized. Home Exercise Plan:     Complete word retrieval home practice activities      Objective Findings:  See above    Interventions used this date:  [x] Speech Treatment       [x] Instruction in HEP  [] Group   [] Dysphagia Treatment   [] Cognitive Skill Chris  [] Motor  [] Voice Treatment       []  AAC  Other:      Communication with other providers: none    Adverse Reactions to treatment: none     Treatment/Activity Tolerance:     [x]  Patient tolerated treatment well []  Patient limited by fatique    []  Patient limited by pain []  Patient limited by other medical complications   []  Other:     Patient Requires Follow-up:  [x]  Yes  []  No    Plan: [x]  Continue per plan of care []  Alter current plan (see comments)   [x]  Plan of care initiated []  Hold pending MD visit []  Discharge    Plan for Next Session: continue plan of care    Electronically signed by:    Donato Espinoza.  MS Mateo, CCC-SLP  Speech/Language Pathologist  11/15/2021  6:51 PM

## 2021-11-22 ENCOUNTER — HOSPITAL ENCOUNTER (OUTPATIENT)
Dept: SPEECH THERAPY | Age: 61
Discharge: HOME | End: 2021-11-22

## 2021-11-22 NOTE — FLOWSHEET NOTE
[x][facility] [address]      [facility] [address] Dept          Outpatient Rehab Dept     [address]       [facility] [address], [address], [address]       [address]     [phone] [phone] [phone]  [][facility] [address]           Outpatient Speech Dept. [address] [facility], [address] [facility]Third [address]           [phone] [phone]    Speech and Language Pathology Daily Note      Patient Name:  Bryce Romero    :  1960  Restrictions/Precautions:    Diagnosis/ICD 10 Dx Code from Physician:    CVA, unspecified mechanism I63.9  Treatment Diagnosis/ICD10 for ST:Aphasia following unspecified cerebrovascular disease I69.920                                                              Memory deficit following unspecified  ICD10 for ST:Aphasia following unspecified cerebrovascular disease I69.920                                                              Memory deficit following unspecified cerebrovascular disease E92.888  Insurance/Certification information:8 sessions provided till 2021  Referring Physician:   CAMILLA Link  Plan of care signed (Y/N):  Not yet         Treatment Provided: speech treatment    Date 11- 11-   Time in/Time out 11:02-11:29AM    Total Timed Code Min 0    Total Treatment Minutes 27 minutes    Units  G Code applied: 1 speech    Subjective     Alert and cooperative Cancel- patient is ill   Pain level: No pain    Visit# / total visits:     GOALS:     Goal 1: Will demonstrate improved word fluency skills as measured by generating 20 or more items within one minute each across three categories. Provided instruction and a handout regarding the Semantic Feature Analysis treatment strategy as the evidence based strategy to improve word retrieval skills. Terrence Gaspar stated opposites promptly and accurately across 10/10 trials. Production of synonyms was accurate and prompt across 8/10 trials. When given three definitions of a target word such as Corina Santhosh", Ihsan Dominguez stated the target word across 5/5 trials. Goal 2: Reading comprehension will improve to the 6-8 sentence passage level with 80% or better consistency based upon responses to yes/no questions Reading comprehension of a 7- sentence passage was accurate with 80% consistency based upon responses to yes/no questions. Goal 3: Immediate memory for a series of 7 digits will improve to 80% or better consistency. Not yet addressed    Effective Strategies that Improve fx: Use of word retrieval strategies    Barriers to progress: none    Education completed:     Provided instruction regarding completion of home practice word retrieval activities, treatment goals and plan, progress in treatment. Understanding verbalized. Home Exercise Plan:     Complete word retrieval home practice activities      Objective Findings:  See above    Interventions used this date:  [] Speech Treatment       [x] Instruction in HEP  [] Group   [] Dysphagia Treatment   [] Cognitive Skill Chris  [] Motor  [] Voice Treatment       []  AAC  XOther:cancel      Communication with other providers: none    Adverse Reactions to treatment: none     Treatment/Activity Tolerance:     []  Patient tolerated treatment well []  Patient limited by fatique    []  Patient limited by pain []  Patient limited by other medical complications   [x]  Other: cancel    Patient Requires Follow-up:  [x]  Yes  []  No    Plan: [x]  Continue per plan of care []  Alter current plan (see comments)   [x]  Plan of care initiated []  Hold pending MD visit []  Discharge    Plan for Next Session: continue plan of care    Electronically signed by:    Ru Galindo MS, CCC-SLP  Speech/Language Pathologist  11/22/2021  8:55 AM

## 2021-11-29 ENCOUNTER — HOSPITAL ENCOUNTER (OUTPATIENT)
Dept: SPEECH THERAPY | Age: 61
Discharge: HOME | End: 2021-11-29

## 2021-11-29 NOTE — FLOWSHEET NOTE
[x[facility] [address]      [facility] [address] Dept          Outpatient Rehab Dept     [address]       [facility] [address], [address] [facility] [address] [address], [address]       [address]     [phone] [phone] [phone]  [][facility] [address]           Outpatient Speech Dept. [address] [facility], [address] [facility]           [phone] [phone]    Speech and Language Pathology Discharge Note      Patient Name:  Mio Matthews    :  1960  Restrictions/Precautions:    Diagnosis/ICD 10 Dx Code from Physician:    CVA, unspecified mechanism I63.9  Treatment Diagnosis/ICD10 for ST:Aphasia following unspecified cerebrovascular disease I69.920                                                              Memory deficit following unspecified  ICD10 for ST:Aphasia following unspecified cerebrovascular disease I69.920                                                              Memory deficit following unspecified cerebrovascular disease C81.315  Insurance/Certification information:8 sessions provided till 2021  Referring Physician:   CAMILLA Enrique  Plan of care signed (Y/N):  Not yet         Treatment Provided: speech treatment    Date 11- 11- 11-   Time in/Time out 11:02-11:29AM     Total Timed Code Min 0     Total Treatment Minutes 27 minutes     Units  G Code applied: 1 speech     Subjective     Alert and cooperative Cancel- patient is ill Cancel by patient who reported that he wishes to be discharged from treatment. He reported that he has returned to work and does not want to attend treatment at this time. Pain level: No pain     Visit# / total visits:      GOALS:      Goal 1:  Will demonstrate improved word fluency skills as measured by generating 20 or more items within one minute each across three categories. Provided instruction and a handout regarding the Semantic Feature Analysis treatment strategy as the evidence based strategy to improve word retrieval skills. Brooke Shabazz stated opposites promptly and accurately across 10/10 trials. Production of synonyms was accurate and prompt across 8/10 trials. When given three definitions of a target word such as Yosef Tariq", Brooke Shabazz stated the target word across 5/5 trials. Goal 2: Reading comprehension will improve to the 6-8 sentence passage level with 80% or better consistency based upon responses to yes/no questions Reading comprehension of a 7- sentence passage was accurate with 80% consistency based upon responses to yes/no questions. Goal 3: Immediate memory for a series of 7 digits will improve to 80% or better consistency. Not yet addressed     Effective Strategies that Improve fx: Use of word retrieval strategies     Barriers to progress: none     Education completed:     Provided instruction regarding completion of home practice word retrieval activities, treatment goals and plan, progress in treatment. Understanding verbalized. Home Exercise Plan:     Complete word retrieval home practice activities       Brooke Shabazz was seen for one treatment session on 11-. He cancelled the sessions scheduled on 11- due to illness and on 11- due to his report that he has returned to work and he does not wish to continue treatment. Per his request, he has been discharged from treatment. Patient status regarding goals is stated above. Discharge from treatment       Electronically signed by:    Matthew Sorenson.  MS Mateo, CCC-SLP  Speech/Language Pathologist  11/29/2021  4:21 PM

## 2021-11-29 NOTE — FLOWSHEET NOTE
[facility]x][facility] [address]      [facility] [address] Dept          Outpatient Rehab Dept     [address]       [facility] [address], [address] [facility], [address]       [address]     [phone] [phone] [phone]  [][facility] [address]           Outpatient Speech Dept. [address] [place] [address]           [phone] [phone]    Speech and Language Pathology Daily Note      Patient Name:  uSjata Sandoval    :  1960  Restrictions/Precautions:    Diagnosis/ICD 10 Dx Code from Physician:    CVA, unspecified mechanism I63.9  Treatment Diagnosis/ICD10 for ST:Aphasia following unspecified cerebrovascular disease I69.920                                                              Memory deficit following unspecified  ICD10 for ST:Aphasia following unspecified cerebrovascular disease I69.920                                                              Memory deficit following unspecified cerebrovascular disease S96.456  Insurance/Certification information:8 sessions provided till 2021  Referring Physician:   CAMILLA Ansari  Plan of care signed (Y/N):  Not yet         Treatment Provided: speech treatment    Date 11- 11- 11-   Time in/Time out 11:02-11:29AM     Total Timed Code Min 0     Total Treatment Minutes 27 minutes     Units  G Code applied: 1 speech     Subjective     Alert and cooperative Cancel- patient is ill Cancel by patient who reported that he wishes to be discharged from treatment. He reported that he has returned to work and does not want to attend treatment at this time. Pain level: No pain     Visit# / total visits:      GOALS:      Goal 1:  Will demonstrate improved word fluency skills as measured by generating 20 or more items within one minute each across three categories. Provided instruction and a handout regarding the Semantic Feature Analysis treatment strategy as the evidence based strategy to improve word retrieval skills. René Deras stated opposites promptly and accurately across 10/10 trials. Production of synonyms was accurate and prompt across 8/10 trials. When given three definitions of a target word such as Jaz Singh", René Deras stated the target word across 5/5 trials. Goal 2: Reading comprehension will improve to the 6-8 sentence passage level with 80% or better consistency based upon responses to yes/no questions Reading comprehension of a 7- sentence passage was accurate with 80% consistency based upon responses to yes/no questions. Goal 3: Immediate memory for a series of 7 digits will improve to 80% or better consistency. Not yet addressed     Effective Strategies that Improve fx: Use of word retrieval strategies     Barriers to progress: none     Education completed:     Provided instruction regarding completion of home practice word retrieval activities, treatment goals and plan, progress in treatment. Understanding verbalized.       Home Exercise Plan:     Complete word retrieval home practice activities       Objective Findings:  See above    Interventions used this date:  [] Speech Treatment       [] Instruction in HEP  [] Group   [] Dysphagia Treatment   [] Cognitive Skill Chris  [] Motor  [] Voice Treatment       []  AAC  XOther:cancel      Communication with other providers: none    Adverse Reactions to treatment: none     Treatment/Activity Tolerance:     []  Patient tolerated treatment well []  Patient limited by fatique    []  Patient limited by pain []  Patient limited by other medical complications   [x]  Other: cancel    Patient Requires Follow-up:  [x]  Yes  []  No    Plan: []  Continue per plan of care []  Alter current plan (see comments)   [x]  Plan of care initiated []  Hold pending MD visit [x]  Discharge-  Per patient's request      Electronically signed by:    Paulo Valenzuela.  MS Mateo, CCC-SLP  Speech/Language Pathologist  11/29/2021  1:06 PM

## 2022-01-11 ENCOUNTER — PROCEDURE VISIT (OUTPATIENT)
Dept: CARDIOLOGY CLINIC | Age: 62
End: 2022-01-11
Payer: MEDICAID

## 2022-01-11 DIAGNOSIS — Z95.0 CARDIAC PACEMAKER IN SITU: Primary | ICD-10-CM

## 2022-01-11 DIAGNOSIS — I44.0 AV BLOCK, 1ST DEGREE: ICD-10-CM

## 2022-01-11 DIAGNOSIS — I49.5 SINUS NODE DYSFUNCTION (HCC): ICD-10-CM

## 2022-01-11 PROCEDURE — 93296 REM INTERROG EVL PM/IDS: CPT | Performed by: NURSE PRACTITIONER

## 2022-01-11 PROCEDURE — 93294 REM INTERROG EVL PM/LDLS PM: CPT | Performed by: NURSE PRACTITIONER

## 2022-03-07 ENCOUNTER — HOSPITAL ENCOUNTER (OUTPATIENT)
Age: 62
Setting detail: OBSERVATION
Discharge: HOME | End: 2022-03-11
Attending: FAMILY MEDICINE | Admitting: FAMILY MEDICINE
Payer: COMMERCIAL

## 2022-03-07 ENCOUNTER — APPOINTMENT (OUTPATIENT)
Dept: CT IMAGING | Age: 62
End: 2022-03-07
Payer: COMMERCIAL

## 2022-03-07 ENCOUNTER — APPOINTMENT (OUTPATIENT)
Dept: GENERAL RADIOLOGY | Age: 62
End: 2022-03-07
Payer: COMMERCIAL

## 2022-03-07 ENCOUNTER — APPOINTMENT (OUTPATIENT)
Dept: MRI IMAGING | Age: 62
End: 2022-03-07
Payer: COMMERCIAL

## 2022-03-07 DIAGNOSIS — S22.040A COMPRESSION FRACTURE OF T4 VERTEBRA, INITIAL ENCOUNTER (HCC): ICD-10-CM

## 2022-03-07 DIAGNOSIS — S32.414A: Primary | ICD-10-CM

## 2022-03-07 DIAGNOSIS — M54.50 ACUTE LOW BACK PAIN WITHOUT SCIATICA, UNSPECIFIED BACK PAIN LATERALITY: ICD-10-CM

## 2022-03-07 DIAGNOSIS — S50.311A ABRASION OF RIGHT ELBOW, INITIAL ENCOUNTER: ICD-10-CM

## 2022-03-07 DIAGNOSIS — S09.90XA CLOSED HEAD INJURY, INITIAL ENCOUNTER: ICD-10-CM
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
## 2022-03-07 DIAGNOSIS — S32.431G: ICD-10-CM

## 2022-03-07 PROBLEM — I25.10 CORONARY ARTERY DISEASE INVOLVING NATIVE CORONARY ARTERY: Status: ACTIVE | Noted: 2017-08-07

## 2022-03-07 PROBLEM — E66.9 OBESITY (BMI 30.0-34.9): Status: ACTIVE | Noted: 2017-08-07

## 2022-03-07 PROBLEM — E66.811 OBESITY (BMI 30.0-34.9): Status: ACTIVE | Noted: 2017-08-07

## 2022-03-07 PROBLEM — I48.20 ATRIAL FIBRILLATION, CHRONIC (HCC): Status: ACTIVE | Noted: 2021-09-22

## 2022-03-07 PROBLEM — M19.90 ARTHRITIS: Status: ACTIVE | Noted: 2021-04-23

## 2022-03-07 PROBLEM — E11.9 DIABETES (HCC): Status: ACTIVE | Noted: 2021-09-10

## 2022-03-07 PROBLEM — S32.431A CLOSED DISPLACED FRACTURE OF ANTERIOR COLUMN OF RIGHT ACETABULUM (HCC): Status: RESOLVED | Noted: 2022-03-07 | Resolved: 2022-03-07

## 2022-03-07 PROBLEM — G47.09 OTHER INSOMNIA: Status: ACTIVE | Noted: 2021-11-18

## 2022-03-07 PROBLEM — I63.9 CEREBROVASCULAR ACCIDENT (HCC): Status: ACTIVE | Noted: 2021-09-09

## 2022-03-07 PROBLEM — S32.431A CLOSED DISPLACED FRACTURE OF ANTERIOR COLUMN OF RIGHT ACETABULUM (HCC): Status: ACTIVE | Noted: 2022-03-07

## 2022-03-07 PROBLEM — K51.90 ULCERATIVE COLITIS (HCC): Status: ACTIVE | Noted: 2021-04-23

## 2022-03-07 LAB
ABO/RH: NORMAL
ALBUMIN SERPL-MCNC: 3.6 GM/DL (ref 3.4–5)
ALP BLD-CCNC: 145 IU/L (ref 40–129)
ALT SERPL-CCNC: 29 U/L (ref 10–40)
ANION GAP SERPL CALCULATED.3IONS-SCNC: 12 MMOL/L (ref 4–16)
ANTIBODY SCREEN: NEGATIVE
AST SERPL-CCNC: 29 IU/L (ref 15–37)
BACTERIA: NEGATIVE /HPF
BASOPHILS ABSOLUTE: 0 K/CU MM
BASOPHILS RELATIVE PERCENT: 0.2 % (ref 0–1)
BILIRUB SERPL-MCNC: 0.3 MG/DL (ref 0–1)
BILIRUBIN URINE: NEGATIVE MG/DL
BLOOD, URINE: ABNORMAL
BUN BLDV-MCNC: 15 MG/DL (ref 6–23)
CALCIUM SERPL-MCNC: 9.2 MG/DL (ref 8.3–10.6)
CHLORIDE BLD-SCNC: 102 MMOL/L (ref 99–110)
CLARITY: CLEAR
CO2: 19 MMOL/L (ref 21–32)
COLOR: YELLOW
CREAT SERPL-MCNC: 1 MG/DL (ref 0.9–1.3)
DIFFERENTIAL TYPE: ABNORMAL
EOSINOPHILS ABSOLUTE: 0 K/CU MM
EOSINOPHILS RELATIVE PERCENT: 0.2 % (ref 0–3)
ESTIMATED AVERAGE GLUCOSE: 160 MG/DL
GFR AFRICAN AMERICAN: >60 ML/MIN/1.73M2
GFR NON-AFRICAN AMERICAN: >60 ML/MIN/1.73M2
GLUCOSE BLD-MCNC: 122 MG/DL (ref 70–99)
GLUCOSE BLD-MCNC: 140 MG/DL (ref 70–99)
GLUCOSE BLD-MCNC: 398 MG/DL (ref 70–99)
GLUCOSE, URINE: NEGATIVE MG/DL
HBA1C MFR BLD: 7.2 % (ref 4.2–6.3)
HCT VFR BLD CALC: 41.9 % (ref 42–52)
HEMOGLOBIN: 13.2 GM/DL (ref 13.5–18)
HYALINE CASTS: 0 /LPF
IMMATURE NEUTROPHIL %: 0.7 % (ref 0–0.43)
KETONES, URINE: NEGATIVE MG/DL
LEUKOCYTE ESTERASE, URINE: NEGATIVE
LYMPHOCYTES ABSOLUTE: 1.1 K/CU MM
LYMPHOCYTES RELATIVE PERCENT: 8.4 % (ref 24–44)
MCH RBC QN AUTO: 29.8 PG (ref 27–31)
MCHC RBC AUTO-ENTMCNC: 31.5 % (ref 32–36)
MCV RBC AUTO: 94.6 FL (ref 78–100)
MONOCYTES ABSOLUTE: 1 K/CU MM
MONOCYTES RELATIVE PERCENT: 7.3 % (ref 0–4)
MUCUS: ABNORMAL HPF
NITRITE URINE, QUANTITATIVE: NEGATIVE
NUCLEATED RBC %: 0 %
PDW BLD-RTO: 13.5 % (ref 11.7–14.9)
PH, URINE: 5 (ref 5–8)
PLATELET # BLD: 230 K/CU MM (ref 140–440)
PMV BLD AUTO: 9.4 FL (ref 7.5–11.1)
POTASSIUM SERPL-SCNC: 4.5 MMOL/L (ref 3.5–5.1)
PROTEIN UA: NEGATIVE MG/DL
RBC # BLD: 4.43 M/CU MM (ref 4.6–6.2)
RBC URINE: 1 /HPF (ref 0–3)
SEGMENTED NEUTROPHILS ABSOLUTE COUNT: 10.9 K/CU MM
SEGMENTED NEUTROPHILS RELATIVE PERCENT: 83.2 % (ref 36–66)
SODIUM BLD-SCNC: 133 MMOL/L (ref 135–145)
SPECIFIC GRAVITY UA: >1.03 (ref 1–1.03)
SPERM: ABNORMAL /HFP
TOTAL IMMATURE NEUTOROPHIL: 0.09 K/CU MM
TOTAL NUCLEATED RBC: 0 K/CU MM
TOTAL PROTEIN: 6.3 GM/DL (ref 6.4–8.2)
TRICHOMONAS: ABNORMAL /HPF
UROBILINOGEN, URINE: 0.2 MG/DL (ref 0.2–1)
VITAMIN D 25-HYDROXY: 10.08 NG/ML
WBC # BLD: 13.1 K/CU MM (ref 4–10.5)
WBC UA: 1 /HPF (ref 0–2)

## 2022-03-07 PROCEDURE — 86900 BLOOD TYPING SEROLOGIC ABO: CPT

## 2022-03-07 PROCEDURE — 90715 TDAP VACCINE 7 YRS/> IM: CPT | Performed by: PHYSICIAN ASSISTANT

## 2022-03-07 PROCEDURE — 99285 EMERGENCY DEPT VISIT HI MDM: CPT

## 2022-03-07 PROCEDURE — 6370000000 HC RX 637 (ALT 250 FOR IP): Performed by: NURSE PRACTITIONER

## 2022-03-07 PROCEDURE — 73080 X-RAY EXAM OF ELBOW: CPT

## 2022-03-07 PROCEDURE — 72125 CT NECK SPINE W/O DYE: CPT

## 2022-03-07 PROCEDURE — 71045 X-RAY EXAM CHEST 1 VIEW: CPT

## 2022-03-07 PROCEDURE — 72128 CT CHEST SPINE W/O DYE: CPT

## 2022-03-07 PROCEDURE — 86901 BLOOD TYPING SEROLOGIC RH(D): CPT

## 2022-03-07 PROCEDURE — 2580000003 HC RX 258: Performed by: FAMILY MEDICINE

## 2022-03-07 PROCEDURE — 94761 N-INVAS EAR/PLS OXIMETRY MLT: CPT

## 2022-03-07 PROCEDURE — 70450 CT HEAD/BRAIN W/O DYE: CPT

## 2022-03-07 PROCEDURE — 86850 RBC ANTIBODY SCREEN: CPT

## 2022-03-07 PROCEDURE — 96375 TX/PRO/DX INJ NEW DRUG ADDON: CPT

## 2022-03-07 PROCEDURE — 1200000000 HC SEMI PRIVATE

## 2022-03-07 PROCEDURE — G0378 HOSPITAL OBSERVATION PER HR: HCPCS

## 2022-03-07 PROCEDURE — 81001 URINALYSIS AUTO W/SCOPE: CPT

## 2022-03-07 PROCEDURE — 36415 COLL VENOUS BLD VENIPUNCTURE: CPT

## 2022-03-07 PROCEDURE — 87040 BLOOD CULTURE FOR BACTERIA: CPT

## 2022-03-07 PROCEDURE — 72131 CT LUMBAR SPINE W/O DYE: CPT

## 2022-03-07 PROCEDURE — 96361 HYDRATE IV INFUSION ADD-ON: CPT

## 2022-03-07 PROCEDURE — 6360000002 HC RX W HCPCS: Performed by: PHYSICIAN ASSISTANT

## 2022-03-07 PROCEDURE — 74176 CT ABD & PELVIS W/O CONTRAST: CPT

## 2022-03-07 PROCEDURE — 73502 X-RAY EXAM HIP UNI 2-3 VIEWS: CPT

## 2022-03-07 PROCEDURE — 80053 COMPREHEN METABOLIC PANEL: CPT

## 2022-03-07 PROCEDURE — 90471 IMMUNIZATION ADMIN: CPT | Performed by: PHYSICIAN ASSISTANT

## 2022-03-07 PROCEDURE — 82306 VITAMIN D 25 HYDROXY: CPT

## 2022-03-07 PROCEDURE — 6360000002 HC RX W HCPCS: Performed by: NURSE PRACTITIONER

## 2022-03-07 PROCEDURE — 83036 HEMOGLOBIN GLYCOSYLATED A1C: CPT

## 2022-03-07 PROCEDURE — 96374 THER/PROPH/DIAG INJ IV PUSH: CPT

## 2022-03-07 PROCEDURE — 82962 GLUCOSE BLOOD TEST: CPT

## 2022-03-07 PROCEDURE — 85025 COMPLETE CBC W/AUTO DIFF WBC: CPT

## 2022-03-07 PROCEDURE — 6370000000 HC RX 637 (ALT 250 FOR IP): Performed by: PHYSICIAN ASSISTANT

## 2022-03-07 PROCEDURE — 99222 1ST HOSP IP/OBS MODERATE 55: CPT | Performed by: PHYSICIAN ASSISTANT

## 2022-03-07 RX ORDER — METOPROLOL SUCCINATE 25 MG/1
25 TABLET, EXTENDED RELEASE ORAL DAILY
Status: DISCONTINUED | OUTPATIENT
Start: 2022-03-08 | End: 2022-03-11 | Stop reason: HOSPADM

## 2022-03-07 RX ORDER — PANTOPRAZOLE SODIUM 40 MG/1
40 TABLET, DELAYED RELEASE ORAL 2 TIMES DAILY
Status: DISCONTINUED | OUTPATIENT
Start: 2022-03-07 | End: 2022-03-11 | Stop reason: HOSPADM

## 2022-03-07 RX ORDER — DULOXETIN HYDROCHLORIDE 60 MG/1
1 CAPSULE, DELAYED RELEASE ORAL
COMMUNITY
Start: 2022-02-15 | End: 2022-09-20 | Stop reason: SDUPTHER

## 2022-03-07 RX ORDER — FENTANYL CITRATE 50 UG/ML
50 INJECTION, SOLUTION INTRAMUSCULAR; INTRAVENOUS ONCE
Status: COMPLETED | OUTPATIENT
Start: 2022-03-07 | End: 2022-03-07

## 2022-03-07 RX ORDER — CLOPIDOGREL BISULFATE 75 MG/1
1 TABLET ORAL
COMMUNITY
Start: 2022-02-10 | End: 2022-09-12 | Stop reason: SDUPTHER

## 2022-03-07 RX ORDER — SODIUM CHLORIDE 9 MG/ML
INJECTION, SOLUTION INTRAVENOUS CONTINUOUS
Status: DISCONTINUED | OUTPATIENT
Start: 2022-03-07 | End: 2022-03-07

## 2022-03-07 RX ORDER — FERROUS SULFATE 325(65) MG
325 TABLET ORAL 2 TIMES DAILY WITH MEALS
Status: DISCONTINUED | OUTPATIENT
Start: 2022-03-07 | End: 2022-03-11 | Stop reason: HOSPADM

## 2022-03-07 RX ORDER — ONDANSETRON 4 MG/1
4 TABLET, ORALLY DISINTEGRATING ORAL EVERY 8 HOURS PRN
Status: DISCONTINUED | OUTPATIENT
Start: 2022-03-07 | End: 2022-03-11 | Stop reason: HOSPADM

## 2022-03-07 RX ORDER — SODIUM CHLORIDE 0.9 % (FLUSH) 0.9 %
5-40 SYRINGE (ML) INJECTION PRN
Status: DISCONTINUED | OUTPATIENT
Start: 2022-03-07 | End: 2022-03-11 | Stop reason: HOSPADM

## 2022-03-07 RX ORDER — APIXABAN 5 MG/1
1 TABLET, FILM COATED ORAL 2 TIMES DAILY
COMMUNITY
Start: 2022-02-10 | End: 2022-09-12 | Stop reason: SDUPTHER

## 2022-03-07 RX ORDER — HYDROCODONE BITARTRATE AND ACETAMINOPHEN 5; 325 MG/1; MG/1
1 TABLET ORAL EVERY 4 HOURS PRN
Status: DISCONTINUED | OUTPATIENT
Start: 2022-03-07 | End: 2022-03-11 | Stop reason: HOSPADM

## 2022-03-07 RX ORDER — DEXTROSE MONOHYDRATE 25 G/50ML
12.5 INJECTION, SOLUTION INTRAVENOUS PRN
Status: DISCONTINUED | OUTPATIENT
Start: 2022-03-07 | End: 2022-03-07 | Stop reason: ALTCHOICE

## 2022-03-07 RX ORDER — ACETAMINOPHEN 325 MG/1
650 TABLET ORAL EVERY 4 HOURS PRN
Status: DISCONTINUED | OUTPATIENT
Start: 2022-03-07 | End: 2022-03-11 | Stop reason: HOSPADM

## 2022-03-07 RX ORDER — LIDOCAINE 4 G/G
1 PATCH TOPICAL DAILY
Status: DISCONTINUED | OUTPATIENT
Start: 2022-03-07 | End: 2022-03-11 | Stop reason: HOSPADM

## 2022-03-07 RX ORDER — METOPROLOL SUCCINATE 25 MG/1
1 TABLET, EXTENDED RELEASE ORAL DAILY
Status: ON HOLD | COMMUNITY
Start: 2022-02-10 | End: 2022-05-26 | Stop reason: HOSPADM

## 2022-03-07 RX ORDER — PREDNISONE 10 MG/1
5 TABLET ORAL DAILY
Status: DISCONTINUED | OUTPATIENT
Start: 2022-03-07 | End: 2022-03-07

## 2022-03-07 RX ORDER — ONDANSETRON 2 MG/ML
4 INJECTION INTRAMUSCULAR; INTRAVENOUS EVERY 6 HOURS PRN
Status: DISCONTINUED | OUTPATIENT
Start: 2022-03-07 | End: 2022-03-11 | Stop reason: HOSPADM

## 2022-03-07 RX ORDER — DULOXETIN HYDROCHLORIDE 30 MG/1
60 CAPSULE, DELAYED RELEASE ORAL DAILY
Status: DISCONTINUED | OUTPATIENT
Start: 2022-03-08 | End: 2022-03-11 | Stop reason: HOSPADM

## 2022-03-07 RX ORDER — POLYETHYLENE GLYCOL 3350 17 G/17G
17 POWDER, FOR SOLUTION ORAL DAILY PRN
Status: DISCONTINUED | OUTPATIENT
Start: 2022-03-07 | End: 2022-03-11 | Stop reason: HOSPADM

## 2022-03-07 RX ORDER — SODIUM CHLORIDE 9 MG/ML
25 INJECTION, SOLUTION INTRAVENOUS PRN
Status: DISCONTINUED | OUTPATIENT
Start: 2022-03-07 | End: 2022-03-11 | Stop reason: HOSPADM

## 2022-03-07 RX ORDER — KETOROLAC TROMETHAMINE 30 MG/ML
15 INJECTION, SOLUTION INTRAMUSCULAR; INTRAVENOUS EVERY 6 HOURS
Status: DISCONTINUED | OUTPATIENT
Start: 2022-03-07 | End: 2022-03-08

## 2022-03-07 RX ORDER — ATORVASTATIN CALCIUM 40 MG/1
40 TABLET, FILM COATED ORAL NIGHTLY
Status: DISCONTINUED | OUTPATIENT
Start: 2022-03-07 | End: 2022-03-11 | Stop reason: HOSPADM

## 2022-03-07 RX ORDER — HYDROCODONE BITARTRATE AND ACETAMINOPHEN 5; 325 MG/1; MG/1
2 TABLET ORAL EVERY 4 HOURS PRN
Status: DISCONTINUED | OUTPATIENT
Start: 2022-03-07 | End: 2022-03-11 | Stop reason: HOSPADM

## 2022-03-07 RX ORDER — SODIUM CHLORIDE 0.9 % (FLUSH) 0.9 %
5-40 SYRINGE (ML) INJECTION EVERY 12 HOURS SCHEDULED
Status: DISCONTINUED | OUTPATIENT
Start: 2022-03-07 | End: 2022-03-11 | Stop reason: HOSPADM

## 2022-03-07 RX ORDER — LANOLIN ALCOHOL/MO/W.PET/CERES
1000 CREAM (GRAM) TOPICAL DAILY
Status: DISCONTINUED | OUTPATIENT
Start: 2022-03-07 | End: 2022-03-11 | Stop reason: HOSPADM

## 2022-03-07 RX ORDER — BUDESONIDE AND FORMOTEROL FUMARATE DIHYDRATE 160; 4.5 UG/1; UG/1
2 AEROSOL RESPIRATORY (INHALATION) 2 TIMES DAILY
Status: DISCONTINUED | OUTPATIENT
Start: 2022-03-07 | End: 2022-03-11 | Stop reason: HOSPADM

## 2022-03-07 RX ORDER — ONDANSETRON 2 MG/ML
4 INJECTION INTRAMUSCULAR; INTRAVENOUS ONCE
Status: COMPLETED | OUTPATIENT
Start: 2022-03-07 | End: 2022-03-07

## 2022-03-07 RX ORDER — METFORMIN HYDROCHLORIDE 500 MG/1
1 TABLET, EXTENDED RELEASE ORAL 2 TIMES DAILY
COMMUNITY
Start: 2022-02-10 | End: 2022-09-20 | Stop reason: SDUPTHER

## 2022-03-07 RX ORDER — DEXTROSE MONOHYDRATE 50 MG/ML
100 INJECTION, SOLUTION INTRAVENOUS PRN
Status: DISCONTINUED | OUTPATIENT
Start: 2022-03-07 | End: 2022-03-11 | Stop reason: HOSPADM

## 2022-03-07 RX ORDER — CLOPIDOGREL BISULFATE 75 MG/1
75 TABLET ORAL DAILY
Status: DISCONTINUED | OUTPATIENT
Start: 2022-03-08 | End: 2022-03-11 | Stop reason: HOSPADM

## 2022-03-07 RX ORDER — SODIUM CHLORIDE 9 MG/ML
INJECTION, SOLUTION INTRAVENOUS CONTINUOUS
Status: DISPENSED | OUTPATIENT
Start: 2022-03-07 | End: 2022-03-07

## 2022-03-07 RX ORDER — NICOTINE POLACRILEX 4 MG
15 LOZENGE BUCCAL PRN
Status: DISCONTINUED | OUTPATIENT
Start: 2022-03-07 | End: 2022-03-11 | Stop reason: HOSPADM

## 2022-03-07 RX ORDER — ASPIRIN 81 MG/1
81 TABLET, CHEWABLE ORAL DAILY
Status: DISCONTINUED | OUTPATIENT
Start: 2022-03-08 | End: 2022-03-11 | Stop reason: HOSPADM

## 2022-03-07 RX ORDER — HYDROCODONE BITARTRATE AND ACETAMINOPHEN 5; 325 MG/1; MG/1
1 TABLET ORAL ONCE
Status: COMPLETED | OUTPATIENT
Start: 2022-03-07 | End: 2022-03-07

## 2022-03-07 RX ADMIN — INSULIN LISPRO 5 UNITS: 100 INJECTION, SOLUTION INTRAVENOUS; SUBCUTANEOUS at 17:01

## 2022-03-07 RX ADMIN — KETOROLAC TROMETHAMINE 15 MG: 30 INJECTION, SOLUTION INTRAMUSCULAR; INTRAVENOUS at 17:05

## 2022-03-07 RX ADMIN — HYDROCODONE BITARTRATE AND ACETAMINOPHEN 2 TABLET: 5; 325 TABLET ORAL at 21:54

## 2022-03-07 RX ADMIN — SODIUM CHLORIDE: 9 INJECTION, SOLUTION INTRAVENOUS at 15:44

## 2022-03-07 RX ADMIN — ATORVASTATIN CALCIUM 40 MG: 40 TABLET, FILM COATED ORAL at 21:43

## 2022-03-07 RX ADMIN — PANTOPRAZOLE SODIUM 40 MG: 40 TABLET, DELAYED RELEASE ORAL at 21:43

## 2022-03-07 RX ADMIN — HYDROCODONE BITARTRATE AND ACETAMINOPHEN 1 TABLET: 5; 325 TABLET ORAL at 07:58

## 2022-03-07 RX ADMIN — FENTANYL CITRATE 50 MCG: 50 INJECTION INTRAMUSCULAR; INTRAVENOUS at 11:52

## 2022-03-07 RX ADMIN — FERROUS SULFATE TAB 325 MG (65 MG ELEMENTAL FE) 325 MG: 325 (65 FE) TAB at 17:00

## 2022-03-07 RX ADMIN — TETANUS TOXOID, REDUCED DIPHTHERIA TOXOID AND ACELLULAR PERTUSSIS VACCINE, ADSORBED 0.5 ML: 5; 2.5; 8; 8; 2.5 SUSPENSION INTRAMUSCULAR at 07:58

## 2022-03-07 RX ADMIN — APIXABAN 5 MG: 5 TABLET, FILM COATED ORAL at 21:43

## 2022-03-07 RX ADMIN — ONDANSETRON 4 MG: 2 INJECTION INTRAMUSCULAR; INTRAVENOUS at 11:51

## 2022-03-07 RX ADMIN — HYDROCODONE BITARTRATE AND ACETAMINOPHEN 2 TABLET: 5; 325 TABLET ORAL at 15:48

## 2022-03-07 ASSESSMENT — ENCOUNTER SYMPTOMS
EYES NEGATIVE: 1
ABDOMINAL PAIN: 0
BACK PAIN: 1
VOMITING: 0
CONSTIPATION: 0
SHORTNESS OF BREATH: 0
COUGH: 0
SORE THROAT: 0
NAUSEA: 0

## 2022-03-07 ASSESSMENT — PAIN SCALES - GENERAL
PAINLEVEL_OUTOF10: 6
PAINLEVEL_OUTOF10: 4
PAINLEVEL_OUTOF10: 4
PAINLEVEL_OUTOF10: 6
PAINLEVEL_OUTOF10: 9
PAINLEVEL_OUTOF10: 5
PAINLEVEL_OUTOF10: 9
PAINLEVEL_OUTOF10: 4
PAINLEVEL_OUTOF10: 7

## 2022-03-07 ASSESSMENT — PAIN DESCRIPTION - LOCATION: LOCATION: HIP

## 2022-03-07 ASSESSMENT — PAIN DESCRIPTION - ORIENTATION: ORIENTATION: RIGHT

## 2022-03-07 NOTE — ED PROVIDER NOTES
eMERGENCY dEPARTMENT eNCOUnter        [facility]    Chief Complaint   Patient presents with    Hip Pain       This patient was not evaluated by the attending physician. I have independently evaluated this patient. My supervising physician was available for consultation. MAYTE Levy is a 60 y.o. male who presents with right hip pain after fall. Onset was shortly PTA. The context (mechanism) is patient reports he was hooking his semitruck up to a trailer when he slipped from approximately 4 feet and fell landing on his right hip. He reports that he did strike the back of his head on the ground. Patient denies loss of consciousness. Patient reports pain is the worst in his right hip and characterizes severity as sharp and worse with palpation or movements. Patient reports he has been essentially unable to bear weight since injury occurred but was able to get up on his own and get into his car to drive home after injury occurred. Patient denies radiation of right hip pain. He reports associated abrasion of right elbow and mild right elbow discomfort where abrasion is located. He also reports mild low back pain since fall. Patient denies neck pain. Patient denies tetanus status being up-to-date. Patient is on Eliquis. Patient denies EtOH or illicit drug use. Patient denies numbness, weakness, tingling, bowel or bladder incontinence, saddle anesthesia, urinary retention, chest pain, abdominal pain, nausea, vomiting. REVIEW OF SYSTEMS    Constitutional:  Denies fever. Neurologic:    Denies headache. Denies confusion or memory loss. Denies light-headedness, dizziness. No extremity pain, sensory changes, or weakness. Eyes:  Denies diplopia, blurred vision, or loss visual field. Denies discharge .   Cardiac: No Chest Pain, palpitations, or Chest Injury  Respiratory:  Denies cough, wheezes, shortness of breath, respiratory discomfort   GI: No Abdominal pain or Abdominal Injury  Musculoskeletal:    See HPI. Skin:  + abrasion right elbow; Denies rash   Lymphatic:  Denies swollen glands     All other review of systems are negative  See HPI and nursing notes for additional information     PAST MEDICAL AND SURGICAL HISTORY    Past Medical History:   Diagnosis Date    Atrial fibrillation, chronic ([facility] 12/2020    COPD (chronic obstructive pulmonary disease) ([facility] 06/15/2021    Coronary artery disease involving native coronary artery of native heart without angina pectoris 06/15/2021    H/O echocardiogram 02/02/2021    EF is estimated at 50%. presence of PVC affected LVEF estimation, Mild TR.    Heart disease     stint    History of blood transfusion     Hx of blood clots     Hx of cardiovascular stress test 06/11/2021    Small size severe intensity,perfusion defect in apical wall.  Hx of Doppler ultrasound 04/09/2021    Left distal SSV shows occlusive chronic SVT.  Hypertension     Myasthenia gravis ([facility]     NSTEMI (non-ST elevated myocardial infarction) ([facility] 12/2020    Post PTCA 12/14/2020    Ramus Stented.  S/P ablation of atrial fibrillation 09/08/2021    S/P ablation of afib PVI performed by Dr. Judee Peabody.  TIA (transient ischemic attack)     Ulcerative colitis ([facility]      Past Surgical History:   Procedure Laterality Date    CARDIAC SURGERY      ILEOSTOMY OR JEJUNOSTOMY      PACEMAKER INSERTION N/A 7/7/2021    PACEMAKER INSERTION PERMANENT performed by Liborio Toscano MD at [facility] PTCA  12/14/2020    Ramus Stented.     TONSILLECTOMY      UPPER GASTROINTESTINAL ENDOSCOPY N/A 6/15/2021    EGD DIAGNOSTIC ONLY performed by Diogenes Jj MD at [facility] N/A 7/4/2021    EGD DIAGNOSTIC ONLY performed by Verona Bojorquez MD at [address] [place] [address]    Current Outpatient Rx   Medication Sig Dispense Refill    metoprolol succinate (TOPROL XL) 100 MG extended release tablet Take 1 tablet by mouth 2 Former Smoker     Packs/day: 0.25     Types: Cigarettes     Quit date: 2021     Years since quittin.9    Smokeless tobacco: Never Used   Substance and Sexual Activity    Alcohol use: Yes     Alcohol/week: 1.0 standard drink     Types: 1 Cans of beer per week     Comment: occ    Drug use: No    Sexual activity: Yes     Partners: Female   Other Topics Concern    None   Social History Narrative    None     Social Determinants of Health     Financial Resource Strain:     Difficulty of Paying Living Expenses: Not on file   Food Insecurity:     Worried About Running Out of Food in the Last Year: Not on file    Ross of Food in the Last Year: Not on file   Transportation Needs:     Lack of Transportation (Medical): Not on file    Lack of Transportation (Non-Medical):  Not on file   Physical Activity:     Days of Exercise per Week: Not on file    Minutes of Exercise per Session: Not on file   Stress:     Feeling of Stress : Not on file   Social Connections:     Frequency of Communication with Friends and Family: Not on file    Frequency of Social Gatherings with Friends and Family: Not on file    Attends [employer] Services: Not on file    Active Member of [address] [place] [address] or Organizations: Not on file    Attends Club or Organization Meetings: Not on file    Marital Status: Not on file   Intimate Partner Violence:     Fear of Current or Ex-Partner: Not on file    Emotionally Abused: Not on file    Physically Abused: Not on file    Sexually Abused: Not on file   Housing Stability:     Unable to Pay for Housing in the Last Year: Not on file    Number of [place] in the Last Year: Not on file    Unstable Housing in the Last Year: Not on file     Family History   Family history unknown: Yes         PHYSICAL EXAM    VITAL SIGNS: /88   Pulse 70   Temp 98 °F (36.7 °C) (Oral)   Resp 16   Ht 5' 11\" (1.803 m)   Wt 285 lb (129.3 kg)   SpO2 95%   BMI 39.75 kg/m²      Constitutional:  Well developed, well nourished, no acute distress. Scalp:  No swelling, discoloration. Skin intact    Neck / back:  No JVD. No swelling or discoloration on inspection. No posterior midline neck tenderness. Full range of motion without pain. No swelling, discoloration, or palpable defect of back exam. No midline cervical spine tenderness to palpation. There is diffuse inferior thoracic as well as diffuse lumbar spine tenderness to palpation as well as tenderness of the paraspinal musculature in the inferior thoracic and lumbar regions. No mid to superior thoracic midline tenderness to palpation. There is no point tenderness to palpation over the CTL spine. HENT:   - PERRL. EOMI. No obvious eyeball trauma, hyphema, hemorrhage, or conjunctival injection. Eyelids intact without obvious trauma. - External auditory canals and TMs clear  - Oral cavity without apparant injury but patient reports feeling unevenness of 2 teeth and points to tooth #7 and #8. Dentition appears grossly intact without obvious injury. Oropharynx clear. No trismus  - There is no tenderness to palpation of the facial bones including orbits, maxilla and mandible reveals no focal tenderness or palpable defect, crepitus. No midface instability. Able to fully open and close jaw without pain or TMJ tenderness.    - No Caballero sign, no raccoon sign. Cardiovascular:    Reg rate, no murmurs. Inspection of chest wall reveals no discoloration or swelling. There is no tenderness or palpable defect of chest wall including specifically the right eighth rib. Respiratory:   Nonlabored breathing. Lungs Clear, no retractions   GI:    No discoloration or swelling. Soft, nontender, normal bowel sounds. Colostomy bag in place. Musculoskeletal:    No edema, no acute deformities.   Full range of motion of bilateral upper and lower extremities without obvious deficit , pain, tenderness to palpation except for right hip as described and except for Segs Relative 83.2 (H) 36 - 66 %    Lymphocytes % 8.4 (L) 24 - 44 %    Monocytes % 7.3 (H) 0 - 4 %    Eosinophils % 0.2 0 - 3 %    Basophils % 0.2 0 - 1 %    Segs Absolute 10.9 K/CU MM    Lymphocytes Absolute 1.1 K/CU MM    Monocytes Absolute 1.0 K/CU MM    Eosinophils Absolute 0.0 K/CU MM    Basophils Absolute 0.0 K/CU MM    Nucleated RBC % 0.0 %    Total Nucleated RBC 0.0 K/CU MM    Total Immature Neutrophil 0.09 K/CU MM    Immature Neutrophil % 0.7 (H) 0 - 0.43 %   Comprehensive Metabolic Panel   Result Value Ref Range    Sodium 133 (L) 135 - 145 MMOL/L    Potassium 4.5 3.5 - 5.1 MMOL/L    Chloride 102 99 - 110 mMol/L    CO2 19 (L) 21 - 32 MMOL/L    BUN 15 6 - 23 MG/DL    CREATININE 1.0 0.9 - 1.3 MG/DL    Glucose 140 (H) 70 - 99 MG/DL    Calcium 9.2 8.3 - 10.6 MG/DL    Albumin 3.6 3.4 - 5.0 GM/DL    Total Protein 6.3 (L) 6.4 - 8.2 GM/DL    Total Bilirubin 0.3 0.0 - 1.0 MG/DL    ALT 29 10 - 40 U/L    AST 29 15 - 37 IU/L    Alkaline Phosphatase 145 (H) 40 - 129 IU/L    GFR Non-African American >60 >60 mL/min/1.73m2    GFR African American >60 >60 mL/min/1.73m2    Anion Gap 12 4 - 16         Imaging:  CT ABDOMEN PELVIS WO CONTRAST Additional Contrast? None   Preliminary Result   1. No acute intra-abdominal injury. 2. Acute nondisplaced fracture of the right anterior acetabulum extending   into the iliac bone. 3. Acute versus subacute fracture of the right 8th rib. Correlate with focal   pain. 4. Status post total colectomy with right lower quadrant ostomy. No   complication. CT LUMBAR SPINE WO CONTRAST   Final Result   No acute fracture the lumbar spine. Transitional anatomy with lumbarization of S1. Stepwise grade 1   retrolisthesis of L3 on L4 and L4 on L5.         CT THORACIC SPINE WO CONTRAST   Final Result   Superior endplate compression deformity at T4 with approximately 30%   vertebral body height loss is new since the CT scan 09/09/2021.   The fracture   does not clearly involve the posterior column         CT CERVICAL SPINE WO CONTRAST   Final Result   Addendum 1 of 1   ADDENDUM:   The impression should state: No acute fracture or subluxation of the    cervical   spine. Final   1. Acute fracture or subluxation of the cervical spine. 2.  Multilevel degenerative disc disease resulting in spinal canal stenosis   and neural foraminal narrowing from C4-5 through C6-7. CT HEAD WO CONTRAST   Final Result   1. No acute intracranial abnormality. 2.  Old infarct in the left frontal lobe as seen on the prior CT scan. XR ELBOW RIGHT (MIN 3 VIEWS)   Final Result   No acute abnormality. XR HIP 2-3 VW W PELVIS RIGHT   Final Result   No acute osseous abnormality. ED COURSE & MEDICAL DECISION MAKING       Vital signs and nursing notes reviewed during ED course. I have independently evaluated this patient. Supervising physician present in the Emergency Department, available for consultation, throughout entirety of  patient care. Patient presents as above after mechanical fall. Patient does have abrasion of right elbow that does not require repair in the ED today but tetanus status booster in. Presentation prompted work-up in the ED today with labs and imaging. Labs reveal mild leukocytosis of 13.1 with left shift and mild anemia with hemoglobin of 13.2. Imaging significant for acute nondisplaced fracture of the right anterior acetabulum extending into the iliac bone, subacute fracture of the right eighth rib that patient reports occurred several weeks ago from a mechanical fall on ice but pain has resolved and he has no chest pain or shortness of breath present today. Imaging also significant for superior endplate compression deformity at T4 with approximately 30% vertebral body height loss. Rest imaging without emergent findings. Right elbow x-ray without acute osseous abnormality.   Patient treated with Jaswant Amaya in the ED initially for pain but continued to have discomfort so then treated with IV fentanyl and IV Zofran. Patient neurologically intact on exam.  Patient has no chest pain or shortness of breath. Right lower extremity distally neurovascular intact. Given T4 compression fracture did consult with on-call neurosurgeon, Dr. Monica Cagle, we discussed the patient's case. He advises treating this with pain medication and will have nurse practitioner from his team evaluate patient. He is happy to follow along. I also consulted on-call orthopedist for hip fracture and we discussed the patient's case. Orthopedist, Dr. Zion Cameron, will plan to see the patient tomorrow and decide on possible MRI at that time but until then he reports it is absolutely imperative that patient remain nonweightbearing on right lower extremity. Patient and I discussed these consultations. Will admit patient at this time for further evaluation and care. Patient agreeable to admission. I consulted with hospitalist, Uziel Paul, and we discussed the patient's case. She agrees to admit the patient at this time. All pertinent Lab data and radiographic results reviewed with patient at bedside. The patient and/or the family were informed of the results of any tests/labs/imaging, the treatment plan, and time was allotted to answer questions. Diagnosis, disposition, and plan reviewed with patient who understands and agrees. Patient understands and agrees to admission. Clinical  IMPRESSION    1. Nondisplaced fracture of anterior wall of right acetabulum, initial encounter for closed fracture ([facility]    2. Fall from slip, trip, or stumble, initial encounter    3. Closed head injury, initial encounter    4. Abrasion of right elbow, initial encounter    5. Acute low back pain without sciatica, unspecified back pain laterality    6.  Compression fracture of T4 vertebra, initial encounter [facility])        Disposition:  Admission  Comment: Please note this report has been produced using speech recognition software and may contain errors related to that system including errors in grammar, punctuation, and spelling, as well as words and phrases that may be inappropriate. If there are any questions or concerns please feel free to contact the dictating provider for clarification.                   Cyril Whyte PA-C  03/07/22 [id number]

## 2022-03-07 NOTE — H&P
V2.0  History and Physical      Name:  Ruperto Serra /Age/Sex: 1960  (60 y.o. male)   MRN & CSN:  [id number] & [id number] Encounter Date/Time: 3/7/2022 11:59 AM EST   Location:  ED21/ED-21 PCP: Negin Esteves 74 Day: 1    Assessment and Plan:   Ruperto Serra is a 60 y.o. male with a pmh on  Atrial fibrillation on Eliquis, myasthenia gravis who presents status post fall    Fall from slip, intital encoutner   Acute non-nondisplaced fracture of right anterior acetabulum  Closed  head injury, initial encounter     Acute fracture of right anterior acetabulum seen on CT abdomen pelvis,    Additional imaging change as noted below . ER contacted Dr. Dr. Lisa Mejia from orthopedics and Dr. Ronaldo Matute from neurosurgery. No plans for urgent surgical intervention at this time   Strict bedrest, nonweight bearing   Pain control PRN   Antiemetics PRN   Dr Lisa Mejia to evaluate in am   Fall precautions   CT head, cervical spine without evidence of acute abnormality   Neurochecks q4       Acute versus subacute fracture of right eighth rib    T4 compression fracture    30% height loss- MRI ordered per neurosurgery for further imaging   Will likely need PT OT   Pain management as noted above   No loss of bowel or bladder control   Rib fracture on associated with pain at correlating site, likely due to previous fall, injury per patient reports. Continue to monitor     Leukocytosis   Likely inflammatory secondary to fall,. Check chest x-ray, UA with reflex culture, blood cultures x2. Trend labs daily. Patient denies fever, chills, or symptoms  hold off on empiric antibiotics at this time pending lab results    Hyponatremia   Mild 2/2 dehydration post fall. Replete with IVF.  Repeat BMP in am    Non Insulin dependent type 2 diabetes   -Start sliding scale with hypoglycemia protocol   -Hold home oral antihyperglycemics, check hemoglobin A1c    Coronary artery disease  Atrial fibrillation, chronic   On Eliquis - will need held if surgery planned. Continue as no surgical interventions are planned at this time   Continue aspirin, Plavix,metoprolol    Myestenia Gravis   Continue mestinon, Zilucoplan  Chronic Conditions: continue home medication as ordered  Prior CVA, COPD , anemia, arthritis, history of GI bleed, parastomal hernia, MI, ulcerative colitis, hypertension, TIA      All testing  and results reviewed with patient . All questions answered. Patient and family voiced understanding    This patient was seen and examined in conjunction with Dr. Lesly Sherman. He/She was agreeable with the plan and management as dictated above. Disposition:   Current Living situation: Home with wife  Expected Disposition: 3 days       Diet ADULT DIET; Regular   GI Prophylaxis  [x] PPI,  [] H2 Blocker,  [] Carafate,  [] Diet/Tube Feeds   DVT Prophylaxis [] Lovenox, []  Heparin, [] SCDs, [] Ambulation,  [x] NOAC   Code Status Full Code   Surrogate Decision Maker/ POA          History from:     patient, spouse    History of Present Illness:     Chief Complaint: Closed displaced fracture of anterior column of right acetabulum ([address]  Kelley Metzger is a 60 y.o. male with pmh of myasthenia gravis, CAD, atrial fibrillation status post ablation, who presents status post fall this morning with right hip pain. Onset was shortly prior to arrival. Patient reports he was walking up a semitruck to a trailer when he slipped and fell approximately 3 to 4 feet on the ground landing on his right hip. Patient reports he did strike the back of his head on the ground. Denies loss of consciousness. Reports the pain is in the worst in his right hip which she characterizes as a dull ache, worse with palpation or any movement. . Currently rates pain as a 5 out of 10. He has just received pain medication before I see him. Patient denies any radiation of his right hip pain. He also endorses back pain since the fall. Denies any neck pain.  Patient denies any headaches, blurred vision, dizziness prior to fall. Denies any numbness, weakness, tingling, bowel or bladder incontinence, urinary retention, chest pain, abdominal pain, nausea or vomiting. Review of Systems: Need 10 Elements   Review of Systems   Constitutional: Negative. HENT: Negative for congestion and sore throat. Eyes: Negative. Respiratory: Negative for cough and shortness of breath. Cardiovascular: Negative for chest pain and leg swelling. Gastrointestinal: Negative for abdominal pain, constipation, nausea and vomiting. Endocrine: Negative. Genitourinary: Negative for dysuria and hematuria. Musculoskeletal: Positive for arthralgias and back pain. Negative for neck pain. Skin: Negative for wound. Neurological: Negative for dizziness and light-headedness. All other systems reviewed and are negative. Objective:   No intake or output data in the 24 hours ending 03/07/22 1338   Vitals:   Vitals:    03/07/22 0615 03/07/22 0753 03/07/22 1101   BP: (!) 149/113 137/88 (!) 122/91   Pulse: 71 70 70   Resp: 20 16 20   Temp: 98 °F (36.7 °C)     TempSrc: Oral     SpO2: 97% 95% 96%   Weight: 285 lb (129.3 kg)     Height: 5' 11\" (1.803 m)         Medications Prior to Admission     Prior to Admission medications    Medication Sig Start Date End Date Taking?  Authorizing Provider   ELIQUIS 5 MG TABS tablet Take 1 tablet by mouth 2 times daily 2/10/22  Yes Historical Provider, MD   clopidogrel (PLAVIX) 75 MG tablet Take 1 tablet by mouth 2/10/22  Yes Historical Provider, MD   DULoxetine (CYMBALTA) 60 MG extended release capsule Take 1 capsule by mouth 2/15/22  Yes Historical Provider, MD   metFORMIN (GLUCOPHAGE-XR) 500 MG extended release tablet Take 1 tablet by mouth 2 times daily 2/10/22  Yes Historical Provider, MD   metoprolol succinate (TOPROL XL) 25 MG extended release tablet Take 1 tablet by mouth daily 2/10/22  Yes Historical Provider, MD   aspirin 81 MG chewable tablet Take 1 tablet by mouth daily 7/10/21  Yes Emma Walsh MD   budesonide-formoterol [facility]) 160-4.5 MCG/ACT AERO Inhale 2 puffs into the lungs 2 times daily 6/16/21  Yes Home Barksdale MD   atorvastatin (LIPITOR) 40 MG tablet Take 1 tablet by mouth nightly START 6/25/2021 6/16/21  Yes Home Barksdale MD   tiotropium (Kishan Idler) 18 MCG inhalation capsule Inhale 1 capsule into the lungs daily 6/16/21  Yes Home Barksdale MD   pantoprazole (PROTONIX) 40 MG tablet Take 1 tablet by mouth 2 times daily 6/16/21  Yes Home Barksdale MD   Elastic Bandages & Supports (MEDICAL COMPRESSION THIGH HIGH) [address] Wear daily and remove nightly 20 - 30 mmgh 4/14/21  Yes Reece Cortez MD   NONFORMULARY Inject 40 mg/L into the skin every evening Tamara Lackey  - patient provided experimental drug from [place] for Myasthenia Gravis   Yes Historical Provider, MD   vitamin B-12 (CYANOCOBALAMIN) 500 MCG tablet Take 1,000 mcg by mouth daily Indications: 8am    Yes Historical Provider, MD   PREDNISONE PO Take 13 mg by mouth daily Indications: Takes at  8am    Yes Historical Provider, MD   pyridostigmine (MESTINON) 60 MG tablet Take 1 tablet by mouth 4 times daily  Patient taking differently: Take 60 mg by mouth 5 times daily Indications: Takes at 0800, 1200, 1600, 2000  7/20/16  Yes Tamiko Juarez MD   acetaminophen (TYLENOL) 325 MG tablet Take 650 mg by mouth every 4 hours as needed for Pain or Fever   Yes Historical Provider, MD   amiodarone (CORDARONE) 200 MG tablet Take 1 tablet by mouth daily 7/9/21 10/7/21  Emma Walsh MD   ferrous sulfate (FE TABS 325) 325 (65 Fe) MG EC tablet Take 1 tablet by mouth 2 times daily (with meals) 7/9/21   Emma Walsh MD   GLUCOSAMINE-CALCIUM-VIT D PO Take by mouth daily    Historical Provider, MD       Physical Exam: Need 8 Elements   Physical Exam  Vitals and nursing note reviewed. Constitutional:       General: He is not in acute distress. Appearance: Normal appearance.    HENT: Head: Normocephalic. Nose: Nose normal.      Mouth/Throat:      Pharynx: Oropharynx is clear. Eyes:      Pupils: Pupils are equal, round, and reactive to light. Cardiovascular:      Rate and Rhythm: Normal rate and regular rhythm. Pulses: Normal pulses. Pulmonary:      Effort: Pulmonary effort is normal. No respiratory distress. Breath sounds: No wheezing or rhonchi. Abdominal:      General: Abdomen is flat. Bowel sounds are normal. There is no distension. Comments: Ileostomy    Musculoskeletal:         General: Normal range of motion. Cervical back: Normal and normal range of motion. No bony tenderness. Thoracic back: Bony tenderness present. No tenderness. Lumbar back: Bony tenderness present. Right hip: Tenderness present. Comments: Diffuse right AL tenderness to palpitation. ROM deferred due to fracture    Skin:     General: Skin is warm and dry. Capillary Refill: Capillary refill takes less than 2 seconds. Neurological:      General: No focal deficit present. Mental Status: He is alert and oriented to person, place, and time. Psychiatric:         Mood and Affect: Mood normal.            Past Medical History:   PMHx   Past Medical History:   Diagnosis Date    Arthritis 4/23/2021    Atrial fibrillation, chronic ([facility] [address] 12/2020    Cerebrovascular accident [facility]) 9/9/2021    COPD (chronic obstructive pulmonary disease) ([facility] [address] 06/15/2021    Coronary artery disease involving native coronary artery of native heart without angina pectoris 06/15/2021    Diabetes ([facility] [address] 9/10/2021    H/O echocardiogram 02/02/2021    EF is estimated at 50%. presence of PVC affected LVEF estimation, Mild TR.    Heart disease     stint    History of blood transfusion     Hx of blood clots     Hx of cardiovascular stress test 06/11/2021    Small size severe intensity,perfusion defect in apical wall.     Hx of Doppler ultrasound 04/09/2021    Left distal SSV shows occlusive chronic SVT.  Hypertension     [facility] [address]     NSTEMI (non-ST elevated myocardial infarction) ([facility] [address] 2020    Post PTCA 2020    Ramus Stented.  S/P ablation of atrial fibrillation 2021    S/P ablation of afib PVI performed by Dr. Judee Peabody.  TIA (transient ischemic attack)     Ulcerative colitis ([facility] [address]      PSHX:  has a past surgical history that includes ileostomy or jejunostomy; Tonsillectomy; Cardiac surgery; Percutaneous Transluminal Coronary Angio (2020); Upper gastrointestinal endoscopy (N/A, 6/15/2021); Upper gastrointestinal endoscopy (N/A, 2021); and Pacemaker insertion (N/A, 2021). Allergies: No Known Allergies  Fam HX:  Family history is unknown by patient. Soc HX:   Social History     Socioeconomic History    Marital status:      Spouse name: None    Number of children: None    Years of education: None    Highest education level: None   Occupational History    None   Tobacco Use    Smoking status: Former Smoker     Packs/day: 0.25     Types: Cigarettes     Quit date: 2021     Years since quittin.9    Smokeless tobacco: Never Used   Substance and Sexual Activity    Alcohol use: Yes     Alcohol/week: 1.0 standard drink     Types: 1 Cans of beer per week     Comment: occ    Drug use: No    Sexual activity: Yes     Partners: Female   Other Topics Concern    None   Social History Narrative    None     Social Determinants of Health     Financial Resource Strain:     Difficulty of Paying Living Expenses: Not on file   Food Insecurity:     Worried About Running Out of Food in the Last Year: Not on file    Ross of Food in the Last Year: Not on file   Transportation Needs:     Lack of Transportation (Medical): Not on file    Lack of Transportation (Non-Medical):  Not on file   Physical Activity:     Days of Exercise per Week: Not on file    Minutes of Exercise per Session: Not on file   Stress:     Feeling of Stress : Not on file   Social Connections:     Frequency of Communication with Friends and Family: Not on file    Frequency of Social Gatherings with Friends and Family: Not on file    Attends Roman [employer] Services: Not on file    Active Member of Clubs or Organizations: Not on file    Attends Club or Organization Meetings: Not on file    Marital Status: Not on file   Intimate Partner Violence:     Fear of Current or Ex-Partner: Not on file    Emotionally Abused: Not on file    Physically Abused: Not on file    Sexually Abused: Not on file   Housing Stability:     Unable to Pay for Housing in the Last Year: Not on file    Number of [place] in the Last Year: Not on file    Unstable Housing in the Last Year: Not on file       Medications:   Medications:    Infusions:   PRN Meds:     Labs      CBC:   Recent Labs     03/07/22  1055   WBC 13.1*   HGB 13.2*        BMP:    Recent Labs     03/07/22  1055   *   K 4.5      CO2 19*   BUN 15   CREATININE 1.0   GLUCOSE 140*     Hepatic:   Recent Labs     03/07/22  1055   AST 29   ALT 29   BILITOT 0.3   ALKPHOS 145*     Lipids: No results found for: CHOL, HDL, TRIG  Hemoglobin A1C: No results found for: LABA1C  TSH: No results found for: TSH  Troponin:   Lab Results   Component Value Date    TROPONINT 0.504 09/09/2021    TROPONINT 0.021 07/03/2021    TROPONINT 0.016 06/11/2021     Lactic Acid: No results for input(s): LACTA in the last 72 hours. BNP: No results for input(s): PROBNP in the last 72 hours.   UA:  Lab Results   Component Value Date    NITRU NEGATIVE 07/05/2021    COLORU YELLOW 07/05/2021    WBCUA 1 07/05/2021    RBCUA <1 07/05/2021    MUCUS RARE 07/05/2021    TRICHOMONAS NONE SEEN 07/05/2021    BACTERIA NEGATIVE 07/05/2021    CLARITYU CLEAR 07/05/2021    SPECGRAV 1.016 07/05/2021    LEUKOCYTESUR NEGATIVE 07/05/2021    UROBILINOGEN NEGATIVE 07/05/2021    BILIRUBINUR NEGATIVE 07/05/2021    BLOODU SMALL 07/05/2021    KETUA NEGATIVE 07/05/2021 Urine Cultures: No results found for: LABURIN  Blood Cultures: No results found for: BC  No results found for: BLOODCULT2  Organism: No results found for: ORG    Imaging/Diagnostics Last 24 Hours   CT ABDOMEN PELVIS WO CONTRAST Additional Contrast? None    Result Date: 3/7/2022  EXAMINATION: CT OF THE ABDOMEN AND PELVIS WITHOUT CONTRAST 3/7/2022 7:43 am TECHNIQUE: CT of the abdomen and pelvis was performed without the administration of intravenous contrast. Multiplanar reformatted images are provided for review. Dose modulation, iterative reconstruction, and/or weight based adjustment of the mA/kV was utilized to reduce the radiation dose to as low as reasonably achievable. COMPARISON: July 5, 2021 HISTORY: ORDERING SYSTEM PROVIDED HISTORY: fall, right sided pain TECHNOLOGIST PROVIDED HISTORY: Additional Contrast?->None Reason for exam:->fall, right sided pain Decision Support Exception - unselect if not a suspected or confirmed emergency medical condition->Emergency Medical Condition (MA) Reason for Exam: fall, right sided abdomen pain FINDINGS: Lower Chest: Clear lung bases. Organs: The liver, gallbladder, spleen, pancreas, adrenal glands, and kidneys demonstrate no acute abnormality. Bilateral ureters are normal in course and caliber. No ureteral calculi. GI/Bowel: The stomach is normal.  The small bowel is normal in course and caliber without evidence of wall thickening or obstruction. Status post total colectomy with right lower quadrant ostomy. Pelvis: Normal bladder. Prostate and seminal vesicles are unremarkable. Peritoneum/Retroperitoneum: No free fluid or free air. No pathologic lymphadenopathy. Aorta and its branches are normal in course and caliber. Moderate atherosclerosis. Bones/Soft Tissues: Acute fracture of the right hemipelvis is identified involving the anterior acetabulum extending into the right iliac bone. No significant displacement.   Elsewhere, an acute to subacute fracture of the right 8th rib is present. Chronic fractures of the right L1 and L2 transverse processes are seen. 1. No acute intra-abdominal injury. 2. Acute nondisplaced fracture of the right anterior acetabulum extending into the iliac bone. 3. Acute versus subacute fracture of the right 8th rib. Correlate with focal pain. 4. Status post total colectomy with right lower quadrant ostomy. No complication. XR ELBOW RIGHT (MIN 3 VIEWS)    Result Date: 3/7/2022  EXAMINATION: THREE XRAY VIEWS OF THE RIGHT ELBOW 3/7/2022 7:07 am COMPARISON: None. HISTORY: ORDERING SYSTEM PROVIDED HISTORY: Injury, fall from 4 ft, minimal pain TECHNOLOGIST PROVIDED HISTORY: PORTABLE Reason for exam:->Injury, fall from 4 ft, minimal pain Reason for Exam: Injury, fall from 4 ft, minimal pain Right elbow injury FINDINGS: There is no elbow effusion. There is no acute fracture or dislocation. Alignment is normal.     No acute abnormality. CT HEAD WO CONTRAST    Result Date: 3/7/2022  EXAMINATION: CT OF THE HEAD WITHOUT CONTRAST  3/7/2022 7:40 am TECHNIQUE: CT of the head was performed without the administration of intravenous contrast. Dose modulation, iterative reconstruction, and/or weight based adjustment of the mA/kV was utilized to reduce the radiation dose to as low as reasonably achievable. COMPARISON: CT scan of the head 09/09/2021 HISTORY: ORDERING SYSTEM PROVIDED HISTORY: fall, hit head, on eliquis TECHNOLOGIST PROVIDED HISTORY: Has a \"code stroke\" or \"stroke alert\" been called? ->No Reason for exam:->fall, hit head, on eliquis Decision Support Exception - unselect if not a suspected or confirmed emergency medical condition->Emergency Medical Condition (MA) Reason for Exam: fall, head pain FINDINGS: BRAIN/VENTRICLES: There is no acute intracranial hemorrhage, mass effect or midline shift. No abnormal extra-axial fluid collection. The gray-white differentiation is maintained without evidence of an acute infarct.   There is no evidence of hydrocephalus. Small area of encephalomalacia in the left frontal lobe where there is evidence of infarct on prior examination. Periventricular and subcortical white matter hypodensities are not significantly changed. ORBITS: The visualized portion of the orbits demonstrate no acute abnormality. SINUSES: The visualized paranasal sinuses and mastoid air cells demonstrate no acute abnormality. SOFT TISSUES/SKULL:  No acute abnormality of the visualized skull or soft tissues. 1.  No acute intracranial abnormality. 2.  Old infarct in the left frontal lobe as seen on the prior CT scan. CT CERVICAL SPINE WO CONTRAST    Addendum Date: 3/7/2022    ADDENDUM: The impression should state: No acute fracture or subluxation of the cervical spine. Result Date: 3/7/2022  EXAMINATION: CT OF THE CERVICAL SPINE WITHOUT CONTRAST 3/7/2022 7:42 am TECHNIQUE: CT of the cervical spine was performed without the administration of intravenous contrast. Multiplanar reformatted images are provided for review. Dose modulation, iterative reconstruction, and/or weight based adjustment of the mA/kV was utilized to reduce the radiation dose to as low as reasonably achievable. COMPARISON: CT scan cervical spine 11/01/2016 HISTORY: ORDERING SYSTEM PROVIDED HISTORY: fall TECHNOLOGIST PROVIDED HISTORY: Reason for exam:->fall Decision Support Exception - unselect if not a suspected or confirmed emergency medical condition->Emergency Medical Condition (MA) Reason for Exam: fall, neck pain FINDINGS: BONES/ALIGNMENT: There is no acute fracture or traumatic malalignment. There is some reversal of the cervical lordosis. DEGENERATIVE CHANGES: Multilevel degenerative disc disease with posterior disc osteophyte complex causing some narrowing of the spinal canal at C4-5, C5-6, and C6-7. There is some associated narrowing of the neural foramen at these levels. SOFT TISSUES: There is no prevertebral soft tissue swelling.      1.  Acute fracture or subluxation of the cervical spine. 2.  Multilevel degenerative disc disease resulting in spinal canal stenosis and neural foraminal narrowing from C4-5 through C6-7. CT THORACIC SPINE WO CONTRAST    Result Date: 3/7/2022  EXAMINATION: CT OF THE THORACIC SPINE WITHOUT CONTRAST  3/7/2022 7:42 am: TECHNIQUE: CT of the thoracic spine was performed without the administration of intravenous contrast. Multiplanar reformatted images are provided for review. Dose modulation, iterative reconstruction, and/or weight based adjustment of the mA/kV was utilized to reduce the radiation dose to as low as reasonably achievable. COMPARISON: CT scan of the neck 09/09/2021 CT scan of the chest 07/16/2016 and CT scan of the abdomen and pelvis 02/02/2020 HISTORY: ORDERING SYSTEM PROVIDED HISTORY: fall from 4 ft, low thoracic pain TECHNOLOGIST PROVIDED HISTORY: CT  T-Spine w/o Contrast Reason for exam:->fall from 4 ft, low thoracic pain Reason for Exam: fall off truck, upper back pain FINDINGS: BONES/ALIGNMENT: There is moderate superior endplate compression deformity at T4 with approximately 30% vertebral body height loss. This is new since the CT scan 09/09/2021. Linear sclerotic changes extend to the posterolateral cortex, but does not include the vertebral arch on the axial images. T3 superior endplate Schmorl's node is not significantly changed. Healing right L1 and L2 transverse process fractures are present. DEGENERATIVE CHANGES: Degenerative changes of the bilateral costovertebral junctions. SOFT TISSUES: No paraspinal mass is seen. Superior endplate compression deformity at T4 with approximately 30% vertebral body height loss is new since the CT scan 09/09/2021.   The fracture does not clearly involve the posterior column     CT LUMBAR SPINE WO CONTRAST    Result Date: 3/7/2022  EXAMINATION: CT OF THE LUMBAR SPINE WITHOUT CONTRAST  3/7/2022 TECHNIQUE: CT of the lumbar spine was performed without the administration of intravenous contrast. Multiplanar reformatted images are provided for review. Adjustment of mA and/or kV according to patient size was utilized. Dose modulation, iterative reconstruction, and/or weight based adjustment of the mA/kV was utilized to reduce the radiation dose to as low as reasonably achievable. COMPARISON: None HISTORY: ORDERING SYSTEM PROVIDED HISTORY: fall from 4 ft, diffuse pain TECHNOLOGIST PROVIDED HISTORY: Reason for exam:->fall from 4 ft, diffuse pain Decision Support Exception - unselect if not a suspected or confirmed emergency medical condition->Emergency Medical Condition (MA) Reason for Exam: fell off truck, low back pain FINDINGS: BONES/ALIGNMENT: Transitional anatomy with lumbarization of S1. Stepwise grade 1 retrolisthesis of L3 on L4 and L4 on L5. The vertebral body heights are maintained. No osseous destructive lesion is seen. DEGENERATIVE CHANGES: Degenerative changes with intervertebral disc space narrowing greatest at L4-L5 and L3-L4. Meryle Sandman SOFT TISSUES/RETROPERITONEUM: No paraspinal mass is seen. Vascular calcifications of the descending aorta. No acute fracture the lumbar spine. Transitional anatomy with lumbarization of S1. Stepwise grade 1 retrolisthesis of L3 on L4 and L4 on L5. XR HIP 2-3 VW W PELVIS RIGHT    Result Date: 3/7/2022  EXAMINATION: ONE XRAY VIEW OF THE PELVIS AND TWO XRAY VIEWS RIGHT HIP 3/7/2022 6:46 am COMPARISON: None. HISTORY: ORDERING SYSTEM PROVIDED HISTORY: fall, right hip pain TECHNOLOGIST PROVIDED HISTORY: Reason for exam:->fall, right hip pain Reason for Exam: fall, right hip pain Additional signs and symptoms: none FINDINGS: Postsurgical changes are seen involving the pelvis. There is no acute fracture, dislocation, or bone destruction. Mild degenerative changes are seen involving the bilateral hips. No acute osseous abnormality.              Electronically signed by JAMIE Bacon CNP on 3/7/2022 at 1:38 PM          This dictation was created with voice recognition software. While attempts have been made to review the dictation as it is transcribed, on occasion the spoken word can be misinterpreted by the technology leading to omissions or inappropriate words, phrases or sentences.      Electronically signed by JAMIE Pino CNP on 3/7/2022 at 1:38 PM

## 2022-03-07 NOTE — ED NOTES
[facility]      Liz Murguia  03/07/22 1030

## 2022-03-07 NOTE — CONSULTS
Neurosurgery   Consult Note      Reason for Consult: T4 compression fracture  Consulting Celine [place]  Attending Physician: Betty Almeida MD  Date of Admission: 3/7/2022  Subjective:   CHIEF COMPLAINT: S/P fall, mid back pain, right hip pain    HPI:  60 y.o. 1960  Who presented to the ED 3/7/22 after falling from a 4 foot platform off of his semitruck. The patient was able to get off of the ground and drove himself home prior to coming to the ED. He has significant pain in his right hip and some pain in his mid back. CT of the head was negative for any acute intracranial pathology. Does show an old infarct in the left frontal lobe that was sustained September 2021. CT of the cervical and lumbar spine are negative for any acute fractures. There is a potentially old right L1 fracture noted on CT of the abdomen and pelvis. CT of the thoracic spine shows a T4 compression fracture with roughly 30% loss of height. He denies any weakness in his extremities, denies any numbness/tingling. He states he is able to tell when he needs to urinate, has a colostomy bag. Patient is not on Eliquis, Plavix, aspirin. PMHx positive for A. fib, stroke, COPD, diabetes, hypertension, myasthenia gravis, ulcerative colitis. Past surgical history positive for pacemaker insertion, ileostomy, ablation of A. fib. Past Medical and Surgical History:       Diagnosis Date    Atrial fibrillation, chronic ([facility] [address] 12/2020    COPD (chronic obstructive pulmonary disease) ([facility] [address] 06/15/2021    Coronary artery disease involving native coronary artery of native heart without angina pectoris 06/15/2021    H/O echocardiogram 02/02/2021    EF is estimated at 50%. presence of PVC affected LVEF estimation, Mild TR.    Heart disease     stint    History of blood transfusion     Hx of blood clots     Hx of cardiovascular stress test 06/11/2021    Small size severe intensity,perfusion defect in apical wall.     Hx of Doppler ultrasound 04/09/2021    Left distal SSV shows occlusive chronic SVT.  Hypertension     Myasthenia gravis ([facility] [address]     NSTEMI (non-ST elevated myocardial infarction) ([facility] [address] 12/2020    Post PTCA 12/14/2020    Ramus Stented.  S/P ablation of atrial fibrillation 09/08/2021    S/P ablation of afib PVI performed by Dr. Natalio Solis.  TIA (transient ischemic attack)     Ulcerative colitis ([facility] [address]          Procedure Laterality Date    CARDIAC SURGERY      ILEOSTOMY OR JEJUNOSTOMY      PACEMAKER INSERTION N/A 7/7/2021    PACEMAKER INSERTION PERMANENT performed by Stu Quintana MD at [facility] PTCA  12/14/2020    Ramus Stented.  TONSILLECTOMY      UPPER GASTROINTESTINAL ENDOSCOPY N/A 6/15/2021    EGD DIAGNOSTIC ONLY performed by Allison Reyes MD at [address] [place] [address],Third Floor N/A 7/4/2021    EGD DIAGNOSTIC ONLY performed by Reno Chaves MD at [facility] ENDOSCOPY       Social History:    TOBACCO:   reports that he quit smoking about 11 months ago. His smoking use included cigarettes. He smoked 0.25 packs per day. He has never used smokeless tobacco.  ETOH:   reports current alcohol use of about 1.0 standard drink of alcohol per week.     Family History:       Family history unknown: Yes       Current Medications:    Current Facility-Administered Medications: HYDROcodone-acetaminophen (NORCO) 5-325 MG per tablet 1 tablet, 1 tablet, Oral, Q4H PRN **OR** HYDROcodone-acetaminophen (NORCO) 5-325 MG per tablet 2 tablet, 2 tablet, Oral, Q4H PRN  lidocaine 4 % external patch 1 patch, 1 patch, TransDERmal, Daily  ketorolac (TORADOL) injection 15 mg, 15 mg, IntraVENous, Q6H  Facility-Administered Medications Ordered in Other Encounters: Immune Globulin (Human) solution 20 g, 20 g, IntraVENous, Once **AND** Immune Globulin (Human) solution 20 g, 20 g, IntraVENous, Once  Immune Globulin (Human) solution 20 g, 20 g, IntraVENous, Once **AND** Immune Globulin (Human) solution 20 g, 20 g, 5/5 throughout  Lower extremity strength: Left lower extremity 5/5 throughout, right iliopsoas and quadriceps/hamstrings not tested due to right hip fracture. Right anteriortibialis and gastrocnemius 5/5. Bilateral patellar DTR 2+    DATA:    Old records have been reviewed    CBC:  Recent Labs     03/07/22  1055   WBC 13.1*   RBC 4.43*   HGB 13.2*   HCT 41.9*      MCV 94.6   MCH 29.8   MCHC 31.5*   RDW 13.5   SEGSPCT 83.2*      BMP:  Recent Labs     03/07/22  1055   *   K 4.5      CO2 19*   BUN 15   CREATININE 1.0   CALCIUM 9.2   GLUCOSE 140*        Radiology Review:  All pertinent images / reports were reviewed as a part of this visit. Ct head 3/7/22  Impression   1.  No acute intracranial abnormality.       2.  Old infarct in the left frontal lobe as seen on the prior CT scan. Ct cervical spine 3/7/22  Impression   1.  Acute fracture or subluxation of the cervical spine.       2.  Multilevel degenerative disc disease resulting in spinal canal stenosis   and neural foraminal narrowing from C4-5 through C6-7. CT thoracic spine 3/7/22  Impression   Superior endplate compression deformity at T4 with approximately 30%   vertebral body height loss is new since the CT scan 09/09/2021.  The fracture   does not clearly involve the posterior column     Ct lumbar 3/7/22  Impression   No acute fracture the lumbar spine.       Transitional anatomy with lumbarization of S1.  Stepwise grade 1   retrolisthesis of L3 on L4 and L4 on L5.     CT abd/pelvis 3/7/22  Impression   1. No acute intra-abdominal injury. 2. Acute nondisplaced fracture of the right anterior acetabulum extending   into the iliac bone. 3. Acute versus subacute fracture of the right 8th rib.  Correlate with focal   pain. 4. Status post total colectomy with right lower quadrant ostomy.  No   complication.        Assessment:   T4 SEP fracture, 30% loss of height  Acute non-displaced fracture of right anterior acetabulum extending into iliac bone  Acute vs Subacute right 8th rib fracture  Old infarct left frontal lobe, history of stroke September 2021  L1 right transverse process fracture, acuity unknown  Plan:   Patient presenting to the ER after falling off of a 4 foot platform attempting to hook up his semi trailer. His wife is bedside and states that after his fall he drove himself back home before coming to the ER. CT of the head, cervical, thoracic, lumbar spine were obtained. No acute fractures are seen except for T4 where there is 30% loss of vertebral body height and minimal to no retropulsion. Patient does complain of some cervical posterior pain but is able to flex and rotate his head without any difficulties. He has no red flag symptoms at this time consisting of weakness or numbness/tingling in his extremities. He is able to control his urine, he does have a chronic colostomy bag. An MRI of the thoracic spine is pending. Patient does have a [employer] pacemaker placed in July 2021 that is MRI compatible under certain conditions. Patient be bedrest until MRI is obtained. He will at the minimum need bracing, likely a  brace. No bracing needed for right L1 transverse process fracture. Orthopedic surgery following for right acetabular fracture. Electronically signed by Gen Turk PA-C on 3/7/2022 at 12:31 PM    Supervising physician: Karey Walters. Parris Sandoval MD.  Dr. Parris Sandoval was readily and continuously available by phone for direct consultation regarding the care of this patient. Time spent with patient in consultation, education, and collaboration with medical time is >50% of total time spent on case, including time spent in chart review and dictation. Total time spent: 40 minutes    Thank you for the opportunity to participate in the care of your patient.

## 2022-03-07 NOTE — FLOWSHEET NOTE
was hooking up his tractor trail to semi and fell from [place]. States he is on blood thinners. Right hip/back pain.

## 2022-03-07 NOTE — PROGRESS NOTES
4 Eyes Skin Assessment     NAME:  Yina Christensen  YOB: 1960  MEDICAL RECORD NUMBER:  [id number]    The patient is being assess for  Admission    I agree that 2 RN's have performed a thorough Head to Toe Skin Assessment on the patient. ALL assessment sites listed below have been assessed. Areas assessed by both nurses:    Head, Face, Ears, Shoulders, Back, Chest, Arms, Elbows, Hands, Sacrum. Buttock, Coccyx, Ischium and Legs. Feet and Heels        Does the Patient have a Wound? Yes wound(s) were present on assessment. LDA wound assessment was Initiated and completed        Nilo Prevention initiated:  Yes   Wound Care Orders initiated:  No    Pressure Injury (Stage 3,4, Unstageable, DTI, NWPT, and Complex wounds) if present place consult order under [de-identified] NA    New and Established Ostomies if present place consult order under : Yes      Nurse 1 eSignature: Electronically signed by Rogelio Marcus RN on 3/7/22 at 5:53 PM EST    **SHARE this note so that the co-signing nurse is able to place an eSignature**    Nurse 2 eSignature: Electronically signed by Hillary Eckert.  Margret Brittle on 3/8/22 at 2:07 PM EST

## 2022-03-08 ENCOUNTER — APPOINTMENT (OUTPATIENT)
Dept: MRI IMAGING | Age: 62
End: 2022-03-08
Payer: COMMERCIAL

## 2022-03-08 ENCOUNTER — APPOINTMENT (OUTPATIENT)
Dept: GENERAL RADIOLOGY | Age: 62
End: 2022-03-08
Payer: COMMERCIAL

## 2022-03-08 PROBLEM — S32.431G: Status: ACTIVE | Noted: 2022-03-08

## 2022-03-08 LAB
ALBUMIN SERPL-MCNC: 3.7 GM/DL (ref 3.4–5)
ALP BLD-CCNC: 135 IU/L (ref 40–128)
ALT SERPL-CCNC: 23 U/L (ref 10–40)
ANION GAP SERPL CALCULATED.3IONS-SCNC: 13 MMOL/L (ref 4–16)
AST SERPL-CCNC: 17 IU/L (ref 15–37)
BASOPHILS ABSOLUTE: 0 K/CU MM
BASOPHILS RELATIVE PERCENT: 0.4 % (ref 0–1)
BILIRUB SERPL-MCNC: 0.3 MG/DL (ref 0–1)
BUN BLDV-MCNC: 14 MG/DL (ref 6–23)
CALCIUM SERPL-MCNC: 8.8 MG/DL (ref 8.3–10.6)
CHLORIDE BLD-SCNC: 108 MMOL/L (ref 99–110)
CO2: 21 MMOL/L (ref 21–32)
CREAT SERPL-MCNC: 1 MG/DL (ref 0.9–1.3)
DIFFERENTIAL TYPE: ABNORMAL
EOSINOPHILS ABSOLUTE: 0.2 K/CU MM
EOSINOPHILS RELATIVE PERCENT: 2.4 % (ref 0–3)
GFR AFRICAN AMERICAN: >60 ML/MIN/1.73M2
GFR NON-AFRICAN AMERICAN: >60 ML/MIN/1.73M2
GLUCOSE BLD-MCNC: 113 MG/DL (ref 70–99)
GLUCOSE BLD-MCNC: 118 MG/DL (ref 70–99)
GLUCOSE BLD-MCNC: 143 MG/DL (ref 70–99)
GLUCOSE BLD-MCNC: 164 MG/DL (ref 70–99)
GLUCOSE BLD-MCNC: 228 MG/DL (ref 70–99)
HCT VFR BLD CALC: 40.3 % (ref 42–52)
HEMOGLOBIN: 12.5 GM/DL (ref 13.5–18)
IMMATURE NEUTROPHIL %: 0.6 % (ref 0–0.43)
INR BLD: 1.2 INDEX
LYMPHOCYTES ABSOLUTE: 1.3 K/CU MM
LYMPHOCYTES RELATIVE PERCENT: 16.1 % (ref 24–44)
MCH RBC QN AUTO: 29.4 PG (ref 27–31)
MCHC RBC AUTO-ENTMCNC: 31 % (ref 32–36)
MCV RBC AUTO: 94.8 FL (ref 78–100)
MONOCYTES ABSOLUTE: 0.8 K/CU MM
MONOCYTES RELATIVE PERCENT: 10.5 % (ref 0–4)
NUCLEATED RBC %: 0 %
PDW BLD-RTO: 13.8 % (ref 11.7–14.9)
PLATELET # BLD: 193 K/CU MM (ref 140–440)
PMV BLD AUTO: 9.2 FL (ref 7.5–11.1)
POTASSIUM SERPL-SCNC: 4.3 MMOL/L (ref 3.5–5.1)
PROTHROMBIN TIME: 15.5 SECONDS (ref 11.7–14.5)
RBC # BLD: 4.25 M/CU MM (ref 4.6–6.2)
SEGMENTED NEUTROPHILS ABSOLUTE COUNT: 5.4 K/CU MM
SEGMENTED NEUTROPHILS RELATIVE PERCENT: 70 % (ref 36–66)
SODIUM BLD-SCNC: 142 MMOL/L (ref 135–145)
TOTAL IMMATURE NEUTOROPHIL: 0.05 K/CU MM
TOTAL NUCLEATED RBC: 0 K/CU MM
TOTAL PROTEIN: 5.9 GM/DL (ref 6.4–8.2)
WBC # BLD: 7.8 K/CU MM (ref 4–10.5)

## 2022-03-08 PROCEDURE — 85025 COMPLETE CBC W/AUTO DIFF WBC: CPT

## 2022-03-08 PROCEDURE — 85610 PROTHROMBIN TIME: CPT

## 2022-03-08 PROCEDURE — 94640 AIRWAY INHALATION TREATMENT: CPT

## 2022-03-08 PROCEDURE — 6370000000 HC RX 637 (ALT 250 FOR IP): Performed by: NURSE PRACTITIONER

## 2022-03-08 PROCEDURE — G0378 HOSPITAL OBSERVATION PER HR: HCPCS

## 2022-03-08 PROCEDURE — 96361 HYDRATE IV INFUSION ADD-ON: CPT

## 2022-03-08 PROCEDURE — 82962 GLUCOSE BLOOD TEST: CPT

## 2022-03-08 PROCEDURE — 2580000003 HC RX 258: Performed by: NURSE PRACTITIONER

## 2022-03-08 PROCEDURE — 80053 COMPREHEN METABOLIC PANEL: CPT

## 2022-03-08 PROCEDURE — 36415 COLL VENOUS BLD VENIPUNCTURE: CPT

## 2022-03-08 PROCEDURE — 72070 X-RAY EXAM THORAC SPINE 2VWS: CPT

## 2022-03-08 PROCEDURE — 99222 1ST HOSP IP/OBS MODERATE 55: CPT | Performed by: STUDENT IN AN ORGANIZED HEALTH CARE EDUCATION/TRAINING PROGRAM

## 2022-03-08 PROCEDURE — 6360000002 HC RX W HCPCS: Performed by: NURSE PRACTITIONER

## 2022-03-08 PROCEDURE — 99232 SBSQ HOSP IP/OBS MODERATE 35: CPT | Performed by: PHYSICIAN ASSISTANT

## 2022-03-08 PROCEDURE — 94761 N-INVAS EAR/PLS OXIMETRY MLT: CPT

## 2022-03-08 PROCEDURE — 72146 MRI CHEST SPINE W/O DYE: CPT

## 2022-03-08 PROCEDURE — 96376 TX/PRO/DX INJ SAME DRUG ADON: CPT

## 2022-03-08 PROCEDURE — 6360000002 HC RX W HCPCS: Performed by: INTERNAL MEDICINE

## 2022-03-08 RX ORDER — MORPHINE SULFATE 2 MG/ML
2 INJECTION, SOLUTION INTRAMUSCULAR; INTRAVENOUS
Status: COMPLETED | OUTPATIENT
Start: 2022-03-08 | End: 2022-03-08

## 2022-03-08 RX ADMIN — CYANOCOBALAMIN TAB 1000 MCG 1000 MCG: 1000 TAB at 10:02

## 2022-03-08 RX ADMIN — PREDNISONE 13 MG: 1 TABLET ORAL at 10:06

## 2022-03-08 RX ADMIN — ATORVASTATIN CALCIUM 40 MG: 40 TABLET, FILM COATED ORAL at 22:57

## 2022-03-08 RX ADMIN — TIOTROPIUM BROMIDE INHALATION SPRAY 2 PUFF: 3.12 SPRAY, METERED RESPIRATORY (INHALATION) at 07:52

## 2022-03-08 RX ADMIN — KETOROLAC TROMETHAMINE 15 MG: 30 INJECTION, SOLUTION INTRAMUSCULAR; INTRAVENOUS at 05:32

## 2022-03-08 RX ADMIN — ASPIRIN 81 MG 81 MG: 81 TABLET ORAL at 10:03

## 2022-03-08 RX ADMIN — INSULIN LISPRO 2 UNITS: 100 INJECTION, SOLUTION INTRAVENOUS; SUBCUTANEOUS at 16:48

## 2022-03-08 RX ADMIN — BUDESONIDE AND FORMOTEROL FUMARATE DIHYDRATE 2 PUFF: 160; 4.5 AEROSOL RESPIRATORY (INHALATION) at 07:52

## 2022-03-08 RX ADMIN — FERROUS SULFATE TAB 325 MG (65 MG ELEMENTAL FE) 325 MG: 325 (65 FE) TAB at 17:04

## 2022-03-08 RX ADMIN — METOPROLOL SUCCINATE 25 MG: 25 TABLET, EXTENDED RELEASE ORAL at 12:05

## 2022-03-08 RX ADMIN — PANTOPRAZOLE SODIUM 40 MG: 40 TABLET, DELAYED RELEASE ORAL at 22:57

## 2022-03-08 RX ADMIN — MORPHINE SULFATE 2 MG: 2 INJECTION, SOLUTION INTRAMUSCULAR; INTRAVENOUS at 08:02

## 2022-03-08 RX ADMIN — KETOROLAC TROMETHAMINE 15 MG: 30 INJECTION, SOLUTION INTRAMUSCULAR; INTRAVENOUS at 00:23

## 2022-03-08 RX ADMIN — APIXABAN 5 MG: 5 TABLET, FILM COATED ORAL at 22:57

## 2022-03-08 RX ADMIN — FERROUS SULFATE TAB 325 MG (65 MG ELEMENTAL FE) 325 MG: 325 (65 FE) TAB at 10:11

## 2022-03-08 RX ADMIN — SODIUM CHLORIDE, PRESERVATIVE FREE 10 ML: 5 INJECTION INTRAVENOUS at 22:57

## 2022-03-08 RX ADMIN — HYDROCODONE BITARTRATE AND ACETAMINOPHEN 2 TABLET: 5; 325 TABLET ORAL at 22:57

## 2022-03-08 RX ADMIN — INSULIN LISPRO 1 UNITS: 100 INJECTION, SOLUTION INTRAVENOUS; SUBCUTANEOUS at 11:53

## 2022-03-08 RX ADMIN — PANTOPRAZOLE SODIUM 40 MG: 40 TABLET, DELAYED RELEASE ORAL at 10:02

## 2022-03-08 RX ADMIN — CLOPIDOGREL BISULFATE 75 MG: 75 TABLET ORAL at 10:03

## 2022-03-08 RX ADMIN — DULOXETINE HYDROCHLORIDE 60 MG: 30 CAPSULE, DELAYED RELEASE ORAL at 10:03

## 2022-03-08 RX ADMIN — SODIUM CHLORIDE, PRESERVATIVE FREE 10 ML: 5 INJECTION INTRAVENOUS at 10:14

## 2022-03-08 RX ADMIN — HYDROCODONE BITARTRATE AND ACETAMINOPHEN 2 TABLET: 5; 325 TABLET ORAL at 17:04

## 2022-03-08 RX ADMIN — APIXABAN 5 MG: 5 TABLET, FILM COATED ORAL at 10:03

## 2022-03-08 ASSESSMENT — PAIN DESCRIPTION - DESCRIPTORS
DESCRIPTORS: SHARP
DESCRIPTORS: SHARP
DESCRIPTORS: DISCOMFORT

## 2022-03-08 ASSESSMENT — PAIN DESCRIPTION - ORIENTATION
ORIENTATION: RIGHT

## 2022-03-08 ASSESSMENT — PAIN SCALES - GENERAL
PAINLEVEL_OUTOF10: 8
PAINLEVEL_OUTOF10: 9
PAINLEVEL_OUTOF10: 6
PAINLEVEL_OUTOF10: 8
PAINLEVEL_OUTOF10: 4

## 2022-03-08 ASSESSMENT — PAIN - FUNCTIONAL ASSESSMENT
PAIN_FUNCTIONAL_ASSESSMENT: ACTIVITIES ARE NOT PREVENTED
PAIN_FUNCTIONAL_ASSESSMENT: PREVENTS OR INTERFERES SOME ACTIVE ACTIVITIES AND ADLS

## 2022-03-08 ASSESSMENT — ENCOUNTER SYMPTOMS
COUGH: 0
COLOR CHANGE: 0
BACK PAIN: 1
SHORTNESS OF BREATH: 0
VOMITING: 0
NAUSEA: 0
WHEEZING: 0
SORE THROAT: 0
VOICE CHANGE: 0

## 2022-03-08 ASSESSMENT — PAIN DESCRIPTION - PAIN TYPE
TYPE: ACUTE PAIN
TYPE: ACUTE PAIN

## 2022-03-08 ASSESSMENT — PAIN DESCRIPTION - PROGRESSION
CLINICAL_PROGRESSION: NOT CHANGED
CLINICAL_PROGRESSION: GRADUALLY IMPROVING

## 2022-03-08 ASSESSMENT — PAIN DESCRIPTION - ONSET
ONSET: ON-GOING

## 2022-03-08 ASSESSMENT — PAIN DESCRIPTION - LOCATION
LOCATION: BACK;HIP
LOCATION: HIP
LOCATION: BACK;HIP;NECK

## 2022-03-08 ASSESSMENT — PAIN DESCRIPTION - DIRECTION: RADIATING_TOWARDS: DOWN

## 2022-03-08 ASSESSMENT — PAIN DESCRIPTION - FREQUENCY
FREQUENCY: CONTINUOUS

## 2022-03-08 NOTE — CONSULTS
3/7/2022   Chief Complaint   Patient presents with    Hip Pain        History of Present Illness:                             Ernestina Sims is a 60 y.o. 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 He was able to get off the ground and was able to drive himself to the emergency room where he presented. Imaging demonstrated a nondisplaced acetabular fracture. He also had a thoracic spine T4 compression fracture without any associated numbness or tingling. He was subsequently admitted for further evaluation and orthopedics was consulted. He denies any prior right hip pain or any prior right hip injury. His right buttock is the only area orthopedically that he has complaints other than his thoracic spine. He has no other complaints at this time and again this includes any numbness or tingling. I have not previously seen this patient. The pain's location is posterior acetabulum. he describes the symptoms as aching, sharp and stabbing. Symptoms improve with rest, especially nonweightbearing. Symptoms worsen with weight bearing but are minimal with logrolling of the hip. No prominent mechanical symptoms. Denies sensation of instability. Treatment thus far has included rest, including being made nonweightbearing. Patient denies any previous surgery to the hip or lumbar spine. Outside reports reviewed: Emergency room note and imaging reviewed    Patient's medications, allergies, past medical, surgical, social and family histories were reviewed and updated as appropriate. Medical History  Patient's medications, allergies, past medical, surgical, social and family histories were reviewed and updated as appropriate.     Past Medical History:   Diagnosis Date    Arthritis 4/23/2021    Atrial fibrillation, chronic ([email or web address] 12/2020    Cerebrovascular accident ([email or web address] 9/9/2021    Closed displaced fracture of anterior column of right acetabulum ([email or web address] 3/7/2022    COPD (chronic obstructive pulmonary disease) ([email or web address] 06/15/2021    Coronary artery disease involving native coronary artery of native heart without angina pectoris 06/15/2021    Diabetes ([email or web address] 9/10/2021    H/O echocardiogram 02/02/2021    EF is estimated at 50%. presence of PVC affected LVEF estimation, Mild TR.    Heart disease     stint    History of blood transfusion     Hx of blood clots     Hx of cardiovascular stress test 06/11/2021    Small size severe intensity,perfusion defect in apical wall.  Hx of Doppler ultrasound 04/09/2021    Left distal SSV shows occlusive chronic SVT.  Hypertension     Myasthenia gravis ([email or web address]     NSTEMI (non-ST elevated myocardial infarction) ([facility] 12/2020    Post PTCA 12/14/2020    Ramus Stented.  S/P ablation of atrial fibrillation 09/08/2021    S/P ablation of afib PVI performed by Dr. Dulce Maria Segundo.  TIA (transient ischemic attack)     Ulcerative colitis ([email or web address]      Past Surgical History:   Procedure Laterality Date    CARDIAC SURGERY      ILEOSTOMY OR JEJUNOSTOMY      PACEMAKER INSERTION N/A 07/07/2021    PACEMAKER INSERTION PERMANENT performed by Lucia Boo MD at [address]    PTCA  12/14/2020    Ramus Stented.     TONSILLECTOMY      UPPER GASTROINTESTINAL ENDOSCOPY N/A 06/15/2021    EGD DIAGNOSTIC ONLY performed by Eliane Olmos MD at [address] [place] [address] N/A 07/04/2021    EGD DIAGNOSTIC ONLY performed by Yemi Altman MD at [facility] ENDOSCOPY     Family History   Family history unknown: Yes     Social History     Socioeconomic History    Marital status:      Spouse name: None    Number of children: None    Years of education: None    Highest education level: None   Occupational History    None   Tobacco Use    Smoking status: Former Smoker     Packs/day: 0.25     Types: Cigarettes     Quit date: 2021     Years since quittin.9    Smokeless tobacco: Never Used   Substance and Sexual Activity    Alcohol use: Yes     Alcohol/week: 1.0 standard drink     Types: 1 Cans of beer per week     Comment: occ    Drug use: No    Sexual activity: Yes     Partners: Female   Other Topics Concern    None   Social History Narrative    None     Social Determinants of Health     Financial Resource Strain:     Difficulty of Paying Living Expenses: Not on file   Food Insecurity:     Worried About Running Out of Food in the Last Year: Not on file    Ross of Food in the Last Year: Not on file   Transportation Needs:     Lack of Transportation (Medical): Not on file    Lack of Transportation (Non-Medical):  Not on file   Physical Activity:     Days of Exercise per Week: Not on file    Minutes of Exercise per Session: Not on file   Stress:     Feeling of Stress : Not on file   Social Connections:     Frequency of Communication with Friends and Family: Not on file    Frequency of Social Gatherings with Friends and Family: Not on file    Attends [employer] Services: Not on file    Active Member of [address] [place] [address] or Organizations: Not on file    Attends Club or Organization Meetings: Not on file    Marital Status: Not on file   Intimate Partner Violence:     Fear of Current or Ex-Partner: Not on file    Emotionally Abused: Not on file    Physically Abused: Not on file    Sexually Abused: Not on file   Housing Stability:     Unable to Pay for Housing in the Last Year: Not on file    Number of [place] in the Last Year: Not on file    Unstable Housing in the Last Year: Not on file     Current Facility-Administered Medications   Medication Dose Route Frequency Provider Last Rate Last Admin    HYDROcodone-acetaminophen (NORCO) 5-325 MG per tablet 1 tablet  1 tablet Oral Q4H PRN JAMIE Herrera CNP        Or   [place] HYDROcodone-acetaminophen (NORCO) 5-325 MG per tablet 2 tablet  2 tablet Oral Q4H PRN JAMIE Chatman CNP   2 tablet at 03/07/22 2154    lidocaine 4 % external patch 1 patch  1 patch TransDERmal Daily JAMIE Hazel CNP   1 patch at 03/08/22 1004    acetaminophen (TYLENOL) tablet 650 mg  650 mg Oral Q4H PRN JAMIE Hazel CNP        sodium chloride flush 0.9 % injection 5-40 mL  5-40 mL IntraVENous 2 times per day JAMIE Chatman - CNP   10 mL at 03/08/22 1014    sodium chloride flush 0.9 % injection 5-40 mL  5-40 mL IntraVENous PRN JAMIE Hazel - CNP        0.9 % sodium chloride infusion  25 mL IntraVENous PRN JAMIE Hazel CNP        ondansetron (ZOFRAN-ODT) disintegrating tablet 4 mg  4 mg Oral Q8H PRN JAMIE Hazel CNP        Or    ondansetron (ZOFRAN) injection 4 mg  4 mg IntraVENous Q6H PRN JAMIE Hazel - CNP        polyethylene glycol (GLYCOLAX) packet 17 g  17 g Oral Daily PRN JAMIE Hazel CNP        bisacodyl (DULCOLAX) EC tablet 5 mg  5 mg Oral Daily JAMIE Hazel CNP        aspirin chewable tablet 81 mg  81 mg Oral Daily JAMIE Hazel CNP   81 mg at 03/08/22 1003    atorvastatin (LIPITOR) tablet 40 mg  40 mg Oral Nightly JAMIE Hazel CNP   40 mg at 03/07/22 2143    budesonide-formoterol (SYMBICORT) 160-4.5 MCG/ACT inhaler 2 puff  2 puff Inhalation BID JAMIE Chatman CNP   2 puff at 03/08/22 0752    clopidogrel (PLAVIX) tablet 75 mg  75 mg Oral Daily JAMIE Hazel CNP   75 mg at 03/08/22 1003    DULoxetine (CYMBALTA) extended release capsule 60 mg  60 mg Oral Daily JAMIE Hazel CNP   60 mg at 03/08/22 1003    apixaban (ELIQUIS) tablet 5 mg  5 mg Oral BID JAMIE Chatman CNP   5 mg at 03/08/22 1003    pantoprazole (PROTONIX) tablet 40 mg  40 mg Oral BID Monserrat GUERRA JAMIE Adamson CNP   40 mg at 03/08/22 1002    predniSONE (DELTASONE) tablet 13 mg  13 mg Oral Daily JAMIE Hazel CNP   13 mg at 03/08/22 1006    tiotropium (SPIRIVA RESPIMAT) 2.5 MCG/ACT inhaler 2 puff  2 puff Inhalation Daily JAMIE Bansal CNP   2 puff at 03/08/22 0752    vitamin B-12 (CYANOCOBALAMIN) tablet 1,000 mcg  1,000 mcg Oral Daily JAMIE Bansal CNP   1,000 mcg at 03/08/22 1002    ferrous sulfate (IRON 325) tablet 325 mg  325 mg Oral BID  JAMIE Hazel CNP   325 mg at 03/08/22 1011    metoprolol succinate (TOPROL XL) extended release tablet 25 mg  25 mg Oral Daily JAMIE Hazel CNP        glucose (GLUTOSE) 40 % oral gel 15 g  15 g Oral PRN JAMIE Hazel CNP        glucagon (rDNA) injection 1 mg  1 mg IntraMUSCular PRN JAMIE Hazel CNP        dextrose 5 % solution  100 mL/hr IntraVENous PRN JAMIE Hazel CNP        insulin lispro (HUMALOG) injection vial 10 Units  0.08 Units/kg SubCUTAneous TID  JAMIE Hazel CNP   10 Units at 03/08/22 1157    insulin lispro (HUMALOG) injection vial 0-6 Units  0-6 Units SubCUTAneous TID  JAMIE Hazel CNP   1 Units at 03/08/22 1153    insulin lispro (HUMALOG) injection vial 0-3 Units  0-3 Units SubCUTAneous Nightly JAMIE Hazel CNP        dextrose bolus (hypoglycemia) 10% 125 mL  125 mL IntraVENous PRN JAMIE Hazel CNP        Or    dextrose bolus (hypoglycemia) 10% 250 mL  250 mL IntraVENous PRN JAMIE Hazel CNP        Zilucoplan ( RA [id number] )  40mg/ 1ml -prefilled syr .  once Q PM  40 mg SubCUTAneous QPM JAMIE Hazel CNP   40 mg at 03/07/22 0206     Facility-Administered Medications Ordered in Other Encounters   Medication Dose Route Frequency Provider Last Rate Last Admin    Immune Globulin (Human) solution 20 g  20 g IntraVENous Once Christiano Montejo MD        And    Immune Globulin (Human) solution 20 g  20 g IntraVENous Once Christiano Montejo MD        Immune Globulin (Human) solution 20 g  20 g IntraVENous Once Christiano Montejo MD        And    Immune Globulin (Human) solution 20 g  20 g IntraVENous Once Christiano Montejo MD         No Known Allergies      Review of Systems   Constitutional: Positive for activity change. Negative for fatigue and fever. HENT: Negative for sneezing, sore throat and voice change. Respiratory: Negative for cough, shortness of breath and wheezing. Cardiovascular: Negative for leg swelling. Gastrointestinal: Negative for nausea and vomiting. Musculoskeletal: Positive for arthralgias, back pain, gait problem and myalgias. Negative for joint swelling, neck pain and neck stiffness. Skin: Negative for color change, pallor, rash and wound. Neurological: Negative for weakness and numbness. Psychiatric/Behavioral: Negative for behavioral problems, confusion and self-injury. Examination:  General Exam:  Vitals: /81   Pulse 59   Temp 97.7 °F (36.5 °C) (Oral)   Resp 16   Ht 5' 11\" (1.803 m)   Wt 285 lb (129.3 kg)   SpO2 91%   BMI 39.75 kg/m²    Physical Exam  Constitutional:       General: He is not in acute distress. Appearance: Normal appearance. He is obese. He is not ill-appearing. HENT:      Head: Normocephalic and atraumatic. Nose: Nose normal.      Mouth/Throat:      Mouth: Mucous membranes are dry. Eyes:      Extraocular Movements: Extraocular movements intact. Cardiovascular:      Rate and Rhythm: Normal rate. Pulses: Normal pulses. Pulmonary:      Effort: Pulmonary effort is normal.      Breath sounds: Normal breath sounds. Musculoskeletal:         General: Tenderness and signs of injury present. No swelling or deformity. Cervical back: Normal range of motion.       Lumbar back: Normal. Right hip: Tenderness and bony tenderness present. No deformity, lacerations or crepitus. Normal range of motion. Normal strength. Left hip: Normal.      Right upper leg: Normal.      Left upper leg: Normal.      Right knee: Normal.      Left knee: Normal.      Right lower leg: No edema. Left lower leg: No edema. Skin:     General: Skin is warm and dry. Capillary Refill: Capillary refill takes less than 2 seconds. Neurological:      General: No focal deficit present. Mental Status: He is alert and oriented to person, place, and time. Mental status is at baseline. Psychiatric:         Mood and Affect: Mood normal.         Behavior: Behavior normal.        RIGHT HIP EXAMINATION:      OBSERVATION / INSPECTION    Gait: Not tested    Alignment: Neutral     Scars: None     Muscle atrophy: None     Effusion: None     Warmth: None     Discoloration: None     Leg lengths: Equal     Pelvis: Level       TENDERNESS / CREPITUS (T/C):    T / C    Trochanteric bursa - / -    Piriformis + / -    SI joint - / -    Psoas tendon - / -    Rectus insertion - / -    Adductor insertion - / -    Pubic symphysis - / -      ROM: (* = pain)      Flexion:  120 degrees*    External rotation: 40 degrees    Internal rotation with axial load: 30 degrees    Internal rotation without axial load: 40 degrees    Abduction: 45 degrees    Adduction: 20 degrees      SPECIAL TESTS:    Log roll: Negative     Straight leg raise: Negative      EXTREMITY NEURO-VASCULAR EXAMINATION:     Sensation:  Grossly intact to light touch all dermatomal regions. Motor Function:  Fully intact motor function at hip, knee, foot and ankle      DTRs;  quadriceps and  achilles 2+. No clonus and downgoing Babinski. Vascular status:  DP and PT pulses 2+, brisk capillary refill, symmetric. Skin: intact, compartments soft. Small laceration over left patella that is clean in nature.   No other areas of tenderness to palpation including contralateral hip as well as bilateral thighs, knees, lower legs, ankles, feet. No other areas of bruising or deformity. Full range of motion throughout the left hip as well as bilateral knees and ankles without pain. Diagnostic testing:  X-ray images were reviewed by myself and discussed with the patient:    X-ray right hip with pelvis:  Postsurgical changes are seen involving the pelvis.  There is no acute   fracture, dislocation, or bone destruction.  Mild degenerative changes are   seen involving the bilateral hips. X-ray right elbow:  There is no elbow effusion. Demian Motto is no acute fracture or dislocation. Alignment is normal.         CT cervical spine:  1.  No acute fracture or subluxation of the cervical spine.       2.  Multilevel degenerative disc disease resulting in spinal canal stenosis   and neural foraminal narrowing from C4-5 through C6-7. CT thoracic spine:  Superior endplate compression deformity at T4 with approximately 30%   vertebral body height loss is new since the CT scan 09/09/2021.  The fracture   does not clearly involve the posterior column         CT lumbar spine:  No acute fracture the lumbar spine.       Transitional anatomy with lumbarization of S1.  Stepwise grade 1   retrolisthesis of L3 on L4 and L4 on L5.         CT abdomen pelvis:  1. No acute intra-abdominal injury. 2. Acute nondisplaced fracture of the right anterior acetabulum extending   into the iliac bone. 3. Acute versus subacute fracture of the right 8th rib.  Correlate with focal   pain. 4. Status post total colectomy with right lower quadrant ostomy.  No   complication.          MRI thoracic spine:  Acute-to-subacute compression fracture of T4 with loss of 30% of vertebral   body height.  No retropulsion is evident.       No thoracic cord contusion.       Mild deformity of the superior endplate of T3 is secondary to Schmorl's node.       Moderate-sized central disc protrusions at T5-6 and T6-7 resulting in no   significant cord compression.       Mild central canal stenosis at T9-10 and T10-11.       Moderate central canal stenosis at T11-12. Office Procedures:  Orders Placed This Encounter   Procedures    XR HIP 2-3 VW W PELVIS RIGHT     Standing Status:   Standing     Number of Occurrences:   1     Order Specific Question:   Reason for exam:     Answer:   fall, right hip pain    CT HEAD WO CONTRAST     Standing Status:   Standing     Number of Occurrences:   1     Order Specific Question:   Has a \"code stroke\" or \"stroke alert\" been called?      Answer:   No     Order Specific Question:   Reason for exam:     Answer:   fall, hit head, on eliquis     Order Specific Question:   Decision Support Exception - unselect if not a suspected or confirmed emergency medical condition     Answer:   Emergency Medical Condition (MA) [1]    CT CERVICAL SPINE WO CONTRAST     Standing Status:   Standing     Number of Occurrences:   1     Order Specific Question:   Reason for exam:     Answer:   fall     Order Specific Question:   Decision Support Exception - unselect if not a suspected or confirmed emergency medical condition     Answer:   Emergency Medical Condition (MA) [1]    CT THORACIC SPINE WO CONTRAST     CT&nbsp;&nbsp;T-Spine w/o Contrast     Standing Status:   Standing     Number of Occurrences:   1     Order Specific Question:   Reason for exam:     Answer:   fall from 4 ft, low thoracic pain    CT LUMBAR SPINE WO CONTRAST     Standing Status:   Standing     Number of Occurrences:   1     Order Specific Question:   Reason for exam:     Answer:   fall from 4 ft, diffuse pain     Order Specific Question:   Decision Support Exception - unselect if not a suspected or confirmed emergency medical condition     Answer:   Emergency Medical Condition (MA) [1]    CT ABDOMEN PELVIS WO CONTRAST Additional Contrast? None     Standing Status:   Standing     Number of Occurrences:   1     Order Specific Regular    Vital signs per unit routine     Standing Status:   Standing     Number of Occurrences:   1    Strict Bedrest     Standing Status:   Standing     Number of Occurrences:   1    Place intermittent pneumatic compression device     Standing Status:   Standing     Number of Occurrences:   1    Notify Provider     For blood glucose less than 50mg/dL or patient not alert, blood glucose less than 70mg/dL after two treatments, or for blood glucose greater than 400. Standing Status:   Standing     Number of Occurrences:   1    HYPOGLYCEMIA TREATMENT: blood glucose less than 50 mg/dL and patient  ALERT and TOLERATING PO     Give 8 ounces juice or regular soda or 2 tubes glucose gel. Repeat blood glucose in 15 minutes. If blood glucose is less than 70 mg/dL, repeat treatment and recheck blood glucose in 15 minutes x2 and notify provider. Standing Status:   Standing     Number of Occurrences:   03023    HYPOGLYCEMIA TREATMENT: blood glucose less than 70 mg/dL and patient ALERT and TOLERATING PO     Give 4 ounces juice or regular soda or 1 tube glucose gel. Repeat blood glucose in 15 minutes. If blood glucose is less than 70 mg/dL, repeat treatment and recheck blood glucose in 15 minutes x2. If blood glucose remains less than 70 mg/dL, notify provider. Standing Status:   Standing     Number of Occurrences:   [id number]    HYPOGLYCEMIA TREATMENT: blood glucose less than 70 mg/dL and patient NOT ALERT or NPO     Give dextrose 50% intravenous. If patient does not respond within 5 minutes, repeat dose x1. Start D5W at 100 mL/hour until ordering provider can be reached. Repeat blood glucose in 15 minutes. If blood glucose is less than 70 mg/dL, repeat treatment and recheck blood glucose in 15 minutes x2. Notify provider. If no intravenous access, administer glucagon 1 mg. After administration, attempt intravenous access and start D5W at 100 mL/hr. Repeat blood glucose in 15 minutes x2 and notify provider. Standing Status:   Standing     Number of Occurrences:   [id number]    Neuro checks     Standing Status:   Standing     Number of Occurrences:   1    Full Code     Standing Status:   Standing     Number of Occurrences:   1    Inpatient consult to Orthopedic Surgery     Standing Status:   Standing     Number of Occurrences:   1     Order Specific Question:   Reason for Consult? Answer:   Patient in ED     Order Specific Question:   Provider Contacted? Answer:   No     Order Specific Question:   When to contact consulting provider     Answer:   As soon as possible    Inpatient consult to Neurosurgery     Standing Status:   Standing     Number of Occurrences:   1     Order Specific Question:   Reason for Consult? Answer:   Pt in ED    Consult to Hospitalist     Standing Status:   Standing     Number of Occurrences:   1     Order Specific Question:   Reason for Consult? Answer:   Pt in ED    Inpatient consult to Orthopedic Surgery     Standing Status:   Standing     Number of Occurrences:   1     Order Specific Question:   Reason for Consult? Answer:   Acute nondisplaced fracture of the right anterior acetabulum extending    OT eval and treat     Standing Status:   Standing     Number of Occurrences:   1    PT eval and treat     Standing Status:   Standing     Number of Occurrences:   1    Initiate Oxygen Therapy Protocol     Initiate oxygen therapy if the patient has 1) SpO2 is less than 90%, 2) Cyanosis, Chest Pain, Dyspnea, or Altered level of consciousness AND SpO2 checked and it is less than 90%, or 3) patient on Home oxygen. To initiate oxygen therapy: nurse or RT enters Nasal cannula oxygen order using Per Protocol without Cosign order mode (if indication Home Oxygen then change L/min to same amount at home and change Wean to Room Air to No). Notify provider if initiate oxygen therapy unless already on Home Oxygen.      Standing Status:   Standing     Number of Occurrences: [address] glucose     If blood sugar is below 110 at bedtime, then check blood glucose at 0200. Standing Status:   Standing     Number of Occurrences:   26    POCT Glucose     Repeat blood glucose 15 minutes following intervention. If blood glucose is less than 70 mg/dL, repeat treatment and recheck blood glucose in 15 minutes x2. If blood glucose remains less than 70 mg/dL, call ordering provider for further instruction. If patient experienced a hypoglycemic event in last 24 hours, obtain blood glucose at 0200. Standing Status:   Standing     Number of Occurrences:   [id number]    POCT Glucose     Standing Status:   Standing     Number of Occurrences:   1    POCT Glucose     Standing Status:   Standing     Number of Occurrences:   1    POCT Glucose     Standing Status:   Standing     Number of Occurrences:   1    POCT Glucose     Standing Status:   Standing     Number of Occurrences:   1    TYPE AND SCREEN     Specimen is valid for 3 days - nurse to verify valid specimen     Standing Status:   Standing     Number of Occurrences:   1    Saline lock IV     Standing Status:   Standing     Number of Occurrences:   1    ADMIT TO INPATIENT     Standing Status:   Standing     Number of Occurrences:   1     Order Specific Question:   Discharge Plan:     Answer: Other/Uknown (Specify)     Order Specific Question:   Telemetry/Cardiac Monitoring Required? Answer:   No    Fall precautions     Standing Status:   Standing     Number of Occurrences:   1    DME Order for Orthosis as OP      brace to be worn when upright and out of bed     Standing Status:   Standing     Number of Occurrences:   1       Assessment and Plan    A: Right hip nondisplaced acetabular fracture, anterior:    P:   I had a thorough conversation with the patient regarding his right hip injury and treatment course.   I explained the nondisplaced nature of his injury and how at this time we can treat him nonoperatively with a prolonged course of nonweightbearing which will likely be 6 to 8 weeks, likely close to 8 weeks due to his size and health issues. We will follow him closely in office and he will see me within 1 week of discharge for new imaging in office. If he remains nondisplaced we will continue the conservative treatment course. If significant displacement is seen, we will likely refer him to a traumatologist to discuss surgical intervention. However, I explained that the nature of his injury and how it is nondisplaced that he will likely do well if he can remain nonweightbearing he feels that this would not be a problem. No further orthopedic intervention plan at this time. Patient will continue his anticoagulation that he was on previously for a cardiac issue. Please contact me if there are any issues regarding the patient I will be happy to see him. Otherwise I will see the patient in office.     Electronically signed by Cam Das DO on 3/8/2022 at 12:02 PM

## 2022-03-08 NOTE — PROGRESS NOTES
Neurosurgery Progress Note  3/8/2022 11:07 AM    Assessment and Plan:   T4 compression fracture-patient falling from 4 foot high platform from [address] patient completed MRI today showing an acute to subacute fracture T4 was roughly 30% loss of height, no retropulsion noted.  brace ordered. Patient to wear this whenever upright and out of bed. Thoracic x-rays with brace in place ordered. Patient to follow-up with my office in 6 weeks with repeat thoracic x-rays. Return precautions placed in discharge instructions. L1 right transverse process fracture-acuity unknown, no bracing needed even if acute  Acute to subacute 8th rib racture  Right acetabular fracture extending into iliac crest-orthopedics consulted  Myasthenia gravis  Ulcerative Colitis    Supervising physician: Esperanza Baker. Abilio Garcia MD.  Dr. Abilio Garcia was readily and continuously available by phone for direct consultation regarding the care of this patient. Subjective:   Admit Date: 3/7/2022  PCP: JAMIE Chapa    Patient laying in bed, just completed thoracic MRI. He continues to deny any numbness/tingling in any of his extremities. Continues to have right hip pain and mid back. Continues to be able to control his urine, has colostomy bag.     Data:   Scheduled Meds:   lidocaine  1 patch TransDERmal Daily    sodium chloride flush  5-40 mL IntraVENous 2 times per day    bisacodyl  5 mg Oral Daily    aspirin  81 mg Oral Daily    atorvastatin  40 mg Oral Nightly    budesonide-formoterol  2 puff Inhalation BID    clopidogrel  75 mg Oral Daily    DULoxetine  60 mg Oral Daily    apixaban  5 mg Oral BID    pantoprazole  40 mg Oral BID    predniSONE  13 mg Oral Daily    tiotropium  2 puff Inhalation Daily    vitamin B-12  1,000 mcg Oral Daily    ferrous sulfate  325 mg Oral BID WC    metoprolol succinate  25 mg Oral Daily    insulin lispro  0.08 Units/kg SubCUTAneous TID WC    insulin lispro  0-6 Units SubCUTAneous TID WC    insulin lispro  0-3 Units SubCUTAneous Nightly    Zilucoplan ( [id number] )  40mg/ 1ml -prefilled syr . once Q PM  40 mg SubCUTAneous QPM         No intake/output data recorded. No intake/output data recorded. No intake or output data in the 24 hours ending 03/08/22 1107  CULTURE results: Invalid input(s): BLOOD CULTURE,  URINE CULTURE, SURGICAL CULTURE  CBC:   Recent Labs     03/07/22  1055 03/08/22  0127   WBC 13.1* 7.8   HGB 13.2* 12.5*    193     BMP:    Recent Labs     03/07/22  1055 03/08/22  0127   * 142   K 4.5 4.3    108   CO2 19* 21   BUN 15 14   CREATININE 1.0 1.0   GLUCOSE 140* 113*     Hepatic:   Recent Labs     03/07/22  1055 03/08/22  0127   AST 29 17   ALT 29 23   BILITOT 0.3 0.3   ALKPHOS 145* 135*         IMAGING: available xray, CT, and MRI results reviewed    MRI thoracic spine 3/8/22  Impression   Acute-to-subacute compression fracture of T4 with loss of 30% of vertebral   body height.  No retropulsion is evident.       No thoracic cord contusion.       Mild deformity of the superior endplate of T3 is secondary to Schmorl's node.       Moderate-sized central disc protrusions at T5-6 and T6-7 resulting in no   significant cord compression.       Mild central canal stenosis at T9-10 and T10-11.       Moderate central canal stenosis at T11-12. Objective:   Vitals: /81   Pulse 59   Temp 97.7 °F (36.5 °C) (Oral)   Resp 16   Ht 5' 11\" (1.803 m)   Wt 285 lb (129.3 kg)   SpO2 91%   BMI 39.75 kg/m²   General appearance: alert, laying in bed, just completed MRI  HEENT: normocephalic, atraumatic, EOM intact  Neurologic: Mental status: Alert and oriented x4, speech clear and coherent, no facial droop, follows commands briskly, sensation intact in all extremities  Extremities: Bilateral upper extremities throughout, left lower extremity 5/5 throughout, right lower extremity not tested due to right acetabular fracture.   Incision: none  Drains: none      Electronically signed by Esperanza Abel PA-C on 3/8/2022 at 11:07 AM

## 2022-03-08 NOTE — PROGRESS NOTES
Hospitalist Progress Note      Name:  Samira Parnell /Age/Sex: 1960  (60 y.o. male)   MRN & CSN:  [id number] & [id number] Admission Date/Time: 3/7/2022  6:05 AM   Location:  [address] [place] [address]- PCP: Negin Garcia 74 Day: 2    ASSESSMENT & PLAN:  Samira Parnell is a 60 y.o.  male with PMHx of myasthenia gravis, coronary artery disease, PAF, s/p ablation, CVA, COPD, history of GI bleeding, parastomal hernia, ulcerative colitis, HTN, who presented after a fall while trying to hook up a trailer to his truck and subsequently slipped. 1.  Acute to subacute T4 compression fracture with 30% vertebral height loss  2. Acute nondisplaced fracture of the right anterior acetabulum extending to the iliac bone--most likely nonoperative  3. PAF--s/p ablation  4. Coronary artery disease  5. Morbid obesity--285 pounds, BMI 39.75  6. Type II DM--not on insulin  7. Myasthenia gravis  8. History of GI bleeding  9. HTN  10. CVA  11. COPD    Plan  -Pain control with Norco as needed  -PT/OT  -Ortho consultation  -Await final neurosurgery recommendations    Diet ADULT DIET; Regular   DVT Prophylaxis [x] Eliquis, []  Heparin, [] SCDs, [] Ambulation   GI Prophylaxis [] PPI,  [] H2 Blocker,  [] Carafate,  [] Diet/Tube Feeds   Code Status Full Code   Disposition  to be decided   MDM [] Low, [x] Moderate,[]  High     Chief complaint/Interval History/ROS     Chief Complaint: Fall at home    INTERVAL HISTORY:    No overnight events. MRI of the lumbar spine confirm compression fracture at the level of L4 with 30% height loss. Patient is also reported right hip pain. ROS:  No chest pain. No abdominal pain. No nausea or vomiting. Objective:   No intake or output data in the 24 hours ending 22 1106   Vitals:   Vitals:    22 0945   BP: 124/81   Pulse: 59   Resp: 16   Temp: 97.7 °F (36.5 °C)   SpO2: 91%     Physical Exam:   GEN: Awake male, morbid obese. Nontoxic-appearing. Pleasant.   Answers questions. EYES: No discharge. Ocular muscles intact. HENT: Membranes moist.  NECK: Supple, no neck mass. RESP: Clear to auscultation bilaterally  CV: RRR. No lower extremity edema. GI: Abdomen soft. Nontender. Nondistended. : No Andres. MSK: L4 compression fracture. Right anterior acetabular fracture. SKIN: warm, dry, no rashes, no pressure ulcer  PSYCH: Awake, alert, oriented     Medications:   Medications:    lidocaine  1 patch TransDERmal Daily    sodium chloride flush  5-40 mL IntraVENous 2 times per day    bisacodyl  5 mg Oral Daily    aspirin  81 mg Oral Daily    atorvastatin  40 mg Oral Nightly    budesonide-formoterol  2 puff Inhalation BID    clopidogrel  75 mg Oral Daily    DULoxetine  60 mg Oral Daily    apixaban  5 mg Oral BID    pantoprazole  40 mg Oral BID    predniSONE  13 mg Oral Daily    tiotropium  2 puff Inhalation Daily    vitamin B-12  1,000 mcg Oral Daily    ferrous sulfate  325 mg Oral BID WC    metoprolol succinate  25 mg Oral Daily    insulin lispro  0.08 Units/kg SubCUTAneous TID WC    insulin lispro  0-6 Units SubCUTAneous TID WC    insulin lispro  0-3 Units SubCUTAneous Nightly    Zilucoplan ( [id number] )  40mg/ 1ml -prefilled syr .  once Q PM  40 mg SubCUTAneous QPM      Infusions:    sodium chloride      dextrose       PRN Meds: HYDROcodone 5 mg - acetaminophen, 1 tablet, Q4H PRN   Or  HYDROcodone 5 mg - acetaminophen, 2 tablet, Q4H PRN  acetaminophen, 650 mg, Q4H PRN  sodium chloride flush, 5-40 mL, PRN  sodium chloride, 25 mL, PRN  ondansetron, 4 mg, Q8H PRN   Or  ondansetron, 4 mg, Q6H PRN  polyethylene glycol, 17 g, Daily PRN  glucose, 15 g, PRN  glucagon (rDNA), 1 mg, PRN  dextrose, 100 mL/hr, PRN  dextrose bolus (hypoglycemia), 125 mL, PRN   Or  dextrose bolus (hypoglycemia), 250 mL, PRN          Electronically signed by Gerson Collier MD on 3/8/2022 at 11:06 AM

## 2022-03-09 LAB
GLUCOSE BLD-MCNC: 125 MG/DL (ref 70–99)
GLUCOSE BLD-MCNC: 144 MG/DL (ref 70–99)
GLUCOSE BLD-MCNC: 219 MG/DL (ref 70–99)
GLUCOSE BLD-MCNC: 245 MG/DL (ref 70–99)

## 2022-03-09 PROCEDURE — 2580000003 HC RX 258: Performed by: NURSE PRACTITIONER

## 2022-03-09 PROCEDURE — 94640 AIRWAY INHALATION TREATMENT: CPT

## 2022-03-09 PROCEDURE — 97535 SELF CARE MNGMENT TRAINING: CPT

## 2022-03-09 PROCEDURE — 6370000000 HC RX 637 (ALT 250 FOR IP): Performed by: NURSE PRACTITIONER

## 2022-03-09 PROCEDURE — 97166 OT EVAL MOD COMPLEX 45 MIN: CPT

## 2022-03-09 PROCEDURE — G0378 HOSPITAL OBSERVATION PER HR: HCPCS

## 2022-03-09 PROCEDURE — 97162 PT EVAL MOD COMPLEX 30 MIN: CPT

## 2022-03-09 PROCEDURE — 97116 GAIT TRAINING THERAPY: CPT

## 2022-03-09 PROCEDURE — 97530 THERAPEUTIC ACTIVITIES: CPT

## 2022-03-09 PROCEDURE — 82962 GLUCOSE BLOOD TEST: CPT

## 2022-03-09 PROCEDURE — 99232 SBSQ HOSP IP/OBS MODERATE 35: CPT | Performed by: PHYSICIAN ASSISTANT

## 2022-03-09 PROCEDURE — 94761 N-INVAS EAR/PLS OXIMETRY MLT: CPT

## 2022-03-09 RX ORDER — HYDROCODONE BITARTRATE AND ACETAMINOPHEN 5; 325 MG/1; MG/1
1 TABLET ORAL EVERY 6 HOURS PRN
Qty: 12 TABLET | Refills: 0 | Status: SHIPPED | OUTPATIENT
Start: 2022-03-09 | End: 2022-03-12

## 2022-03-09 RX ADMIN — BISACODYL 5 MG: 5 TABLET, COATED ORAL at 08:18

## 2022-03-09 RX ADMIN — APIXABAN 5 MG: 5 TABLET, FILM COATED ORAL at 21:07

## 2022-03-09 RX ADMIN — FERROUS SULFATE TAB 325 MG (65 MG ELEMENTAL FE) 325 MG: 325 (65 FE) TAB at 08:07

## 2022-03-09 RX ADMIN — HYDROCODONE BITARTRATE AND ACETAMINOPHEN 2 TABLET: 5; 325 TABLET ORAL at 21:06

## 2022-03-09 RX ADMIN — HYDROCODONE BITARTRATE AND ACETAMINOPHEN 2 TABLET: 5; 325 TABLET ORAL at 16:01

## 2022-03-09 RX ADMIN — INSULIN LISPRO 2 UNITS: 100 INJECTION, SOLUTION INTRAVENOUS; SUBCUTANEOUS at 16:16

## 2022-03-09 RX ADMIN — ATORVASTATIN CALCIUM 40 MG: 40 TABLET, FILM COATED ORAL at 21:07

## 2022-03-09 RX ADMIN — CYANOCOBALAMIN TAB 1000 MCG 1000 MCG: 1000 TAB at 08:08

## 2022-03-09 RX ADMIN — METOPROLOL SUCCINATE 25 MG: 25 TABLET, EXTENDED RELEASE ORAL at 08:07

## 2022-03-09 RX ADMIN — HYDROCODONE BITARTRATE AND ACETAMINOPHEN 2 TABLET: 5; 325 TABLET ORAL at 02:45

## 2022-03-09 RX ADMIN — APIXABAN 5 MG: 5 TABLET, FILM COATED ORAL at 08:08

## 2022-03-09 RX ADMIN — TIOTROPIUM BROMIDE INHALATION SPRAY 2 PUFF: 3.12 SPRAY, METERED RESPIRATORY (INHALATION) at 08:50

## 2022-03-09 RX ADMIN — ASPIRIN 81 MG 81 MG: 81 TABLET ORAL at 08:07

## 2022-03-09 RX ADMIN — SODIUM CHLORIDE, PRESERVATIVE FREE 10 ML: 5 INJECTION INTRAVENOUS at 08:11

## 2022-03-09 RX ADMIN — BUDESONIDE AND FORMOTEROL FUMARATE DIHYDRATE 2 PUFF: 160; 4.5 AEROSOL RESPIRATORY (INHALATION) at 08:50

## 2022-03-09 RX ADMIN — DULOXETINE HYDROCHLORIDE 60 MG: 30 CAPSULE, DELAYED RELEASE ORAL at 08:07

## 2022-03-09 RX ADMIN — CLOPIDOGREL BISULFATE 75 MG: 75 TABLET ORAL at 08:08

## 2022-03-09 RX ADMIN — HYDROCODONE BITARTRATE AND ACETAMINOPHEN 2 TABLET: 5; 325 TABLET ORAL at 08:06

## 2022-03-09 RX ADMIN — PREDNISONE 13 MG: 1 TABLET ORAL at 08:10

## 2022-03-09 RX ADMIN — FERROUS SULFATE TAB 325 MG (65 MG ELEMENTAL FE) 325 MG: 325 (65 FE) TAB at 16:23

## 2022-03-09 RX ADMIN — PANTOPRAZOLE SODIUM 40 MG: 40 TABLET, DELAYED RELEASE ORAL at 21:06

## 2022-03-09 RX ADMIN — INSULIN LISPRO 1 UNITS: 100 INJECTION, SOLUTION INTRAVENOUS; SUBCUTANEOUS at 07:37

## 2022-03-09 RX ADMIN — HYDROCODONE BITARTRATE AND ACETAMINOPHEN 2 TABLET: 5; 325 TABLET ORAL at 11:43

## 2022-03-09 RX ADMIN — PANTOPRAZOLE SODIUM 40 MG: 40 TABLET, DELAYED RELEASE ORAL at 08:07

## 2022-03-09 ASSESSMENT — PAIN DESCRIPTION - DESCRIPTORS
DESCRIPTORS: ACHING
DESCRIPTORS: DISCOMFORT
DESCRIPTORS: ACHING
DESCRIPTORS: ACHING

## 2022-03-09 ASSESSMENT — PAIN DESCRIPTION - FREQUENCY
FREQUENCY: CONTINUOUS

## 2022-03-09 ASSESSMENT — PAIN DESCRIPTION - LOCATION
LOCATION: BACK
LOCATION: BACK;HIP;NECK
LOCATION: BACK;HIP
LOCATION: BACK
LOCATION: GENERALIZED;HEAD;BACK

## 2022-03-09 ASSESSMENT — PAIN DESCRIPTION - ONSET
ONSET: ON-GOING

## 2022-03-09 ASSESSMENT — PAIN DESCRIPTION - PAIN TYPE
TYPE: ACUTE PAIN

## 2022-03-09 ASSESSMENT — PAIN DESCRIPTION - ORIENTATION
ORIENTATION: RIGHT;MID
ORIENTATION: MID
ORIENTATION: RIGHT;MID
ORIENTATION: RIGHT
ORIENTATION: MID;RIGHT

## 2022-03-09 ASSESSMENT — PAIN DESCRIPTION - PROGRESSION
CLINICAL_PROGRESSION: NOT CHANGED

## 2022-03-09 ASSESSMENT — PAIN SCALES - GENERAL
PAINLEVEL_OUTOF10: 6
PAINLEVEL_OUTOF10: 8
PAINLEVEL_OUTOF10: 8
PAINLEVEL_OUTOF10: 4
PAINLEVEL_OUTOF10: 7
PAINLEVEL_OUTOF10: 8
PAINLEVEL_OUTOF10: 8
PAINLEVEL_OUTOF10: 6
PAINLEVEL_OUTOF10: 0
PAINLEVEL_OUTOF10: 7
PAINLEVEL_OUTOF10: 8

## 2022-03-09 ASSESSMENT — PAIN - FUNCTIONAL ASSESSMENT: PAIN_FUNCTIONAL_ASSESSMENT: PREVENTS OR INTERFERES SOME ACTIVE ACTIVITIES AND ADLS

## 2022-03-09 NOTE — DISCHARGE SUMMARY
V2.0  Discharge Summary    Name:  Porsche Cano /Age/Sex: 1960 (60 y.o. male)   Admit Date: 3/7/2022  Discharge Date: 3/9/22    MRN & CSN:  [id number] & [id number] Encounter Date and Time 3/9/22 1:09 PM EST    Attending:  Luisana Gee MD Discharging Provider: Rachna Mccullough [facility] Course:     Brief HPI:   Porsche Cano is a 60 y.o. male with past medical history of myasthenia gravis, PAF s/p ablation, CVA, COPD, UC, HTN who presents with mechanical fall. Problems addressed during this hospitalization:      Acute nondisplaced fracture of the right anterior acetabulum   - s/p mechanical fall   -CT A/P with acute nondisplaced fracture of the right anterior acetabulum extending into the iliac bone   - orthopedic surgery consulted, recommended non operative management including nonweightbearing RLE for 6-8 weeks, follow-up in 1 week with Dr. Alejo Watson for repeat imaging  -PT/OT evaluated, recommended ARU however patient refused and chose to go home with home health care.   DME order for wheelchair and bedside commode on discharge.  -Prescribed short course of [place] on discharge, recommended Tylenol/ibuprofen PRN as well     Acute to subacute T4 compression fracture  L1 transverse process fracture   - s/p mechanical fall   - MRI T spine showed acute to subacute compression fracture of tT4 with 30% height loss  - NSG followed - recommended TLSO brace when upright and out of bed, follow-up in 6 weeks   - Prescribed short course of Norco on discharge, recommended Tylenol/ibuprofen PRN as well    Acute versus subacute fracture of the right eighth rib  -Provided incentive spirometry on discharge     PAF  - S/p ablation   -Continue beta-blocker, Eliquis on discharge     CAD   -Continue Plavix, statin     T2DM  - continue home metformin      Myasthenia gravis   - continue home mestinon, steroids     Hypertension   -BP well controlled on discharge  -Continue home medications     CVA  -Continue home Plavix, statin    This patient was discussed Dr. Harriet Bucio. He was agreeable with patient's plan at discharge as dictated above. The patient expressed appropriate understanding of, and agreement with the discharge recommendations, medications, and plan. Consults this admission:  [address] [place] TO NEUROSURGERY  IP CONSULT TO HOSPITALIST  IP CONSULT TO ORTHOPEDIC SURGERY  IP CONSULT TO CASE MANAGEMENT  IP CONSULT TO HOME CARE NEEDS    Discharge Diagnosis:   Closed displaced fracture of anterior column of right acetabulum [facility])    Discharge Instruction:   Follow up appointments: PCP, neurosurgery, orthopedic surgery  Primary care physician: JAMIE Domingo within 2 weeks  Diet: regular diet   Activity: Nonweightbearing right lower extremity  Disposition: Discharged to:   []Home, [x]HHC, []SNF, []Acute Rehab, []Hospice   Condition on discharge: Stable  Labs and Tests to be Followed up as an outpatient by PCP or Specialist:     Discharge Medications:        Medication List      START taking these medications    HYDROcodone-acetaminophen 5-325 MG per tablet  Commonly known as: NORCO  Take 1 tablet by mouth every 6 hours as needed for Pain for up to 3 days.         CHANGE how you take these medications    pyridostigmine 60 MG tablet  Commonly known as: MESTINON  Take 1 tablet by mouth 4 times daily  What changed: when to take this        CONTINUE taking these medications    acetaminophen 325 MG tablet  Commonly known as: TYLENOL     atorvastatin 40 MG tablet  Commonly known as: LIPITOR  Take 1 tablet by mouth nightly START 6/25/2021     budesonide-formoterol 160-4.5 MCG/ACT Aero  Commonly known as: SYMBICORT  Inhale 2 puffs into the lungs 2 times daily     clopidogrel 75 MG tablet  Commonly known as: PLAVIX     DULoxetine 60 MG extended release capsule  Commonly known as: CYMBALTA     Eliquis 5 MG Tabs tablet  Generic drug: apixaban     ferrous sulfate 325 (65 Fe) MG EC tablet  Commonly known as: FE TABS 325  Take 1 tablet by mouth 2 times daily (with meals)     GLUCOSAMINE-CALCIUM-VIT D PO     Medical Compression Thigh High Misc  Wear daily and remove nightly 20 - 30 mmgh     metFORMIN 500 MG extended release tablet  Commonly known as: GLUCOPHAGE-XR     metoprolol succinate 25 MG extended release tablet  Commonly known as: TOPROL XL     NONFORMULARY     pantoprazole 40 MG tablet  Commonly known as: PROTONIX  Take 1 tablet by mouth 2 times daily     PREDNISONE PO     Spiriva HandiHaler 18 MCG inhalation capsule  Generic drug: tiotropium  Inhale 1 capsule into the lungs daily     vitamin B-12 500 MCG tablet  Commonly known as: CYANOCOBALAMIN        STOP taking these medications    amiodarone 200 MG tablet  Commonly known as: CORDARONE     aspirin 81 MG chewable tablet           Where to Get Your Medications      These medications were sent to [address] [place] [address] 57, [address] [facility]    Phone: [phone]   HYDROcodone-acetaminophen 5-325 MG per tablet        Objective Findings at Discharge:   /81   Pulse 73   Temp 97.9 °F (36.6 °C) (Oral)   Resp 18   Ht 5' 11\" (1.803 m)   Wt 285 lb (129.3 kg)   SpO2 94%   BMI 39.75 kg/m²       Physical Exam:   General: NAD, morbidly obese, laying in bed comfortably   Eyes: EOMI  ENT: neck supple  Cardiovascular: Regular rate and rhythm. No murmurs  Respiratory: Clear to auscultation bilaterally, respirations even and unlabored on room air  Gastrointestinal: Abdomen soft, non tender  Genitourinary: no suprapubic tenderness  Musculoskeletal: No edema. Bilateral lower extremities neurovascularly intact. Skin: warm, dry  Neuro: Alert. Psych: Mood appropriate.          Labs and Imaging   CT ABDOMEN PELVIS WO CONTRAST Additional Contrast? None    Result Date: 3/7/2022  EXAMINATION: CT OF THE ABDOMEN AND PELVIS WITHOUT CONTRAST 3/7/2022 7:43 am TECHNIQUE: CT of the abdomen and pelvis was performed without the administration of intravenous contrast. Multiplanar reformatted images are provided for review. Dose modulation, iterative reconstruction, and/or weight based adjustment of the mA/kV was utilized to reduce the radiation dose to as low as reasonably achievable. COMPARISON: July 5, 2021 HISTORY: ORDERING SYSTEM PROVIDED HISTORY: fall, right sided pain TECHNOLOGIST PROVIDED HISTORY: Additional Contrast?->None Reason for exam:->fall, right sided pain Decision Support Exception - unselect if not a suspected or confirmed emergency medical condition->Emergency Medical Condition (MA) Reason for Exam: fall, right sided abdomen pain FINDINGS: Lower Chest: Clear lung bases. Organs: The liver, gallbladder, spleen, pancreas, adrenal glands, and kidneys demonstrate no acute abnormality. Bilateral ureters are normal in course and caliber. No ureteral calculi. GI/Bowel: The stomach is normal.  The small bowel is normal in course and caliber without evidence of wall thickening or obstruction. Status post total colectomy with right lower quadrant ostomy. Pelvis: Normal bladder. Prostate and seminal vesicles are unremarkable. Peritoneum/Retroperitoneum: No free fluid or free air. No pathologic lymphadenopathy. Aorta and its branches are normal in course and caliber. Moderate atherosclerosis. Bones/Soft Tissues: Acute fracture of the right hemipelvis is identified involving the anterior acetabulum extending into the right iliac bone. No significant displacement. Elsewhere, an acute to subacute fracture of the right 8th rib is present. Chronic fractures of the right L1 and L2 transverse processes are seen. 1. No acute intra-abdominal injury. 2. Acute nondisplaced fracture of the right anterior acetabulum extending into the iliac bone. 3. Acute versus subacute fracture of the right 8th rib. Correlate with focal pain.  4. Status post total colectomy with right lower quadrant ostomy. No complication. XR ELBOW RIGHT (MIN 3 VIEWS)    Result Date: 3/7/2022  EXAMINATION: THREE XRAY VIEWS OF THE RIGHT ELBOW 3/7/2022 7:07 am COMPARISON: None. HISTORY: ORDERING SYSTEM PROVIDED HISTORY: Injury, fall from 4 ft, minimal pain TECHNOLOGIST PROVIDED HISTORY: PORTABLE Reason for exam:->Injury, fall from 4 ft, minimal pain Reason for Exam: Injury, fall from 4 ft, minimal pain Right elbow injury FINDINGS: There is no elbow effusion. There is no acute fracture or dislocation. Alignment is normal.     No acute abnormality. CT HEAD WO CONTRAST    Result Date: 3/7/2022  EXAMINATION: CT OF THE HEAD WITHOUT CONTRAST  3/7/2022 7:40 am TECHNIQUE: CT of the head was performed without the administration of intravenous contrast. Dose modulation, iterative reconstruction, and/or weight based adjustment of the mA/kV was utilized to reduce the radiation dose to as low as reasonably achievable. COMPARISON: CT scan of the head 09/09/2021 HISTORY: ORDERING SYSTEM PROVIDED HISTORY: fall, hit head, on eliquis TECHNOLOGIST PROVIDED HISTORY: Has a \"code stroke\" or \"stroke alert\" been called? ->No Reason for exam:->fall, hit head, on eliquis Decision Support Exception - unselect if not a suspected or confirmed emergency medical condition->Emergency Medical Condition (MA) Reason for Exam: fall, head pain FINDINGS: BRAIN/VENTRICLES: There is no acute intracranial hemorrhage, mass effect or midline shift. No abnormal extra-axial fluid collection. The gray-white differentiation is maintained without evidence of an acute infarct. There is no evidence of hydrocephalus. Small area of encephalomalacia in the left frontal lobe where there is evidence of infarct on prior examination. Periventricular and subcortical white matter hypodensities are not significantly changed. ORBITS: The visualized portion of the orbits demonstrate no acute abnormality.  SINUSES: The visualized paranasal sinuses and mastoid air cells demonstrate no acute abnormality. SOFT TISSUES/SKULL:  No acute abnormality of the visualized skull or soft tissues. 1.  No acute intracranial abnormality. 2.  Old infarct in the left frontal lobe as seen on the prior CT scan. CT CERVICAL SPINE WO CONTRAST    Addendum Date: 3/7/2022    ADDENDUM: The impression should state: No acute fracture or subluxation of the cervical spine. Result Date: 3/7/2022  EXAMINATION: CT OF THE CERVICAL SPINE WITHOUT CONTRAST 3/7/2022 7:42 am TECHNIQUE: CT of the cervical spine was performed without the administration of intravenous contrast. Multiplanar reformatted images are provided for review. Dose modulation, iterative reconstruction, and/or weight based adjustment of the mA/kV was utilized to reduce the radiation dose to as low as reasonably achievable. COMPARISON: CT scan cervical spine 11/01/2016 HISTORY: ORDERING SYSTEM PROVIDED HISTORY: fall TECHNOLOGIST PROVIDED HISTORY: Reason for exam:->fall Decision Support Exception - unselect if not a suspected or confirmed emergency medical condition->Emergency Medical Condition (MA) Reason for Exam: fall, neck pain FINDINGS: BONES/ALIGNMENT: There is no acute fracture or traumatic malalignment. There is some reversal of the cervical lordosis. DEGENERATIVE CHANGES: Multilevel degenerative disc disease with posterior disc osteophyte complex causing some narrowing of the spinal canal at C4-5, C5-6, and C6-7. There is some associated narrowing of the neural foramen at these levels. SOFT TISSUES: There is no prevertebral soft tissue swelling. 1.  Acute fracture or subluxation of the cervical spine. 2.  Multilevel degenerative disc disease resulting in spinal canal stenosis and neural foraminal narrowing from C4-5 through C6-7.      CT THORACIC SPINE WO CONTRAST    Result Date: 3/7/2022  EXAMINATION: CT OF THE THORACIC SPINE WITHOUT CONTRAST  3/7/2022 7:42 am: TECHNIQUE: CT of the thoracic spine was performed without the administration of intravenous contrast. Multiplanar reformatted images are provided for review. Dose modulation, iterative reconstruction, and/or weight based adjustment of the mA/kV was utilized to reduce the radiation dose to as low as reasonably achievable. COMPARISON: CT scan of the neck 09/09/2021 CT scan of the chest 07/16/2016 and CT scan of the abdomen and pelvis 02/02/2020 HISTORY: ORDERING SYSTEM PROVIDED HISTORY: fall from 4 ft, low thoracic pain TECHNOLOGIST PROVIDED HISTORY: CT  T-Spine w/o Contrast Reason for exam:->fall from 4 ft, low thoracic pain Reason for Exam: fall off truck, upper back pain FINDINGS: BONES/ALIGNMENT: There is moderate superior endplate compression deformity at T4 with approximately 30% vertebral body height loss. This is new since the CT scan 09/09/2021. Linear sclerotic changes extend to the posterolateral cortex, but does not include the vertebral arch on the axial images. T3 superior endplate Schmorl's node is not significantly changed. Healing right L1 and L2 transverse process fractures are present. DEGENERATIVE CHANGES: Degenerative changes of the bilateral costovertebral junctions. SOFT TISSUES: No paraspinal mass is seen. Superior endplate compression deformity at T4 with approximately 30% vertebral body height loss is new since the CT scan 09/09/2021. The fracture does not clearly involve the posterior column     CT LUMBAR SPINE WO CONTRAST    Result Date: 3/7/2022  EXAMINATION: CT OF THE LUMBAR SPINE WITHOUT CONTRAST  3/7/2022 TECHNIQUE: CT of the lumbar spine was performed without the administration of intravenous contrast. Multiplanar reformatted images are provided for review. Adjustment of mA and/or kV according to patient size was utilized. Dose modulation, iterative reconstruction, and/or weight based adjustment of the mA/kV was utilized to reduce the radiation dose to as low as reasonably achievable.  COMPARISON: None HISTORY: ORDERING SYSTEM PROVIDED HISTORY: fall from 4 ft, diffuse pain TECHNOLOGIST PROVIDED HISTORY: Reason for exam:->fall from 4 ft, diffuse pain Decision Support Exception - unselect if not a suspected or confirmed emergency medical condition->Emergency Medical Condition (MA) Reason for Exam: fell off truck, low back pain FINDINGS: BONES/ALIGNMENT: Transitional anatomy with lumbarization of S1. Stepwise grade 1 retrolisthesis of L3 on L4 and L4 on L5. The vertebral body heights are maintained. No osseous destructive lesion is seen. DEGENERATIVE CHANGES: Degenerative changes with intervertebral disc space narrowing greatest at L4-L5 and L3-L4. Pedro Colder SOFT TISSUES/RETROPERITONEUM: No paraspinal mass is seen. Vascular calcifications of the descending aorta. No acute fracture the lumbar spine. Transitional anatomy with lumbarization of S1. Stepwise grade 1 retrolisthesis of L3 on L4 and L4 on L5. MRI THORACIC SPINE WO CONTRAST    Result Date: 3/8/2022  EXAMINATION: MRI OF THE THORACIC SPINE WITHOUT CONTRAST  3/8/2022 8:36 am TECHNIQUE: Multiplanar multisequence MRI of the thoracic spine was performed without the administration of intravenous contrast. COMPARISON: CT thoracic spine 03/07/2022 HISTORY: ORDERING SYSTEM PROVIDED HISTORY: T4 compression fracture TECHNOLOGIST PROVIDED HISTORY: Reason for exam:->T4 compression fracture Decision Support Exception - unselect if not a suspected or confirmed emergency medical condition->Emergency Medical Condition (MA) Reason for Exam: T4 compression fracture FINDINGS: BONES/ALIGNMENT: There is normal alignment of the spine. There is a compression fracture of the T4 vertebral body with loss of approximately 30% of vertebral body height. STIR signal abnormality is identified at this level compatible with acute-to-subacute compression fracture. Deformity of the superior endplate of T3 is additionally identified likely due to Schmorl's node.  SPINAL CORD: No abnormal cord signal is seen. Fat infiltration is identified within the mediastinum which is of doubtful clinical significance. SOFT TISSUES: No paraspinal mass identified. DEGENERATIVE CHANGES: Moderate-sized central disc protrusion is identified at T5-6. Moderate-sized central disc protrusion is identified at T6-7. No cord compression is appreciated. Broad disc osteophyte complex at T9-10 results in mild central canal stenosis. At T10-11 mild central canal stenosis again identified due to broad disc osteophyte complex and posterior ligamentous hypertrophy. There is moderate central canal stenosis at T11-12 of multifactorial etiology. Acute-to-subacute compression fracture of T4 with loss of 30% of vertebral body height. No retropulsion is evident. No thoracic cord contusion. Mild deformity of the superior endplate of T3 is secondary to Schmorl's node. Moderate-sized central disc protrusions at T5-6 and T6-7 resulting in no significant cord compression. Mild central canal stenosis at T9-10 and T10-11. Moderate central canal stenosis at T11-12. XR CHEST PORTABLE    Result Date: 3/7/2022  EXAMINATION: ONE XRAY VIEW OF THE CHEST 3/7/2022 1:34 pm COMPARISON: 09/02/2021 HISTORY: ORDERING SYSTEM PROVIDED HISTORY: leukocytosis s/p fall TECHNOLOGIST PROVIDED HISTORY: Reason for exam:->leukocytosis s/p fall Reason for Exam: leukocytosis s/p fall FINDINGS: Left chest cardiac conduction device is similar to prior. The cardiomediastinal silhouette is unchanged with redemonstration of cardiomegaly. Trace right basilar opacities favor atelectasis or scarring. No pneumothorax, or pleural effusion. Osseous structures are grossly unchanged. No acute cardiopulmonary findings. Unchanged cardiomegaly. XR HIP 2-3 VW W PELVIS RIGHT    Result Date: 3/7/2022  EXAMINATION: ONE XRAY VIEW OF THE PELVIS AND TWO XRAY VIEWS RIGHT HIP 3/7/2022 6:46 am COMPARISON: None.  HISTORY: ORDERING SYSTEM PROVIDED HISTORY: fall, right hip pain TECHNOLOGIST PROVIDED HISTORY: Reason for exam:->fall, right hip pain Reason for Exam: fall, right hip pain Additional signs and symptoms: none FINDINGS: Postsurgical changes are seen involving the pelvis. There is no acute fracture, dislocation, or bone destruction. Mild degenerative changes are seen involving the bilateral hips. No acute osseous abnormality. CBC:   Recent Labs     03/07/22  1055 03/08/22  0127   WBC 13.1* 7.8   HGB 13.2* 12.5*    193     BMP:    Recent Labs     03/07/22  1055 03/08/22  0127   * 142   K 4.5 4.3    108   CO2 19* 21   BUN 15 14   CREATININE 1.0 1.0   GLUCOSE 140* 113*     Hepatic:   Recent Labs     03/07/22  1055 03/08/22  0127   AST 29 17   ALT 29 23   BILITOT 0.3 0.3   ALKPHOS 145* 135*     Lipids: No results found for: CHOL, HDL, TRIG  Hemoglobin A1C:   Lab Results   Component Value Date    LABA1C 7.2 03/07/2022     TSH: No results found for: TSH  Troponin:   Lab Results   Component Value Date    TROPONINT 0.504 09/09/2021    TROPONINT 0.021 07/03/2021    TROPONINT 0.016 06/11/2021     Lactic Acid: No results for input(s): LACTA in the last 72 hours. BNP: No results for input(s): PROBNP in the last 72 hours.   UA:  Lab Results   Component Value Date    NITRU NEGATIVE 03/07/2022    COLORU YELLOW 03/07/2022    WBCUA 1 03/07/2022    RBCUA 1 03/07/2022    MUCUS RARE 03/07/2022    TRICHOMONAS NONE SEEN 03/07/2022    BACTERIA NEGATIVE 03/07/2022    CLARITYU CLEAR 03/07/2022    SPECGRAV >1.030 03/07/2022    LEUKOCYTESUR NEGATIVE 03/07/2022    UROBILINOGEN 0.2 03/07/2022    BILIRUBINUR NEGATIVE 03/07/2022    BLOODU SMALL 03/07/2022    KETUA NEGATIVE 03/07/2022     Urine Cultures: No results found for: LABURIN  Blood Cultures: No results found for: BC  No results found for: BLOODCULT2  Organism: No results found for: ORG    Time Spent Discharging patient 35 minutes    Electronically signed by May Bullock PA-C on 3/9/2022 at 1:09 PM

## 2022-03-09 NOTE — PROGRESS NOTES
Informed by CM they have been [facility] to [facility] due to multiple [place] [id number] agencies denying patient. Also have not been able to confirm approval of DME. CM spoke with family who would like to have these things set-up prior to discharge. Discharge order is placed conditionally if we are able [facility] and [facility]ME this evening.

## 2022-03-09 NOTE — PROGRESS NOTES
Outpatient Pharmacy Progress Note for Meds-to-Beds    Total number of Prescriptions Filled: 1  The following medications were dispensed to the patient during the discharge process:   Hyrocodone-APAP 5-325mg     Additional Documentation:   Medication(s) were delivered to the patient's room prior to discharge      Thank you for letting us serve your patients.   [address] [facility]    [address], [address] [facility]    Phone: [phone]    Fax: [phone]

## 2022-03-09 NOTE — CONSULTS
[address] [facility],2Nd Floor ACUTE CARE PHYSICAL THERAPY EVALUATION  Ruperto Serra, 1960, 1116/1116-A, 3/9/2022    History  [place]:  The primary encounter diagnosis was Nondisplaced fracture of anterior wall of right acetabulum, initial encounter for closed fracture ([facility] [address] Diagnoses of Fall from slip, trip, or stumble, initial encounter, Closed head injury, initial encounter, Abrasion of right elbow, initial encounter, Acute low back pain without sciatica, unspecified back pain laterality, and Compression fracture of T4 vertebra, initial encounter [facility]) were also pertinent to this visit. Patient  has a past medical history of Arthritis, Atrial fibrillation, chronic ([facility] [address] Cerebrovascular accident [facility]), Closed displaced fracture of anterior column of right acetabulum ([facility] [address] COPD (chronic obstructive pulmonary disease) ([facility] [address] Coronary artery disease involving native coronary artery of native heart without angina pectoris, Diabetes ([facility] [address] H/O echocardiogram, Heart disease, History of blood transfusion, Hx of blood clots, Hx of cardiovascular stress test, Hx of Doppler ultrasound, Hypertension, Myasthenia gravis ([facility] [address] NSTEMI (non-ST elevated myocardial infarction) ([facility] [address] Post PTCA, S/P ablation of atrial fibrillation, TIA (transient ischemic attack), and Ulcerative colitis ([facility] [address] Patient  has a past surgical history that includes ileostomy or jejunostomy; Tonsillectomy; Cardiac surgery; Percutaneous Transluminal Coronary Angio (12/14/2020); Upper gastrointestinal endoscopy (N/A, 06/15/2021); Upper gastrointestinal endoscopy (N/A, 07/04/2021); and Pacemaker insertion (N/A, 07/07/2021). Subjective:  Patient agreeable to session. Pain:  6/10 pain in R LE at rest at fx site.     Communication with other providers:  Handoff to RN, OT  Restrictions: [address] R LE,  when upright and OOB, general precautions, fall risk, colostomy    Home Setup/Prior level of function  Social/Functional History  Lives With: Spouse (available 24/7)  Type of Home: House  Home Layout: Two level,Able to Live on Main level with bedroom/bathroom (only bathroom is on 2nd level)  Home Access: Stairs to enter with rails  Entrance Stairs - Number of Steps: 3  Entrance Stairs - Rails: Both  Bathroom Shower/Tub: Tub/Shower unit  Bathroom Toilet: Standard  Bathroom Equipment: Shower chair  ADL Assistance: Independent  Homemaking Assistance: Independent  Homemaking Responsibilities: Yes  Ambulation Assistance: Independent (uses no AD)  Transfer Assistance: Independent  Active : Yes  Occupation: Full time employment  Type of occupation:  (for 38 years)    Examination of body systems (includes body structures/functions, activity/participation limitations):  · Observation:  Pt supine in bed upon arrival and agreeable to therapy  · Vision:  [employer] [place]  · [facility]:  [email or web address] [employer] [place]  · Cardiopulmonary:  No O2 needs  · Cognition: WFL, see OT/SLP note for further evaluation. Musculoskeletal  · ROM R/L:  WFL. · Strength R LE NT; L LE 5/5, significant impairment in function and endurance. · Neuro:  Myasthenia gravis- states well controlled      Mobility:  · Rolling L/R:  CGA with cues for sequencing  · Supine to sit:  Min A with assist at R LE and cues for sequencing  · Transfers: Pt completed STS to/from EOB mod A x2. Pt then returned to sitting. Pt attempted STS x3 with max A x1 but unable to complete. Pt then completed STS from EOB max A x2 with cues for sequencing and hand placement. Pt completed stand pivot transfer with RW mod A with second person for safety. Cues provided for walker management and sequencing throughout   · Sitting balance:  Fair+. · Standing balance:  fair. · Gait: pt educated on hop to gait pattern with RW and completed x4 hops min A. Distance limited due to fatigue. Pt SOB after bout.   · Education: Pt educated on  when upright and out of bed, educated on proper donning/doffing of brace, educated on [address] R LE, and educated on hop to gait pattern with RW. Educated pt that due to high level of assist, pt would benefit from ARU placement. Pt does not want any therapy at this time. Educated pt on safety concern of transfers, limited ambulation, and car transfer all needed to enter house. Pt verbalized understanding but still not agreeable to therapy    [facility] 6 Clicks Inpatient Mobility:  AM-PAC Inpatient Mobility Raw Score : 13    Safety: patient left in chair with needs in reach (no working alarm available), call light within reach, RN notified, gait belt used. Assessment:  Pt is a 60 y.o. male admitted to the hospital after a fall resulting in a T4 compression fracture, acute to subacute 8th rib fracture, L1 right transverse process fracture (acuity unknown), and a right acetabular fracture extending into iliac crest. Pt is being treated conservatively and is to wear  when upright and out of bed as well as NWB'ing on R LE. Pt is typically independent with all ambulation and transfers without a device. Pt is currently performing bed mobility min A, transferring max A x2, and ambulating 4 steps with RW min A. Pt is presenting with decreased endurance, impaired balance, impaired transfers, impaired gait, impaired bed mobility, increased pain. Pt would benefit from continued acute care PT as well as ARU placement upon discharge to continue to address impairments. Complexity: moderate    Prognosis: Good, no significant barriers to participation at this time.      Plan Times per week: 6+/week     Equipment: TBD at next level of care; if goes home will need WC and RW as well as HHC and 24/7 assist    Goals:  Short term goals  Time Frame for Short term goals: 1 week  Short term goal 1: Pt to complete all bed mobility SBA  Short term goal 2: Pt to complete all STS transfers to/from bed, chair, and commode max A x1  Short term goal 3: Pt to ambulate 15' with LRAD while maintaining precautions  Short term goal 4: Pt to don/doff brace with supervision  Short term goal 5: Pt to propel manual ' with supervision       Treatment plan:  Bed mobility, transfers, balance, gait, TA, TX    Recommendations for NURSING mobility: stand pivot with brace, RW, and gait belt x2 assist    Time:   Time in: 0950  Time out: 1024  Timed treatment minutes: 24  Total time: 34    Electronically signed by:    Josy Smith PT  3/9/2022, 11:10 AM

## 2022-03-09 NOTE — PROGRESS NOTES
Occupational [address] [place] [address] ACUTE CARE OCCUPATIONAL THERAPY EVALUATION    Marilou Gonzalez, 1960, 1116/1116-A, 3/9/2022    Discharge Recommendation: Inpatient Rehabilitation      History:  [place]:  The primary encounter diagnosis was Nondisplaced fracture of anterior wall of right acetabulum, initial encounter for closed fracture ([address] Diagnoses of Fall from slip, trip, or stumble, initial encounter, Closed head injury, initial encounter, Abrasion of right elbow, initial encounter, Acute low back pain without sciatica, unspecified back pain laterality, and Compression fracture of T4 vertebra, initial encounter [facility]) were also pertinent to this visit. Subjective:  Patient states: \"My boy will be able to help me when I'm out of here. He's bigger than I am!\"   Pain: Pt reported 6/10 pain in Rt acetabulum   Communication with other providers: PT MARCELLUS Lopez CM, JELENA Rdz Sender  Restrictions: General Precautions, Fall Risk, NWB Rt LE, CTLSO brace with EOB/OOB activity, Spinal Precautions, Colostomy, Bed/chair alarm    Home Setup/Prior level of function:  Social/Functional History  Lives With: Spouse (available 24/7)  Type of Home: House  Home Layout: Two level,Able to Live on Main level with bedroom/bathroom (only bathroom is on 2nd level)  Home Access: Stairs to enter with rails  Entrance Stairs - Number of Steps: 3  Entrance Stairs - Rails: Both  Bathroom Shower/Tub: Tub/Shower unit  Bathroom Toilet: Standard  Bathroom Equipment: Shower chair  ADL Assistance: Independent  Homemaking Assistance: Independent  Homemaking Responsibilities: Yes  Ambulation Assistance: Independent (uses no AD)  Transfer Assistance: Independent  Active : Yes  Occupation: Full time employment  Type of occupation:  (for 38 years)    Examination:  · Observation: Supine in bed upon arrival. Pleasant and agreeable to evaluation.   · Vision: [facility]  · Hearing: WFL  · Vitals: Stable vitals throughout session    Body Systems and functions:  · ROM: WNL all joints in BL UEs (active shoulder flexion kept 0-90' due to C-spine precautions)  · Strength: 4+/5 MMT all major muscle groups BL UEs  · Sensation: WFL (denies numbness/tingling)  · Tone: Normal  · Coordination: WNL  · Perception: WNL    Activities of Daily Living (ADLs):  · Feeding: Mod I (with use of straws due to CTLSO brace)  · Grooming: SBA (seated facial hygiene task EOB; unable to complete in standing due to NWB status Rt LE)  · UB bathing: SBA   · LB bathing: Max A (reaching distal LEs, bottom, posterior thighs)  · UB dressing: Dependent (donning CTLSO brace seated EOB)  · LB dressing: Dependent (donning BL socks)  · Toileting: Dependent (Colostomy in place)    Cognitive and Psychosocial Functioning:  · Overall cognitive status: WFL (significantly decreased insight into current deficits however)  · Affect: Normal     Balance:   · Sitting: SBA in unsupported sitting EOB  · Standing: Min A with RW on Lt LE: mod cues for maintaining NWB status Rt LE    Functional Mobility:  · Bed Mobility: Min A supine to sitting EOB via log roll technique (able to roll without assist but min A required for facilitation of Rt LE off of bed in sidelying, able to upright trunk from sidelying without assist)  · Transfers: Mod A x 2 sit to stand from bed on Lt LE on 1st attempt, Max A x 2 sit to stand from bed on Lt LE on 2nd attempt (mod cues for maintaining NWB status Rt LE)  · Ambulation: Min A with RW on Lt LE 4 ft bed to chair; mod coaching for 3-point pattern, technique, maintaining NWB status Rt LE      AM-PAC 6 click short form for inpatient daily activity:   How much help from another person does the patient currently need. .. Unable  Dep A Lot  Max A A Lot   Mod A A Little  Min A A Little   CGA  SBA None   Mod I  Indep  Sup   1. Putting on and taking off regular lower body clothing? [x] 1    [] 2   [] 2   [] 3   [] 3   [] 4      2.  Bathing (including washing, rinsing, drying)? [] 1   [] 2   [x] 2 [] 3 [] 3 [] 4   3. Toileting, which includes using toilet, bedpan, or urinal? [x] 1    [] 2   [] 2   [] 3   [] 3   [] 4     4. Putting on and taking off regular upper body clothing? [x] 1   [] 2   [] 2   [] 3   [] 3    [] 4      5. Taking care of personal grooming such as brushing teeth? [] 1   [] 2    [] 2 [] 3    [x] 3   [] 4      6. Eating meals? [] 1   [] 2   [] 2   [] 3   [] 3   [x] 4      Raw Score:  12     [24=0% impaired(CH), 23=1-19%(CI), 20-22=20-39%(CJ), 15-19=40-59%(CK), 10-14=60-79%(CL), 7-9=80-99%(CM), 6=100%(CN)]     Treatment:  Therapeutic Activity Training:   Therapeutic activity training was instructed today. Cues were given for safety, sequence, UE/LE placement, awareness, and balance. Activities performed today included bed mobility training using log roll technique, sitting balance/tolerance, transfer training on [address], household mobility with RW on Lt LE using hop gait pattern, education on role of OT, POC, spinal precautions, wear of CTLSO brace, NWB status Rt LE, importance of OOB activity, d/c planning and recommendations  Self Care Training:   Cues were given for safety, sequence, UE/LE placement, visual cues, and balance.     Activities performed today included UB/LB dressing tasks including donning and education on CTLSO brace, education modifying ADLs with current precautions    Safety Measures: Gait belt used, Left in Chair, Alarm in place    Assessment:  Pt is a 60year old male with a past medical history of Arthritis, Atrial fibrillation, chronic ([facility] [address] Cerebrovascular accident [facility]), Closed displaced fracture of anterior column of right acetabulum ([facility] [address] COPD (chronic obstructive pulmonary disease) ([facility] [address] Coronary artery disease involving native coronary artery of native heart without angina pectoris, [facility] H/O echocardiogram, Heart disease, History of blood transfusion, Hx of blood clots, Hx of cardiovascular stress test, Hx of Doppler ultrasound, Hypertension, Myasthenia gravis ([facility] [address] NSTEMI (non-ST elevated myocardial infarction) ([facility] [address] Post PTCA, S/P ablation of atrial fibrillation, TIA (transient ischemic attack), and Ulcerative colitis ([facility] [address] Pt admitted following a fall at work and diagnosed with a Rt acetabular fracture, Rt 8th rib fracture, T4 superior endplate fracture, and L1 transverse process fracture. Pt undergoing non-operative treatment and has orders to be NWB Rt LE and wear CTLSO brace with EOB/OOB activity. Prior to injury, pt lived with his wife and was fully independent with ADLs, IADLs, mobility, driving, and works full time as a . Pt currently presents with the above impairments, and is not safe for immediate return home. Recommend continued OT services in inpatient rehab setting at discharge. Complexity: High  Prognosis: Good  Plan: 3x/week      Goals:  1. Pt will complete all aspects of bed mobility for EOB/OOB ADLs SBA with HOB flat using log roll technique  2. Pt will complete UB/LB bathing min A with setup using long handled sponge  3. Pt will complete all aspects of LB dressing mod A with setup using LB AE  4. Pt will complete all functional transfers to and from bed, chair, BSC min A on Lt LE  5. Pt will ambulate 5 ft bed to [facility] for toileting CGA on Lt LE using hop gait pattern with RW  6. Pt will complete toileting task of urinating with supervision seated on BSC  7. Pt will complete oral hygiene/grooming routine in unsupported sitting EOB with supervision  8.  Pt will don CTLSO brace seated EOB min A with setup      Time:   Time in: 950  Time out: 1025  Timed treatment minutes: 25  Total time: 35      Electronically signed by:    DYANA Palafox/L, [address] [place] [id number]

## 2022-03-09 NOTE — PROGRESS NOTES
Neurosurgery Progress Note  3/9/2022 9:37 AM      Assessment and Plan:   1. T4 compression fracture-patient falling from 4 foot high platform from [address] patient completed MRI today showing an acute to subacute fracture T4 was roughly 30% loss of height, no retropulsion noted.  brace ordered. Patient to wear this whenever upright and out of bed. Thoracic x-rays with brace in place showing no malalignment of thoracic spine. Patient to follow-up with my office in 6 weeks with repeat thoracic x-rays. Return precautions placed in discharge instructions. Neurosurgery will sign off at this time, please reconsult if there are questions or concerns  2. L1 right transverse process fracture-acuity unknown, no bracing needed even if acute  3. Acute to subacute 8th rib racture  4. Right acetabular fracture extending into iliac crest-orthopedics consulted. Patient to be nonweightbearing for 6 to 8 weeks. Will be made a follow-up appointment with Dr. Shy Ovalle in roughly 1 week. 5. Myasthenia gravis  6. Ulcerative Colitis    Supervising physician: Nico Grover MD.  Dr. Marycarmen Grover was readily and continuously available by phone for direct consultation regarding the care of this patient. Subjective:   Admit Date: 3/7/2022  PCP: JAMIE Garcia    Patient lying in bed. He complains of pain in his mid back that he describes as a bruise. He is able to flex extend and turn his neck without any pain or difficulties. He understands that he will follow up with her office in 6 weeks and will see Dr. Shy Ovalle in 1 week.     Data:   Scheduled Meds:   lidocaine  1 patch TransDERmal Daily    sodium chloride flush  5-40 mL IntraVENous 2 times per day    bisacodyl  5 mg Oral Daily    aspirin  81 mg Oral Daily    atorvastatin  40 mg Oral Nightly    budesonide-formoterol  2 puff Inhalation BID    clopidogrel  75 mg Oral Daily    DULoxetine  60 mg Oral Daily    apixaban  5 mg Oral BID    pantoprazole  40 mg Oral BID    predniSONE  13 mg Oral Daily    tiotropium  2 puff Inhalation Daily    vitamin B-12  1,000 mcg Oral Daily    ferrous sulfate  325 mg Oral BID WC    metoprolol succinate  25 mg Oral Daily    insulin lispro  0.08 Units/kg SubCUTAneous TID WC    insulin lispro  0-6 Units SubCUTAneous TID     insulin lispro  0-3 Units SubCUTAneous Nightly    Zilucoplan ( RA [id number] )  40mg/ 1ml -prefilled syr .  once Q PM  40 mg SubCUTAneous QPM         I/O last 3 completed shifts:  In: -   Out: 925 [Urine:425; Stool:500]  I/O this shift:  In: 10 [I.V.:10]  Out: -     Intake/Output Summary (Last 24 hours) at 3/9/2022 0937  Last data filed at 3/9/2022 0811  Gross per 24 hour   Intake 10 ml   Output 925 ml   Net -915 ml     CULTURE results: Invalid input(s): BLOOD CULTURE,  URINE CULTURE, SURGICAL CULTURE  CBC:   Recent Labs     03/07/22  1055 03/08/22  0127   WBC 13.1* 7.8   HGB 13.2* 12.5*    193     BMP:    Recent Labs     03/07/22  1055 03/08/22  0127   * 142   K 4.5 4.3    108   CO2 19* 21   BUN 15 14   CREATININE 1.0 1.0   GLUCOSE 140* 113*     Hepatic:   Recent Labs     03/07/22  1055 03/08/22  0127   AST 29 17   ALT 29 23   BILITOT 0.3 0.3   ALKPHOS 145* 135*         IMAGING: available xray, CT, and MRI results reviewed    Objective:   Vitals: /81   Pulse 73   Temp 97.9 °F (36.6 °C) (Oral)   Resp 18   Ht 5' 11\" (1.803 m)   Wt 285 lb (129.3 kg)   SpO2 94%   BMI 39.75 kg/m²   General appearance: alert, laying in bed, no distress  HEENT: normocephalic, atraumatic, EOMI  Neurologic: Mental status: Normocephalic, atraumatic, EOM intact  Extremities: Bilateral upper and lower extremities 5/5 throughout, negative Axel's bilaterally  Incision: none  Drains: none      Electronically signed by Yemi Valerio PA-C on 3/9/2022 at 9:37 AM

## 2022-03-09 NOTE — PROGRESS NOTES
I.S. given to patient, instructed on use and return demonstration successful. Appointments made with physicians and patient has prescribed medications. Waiting on case management to set up c then patient will be discharged.

## 2022-03-09 NOTE — PROGRESS NOTES
Ashely Bethea was evaluated today and a DME order was entered for a standard wheelchair because he requires this to successfully complete daily living tasks of ambulating. A standard manual wheelchair is necessary due to patient's impaired ambulation and mobility restrictions and would be unable to resolve these daily living tasks using a cane or walker. The patient is capable of using a standard wheelchair safely in their home and can maneuver within their home with adequate access. There is a caregiver available to provide necessary assistance. The need for this equipment was discussed with the patient and he understands, is in agreement, and has not expressed an unwillingness to use the wheelchair. Ashely Bethea requires a bedside commode due to being confined to one level of the home, and is physically incapable of utilizing regular toilet facilities. Current body weight is Weight: 285 lb (129.3 kg).

## 2022-03-09 NOTE — CARE COORDINATION
Case Management Assessment  Initial Evaluation    Date/Time of Evaluation: 3/9/2022 12:06 PM  Assessment Completed by: Nii Zuleta RN    If patient is discharged prior to next notation, then this note serves as note for discharge by case management. Patient Name: Tai Wilkerson                   YOB: 1960  Diagnosis: Abrasion of right elbow, initial encounter [S50.311A]  Closed displaced fracture of anterior column of right acetabulum, initial encounter [facility]) [S32.431A]  Closed head injury, initial encounter [S09.90XA]  Fall from slip, trip, or stumble, initial encounter [W01. 0XXA]  Nondisplaced fracture of anterior wall of right acetabulum, initial encounter for closed fracture ([facility] [S32.414A]  Acute low back pain without sciatica, unspecified back pain laterality [M54.50]  Compression fracture of T4 vertebra, initial encounter ([facility] [S22.040A]  Closed displaced fracture of anterior column of right acetabulum with delayed healing [S32.431G]                   Date / Time: 3/7/2022  6:05 AM    Patient Admission Status: Inpatient    Current PCP: JAMIE Mitchell    Chart Reviewed: Yes      Patient Interviewed: Yes   Family Interviewed:  Yes - Wife at bedside      Hospitalization in the last 30 days (Readmission):  No    If yes, Readmission Assessment in  Navigator will be completed.     Emergency Contacts:  Extended Emergency Contact Information  Primary Emergency Contact: Laura Rosario  Address: [facility], [address] Phone: [phone]  Relation: Spouse    Advance Directives:   Code Status: Full 2021 Deja Zamoray: No  Agent:   Contact Number:     Copy available: No     In paper Chart: No    Scanned into EMR No    Financial  Payor: [employer] / Plan: Delonte Quintana [employer] / Product Type: *No Product type* /     Does insurance require precert for SNF: Yes - Workers Comp [address] [facility] [address] to afford home medications/ co-pay costs: Yes  Potential assistance Purchasing Medications: Yes      [facility], [address] [phone] Michelle Khan [phone]  [address] [facility] [address]  Phone: [phone] Fax: [phone]      ADLS  Support Systems:    Transportation: family    Meal Preparation:     PT AM-PAC: 15 /24  OT AM-PAC:   /24    Housing  Home Environment:  2 story home with wife Steps: several steps going up to second floor. Pt is planning to reside on main floor during recovery. Plans to Return to Present Housing: Yes  Other Identified Issues/Barriers to RETURNING to current housing: Decreased mobility          Community Service Affiliation: IF APPLICABLE. IF NOT APPLICABLE, HIGHLIGHT AND DELETE LINES. Dialysis:  No    Location:                   Days of week:             Outpatient PT/OT: No    Cancer Center: No     CHF Clinic: No     Cardio/Pulmonary Rehab: No  Pain Clinic: No  Community Mental Health: No    Wound Clinic: No     Other:     DISCHARGE PLAN:  Disposition: Home with Home Health Care: No preference on agency     Transportation PLAN for discharge: EMS transportation     Factors facilitating achievement of predicted outcomes: Caregiver support    Barriers to discharge: Long standing deficits    Additional Case Management Notes: RN CM to bedside to introduce self. Pt lives at home with wife. Pt states he does not want to do any inpatient rehab and wants to return home with [address] PT / OT and nursing. Pt does not use any DME at this time. Pt has rx coverage. This is a worker's comp claim. Pt is in touch with Benigno Haley through his employer. Pt has no preference on [address] agency. This RN CM will work on getting [address] set up. Sticky note to physician asking for [address] orders to be placed.      The Plan for Transition of Care is related to the following treatment goals of Abrasion of right elbow, initial encounter [S50.311A]  Closed displaced fracture of anterior column of right acetabulum, initial encounter [facility]) [S32.431A]  Closed head injury, initial encounter [S09.90XA]  Fall from slip, trip, or stumble, initial encounter [W01. 0XXA]  Nondisplaced fracture of anterior wall of right acetabulum, initial [facility] for [facility] [S32.414A]  Acute low back pain without sciatica, unspecified back pain laterality [M54.50]  Compression fracture of T4 vertebra, initial encounter ([facility] [S22.040A]  Closed displaced fracture of anterior column of right acetabulum with delayed healing [H81.637A]    IF APPLICABLE: The Patient and/or patient representative Ne Fung and his family were provided with a choice of provider and agrees with the discharge plan Yes    IF APPLICABLE: Freedom of choice list was provided with basic dialogue that supports the patient's individualized plan of care/goals and shares the quality data associated with the providers.  Yes    Rica Rodriguez RN  [facility]  Case Management Department  Ph: [phone]  Fax: [phone]

## 2022-03-10 LAB
GLUCOSE BLD-MCNC: 123 MG/DL (ref 70–99)
GLUCOSE BLD-MCNC: 164 MG/DL (ref 70–99)
GLUCOSE BLD-MCNC: 173 MG/DL (ref 70–99)
GLUCOSE BLD-MCNC: 174 MG/DL (ref 70–99)
GLUCOSE BLD-MCNC: 179 MG/DL (ref 70–99)

## 2022-03-10 PROCEDURE — 97530 THERAPEUTIC ACTIVITIES: CPT

## 2022-03-10 PROCEDURE — 94150 VITAL CAPACITY TEST: CPT

## 2022-03-10 PROCEDURE — G0378 HOSPITAL OBSERVATION PER HR: HCPCS

## 2022-03-10 PROCEDURE — 97542 WHEELCHAIR MNGMENT TRAINING: CPT

## 2022-03-10 PROCEDURE — 82962 GLUCOSE BLOOD TEST: CPT

## 2022-03-10 PROCEDURE — 94640 AIRWAY INHALATION TREATMENT: CPT

## 2022-03-10 PROCEDURE — 6370000000 HC RX 637 (ALT 250 FOR IP): Performed by: NURSE PRACTITIONER

## 2022-03-10 PROCEDURE — 94760 N-INVAS EAR/PLS OXIMETRY 1: CPT

## 2022-03-10 PROCEDURE — 94761 N-INVAS EAR/PLS OXIMETRY MLT: CPT

## 2022-03-10 PROCEDURE — 2580000003 HC RX 258: Performed by: NURSE PRACTITIONER

## 2022-03-10 RX ADMIN — ATORVASTATIN CALCIUM 40 MG: 40 TABLET, FILM COATED ORAL at 20:59

## 2022-03-10 RX ADMIN — PANTOPRAZOLE SODIUM 40 MG: 40 TABLET, DELAYED RELEASE ORAL at 20:59

## 2022-03-10 RX ADMIN — HYDROCODONE BITARTRATE AND ACETAMINOPHEN 2 TABLET: 5; 325 TABLET ORAL at 20:59

## 2022-03-10 RX ADMIN — SODIUM CHLORIDE, PRESERVATIVE FREE 10 ML: 5 INJECTION INTRAVENOUS at 02:29

## 2022-03-10 RX ADMIN — METOPROLOL SUCCINATE 25 MG: 25 TABLET, EXTENDED RELEASE ORAL at 09:21

## 2022-03-10 RX ADMIN — DULOXETINE HYDROCHLORIDE 60 MG: 30 CAPSULE, DELAYED RELEASE ORAL at 09:21

## 2022-03-10 RX ADMIN — BUDESONIDE AND FORMOTEROL FUMARATE DIHYDRATE 2 PUFF: 160; 4.5 AEROSOL RESPIRATORY (INHALATION) at 09:14

## 2022-03-10 RX ADMIN — BUDESONIDE AND FORMOTEROL FUMARATE DIHYDRATE 2 PUFF: 160; 4.5 AEROSOL RESPIRATORY (INHALATION) at 19:59

## 2022-03-10 RX ADMIN — PREDNISONE 13 MG: 1 TABLET ORAL at 09:22

## 2022-03-10 RX ADMIN — INSULIN LISPRO 1 UNITS: 100 INJECTION, SOLUTION INTRAVENOUS; SUBCUTANEOUS at 12:03

## 2022-03-10 RX ADMIN — ASPIRIN 81 MG 81 MG: 81 TABLET ORAL at 09:22

## 2022-03-10 RX ADMIN — SODIUM CHLORIDE, PRESERVATIVE FREE 10 ML: 5 INJECTION INTRAVENOUS at 09:21

## 2022-03-10 RX ADMIN — HYDROCODONE BITARTRATE AND ACETAMINOPHEN 2 TABLET: 5; 325 TABLET ORAL at 03:03

## 2022-03-10 RX ADMIN — HYDROCODONE BITARTRATE AND ACETAMINOPHEN 2 TABLET: 5; 325 TABLET ORAL at 14:24

## 2022-03-10 RX ADMIN — BUDESONIDE AND FORMOTEROL FUMARATE DIHYDRATE 2 PUFF: 160; 4.5 AEROSOL RESPIRATORY (INHALATION) at 02:28

## 2022-03-10 RX ADMIN — HYDROCODONE BITARTRATE AND ACETAMINOPHEN 2 TABLET: 5; 325 TABLET ORAL at 09:21

## 2022-03-10 RX ADMIN — APIXABAN 5 MG: 5 TABLET, FILM COATED ORAL at 20:59

## 2022-03-10 RX ADMIN — TIOTROPIUM BROMIDE INHALATION SPRAY 2 PUFF: 3.12 SPRAY, METERED RESPIRATORY (INHALATION) at 09:15

## 2022-03-10 RX ADMIN — FERROUS SULFATE TAB 325 MG (65 MG ELEMENTAL FE) 325 MG: 325 (65 FE) TAB at 16:34

## 2022-03-10 RX ADMIN — SODIUM CHLORIDE, PRESERVATIVE FREE 10 ML: 5 INJECTION INTRAVENOUS at 22:47

## 2022-03-10 RX ADMIN — APIXABAN 5 MG: 5 TABLET, FILM COATED ORAL at 21:00

## 2022-03-10 RX ADMIN — CLOPIDOGREL BISULFATE 75 MG: 75 TABLET ORAL at 09:22

## 2022-03-10 RX ADMIN — PANTOPRAZOLE SODIUM 40 MG: 40 TABLET, DELAYED RELEASE ORAL at 09:21

## 2022-03-10 RX ADMIN — APIXABAN 5 MG: 5 TABLET, FILM COATED ORAL at 09:22

## 2022-03-10 RX ADMIN — FERROUS SULFATE TAB 325 MG (65 MG ELEMENTAL FE) 325 MG: 325 (65 FE) TAB at 09:21

## 2022-03-10 RX ADMIN — CYANOCOBALAMIN TAB 1000 MCG 1000 MCG: 1000 TAB at 09:22

## 2022-03-10 RX ADMIN — INSULIN LISPRO 2 UNITS: 100 INJECTION, SOLUTION INTRAVENOUS; SUBCUTANEOUS at 16:35

## 2022-03-10 ASSESSMENT — PAIN DESCRIPTION - PAIN TYPE
TYPE: ACUTE PAIN

## 2022-03-10 ASSESSMENT — PAIN - FUNCTIONAL ASSESSMENT
PAIN_FUNCTIONAL_ASSESSMENT: PREVENTS OR INTERFERES SOME ACTIVE ACTIVITIES AND ADLS

## 2022-03-10 ASSESSMENT — PAIN DESCRIPTION - DESCRIPTORS
DESCRIPTORS: ACHING;DISCOMFORT
DESCRIPTORS: ACHING
DESCRIPTORS: ACHING
DESCRIPTORS: ACHING;SORE

## 2022-03-10 ASSESSMENT — PAIN DESCRIPTION - LOCATION
LOCATION: GENERALIZED
LOCATION: GENERALIZED
LOCATION: BACK
LOCATION: BACK

## 2022-03-10 ASSESSMENT — PAIN SCALES - GENERAL
PAINLEVEL_OUTOF10: 4
PAINLEVEL_OUTOF10: 8
PAINLEVEL_OUTOF10: 3
PAINLEVEL_OUTOF10: 9
PAINLEVEL_OUTOF10: 7
PAINLEVEL_OUTOF10: 4
PAINLEVEL_OUTOF10: 7
PAINLEVEL_OUTOF10: 4
PAINLEVEL_OUTOF10: 8

## 2022-03-10 ASSESSMENT — PAIN DESCRIPTION - PROGRESSION
CLINICAL_PROGRESSION: NOT CHANGED
CLINICAL_PROGRESSION: GRADUALLY IMPROVING
CLINICAL_PROGRESSION: GRADUALLY IMPROVING
CLINICAL_PROGRESSION: NOT CHANGED

## 2022-03-10 ASSESSMENT — PAIN DESCRIPTION - ONSET
ONSET: ON-GOING

## 2022-03-10 ASSESSMENT — PAIN DESCRIPTION - FREQUENCY
FREQUENCY: CONTINUOUS

## 2022-03-10 ASSESSMENT — PAIN DESCRIPTION - ORIENTATION
ORIENTATION: MID
ORIENTATION: MID

## 2022-03-10 ASSESSMENT — PAIN DESCRIPTION - DIRECTION
RADIATING_TOWARDS: DOWN
RADIATING_TOWARDS: DOWN

## 2022-03-10 NOTE — PROGRESS NOTES
mestinon, steroids     Hypertension   -BP well controlled on discharge  -Continue home medications     CVA  -Continue home Plavix, statin on discharge     This patient was discussed with Dr. Carlos Caldwell. He was agreeable with the plan and management as dictated above. Diet ADULT DIET; Regular   DVT Prophylaxis [] Lovenox, []  Heparin, [] SCDs, [] Ambulation,  [x] Eliquis, [] Xarelto   Code Status Full Code   Disposition Awaiting HHC/DME arrangements, planning to discharge today once arranged   Surrogate Decision Maker/ POA      Subjective:     Chief Complaint: Hip Pain    Patient seen and evaluated at bedside. No acute events overnight. States he is having some pain with movement, but otherwise doing okay. Denies chest pain, shortness of breath, nausea/tingling. Patient is eager to get home today. Radha setting up home health care and DME prior to discharge, patient is agreeable for this. Review of Systems:    Ten point ROS reviewed negative, unless as noted above    Objective: Intake/Output Summary (Last 24 hours) at 3/10/2022 0921  Last data filed at 3/9/2022 1145  Gross per 24 hour   Intake 240 ml   Output 700 ml   Net -460 ml        Vitals:   Vitals:    03/10/22 0919   BP: (!) 133/91   Pulse: 63   Resp: 18   Temp: 98 °F (36.7 °C)   SpO2: 94%       Physical Exam:   PHYSICAL EXAM   GEN Awake male, sitting upright in bed in no apparent distress. Appears given age. Morbidly obese. EYES Pupils are equally round. Gaze conjugate. HENT Mucous membranes are moist.   NECK Supple, no apparent thyromegaly or masses. RESP Respirations even and unlabored on RA. Clear to auscultation bilaterally anteriorly. CARDIO/VASC Regular rate without appreciable murmurs. GI Abdomen is soft without significant tenderness, masses, or guarding.   Andres catheter is not present. MSK No gross joint deformities. SKIN Normal coloration, warm, dry.   NEURO Cranial nerves appear grossly intact, normal speech, no lateralizing weakness. PSYCH Awake, alert, oriented x 4. Affect appropriate. Medications:   Medications:    lidocaine  1 patch TransDERmal Daily    sodium chloride flush  5-40 mL IntraVENous 2 times per day    bisacodyl  5 mg Oral Daily    aspirin  81 mg Oral Daily    atorvastatin  40 mg Oral Nightly    budesonide-formoterol  2 puff Inhalation BID    clopidogrel  75 mg Oral Daily    DULoxetine  60 mg Oral Daily    apixaban  5 mg Oral BID    pantoprazole  40 mg Oral BID    predniSONE  13 mg Oral Daily    tiotropium  2 puff Inhalation Daily    vitamin B-12  1,000 mcg Oral Daily    ferrous sulfate  325 mg Oral BID WC    metoprolol succinate  25 mg Oral Daily    insulin lispro  0.08 Units/kg SubCUTAneous TID WC    insulin lispro  0-6 Units SubCUTAneous TID WC    insulin lispro  0-3 Units SubCUTAneous Nightly    Zilucoplan ( RA 132411 )  40mg/ 1ml -prefilled syr .  once Q PM  40 mg SubCUTAneous QPM      Infusions:    sodium chloride      dextrose       PRN Meds: HYDROcodone 5 mg - acetaminophen, 1 tablet, Q4H PRN   Or  HYDROcodone 5 mg - acetaminophen, 2 tablet, Q4H PRN  acetaminophen, 650 mg, Q4H PRN  sodium chloride flush, 5-40 mL, PRN  sodium chloride, 25 mL, PRN  ondansetron, 4 mg, Q8H PRN   Or  ondansetron, 4 mg, Q6H PRN  polyethylene glycol, 17 g, Daily PRN  glucose, 15 g, PRN  glucagon (rDNA), 1 mg, PRN  dextrose, 100 mL/hr, PRN  dextrose bolus (hypoglycemia), 125 mL, PRN   Or  dextrose bolus (hypoglycemia), 250 mL, PRN        Labs      Recent Results (from the past 24 hour(s))   POCT Glucose    Collection Time: 03/09/22 11:26 AM   Result Value Ref Range    POC Glucose 125 (H) 70 - 99 MG/DL   POCT Glucose    Collection Time: 03/09/22  4:16 PM   Result Value Ref Range    POC Glucose 245 (H) 70 - 99 MG/DL   POCT Glucose    Collection Time: 03/09/22  8:51 PM   Result Value Ref Range    POC Glucose 219 (H) 70 - 99 MG/DL   POCT Glucose    Collection Time: 03/10/22  6:57 AM   Result Value Ref Range    POC Glucose 123 (H) 70 - 99 MG/DL        Imaging/Diagnostics Last 24 Hours   XR THORACIC SPINE (2 VIEWS)    Result Date: 3/9/2022  EXAMINATION: 03/08/2022 MRI XRAY VIEWS OF THE THORACIC SPINE 3/8/2022 10:24 pm COMPARISON: None. HISTORY: ORDERING SYSTEM PROVIDED HISTORY: obtain sitting with  in place, T4 fracture TECHNOLOGIST PROVIDED HISTORY: Reason for exam:->obtain sitting with  in place, T4 fracture Reason for Exam: obtain sitting with  in place, T4 fracture Additional signs and symptoms: none Relevant Medical/Surgical History: none FINDINGS: T4 compression deformity again noted. Alignment is anatomic. The T3 deformity is not visualized. Lungs appear unremarkable.      Anatomic alignment T4 compression deformity again noted       Electronically signed by Geoffrey Taylor PA-C on 3/10/2022 at 9:21 AM

## 2022-03-10 NOTE — PROGRESS NOTES
Physical Therapy    Physical Therapy Treatment Note  Name: Dick Choudhury MRN: [id number] :   1960   Date:  3/10/2022   Admission Date: 3/7/2022 Room:  [address] [place] [address]   Restrictions/Precautions:        NWB'ing R LE,  when upright and OOB, general precautions, fall risk, colostomy. fall resulting in a T4 compression fracture, acute to subacute 8th rib fracture, L1 right transverse process fracture (acuity unknown), and a right acetabular fracture extending into iliac crest  Communication with other providers:  Per nurse ok to tx and notified pt rating pain 8. Pt was advised pt would be mod assist for sit pivot wc transfer but would advice recliner would most likely be max assist of 2. Subjective:  Patient states: Motivated and agreeable to tx  Pain:   Location, Type, Intensity (0/10 to 10/10):  Rates leg and back pain 8 at end of tx session  Objective:    Observation:  Alert and oriented  Treatment, including education/measures:  Discussed spinal precautions and NWB RLE. Pt was able to practice donning/doffing brace x 3 reps but still needing min assist and cues but doing better better by 3 trail. Pt dependent to don socks  Pt given leg  to assist moving right leg in/out of bed and on/off pedal of wc. Pt given and instructed in use of using a  reacher and safety with wearing back brace and limiting bending and twisting. Ex in sup; Deep breathing followed with aps  Sup to sit with leg  and bed rail sba and cues  Sit pivot bed<=>wc mod assist and cues   pt educated on wc safety with brakes  Pt was able to use UEs and LLE to propel 120' x 1, 50' x 1 and mange wc in valente. Pt was dependent for ramp in/out of // bars and set up for transfers. Pt transferred sit<=> //bars with min assist and cues and having difficulty at first to maintain NWB. Pt was able to tolerate some gentle AROM in NWB RLE and some heel raises on LLE.  Pt stood in // bars x 2 reps for 5 minutes each time.  Safety  Patient left safely in the bed, with call light/phone in reach with alarm applied. Gait belt and mask were used for transfers and gait. Assessment / Impression:       Patient's tolerance of treatment:  good  Adverse Reaction: na  Significant change in status and impact:  na  Barriers to improvement:  Strength and safety  Plan for Next Session:    Cont.  POC  Time in:  1005  Time out:  1100  Timed treatment minutes:  55  Total treatment time:  54    Previously filed items:  Social/Functional History  Lives With: Spouse (available 24/7)  Type of Home: House  Home Layout: Two level,Able to Live on Main level with bedroom/bathroom (only bathroom is on 2nd level)  Home Access: Stairs to enter with rails  Entrance Stairs - Number of Steps: 3  Entrance Stairs - Rails: Both  Bathroom Shower/Tub: Tub/Shower unit  Bathroom Toilet: Standard  Bathroom Equipment: Shower chair  ADL Assistance: [address]: Independent  Homemaking Responsibilities: Yes  Ambulation Assistance: Independent (uses no AD)  Transfer Assistance: Independent  Active : Yes  Occupation: Full time employment  Type of occupation:  (for 38 years)  Short term goals  Time Frame for Short term goals: 1 week  Short term goal 1: Pt to complete all bed mobility SBA  Short term goal 2: Pt to complete all STS transfers to/from bed, chair, and commode max A x1  Short term goal 3: Pt to ambulate 13' with LRAD while maintaining precautions  Short term goal 4: Pt to don/doff brace with supervision  Short term goal 5: Pt to propel manual ' with supervision       Electronically signed by:    Thalia Foster PTA  3/10/2022, 8:13 AM

## 2022-03-10 NOTE — CARE COORDINATION
This RN CM received a VM from [facility] stating they are able to take pt. They did ask I return their call at [phone]. This RN CM attempted to call, no answer, no VM. CM will try again shortly. 1135 - This RN CM spoke with [facility]. They have received the order and are reviewing it. They have to see if they have the equipment needed for his weight in the warehouse. [facility] will call this RN CM and advise me if they do. 1207 - LVM with [facility]. 1500 - This RN CM spoke with [facility]. He states that order is not completed at this time and his manager is out. He states they will call me back as soon as manager is back. I am also sending DME order to [employer]. 1600 - Called and spoke with Trung Ribeiro has BSC and 20 inch wheelchair. [facility] is going to call and speak with pt. I will let Anisha VIOLETTA RN know that pt is going to [place] with mary and if they are able to accommodate him, he should be able to go.

## 2022-03-10 NOTE — PLAN OF CARE
Problem: Infection:  Goal: Will remain free from infection  Description: Will remain free from infection  3/10/2022 1027 by Ceci Troncoso RN  Outcome: Ongoing  3/10/2022 0457 by Breanna Collier  Outcome: Ongoing     Problem: Safety:  Goal: Free from accidental physical injury  Description: Free from accidental physical injury  3/10/2022 1027 by Ceci Troncoso RN  Outcome: Ongoing  3/10/2022 0457 by Breanna Collier  Outcome: Ongoing  Goal: Free from intentional harm  Description: Free from intentional harm  3/10/2022 1027 by Ceci Troncoso RN  Outcome: Ongoing  3/10/2022 0457 by Breanna Collier  Outcome: Ongoing     Problem: Daily Care:  Goal: Daily care needs are met  Description: Daily care needs are met  3/10/2022 1027 by Ceci Troncoso RN  Outcome: Ongoing  3/10/2022 0457 by Breanna Collier  Outcome: Ongoing     Problem: Pain:  Goal: Patient's pain/discomfort is manageable  Description: Patient's pain/discomfort is manageable  3/10/2022 1027 by Ceci Troncoso RN  Outcome: Ongoing  3/10/2022 0457 by Breanna Collier  Outcome: Ongoing     Problem: Skin Integrity:  Goal: Skin integrity will stabilize  Description: Skin integrity will stabilize  3/10/2022 1027 by Ceci Troncoso RN  Outcome: Ongoing  3/10/2022 0457 by Breanna Collier  Outcome: Ongoing     Problem: Discharge Planning:  Goal: Patients continuum of care needs are met  Description: Patients continuum of care needs are met  3/10/2022 1027 by Ceci Troncoso RN  Outcome: Ongoing  3/10/2022 0457 by Breanna Collier  Outcome: Ongoing     Problem: Mobility - Impaired:  Goal: Mobility will improve  Description: Mobility will improve  Outcome: Ongoing

## 2022-03-11 VITALS
HEART RATE: 60 BPM | BODY MASS INDEX: 39.9 KG/M2 | WEIGHT: 285 LBS | HEIGHT: 71 IN | TEMPERATURE: 97.8 F | RESPIRATION RATE: 20 BRPM | OXYGEN SATURATION: 95 % | DIASTOLIC BLOOD PRESSURE: 88 MMHG | SYSTOLIC BLOOD PRESSURE: 147 MMHG

## 2022-03-11 LAB
GLUCOSE BLD-MCNC: 125 MG/DL (ref 70–99)
GLUCOSE BLD-MCNC: 129 MG/DL (ref 70–99)
GLUCOSE BLD-MCNC: 144 MG/DL (ref 70–99)

## 2022-03-11 PROCEDURE — 94640 AIRWAY INHALATION TREATMENT: CPT

## 2022-03-11 PROCEDURE — 94150 VITAL CAPACITY TEST: CPT

## 2022-03-11 PROCEDURE — 94761 N-INVAS EAR/PLS OXIMETRY MLT: CPT

## 2022-03-11 PROCEDURE — G0378 HOSPITAL OBSERVATION PER HR: HCPCS

## 2022-03-11 PROCEDURE — 6370000000 HC RX 637 (ALT 250 FOR IP): Performed by: NURSE PRACTITIONER

## 2022-03-11 PROCEDURE — 82962 GLUCOSE BLOOD TEST: CPT

## 2022-03-11 PROCEDURE — 97530 THERAPEUTIC ACTIVITIES: CPT

## 2022-03-11 PROCEDURE — 2580000003 HC RX 258: Performed by: NURSE PRACTITIONER

## 2022-03-11 PROCEDURE — 97542 WHEELCHAIR MNGMENT TRAINING: CPT

## 2022-03-11 PROCEDURE — 97535 SELF CARE MNGMENT TRAINING: CPT

## 2022-03-11 PROCEDURE — 97116 GAIT TRAINING THERAPY: CPT

## 2022-03-11 RX ADMIN — SODIUM CHLORIDE, PRESERVATIVE FREE 10 ML: 5 INJECTION INTRAVENOUS at 09:02

## 2022-03-11 RX ADMIN — APIXABAN 5 MG: 5 TABLET, FILM COATED ORAL at 09:00

## 2022-03-11 RX ADMIN — BUDESONIDE AND FORMOTEROL FUMARATE DIHYDRATE 2 PUFF: 160; 4.5 AEROSOL RESPIRATORY (INHALATION) at 08:18

## 2022-03-11 RX ADMIN — INSULIN LISPRO 1 UNITS: 100 INJECTION, SOLUTION INTRAVENOUS; SUBCUTANEOUS at 12:28

## 2022-03-11 RX ADMIN — HYDROCODONE BITARTRATE AND ACETAMINOPHEN 2 TABLET: 5; 325 TABLET ORAL at 09:14

## 2022-03-11 RX ADMIN — CYANOCOBALAMIN TAB 1000 MCG 1000 MCG: 1000 TAB at 09:00

## 2022-03-11 RX ADMIN — FERROUS SULFATE TAB 325 MG (65 MG ELEMENTAL FE) 325 MG: 325 (65 FE) TAB at 09:01

## 2022-03-11 RX ADMIN — PANTOPRAZOLE SODIUM 40 MG: 40 TABLET, DELAYED RELEASE ORAL at 09:00

## 2022-03-11 RX ADMIN — ASPIRIN 81 MG 81 MG: 81 TABLET ORAL at 09:01

## 2022-03-11 RX ADMIN — TIOTROPIUM BROMIDE INHALATION SPRAY 2 PUFF: 3.12 SPRAY, METERED RESPIRATORY (INHALATION) at 08:18

## 2022-03-11 RX ADMIN — HYDROCODONE BITARTRATE AND ACETAMINOPHEN 2 TABLET: 5; 325 TABLET ORAL at 03:00

## 2022-03-11 RX ADMIN — PREDNISONE 13 MG: 1 TABLET ORAL at 09:12

## 2022-03-11 RX ADMIN — DULOXETINE HYDROCHLORIDE 60 MG: 30 CAPSULE, DELAYED RELEASE ORAL at 08:59

## 2022-03-11 RX ADMIN — CLOPIDOGREL BISULFATE 75 MG: 75 TABLET ORAL at 09:00

## 2022-03-11 RX ADMIN — METOPROLOL SUCCINATE 25 MG: 25 TABLET, EXTENDED RELEASE ORAL at 09:01

## 2022-03-11 ASSESSMENT — PAIN DESCRIPTION - PROGRESSION
CLINICAL_PROGRESSION: NOT CHANGED

## 2022-03-11 ASSESSMENT — PAIN DESCRIPTION - PAIN TYPE: TYPE: ACUTE PAIN

## 2022-03-11 ASSESSMENT — PAIN SCALES - GENERAL
PAINLEVEL_OUTOF10: 9
PAINLEVEL_OUTOF10: 8
PAINLEVEL_OUTOF10: 0

## 2022-03-11 NOTE — PROGRESS NOTES
Occupational Therapy Treatment Note      Name: Ashely Bethea MRN: [id number] :   1960   Date:  3/11/2022   Admission Date: 3/7/2022 Room:  [address] [place] [address]A     Primary Problem:  The primary encounter diagnosis was Nondisplaced fracture of anterior wall of right acetabulum, initial encounter for closed fracture ([facility] Diagnoses of Fall from slip, trip, or stumble, initial encounter, Closed head injury, initial encounter, Abrasion of right elbow, initial encounter, Acute low back pain without sciatica, unspecified back pain laterality, and Compression fracture of T4 vertebra, initial encounter [facility]) were also pertinent to this visit. Restrictions/Precautions:  General Precautions, Fall Risk, NWB Rt LE, CTLSO brace with EOB/OOB activity, Spinal Precautions, Colostomy, Bed/chair alarm    Communication with other providers:  Per chart review and Nurse patient is appropriate for therapeutic intervention. Co tx with Lori Rodriguez PTA for safety    Subjective:  Patient states:  Agreeable to OT therapy. \"I would like to get cleaned up. \"  Pain: 8/10 Hip and back (location, type, intensity)    Objective:    Observation:  Semi del real's position upon arrival.  Objective Measures:  Alert and oriented    Treatment, including education:  Self Care Training:   Cues were given for safety, sequence, UE/LE placement, visual cues, and balance. Activities performed today included dressing, toileting, hand hygiene, and grooming. Engaged UB/LB bathing on EOB with SBA for UB and Min A below knees. Perform Min A for donning/doffing socks utilizing reacher and sock aid. Completed grooming task/ colostomy hygiene with setup. Therapeutic Activity Training:   Therapeutic activity training was instructed today. Cues were given for safety, sequence, UE/LE placement, awareness, and balance.     Bed mobility:SBA utilizing bed features  Sit pivot transfers bed>w/c Mod A x1 and SBA x1; w/c>bed Min A x1 and SBA x1  Sit/stand transfers:CGA within parallel bars x3 reps     Functional Mobility: 5'+5' within parallel bars CGA with G safety awareness carryover on LLE WB status    Activities performed today included bed mobility training, transfers, functional mobility  to increase strength, activity tolerance to facilitate IND c ADL tasks, func transfers / mobility with G safety awareness carryover    Assessment / Impression:    Patient's tolerance of treatment: good  Adverse Reaction: none  Significant change in status and impact:  none  Barriers to improvement: activity tolerance, strength and safety     Safety  Patient educated on role of OT , benefits of OT and rationale for therapeutic intervention. Patient safely in bed + alarm set at end of session, with call light/phone in reach, and nursing aware. Gait belt was used for func transfers / mobility.     Plan for Next Session:    Continue OT POC    Time in:  925  Time out:  1011  Timed treatment minutes:  46  Total treatment time:  46      Electronically signed by:    ANJEL Calvillo,   3/11/2022, 9:24 AM

## 2022-03-11 NOTE — CARE COORDINATION
[facility] cannot provide DME due to pt is out of network. I will contact BREANNA and Janelle to see if they are able to fulfill need for wheelchair. Pt has colostomy and states he does not need the BSC. [facility] states pt is also out of network. There is not a worker's comp form in soft chart. 1025 - This RN CM called [facility], order has not yet been reviewed and processed. They state they will call once it is. I have also faxed the order to [facility]. 1122 - This RN CM reached out to [address] [facility] here in [facility]. They have one standard wheelchair with leg rests for 15.00. I have spoken with both pt and wife's pt about this wheelchair. They states they would like to go ahead and obtain this wheelchair for 15.00 instead of waiting for insurance to be approved. Wife states she is going to  the wheelchair and then will come to the hospital. I have asked Ann Joya to let up know when she is here so pt can be discharged. Analisa Arriaza ,charge nurse also advised. 1521 - AVS and C orders faxed to [facility].

## 2022-03-11 NOTE — CARE COORDINATION
CM - in ER received a call from Anisha ([phone])in reference to this pt in room 1116. An order was placed for DME equipment for one  wheelchair (18x20 -200 lb capacity) and one BSC for pt wt capacity of 285 lb . There was an additional order for an Orthosis back brace -not included on this DME order. This original order was sent to [facility] [facility] ([phone]) After 5 PM -[address] notified us that the pt insurance is \"out of network\"and could not fill the order . Somehow the order was thought to be transferred to [facility] ([phone]) CM called both places for information. CM is unsure why [facility] -DME supply was not involved. ? At this point any delivery tonight is out of the question -pt will need to stay for a safe discharge and clarification on these orders. The insurance listed (primary) is [facility] and (secondary) [address] [place] [address]. If this is a Workmans Comp billing nima if any will accept unless they can use the [facility] as Primary. The wheelchair listed above is under  the wt recommendation --it should be a [id number] -300 lb capacity -for this and the proper BSC you will need to  Check stock during the daytime hours at [phone] ---and ---check for delivery time to make sure it can be delivered that or next day.      CM will supply all the paperwork and fax ready orders so this matter can be taken care of ASAP Report and paperwork will be given to Pt night shift nurse

## 2022-03-11 NOTE — PROGRESS NOTES
Physical Therapy    Physical Therapy Treatment Note  Name: Stephen Sims MRN: [id number] :   1960   Date:  3/11/2022   Admission Date: 3/7/2022 Room:  [address] [place] [address]   Restrictions/Precautions:        NWB'ing R LE,  when upright and OOB, general precautions, fall risk, colostomy. fall resulting in a T4 compression fracture, acute to subacute 8th rib fracture, L1 right transverse process fracture (acuity unknown), and a right acetabular fracture extending into iliac crest  Communication with other providers: Co-tx with MARTINO for safety with transfers and amb. refer to OT note for ADLs  Subjective:  Patient states: Motivated and agreeable to tx  Pain:   Location, Type, Intensity (0/10 to 10/10):  Rates back and hip pain 8. Pt will call nurse for pain meds as needed  Objective:    Observation:  Alert and oriented with back brace on for tx session. Treatment, including education/measures:  Sup to sit with bed rail sba  Sit pivot bed to wc moving to left side mod assist of one and sba assist of one for safety. Pt denies putting wt thru RLE but feel pt did put on ground and TTWB. Sit pivot wc to bed pt did much better moving to left side but was reaching across his body to grab bed rail with right hand and pushing up from chair with left hand and twisting on left foot in more of a half stand transfers only needing min assist of one and sba of one for safety. Pt is able to propel wc with UE and using LLE for steering. Pt was able to back up ramp on // bars using UE and LLE and min. assist and cue  Pt was able to transfer sit to stand using bars with cga. Pt was able to progress to hopping forward and backward (5'each direction) x 2 reps but needing increased rest break between bouts of hopping due to increased SOB and increased HR up to 110. O2 staying in 90's with tx session. Safety  Patient left safely in the bed, with call light/phone in reach with alarm applied.  Gait belt and mask were used for transfers and gait. Assessment / Impression:       Patient's tolerance of treatment:  good   Adverse Reaction: na  Significant change in status and impact:  na  Barriers to improvement:  SOB with activity, strength, and safety. Plan for Next Session:    Cont.  POC  Time in:  0925  Time out:  1011  Timed treatment minutes:  46  Total treatment time:  55    Previously filed items:  Social/Functional History  Lives With: Spouse (available 24/7)  Type of Home: House  Home Layout: Two level,Able to Live on Main level with bedroom/bathroom (only bathroom is on 2nd level)  Home Access: Stairs to enter with rails  Entrance Stairs - Number of Steps: 3  Entrance Stairs - Rails: Both  Bathroom Shower/Tub: Tub/Shower unit  Bathroom Toilet: Standard  Bathroom Equipment: Shower chair  ADL Assistance: [facility] [address]: Independent  Homemaking Responsibilities: Yes  Ambulation Assistance: Independent (uses no AD)  Transfer Assistance: Independent  Active : Yes  Occupation: Full time employment  Type of occupation:  (for 38 years)  Short term goals  Time Frame for Short term goals: 1 week  Short term goal 1: Pt to complete all bed mobility SBA  Short term goal 2: Pt to complete all STS transfers to/from bed, chair, and commode max A x1  Short term goal 3: Pt to ambulate 13' with LRAD while maintaining precautions  Short term goal 4: Pt to don/doff brace with supervision  Short term goal 5: Pt to propel manual ' with supervision       Electronically signed by:    Shannon Caballero PTA  3/11/2022, 8:09 AM

## 2022-03-11 NOTE — PROGRESS NOTES
V2.0  [facility] Hospitalist Progress Note      Name:  Lety Rubio /Age/Sex: 1960  (60 y.o. male)   MRN & CSN:  [id number] & [id number] Encounter Date/Time: 3/11/2022 10:44 AM EST    Location:  [address] [place] [address]-A PCP: [address] Day: 5    Assessment and Plan:   Elaine mdeellin 60 y. o. male with past medical history of myasthenia gravis, PAF s/p ablation, CVA, COPD, UC, HTN who presents with mechanical fall. Patient was discharged 3/9/22, but discharge has been held due to lack of safe discharge plan due to inability to arrange [place] [id number] and wheelchair. C has been arranged. Awaiting wheelchair.  Plan to discharge today after this is arranged.      Acute nondisplaced fracture of the right anterior acetabulum   - s/p mechanical fall   -CT A/P with acute nondisplaced fracture of the right anterior acetabulum extending into the iliac bone   - orthopedic surgery consulted, recommended non operative management including nonweightbearing RLE for 6-8 weeks, follow-up in 1 week with Dr. Barb Mckenzie for repeat imaging  -PT/OT evaluated, recommended ARU however patient refused and chose to go home with home health care.  DME order for wheelchair and bedside commode on discharge.  -Prescribed short course of Norco on discharge, recommended Tylenol/ibuprofen PRN as well     Acute to subacute T4 compression fracture  L1 transverse process fracture   - s/p mechanical fall   - MRI T spine showed acute to subacute compression fracture of T4 with 30% height loss  - NSG followed - recommended TLSO brace when upright and out of bed, follow-up in 6 weeks   - Prescribed short course of Norco on discharge, recommended Tylenol/ibuprofen PRN as well     Acute versus subacute fracture of the right eighth rib  -Provided incentive spirometry on discharge  -Pain control as above    Hypertension   -BP intermittently elevated in setting of pain  -Continue home medications     PAF  - S/p ablation    -Continue beta-blocker, Eliquis on discharge     CAD   -Continue Plavix, statin     T2DM  - continue home metformin      Myasthenia gravis   - no signs of exacerbation   - continue home mestinon, steroids     CVA  -Continue home Plavix, statin on discharge     This patient was discussed with Dr. Alyssa Ibrahim. He was agreeable with the plan and management as dictated above. Diet ADULT DIET; Regular   DVT Prophylaxis [x] Lovenox, []  Heparin, [] SCDs, [] Ambulation,  [] Eliquis, [] Xarelto   Code Status Full Code   Disposition Plan to discharge today after obtaining wheelchair for patient    Surrogate Decision Maker/ POA      Subjective:     Chief Complaint: Hip Pain     Patient seen and examined at bedside. No acute events overnight. Still awaiting wheelchair. Home health care was set up yesterday. Patient is anxious to get home. States pain in his right leg only when he moves. Denies chest pain, shortness of breath, numbness/tingling. Review of Systems:    Ten point ROS reviewed negative, unless as noted above    Objective: Intake/Output Summary (Last 24 hours) at 3/11/2022 1044  Last data filed at 3/10/2022 2358  Gross per 24 hour   Intake --   Output 950 ml   Net -950 ml        Vitals:   Vitals:    03/11/22 0845   BP: (!) 147/88   Pulse: 60   Resp: 20   Temp: 97.8 °F (36.6 °C)   SpO2: 95%       Physical Exam:   PHYSICAL EXAM   GEN Awake male, sitting upright in bed in no apparent distress. Appears given age. Obese. EYES Pupils are equally round. Gaze conjugate. HENT Mucous membranes are moist.   NECK Supple, no apparent thyromegaly or masses. RESP Respirations even and unlabored on RA. To auscultation bilaterally. CARDIO/VASC Regular rate without appreciable murmurs. GI Abdomen is soft without significant tenderness, masses, or guarding.   Andres catheter is not present. MSK No gross joint deformities. SKIN Normal coloration, warm, dry.   NEURO Cranial nerves appear grossly intact, normal speech, no lateralizing weakness. PSYCH Awake, alert, oriented x 4. Affect appropriate. Medications:   Medications:    lidocaine  1 patch TransDERmal Daily    sodium chloride flush  5-40 mL IntraVENous 2 times per day    bisacodyl  5 mg Oral Daily    aspirin  81 mg Oral Daily    atorvastatin  40 mg Oral Nightly    budesonide-formoterol  2 puff Inhalation BID    clopidogrel  75 mg Oral Daily    DULoxetine  60 mg Oral Daily    apixaban  5 mg Oral BID    pantoprazole  40 mg Oral BID    predniSONE  13 mg Oral Daily    tiotropium  2 puff Inhalation Daily    vitamin B-12  1,000 mcg Oral Daily    ferrous sulfate  325 mg Oral BID WC    metoprolol succinate  25 mg Oral Daily    insulin lispro  0.08 Units/kg SubCUTAneous TID WC    insulin lispro  0-6 Units SubCUTAneous TID WC    insulin lispro  0-3 Units SubCUTAneous Nightly    Zilucoplan ( [id number] )  40mg/ 1ml -prefilled syr .  once Q PM  40 mg SubCUTAneous QPM      Infusions:    sodium chloride      dextrose       PRN Meds: HYDROcodone 5 mg - acetaminophen, 1 tablet, Q4H PRN   Or  HYDROcodone 5 mg - acetaminophen, 2 tablet, Q4H PRN  acetaminophen, 650 mg, Q4H PRN  sodium chloride flush, 5-40 mL, PRN  sodium chloride, 25 mL, PRN  ondansetron, 4 mg, Q8H PRN   Or  ondansetron, 4 mg, Q6H PRN  polyethylene glycol, 17 g, Daily PRN  glucose, 15 g, PRN  glucagon (rDNA), 1 mg, PRN  dextrose, 100 mL/hr, PRN  dextrose bolus (hypoglycemia), 125 mL, PRN   Or  dextrose bolus (hypoglycemia), 250 mL, PRN        Labs      Recent Results (from the past 24 hour(s))   POCT Glucose    Collection Time: 03/10/22 10:59 AM   Result Value Ref Range    POC Glucose 179 (H) 70 - 99 MG/DL   POCT Glucose    Collection Time: 03/10/22  4:17 PM   Result Value Ref Range    POC Glucose 173 (H) 70 - 99 MG/DL   POCT Glucose    Collection Time: 03/10/22  8:12 PM   Result Value Ref Range    POC Glucose 174 (H) 70 - 99 MG/DL   POCT Glucose    Collection Time: 03/10/22  8:45 PM   Result Value Ref Range    POC Glucose 164 (H) 70 - 99 MG/DL   POCT Glucose    Collection Time: 03/11/22  5:47 AM   Result Value Ref Range    POC Glucose 129 (H) 70 - 99 MG/DL   POCT Glucose    Collection Time: 03/11/22  7:06 AM   Result Value Ref Range    POC Glucose 125 (H) 70 - 99 MG/DL        Imaging/Diagnostics Last 24 Hours   No results found.     Electronically signed by Vianey Echavarria PA-C on 3/11/2022 at 10:44 AM

## 2022-03-12 LAB
CULTURE: NORMAL
CULTURE: NORMAL
Lab: NORMAL
Lab: NORMAL
SPECIMEN: NORMAL
SPECIMEN: NORMAL

## 2022-03-23 ENCOUNTER — OFFICE VISIT (OUTPATIENT)
Dept: ORTHOPEDIC SURGERY | Age: 62
End: 2022-03-23
Payer: MEDICAID

## 2022-03-23 VITALS
HEIGHT: 71 IN | OXYGEN SATURATION: 97 % | HEART RATE: 75 BPM | BODY MASS INDEX: 39.9 KG/M2 | WEIGHT: 285 LBS | RESPIRATION RATE: 16 BRPM

## 2022-03-23 DIAGNOSIS — S32.414A CLOSED NONDISPLACED FRACTURE OF ANTERIOR WALL OF RIGHT ACETABULUM, INITIAL ENCOUNTER (HCC): Primary | ICD-10-CM

## 2022-03-23 PROCEDURE — G8484 FLU IMMUNIZE NO ADMIN: HCPCS | Performed by: STUDENT IN AN ORGANIZED HEALTH CARE EDUCATION/TRAINING PROGRAM

## 2022-03-23 PROCEDURE — G8427 DOCREV CUR MEDS BY ELIG CLIN: HCPCS | Performed by: STUDENT IN AN ORGANIZED HEALTH CARE EDUCATION/TRAINING PROGRAM

## 2022-03-23 PROCEDURE — G8417 CALC BMI ABV UP PARAM F/U: HCPCS | Performed by: STUDENT IN AN ORGANIZED HEALTH CARE EDUCATION/TRAINING PROGRAM

## 2022-03-23 PROCEDURE — 1036F TOBACCO NON-USER: CPT | Performed by: STUDENT IN AN ORGANIZED HEALTH CARE EDUCATION/TRAINING PROGRAM

## 2022-03-23 PROCEDURE — 99213 OFFICE O/P EST LOW 20 MIN: CPT | Performed by: STUDENT IN AN ORGANIZED HEALTH CARE EDUCATION/TRAINING PROGRAM

## 2022-03-23 PROCEDURE — 3017F COLORECTAL CA SCREEN DOC REV: CPT | Performed by: STUDENT IN AN ORGANIZED HEALTH CARE EDUCATION/TRAINING PROGRAM

## 2022-03-23 RX ORDER — OXYCODONE HYDROCHLORIDE AND ACETAMINOPHEN 5; 325 MG/1; MG/1
1 TABLET ORAL EVERY 6 HOURS PRN
Qty: 28 TABLET | Refills: 0 | Status: SHIPPED | OUTPATIENT
Start: 2022-03-23 | End: 2022-03-30

## 2022-03-23 ASSESSMENT — ENCOUNTER SYMPTOMS
BACK PAIN: 1
COLOR CHANGE: 0
SORE THROAT: 0
COUGH: 0
WHEEZING: 0
NAUSEA: 0
VOICE CHANGE: 0
VOMITING: 0
SHORTNESS OF BREATH: 0

## 2022-03-23 NOTE — PROGRESS NOTES
3/23/2022   No chief complaint on file. Updated HPI: Patient is here for reevaluation of his right acetabular fracture. Patient was seen in the hospital on the floor while I was on call several weeks prior. Patient states he has been compliant with his nonweightbearing since the injury has been doing well. He denies any right hip or groin pain. No additional orthopedic complaints at this time. Patient is being treated for spinal issues by the neurology team.  No other complaints at this time and patient denies any constitutional symptoms. Previous HPI (3/8/2022): Leroy Berger is a 60 y.o. 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 He was able to get off the ground and was able to drive himself to the emergency room where he presented. Imaging demonstrated a nondisplaced acetabular fracture. He also had a thoracic spine T4 compression fracture without any associated numbness or tingling. He was subsequently admitted for further evaluation and orthopedics was consulted. He denies any prior right hip pain or any prior right hip injury. His right buttock is the only area orthopedically that he has complaints other than his thoracic spine. He has no other complaints at this time and again this includes any numbness or tingling. I have not previously seen this patient. The pain's location is posterior acetabulum. he describes the symptoms as aching, sharp and stabbing. Symptoms improve with rest, especially nonweightbearing. Symptoms worsen with weight bearing but are minimal with logrolling of the hip. No prominent mechanical symptoms. Denies sensation of instability.       Treatment thus far has included rest, including being made nonweightbearing. Patient denies any previous surgery to the hip or lumbar spine. Outside reports reviewed: Emergency room note and imaging reviewed    Patient's medications, allergies, past medical, surgical, social and family histories were reviewed and updated as appropriate. Medical History  Patient's medications, allergies, past medical, surgical, social and family histories were reviewed and updated as appropriate. Past Medical History:   Diagnosis Date    Arthritis 4/23/2021    Atrial fibrillation, chronic ([facility] 12/2020    [facility] 9/9/2021    Closed displaced fracture of anterior column of right acetabulum ([facility] 3/7/2022    COPD (chronic obstructive pulmonary disease) ([facility] 06/15/2021    Coronary artery disease involving native coronary artery of native heart without angina pectoris 06/15/2021    Diabetes ([facility] 9/10/2021    H/O echocardiogram 02/02/2021    EF is estimated at 50%. presence of PVC affected LVEF estimation, Mild TR.    Heart disease     stint    History of blood transfusion     Hx of blood clots     Hx of cardiovascular stress test 06/11/2021    Small size severe intensity,perfusion defect in apical wall.  Hx of Doppler ultrasound 04/09/2021    Left distal SSV shows occlusive chronic SVT.  Hypertension     Myasthenia gravis ([facility]     NSTEMI (non-ST elevated myocardial infarction) ([facility] 12/2020    Post PTCA 12/14/2020    Ramus Stented.  S/P ablation of atrial fibrillation 09/08/2021    S/P ablation of afib PVI performed by Dr. Dulce Maria Segundo.  TIA (transient ischemic attack)     Ulcerative colitis ([facility]      Past Surgical History:   Procedure Laterality Date    CARDIAC SURGERY      ILEOSTOMY OR JEJUNOSTOMY      PACEMAKER INSERTION N/A 07/07/2021    PACEMAKER INSERTION PERMANENT performed by Lucia Boo MD at [address]    PTCA  12/14/2020    Ramus Stented.     TONSILLECTOMY      UPPER GASTROINTESTINAL ENDOSCOPY N/A 06/15/2021    EGD DIAGNOSTIC ONLY performed by Birdie Burnham MD at [place] [address] N/A 2021    EGD DIAGNOSTIC ONLY performed by Radha Rockwell MD at [facility] ENDOSCOPY     Family History   Family history unknown: Yes     Social History     Socioeconomic History    Marital status:      Spouse name: Not on file    Number of children: Not on file    Years of education: Not on file    Highest education level: Not on file   Occupational History    Not on file   Tobacco Use    Smoking status: Former Smoker     Packs/day: 0.25     Types: Cigarettes     Quit date: 2021     Years since quittin.9    Smokeless tobacco: Never Used   Substance and Sexual Activity    Alcohol use: Yes     Alcohol/week: 1.0 standard drink     Types: 1 Cans of beer per week     Comment: occ    Drug use: No    Sexual activity: Yes     Partners: Female   Other Topics Concern    Not on file   Social History Narrative    Not on file     Social Determinants of Health     Financial Resource Strain:     Difficulty of Paying Living Expenses: Not on file   Food Insecurity:     Worried About Running Out of Food in the Last Year: Not on file    Ross of Food in the Last Year: Not on file   Transportation Needs:     Lack of Transportation (Medical): Not on file    Lack of Transportation (Non-Medical):  Not on file   Physical Activity:     Days of Exercise per Week: Not on file    Minutes of Exercise per Session: Not on file   Stress:     Feeling of Stress : Not on file   Social Connections:     Frequency of Communication with Friends and Family: Not on file    Frequency of Social Gatherings with Friends and Family: Not on file    Attends Christian Services: Not on file    Active Member of Clubs or Organizations: Not on file    Attends Club or Organization Meetings: Not on file    Marital Status: Not on file   Intimate Partner Violence:     Fear of Current or Ex-Partner: Not on file    Emotionally Abused: Not on file    Physically Abused: Not on file    Sexually Abused: Not on file   Housing Stability:     Unable to Pay for Housing in the Last Year: Not on file    Number of [place] in the Last Year: Not on file    Unstable Housing in the Last Year: Not on file     Current Outpatient Medications   Medication Sig Dispense Refill    ELIQUIS 5 MG TABS tablet Take 1 tablet by mouth 2 times daily      clopidogrel (PLAVIX) 75 MG tablet Take 1 tablet by mouth      DULoxetine (CYMBALTA) 60 MG extended release capsule Take 1 capsule by mouth      metFORMIN (GLUCOPHAGE-XR) 500 MG extended release tablet Take 1 tablet by mouth 2 times daily      metoprolol succinate (TOPROL XL) 25 MG extended release tablet Take 1 tablet by mouth daily      ferrous sulfate (FE TABS 325) 325 (65 Fe) MG EC tablet Take 1 tablet by mouth 2 times daily (with meals) 90 tablet 1    GLUCOSAMINE-CALCIUM-VIT D PO Take by mouth daily      budesonide-formoterol (SYMBICORT) 160-4.5 MCG/ACT AERO Inhale 2 puffs into the lungs 2 times daily 1 Inhaler 3    atorvastatin (LIPITOR) 40 MG tablet Take 1 tablet by mouth nightly START 6/25/2021 30 tablet 3    tiotropium (SPIRIVA HANDIHALER) 18 MCG inhalation capsule Inhale 1 capsule into the lungs daily 90 capsule 1    pantoprazole (PROTONIX) 40 MG tablet Take 1 tablet by mouth 2 times daily 60 tablet 1    Elastic Bandages & Supports (MEDICAL COMPRESSION THIGH HIGH) MISC Wear daily and remove nightly 20 - 30 mmgh 2 each 0    NONFORMULARY Inject 40 mg/L into the skin every evening Zilucoplan  - patient provided experimental drug from [place] for Myasthenia Gravis      vitamin B-12 (CYANOCOBALAMIN) 500 MCG tablet Take 1,000 mcg by mouth daily Indications: 8am       PREDNISONE PO Take 13 mg by mouth daily Indications: Takes at  8am       pyridostigmine (MESTINON) 60 MG tablet Take 1 tablet by mouth 4 times daily (Patient taking differently: Take 60 mg by mouth 5 times daily Indications: Takes at 0800, 1200, 1600, 2000 ) 120 tablet 0    acetaminophen (TYLENOL) 325 MG tablet Take 650 mg by mouth every 4 hours as needed for Pain or Fever       No current facility-administered medications for this visit. Facility-Administered Medications Ordered in Other Visits   Medication Dose Route Frequency Provider Last Rate Last Admin    Immune Globulin (Human) solution 20 g  20 g IntraVENous Once Yuan Hutchins MD        And    Immune Globulin (Human) solution 20 g  20 g IntraVENous Once Yuna Hutchins MD        Immune Globulin (Human) solution 20 g  20 g IntraVENous Once Yuan Hutchins MD        And    Immune Globulin (Human) solution 20 g  20 g IntraVENous Once Yuan Hutchins MD         No Known Allergies      Review of Systems   Constitutional: Positive for activity change. Negative for fatigue and fever. HENT: Negative for sneezing, sore throat and voice change. Respiratory: Negative for cough, shortness of breath and wheezing. Cardiovascular: Negative for leg swelling. Gastrointestinal: Negative for nausea and vomiting. Musculoskeletal: Positive for arthralgias, back pain, gait problem and myalgias. Negative for joint swelling, neck pain and neck stiffness. Skin: Negative for color change, pallor, rash and wound. Neurological: Negative for weakness and numbness. Psychiatric/Behavioral: Negative for behavioral problems, confusion and self-injury. Examination:  General Exam:  Vitals: There were no vitals taken for this visit. Physical Exam  Constitutional:       General: He is not in acute distress. Appearance: Normal appearance. He is obese. He is not ill-appearing. HENT:      Head: Normocephalic and atraumatic. Nose: Nose normal.      Mouth/Throat:      Mouth: Mucous membranes are dry. Eyes:      Extraocular Movements: Extraocular movements intact.    Cardiovascular: Rate and Rhythm: Normal rate. Pulses: Normal pulses. Pulmonary:      Effort: Pulmonary effort is normal.      Breath sounds: Normal breath sounds. Musculoskeletal:         General: Tenderness and signs of injury present. No swelling or deformity. Cervical back: Normal range of motion. Lumbar back: Normal.      Right hip: Tenderness and bony tenderness present. No deformity, lacerations or crepitus. Normal range of motion. Normal strength. Left hip: Normal.      Right upper leg: Normal.      Left upper leg: Normal.      Right knee: Normal.      Left knee: Normal.      Right lower leg: No edema. Left lower leg: No edema. Skin:     General: Skin is warm and dry. Capillary Refill: Capillary refill takes less than 2 seconds. Neurological:      General: No focal deficit present. Mental Status: He is alert and oriented to person, place, and time. Mental status is at baseline. Psychiatric:         Mood and Affect: Mood normal.         Behavior: Behavior normal.        RIGHT HIP EXAMINATION:      OBSERVATION / INSPECTION    Gait: Not tested    Alignment: Neutral     Scars: None     Muscle atrophy: None     Effusion: None     Warmth: None     Discoloration: None     Leg lengths: Equal     Pelvis: Level       TENDERNESS / CREPITUS (T/C):    T / C    Trochanteric bursa - / -    Piriformis + / -    SI joint - / -    Psoas tendon - / -    Rectus insertion - / -    Adductor insertion - / -    Pubic symphysis - / -      ROM: (* = pain)      Flexion:  120 degrees*    External rotation: 40 degrees    Internal rotation with axial load: 30 degrees    Internal rotation without axial load: 40 degrees    Abduction: 45 degrees    Adduction: 20 degrees      SPECIAL TESTS:    Log roll: Negative     Straight leg raise: Negative      EXTREMITY NEURO-VASCULAR EXAMINATION:     Sensation:  Grossly intact to light touch all dermatomal regions.      Motor Function:  Fully intact motor function at hip, knee, foot and ankle      DTRs;  quadriceps and  achilles 2+. No clonus and downgoing Babinski. Vascular status:  DP and PT pulses 2+, brisk capillary refill, symmetric. Skin: intact, compartments soft. Diagnostic testing:  X-ray images were reviewed by myself and discussed with the patient:  3 views of the pelvis including AP, inlet, outlet and a skeletally mature patient demonstrate no acute osseous abnormalities. No displacement of previous fracture seen on pelvic CT. No additional osseous or soft tissue abnormalities including any type of additional fracture or soft tissue avulsion type injury. Moderate osteoarthritic changes at the hip joint. Previous imaging:  X-ray right hip with pelvis:  Postsurgical changes are seen involving the pelvis.  There is no acute   fracture, dislocation, or bone destruction.  Mild degenerative changes are   seen involving the bilateral hips. X-ray right elbow:  There is no elbow effusion. Lunette Manfred is no acute fracture or dislocation. Alignment is normal.         CT cervical spine:  1.  No acute fracture or subluxation of the cervical spine.       2.  Multilevel degenerative disc disease resulting in spinal canal stenosis   and neural foraminal narrowing from C4-5 through C6-7. CT thoracic spine:  Superior endplate compression deformity at T4 with approximately 30%   vertebral body height loss is new since the CT scan 09/09/2021.  The fracture   does not clearly involve the posterior column         CT lumbar spine:  No acute fracture the lumbar spine.       Transitional anatomy with lumbarization of S1.  Stepwise grade 1   retrolisthesis of L3 on L4 and L4 on L5.         CT abdomen pelvis:  1. No acute intra-abdominal injury. 2. Acute nondisplaced fracture of the right anterior acetabulum extending   into the iliac bone. 3. Acute versus subacute fracture of the right 8th rib.  Correlate with focal   pain.    4. Status post total colectomy with right lower quadrant ostomy.  No   complication. MRI thoracic spine:  Acute-to-subacute compression fracture of T4 with loss of 30% of vertebral   body height.  No retropulsion is evident.       No thoracic cord contusion.       Mild deformity of the superior endplate of T3 is secondary to Schmorl's node.       Moderate-sized central disc protrusions at T5-6 and T6-7 resulting in no   significant cord compression.       Mild central canal stenosis at T9-10 and T10-11.       Moderate central canal stenosis at T11-12. Office Procedures:  No orders of the defined types were placed in this encounter. Assessment and Plan    A: Right hip nondisplaced acetabular fracture, anterior:    P:     I had a thorough conversation with the patient and his wife regarding his right acetabular fracture as well as treatment course. I explained that the fracture remains nondisplaced on x-rays at this time I feel that he is healing well. I will allow him to do toe-touch to up to 50% weightbearing as tolerated while using a walker. He will follow-up in my office in 4 weeks for new x-rays and we will likely advance him at that time pending his physical exam findings and x-ray findings. Patient was given a new Percocet prescription today. He will continue with physical therapy. He will follow-up with Beronica Barnhart with the neurology team for further treatment of his spinal issues. All questions were answered and patient and his wife voiced understanding.     Electronically signed by Clayton Cowan DO on 3/23/2022 at 10:56 AM

## 2022-04-11 ENCOUNTER — TELEPHONE (OUTPATIENT)
Dept: ORTHOPEDIC SURGERY | Age: 62
End: 2022-04-11

## 2022-04-11 DIAGNOSIS — S32.414A CLOSED NONDISPLACED FRACTURE OF ANTERIOR WALL OF RIGHT ACETABULUM, INITIAL ENCOUNTER (HCC): Primary | ICD-10-CM

## 2022-04-11 RX ORDER — CYCLOBENZAPRINE HCL 5 MG
5 TABLET ORAL 2 TIMES DAILY PRN
Qty: 30 TABLET | Refills: 0 | Status: SHIPPED | OUTPATIENT
Start: 2022-04-11 | End: 2022-04-21

## 2022-04-11 NOTE — TELEPHONE ENCOUNTER
Pancho Wu with [address] called stating that the pt is having a lot muscle/tendon tightness in his hip and lower leg. She is requesting a muscle relaxer be called in for the pt.      [address] [facility]

## 2022-04-14 DIAGNOSIS — S22.040A COMPRESSION FRACTURE OF T4 VERTEBRA, INITIAL ENCOUNTER (HCC): Primary | ICD-10-CM

## 2022-04-18 ENCOUNTER — PROCEDURE VISIT (OUTPATIENT)
Dept: CARDIOLOGY CLINIC | Age: 62
End: 2022-04-18
Payer: MEDICAID

## 2022-04-18 DIAGNOSIS — I49.5 SINUS NODE DYSFUNCTION (HCC): ICD-10-CM

## 2022-04-18 DIAGNOSIS — I44.0 AV BLOCK, 1ST DEGREE: ICD-10-CM

## 2022-04-18 DIAGNOSIS — Z95.0 CARDIAC PACEMAKER IN SITU: Primary | ICD-10-CM

## 2022-04-19 ENCOUNTER — HOSPITAL ENCOUNTER (OUTPATIENT)
Age: 62
Discharge: HOME | End: 2022-04-19
Payer: COMMERCIAL

## 2022-04-19 ENCOUNTER — HOSPITAL ENCOUNTER (OUTPATIENT)
Dept: GENERAL RADIOLOGY | Age: 62
Discharge: HOME | End: 2022-04-19
Payer: COMMERCIAL

## 2022-04-19 DIAGNOSIS — S22.040A COMPRESSION FRACTURE OF T4 VERTEBRA, INITIAL ENCOUNTER (HCC): ICD-10-CM

## 2022-04-19 PROCEDURE — 72070 X-RAY EXAM THORAC SPINE 2VWS: CPT

## 2022-04-20 ENCOUNTER — OFFICE VISIT (OUTPATIENT)
Dept: NEUROSURGERY | Age: 62
End: 2022-04-20
Payer: MEDICAID

## 2022-04-20 VITALS
RESPIRATION RATE: 18 BRPM | HEART RATE: 73 BPM | WEIGHT: 285 LBS | OXYGEN SATURATION: 94 % | BODY MASS INDEX: 39.9 KG/M2 | HEIGHT: 71 IN

## 2022-04-20 DIAGNOSIS — S22.040A COMPRESSION FRACTURE OF T4 VERTEBRA, INITIAL ENCOUNTER (HCC): Primary | ICD-10-CM

## 2022-04-20 PROCEDURE — 3017F COLORECTAL CA SCREEN DOC REV: CPT | Performed by: PHYSICIAN ASSISTANT

## 2022-04-20 PROCEDURE — 1036F TOBACCO NON-USER: CPT | Performed by: PHYSICIAN ASSISTANT

## 2022-04-20 PROCEDURE — 99213 OFFICE O/P EST LOW 20 MIN: CPT | Performed by: PHYSICIAN ASSISTANT

## 2022-04-20 PROCEDURE — G8417 CALC BMI ABV UP PARAM F/U: HCPCS | Performed by: PHYSICIAN ASSISTANT

## 2022-04-20 PROCEDURE — G8427 DOCREV CUR MEDS BY ELIG CLIN: HCPCS | Performed by: PHYSICIAN ASSISTANT

## 2022-04-20 RX ORDER — OXYCODONE HYDROCHLORIDE AND ACETAMINOPHEN 5; 325 MG/1; MG/1
1 TABLET ORAL EVERY 6 HOURS PRN
Qty: 32 TABLET | Refills: 0 | Status: SHIPPED | OUTPATIENT
Start: 2022-04-20 | End: 2022-04-27

## 2022-04-20 NOTE — PATIENT INSTRUCTIONS
Avoid excessive bending/twisting of thoracic spine. Wear brace whenever upright and out of bed. You can remove the brace when you are laying in bed or on the couch. Limit weight restrictions to less than 15 pounds for 3 months. Follow-up in office in 6 weeks with repeat xrays. Notify the neurosurgical office urgently if you begin to develop any numbness or tingling in extremities, weakness in extremities, or loss of bowel or bladder control.

## 2022-04-20 NOTE — PROGRESS NOTES
Neurosurgery Office Note    HPI:  60 y.o. 1960  Who presents today for follow-up of a T4 compression fracture sustained on 3/7/2022 after falling out of his semitruck. It was seen in the hospital and was given a  brace to wear whenever upright and out of bed. Patient was also diagnosed with an acute to subacute eighth rib fracture, a right acetabular fracture extending into the iliac crest, and an L1 right transverse process fracture with unknown acuity. He did present with his  brace on, unfortunately it was backwards. I did adjust the brace and show his wife the appropriate way to  put it on. He states that he does have some continued pain in his back at roughly the T4 area. He denies any radiation of pain into his lower extremities or abdomen. He found to have right-sided eighth rib fractures after his fall, he states that he still has some soreness on his right abdomen but it is not worse. He occasionally takes Percocets for his back and right hip pain. He has seen orthopedic surgery for his right acetabular fracture, he states that he has 1 more appointment with him the beginning of May before being cleared to return to work. He denies any numbness/tingling or weakness in his arms or legs, is able to ambulate with a walker. He states that he was told to limit the amount of weight he puts on his right leg. He does receive home health physical therapy currently. He denies any bowel/bladder incontinence or saddle paresthesias. Patient is on Eliquis, Plavix, aspirin. PMHx positive for A. fib, stroke, COPD, diabetes, hypertension, myasthenia gravis, ulcerative colitis. Past Surgical history positive for pacemaker insertion, ileostomy, ablation of A. fib.                    Past Medical and Surgical History:       Diagnosis Date    Arthritis 4/23/2021    Atrial fibrillation, chronic ([address] 12/2020    Cerebrovascular accident ([facility] 9/9/2021    Closed displaced fracture of anterior column of right acetabulum [facility]) 3/7/2022    COPD [facility]ch[facility] obstructive [facility] disease[facility] 06/15/2021    Coronary artery disease involving native coronary artery of native heart without angina pectoris 06/15/2021    Diabetes ([facility] 9/10/2021    H/O echocardiogram 02/02/2021    EF is estimated at 50%. presence of PVC affected LVEF estimation, Mild TR.    Heart disease     stint    History of blood transfusion     Hx of blood clots     Hx of cardiovascular stress test 06/11/2021    Small size severe intensity,perfusion defect in apical wall.  Hx of Doppler ultrasound 04/09/2021    Left distal SSV shows occlusive chronic SVT.  Hypertension     Myasthenia gravis ([facility]     NSTEMI (non-ST elevated myocardial infarction) ([facility] 12/2020    Post PTCA 12/14/2020    Ramus Stented.  S/P ablation of atrial fibrillation 09/08/2021    S/P ablation of afib PVI performed by Dr. Ysabel Alonso.  TIA (transient ischemic attack)     Ulcerative colitis ([facility]          Procedure Laterality Date    CARDIAC SURGERY      ILEOSTOMY OR JEJUNOSTOMY      PACEMAKER INSERTION N/A 07/07/2021    PACEMAKER INSERTION PERMANENT performed by Neyda Morin MD at [address] [place] [address],[facility]. [address]    PTCA  12/14/2020    Ramus Stented.  TONSILLECTOMY      UPPER GASTROINTESTINAL ENDOSCOPY N/A 06/15/2021    EGD DIAGNOSTIC ONLY performed by Devante Rosales MD at [address] [facility] N/A 07/04/2021    EGD DIAGNOSTIC ONLY performed by Carlos Vidal MD at [facility] ENDOSCOPY       Social History:    TOBACCO:   reports that he quit smoking about 12 months ago. His smoking use included cigarettes. He smoked 0.25 packs per day. He has never used smokeless tobacco.  ETOH:   reports current alcohol use of about 1.0 standard drink of alcohol per week. Family History:       Family history unknown: Yes       Current Medications:    No current facility-administered medications for this visit.   Facility-Administered Medications Ordered in Other Visits: Immune Globulin (Human) solution 20 g, 20 g, IntraVENous, Once **AND** Immune Globulin (Human) solution 20 g, 20 g, IntraVENous, Once  Immune Globulin (Human) solution 20 g, 20 g, IntraVENous, Once **AND** Immune Globulin (Human) solution 20 g, 20 g, IntraVENous, Once    No Known Allergies     REVIEW OF SYSTEMS:    CONSTITUTIONAL:  negative for fevers, chills, diaphoresis, activity change, appetite change, fatigue  EYES:  negative for blurred vision, eye discharge, visual disturbance and icterus  HEENT:  negative for hearing loss, tinnitus, ear drainage, sinus pressure, nasal congestion  RESPIRATORY:  No cough, shortness of breath, hemoptysis  GASTROINTESTINAL:  negative for nausea, vomiting, diarrhea, constipation, blood in stool and abdominal pain  GENITOURINARY:  negative for frequency, dysuria, urinary incontinence, decreased urine volume, and hematuria  HEMATOLOGIC/LYMPHATIC:  negative for easy bruising, bleeding and lymphadenopathy  MUSCULOSKELETAL:  Positive for back pain and right hip pain, negative for joint swelling, decreased range of motion and muscle weakness  NEUROLOGICAL:  negative for headaches, slurred speech, unilateral weakness  PSYCHIATRIC/BEHAVIORAL: negative for hallucinations, behavioral problems, confusion and agitation. Objective:   PHYSICAL EXAM:      VITALS:  Pulse 73   Resp 18   Ht 5' 11\" (1.803 m)   Wt 285 lb (129.3 kg)   SpO2 94%   BMI 39.75 kg/m²      24HR INTAKE/OUTPUT:  No intake or output data in the 24 hours ending 04/20/22 1108  CONSTITUTIONAL:  Awake, alert, cooperative, no apparent distress, and appears stated age  [de-identified]: NCAT, PERRL, EOMI. Sclera white, conjunctive full. PSYCHIATRIC: Oriented to person place and time. No obvious depression or anxiety. MUSCULOSKELETAL: No obvious misalignment or effusion of the joints. No clubbing, cyanosis of the digits. SKIN:  normal skin color, texture, turgor and no redness, warmth, or swelling. NEUROLOGIC: Alert and oriented x4, face symmetrical, no obvious droop, speech clear and coherent, sensation intact to light touch and pinprick sensation, patient in wheelchair currently, did not assess gait  Upper extremity strength: Bilateral upper extremities 5/5 throughout, bilateral Axel's negative  Lower extremity strength: Bilateral lower extremities 5/5 throughout    DATA:    Old records have been reviewed      Radiology Review:  All pertinent images / reports were reviewed as a part of this visit. Thoracic AP/LAT x-rays 4/19/2022  Impression   Stable T4 subacute vertebral body compression fracture.       No acute abnormality of the thoracic spine.       Multilevel mild thoracic spondylosis. Assessment and plan:     1. Compression fracture of T4 vertebra, initial encounter [facility])       -reviewed thoracic x-rays with the patient and his wife. He does complain of continued pain in his mid back, overall is not worse than when he first sustained his fall. He has been receiving physical therapy for his back and right hip fracture. Advised patient to continue with his thoracic restrictions. Him and his wife both understand that they will follow-up with me in 6 weeks. Patient has concerns about extending his disability. They understand that they can fax it to my office and I will complete the paperwork for them. Patient given strict return precautions. -     XR THORACIC SPINE (2 VIEWS); Future, to obtain before repeat 6-week visit  -     oxyCODONE-acetaminophen (PERCOCET) 5-325 MG per tablet refilled and sent to patient's pharmacy       Next steps: Continue with thoracic restrictions continue to wear brace. Follow-up with me in 6 weeks with repeat thoracic x-rays. Electronically signed by Mariah Acevedo PA-C on 4/20/2022 at 11:08 AM      Supervising physician: Jeniffer Deras.  Antonina Chen MD.  Dr. Antonina Chen was readily and continuously available by phone for direct consultation regarding the care of this patient. Time spent with patient in consultation, education, and collaboration with medical time is >50% of total time spent on case, including time spent in chart review and dictation. Total time spent: 20 minutes    Thank you for the opportunity to participate in the care of your patient.

## 2022-04-20 NOTE — PROGRESS NOTES
Patient presents to the office for follow up on T4 compression fx. Wearing brace today. Pain 6/10 today. States pain is in middle of back that radiates to lower back. States he is doing home therapy. Ambulating with wheelchair today. XR Thoracic Spine 4/19/22      Impression   Stable T4 subacute vertebral body compression fracture.       No acute abnormality of the thoracic spine.       Multilevel mild thoracic spondylosis.

## 2022-04-21 PROCEDURE — 93294 REM INTERROG EVL PM/LDLS PM: CPT | Performed by: NURSE PRACTITIONER

## 2022-04-21 PROCEDURE — 93296 REM INTERROG EVL PM/IDS: CPT | Performed by: NURSE PRACTITIONER

## 2022-04-22 ENCOUNTER — TELEPHONE (OUTPATIENT)
Dept: NEUROSURGERY | Age: 62
End: 2022-04-22

## 2022-04-22 DIAGNOSIS — S22.040A COMPRESSION FRACTURE OF T4 VERTEBRA, INITIAL ENCOUNTER (HCC): Primary | ICD-10-CM

## 2022-04-22 RX ORDER — CYCLOBENZAPRINE HCL 5 MG
5 TABLET ORAL 2 TIMES DAILY PRN
Qty: 30 TABLET | Refills: 0 | Status: SHIPPED | OUTPATIENT
Start: 2022-04-22 | End: 2022-05-07

## 2022-04-22 NOTE — TELEPHONE ENCOUNTER
Patient's spouse Antoni Oliver called and left message on neurosurgery voicemail line requesting refill on Flexeril. This was last prescribed by Dr Krista Connolly on 4/11/22, #30 with zero refills. Please advise.

## 2022-04-25 NOTE — TELEPHONE ENCOUNTER
Spoke to patient's spouse Dayana Chacon this morning informing her that medication was sent to pharmacy. Nothing further needed at this time.

## 2022-04-30 ENCOUNTER — HOSPITAL ENCOUNTER (OUTPATIENT)
Dept: GENERAL RADIOLOGY | Age: 62
Discharge: HOME | End: 2022-04-30
Payer: COMMERCIAL

## 2022-04-30 ENCOUNTER — HOSPITAL ENCOUNTER (OUTPATIENT)
Age: 62
Discharge: HOME | End: 2022-04-30
Payer: COMMERCIAL

## 2022-04-30 DIAGNOSIS — Z09 FOLLOW UP: ICD-10-CM

## 2022-04-30 PROCEDURE — 73502 X-RAY EXAM HIP UNI 2-3 VIEWS: CPT

## 2022-05-02 ENCOUNTER — OFFICE VISIT (OUTPATIENT)
Dept: ORTHOPEDIC SURGERY | Age: 62
End: 2022-05-02
Payer: COMMERCIAL

## 2022-05-02 VITALS
HEIGHT: 71 IN | RESPIRATION RATE: 15 BRPM | DIASTOLIC BLOOD PRESSURE: 131 MMHG | WEIGHT: 285 LBS | BODY MASS INDEX: 39.9 KG/M2 | SYSTOLIC BLOOD PRESSURE: 164 MMHG | OXYGEN SATURATION: 95 % | HEART RATE: 43 BPM

## 2022-05-02 DIAGNOSIS — S32.401A CLOSED NONDISPLACED FRACTURE OF RIGHT ACETABULUM, UNSPECIFIED PORTION OF ACETABULUM, INITIAL ENCOUNTER (HCC): Primary | ICD-10-CM

## 2022-05-02 PROCEDURE — 99213 OFFICE O/P EST LOW 20 MIN: CPT | Performed by: STUDENT IN AN ORGANIZED HEALTH CARE EDUCATION/TRAINING PROGRAM

## 2022-05-02 PROCEDURE — G8428 CUR MEDS NOT DOCUMENT: HCPCS | Performed by: STUDENT IN AN ORGANIZED HEALTH CARE EDUCATION/TRAINING PROGRAM

## 2022-05-02 PROCEDURE — G8417 CALC BMI ABV UP PARAM F/U: HCPCS | Performed by: STUDENT IN AN ORGANIZED HEALTH CARE EDUCATION/TRAINING PROGRAM

## 2022-05-02 PROCEDURE — 3017F COLORECTAL CA SCREEN DOC REV: CPT | Performed by: STUDENT IN AN ORGANIZED HEALTH CARE EDUCATION/TRAINING PROGRAM

## 2022-05-02 PROCEDURE — 1036F TOBACCO NON-USER: CPT | Performed by: STUDENT IN AN ORGANIZED HEALTH CARE EDUCATION/TRAINING PROGRAM

## 2022-05-02 ASSESSMENT — ENCOUNTER SYMPTOMS
NAUSEA: 0
SORE THROAT: 0
VOMITING: 0
VOICE CHANGE: 0
COLOR CHANGE: 0
SHORTNESS OF BREATH: 0
BACK PAIN: 1
COUGH: 0
WHEEZING: 0

## 2022-05-02 NOTE — PROGRESS NOTES
Patient comes in today for a follow up of a right acetabular fx on 3/7/22. He rates his pain at 3/10 currently but states that his pain will increase with activity. He reports that he is doing better. He denies any new falls or injuries since his last visit.

## 2022-05-02 NOTE — PROGRESS NOTES
5/2/2022   No chief complaint on file. Updated HPI: Patient is here for reevaluation of his right acetabular fracture. Patient states he is ambulating well without any pain. He has been utilizing a walker but does ambulate without it at times without any type of right lower extremity pain including any type of right groin pain. He has no new injury since last being seen. He states he is much improved and he has no new complaints. Updated HPI (3/24/2022): Patient is here for reevaluation of his right acetabular fracture. Patient was seen in the hospital on the floor while I was on call several weeks prior. Patient states he has been compliant with his nonweightbearing since the injury has been doing well. He denies any right hip or groin pain. No additional orthopedic complaints at this time. Patient is being treated for spinal issues by the neurology team.  No other complaints at this time and patient denies any constitutional symptoms. Previous HPI (3/8/2022): Jasper Haas is a 60 y.o. 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 He was able to get off the ground and was able to drive himself to the emergency room where he presented. Imaging demonstrated a nondisplaced acetabular fracture. He also had a thoracic spine T4 compression fracture without any associated numbness or tingling. He was subsequently admitted for further evaluation and orthopedics was consulted. He denies any prior right hip pain or any prior right hip injury. His right buttock is the only area orthopedically that he has complaints other than his thoracic spine. He has no other complaints at this time and again this includes any numbness or tingling.   I have not previously seen this patient. The pain's location is posterior acetabulum. he describes the symptoms as aching, sharp and stabbing. Symptoms improve with rest, especially nonweightbearing. Symptoms worsen with weight bearing but are minimal with logrolling of the hip. No prominent mechanical symptoms. Denies sensation of instability. Treatment thus far has included rest, including being made nonweightbearing. Patient denies any previous surgery to the hip or lumbar spine. Outside reports reviewed: Emergency room note and imaging reviewed    Patient's medications, allergies, past medical, surgical, social and family histories were reviewed and updated as appropriate. Medical History  Patient's medications, allergies, past medical, surgical, social and family histories were reviewed and updated as appropriate. Past Medical History:   Diagnosis Date    Arthritis 4/23/2021    Atrial fibrillation, chronic ([address] 12/2020    Cerebrovascular accident ([facility] 9/9/2021    Closed displaced fracture of anterior column of right acetabulum ([address] 3/7/2022    COPD (chronic obstructive pulmonary disease) ([address] 06/15/2021    Coronary artery disease involving native coronary artery of native heart without angina pectoris 06/15/2021    Diabetes ([address] 9/10/2021    H/O echocardiogram 02/02/2021    EF is estimated at 50%. presence of PVC affected LVEF estimation, Mild TR.    Heart disease     stint    History of blood transfusion     Hx of blood clots     Hx of cardiovascular stress test 06/11/2021    Small size severe intensity,perfusion defect in apical wall.  Hx of Doppler ultrasound 04/09/2021    Left distal SSV shows occlusive chronic SVT.  Hypertension     Myasthenia gravis ([address]     NSTEMI (non-ST elevated myocardial infarction) ([address] 12/2020    Post PTCA 12/14/2020    Ramus Stented.  S/P ablation of atrial fibrillation 09/08/2021    S/P ablation of afib PVI performed by Dr. Jesus Damon.     TIA (transient ischemic attack)     Ulcerative colitis ([facility]      Past Surgical History:   Procedure Laterality Date    CARDIAC SURGERY      ILEOSTOMY OR JEJUNOSTOMY      PACEMAKER INSERTION N/A 2021    PACEMAKER INSERTION PERMANENT performed by Camilo Ernandez MD at [address] [place] [address],[address]    PTCA  2020    Ramus Stented.  TONSILLECTOMY      UPPER GASTROINTESTINAL ENDOSCOPY N/A 06/15/2021    EGD DIAGNOSTIC ONLY performed by Brina Jordan MD at [address] N/A 2021    EGD DIAGNOSTIC ONLY performed by Marianne Collier MD at [facility] ENDOSCOPY     Family History   Family history unknown: Yes     Social History     Socioeconomic History    Marital status:      Spouse name: Not on file    Number of children: Not on file    Years of education: Not on file    Highest education level: Not on file   Occupational History    Not on file   Tobacco Use    Smoking status: Former Smoker     Packs/day: 0.25     Types: Cigarettes     Quit date: 2021     Years since quittin.0    Smokeless tobacco: Never Used   Substance and Sexual Activity    Alcohol use: Yes     Alcohol/week: 1.0 standard drink     Types: 1 Cans of beer per week     Comment: occ    Drug use: No    Sexual activity: Yes     Partners: Female   Other Topics Concern    Not on file   Social History Narrative    Not on file     Social Determinants of Health     Financial Resource Strain:     Difficulty of Paying Living Expenses: Not on file   Food Insecurity:     Worried About Running Out of Food in the Last Year: Not on file    Ross of Food in the Last Year: Not on file   Transportation Needs:     Lack of Transportation (Medical): Not on file    Lack of Transportation (Non-Medical):  Not on file   Physical Activity:     Days of Exercise per Week: Not on file    Minutes of Exercise per Session: Not on file   Stress:     Feeling of Stress : Not on file   Social Connections:     Frequency of Communication with Friends and Family: Not on file    Frequency of Social Gatherings with Friends and Family: Not on file    Attends Bahai Services: Not on file    Active Member of Clubs or Organizations: Not on file    Attends Club or Organization Meetings: Not on file    Marital Status: Not on file   Intimate Partner Violence:     Fear of Current or Ex-Partner: Not on file    Emotionally Abused: Not on file    Physically Abused: Not on file    Sexually Abused: Not on file   Housing Stability:     Unable to Pay for Housing in the Last Year: Not on file    Number of [place] in the Last Year: Not on file    Unstable Housing in the Last Year: Not on file     Current Outpatient Medications   Medication Sig Dispense Refill    cyclobenzaprine (FLEXERIL) 5 MG tablet Take 1 tablet by mouth 2 times daily as needed for Muscle spasms 30 tablet 0    ELIQUIS 5 MG TABS tablet Take 1 tablet by mouth 2 times daily      clopidogrel (PLAVIX) 75 MG tablet Take 1 tablet by mouth      DULoxetine (CYMBALTA) 60 MG extended release capsule Take 1 capsule by mouth      metFORMIN (GLUCOPHAGE-XR) 500 MG extended release tablet Take 1 tablet by mouth 2 times daily      metoprolol succinate (TOPROL XL) 25 MG extended release tablet Take 1 tablet by mouth daily      ferrous sulfate (FE TABS 325) 325 (65 Fe) MG EC tablet Take 1 tablet by mouth 2 times daily (with meals) 90 tablet 1    GLUCOSAMINE-CALCIUM-VIT D PO Take by mouth daily       budesonide-formoterol (SYMBICORT) 160-4.5 MCG/ACT AERO Inhale 2 puffs into the lungs 2 times daily 1 Inhaler 3    atorvastatin (LIPITOR) 40 MG tablet Take 1 tablet by mouth nightly START 6/25/2021 30 tablet 3    tiotropium (SPIRIVA HANDIHALER) 18 MCG inhalation capsule Inhale 1 capsule into the lungs daily 90 capsule 1    pantoprazole (PROTONIX) 40 MG tablet Take 1 tablet by mouth 2 times daily 60 tablet 1    Elastic Bandages & Supports (MEDICAL COMPRESSION THIGH HIGH) MISC Wear daily and remove nightly 20 - 30 mmgh 2 each 0    NONFORMULARY Inject 40 mg/L into the skin every evening Zilucoplan  - patient provided experimental drug from [place] for Myasthenia Gravis      vitamin B-12 (CYANOCOBALAMIN) 500 MCG tablet Take 1,000 mcg by mouth daily Indications: 8am       PREDNISONE PO Take 13 mg by mouth daily Indications: Takes at  8am       pyridostigmine (MESTINON) 60 MG tablet Take 1 tablet by mouth 4 times daily (Patient taking differently: Take 60 mg by mouth 5 times daily Indications: Takes at 0800, 1200, 1600, 2000 ) 120 tablet 0    acetaminophen (TYLENOL) 325 MG tablet Take 650 mg by mouth every 4 hours as needed for Pain or Fever       No current facility-administered medications for this visit. Facility-Administered Medications Ordered in Other Visits   Medication Dose Route Frequency Provider Last Rate Last Admin    Immune Globulin (Human) solution 20 g  20 g IntraVENous Once Dena Londono MD        And    Immune Globulin (Human) solution 20 g  20 g IntraVENous Once Dena Londono MD        Immune Globulin (Human) solution 20 g  20 g IntraVENous Once Dena Londono MD        And    Immune Globulin (Human) solution 20 g  20 g IntraVENous Once Dena Londono MD         No Known Allergies      Review of Systems   Constitutional: Positive for activity change. Negative for fatigue and fever. HENT: Negative for sneezing, sore throat and voice change. Respiratory: Negative for cough, shortness of breath and wheezing. Cardiovascular: Negative for leg swelling. Gastrointestinal: Negative for nausea and vomiting. Musculoskeletal: Positive for back pain and gait problem. Negative for arthralgias, joint swelling, myalgias, neck pain and neck stiffness. Skin: Negative for color change, pallor, rash and wound. Neurological: Negative for weakness and numbness.    Psychiatric/Behavioral: Negative for behavioral problems, confusion and self-injury. Examination:  General Exam:  Vitals: There were no vitals taken for this visit. Physical Exam  Constitutional:       General: He is not in acute distress. Appearance: Normal appearance. He is obese. He is not ill-appearing. HENT:      Head: Normocephalic and atraumatic. Nose: Nose normal.      Mouth/Throat:      Mouth: Mucous membranes are dry. Eyes:      Extraocular Movements: Extraocular movements intact. Cardiovascular:      Rate and Rhythm: Normal rate. Pulses: Normal pulses. Pulmonary:      Effort: Pulmonary effort is normal.      Breath sounds: Normal breath sounds. Musculoskeletal:         General: Signs of injury present. No swelling, tenderness or deformity. Cervical back: Normal range of motion. Lumbar back: Normal.      Right hip: No deformity, lacerations, tenderness, bony tenderness or crepitus. Normal range of motion. Decreased strength. Left hip: Normal.      Right upper leg: Normal.      Left upper leg: Normal.      Right knee: Normal.      Left knee: Normal.      Right lower leg: No edema. Left lower leg: No edema. Skin:     General: Skin is warm and dry. Capillary Refill: Capillary refill takes less than 2 seconds. Neurological:      General: No focal deficit present. Mental Status: He is alert and oriented to person, place, and time. Mental status is at baseline.    Psychiatric:         Mood and Affect: Mood normal.         Behavior: Behavior normal.        RIGHT HIP EXAMINATION:      OBSERVATION / INSPECTION    Gait: Ambulates with minimal limp    Alignment: Neutral     Scars: None     Muscle atrophy: None     Effusion: None     Warmth: None     Discoloration: None     Leg lengths: Equal     Pelvis: Level       TENDERNESS / CREPITUS (T/C):    T / C    Trochanteric bursa - / -    Piriformis - / -    SI joint - / -    Psoas tendon - / -    Rectus insertion - / -    Adductor insertion - / -    Pubic symphysis - / -      ROM: (* = pain)      Flexion:  120 degrees    External rotation: 40 degrees    Internal rotation with axial load: 30 degrees    Internal rotation without axial load: 40 degrees    Abduction: 45 degrees    Adduction: 20 degrees      SPECIAL TESTS:    Log roll: Negative     Straight leg raise: Negative      EXTREMITY NEURO-VASCULAR EXAMINATION:     Sensation:  Grossly intact to light touch all dermatomal regions. Motor Function:  Fully intact motor function at hip, knee, foot and ankle      DTRs;  quadriceps and  achilles 2+. No clonus and downgoing Babinski. Vascular status:  DP and PT pulses 2+, brisk capillary refill, symmetric. Skin: intact, compartments soft. Diagnostic testing:  X-ray images were reviewed by myself and discussed with the patient:  3 Views of Right Hip:  No acute fracture or malalignment.       Minimal degenerative changes.       Soft tissues unremarkable. Previous imaging:  3 views of the pelvis including AP, inlet, outlet and a skeletally mature patient demonstrate no acute osseous abnormalities. No displacement of previous fracture seen on pelvic CT. No additional osseous or soft tissue abnormalities including any type of additional fracture or soft tissue avulsion type injury. Moderate osteoarthritic changes at the hip joint. X-ray right hip with pelvis:  Postsurgical changes are seen involving the pelvis.  There is no acute   fracture, dislocation, or bone destruction.  Mild degenerative changes are   seen involving the bilateral hips. X-ray right elbow:  There is no elbow effusion. Linda Cheeks is no acute fracture or dislocation. Alignment is normal.         CT cervical spine:  1.  No acute fracture or subluxation of the cervical spine.       2.  Multilevel degenerative disc disease resulting in spinal canal stenosis   and neural foraminal narrowing from C4-5 through C6-7. CT thoracic spine:  Superior endplate compression deformity at T4 with approximately 30%   vertebral body height loss is new since the CT scan 09/09/2021.  The fracture   does not clearly involve the posterior column         CT lumbar spine:  No acute fracture the lumbar spine.       Transitional anatomy with lumbarization of S1.  Stepwise grade 1   retrolisthesis of L3 on L4 and L4 on L5.         CT abdomen pelvis:  1. No acute intra-abdominal injury. 2. Acute nondisplaced fracture of the right anterior acetabulum extending   into the iliac bone. 3. Acute versus subacute fracture of the right 8th rib.  Correlate with focal   pain. 4. Status post total colectomy with right lower quadrant ostomy.  No   complication. MRI thoracic spine:  Acute-to-subacute compression fracture of T4 with loss of 30% of vertebral   body height.  No retropulsion is evident.       No thoracic cord contusion.       Mild deformity of the superior endplate of T3 is secondary to Schmorl's node.       Moderate-sized central disc protrusions at T5-6 and T6-7 resulting in no   significant cord compression.       Mild central canal stenosis at T9-10 and T10-11.       Moderate central canal stenosis at T11-12. Office Procedures:  No orders of the defined types were placed in this encounter. Assessment and Plan    A: Right hip nondisplaced acetabular fracture, anterior    P:   I had a thorough conversation with the patient his wife regarding his right acetabular fracture and treatment course. I explained that there is no concerning findings on x-ray today and because he has been ambulating without issue, we will continue progressing him. His goal is to be walking fully without the walker within the next 2 to 4 weeks. I suggested he do physical therapy to help regain some of the strength that he is lost and patient and his wife agreed. They were given a referral for physical therapy.   They will continue using ice and over-the-counter anti-inflammatories for pain control. They will follow-up with me as needed. All questions were answered and patient and his wife voiced understanding.     Electronically signed by Cordell Corona DO on 5/2/2022 at 1:20 PM

## 2022-05-06 ENCOUNTER — OFFICE VISIT (OUTPATIENT)
Dept: CARDIOLOGY CLINIC | Age: 62
End: 2022-05-06
Payer: MEDICAID

## 2022-05-06 VITALS
HEIGHT: 71 IN | HEART RATE: 98 BPM | SYSTOLIC BLOOD PRESSURE: 130 MMHG | DIASTOLIC BLOOD PRESSURE: 76 MMHG | BODY MASS INDEX: 39.93 KG/M2 | WEIGHT: 285.2 LBS

## 2022-05-06 DIAGNOSIS — I48.91 ATRIAL FIBRILLATION WITH RAPID VENTRICULAR RESPONSE (HCC): ICD-10-CM

## 2022-05-06 DIAGNOSIS — I48.0 PAF (PAROXYSMAL ATRIAL FIBRILLATION) (HCC): Primary | ICD-10-CM

## 2022-05-06 PROCEDURE — 3017F COLORECTAL CA SCREEN DOC REV: CPT | Performed by: INTERNAL MEDICINE

## 2022-05-06 PROCEDURE — 93000 ELECTROCARDIOGRAM COMPLETE: CPT | Performed by: INTERNAL MEDICINE

## 2022-05-06 PROCEDURE — G8417 CALC BMI ABV UP PARAM F/U: HCPCS | Performed by: INTERNAL MEDICINE

## 2022-05-06 PROCEDURE — G8427 DOCREV CUR MEDS BY ELIG CLIN: HCPCS | Performed by: INTERNAL MEDICINE

## 2022-05-06 PROCEDURE — 99213 OFFICE O/P EST LOW 20 MIN: CPT | Performed by: INTERNAL MEDICINE

## 2022-05-06 PROCEDURE — 1036F TOBACCO NON-USER: CPT | Performed by: INTERNAL MEDICINE

## 2022-05-06 ASSESSMENT — ENCOUNTER SYMPTOMS
COUGH: 0
WHEEZING: 0
PHOTOPHOBIA: 0
SHORTNESS OF BREATH: 1
VOMITING: 0
DIARRHEA: 0
COLOR CHANGE: 0
EYE PAIN: 0
NAUSEA: 0
BLOOD IN STOOL: 0
CHEST TIGHTNESS: 0
ABDOMINAL PAIN: 0
BACK PAIN: 0
CONSTIPATION: 0

## 2022-05-06 NOTE — PROGRESS NOTES
Electrophysiology fu Note      Reason for consultation:  Atrial fibrillation    Chief complaint : Palpitations, tiredness, arrhythmia    Referring physician: Miroslava Ramachandran      Primary care physician: JAMIE Justice      History of Present Illness: This visit (5/6/2022)      Chief Complaint   Patient presents with    Follow-up     here to follow up for Afib w/RVR on device report. He states he does have some dizziness and SOB with exertion. Dizziness if he gets up to fast, no other cardiac complaints at this time. Patient did fall in March and fractured his Rt hip.  Atrial Fibrillation    Shortness of Breath           Previous visit:    Patient is a 71-year-old male with history of hypertension, myasthenia gravis, sick sinus syndrome s/p pacemaker, history of blood clots on anticoagulation, atrial fibrillation referred by Dr. Richmond Acosta for atrial fibrillation management. Patient reports palpitations on and off and feels tired and weak. Patient also has shortness of breath with exertion at times. Patient reports that he was cardioverted 4 times total.  Patient also reports that he has a history of gastric ulcer and bleeding needing 3 units of blood. He is cleared for anticoagulation now and he is on anticoagulation without any problem. Patient wants to consider ablation therapy which was recommended to him by his cardiologist.    Patient denies any chest pain, dizziness or syncope.   Patient did have pauses in the hospital and had a pacemaker in July    Pastmedical history:   Past Medical History:   Diagnosis Date    Arthritis 4/23/2021    Atrial fibrillation, chronic ([address] 12/2020    Cerebrovascular accident ([address] 9/9/2021    Closed displaced fracture of anterior column of right acetabulum ([address] 3/7/2022    COPD (chronic obstructive pulmonary disease) ([address] 06/15/2021    Coronary artery disease involving native coronary artery of native heart without angina pectoris 06/15/2021    Diabetes ([facility] 9/10/2021    H/O echocardiogram 02/02/2021    EF is estimated at 50%. presence of PVC affected LVEF estimation, Mild TR.    Heart disease     stint    History of blood transfusion     Hx of blood clots     Hx of cardiovascular stress test 06/11/2021    Small size severe intensity,perfusion defect in apical wall.  Hx of Doppler ultrasound 04/09/2021    Left distal SSV shows occlusive chronic SVT.  Hypertension     Myasthenia gravis ([facility]     NSTEMI (non-ST elevated myocardial infarction) ([facility] 12/2020    Post PTCA 12/14/2020    Ramus Stented.  S/P ablation of atrial fibrillation 09/08/2021    S/P ablation of afib PVI performed by Dr. Airam Hays.  TIA (transient ischemic attack)     Ulcerative colitis ([facility]        Surgical history :   Past Surgical History:   Procedure Laterality Date    CARDIAC SURGERY      ILEOSTOMY OR JEJUNOSTOMY      PACEMAKER INSERTION N/A 07/07/2021    PACEMAKER INSERTION PERMANENT performed by Hussein Jennings MD at [address] [place] [address],[facility]. [address]    PTCA  12/14/2020    Ramus Stented.  TONSILLECTOMY      UPPER GASTROINTESTINAL ENDOSCOPY N/A 06/15/2021    EGD DIAGNOSTIC ONLY [facility] N[facility]A 07/04/2021    EGD DIAGNOSTIC ONLY performed by Salvatore Bailey MD at [facility] ENDOSCOPY       Family history:   Family History   Family history unknown: Yes       Social history :  reports that he quit smoking about 13 months ago. His smoking use included cigarettes. He smoked 0.25 packs per day. He has never used smokeless tobacco. He reports current alcohol use of about 1.0 standard drink of alcohol per week. He reports that he does not use drugs.     No Known Allergies    Current Outpatient Medications on File Prior to Visit   Medication Sig Dispense Refill    cyclobenzaprine (FLEXERIL) 5 MG tablet Take 1 tablet by mouth 2 times daily as needed for Muscle spasms 30 tablet 0    ELIQUIS 5 MG TABS tablet Take 1 tablet by mouth 2 times daily      clopidogrel (PLAVIX) 75 MG tablet Take 1 tablet by mouth      DULoxetine (CYMBALTA) 60 MG extended release capsule Take 1 capsule by mouth      metFORMIN (GLUCOPHAGE-XR) 500 MG extended release tablet Take 1 tablet by mouth 2 times daily      metoprolol succinate (TOPROL XL) 25 MG extended release tablet Take 1 tablet by mouth daily      ferrous sulfate (FE TABS 325) 325 (65 Fe) MG EC tablet Take 1 tablet by mouth 2 times daily (with meals) 90 tablet 1    GLUCOSAMINE-CALCIUM-VIT D PO Take by mouth daily       budesonide-formoterol (SYMBICORT) 160-4.5 MCG/ACT AERO Inhale 2 puffs into the lungs 2 times daily 1 Inhaler 3    atorvastatin (LIPITOR) 40 MG tablet Take 1 tablet by mouth nightly START 6/25/2021 30 tablet 3    tiotropium (SPIRIVA HANDIHALER) 18 MCG inhalation capsule Inhale 1 capsule into the lungs daily 90 capsule 1    pantoprazole (PROTONIX) 40 MG tablet Take 1 tablet by mouth 2 times daily 60 tablet 1    Elastic Bandages & Supports (MEDICAL COMPRESSION THIGH HIGH) MISC Wear daily and remove nightly 20 - 30 mmgh 2 each 0    NONFORMULARY Inject 40 mg/L into the skin every evening Zilucoplan  - patient provided experimental drug from [place] [id number] for Myasthenia Gravis      vitamin B-12 (CYANOCOBALAMIN) 500 MCG tablet Take 1,000 mcg by mouth daily Indications: 8am       PREDNISONE PO Take 13 mg by mouth daily Indications: Takes at  8am       pyridostigmine (MESTINON) 60 MG tablet Take 1 tablet by mouth 4 times daily (Patient taking differently: Take 60 mg by mouth 5 times daily Indications: Takes at 0800, 1200, 1600, 2000 ) 120 tablet 0    acetaminophen (TYLENOL) 325 MG tablet Take 650 mg by mouth every 4 hours as needed for Pain or Fever       Current Facility-Administered Medications on File Prior to Visit   Medication Dose Route Frequency Provider Last Rate Last Admin    Immune Globulin (Human) solution 20 g  20 g IntraVENous Once Adrienne Ponce MD        And    Immune Globulin (Human) solution 20 g  20 g IntraVENous Once Adrienne Ponce MD        Immune Globulin (Human) solution 20 g  20 g IntraVENous Once Adrienne Ponce MD        And    Immune Globulin (Human) solution 20 g  20 g IntraVENous Once Adrienne Ponce MD           Review of Systems:   Review of Systems   Constitutional: Positive for fatigue. Negative for activity change, chills and fever. HENT: Negative for congestion, ear pain and tinnitus. Eyes: Negative for photophobia, pain and visual disturbance. Respiratory: Positive for shortness of breath (with exertion). Negative for cough, chest tightness and wheezing. Cardiovascular: Negative for chest pain, palpitations and leg swelling. Gastrointestinal: Negative for abdominal pain, blood in stool, constipation, diarrhea, nausea and vomiting. Endocrine: Negative for cold intolerance and heat intolerance. Genitourinary: Negative for dysuria, flank pain and hematuria. Musculoskeletal: Positive for arthralgias. Negative for back pain, myalgias and neck stiffness. Skin: Negative for color change and rash. Allergic/Immunologic: Negative for food allergies. Neurological: Positive for weakness. Negative for dizziness, light-headedness, numbness and headaches. Hematological: Does not bruise/bleed easily. Psychiatric/Behavioral: Negative for agitation, behavioral problems and confusion. Examination:      Vitals:    05/06/22 1301   BP: 138/82   Pulse: 112   Weight: 285 lb 3.2 oz (129.4 kg)   Height: 5' 11\" (1.803 m)        Body mass index is 39.78 kg/m². Physical Exam  Constitutional:       Appearance: Normal appearance. He is not ill-appearing. HENT:      Head: Normocephalic and atraumatic.       Mouth/Throat:      Mouth: Mucous membranes are moist.   Eyes:      Conjunctiva/sclera: Conjunctivae normal.   Cardiovascular:      Rate and Rhythm: Normal rate and regular rhythm. Heart sounds: No murmur heard. Pulmonary:      Effort: Pulmonary effort is normal.      Breath sounds: No rales. Abdominal:      General: Abdomen is flat. Palpations: Abdomen is soft. Musculoskeletal:         General: No tenderness. Normal range of motion. Cervical back: Normal range of motion. Right lower leg: No edema. Left lower leg: No edema. Skin:     General: Skin is warm and dry. Neurological:      General: No focal deficit present. Mental Status: He is alert and oriented to person, place, and time. CBC:   Lab Results   Component Value Date    WBC 7.8 03/08/2022    HGB 12.5 03/08/2022    HCT 40.3 03/08/2022     03/08/2022     Lipids:No results found for: CHOL, TRIG, HDL, LDLCALC, LDLDIRECT  PT/INR:   Lab Results   Component Value Date    INR 1.20 03/08/2022        BMP:    Lab Results   Component Value Date     03/08/2022    K 4.3 03/08/2022     03/08/2022    CO2 21 03/08/2022    BUN 14 03/08/2022     CMP:   Lab Results   Component Value Date    AST 17 03/08/2022    PROT 5.9 (L) 03/08/2022    BILITOT 0.3 03/08/2022    ALKPHOS 135 (H) 03/08/2022     TSH:  No results found for: TSH, T4    EKGINTERPRETATION - EKG Interpretation:        Device Assessment:    The device is Medtronic pacemaker - Dual Chamber chamber      MRI Compatible : yes    Device interrogation was performed. Mode: AAIr --- DDDr     Sensing is normal. Impedence is normal.  Threshold is normal.     There has not been interval changes. Estimated battery life is 13.1 years    The underlying rhythm is persistent atrial fibrillation. 66.9 % atrial paced;  <0.1 % ventricular paced.       Atrial Arrhythmia : Patient had some rapid episodes of atrial arrhythmias especially in March    Non sustained VT episodes : Nonsustained VT reported are mostly atrial runs    Sustained VT episodes : no    Patient activity reported 2.1 hrs/day    The patient is mostly atrial pacemaker dependent. Interpreted by    Benito Cranker, M.D          IMPRESSION / RECOMMENDATIONS:     Persistent atrial fibrillation - with atrial fibrillation and flutter history sp ablation PV isolation and atrial flutter ablation  History of myasthenia gravis  COPD  Obesity BMI 40  CAD  History of gastric ulcer with bleeding now cleared for anticoagulation  Chronic back pain  History of blood clots       Patient is the one who had post ablation stroke and etiology was unclear until now and patient is fully recovered now. Patient had a recent fall from a trailer and had fracture of the back and ribs and during that time he went into atrial fibrillation spontaneously converted. Patient is having multiple PACs and continues to have this episode so I am little concerned about patient going back into atrial fibrillation and with his history of A. fib ablation causing stroke I am concerned about any other ablation on him. I would recommend either sotalol or Tikosyn on the patient    Patient wants to do the medication and we will try to get it scheduled    With his history of myasthenia gravis I do not want to do amiodarone    With history of GI bleeding so he may be assessed for appendage closure in future too if needed. Thanks again for allowing me to participate in care of this patient. Please call me if you have any questions. With best regards. Wendy Nicholson MD, 5/6/2022 1:17 PM     Please note this report has been partially produced using speech recognition software and may contain errors related to that system including errors in grammar, punctuation, and spelling, as well as words and phrases that may be inappropriate. If there are any questions or concerns please feel free to contact the dictating provider for clarification.

## 2022-05-10 ENCOUNTER — TELEPHONE (OUTPATIENT)
Dept: CARDIOLOGY CLINIC | Age: 62
End: 2022-05-10

## 2022-05-10 NOTE — TELEPHONE ENCOUNTER
Spoke with Kamar Mary RN, with access center and patient scheduled for direct admit 5/23/2022 for Tikosyn loading therapy. Patient aware will receive phone call morning of admit with room number, but advised to still check in at patient information desk upon arrival to [facility]. Patient voiced understanding.

## 2022-05-18 ENCOUNTER — TELEPHONE (OUTPATIENT)
Dept: NEUROSURGERY | Age: 62
End: 2022-05-18

## 2022-05-18 DIAGNOSIS — S22.040A COMPRESSION FRACTURE OF T4 VERTEBRA, INITIAL ENCOUNTER (HCC): Primary | ICD-10-CM

## 2022-05-18 RX ORDER — OXYCODONE HYDROCHLORIDE AND ACETAMINOPHEN 5; 325 MG/1; MG/1
1 TABLET ORAL EVERY 6 HOURS PRN
Qty: 28 TABLET | Refills: 0 | Status: ON HOLD | OUTPATIENT
Start: 2022-05-18 | End: 2022-05-26 | Stop reason: HOSPADM

## 2022-05-18 RX ORDER — CYCLOBENZAPRINE HCL 5 MG
5 TABLET ORAL 2 TIMES DAILY PRN
Qty: 30 TABLET | Refills: 0 | Status: SHIPPED | OUTPATIENT
Start: 2022-05-18 | End: 2022-06-02

## 2022-05-18 NOTE — TELEPHONE ENCOUNTER
Patient's spouse Jamison Joe called today requesting refill on Percocet and Flexeril for patient. Patient has an upcoming follow up appointment with you on 6/1/22. Please advise. [facility]'s [facility].

## 2022-05-23 ENCOUNTER — TELEPHONE (OUTPATIENT)
Dept: CARDIOLOGY CLINIC | Age: 62
End: 2022-05-23

## 2022-05-23 ENCOUNTER — HOSPITAL ENCOUNTER (INPATIENT)
Age: 62
LOS: 3 days | Discharge: HOME HEALTH | DRG: 201 | End: 2022-05-26
Attending: INTERNAL MEDICINE | Admitting: INTERNAL MEDICINE
Payer: MEDICAID

## 2022-05-23 PROBLEM — Z51.81 ENCOUNTER FOR MONITORING SOTALOL THERAPY: Status: ACTIVE | Noted: 2022-05-23

## 2022-05-23 PROBLEM — I48.91 ATRIAL FIBRILLATION (HCC): Status: ACTIVE | Noted: 2022-05-23

## 2022-05-23 PROBLEM — Z79.899 ENCOUNTER FOR MONITORING SOTALOL THERAPY: Status: ACTIVE | Noted: 2022-05-23

## 2022-05-23 LAB
ANION GAP SERPL CALCULATED.3IONS-SCNC: 12 MMOL/L (ref 4–16)
BUN BLDV-MCNC: 16 MG/DL (ref 6–23)
CALCIUM SERPL-MCNC: 8.9 MG/DL (ref 8.3–10.6)
CHLORIDE BLD-SCNC: 107 MMOL/L (ref 99–110)
CO2: 21 MMOL/L (ref 21–32)
CREAT SERPL-MCNC: 1 MG/DL (ref 0.9–1.3)
EKG ATRIAL RATE: 70 BPM
EKG DIAGNOSIS: NORMAL
EKG P AXIS: 95 DEGREES
EKG P-R INTERVAL: 126 MS
EKG Q-T INTERVAL: 432 MS
EKG QRS DURATION: 124 MS
EKG QTC CALCULATION (BAZETT): 466 MS
EKG R AXIS: 68 DEGREES
EKG T AXIS: -2 DEGREES
EKG VENTRICULAR RATE: 70 BPM
GFR AFRICAN AMERICAN: >60 ML/MIN/1.73M2
GFR NON-AFRICAN AMERICAN: >60 ML/MIN/1.73M2
GLUCOSE BLD-MCNC: 141 MG/DL (ref 70–99)
GLUCOSE BLD-MCNC: 165 MG/DL (ref 70–99)
MAGNESIUM: 1.8 MG/DL (ref 1.8–2.4)
PHOSPHORUS: 3.9 MG/DL (ref 2.5–4.9)
POTASSIUM SERPL-SCNC: 4.6 MMOL/L (ref 3.5–5.1)
SODIUM BLD-SCNC: 140 MMOL/L (ref 135–145)

## 2022-05-23 PROCEDURE — 2140000000 HC CCU INTERMEDIATE R&B

## 2022-05-23 PROCEDURE — 94640 AIRWAY INHALATION TREATMENT: CPT

## 2022-05-23 PROCEDURE — 83735 ASSAY OF MAGNESIUM: CPT

## 2022-05-23 PROCEDURE — 6370000000 HC RX 637 (ALT 250 FOR IP): Performed by: NURSE PRACTITIONER

## 2022-05-23 PROCEDURE — 80048 BASIC METABOLIC PNL TOTAL CA: CPT

## 2022-05-23 PROCEDURE — 93010 ELECTROCARDIOGRAM REPORT: CPT | Performed by: INTERNAL MEDICINE

## 2022-05-23 PROCEDURE — 93005 ELECTROCARDIOGRAM TRACING: CPT | Performed by: NURSE PRACTITIONER

## 2022-05-23 PROCEDURE — 84100 ASSAY OF PHOSPHORUS: CPT

## 2022-05-23 PROCEDURE — 82962 GLUCOSE BLOOD TEST: CPT

## 2022-05-23 PROCEDURE — 99231 SBSQ HOSP IP/OBS SF/LOW 25: CPT | Performed by: NURSE PRACTITIONER

## 2022-05-23 RX ORDER — SOTALOL HYDROCHLORIDE 80 MG/1
80 TABLET ORAL 2 TIMES DAILY
Status: DISCONTINUED | OUTPATIENT
Start: 2022-05-23 | End: 2022-05-26 | Stop reason: HOSPADM

## 2022-05-23 RX ORDER — ATORVASTATIN CALCIUM 40 MG/1
40 TABLET, FILM COATED ORAL NIGHTLY
Status: DISCONTINUED | OUTPATIENT
Start: 2022-05-24 | End: 2022-05-26 | Stop reason: HOSPADM

## 2022-05-23 RX ORDER — PYRIDOSTIGMINE BROMIDE 60 MG/1
60 TABLET ORAL
Status: DISCONTINUED | OUTPATIENT
Start: 2022-05-23 | End: 2022-05-26 | Stop reason: HOSPADM

## 2022-05-23 RX ORDER — CYCLOBENZAPRINE HCL 10 MG
5 TABLET ORAL 2 TIMES DAILY PRN
Status: DISCONTINUED | OUTPATIENT
Start: 2022-05-23 | End: 2022-05-26 | Stop reason: HOSPADM

## 2022-05-23 RX ORDER — ACETAMINOPHEN 325 MG/1
650 TABLET ORAL EVERY 6 HOURS PRN
Status: DISCONTINUED | OUTPATIENT
Start: 2022-05-23 | End: 2022-05-26 | Stop reason: HOSPADM

## 2022-05-23 RX ORDER — DULOXETIN HYDROCHLORIDE 30 MG/1
60 CAPSULE, DELAYED RELEASE ORAL DAILY
Status: DISCONTINUED | OUTPATIENT
Start: 2022-05-23 | End: 2022-05-26 | Stop reason: HOSPADM

## 2022-05-23 RX ORDER — BUDESONIDE AND FORMOTEROL FUMARATE DIHYDRATE 160; 4.5 UG/1; UG/1
2 AEROSOL RESPIRATORY (INHALATION) 2 TIMES DAILY
Status: DISCONTINUED | OUTPATIENT
Start: 2022-05-23 | End: 2022-05-26 | Stop reason: HOSPADM

## 2022-05-23 RX ORDER — LANOLIN ALCOHOL/MO/W.PET/CERES
1000 CREAM (GRAM) TOPICAL DAILY
Status: DISCONTINUED | OUTPATIENT
Start: 2022-05-24 | End: 2022-05-26 | Stop reason: HOSPADM

## 2022-05-23 RX ORDER — CLOPIDOGREL BISULFATE 75 MG/1
75 TABLET ORAL DAILY
Status: DISCONTINUED | OUTPATIENT
Start: 2022-05-24 | End: 2022-05-26 | Stop reason: HOSPADM

## 2022-05-23 RX ORDER — PANTOPRAZOLE SODIUM 40 MG/1
40 TABLET, DELAYED RELEASE ORAL 2 TIMES DAILY
Status: DISCONTINUED | OUTPATIENT
Start: 2022-05-23 | End: 2022-05-26 | Stop reason: HOSPADM

## 2022-05-23 RX ORDER — LANOLIN ALCOHOL/MO/W.PET/CERES
400 CREAM (GRAM) TOPICAL DAILY
Status: DISCONTINUED | OUTPATIENT
Start: 2022-05-23 | End: 2022-05-26 | Stop reason: HOSPADM

## 2022-05-23 RX ORDER — METFORMIN HYDROCHLORIDE 500 MG/1
500 TABLET, EXTENDED RELEASE ORAL 2 TIMES DAILY
Status: DISCONTINUED | OUTPATIENT
Start: 2022-05-23 | End: 2022-05-26 | Stop reason: HOSPADM

## 2022-05-23 RX ADMIN — PYRIDOSTIGMINE BROMIDE 60 MG: 60 TABLET ORAL at 20:09

## 2022-05-23 RX ADMIN — SOTALOL HYDROCHLORIDE 80 MG: 80 TABLET ORAL at 13:45

## 2022-05-23 RX ADMIN — SOTALOL HYDROCHLORIDE 80 MG: 80 TABLET ORAL at 21:17

## 2022-05-23 RX ADMIN — METFORMIN HYDROCHLORIDE 500 MG: 500 TABLET, EXTENDED RELEASE ORAL at 20:10

## 2022-05-23 RX ADMIN — Medication 400 MG: at 13:45

## 2022-05-23 RX ADMIN — PYRIDOSTIGMINE BROMIDE 60 MG: 60 TABLET ORAL at 23:42

## 2022-05-23 RX ADMIN — PYRIDOSTIGMINE BROMIDE 60 MG: 60 TABLET ORAL at 16:05

## 2022-05-23 RX ADMIN — APIXABAN 5 MG: 5 TABLET, FILM COATED ORAL at 20:10

## 2022-05-23 RX ADMIN — PANTOPRAZOLE SODIUM 40 MG: 40 TABLET, DELAYED RELEASE ORAL at 20:10

## 2022-05-23 RX ADMIN — BUDESONIDE AND FORMOTEROL FUMARATE DIHYDRATE 2 PUFF: 160; 4.5 AEROSOL RESPIRATORY (INHALATION) at 20:34

## 2022-05-23 ASSESSMENT — ENCOUNTER SYMPTOMS
SHORTNESS OF BREATH: 1
SINUS PRESSURE: 0
BACK PAIN: 0
PHOTOPHOBIA: 0
ABDOMINAL PAIN: 0
VOMITING: 0
COLOR CHANGE: 0
EYE ITCHING: 0
SINUS PAIN: 0
CONSTIPATION: 0
DIARRHEA: 0
BLOOD IN STOOL: 0
ABDOMINAL DISTENTION: 0
NAUSEA: 0

## 2022-05-23 NOTE — H&P
Electrophysiology Consult Note      Reason for consultation: atrial fibrillation    Chief complaint: palpitations, tiredness, arrhythmia    Referring physician:  Dr Vlad Jerome      Primary care physician: JAMIE Sanches      History of Present Illness:   Delvin Castanon is a 60year old male with a history of hypertension, myasthenia gravis, sick sinus syndrome s/p pacemaker, history of blood clots on anticoagulation, and atrial fibrillation. He was referred for management of atrial fibrillation. Patient reports that he has intermittent palpations and feels tired and weak. He also reports exertional shortness of breath that is intermittent and will resolve with rest. Patient has received cardioversion four times. Patient has a history of gastric ulcer and bleeding resulting in blood transfusion. He is on anticoagulation at this time and has not had any problems with it. Patient is here for antiarrhythmic therapy. Patient currently denies chest pain, palpitations, shortness of breath, lightheadedness, dizziness, edema or syncope. Past medical history:   Past Medical History:   Diagnosis Date    Arthritis 4/23/2021    Atrial fibrillation, chronic ([address] 12/2020    Cerebrovascular accident ([address] 9/9/2021    Closed displaced fracture of anterior column of right acetabulum ([address] 3/7/2022    COPD (chronic obstructive pulmonary disease) ([address] 06/15/2021    Coronary artery disease involving native coronary artery of native heart without angina pectoris 06/15/2021    Diabetes ([address] 9/10/2021    H/O echocardiogram 02/02/2021    EF is estimated at 50%. presence of PVC affected LVEF estimation, Mild TR. Heart disease     stint    History of blood transfusion     Hx of blood clots     Hx of cardiovascular stress test 06/11/2021    Small size severe intensity,perfusion defect in apical wall. Hx of Doppler ultrasound 04/09/2021    Left distal SSV shows occlusive chronic SVT.     Hypertension     Myasthenia gravis ([address] NSTEMI (non-ST elevated myocardial infarction) ([facility] 12/2020    Post PTCA 12/14/2020    Ramus Stented. S/P ablation of atrial fibrillation 09/08/2021    S/P ablation of afib PVI performed by Dr. Rony Shannon. TIA (transient ischemic attack)     Ulcerative colitis ([facility]        Surgical history :  Past Surgical History:   Procedure Laterality Date    CARDIAC SURGERY      ILEOSTOMY OR JEJUNOSTOMY      PACEMAKER INSERTION N/A 07/07/2021    PACEMAKER INSERTION PERMANENT performed by Alda Valenzuela MD at [address] [place] [address],[place]. [address]    PTCA  12/14/2020    Ramus Stented. TONSILLECTOMY      UPPER GASTROINTESTINAL ENDOSCOPY N/A 06/15/2021    EGD DIAGNOSTIC ONLY performed by Richard Tamayo MD at [facility] N/A 07/04/2021    EGD DIAGNOSTIC ONLY performed by Kristine Hamm MD at [facility] ENDOSCOPY       Family history:   Family History   Family history unknown: Yes       Social history :  reports that he quit smoking about 13 months ago. His smoking use included cigarettes. He smoked 0.25 packs per day. He has never used smokeless tobacco. He reports current alcohol use of about 1.0 standard drink of alcohol per week. He reports that he does not use drugs.     No Known Allergies    Current Facility-Administered Medications on File Prior to Encounter   Medication Dose Route Frequency Provider Last Rate Last Admin    Immune Globulin (Human) solution 20 g  20 g IntraVENous Once Yaritza Campoverde MD        And    Immune Globulin (Human) solution 20 g  20 g IntraVENous Once Yaritza Campoverde MD        Immune Globulin (Human) solution 20 g  20 g IntraVENous Once Yaritza Campoverde MD        And    Immune Globulin (Human) solution 20 g  20 g IntraVENous Once Yaritza Campoverde MD         Current Outpatient Medications on File Prior to Encounter   Medication Sig Dispense Refill    cyclobenzaprine (FLEXERIL) 5 MG tablet Take 1 tablet by mouth 2 times daily as needed for Muscle spasms 30 tablet 0    oxyCODONE-acetaminophen (PERCOCET) 5-325 MG per tablet Take 1 tablet by mouth every 6 hours as needed for Pain for up to 7 days. Intended supply: 7 days.  Take lowest dose possible to manage pain 28 tablet 0    ELIQUIS 5 MG TABS tablet Take 1 tablet by mouth 2 times daily      clopidogrel (PLAVIX) 75 MG tablet Take 1 tablet by mouth      DULoxetine (CYMBALTA) 60 MG extended release capsule Take 1 capsule by mouth      metFORMIN (GLUCOPHAGE-XR) 500 MG extended release tablet Take 1 tablet by mouth 2 times daily      metoprolol succinate (TOPROL XL) 25 MG extended release tablet Take 1 tablet by mouth daily      ferrous sulfate (FE TABS 325) 325 (65 Fe) MG EC tablet Take 1 tablet by mouth 2 times daily (with meals) (Patient not taking: Reported on 5/23/2022) 90 tablet 1    GLUCOSAMINE-CALCIUM-VIT D PO Take by mouth daily       budesonide-formoterol (SYMBICORT) 160-4.5 MCG/ACT AERO Inhale 2 puffs into the lungs 2 times daily 1 Inhaler 3    atorvastatin (LIPITOR) 40 MG tablet Take 1 tablet by mouth nightly START 6/25/2021 30 tablet 3    tiotropium (SPIRIVA HANDIHALER) 18 MCG inhalation capsule Inhale 1 capsule into the lungs daily 90 capsule 1    pantoprazole (PROTONIX) 40 MG tablet Take 1 tablet by mouth 2 times daily 60 tablet 1    Elastic Bandages & Supports (MEDICAL COMPRESSION THIGH HIGH) MISC Wear daily and remove nightly 20 - 30 mmgh 2 each 0    NONFORMULARY Inject 40 mg/L into the skin every evening Zilucoplan  - patient provided experimental drug from [place] [id number] for Myasthenia Gravis      vitamin B-12 (CYANOCOBALAMIN) 500 MCG tablet Take 1,000 mcg by mouth daily Indications: 8am       PREDNISONE PO Take 13 mg by mouth daily Indications: Takes at  8am       pyridostigmine (MESTINON) 60 MG tablet Take 1 tablet by mouth 4 times daily (Patient taking differently: Take 60 mg by mouth 5 times daily Indications: Takes at 0800, 1200, 1600, 2000 ) 120 tablet 0    acetaminophen (TYLENOL) 325 MG tablet Take 650 mg by mouth every 4 hours as needed for Pain or Fever         Review of Systems:   Review of Systems   Constitutional: Positive for activity change and fatigue. Negative for appetite change. HENT: Negative for congestion, sinus pressure and sinus pain. Eyes: Negative for photophobia, itching and visual disturbance. Respiratory: Positive for shortness of breath. Cardiovascular: Negative for chest pain, palpitations and leg swelling. Gastrointestinal: Negative for abdominal distention, abdominal pain, blood in stool, constipation, diarrhea, nausea and vomiting. Genitourinary: Negative for difficulty urinating and dysuria. Musculoskeletal: Positive for arthralgias. Negative for back pain and gait problem. Skin: Negative for color change and wound. Neurological: Positive for weakness. Negative for dizziness, syncope and light-headedness. Psychiatric/Behavioral: Negative for agitation and confusion. The patient is not nervous/anxious. Physical Examination:    /80   Pulse 70   Temp 97.8 °F (36.6 °C) (Oral)   Resp 15   SpO2 97%    Wt Readings from Last 3 Encounters:   05/06/22 285 lb 3.2 oz (129.4 kg)   05/02/22 285 lb (129.3 kg)   04/20/22 285 lb (129.3 kg)     There is no height or weight on file to calculate BMI. Physical Exam  Constitutional:       Appearance: He is obese. HENT:      Head: Normocephalic and atraumatic. Right Ear: External ear normal.      Left Ear: External ear normal.      Nose: No congestion or rhinorrhea. Mouth/Throat:      Mouth: Mucous membranes are moist.      Pharynx: Oropharynx is clear. Eyes:      General:         Right eye: No discharge. Left eye: No discharge. Conjunctiva/sclera: Conjunctivae normal.   Cardiovascular:      Rate and Rhythm: Normal rate and regular rhythm. Heart sounds: No murmur heard. No friction rub. No gallop.     Pulmonary:      Effort: Pulmonary effort is normal.      Breath sounds: Normal breath sounds. No wheezing or rhonchi. Abdominal:      General: Abdomen is flat. Bowel sounds are normal. There is no distension. Musculoskeletal:      Cervical back: Normal range of motion. Right lower leg: No edema. Skin:     General: Skin is warm and dry. Neurological:      Mental Status: He is alert and oriented to person, place, and time. Psychiatric:         Mood and Affect: Mood normal.         Behavior: Behavior normal.         Thought Content:  Thought content normal.         Judgment: Judgment normal.             CBC:   Lab Results   Component Value Date    WBC 7.8 03/08/2022    HGB 12.5 03/08/2022    HCT 40.3 03/08/2022     03/08/2022     Lipids: No results found for: CHOL, TRIG, HDL, LDLCALC, LDLDIRECT  PT/INR:   Lab Results   Component Value Date    INR 1.20 03/08/2022        BMP:    Lab Results   Component Value Date     05/23/2022    K 4.6 05/23/2022     05/23/2022    CO2 21 05/23/2022    BUN 16 05/23/2022     CMP:   Lab Results   Component Value Date    AST 17 03/08/2022    ALT 23 03/08/2022    PROT 5.9 (L) 03/08/2022    BILITOT 0.3 03/08/2022    ALKPHOS 135 (H) 03/08/2022     TSH:  No results found for: TSH, T4    EKG Interpretation:        IMPRESSION / RECOMMENDATIONS:     Sotalol monitoring therapy  Persistent atrial fibrillation with history of ablation of atrial fibrillation and atrial flutter  Hypercoagulable secondary to persistent atrial fibrillation  History of myasthenia gravis  COPD  Obesity BMI 40  CAD  History of gastric ulcer with bleeding now cleared for anticoagulation  Chronic Back Pain  History of Blood Clots    Patient  Is here for sotalol monitoring therapy  EKG obtained- patient in sinus rhythm  QTc is within range to start Sotalol 80 mg BID  Patient to have an EKG 1 hour before each scheduled dose of Sotalol  Given with the sotalol dosed according to the QTc  Patient to continue Eliquis 5 mg twice daily as he has hypercoagulable state secondary to persistent atrial fibrillation. Patient denies issues with bleeding. Patient will require 72-hour hospital admission. Expected time of discharge is Thursday. Thanks again for allowing me to participate in care of thispatient. Please call me if you have any questions. With best regards. JAMIE Richardson-CNP    I have seen and examined this patient personally, independently of the nurse practitioner. I have reviewed the hospital care given to date and reviewed all pertinent labs and imaging. The plan was developed mutually at the time of the visit with the patient, Nurse practioner (NP)  and myself. I have spoken with patient, nursing staff and provided written and verbal instructions. The above note has ang reviewed and I agree with the history, physical examination and treatment plan. Necessary changes to the note has been made in history, physical examination and plan to the above note.     Naomi Velazquez M.D

## 2022-05-24 LAB
EKG ATRIAL RATE: 70 BPM
EKG ATRIAL RATE: 74 BPM
EKG DIAGNOSIS: NORMAL
EKG DIAGNOSIS: NORMAL
EKG P AXIS: 103 DEGREES
EKG P AXIS: 113 DEGREES
EKG P-R INTERVAL: 114 MS
EKG P-R INTERVAL: 124 MS
EKG Q-T INTERVAL: 442 MS
EKG Q-T INTERVAL: 454 MS
EKG QRS DURATION: 126 MS
EKG QRS DURATION: 126 MS
EKG QTC CALCULATION (BAZETT): 490 MS
EKG QTC CALCULATION (BAZETT): 490 MS
EKG R AXIS: 58 DEGREES
EKG R AXIS: 78 DEGREES
EKG T AXIS: 18 DEGREES
EKG T AXIS: 20 DEGREES
EKG VENTRICULAR RATE: 70 BPM
EKG VENTRICULAR RATE: 74 BPM
GLUCOSE BLD-MCNC: 114 MG/DL (ref 70–99)
GLUCOSE BLD-MCNC: 142 MG/DL (ref 70–99)
GLUCOSE BLD-MCNC: 220 MG/DL (ref 70–99)

## 2022-05-24 PROCEDURE — 99231 SBSQ HOSP IP/OBS SF/LOW 25: CPT | Performed by: NURSE PRACTITIONER

## 2022-05-24 PROCEDURE — 2140000000 HC CCU INTERMEDIATE R&B

## 2022-05-24 PROCEDURE — 6370000000 HC RX 637 (ALT 250 FOR IP): Performed by: NURSE PRACTITIONER

## 2022-05-24 PROCEDURE — 93005 ELECTROCARDIOGRAM TRACING: CPT | Performed by: NURSE PRACTITIONER

## 2022-05-24 PROCEDURE — 93010 ELECTROCARDIOGRAM REPORT: CPT | Performed by: INTERNAL MEDICINE

## 2022-05-24 PROCEDURE — 94761 N-INVAS EAR/PLS OXIMETRY MLT: CPT

## 2022-05-24 PROCEDURE — 94640 AIRWAY INHALATION TREATMENT: CPT

## 2022-05-24 PROCEDURE — 82962 GLUCOSE BLOOD TEST: CPT

## 2022-05-24 RX ADMIN — APIXABAN 5 MG: 5 TABLET, FILM COATED ORAL at 20:16

## 2022-05-24 RX ADMIN — ATORVASTATIN CALCIUM 40 MG: 40 TABLET, FILM COATED ORAL at 20:15

## 2022-05-24 RX ADMIN — TIOTROPIUM BROMIDE INHALATION SPRAY 2 PUFF: 3.12 SPRAY, METERED RESPIRATORY (INHALATION) at 08:20

## 2022-05-24 RX ADMIN — PYRIDOSTIGMINE BROMIDE 60 MG: 60 TABLET ORAL at 20:15

## 2022-05-24 RX ADMIN — PYRIDOSTIGMINE BROMIDE 60 MG: 60 TABLET ORAL at 11:36

## 2022-05-24 RX ADMIN — PANTOPRAZOLE SODIUM 40 MG: 40 TABLET, DELAYED RELEASE ORAL at 09:17

## 2022-05-24 RX ADMIN — PANTOPRAZOLE SODIUM 40 MG: 40 TABLET, DELAYED RELEASE ORAL at 20:15

## 2022-05-24 RX ADMIN — CYCLOBENZAPRINE 5 MG: 10 TABLET, FILM COATED ORAL at 20:25

## 2022-05-24 RX ADMIN — SOTALOL HYDROCHLORIDE 80 MG: 80 TABLET ORAL at 09:17

## 2022-05-24 RX ADMIN — PYRIDOSTIGMINE BROMIDE 60 MG: 60 TABLET ORAL at 06:34

## 2022-05-24 RX ADMIN — Medication 400 MG: at 09:17

## 2022-05-24 RX ADMIN — PREDNISONE 13 MG: 1 TABLET ORAL at 09:17

## 2022-05-24 RX ADMIN — APIXABAN 5 MG: 5 TABLET, FILM COATED ORAL at 09:17

## 2022-05-24 RX ADMIN — METFORMIN HYDROCHLORIDE 500 MG: 500 TABLET, EXTENDED RELEASE ORAL at 20:16

## 2022-05-24 RX ADMIN — SOTALOL HYDROCHLORIDE 80 MG: 80 TABLET ORAL at 21:29

## 2022-05-24 RX ADMIN — ACETAMINOPHEN 650 MG: 325 TABLET ORAL at 20:25

## 2022-05-24 RX ADMIN — BUDESONIDE AND FORMOTEROL FUMARATE DIHYDRATE 2 PUFF: 160; 4.5 AEROSOL RESPIRATORY (INHALATION) at 08:20

## 2022-05-24 RX ADMIN — METFORMIN HYDROCHLORIDE 500 MG: 500 TABLET, EXTENDED RELEASE ORAL at 09:17

## 2022-05-24 RX ADMIN — CYANOCOBALAMIN TAB 1000 MCG 1000 MCG: 1000 TAB at 09:17

## 2022-05-24 RX ADMIN — PYRIDOSTIGMINE BROMIDE 60 MG: 60 TABLET ORAL at 22:50

## 2022-05-24 RX ADMIN — DULOXETINE HYDROCHLORIDE 60 MG: 30 CAPSULE, DELAYED RELEASE ORAL at 09:17

## 2022-05-24 RX ADMIN — PYRIDOSTIGMINE BROMIDE 60 MG: 60 TABLET ORAL at 15:17

## 2022-05-24 RX ADMIN — CLOPIDOGREL BISULFATE 75 MG: 75 TABLET ORAL at 09:17

## 2022-05-24 RX ADMIN — BUDESONIDE AND FORMOTEROL FUMARATE DIHYDRATE 2 PUFF: 160; 4.5 AEROSOL RESPIRATORY (INHALATION) at 20:00

## 2022-05-24 ASSESSMENT — PAIN SCALES - GENERAL
PAINLEVEL_OUTOF10: 0

## 2022-05-24 ASSESSMENT — PAIN DESCRIPTION - LOCATION: LOCATION: BACK

## 2022-05-24 ASSESSMENT — PAIN DESCRIPTION - DESCRIPTORS: DESCRIPTORS: ACHING

## 2022-05-24 NOTE — CARE COORDINATION
Pt chart reviewed. Screened for discharge planning. Pt from home. Pt appears independent. Pt has DME to include wheelchair. Pt is active with [facility]. Pt has PCP and insurance. Pt discharge plan is home. CM will continue to follow for any needs.

## 2022-05-24 NOTE — PROGRESS NOTES
Admit Date:  5/23/2022    Admission diagnosis / Complaint: Sotalol Monitoring Therapy      Subjective:  Mr. Antonietta Baumgarten is resting in bed. He denies cardiac complaints. Objective:   /68   Pulse 78   Temp 98.5 °F (36.9 °C) (Oral)   Resp 16   Wt 297 lb 9.9 oz (135 kg)   SpO2 98%   BMI 41.51 kg/m²     Intake/Output Summary (Last 24 hours) at 5/24/2022 1504  Last data filed at 5/24/2022 0932  Gross per 24 hour   Intake 920 ml   Output --   Net 920 ml       TELEMETRY: Atrial paced   has a past medical history of Arthritis, Atrial fibrillation, chronic ([facility] [address] Cerebrovascular accident [facility]), Closed displaced fracture of anterior column of right acetabulum ([facility] [address] COPD (chronic obstructive pulmonary disease) ([facility] [address] Coronary artery disease involving native coronary artery of native heart without angina pectoris, Diabetes ([facility] [address] H/O echocardiogram, Heart disease, History of blood transfusion, Hx of blood clots, Hx of cardiovascular stress test, Hx of Doppler ultrasound, Hypertension, Myasthenia gravis ([facility] [address] NSTEMI (non-ST elevated myocardial infarction) ([facility] [address] Post PTCA, S/P ablation of atrial fibrillation, TIA (transient ischemic attack), and Ulcerative colitis ([facility] [address] has a past surgical history that includes ileostomy or jejunostomy; Tonsillectomy; Cardiac surgery; Percutaneous Transluminal Coronary Angio (12/14/2020); Upper gastrointestinal endoscopy (N/A, 06/15/2021); Upper gastrointestinal endoscopy (N/A, 07/04/2021); and Pacemaker insertion (N/A, 07/07/2021).      Physical Exam:  General:  Awake, alert, obese  Skin:  Warm and dry  Neck:  JVD not appreciated  Chest:  Clear to auscultation, respiration easy  Cardiovascular:  RRR S1S2  Abdomen:  Soft nontender  Extremities:  no edema    Medications:    magnesium oxide  400 mg Oral Daily    atorvastatin  40 mg Oral Nightly    budesonide-formoterol  2 puff Inhalation BID    clopidogrel  75 mg Oral Daily    DULoxetine  60 mg Oral Daily    apixaban  5 mg Oral BID    metFORMIN  500 mg Oral BID    vitamin B-12  1,000 mcg Oral Daily    tiotropium  2 puff Inhalation Daily    pyridostigmine  60 mg Oral 5x Daily    predniSONE  13 mg Oral Daily    pantoprazole  40 mg Oral BID    sotalol  80 mg Oral BID    NON-Formulary ( Zilucoplan 40mg/ml ) injection. SubCUTAneous QPM       acetaminophen, cyclobenzaprine    Lab Data:  CBC: No results for input(s): WBC, HGB, HCT, MCV, PLT in the last 72 hours. BMP:   Recent Labs     05/23/22  1037      K 4.6      CO2 21   PHOS 3.9   BUN 16   CREATININE 1.0     LIVER PROFILE: No results for input(s): AST, ALT, LIPASE, BILIDIR, BILITOT, ALKPHOS in the last 72 hours. Invalid input(s): AMYLASE,  ALB  PT/INR: No results for input(s): PROTIME, INR in the last 72 hours. APTT: No results for input(s): APTT in the last 72 hours. BNP:  No results for input(s): BNP in the last 72 hours. TROPONIN: @TROPONINI:3@      Assessment and Plan    Sotalol monitoring therapy  Persistent atrial fibrillation with history of ablation of atrial fibrillation atrial flutter  Hypercoagulable secondary to persistent atrial fibrillation  History of myasthenia gravis  COPD  CAD  Obesity BMI 40  History of gastric ulcer with bleeding now cleared for anticoagulation  Chronic back pain  History of blood clots    Patient remains atrial paced. QTC is within range to continue sotalol monitoring therapy  Patient to continue sotalol 80 mg twice daily  Patient denies issues with bleeding. We will continue Eliquis 5 mg twice daily  Patient will have an EKG 1 hour before each scheduled dose of sotalol. Sotalol will be dosed according to the [address] [place] [address], APRN - CNP, 5/24/2022 3:04 PM     Please note this report has been partially produced using speech recognition software and may contain errors related to that system including errors in grammar, punctuation, and spelling, as well as words and phrases that may be inappropriate.  If there are any questions or concerns please feel free to contact the dictating provider for clarification.

## 2022-05-25 LAB
GLUCOSE BLD-MCNC: 121 MG/DL (ref 70–99)
GLUCOSE BLD-MCNC: 166 MG/DL (ref 70–99)
GLUCOSE BLD-MCNC: 198 MG/DL (ref 70–99)
GLUCOSE BLD-MCNC: 277 MG/DL (ref 70–99)

## 2022-05-25 PROCEDURE — 93005 ELECTROCARDIOGRAM TRACING: CPT | Performed by: NURSE PRACTITIONER

## 2022-05-25 PROCEDURE — 94664 DEMO&/EVAL PT USE INHALER: CPT

## 2022-05-25 PROCEDURE — 6370000000 HC RX 637 (ALT 250 FOR IP): Performed by: NURSE PRACTITIONER

## 2022-05-25 PROCEDURE — 82962 GLUCOSE BLOOD TEST: CPT

## 2022-05-25 PROCEDURE — 99232 SBSQ HOSP IP/OBS MODERATE 35: CPT | Performed by: NURSE PRACTITIONER

## 2022-05-25 PROCEDURE — 2140000000 HC CCU INTERMEDIATE R&B

## 2022-05-25 PROCEDURE — 94761 N-INVAS EAR/PLS OXIMETRY MLT: CPT

## 2022-05-25 PROCEDURE — 94640 AIRWAY INHALATION TREATMENT: CPT

## 2022-05-25 RX ADMIN — METFORMIN HYDROCHLORIDE 500 MG: 500 TABLET, EXTENDED RELEASE ORAL at 21:20

## 2022-05-25 RX ADMIN — CYCLOBENZAPRINE 5 MG: 10 TABLET, FILM COATED ORAL at 21:23

## 2022-05-25 RX ADMIN — PREDNISONE 13 MG: 1 TABLET ORAL at 09:29

## 2022-05-25 RX ADMIN — PANTOPRAZOLE SODIUM 40 MG: 40 TABLET, DELAYED RELEASE ORAL at 09:29

## 2022-05-25 RX ADMIN — PYRIDOSTIGMINE BROMIDE 60 MG: 60 TABLET ORAL at 11:52

## 2022-05-25 RX ADMIN — CLOPIDOGREL BISULFATE 75 MG: 75 TABLET ORAL at 09:29

## 2022-05-25 RX ADMIN — Medication 400 MG: at 09:29

## 2022-05-25 RX ADMIN — PANTOPRAZOLE SODIUM 40 MG: 40 TABLET, DELAYED RELEASE ORAL at 21:18

## 2022-05-25 RX ADMIN — APIXABAN 5 MG: 5 TABLET, FILM COATED ORAL at 21:18

## 2022-05-25 RX ADMIN — METFORMIN HYDROCHLORIDE 500 MG: 500 TABLET, EXTENDED RELEASE ORAL at 09:29

## 2022-05-25 RX ADMIN — PYRIDOSTIGMINE BROMIDE 60 MG: 60 TABLET ORAL at 06:11

## 2022-05-25 RX ADMIN — ATORVASTATIN CALCIUM 40 MG: 40 TABLET, FILM COATED ORAL at 21:18

## 2022-05-25 RX ADMIN — SOTALOL HYDROCHLORIDE 80 MG: 80 TABLET ORAL at 21:18

## 2022-05-25 RX ADMIN — PYRIDOSTIGMINE BROMIDE 60 MG: 60 TABLET ORAL at 18:47

## 2022-05-25 RX ADMIN — BUDESONIDE AND FORMOTEROL FUMARATE DIHYDRATE 2 PUFF: 160; 4.5 AEROSOL RESPIRATORY (INHALATION) at 09:03

## 2022-05-25 RX ADMIN — ACETAMINOPHEN 650 MG: 325 TABLET ORAL at 21:24

## 2022-05-25 RX ADMIN — DULOXETINE HYDROCHLORIDE 60 MG: 30 CAPSULE, DELAYED RELEASE ORAL at 09:29

## 2022-05-25 RX ADMIN — CYANOCOBALAMIN TAB 1000 MCG 1000 MCG: 1000 TAB at 09:29

## 2022-05-25 RX ADMIN — APIXABAN 5 MG: 5 TABLET, FILM COATED ORAL at 09:29

## 2022-05-25 RX ADMIN — PYRIDOSTIGMINE BROMIDE 60 MG: 60 TABLET ORAL at 16:22

## 2022-05-25 RX ADMIN — PYRIDOSTIGMINE BROMIDE 60 MG: 60 TABLET ORAL at 21:18

## 2022-05-25 RX ADMIN — BUDESONIDE AND FORMOTEROL FUMARATE DIHYDRATE 2 PUFF: 160; 4.5 AEROSOL RESPIRATORY (INHALATION) at 20:41

## 2022-05-25 RX ADMIN — SOTALOL HYDROCHLORIDE 80 MG: 80 TABLET ORAL at 09:29

## 2022-05-25 RX ADMIN — CYCLOBENZAPRINE 5 MG: 10 TABLET, FILM COATED ORAL at 16:29

## 2022-05-25 RX ADMIN — TIOTROPIUM BROMIDE INHALATION SPRAY 2 PUFF: 3.12 SPRAY, METERED RESPIRATORY (INHALATION) at 09:02

## 2022-05-25 ASSESSMENT — PAIN SCALES - GENERAL
PAINLEVEL_OUTOF10: 2
PAINLEVEL_OUTOF10: 8
PAINLEVEL_OUTOF10: 0

## 2022-05-25 ASSESSMENT — PAIN DESCRIPTION - ORIENTATION: ORIENTATION: LOWER

## 2022-05-25 ASSESSMENT — PAIN DESCRIPTION - LOCATION
LOCATION: BACK
LOCATION: BACK

## 2022-05-25 ASSESSMENT — PAIN DESCRIPTION - DESCRIPTORS: DESCRIPTORS: ACHING

## 2022-05-25 NOTE — ANESTHESIA PRE PROCEDURE
Department of Anesthesiology  Preprocedure Note       Name:  Mati Gonzalez   Age:  57 y.o.  :  1960                                          MRN:  [id number]         Date:  2021      Surgeon: Vianney Christensen):  Courtney Looney MD    Procedure: Procedure(s):  EGD DIAGNOSTIC ONLY    Medications prior to admission:   Prior to Admission medications    Medication Sig Start Date End Date Taking? Authorizing Provider   GLUCOSAMINE-CALCIUM-VIT D PO Take by mouth daily    Historical Provider, MD   budesonide-formoterol (SYMBICORT) 160-4.5 MCG/ACT AERO Inhale 2 puffs into the lungs 2 times daily 21   Candelaria Cooper MD   atorvastatin (LIPITOR) 40 MG tablet Take 1 tablet by mouth nightly START 2021   Candelaria Cooper MD   tiotropium (SPIRIVA HANDIHALER) 18 MCG inhalation capsule Inhale 1 capsule into the lungs daily 21   Candelaria Cooper MD   pantoprazole (PROTONIX) 40 MG tablet Take 1 tablet by mouth 2 times daily 21   Candelaria Cooper MD   Elastic Bandages & Supports (MEDICAL COMPRESSION THIGH HIGH) MISC Wear daily and remove nightly 20 - 30 mmgh 21   Mariana Parson MD   clopidogrel (PLAVIX) 75 MG tablet Take 1 tablet by mouth daily 21   JAMIE Harley CNP   metoprolol succinate (TOPROL XL) 25 MG extended release tablet Take 1 tablet by mouth daily 21   JAMIE Harley CNP   rivaroxaban (XARELTO) 20 MG TABS tablet Take 1 tablet by mouth daily (with breakfast) 4/13/21 7/3/21  JAMIE Harley CNP   digoxin (LANOXIN) 125 MCG tablet Take 1 tablet by mouth daily 21   JAMIE Harley CNP   NONFORMULARY Inject 40 mg/L into the skin every evening Zilucoplan  - patient provided experimental drug from [place] for Myasthenia Gravis    Historical Provider, MD   albuterol sulfate HFA (PROVENTIL HFA) 108 (90 Base) MCG/ACT inhaler Inhale 2 puffs into the lungs every 4 hours as needed for Wheezing or Shortness of Breath With spacer (and mask if indicated). Thanks. 12/20/18 3/15/21  Jessi Trinh MD   vitamin B-12 (CYANOCOBALAMIN) 500 MCG tablet Take 1,000 mcg by mouth daily Indications: 8am     Historical Provider, MD   PREDNISONE PO Take 13 mg by mouth daily Indications: Takes at  [facility] MD Aurea   pyridostigmine (MESTINON) 60 MG tablet Take 1 tablet by mouth 4 times daily  Patient taking differently: Take 60 mg by mouth 5 times daily Indications: Takes at 0800, 1200, 1600, 2000 7/20/16   Don Coon MD   acetaminophen (TYLENOL) 325 MG tablet Take 650 mg by mouth every 4 hours as needed for Pain or Fever    Historical Provider, MD       Current medications:    No current facility-administered medications for this visit. No current outpatient medications on file.      Facility-Administered Medications Ordered in Other Visits   Medication Dose Route Frequency Provider Last Rate Last Admin    0.9 % sodium chloride infusion   Intravenous PRN Sada Pang MD        ipratropium-albuterol (DUONEB) nebulizer solution 1 ampule  1 ampule Inhalation Q4H WA MD Joan   1 ampule at 07/04/21 4702    sodium chloride flush 0.9 % injection 5-40 mL  5-40 mL Intravenous 2 times per day MD Joan   10 mL at 07/04/21 0815    sodium chloride flush 0.9 % injection 5-40 mL  5-40 mL Intravenous PRN MD Joan        0.9 % sodium chloride infusion  25 mL Intravenous PRN MD Joan        ondansetron (ZOFRAN-ODT) disintegrating tablet 4 mg  4 mg Oral Q8H PRN MD Joan        Or    ondansetron [facility]) injection 4 mg  4 mg Intravenous Q6H PRN MD Joan        acetaminophen (TYLENOL) tablet 650 mg  650 mg Oral Q6H PRN MD Joan   650 mg at 07/03/21 2003    Or    acetaminophen (TYLENOL) suppository 650 mg  650 mg Rectal Q6H PRN MD Joan        morphine (PF) injection 2 mg  2 mg Intravenous Q4H PRN MD Joan        0.9 % sodium chloride infusion   Intravenous ARYAN Facundo Lee APRN - CNP        digoxin (LANOXIN) injection 125 mcg  125 mcg Intravenous Daily Jose Manuel Garcia MD   125 mcg at 07/04/21 0815    metoprolol (LOPRESSOR) 5 mg in sodium chloride 0.9 % 50 mL IVPB  5 mg Intravenous Q6H Jose Manuel Garcia MD   Stopped at 07/04/21 0551    pantoprazole (PROTONIX) injection 40 mg  40 mg Intravenous Daily Shorty Mai MD   40 mg at 07/04/21 0518    Immune Globulin (Human) solution 20 g  20 g Intravenous Once Chas Haley MD        And    Immune Globulin (Human) solution 20 g  20 g Intravenous Once Chas Haley MD        Immune Globulin (Human) solution 20 g  20 g Intravenous Once Chas Haley MD        And    Immune Globulin (Human) solution 20 g  20 g Intravenous Once Chas Haley MD           Allergies:  No Known Allergies    Problem List:    Patient Active Problem List   Diagnosis Code    Myasthenia gravis ([facility] G70.00    Unspecified ptosis of bilateral eyelids H02.403    Cellulitis of left hand L03.114    Viral gastroenteritis A08.4    SOB (shortness of breath) R06.02    Atrial fibrillation, chronic ([facility]) I48.20    Post PTCA Z80.64    NSTEMI (non-ST elevated myocardial infarction) ([facility]) I21.4    Atrial fibrillation with rapid ventricular response ([facility]) I48.91    Hyperkalemia E87.5    Type 2 MI (myocardial infarction) ([facility] I21. A1    Gastritis and duodenitis K29.90    Duodenal ulcer K26.9    Coronary artery disease involving native coronary artery of native heart without angina pectoris I25.10    COPD (chronic obstructive pulmonary disease) ([facility]) J44.9    GI bleed K92.2       Past Medical History:        Diagnosis Date    Atrial fibrillation, chronic ([facility]) 12/2020    COPD (chronic obstructive pulmonary disease) ([facility] 6/15/2021    Coronary artery disease involving native coronary artery of native heart without angina pectoris 6/15/2021    H/O echocardiogram 02/02/2021    EF is estimated at 50%. presence of PVC affected LVEF estimation, Mild TR.    Heart disease     stint    History of blood transfusion     Hx of blood clots     Hx of cardiovascular stress test 2021    Small size severe intensity,perfusion defect in apical wall.  Hx of Doppler ultrasound 2021    Left distal SSV shows occlusive chronic SVT.  Hypertension     Myasthenia gravis ([facility] [address]     NSTEMI (non-ST elevated myocardial infarction) ([facility] [address] 2020    Post PTCA 2020    Ramus Stented.  TIA (transient ischemic attack)     Ulcerative colitis ([facility] [address]        Past Surgical History:        Procedure Laterality Date    CARDIAC SURGERY      ILEOSTOMY OR JEJUNOSTOMY      PTCA  2020    Ramus Stented.  TONSILLECTOMY      UPPER GASTROINTESTINAL ENDOSCOPY N/A 6/15/2021    EGD DIAGNOSTIC ONLY performed by Suma Lam MD at [facility] ENDOSCOPY       Social History:    Social History     Tobacco Use    Smoking status: Former Smoker     Packs/day: 0.25     Types: Cigarettes     Quit date: 2021     Years since quittin.2    Smokeless tobacco: Never Used   Substance Use Topics    Alcohol use: Yes     Alcohol/week: 1.0 standard drinks     Types: 1 Cans of beer per week     Comment: occ                                Counseling given: Not Answered      Vital Signs (Current): There were no vitals filed for this visit.                                            BP Readings from Last 3 Encounters:   21 (!) 107/92   21 132/82   06/15/21 127/87       NPO Status:                                                                                 BMI:   Wt Readings from Last 3 Encounters:   21 280 lb 10.3 oz (127.3 kg)   21 291 lb 3.2 oz (132.1 kg)   21 290 lb (131.5 kg)     There is no height or weight on file to calculate BMI.    CBC:   Lab Results   Component Value Date    WBC 7.2 2021    RBC 1.96 2021    HGB 7.6 2021    HCT 24.1 2021    MCV 94.4 2021    RDW 16.6 07/03/2021     07/03/2021       CMP:   Lab Results   Component Value Date     07/04/2021    K 4.4 07/04/2021     07/04/2021    CO2 22 07/04/2021    BUN 13 07/04/2021    CREATININE 1.0 07/04/2021    GFRAA >60 07/04/2021    LABGLOM >60 07/04/2021    GLUCOSE 102 07/04/2021    PROT 5.5 06/16/2021    CALCIUM 8.8 07/04/2021    BILITOT 0.6 06/16/2021    ALKPHOS 45 06/16/2021     06/16/2021     06/16/2021       POC Tests: No results for input(s): POCGLU, POCNA, POCK, POCCL, POCBUN, POCHEMO, POCHCT in the last 72 hours. Coags:   Lab Results   Component Value Date    PROTIME 21.3 07/03/2021    INR 1.64 07/03/2021    APTT 30.8 12/14/2020       HCG (If Applicable): No results found for: PREGTESTUR, PREGSERUM, HCG, HCGQUANT     ABGs:   Lab Results   Component Value Date    PO2ART 72 11/02/2016    SZG3BUD 33.0 11/02/2016    XSL0AZD 24.6 11/02/2016        Type & Screen (If Applicable):  No results found for: LABABO, LABRH    Drug/Infectious Status (If Applicable):  Lab Results   Component Value Date    HEPCAB NON REACTIVE 06/15/2021       COVID-19 Screening (If Applicable):   Lab Results   Component Value Date    COVID19 NOT DETECTED 07/03/2021    COVID19 NOT DETECTED 12/12/2020           Anesthesia Evaluation  Patient summary reviewed  Airway: Mallampati: III  TM distance: >3 FB   Neck ROM: full  Mouth opening: > = 3 FB Dental:          Pulmonary:normal exam    (+) COPD:                            ROS comment: Former smoker   Cardiovascular:  Exercise tolerance: poor (<4 METS),   (+) hypertension:, past MI:, dysrhythmias: atrial fibrillation,         Rhythm: irregular  Rate: abnormal                   PE comment: afib rvr   Neuro/Psych:   (+) neuromuscular disease: myasthenia gravis, TIA,             GI/Hepatic/Renal:   (+) PUD, morbid obesity (bmi 40.9)         ROS comment: Ulcerative colitis.    Endo/Other:    (+) blood dyscrasia: anticoagulation therapy and anemia:., . Abdominal:   (+) obese,           Vascular:   + DVT, . Other Findings:             Anesthesia Plan      TIVA, MAC and general     ASA 3       Induction: intravenous. Anesthetic plan and risks discussed with patient. Plan discussed with CRNA and attending. Pre Anesthesia Assessment complete. Chart reviewed on 7/4/2021. This is a chart review only.         JAMIE Gallardo - CRNA   7/4/2021 Adbry Pregnancy And Lactation Text: It is unknown if this medication will adversely affect pregnancy or breast feeding.

## 2022-05-25 NOTE — PROGRESS NOTES
Admit Date:  5/23/2022    Admission diagnosis / Complaint: Sotalol Monitoring Therapy      Subjective:  Mr. Jose Alejandro Hamilton is resting in bed. He denies cardiac complaints. Objective:   /87   Pulse 74   Temp 98.2 °F (36.8 °C) (Oral)   Resp 18   Wt 297 lb 9.9 oz (135 kg)   SpO2 93%   BMI 41.51 kg/m²       Intake/Output Summary (Last 24 hours) at 5/25/2022 1104  Last data filed at 5/24/2022 1842  Gross per 24 hour   Intake 240 ml   Output --   Net 240 ml       TELEMETRY: Atrial paced   has a past medical history of Arthritis, Atrial fibrillation, chronic (HCC), Cerebrovascular accident [facility]), Closed displaced fracture of anterior column of right acetabulum ([facility] [address] COPD (chronic obstructive pulmonary disease) ([facility] [address] Coronary artery disease involving native coronary artery of native heart without angina pectoris, Diabetes ([facility] [address] H/O echocardiogram, Heart disease, History of blood transfusion, Hx of blood clots, Hx of cardiovascular stress test, Hx of Doppler ultrasound, Hypertension, Myasthenia gravis ([facility] [address] NSTEMI (non-ST elevated myocardial infarction) ([facility] [address] Post PTCA, S/P ablation of atrial fibrillation, TIA (transient ischemic attack), and Ulcerative colitis ([facility] [address] has a past surgical history that includes ileostomy or jejunostomy; Tonsillectomy; Cardiac surgery; Percutaneous Transluminal Coronary Angio (12/14/2020); Upper gastrointestinal endoscopy (N/A, 06/15/2021); Upper gastrointestinal endoscopy (N/A, 07/04/2021); and Pacemaker insertion (N/A, 07/07/2021).      Physical Exam:  General:  Awake, alert, obese  Skin:  Warm and dry  Neck:  JVD not appreciated  Chest:  Clear to auscultation, respiration easy  Cardiovascular:  RRR S1S2  Abdomen:  Soft nontender  Extremities:  no edema    Medications:    magnesium oxide  400 mg Oral Daily    atorvastatin  40 mg Oral Nightly    budesonide-formoterol  2 puff Inhalation BID    clopidogrel  75 mg Oral Daily    DULoxetine  60 mg Oral Daily    apixaban  5 mg Oral BID    metFORMIN  500 mg Oral BID    vitamin B-12  1,000 mcg Oral Daily    tiotropium  2 puff Inhalation Daily    pyridostigmine  60 mg Oral 5x Daily    predniSONE  13 mg Oral Daily    pantoprazole  40 mg Oral BID    sotalol  80 mg Oral BID    NON-Formulary ( Zilucoplan 40mg/ml ) injection. SubCUTAneous QPM       acetaminophen, cyclobenzaprine    Lab Data:  CBC: No results for input(s): WBC, HGB, HCT, MCV, PLT in the last 72 hours. BMP:   Recent Labs     05/23/22  1037      K 4.6      CO2 21   PHOS 3.9   BUN 16   CREATININE 1.0     LIVER PROFILE: No results for input(s): AST, ALT, LIPASE, BILIDIR, BILITOT, ALKPHOS in the last 72 hours. Invalid input(s): AMYLASE,  ALB  PT/INR: No results for input(s): PROTIME, INR in the last 72 hours. APTT: No results for input(s): APTT in the last 72 hours. BNP:  No results for input(s): BNP in the last 72 hours. TROPONIN: @TROPONINI:3@      Assessment and Plan    Sotalol monitoring therapy  Persistent atrial fibrillation with history of ablation of atrial fibrillation atrial flutter  Hypercoagulable secondary to persistent atrial fibrillation  History of myasthenia gravis  COPD  CAD  Obesity BMI 40  History of gastric ulcer with bleeding now cleared for anticoagulation  Chronic back pain  History of blood clots    Patient remains atrial paced on tele. Noted on EKG to sinus rhythm with frequent PAC's  QTC is within range to continue sotalol monitoring therapy  Patient to continue sotalol 80 mg twice daily  Patient denies issues with bleeding. We will continue Eliquis 5 mg twice daily  Patient will have an EKG 1 hour before each scheduled dose of sotalol. Sotalol will be dosed according to the QTc  Continue magnesium 400 mg daily for frequent PAC's.        Judie Mora, JAMIE - CNP, 5/25/2022 11:04 AM     Please note this report has been partially produced using speech recognition software and may contain errors related to that system including errors in grammar, punctuation, and spelling, as well as words and phrases that may be inappropriate. If there are any questions or concerns please feel free to contact the dictating provider for clarification.

## 2022-05-26 VITALS
WEIGHT: 297.62 LBS | RESPIRATION RATE: 15 BRPM | TEMPERATURE: 98.1 F | OXYGEN SATURATION: 97 % | SYSTOLIC BLOOD PRESSURE: 133 MMHG | HEART RATE: 73 BPM | DIASTOLIC BLOOD PRESSURE: 97 MMHG | BODY MASS INDEX: 41.51 KG/M2

## 2022-05-26 LAB
EKG ATRIAL RATE: 71 BPM
EKG ATRIAL RATE: 71 BPM
EKG ATRIAL RATE: 76 BPM
EKG ATRIAL RATE: 76 BPM
EKG DIAGNOSIS: NORMAL
EKG P AXIS: 104 DEGREES
EKG P AXIS: 92 DEGREES
EKG P AXIS: 99 DEGREES
EKG P-R INTERVAL: 118 MS
EKG P-R INTERVAL: 122 MS
EKG P-R INTERVAL: 126 MS
EKG P-R INTERVAL: 160 MS
EKG Q-T INTERVAL: 450 MS
EKG Q-T INTERVAL: 450 MS
EKG Q-T INTERVAL: 460 MS
EKG Q-T INTERVAL: 462 MS
EKG QRS DURATION: 134 MS
EKG QRS DURATION: 144 MS
EKG QTC CALCULATION (BAZETT): 489 MS
EKG QTC CALCULATION (BAZETT): 499 MS
EKG QTC CALCULATION (BAZETT): 506 MS
EKG QTC CALCULATION (BAZETT): 519 MS
EKG R AXIS: 200 DEGREES
EKG R AXIS: 54 DEGREES
EKG R AXIS: 64 DEGREES
EKG R AXIS: 73 DEGREES
EKG T AXIS: -12 DEGREES
EKG T AXIS: -22 DEGREES
EKG T AXIS: 1 DEGREES
EKG T AXIS: 16 DEGREES
EKG VENTRICULAR RATE: 71 BPM
EKG VENTRICULAR RATE: 71 BPM
EKG VENTRICULAR RATE: 76 BPM
EKG VENTRICULAR RATE: 76 BPM
GLUCOSE BLD-MCNC: 177 MG/DL (ref 70–99)

## 2022-05-26 PROCEDURE — 6370000000 HC RX 637 (ALT 250 FOR IP): Performed by: NURSE PRACTITIONER

## 2022-05-26 PROCEDURE — 94640 AIRWAY INHALATION TREATMENT: CPT

## 2022-05-26 PROCEDURE — 94761 N-INVAS EAR/PLS OXIMETRY MLT: CPT

## 2022-05-26 PROCEDURE — 93010 ELECTROCARDIOGRAM REPORT: CPT | Performed by: INTERNAL MEDICINE

## 2022-05-26 PROCEDURE — 82962 GLUCOSE BLOOD TEST: CPT

## 2022-05-26 PROCEDURE — 93005 ELECTROCARDIOGRAM TRACING: CPT | Performed by: NURSE PRACTITIONER

## 2022-05-26 PROCEDURE — 99238 HOSP IP/OBS DSCHRG MGMT 30/<: CPT | Performed by: NURSE PRACTITIONER

## 2022-05-26 RX ORDER — LANOLIN ALCOHOL/MO/W.PET/CERES
400 CREAM (GRAM) TOPICAL DAILY
Qty: 30 TABLET | Refills: 0 | Status: SHIPPED | OUTPATIENT
Start: 2022-05-26 | End: 2022-06-24 | Stop reason: SDUPTHER

## 2022-05-26 RX ORDER — SOTALOL HYDROCHLORIDE 80 MG/1
80 TABLET ORAL 2 TIMES DAILY
Qty: 60 TABLET | Refills: 0 | Status: SHIPPED | OUTPATIENT
Start: 2022-05-26 | End: 2022-06-02 | Stop reason: SDUPTHER

## 2022-05-26 RX ADMIN — BUDESONIDE AND FORMOTEROL FUMARATE DIHYDRATE 2 PUFF: 160; 4.5 AEROSOL RESPIRATORY (INHALATION) at 08:34

## 2022-05-26 RX ADMIN — PYRIDOSTIGMINE BROMIDE 60 MG: 60 TABLET ORAL at 09:33

## 2022-05-26 RX ADMIN — SOTALOL HYDROCHLORIDE 80 MG: 80 TABLET ORAL at 09:33

## 2022-05-26 RX ADMIN — CLOPIDOGREL BISULFATE 75 MG: 75 TABLET ORAL at 09:33

## 2022-05-26 RX ADMIN — APIXABAN 5 MG: 5 TABLET, FILM COATED ORAL at 09:32

## 2022-05-26 RX ADMIN — PREDNISONE 13 MG: 1 TABLET ORAL at 09:33

## 2022-05-26 RX ADMIN — PANTOPRAZOLE SODIUM 40 MG: 40 TABLET, DELAYED RELEASE ORAL at 09:32

## 2022-05-26 RX ADMIN — PYRIDOSTIGMINE BROMIDE 60 MG: 60 TABLET ORAL at 06:20

## 2022-05-26 RX ADMIN — CYANOCOBALAMIN TAB 1000 MCG 1000 MCG: 1000 TAB at 09:33

## 2022-05-26 RX ADMIN — TIOTROPIUM BROMIDE INHALATION SPRAY 2 PUFF: 3.12 SPRAY, METERED RESPIRATORY (INHALATION) at 08:34

## 2022-05-26 RX ADMIN — DULOXETINE HYDROCHLORIDE 60 MG: 30 CAPSULE, DELAYED RELEASE ORAL at 09:33

## 2022-05-26 RX ADMIN — METFORMIN HYDROCHLORIDE 500 MG: 500 TABLET, EXTENDED RELEASE ORAL at 09:33

## 2022-05-26 RX ADMIN — Medication 400 MG: at 09:33

## 2022-05-26 ASSESSMENT — PAIN SCALES - GENERAL
PAINLEVEL_OUTOF10: 0
PAINLEVEL_OUTOF10: 0

## 2022-05-26 NOTE — DISCHARGE SUMMARY
[facility]  Discharge Summary    Edson Sales  :  1960  MRN:  [id number]    Admit date:  2022  Discharge date:       Admitting Physician:  Milo Rubinstein, MD    Discharge Diagnoses:    1. Sotalol Monitoring Therapy  2. Hypercoagulable state secondary to persistent atrial fibrillation  3. Persistent atrial fibrillation and history of ablation of atrial fibrillation and atrial flutter  4. History of Blood Clots    Admission Condition:  fair    Discharged Condition:  good    Hospital Course:    Patient has a history of persistent atrial fibrillation. He presented for  Sotalol Monitoring Therapy. An EKG was obtained 1 hour before each scheduled dose of Sotalol was to be given. The QTc was then called to the physician. The Sotalol was dose according to the QTc. After 72 hours the patient is stable for discharge.      Current Discharge Medication List             Details   magnesium oxide (MAG-OX) 400 (240 Mg) MG tablet Take 1 tablet by mouth daily  Qty: 30 tablet, Refills: 0      sotalol (BETAPACE) 80 MG tablet Take 1 tablet by mouth 2 times daily  Qty: 60 tablet, Refills: 0                Details   cyclobenzaprine (FLEXERIL) 5 MG tablet Take 1 tablet by mouth 2 times daily as needed for Muscle spasms  Qty: 30 tablet, Refills: 0    Associated Diagnoses: Compression fracture of T4 vertebra, initial encounter ([facility])      ELIQUIS 5 MG TABS tablet Take 1 tablet by mouth 2 times daily      clopidogrel (PLAVIX) 75 MG tablet Take 1 tablet by mouth      DULoxetine (CYMBALTA) 60 MG extended release capsule Take 1 capsule by mouth      metFORMIN (GLUCOPHAGE-XR) 500 MG extended release tablet Take 1 tablet by mouth 2 times daily      ferrous sulfate (FE TABS 325) 325 (65 Fe) MG EC tablet Take 1 tablet by mouth 2 times daily (with meals)  Qty: 90 tablet, Refills: 1      GLUCOSAMINE-CALCIUM-VIT D PO Take by mouth daily       budesonide-formoterol (SYMBICORT) 160-4.5 MCG/ACT AERO Inhale 2 puffs into the lungs 2 times daily  Qty: 1 Inhaler, Refills: 3      atorvastatin (LIPITOR) 40 MG tablet Take 1 tablet by mouth nightly START 6/25/2021  Qty: 30 tablet, Refills: 3      tiotropium (SPIRIVA HANDIHALER) 18 MCG inhalation capsule Inhale 1 capsule into the lungs daily  Qty: 90 capsule, Refills: 1      pantoprazole (PROTONIX) 40 MG tablet Take 1 tablet by mouth 2 times daily  Qty: 60 tablet, Refills: 1      Elastic Bandages & Supports (MEDICAL COMPRESSION THIGH HIGH) MISC Wear daily and remove nightly 20 - 30 mmgh  Qty: 2 each, Refills: 0      NONFORMULARY Inject 40 mg/L into the skin every evening Zilucoplan  - patient provided experimental drug from [place] for Myasthenia Gravis      vitamin B-12 (CYANOCOBALAMIN) 500 MCG tablet Take 1,000 mcg by mouth daily Indications: 8am       PREDNISONE PO Take 13 mg by mouth daily Indications: Takes at  8am       pyridostigmine (MESTINON) 60 MG tablet Take 1 tablet by mouth 4 times daily  Qty: 120 tablet, Refills: 0      acetaminophen (TYLENOL) 325 MG tablet Take 650 mg by mouth every 4 hours as needed for Pain or Fever             Consults:  none    Discharge Exam:  BP (!) 146/92   Pulse 75   Temp 98 °F (36.7 °C) (Oral)   Resp 23   Wt 297 lb 9.9 oz (135 kg)   SpO2 96%   BMI 41.51 kg/m²   General appearance: alert, appears stated age and cooperative  Head: Normocephalic, without obvious abnormality, atraumatic  Lungs: clear to auscultation bilaterally  Heart: regular rate and rhythm, S1, S2 normal, no murmur, click, rub or gallop  Extremities: extremities normal, atraumatic, no cyanosis or edema  Pulses: 2+ and symmetric  Skin: Skin color, texture, turgor normal. No rashes or lesions  Neurologic: Grossly normal    Disposition:   home    Signed:  JAMIE Zimmerman CNP  5/26/2022, 8:23 AM      I have seen and examined this patient personally, independently of the nurse practitioner. I have reviewed the hospital care given to date and reviewed all pertinent labs and imaging. The plan was developed mutually at the time of the visit with the patient, Nurse practioner (NP)  and myself. I have spoken with patient, nursing staff and provided written and verbal instructions. The above note has ang reviewed and I agree with the history, physical examination and treatment plan. Necessary changes to the note has been made in history, physical examination and plan to the above note.     Zehra Leone M.D 05/26/22

## 2022-05-26 NOTE — DISCHARGE INSTR - DIET

## 2022-05-26 NOTE — PLAN OF CARE
Problem: Discharge Planning  Goal: Discharge to home or other facility with appropriate resources  5/25/2022 2220 by Terence Laguerre RN  Outcome: Progressing  5/25/2022 1739 by Gee Lackey RN  Outcome: Progressing     Problem: Safety - Adult  Goal: Free from fall injury  5/25/2022 2220 by Terence Laguerre RN  Outcome: Progressing  5/25/2022 1739 by Gee Lackey RN  Outcome: Progressing     Problem: ABCDS Injury Assessment  Goal: Absence of physical injury  Outcome: Progressing     Problem: Pain  Goal: Verbalizes/displays adequate comfort level or baseline comfort level  5/25/2022 2220 by Terence Laguerre RN  Outcome: Progressing  5/25/2022 1739 by Gee Lackey RN  Outcome: Progressing

## 2022-05-26 NOTE — PROGRESS NOTES
Outpatient Pharmacy Progress Note for Meds-to-Beds    Total number of Prescriptions Filled: 2  The following medications were dispensed to the patient during the discharge process:  Sotalol  Magnesium oxide    Additional Documentation:  Medication(s) were delivered to the patient's room prior to discharge      Thank you for letting us serve your patients.   [address]    [address], [address] [facility]    Phone: [phone]    Fax: [phone]

## 2022-05-26 NOTE — PROGRESS NOTES
Reviewed AVS with patient. All belongings packed and ready. Pt discharging home with continued HH. Pt in stable condition.

## 2022-05-26 NOTE — PLAN OF CARE
Problem: ABCDS Injury Assessment  Goal: Absence of physical injury  5/25/2022 2220 by Zonia Kulkarni RN  Outcome: Progressing

## 2022-05-26 NOTE — PLAN OF CARE
Problem: ABCDS Injury Assessment  Goal: Absence of physical injury  5/25/2022 2220 by Jose Nuñez RN  Outcome: Progressing

## 2022-05-31 ENCOUNTER — HOSPITAL ENCOUNTER (OUTPATIENT)
Dept: GENERAL RADIOLOGY | Age: 62
Discharge: HOME | End: 2022-05-31
Payer: MEDICAID

## 2022-05-31 ENCOUNTER — HOSPITAL ENCOUNTER (OUTPATIENT)
Age: 62
Discharge: HOME | End: 2022-05-31
Payer: MEDICAID

## 2022-05-31 DIAGNOSIS — S22.040A COMPRESSION FRACTURE OF T4 VERTEBRA, INITIAL ENCOUNTER (HCC): ICD-10-CM

## 2022-05-31 PROCEDURE — 72070 X-RAY EXAM THORAC SPINE 2VWS: CPT

## 2022-06-01 ENCOUNTER — OFFICE VISIT (OUTPATIENT)
Dept: NEUROSURGERY | Age: 62
End: 2022-06-01
Payer: COMMERCIAL

## 2022-06-01 VITALS
HEART RATE: 70 BPM | DIASTOLIC BLOOD PRESSURE: 86 MMHG | BODY MASS INDEX: 39.69 KG/M2 | WEIGHT: 283.5 LBS | HEIGHT: 71 IN | RESPIRATION RATE: 16 BRPM | SYSTOLIC BLOOD PRESSURE: 132 MMHG | OXYGEN SATURATION: 96 %

## 2022-06-01 DIAGNOSIS — S22.040A COMPRESSION FRACTURE OF T4 VERTEBRA, INITIAL ENCOUNTER (HCC): Primary | ICD-10-CM

## 2022-06-01 PROCEDURE — 1111F DSCHRG MED/CURRENT MED MERGE: CPT | Performed by: PHYSICIAN ASSISTANT

## 2022-06-01 PROCEDURE — 1036F TOBACCO NON-USER: CPT | Performed by: PHYSICIAN ASSISTANT

## 2022-06-01 PROCEDURE — 3017F COLORECTAL CA SCREEN DOC REV: CPT | Performed by: PHYSICIAN ASSISTANT

## 2022-06-01 PROCEDURE — 99213 OFFICE O/P EST LOW 20 MIN: CPT | Performed by: PHYSICIAN ASSISTANT

## 2022-06-01 PROCEDURE — G8427 DOCREV CUR MEDS BY ELIG CLIN: HCPCS | Performed by: PHYSICIAN ASSISTANT

## 2022-06-01 PROCEDURE — G8417 CALC BMI ABV UP PARAM F/U: HCPCS | Performed by: PHYSICIAN ASSISTANT

## 2022-06-01 NOTE — LETTER
[facility]  [facility] [address]  Phone: [phone]  Fax: [phone]    Stephanie Nelson        June 1, 2022     Patient: Sandra Iniguez   YOB: 1960   Date of Visit: 6/1/2022       To Whom It May Concern: It is my medical opinion that Raghu Stringer may return to work on 6/6/22 without restrictions. He will follow-up with my office as needed for his fracture. If you have any questions or concerns, please don't hesitate to call.     Sincerely,        [place] Menjivar PA-C

## 2022-06-01 NOTE — PROGRESS NOTES
Patient presents to the office today for a follow up on T4 compression fracture - DOI 3/7/22. Patient states he hasn't been wearing his brace since last visit 6 weeks ago. States he couldn't fasten it. Patient's pain level today 6/10. States he is only taking the flexeril. Had to stop the Percocet per the hospital when he was admitted on 5/23/22 for a-fib. Denies any new injuries or falls. XR Thoracic Spine 5/31/22    Impression   No significant interval change in T4 compression deformity.

## 2022-06-01 NOTE — PROGRESS NOTES
Neurosurgery Office Note    HPI:  60 y.o. 1960  Who presents today for 12 week follow-up of a T4 compression fracture. Since he last saw me 6 weeks ago he states he has not been wearing his brace due to it not fitting. He continues to report mid back pain but overall states that it is better today than when he last saw me. He is interested in returning to work this coming Monday. 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 He denies any N/T or weakness in any of his extremities. He denies any bladder or bowel incontinence. He has followed up with orthopedic surgery regarding his hip and he states that he has been cleared to return to work by them. He also tells me that he was recently hospitalized with cardiology concerns, including afib and receiving sotalol monitoring therapy. Patient is on plavix. PMHx positive for A. fib, history of CVA, COPD, diabetes, myasthenia gravis, ulcerative colitis. Past Surgical history positive for cardiac surgery, pacemaker insertion, tonsillectomy, PTCA. Past Medical and Surgical History:       Diagnosis Date    Arthritis 4/23/2021    Atrial fibrillation, chronic ([address] 12/2020    Cerebrovascular accident ([address] 9/9/2021    Closed displaced fracture of anterior column of right acetabulum ([address] 3/7/2022    COPD (chronic obstructive pulmonary disease) ([address] 06/15/2021    Coronary artery disease involving native coronary artery of native heart without angina pectoris 06/15/2021    Diabetes ([address] 9/10/2021    H/O echocardiogram 02/02/2021    EF is estimated at 50%. presence of PVC affected LVEF estimation, Mild TR.    Heart disease     stint    History of blood transfusion     Hx of blood clots     Hx of cardiovascular stress test 06/11/2021    Small size severe intensity,perfusion defect in apical wall.  Hx of Doppler ultrasound 04/09/2021    Left distal SSV shows occlusive chronic SVT.     Hypertension     Myasthenia gravis ([address]     NSTEMI (non-ST elevated myocardial infarction) [facility] 12/2020    Post PTCA 12/14/2020    Ramus Stented.  S/P ablation of atrial fibrillation 09/08/2021    S/P ablation of afib PVI performed by Dr. Ysabel Alonso.  TIA (transient ischemic attack)     Ulcerative colitis ([facility]          Procedure Laterality Date    CARDIAC SURGERY      ILEOSTOMY OR JEJUNOSTOMY      PACEMAKER INSERTION N/A 07/07/2021    PACEMAKER INSERTION PERMANENT performed by Neyda Morin MD at [address]    PTCA  12/14/2020    Ramus Stented.  TONSILLECTOMY      UPPER GASTROINTESTINAL ENDOSCOPY N/A 06/15/2021    EGD DIAGNOSTIC ONLY performed by Devante Rosales MD at [address] N/A 07/04/2021    EGD DIAGNOSTIC ONLY performed by Carlos Vidal MD at [facility] ENDOSCOPY       Social History:    TOBACCO:   reports that he quit smoking about 13 months ago. His smoking use included cigarettes. He smoked 0.25 packs per day. He has never used smokeless tobacco.  ETOH:   reports current alcohol use of about 1.0 standard drink of alcohol per week. Family History:       Family history unknown: Yes       Current Medications:    No current facility-administered medications for this visit.   Facility-Administered Medications Ordered in Other Visits: Immune Globulin (Human) solution 20 g, 20 g, IntraVENous, Once **AND** Immune Globulin (Human) solution 20 g, 20 g, IntraVENous, Once  Immune Globulin (Human) solution 20 g, 20 g, IntraVENous, Once **AND** Immune Globulin (Human) solution 20 g, 20 g, IntraVENous, Once    No Known Allergies     REVIEW OF SYSTEMS:    CONSTITUTIONAL:  negative for fevers, chills, diaphoresis, activity change, appetite change  EYES:  negative for blurred vision, eye discharge, visual disturbance and icterus  HEENT:  negative for hearing loss, tinnitus, ear drainage, sinus pressure, nasal congestion  RESPIRATORY:  No cough, shortness of breath, hemoptysis  GASTROINTESTINAL:  negative for nausea, vomiting, diarrhea, constipation  GENITOURINARY:  negative for frequency, dysuria, urinary incontinence, decreased urine volume, and hematuria  HEMATOLOGIC/LYMPHATIC:  negative for easy bruising, bleeding and lymphadenopathy  MUSCULOSKELETAL:  Positive for back pain, negative for joint swelling, decreased range of motion and muscle weakness. NEUROLOGICAL:  negative for headaches, slurred speech, unilateral weakness. PSYCHIATRIC/BEHAVIORAL: negative for hallucinations, behavioral problems, confusion and agitation. Objective:   PHYSICAL EXAM:      VITALS:  /86 (Site: Left Upper Arm, Position: Sitting, Cuff Size: Large Adult)   Pulse 70   Resp 16   Ht 5' 11\" (1.803 m)   Wt 283 lb 8 oz (128.6 kg)   SpO2 96%   BMI 39.54 kg/m²      24HR INTAKE/OUTPUT:  No intake or output data in the 24 hours ending 06/03/22 1758  CONSTITUTIONAL:  Awake, alert, cooperative, no apparent distress, and appears stated age  HEENT: NCAT, PERRL, EOMI. Sclera white, conjunctive full. OP with moist mucosal membranes, no thrush, tongue protrudes midline  PSYCHIATRIC: Oriented to person place and time. No obvious depression or anxiety. MUSCULOSKELETAL: No obvious misalignment or effusion of the joints. No clubbing, cyanosis of the digits. SKIN:  normal skin color, texture, turgor and no redness, warmth, or swelling. No palpable nodules or stigmata of embolic phenomenon  NEUROLOGIC: Alert and oriented x4, face symmetrical, no obvious droop, speech clear and coherent, sensation intact to light touch and pinprick sensation  Upper extremity strength: Bilateral upper extremities 5/5 throughout  Lower extremity strength: Bilateral lower extremities 5/5 throughout, bilateral patellar DTR 2+    DATA:    Old records have been reviewed      Radiology Review:  All pertinent images / reports were reviewed as a part of this visit.      Xray lumbar spine 5/31/22, see attached note for impression    Assessment and plan:       1. Compression fracture of T4 vertebra, initial encounter [facility])   - reviewed xray with patient and his wife, fracture appears stable. - He states he would like to return to work. He has been off since his accident 12 weeks ago. He continues to complain of mid back pain at the T4 area that is paraspinal and midline as well. 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 I have concerns that sitting for longer than 40 minutes will make his back hurt worse. He states that he currently when he sits for prolonged periods of time his pain is aggravated. - I recommend that he go to PT and work on core strengthening and back stretching exercises to facilitate his return to work. I have written a letter stating he may return on Monday, 6/6/22. He understands that his back pain should continue to get a little bit better everyday. Should his back pain worsen he is to call my office immediately. Repeat imagine will need to be performed. -     He does not need any more scheduled follow-up but may return on an as needed basis. His thoracic restrictions have been lifted, he was advise to ease back into his usual activities SLOWLY and CAUTIOUSLY. PT will also help to facilitate this. Next steps: Notify office if you have significant worsening of your back pain after returning to work. Participate in physical therapy. Electronically signed by Yoselyn Chin PA-C on 6/3/2022 at 5:58 PM      Supervising physician: Kendra Freedman. Kranthi Mitchell MD.  Dr. Kranthi Mitchell was readily and continuously available by phone for direct consultation regarding the care of this patient. Time spent with patient in consultation, education, and collaboration with medical time is >50% of total time spent on case, including time spent in chart review and dictation.  Total time spent: 20 minutes    Thank you for the opportunity to participate in the care of your patient.

## 2022-06-01 NOTE — PATIENT INSTRUCTIONS
Notify the neurosurgical office urgently if you begin to develop any numbness or tingling in extremities, weakness in extremities, or loss of bowel or bladder control. Thoracic restrictions lifted today. If you begin to develop worsening back pain notify our office. [facility]  [facility], [place] [address] [phone] (S)  [phone] (f)

## 2022-06-02 ENCOUNTER — OFFICE VISIT (OUTPATIENT)
Dept: CARDIOLOGY CLINIC | Age: 62
End: 2022-06-02
Payer: MEDICAID

## 2022-06-02 VITALS
HEIGHT: 71 IN | DIASTOLIC BLOOD PRESSURE: 78 MMHG | SYSTOLIC BLOOD PRESSURE: 118 MMHG | WEIGHT: 286 LBS | BODY MASS INDEX: 40.04 KG/M2 | HEART RATE: 76 BPM

## 2022-06-02 DIAGNOSIS — Z86.718 HISTORY OF BLOOD CLOTS: ICD-10-CM

## 2022-06-02 DIAGNOSIS — I48.19 PERSISTENT ATRIAL FIBRILLATION (HCC): ICD-10-CM

## 2022-06-02 DIAGNOSIS — Z51.81 ENCOUNTER FOR MONITORING SOTALOL THERAPY: Primary | ICD-10-CM

## 2022-06-02 DIAGNOSIS — D68.69 HYPERCOAGULABLE STATE DUE TO PERSISTENT ATRIAL FIBRILLATION (HCC): ICD-10-CM

## 2022-06-02 DIAGNOSIS — I48.19 HYPERCOAGULABLE STATE DUE TO PERSISTENT ATRIAL FIBRILLATION (HCC): ICD-10-CM

## 2022-06-02 DIAGNOSIS — Z79.899 ENCOUNTER FOR MONITORING SOTALOL THERAPY: Primary | ICD-10-CM

## 2022-06-02 PROCEDURE — G8417 CALC BMI ABV UP PARAM F/U: HCPCS | Performed by: NURSE PRACTITIONER

## 2022-06-02 PROCEDURE — 3017F COLORECTAL CA SCREEN DOC REV: CPT | Performed by: NURSE PRACTITIONER

## 2022-06-02 PROCEDURE — G8428 CUR MEDS NOT DOCUMENT: HCPCS | Performed by: NURSE PRACTITIONER

## 2022-06-02 PROCEDURE — 93000 ELECTROCARDIOGRAM COMPLETE: CPT | Performed by: NURSE PRACTITIONER

## 2022-06-02 PROCEDURE — 1036F TOBACCO NON-USER: CPT | Performed by: NURSE PRACTITIONER

## 2022-06-02 PROCEDURE — 99214 OFFICE O/P EST MOD 30 MIN: CPT | Performed by: NURSE PRACTITIONER

## 2022-06-02 PROCEDURE — 1111F DSCHRG MED/CURRENT MED MERGE: CPT | Performed by: NURSE PRACTITIONER

## 2022-06-02 RX ORDER — SOTALOL HYDROCHLORIDE 80 MG/1
80 TABLET ORAL 2 TIMES DAILY
Qty: 60 TABLET | Refills: 2 | Status: SHIPPED | OUTPATIENT
Start: 2022-06-02 | End: 2022-09-12 | Stop reason: SDUPTHER

## 2022-06-02 ASSESSMENT — ENCOUNTER SYMPTOMS
BACK PAIN: 0
ABDOMINAL DISTENTION: 0
SINUS PRESSURE: 0
ABDOMINAL PAIN: 0
SINUS PAIN: 0
NAUSEA: 0
EYE ITCHING: 0
PHOTOPHOBIA: 0
CONSTIPATION: 0
BLOOD IN STOOL: 0
COLOR CHANGE: 0
VOMITING: 0
DIARRHEA: 0

## 2022-06-02 NOTE — PROGRESS NOTES
Electrophysiology Follow Up Note      Reason for consultation: atrial fibrillation    Chief complaint: denies cardiac complaints. Here for follow-up on sotalol monitoring therapy    Referring physician:  Dr Lacie Diaz      Primary care physician: JAMIE Barragan      History of Present Illness: This visit 6/2/2022  Patient here today for follow-up on sotalol monitoring therapy. Patient reports he is feeling well. He denies chest pain, palpitations, shortness of breath, lightheadedness, dizziness, edema or syncope. He states that his fatigue is gradually improving. He is taking his medications as prescribed. He is avoiding alcohol and caffeine. Previous visit  Stephan Rose is a 60year old male with a history of hypertension, myasthenia gravis, sick sinus syndrome s/p pacemaker, history of blood clots on anticoagulation, and atrial fibrillation. He was referred for management of atrial fibrillation. Patient reports that he has intermittent palpations and feels tired and weak. He also reports exertional shortness of breath that is intermittent and will resolve with rest. Patient has received cardioversion four times. Patient has a history of gastric ulcer and bleeding resulting in blood transfusion. He is on anticoagulation at this time and has not had any problems with it. Patient is here for antiarrhythmic therapy. Patient currently denies chest pain, palpitations, shortness of breath, lightheadedness, dizziness, edema or syncope.      Past medical history:   Past Medical History:   Diagnosis Date    Arthritis 4/23/2021    Atrial fibrillation, chronic ([address] 12/2020    Cerebrovascular accident ([address] 9/9/2021    Closed displaced fracture of anterior column of right acetabulum ([address] 3/7/2022    COPD (chronic obstructive pulmonary disease) ([address] 06/15/2021    Coronary artery disease involving native coronary artery of native heart without angina pectoris 06/15/2021    Diabetes ([address] 9/10/2021  H/O echocardiogram 02/02/2021    EF is estimated at 50%. presence of PVC affected LVEF estimation, Mild TR.    Heart disease     stint    History of blood transfusion     Hx of blood clots     Hx of cardiovascular stress test 06/11/2021    Small size severe intensity,perfusion defect in apical wall.  Hx of Doppler ultrasound 04/09/2021    Left distal SSV shows occlusive chronic SVT.  Hypertension     Myasthenia gravis ([address]     NSTEMI (non-ST elevated myocardial infarction) ([address] 12/2020    Post PTCA 12/14/2020    Ramus Stented.  S/P ablation of atrial fibrillation 09/08/2021    S/P ablation of afib PVI performed by Dr. Denice Larsen  TIA (transient ischemic attack)     Ulcerative colitis ([address]        Surgical history :  Past Surgical History:   Procedure Laterality Date    CARDIAC SURGERY      ILEOSTOMY OR JEJUNOSTOMY      PACEMAKER INSERTION N/A 07/07/2021    PACEMAKER INSERTION PERMANENT performed by Qing Hilario MD at [address] [place] [address],[facility] [address]    PTCA  12/14/2020    Ramus Stented.  TONSILLECTOMY      UPPER GASTROINTESTINAL ENDOSCOPY N/A 06/15/2021    EGD DIAGNOSTIC ONLY performed by Les Suarez MD at [address] N/A 07/04/2021    EGD DIAGNOSTIC ONLY performed by Shorty Mai MD at [facility] ENDOSCOPY       Family history:   Family History   Family history unknown: Yes       Social history :  reports that he quit smoking about 13 months ago. His smoking use included cigarettes. He smoked 0.25 packs per day. He has never used smokeless tobacco. He reports current alcohol use of about 1.0 standard drink of alcohol per week. He reports that he does not use drugs.     No Known Allergies    Current Outpatient Medications on File Prior to Visit   Medication Sig Dispense Refill    magnesium oxide (MAG-OX) 400 (240 Mg) MG tablet Take 1 tablet by mouth daily 30 tablet 0    cyclobenzaprine (FLEXERIL) 5 MG tablet Take 1 tablet by mouth 2 times daily as needed for Muscle spasms 30 tablet 0    ELIQUIS 5 MG TABS tablet Take 1 tablet by mouth 2 times daily      clopidogrel (PLAVIX) 75 MG tablet Take 1 tablet by mouth      DULoxetine (CYMBALTA) 60 MG extended release capsule Take 1 capsule by mouth      metFORMIN (GLUCOPHAGE-XR) 500 MG extended release tablet Take 1 tablet by mouth 2 times daily      GLUCOSAMINE-CALCIUM-VIT D PO Take by mouth daily       budesonide-formoterol (SYMBICORT) 160-4.5 MCG/ACT AERO Inhale 2 puffs into the lungs 2 times daily 1 Inhaler 3    atorvastatin (LIPITOR) 40 MG tablet Take 1 tablet by mouth nightly START 6/25/2021 30 tablet 3    tiotropium (SPIRIVA HANDIHALER) 18 MCG inhalation capsule Inhale 1 capsule into the lungs daily 90 capsule 1    pantoprazole (PROTONIX) 40 MG tablet Take 1 tablet by mouth 2 times daily 60 tablet 1    NONFORMULARY Inject 40 mg/L into the skin every evening Zilucoplan  - patient provided experimental drug from [facility] for Myasthenia Gravis      vitamin B-12 (CYANOCOBALAMIN) 500 MCG tablet Take 1,000 mcg by mouth daily Indications: 8am       PREDNISONE PO Take 13 mg by mouth daily Indications: Takes at  8am       pyridostigmine (MESTINON) 60 MG tablet Take 1 tablet by mouth 4 times daily (Patient taking differently: Take 60 mg by mouth 5 times daily Indications: Takes at 0800, 1200, 1600, 2000 ) 120 tablet 0    acetaminophen (TYLENOL) 325 MG tablet Take 650 mg by mouth every 4 hours as needed for Pain or Fever       Current Facility-Administered Medications on File Prior to Visit   Medication Dose Route Frequency Provider Last Rate Last Admin    Immune Globulin (Human) solution 20 g  20 g IntraVENous Once Seng Hernández MD        And    Immune Globulin (Human) solution 20 g  20 g IntraVENous Once Seng Hernández MD        Immune Globulin (Human) solution 20 g  20 g IntraVENous Once Seng Hernández MD        And    Immune Globulin (Human) solution 20 g  20 g IntraVENous Once Chantel García MD           Review of Systems:   Review of Systems   Constitutional: Positive for fatigue. Negative for activity change and appetite change. HENT: Negative for congestion, sinus pressure and sinus pain. Eyes: Negative for photophobia, itching and visual disturbance. Respiratory: Negative for shortness of breath. Cardiovascular: Negative for chest pain, palpitations and leg swelling. Gastrointestinal: Negative for abdominal distention, abdominal pain, blood in stool, constipation, diarrhea, nausea and vomiting. Genitourinary: Negative for difficulty urinating and dysuria. Musculoskeletal: Positive for arthralgias. Negative for back pain and gait problem. Skin: Negative for color change and wound. Neurological: Negative for dizziness, syncope, weakness and light-headedness. Psychiatric/Behavioral: Negative for agitation and confusion. The patient is not nervous/anxious. Physical Examination:    /78   Pulse 76   Ht 5' 11\" (1.803 m)   Wt 286 lb (129.7 kg)   BMI 39.89 kg/m²    Wt Readings from Last 3 Encounters:   06/02/22 286 lb (129.7 kg)   06/01/22 283 lb 8 oz (128.6 kg)   05/24/22 297 lb 9.9 oz (135 kg)     Body mass index is 39.89 kg/m². Physical Exam  Constitutional:       Appearance: He is obese. HENT:      Head: Normocephalic and atraumatic. Right Ear: External ear normal.      Left Ear: External ear normal.      Nose: No congestion or rhinorrhea. Mouth/Throat:      Mouth: Mucous membranes are moist.      Pharynx: Oropharynx is clear. Eyes:      General:         Right eye: No discharge. Left eye: No discharge. Conjunctiva/sclera: Conjunctivae normal.   Cardiovascular:      Rate and Rhythm: Normal rate and regular rhythm. Heart sounds: No murmur heard. No friction rub. No gallop. Pulmonary:      Effort: Pulmonary effort is normal.      Breath sounds: Normal breath sounds. No wheezing or rhonchi.    Abdominal: General: Abdomen is flat. Bowel sounds are normal. There is no distension. Musculoskeletal:      Cervical back: Normal range of motion. Right lower leg: No edema. Skin:     General: Skin is warm and dry. Neurological:      Mental Status: He is alert and oriented to person, place, and time. Psychiatric:         Mood and Affect: Mood normal.         Behavior: Behavior normal.         Thought Content: Thought content normal.         Judgment: Judgment normal.             CBC:   Lab Results   Component Value Date    WBC 7.8 03/08/2022    HGB 12.5 03/08/2022    HCT 40.3 03/08/2022     03/08/2022     Lipids: No results found for: CHOL, TRIG, HDL, LDLCALC, LDLDIRECT  PT/INR:   Lab Results   Component Value Date    INR 1.20 03/08/2022        BMP:    Lab Results   Component Value Date     05/23/2022    K 4.6 05/23/2022     05/23/2022    CO2 21 05/23/2022    BUN 16 05/23/2022     CMP:   Lab Results   Component Value Date    AST 17 03/08/2022    ALT 23 03/08/2022    PROT 5.9 (L) 03/08/2022    BILITOT 0.3 03/08/2022    ALKPHOS 135 (H) 03/08/2022     TSH:  No results found for: TSH, T4    EKG Interpretation: Sinus rhythm      IMPRESSION / RECOMMENDATIONS:     Sotalol monitoring therapy  Persistent atrial fibrillation with history of ablation of atrial fibrillation and atrial flutter  Hypercoagulable secondary to persistent atrial fibrillation  History of myasthenia gravis  COPD  Obesity BMI 40  CAD  History of gastric ulcer with bleeding now cleared for anticoagulation  Chronic Back Pain  History of Blood Clots    Patient is doing well. EKG obtained and reviewed   QTC is within range to continue sotalol 80 mg twice daily  Patient is hypercoagulable secondary to persistent atrial fibrillation and history of blood clots.   Patient denies issues with bleeding  We will continue Eliquis 5 mg twice daily    Patient to follow-up in 1 month or sooner if needed    Thanks again for allowing me to participate in care of thispatient. Please call me if you have any questions. With best regards. Sandhya Jaimes, APRN-CNP      Device Assessment:    The device is Medtronic pacemaker - Dual Chamber chamber      MRI Compatible : yes    Device interrogation was performed. Mode: AAIR --- DDDR     Sensing is normal. Impedence is normal.  Threshold is normal.     There has not been interval changes. Estimated battery life is 13.1 years     The underlying rhythm is AP,VS.  76.0 % atrial paced; <0.1 % ventricular paced. Atrial Arrhythmia : No    Non sustained VT episodes : 15    Sustained VT episodes : No    Patient activity reported 1.6 hrs/day    The patient is pacemaker dependent.

## 2022-06-03 PROBLEM — I48.19 HYPERCOAGULABLE STATE DUE TO PERSISTENT ATRIAL FIBRILLATION (HCC): Status: ACTIVE | Noted: 2022-06-03

## 2022-06-03 PROBLEM — D68.69 HYPERCOAGULABLE STATE DUE TO PERSISTENT ATRIAL FIBRILLATION (HCC): Status: ACTIVE | Noted: 2022-06-03

## 2022-06-03 PROBLEM — Z86.718 HISTORY OF BLOOD CLOTS: Status: ACTIVE | Noted: 2022-06-03

## 2022-06-03 ASSESSMENT — ENCOUNTER SYMPTOMS: SHORTNESS OF BREATH: 0

## 2022-06-24 ENCOUNTER — TELEPHONE (OUTPATIENT)
Dept: CARDIOLOGY CLINIC | Age: 62
End: 2022-06-24

## 2022-06-24 RX ORDER — LANOLIN ALCOHOL/MO/W.PET/CERES
400 CREAM (GRAM) TOPICAL DAILY
Qty: 30 TABLET | Refills: 3 | Status: SHIPPED | OUTPATIENT
Start: 2022-06-24 | End: 2022-09-20 | Stop reason: SDUPTHER

## 2022-06-24 NOTE — TELEPHONE ENCOUNTER
Pt's wife called and wanted to know if he is to continue taking the Magnesium? If so they will need a refill called in to [facility] on [address].  Please advise

## 2022-06-24 NOTE — TELEPHONE ENCOUNTER
Per Esa Solomon CNP, patient okay to continue magnesium. Spoke with patients wife, Chula Arita, and she is aware and voiced understanding. Will pend refill request to Esa Solomon.

## 2022-07-23 PROCEDURE — 93294 REM INTERROG EVL PM/LDLS PM: CPT | Performed by: INTERNAL MEDICINE

## 2022-07-23 PROCEDURE — 93296 REM INTERROG EVL PM/IDS: CPT | Performed by: INTERNAL MEDICINE

## 2022-07-25 ENCOUNTER — PROCEDURE VISIT (OUTPATIENT)
Dept: CARDIOLOGY CLINIC | Age: 62
End: 2022-07-25
Payer: MEDICAID

## 2022-07-25 ENCOUNTER — TELEPHONE (OUTPATIENT)
Dept: CARDIOLOGY CLINIC | Age: 62
End: 2022-07-25

## 2022-07-25 DIAGNOSIS — Z95.0 CARDIAC PACEMAKER IN SITU: Primary | ICD-10-CM

## 2022-07-25 DIAGNOSIS — I49.5 SINUS NODE DYSFUNCTION (HCC): ICD-10-CM

## 2022-07-25 DIAGNOSIS — I44.0 AV BLOCK, 1ST DEGREE: ICD-10-CM

## 2022-07-25 NOTE — LETTER
Cardiology [address]  Phone: [phone]  Fax: [phone]    7/25/2022        Carmencita Griffin  [address] [place] [address]            Dear Catrachita Chaudhary: This is your [facility] schedule. You can maria fernanda your calendar with these dates. Remember that your device is wireless and should automatically do these checks while you are sleeping. If for any reason I do not get your transmission then I will call you and ask that you send a manual transmission. If you have any questions or concerns, please call and ask for John Del Rosario at [phone]. Thank you.

## 2022-08-05 ENCOUNTER — OFFICE VISIT (OUTPATIENT)
Dept: CARDIOLOGY CLINIC | Age: 62
End: 2022-08-05
Payer: MEDICAID

## 2022-08-05 VITALS
HEART RATE: 72 BPM | SYSTOLIC BLOOD PRESSURE: 110 MMHG | BODY MASS INDEX: 39.31 KG/M2 | WEIGHT: 280.8 LBS | HEIGHT: 71 IN | DIASTOLIC BLOOD PRESSURE: 74 MMHG

## 2022-08-05 DIAGNOSIS — Z51.81 ENCOUNTER FOR MONITORING SOTALOL THERAPY: Primary | ICD-10-CM

## 2022-08-05 DIAGNOSIS — Z98.890 S/P ABLATION OF ATRIAL FIBRILLATION: ICD-10-CM

## 2022-08-05 DIAGNOSIS — Z86.79 S/P ABLATION OF ATRIAL FIBRILLATION: ICD-10-CM

## 2022-08-05 DIAGNOSIS — D68.69 HYPERCOAGULABLE STATE DUE TO PERSISTENT ATRIAL FIBRILLATION (HCC): ICD-10-CM

## 2022-08-05 DIAGNOSIS — R06.02 SHORTNESS OF BREATH: ICD-10-CM

## 2022-08-05 DIAGNOSIS — R53.83 OTHER FATIGUE: ICD-10-CM

## 2022-08-05 DIAGNOSIS — I48.19 HYPERCOAGULABLE STATE DUE TO PERSISTENT ATRIAL FIBRILLATION (HCC): ICD-10-CM

## 2022-08-05 DIAGNOSIS — I48.19 PERSISTENT ATRIAL FIBRILLATION (HCC): ICD-10-CM

## 2022-08-05 DIAGNOSIS — Z79.899 ENCOUNTER FOR MONITORING SOTALOL THERAPY: Primary | ICD-10-CM

## 2022-08-05 PROCEDURE — G8417 CALC BMI ABV UP PARAM F/U: HCPCS | Performed by: NURSE PRACTITIONER

## 2022-08-05 PROCEDURE — 1036F TOBACCO NON-USER: CPT | Performed by: NURSE PRACTITIONER

## 2022-08-05 PROCEDURE — 93288 INTERROG EVL PM/LDLS PM IP: CPT | Performed by: NURSE PRACTITIONER

## 2022-08-05 PROCEDURE — 99214 OFFICE O/P EST MOD 30 MIN: CPT | Performed by: NURSE PRACTITIONER

## 2022-08-05 PROCEDURE — G8427 DOCREV CUR MEDS BY ELIG CLIN: HCPCS | Performed by: NURSE PRACTITIONER

## 2022-08-05 PROCEDURE — 3017F COLORECTAL CA SCREEN DOC REV: CPT | Performed by: NURSE PRACTITIONER

## 2022-08-05 ASSESSMENT — ENCOUNTER SYMPTOMS
NAUSEA: 0
VOMITING: 0
ABDOMINAL DISTENTION: 0
BLOOD IN STOOL: 0
COLOR CHANGE: 0
SHORTNESS OF BREATH: 0
BACK PAIN: 0
PHOTOPHOBIA: 0
DIARRHEA: 0
SINUS PAIN: 0
ABDOMINAL PAIN: 0
EYE ITCHING: 0
CONSTIPATION: 0
SINUS PRESSURE: 0

## 2022-08-05 NOTE — PROGRESS NOTES
Electrophysiology Follow Up Note      Reason for consultation: atrial fibrillation    Chief complaint: denies cardiac complaints. Here for follow-up on sotalol monitoring therapy    Referring physician:  Dr Jef Cheng      Primary care physician: Lizette Runner, APRN      History of Present Illness: This visit 8/5/2022  Patient here today for follow-up on sotalol monitoring therapy. He reports that he continues to feel fatigued. He does not have much energy. He denies aggravating or alleviating factors. He denies chest pain, palpitations, shortness of breath, lightheadedness, dizziness, edema or syncope. He is taking his medication as prescribed. He is avoiding alcohol: Caffeine. He reports that his neurologist states that his fatigue is coming from his heart. Patient feels that his fatigue is coming from myathenis gravis    Previous visit 6/2/2022  Patient here today for follow-up on sotalol monitoring therapy. Patient reports he is feeling well. He denies chest pain, palpitations, shortness of breath, lightheadedness, dizziness, edema or syncope. He states that his fatigue is gradually improving. He is taking his medications as prescribed. He is avoiding alcohol and caffeine. Previous visit  Gustavo Bowman is a 60year old male with a history of hypertension, myasthenia gravis, sick sinus syndrome s/p pacemaker, history of blood clots on anticoagulation, and atrial fibrillation. He was referred for management of atrial fibrillation. Patient reports that he has intermittent palpations and feels tired and weak. He also reports exertional shortness of breath that is intermittent and will resolve with rest. Patient has received cardioversion four times. Patient has a history of gastric ulcer and bleeding resulting in blood transfusion. He is on anticoagulation at this time and has not had any problems with it. Patient is here for antiarrhythmic therapy.    Patient currently denies chest pain, palpitations, shortness of breath, lightheadedness, dizziness, edema or syncope. Past medical history:   Past Medical History:   Diagnosis Date    Arthritis 4/23/2021    Atrial fibrillation, chronic ([facility] [address] [address] 12/2020    Cerebrovascular accident ([facility] 9/9/2021    Closed displaced fracture of anterior column of right acetabulum ([facility] [address] 3/7/2022    COPD (chronic obstructive pulmonary disease) ([facility] [address] [address] 06/15/2021    Coronary artery disease involving native coronary artery of native heart without angina pectoris 06/15/2021    Diabetes ([facility] 9/10/2021    H/O echocardiogram 02/02/2021    EF is estimated at 50%. presence of PVC affected LVEF estimation, Mild TR. Heart disease     stint    History of blood transfusion     Hx of blood clots     Hx of cardiovascular stress test 06/11/2021    Small size severe intensity,perfusion defect in apical wall. Hx of Doppler ultrasound 04/09/2021    Left distal SSV shows occlusive chronic SVT. Hypertension     Myasthenia gravis ([facility] [address]     NSTEMI (non-ST elevated myocardial infarction) ([facility] 12/2020    Post PTCA 12/14/2020    Ramus Stented. S/P ablation of atrial fibrillation 09/08/2021    S/P ablation of afib PVI performed by Dr. Carlos Steel. TIA (transient ischemic attack)     Ulcerative colitis [facility])        Surgical history :  Past Surgical History:   Procedure Laterality Date    CARDIAC SURGERY      JEJUNOSTOMY      PACEMAKER INSERTION N/A 07/07/2021    PACEMAKER INSERTION PERMANENT performed by Vincenzo Mazariegos MD at [address] [place], [place] [address]. [address] conditional    PTCA  12/14/2020    Ramus Stented.     TONSILLECTOMY      UPPER GASTROINTESTINAL ENDOSCOPY N/A 06/15/2021    EGD DIAGNOSTIC ONLY performed by Zaki Molina MD at [facility] N/A 07/04/2021    EGD DIAGNOSTIC ONLY performed by Porfirio Muñiz MD at [address] [facility] ENDOSCOPY       Family history:   Family History   Family history unknown: Yes       Social history :  reports that he quit smoking about 16 months ago. His smoking use included cigarettes. He smoked an average of .25 packs per day. He has never used smokeless tobacco. He reports current alcohol use of about 1.0 standard drink per week. He reports that he does not use drugs.     No Known Allergies    Current Outpatient Medications on File Prior to Visit   Medication Sig Dispense Refill    magnesium oxide (MAG-OX) 400 (240 Mg) MG tablet Take 1 tablet by mouth daily 30 tablet 3    sotalol (BETAPACE) 80 MG tablet Take 1 tablet by mouth 2 times daily 60 tablet 2    ELIQUIS 5 MG TABS tablet Take 1 tablet by mouth 2 times daily      clopidogrel (PLAVIX) 75 MG tablet Take 1 tablet by mouth      DULoxetine (CYMBALTA) 60 MG extended release capsule Take 1 capsule by mouth      metFORMIN (GLUCOPHAGE-XR) 500 MG extended release tablet Take 1 tablet by mouth 2 times daily      GLUCOSAMINE-CALCIUM-VIT D PO Take by mouth daily       budesonide-formoterol (SYMBICORT) 160-4.5 MCG/ACT AERO Inhale 2 puffs into the lungs 2 times daily 1 Inhaler 3    atorvastatin (LIPITOR) 40 MG tablet Take 1 tablet by mouth nightly START 6/25/2021 30 tablet 3    tiotropium (SPIRIVA HANDIHALER) 18 MCG inhalation capsule Inhale 1 capsule into the lungs daily 90 capsule 1    pantoprazole (PROTONIX) 40 MG tablet Take 1 tablet by mouth 2 times daily 60 tablet 1    NONFORMULARY Inject 40 mg/L into the skin every evening Zilucoplan  - patient provided experimental drug from [facility] for Myasthenia Gravis      vitamin B-12 (CYANOCOBALAMIN) 500 MCG tablet Take 1,000 mcg by mouth daily Indications: 8am       PREDNISONE PO Take 13 mg by mouth daily Indications: Takes at  8am       pyridostigmine (MESTINON) 60 MG tablet Take 1 tablet by mouth 4 times daily (Patient taking differently: Take 60 mg by mouth 5 times daily Indications: Takes at 0800, 1200, 1600, 2000 ) 120 tablet 0    acetaminophen (TYLENOL) 325 MG tablet Take 650 mg by mouth every 4 hours as needed for Pain or Fever       Current Facility-Administered Medications on File Prior to Visit   Medication Dose Route Frequency Provider Last Rate Last Admin    Immune Globulin (Human) solution 20 g  20 g IntraVENous Once Karen Bunch MD        And    Immune Globulin (Human) solution 20 g  20 g IntraVENous Once Karen Bunch MD        Immune Globulin (Human) solution 20 g  20 g IntraVENous Once Karen Bunch MD        And    Immune Globulin (Human) solution 20 g  20 g IntraVENous Once Karen Bunch MD           Review of Systems:   Review of Systems   Constitutional:  Positive for fatigue. Negative for activity change and appetite change. HENT:  Negative for congestion, sinus pressure and sinus pain. Eyes:  Negative for photophobia, itching and visual disturbance. Respiratory:  Negative for shortness of breath. Cardiovascular:  Negative for chest pain, palpitations and leg swelling. Gastrointestinal:  Negative for abdominal distention, abdominal pain, blood in stool, constipation, diarrhea, nausea and vomiting. Genitourinary:  Negative for difficulty urinating and dysuria. Musculoskeletal:  Positive for arthralgias. Negative for back pain and gait problem. Skin:  Negative for color change and wound. Neurological:  Negative for dizziness, syncope, weakness and light-headedness. Psychiatric/Behavioral:  Negative for agitation and confusion. The patient is not nervous/anxious. Physical Examination:    There were no vitals taken for this visit. Wt Readings from Last 3 Encounters:   06/02/22 286 lb (129.7 kg)   06/01/22 283 lb 8 oz (128.6 kg)   05/24/22 297 lb 9.9 oz (135 kg)     There is no height or weight on file to calculate BMI. Physical Exam  Constitutional:       Appearance: He is obese. HENT:      Head: Normocephalic and atraumatic. Right Ear: External ear normal.      Left Ear: External ear normal.      Nose: No congestion or rhinorrhea.       Mouth/Throat:      Mouth: Mucous membranes are moist.      Pharynx: Oropharynx is clear. Eyes:      General:         Right eye: No discharge. Left eye: No discharge. Conjunctiva/sclera: Conjunctivae normal.   Cardiovascular:      Rate and Rhythm: Normal rate and regular rhythm. Heart sounds: No murmur heard. No friction rub. No gallop. Pulmonary:      Effort: Pulmonary effort is normal.      Breath sounds: Normal breath sounds. No wheezing or rhonchi. Abdominal:      General: Abdomen is flat. Bowel sounds are normal. There is no distension. Musculoskeletal:      Cervical back: Normal range of motion. Right lower leg: No edema. Left lower leg: No edema. Skin:     General: Skin is warm and dry. Neurological:      Mental Status: He is alert and oriented to person, place, and time. Psychiatric:         Mood and Affect: Mood normal.         Behavior: Behavior normal.         Thought Content:  Thought content normal.         Judgment: Judgment normal.           CBC:   Lab Results   Component Value Date/Time    WBC 7.8 03/08/2022 01:27 AM    HGB 12.5 03/08/2022 01:27 AM    HCT 40.3 03/08/2022 01:27 AM     03/08/2022 01:27 AM     Lipids: No results found for: CHOL, TRIG, HDL, LDLCALC, LDLDIRECT  PT/INR:   Lab Results   Component Value Date/Time    INR 1.20 03/08/2022 01:27 AM        BMP:    Lab Results   Component Value Date     05/23/2022    K 4.6 05/23/2022     05/23/2022    CO2 21 05/23/2022    BUN 16 05/23/2022     CMP:   Lab Results   Component Value Date    AST 17 03/08/2022    ALT 23 03/08/2022    PROT 5.9 (L) 03/08/2022    BILITOT 0.3 03/08/2022    ALKPHOS 135 (H) 03/08/2022     TSH:  No results found for: TSH, T4    EKG Interpretation: Sinus rhythm      IMPRESSION / RECOMMENDATIONS:     Sotalol monitoring therapy  Persistent atrial fibrillation with history of ablation of atrial fibrillation and atrial flutter  Hypercoagulable secondary to persistent atrial fibrillation  History of myasthenia gravis  COPD  Obesity BMI 40  CAD  History of gastric ulcer with bleeding now cleared for anticoagulation  Chronic Back Pain  History of Blood Clots    Patient is doing well. However he is complaining of overwhelming fatigue. States his neurologist told him it is coming from his heart    EKG obtained and reviewed   QTC is within range to continue sotalol 80 mg twice daily  Patient is hypercoagulable secondary to persistent atrial fibrillation and history of blood clots. Patient denies issues with bleeding  We will continue Eliquis 5 mg twice daily    I did review patient's chart to see when he had his last echo. Below are the recent echoes within the last 1 year  September 8, 2021 Limited echo at [facility] reveals EF 20 to 30%  September 10, 2021 echo at [facility] reveals EF of 35 to 40%  December 27, 2021 echo at [facility] reveals EF 50 to 55%   Patient's last stress test 6/11/2021-reveals normal myocardial perfusion  EKG today reveals right bundle branch block    I discussed these results with the patient. Patient has not followed with cardiology in some time. Patient to see Dr. Kevin Lee. We will get him scheduled as soon as possible    Patient to follow-up with EP in 3 months    Thanks again for allowing me to participate in care of thispatient. Please call me if you have any questions. With best regards. Gage Freedman, APRN-CNP      Device Assessment:    The device is [employer] pacemaker - Dual Chamber chamber      MRI Compatible : yes    Device interrogation was performed. Mode: AAIR --- DDDR     Sensing is normal. Impedence is normal.  Threshold is normal.     There has not been interval changes. Estimated battery life is 15. years     The underlying rhythm is AP,VS.  74.7 % atrial paced; <0.1 % ventricular paced.       Atrial Arrhythmia : No    Non sustained VT episodes : 12 episodes lasting 1 second or less    Sustained VT episodes : No    Patient activity reported 2.6 hrs/day    The patient is pacemaker dependent.

## 2022-09-12 ENCOUNTER — OFFICE VISIT (OUTPATIENT)
Dept: CARDIOLOGY CLINIC | Age: 62
End: 2022-09-12
Payer: MEDICAID

## 2022-09-12 VITALS
HEIGHT: 71 IN | WEIGHT: 281.4 LBS | DIASTOLIC BLOOD PRESSURE: 82 MMHG | RESPIRATION RATE: 18 BRPM | BODY MASS INDEX: 39.4 KG/M2 | HEART RATE: 73 BPM | SYSTOLIC BLOOD PRESSURE: 132 MMHG | OXYGEN SATURATION: 95 %

## 2022-09-12 DIAGNOSIS — R06.09 DYSPNEA ON EXERTION: Primary | ICD-10-CM

## 2022-09-12 PROCEDURE — G8427 DOCREV CUR MEDS BY ELIG CLIN: HCPCS | Performed by: INTERNAL MEDICINE

## 2022-09-12 PROCEDURE — 3017F COLORECTAL CA SCREEN DOC REV: CPT | Performed by: INTERNAL MEDICINE

## 2022-09-12 PROCEDURE — G8417 CALC BMI ABV UP PARAM F/U: HCPCS | Performed by: INTERNAL MEDICINE

## 2022-09-12 PROCEDURE — 1036F TOBACCO NON-USER: CPT | Performed by: INTERNAL MEDICINE

## 2022-09-12 PROCEDURE — 99214 OFFICE O/P EST MOD 30 MIN: CPT | Performed by: INTERNAL MEDICINE

## 2022-09-12 RX ORDER — ATORVASTATIN CALCIUM 40 MG/1
40 TABLET, FILM COATED ORAL NIGHTLY
Qty: 30 TABLET | Refills: 3 | Status: SHIPPED | OUTPATIENT
Start: 2022-09-12

## 2022-09-12 RX ORDER — APIXABAN 5 MG/1
5 TABLET, FILM COATED ORAL 2 TIMES DAILY
Qty: 60 TABLET | Refills: 3 | Status: SHIPPED | OUTPATIENT
Start: 2022-09-12

## 2022-09-12 RX ORDER — CLOPIDOGREL BISULFATE 75 MG/1
75 TABLET ORAL DAILY
Qty: 30 TABLET | Refills: 3 | Status: SHIPPED | OUTPATIENT
Start: 2022-09-12

## 2022-09-12 RX ORDER — SOTALOL HYDROCHLORIDE 80 MG/1
80 TABLET ORAL 2 TIMES DAILY
Qty: 60 TABLET | Refills: 2 | Status: SHIPPED | OUTPATIENT
Start: 2022-09-12

## 2022-09-12 NOTE — LETTER
*** [facility]    Dr. Ashely GRIFFITHS Deion Edwards  1960  [id number]    Have you had any Chest Pain that is not new? - No     DO EKG IF: Patient has a Heart Rate above 100 or below 40     CAD (Coronary Artery Disease) patient should have one on file every 6 months        Have you had any Shortness of Breath - Yes  If Yes - When at rest and on exertion       Sitting wait 5 minutes do supine (laying down) wait 5 minutes then do standing - log each in \"vitals\" area in Epic   Be sure to ask what symptoms they are having if they get dizzy while completing ortho stats such as: room spinning, nausea, etc.    Have you had any palpitations that are not new? - No      Is the patient on any of the following medications - Betapace (Sotalol)  If Yes DO EKG - Needs done every 6 months    Do you have any edema - swelling in No        Vein \"LEG PROBLEM Questionnaire\"  1. Do you have prominent leg veins? No   2. Do you have any skin discoloration? No  3. Do you have any healed or active sores? No  4. Do you have swelling of the legs? No  5. Do you have a family history of varicose veins? No  6. Does your profession involve pro-longed        standing or heavy lifting? No  7. Have you been fighting overweight problems? No  8. Do you have restless legs? No  9. Do you have any night time cramps? No  10. Do you have any of the following in your legs:        Aching and Tiredness     When did you have your last labs drawn about 1 week ago  Where did you have them done [facility]  What doctor ordered Dr. João Grier    If we do not have these labs you are retrieve these labs for these providers!     Do you have a surgery or procedure scheduled in the near future - No       Ask patient if they want to sign up for [employer] if they are not already signed up     Check to see if we have an E-MAIL on file for the patient     Check medication list thoroughly!!! AND RECONCILE OUTSIDE MEDICATIONS  If dose has changed change the entire order not just the MG  BE SURE TO ASK PATIENT IF THEY NEED MEDICATION REFILLS     At check out add to every patient's \"wrap up\" the following dot phrase AFTERHOURSEDUCATION and ensure we explain this to our patients

## 2022-09-12 NOTE — PROGRESS NOTES
Ariel Henderson MD        OFFICE  FOLLOWUP NOTE    Chief complaints: patient is here for management of CAD, HTN, AFIB, DYSLPIDEMIA, myasthenia gravisCAD, HTN, AFIB, DYSLPIDEMIA, myasthenia gravis, stroke,    Subjective: + shortness of breath, no dizziness, no palpitations    MAYTE Gacria is a 60 y. o.year old who  has a past medical history of Arthritis, Atrial fibrillation, chronic ([facility] [address] Cerebrovascular accident [facility]), Closed displaced fracture of anterior column of right acetabulum ([facility] [address] COPD (chronic obstructive pulmonary disease) ([facility] [address] Coronary artery disease involving native coronary artery of native heart without angina pectoris, Diabetes ([facility] [address] H/O echocardiogram, Heart disease, History of blood transfusion, Hx of blood clots, Hx of cardiovascular stress test, Hx of Doppler ultrasound, Hypertension, Myasthenia gravis ([facility] [address] NSTEMI (non-ST elevated myocardial infarction) ([facility] [address] Post PTCA, S/P ablation of atrial fibrillation, TIA (transient ischemic attack), and Ulcerative colitis ([facility] [address]  and presents for management of CAD, HTN, AFIB, DYSLPIDEMIA, myasthenia gravis which are well controlled      Current Outpatient Medications   Medication Sig Dispense Refill    magnesium oxide (MAG-OX) 400 (240 Mg) MG tablet Take 1 tablet by mouth daily 30 tablet 3    sotalol (BETAPACE) 80 MG tablet Take 1 tablet by mouth 2 times daily 60 tablet 2    ELIQUIS 5 MG TABS tablet Take 1 tablet by mouth 2 times daily      clopidogrel (PLAVIX) 75 MG tablet Take 1 tablet by mouth      DULoxetine (CYMBALTA) 60 MG extended release capsule Take 1 capsule by mouth      metFORMIN (GLUCOPHAGE-XR) 500 MG extended release tablet Take 1 tablet by mouth 2 times daily      GLUCOSAMINE-CALCIUM-VIT D PO Take by mouth daily       budesonide-formoterol (SYMBICORT) 160-4.5 MCG/ACT AERO Inhale 2 puffs into the lungs 2 times daily 1 Inhaler 3    atorvastatin (LIPITOR) 40 MG tablet Take 1 tablet by mouth nightly START 6/25/2021 30 tablet 3    tiotropium (SPIRIVA HANDIHALER) 18 MCG inhalation capsule Inhale 1 capsule into the lungs daily 90 capsule 1    pantoprazole (PROTONIX) 40 MG tablet Take 1 tablet by mouth 2 times daily 60 tablet 1    NONFORMULARY Inject 40 mg/L into the skin every evening Zilucoplan  - patient provided experimental drug from [facility] for Myasthenia Gravis      vitamin B-12 (CYANOCOBALAMIN) 500 MCG tablet Take 1,000 mcg by mouth daily Indications: 8am       PREDNISONE PO Take 13 mg by mouth daily Indications: Takes at  8am       acetaminophen (TYLENOL) 325 MG tablet Take 650 mg by mouth every 4 hours as needed for Pain or Fever      pyridostigmine (MESTINON) 60 MG tablet Take 1 tablet by mouth 4 times daily (Patient taking differently: Take 60 mg by mouth 5 times daily Indications: Takes at 0800, 1200, 1600, 2000) 120 tablet 0     No current facility-administered medications for this visit. Facility-Administered Medications Ordered in Other Visits   Medication Dose Route Frequency Provider Last Rate Last Admin    Immune Globulin (Human) solution 20 g  20 g IntraVENous Once Brown Araujo MD        And    Immune Globulin (Human) solution 20 g  20 g IntraVENous Once Brown Araujo MD        Immune Globulin (Human) solution 20 g  20 g IntraVENous Once Brown Araujo MD        And    Immune Globulin (Human) solution 20 g  20 g IntraVENous Once Brown Araujo MD         Allergies: Patient has no known allergies.   Past Medical History:   Diagnosis Date    Arthritis 4/23/2021    Atrial fibrillation, chronic ([address] 12/2020    Cerebrovascular accident ([address] 9/9/2021    Closed displaced fracture of anterior column of right acetabulum ([address] 3/7/2022    COPD (chronic obstructive pulmonary disease) ([address] 06/15/2021    Coronary artery disease involving native coronary artery of native heart without angina pectoris 06/15/2021    Diabetes ([address] 9/10/2021    H/O echocardiogram 02/02/2021    EF is estimated at 50%.presence of PVC affected LVEF estimation, Mild TR. Heart disease     stint    History of blood transfusion     Hx of blood clots     Hx of cardiovascular stress test 2021    Small size severe intensity,perfusion defect in apical wall. Hx of Doppler ultrasound 2021    Left distal SSV shows occlusive chronic SVT. Hypertension     Myasthenia gravis ([facility] [address]     NSTEMI (non-ST elevated myocardial infarction) ([facility] [address] 2020    Post PTCA 2020    Ramus Stented. S/P ablation of atrial fibrillation 2021    S/P ablation of afib PVI performed by Dr. Char Taylor. TIA (transient ischemic attack)     Ulcerative colitis ([facility] [address]      Past Surgical History:   Procedure Laterality Date    CARDIAC SURGERY      ILEOSTOMY OR JEJUNOSTOMY      PACEMAKER INSERTION N/A 2021    PACEMAKER INSERTION PERMANENT performed by Ladi Wayne MD at [address],[place]. [address] conditional    PTCA  2020    Ramus Stented. TONSILLECTOMY      UPPER GASTROINTESTINAL ENDOSCOPY N/A 06/15/2021    EGD DIAGNOSTIC ONLY performed by Harper Hodgkin, MD at [address] [facility] N/A 2021    EGD DIAGNOSTIC ONLY performed by Venita Verma MD at [facility] ENDOSCOPY     Family History   Family history unknown: Yes     Social History     Tobacco Use    Smoking status: Former     Packs/day: 0.25     Types: Cigarettes     Quit date: 2021     Years since quittin.4    Smokeless tobacco: Never   Substance Use Topics    Alcohol use:  Yes     Alcohol/week: 1.0 standard drink     Types: 1 Cans of beer per week     Comment: occ      [unfilled]  Review of Systems:   Constitutional: No Fever or Weight Loss   Eyes: No Decreased Vision  ENT: No Headaches, Hearing Loss or Vertigo  Cardiovascular: No chest pain,+ dyspnea on exertion, palpitations or loss of consciousness  Respiratory: No cough or wheezing    Gastrointestinal: No abdominal pain, appetite loss, blood in stools, constipation, diarrhea or heartburn  Genitourinary: No dysuria, trouble voiding, or hematuria  Musculoskeletal:  No gait disturbance, weakness or joint complaints  Integumentary: No rash or pruritis  Neurological: No TIA or stroke symptoms  Psychiatric: No anxiety or depression  Endocrine: No malaise, fatigue or temperature intolerance  Hematologic/Lymphatic: No bleeding problems, blood clots or swollen lymph nodes  Allergic/Immunologic: No nasal congestion or hives  All systems negative except as marked. Objective:  /82 (Site: Left Upper Arm, Position: Sitting, Cuff Size: Medium Adult)   Pulse 73   Resp 18   Ht 5' 11\" (1.803 m)   Wt 281 lb 6.4 oz (127.6 kg)   SpO2 95%   BMI 39.25 kg/m²   Wt Readings from Last 3 Encounters:   09/12/22 281 lb 6.4 oz (127.6 kg)   08/05/22 280 lb 12.8 oz (127.4 kg)   06/02/22 286 lb (129.7 kg)     Body mass index is 39.25 kg/m². GENERAL - Alert, oriented, pleasant, in no apparent distress,normal grooming  HEENT - pupils are intact, cornea intact, conjunctive normal color, ears are normal in exam,  Neck - Supple. No jugular venous distention noted. No carotid bruits, no apical -carotid delay  Respiratory:  Normal breath sounds, No respiratory distress, No wheezing, No chest tenderness. ,no use of accessory muscles, diaphragm movement is normal  Cardiovascular: (PMI) apex non displaced,no lifts no thrills, no s3,no s4, Normal heart rate, Normal rhythm, No murmurs, No rubs, No gallops.  Carotid arteries pulse and amplitude are normal no bruit, no abdominal bruit noted ( normal abdominal aorta ausculation),   Extremities - No cyanosis, clubbing, or significant edema, no varicose veins    Abdomen - No masses, tenderness, or organomegaly, no hepato-splenomegally, no bruits  Musculoskeletal - No significant edema, no kyphosis or scoliosis, no deformity in any extremity noted, muscle strength and tone are normal  Skin: no ulcer,no scar,no stasis dermatitis   Neurologic - alert oriented times 3,Cranial nerves II through XII are grossly intact. There were no gross focal neurologic abnormalities. Psychiatric: normal mood and affect    No results found for: CKTOTAL, CKMB, CKMBINDEX, TROPONINI  BNP:  No results found for: BNP  PT/INR:  No results found for: PTINR  Lab Results   Component Value Date    LABA1C 7.2 (H) 03/07/2022     No results found for: CHOL, TRIG, HDL, LDLCALC, LDLDIRECT  Lab Results   Component Value Date    ALT 23 03/08/2022    AST 17 03/08/2022     TSH:  No results found for: TSH    Impression:  Anival Lockhart is a 60 y. o.year old who  has a past medical history of Arthritis, Atrial fibrillation, chronic ([facility] [address] Cerebrovascular accident [facility]), Closed displaced fracture of anterior column of right acetabulum ([facility] [address] COPD (chronic obstructive pulmonary disease) ([facility] [address] Coronary artery disease involving native coronary artery of native heart without angina pectoris, Diabetes ([facility] [address] H/O echocardiogram, Heart disease, History of blood transfusion, Hx of blood clots, Hx of cardiovascular stress test, Hx of Doppler ultrasound, Hypertension, Myasthenia gravis ([facility] [address] NSTEMI (non-ST elevated myocardial infarction) ([facility] [address] Post PTCA, S/P ablation of atrial fibrillation, TIA (transient ischemic attack), and Ulcerative colitis ([facility] [address]  and presents with     Plan:  Possible stroke after afib ablation/l recovered now but has trouble talking,   Dyspnea on exerion, with history ofnstemi AND CAD:will get stress test. Has h/oPCI OF RAMUS,he did  Not do cardiac rehab, stress test ordered,a echo ordered, cxr ordered  PPM;stable,interrogated on 8/5/22, no afib noted  Tiredness: could be due to medications and combination of Low LVEF AND MYASTHENIS GRAVIS, recommend to get TSH  LV dysfinction with NSTEMI, echo is checked and LVEF is 50% now,karen hold off on ace inhibitor or ARB  Paroxysmal afib NOW SINUS, will refill xarelto, continue plavix, and ok to to stop aspirin( he is not on aspirin)  Dyslipidemia: continue statins  LEG SWELLING and stasis dermatitis: venous doppler showed left SSS chronic svt, recommend compression socks  HTN: stable,   myasthenia gravis\": stable  Ulcerative colitis:stable  All labs, medications and tests reviewed, continue all other medications of all above medical condition listed as is.     @[facility]@

## 2022-09-15 ENCOUNTER — HOSPITAL ENCOUNTER (OUTPATIENT)
Dept: GENERAL RADIOLOGY | Age: 62
Discharge: HOME | End: 2022-09-15
Payer: MEDICAID

## 2022-09-15 ENCOUNTER — PROCEDURE VISIT (OUTPATIENT)
Dept: CARDIOLOGY CLINIC | Age: 62
End: 2022-09-15
Payer: MEDICAID

## 2022-09-15 ENCOUNTER — HOSPITAL ENCOUNTER (OUTPATIENT)
Age: 62
Discharge: HOME | End: 2022-09-15
Payer: MEDICAID

## 2022-09-15 DIAGNOSIS — R06.09 DYSPNEA ON EXERTION: ICD-10-CM

## 2022-09-15 LAB
LV EF: 50 %
LV EF: 59 %
LVEF MODALITY: NORMAL
LVEF MODALITY: NORMAL

## 2022-09-15 PROCEDURE — 71046 X-RAY EXAM CHEST 2 VIEWS: CPT

## 2022-09-15 PROCEDURE — 93306 TTE W/DOPPLER COMPLETE: CPT | Performed by: INTERNAL MEDICINE

## 2022-09-15 PROCEDURE — 93015 CV STRESS TEST SUPVJ I&R: CPT | Performed by: INTERNAL MEDICINE

## 2022-09-15 PROCEDURE — A9500 TC99M SESTAMIBI: HCPCS | Performed by: INTERNAL MEDICINE

## 2022-09-15 PROCEDURE — 78452 HT MUSCLE IMAGE SPECT MULT: CPT | Performed by: INTERNAL MEDICINE

## 2022-09-15 NOTE — PROGRESS NOTES
04/28/20 0205   NIV Type   $NIV $Daily Charge   Skin Assessment Clean, dry, & intact   Skin Protection for O2 Device Yes   NIV Started/Stopped On   Equipment Type bipap focus   Mode CPAP   Mask Type Full face mask   Mask Size Small   Settings/Measurements   CPAP/EPAP 10 cmH2O   Resp 21   O2 Flow Rate (L/min) 0 L/min   Vt Exhaled 367 mL   Minute Volume 10.2 Liters   Mask Leak (lpm) 27 lpm   Comfort Level Good   Using Accessory Muscles No   SpO2 94   Alarm Settings   Alarms On Y   Apnea (secs) 20 secs 11:10 AM    9/15/22    Site: antecubital right, condition patent and no redness    # of attempts 1 24 hour chart check complete. 24 hr chart check complete [address] [place] [address] Hospitalist   Progress Note    Admitting Date and Time: 4/27/2020  5:18 PM  Admit Dx: Fatigue, unspecified type [R53.83]  Fatigue, unspecified type [R53.83]    Subjective:    Pt feels much better this morning. Patient states she is ready to go home. She felt weak and yesterday while driving. Was brought into the E [facility] per EMS. Per RN: Scott Plasencia this morning 242. Lantus 40 units given. Lantus 40 units helped last night. ROS: denies fever, chills, cp, sob, n/v, HA unless stated above.      doxycycline hyclate  100 mg Oral 2 times per day    sodium chloride flush  10 mL Intravenous 2 times per day    enoxaparin  40 mg Subcutaneous Daily    cefTRIAXone (ROCEPHIN) IV  1 g Intravenous Q24H    insulin glargine  40 Units Subcutaneous Nightly    insulin glargine  40 Units Subcutaneous QAM    lisinopril  5 mg Oral Daily    rOPINIRole  1 mg Oral TID    atorvastatin  20 mg Oral Nightly    aspirin  81 mg Oral Daily    gabapentin  200 mg Oral TID     sodium chloride flush, 10 mL, PRN  acetaminophen, 650 mg, Q6H PRN    Or  acetaminophen, 650 mg, Q6H PRN  polyethylene glycol, 17 g, Daily PRN  promethazine, 12.5 mg, Q6H PRN    Or  ondansetron, 4 mg, Q6H PRN  oxyCODONE, 5 mg, Q6H PRN  glucose, 15 g, PRN  dextrose, 12.5 g, PRN  glucagon (rDNA), 1 mg, PRN  dextrose, 100 mL/hr, PRN         Objective:    BP (!) 98/54   Pulse 76   Temp 98.6 °F (37 °C) (Oral)   Resp 20   Ht 5' 3\" (1.6 m)   Wt 222 lb 4.8 oz (100.8 kg)   SpO2 97%   BMI 39.38 kg/m²   General Appearance: alert and oriented to person, place and time and in no acute distress  Skin: warm and dry  Head: normocephalic and atraumatic  Eyes: pupils equal, round, and reactive to light, extraocular eye movements intact, conjunctivae normal  Neck: neck supple and non tender without mass   Pulmonary/Chest: clear to auscultation bilaterally- no wheezes, rales or rhonchi, normal air movement, no respiratory distress  Cardiovascular: normal Notification of IV to PO conversion: This patient's order for Doxycycline IV has been changed to Doxycycline PO as approved by the Pharmacy &Therapeutics and Medical Executive Committees. If the patient should become strict NPO while on this therapy, contact the prescriber for further orders.     David Garcia, [address] [facility]  4/28/2020  6:16 AM Notified Dr Mcneil June of need for CPAP order. Also that pt med list is from 2018 and pt is not alert/aware enough to answer most questions about them. Pt alert but delayed in answering questions. Provided this nurse with a very worn medication list from 2018 which was noted, but pt unable to clearly answer whether she is taking all of them or how much and added others while admission was proceeding. Pt blood sugar 62 upon admission to floor. Lantus held for the night. Pt ate a turkey sandwich, jello, 4 gracie doone cookies and 8 oz of juice. Repeat blood sugar 88 after 40 minutes. rubs, gallops, or murmur noted,. Abdomen: soft, non-tender, non-distended, normal bowel sounds, no masses or organomegaly  Extremities: no cyanosis, no clubbing and no edema  Neurologic: no cranial nerve deficit and speech normal      Recent Labs     04/27/20  1736 04/27/20 1824 04/28/20  0420   NA  --  137 140   K  --  3.9 4.2   CL  --  102 104   CO2  --  24 26   BUN  --  22* 18   CREATININE  --  1.3* 1.2*   GLUCOSE 122 110* 156*   CALCIUM  --  9.2 8.9       Recent Labs     04/27/20 1824   ALKPHOS 88   PROT 7.8   LABALBU 4.1   BILITOT 0.3   AST 17   ALT 16       Recent Labs     04/27/20 1824 04/28/20  0420   WBC 14.3* 10.8   RBC 4.02 3.67   HGB 9.9* 9.0*   HCT 32.4* 30.3*   MCV 80.6 82.6   MCH 24.6* 24.5*   MCHC 30.6* 29.7*   RDW 17.7* 17.8*    302   MPV 8.8 8.6           Radiology:   CT Head WO Contrast   Final Result   No significant abnormal findings. CT Chest WO Contrast   Final Result      Multifocal pneumonia with reactive mediastinal adenopathy. Hepatic steatosis. Assessment:  Active Problems:    Fatigue  Resolved Problems:    * No resolved hospital problems. *      Plan: 1. Community-acquired pneumonia- CT of chest with multifocal pneumonia. WBC 14.3  patient received azithromycin/ceftriaxone in ED course. Continue doxycycline Day 2/ceftriaxone day 3. 2.Leukocytosis- 2/2 above WBC 14.3 initially. Trending down WBC 10.8 todayBlood cultures with no growth. Influenza A/B negative. COVID-19 not detected. Urine culture>100,0000 coli  3. DM II-A1c 7.8. Glucose 75  SSI. Discontinue Lantus HS. Decrease Lantus daily   Hypoglycemic protocol. Carb controlled diet. 4.HAKEEM- slight improvement. BUN/Crt 18/1.2  5. COPD- currently not in exacerbation. Continue home regimen. Follow  6. HTN- continue lisinopril. 7. HLD- continue statin therapy. 8.  UTI- complains of burning. Improving leukocytosis. Low-grade fever yesterday currently afebrile urine culture positive E. Coli>100,000.

## 2022-09-16 ENCOUNTER — TELEPHONE (OUTPATIENT)
Dept: CARDIOLOGY CLINIC | Age: 62
End: 2022-09-16

## 2022-09-16 NOTE — TELEPHONE ENCOUNTER
Summary    Supervising physician Dr. Sugar Lugo .abnormal stress test, normal LVEF, NORMAL    EDV, Decreased uptake inferiorly due to diaphragmatic artifact. partially    reversible inferioapical perfusion defect noted. Recommendation    OFFICE visit to discuss results    Summary   Limited study due to patients body habitus. Left ventricular function is low normal, LVEF 50%   Moderate left ventricular hypertrophy. Grade I diastolic dysfunction. Mildly dilated left atrium. No significant valvular disease noted. Moderate tricuspid regurgitation; RVSP is 47 mmHg. Mild Pulmonary hypertension . No evidence of pericardial effusion. Spoke to patient regarding results of echo and stress test. Made an appointment for 9/21/22.

## 2022-09-20 ENCOUNTER — OFFICE VISIT (OUTPATIENT)
Dept: INTERNAL MEDICINE CLINIC | Age: 62
End: 2022-09-20
Payer: MEDICAID

## 2022-09-20 VITALS
DIASTOLIC BLOOD PRESSURE: 70 MMHG | RESPIRATION RATE: 24 BRPM | BODY MASS INDEX: 39.17 KG/M2 | HEIGHT: 71 IN | HEART RATE: 62 BPM | WEIGHT: 279.8 LBS | OXYGEN SATURATION: 92 % | SYSTOLIC BLOOD PRESSURE: 110 MMHG

## 2022-09-20 DIAGNOSIS — Z00.00 PREVENTATIVE HEALTH CARE: ICD-10-CM

## 2022-09-20 DIAGNOSIS — Z12.5 SCREENING FOR MALIGNANT NEOPLASM OF PROSTATE: ICD-10-CM

## 2022-09-20 DIAGNOSIS — M19.90 ARTHRITIS: ICD-10-CM

## 2022-09-20 DIAGNOSIS — G70.00 MYASTHENIA GRAVIS (HCC): ICD-10-CM

## 2022-09-20 DIAGNOSIS — K29.90 GASTRITIS AND DUODENITIS: ICD-10-CM

## 2022-09-20 DIAGNOSIS — J42 CHRONIC BRONCHITIS, UNSPECIFIED CHRONIC BRONCHITIS TYPE (HCC): ICD-10-CM

## 2022-09-20 DIAGNOSIS — I63.422 CEREBROVASCULAR ACCIDENT (CVA) DUE TO EMBOLISM OF LEFT ANTERIOR CEREBRAL ARTERY (HCC): Primary | ICD-10-CM

## 2022-09-20 DIAGNOSIS — I48.20 ATRIAL FIBRILLATION, CHRONIC (HCC): ICD-10-CM

## 2022-09-20 DIAGNOSIS — E55.9 VITAMIN D DEFICIENCY: ICD-10-CM

## 2022-09-20 DIAGNOSIS — I25.10 CORONARY ARTERY DISEASE INVOLVING NATIVE CORONARY ARTERY OF NATIVE HEART WITHOUT ANGINA PECTORIS: ICD-10-CM

## 2022-09-20 DIAGNOSIS — K51.818 OTHER ULCERATIVE COLITIS WITH OTHER COMPLICATION (HCC): ICD-10-CM

## 2022-09-20 DIAGNOSIS — E11.9 TYPE 2 DIABETES MELLITUS WITHOUT COMPLICATION, WITHOUT LONG-TERM CURRENT USE OF INSULIN (HCC): ICD-10-CM

## 2022-09-20 PROBLEM — L03.114 CELLULITIS OF LEFT HAND: Status: RESOLVED | Noted: 2018-09-02 | Resolved: 2022-09-20

## 2022-09-20 PROBLEM — A08.4 VIRAL GASTROENTERITIS: Status: RESOLVED | Noted: 2020-02-02 | Resolved: 2022-09-20

## 2022-09-20 PROBLEM — R06.02 SOB (SHORTNESS OF BREATH): Status: RESOLVED | Noted: 2020-12-12 | Resolved: 2022-09-20

## 2022-09-20 PROBLEM — I63.9 CEREBROVASCULAR ACCIDENT (HCC): Status: RESOLVED | Noted: 2021-09-09 | Resolved: 2022-09-20

## 2022-09-20 PROBLEM — I48.91 ATRIAL FIBRILLATION (HCC): Status: RESOLVED | Noted: 2022-05-23 | Resolved: 2022-09-20

## 2022-09-20 PROBLEM — E87.5 HYPERKALEMIA: Status: RESOLVED | Noted: 2021-06-15 | Resolved: 2022-09-20

## 2022-09-20 PROCEDURE — G8417 CALC BMI ABV UP PARAM F/U: HCPCS | Performed by: INTERNAL MEDICINE

## 2022-09-20 PROCEDURE — 3017F COLORECTAL CA SCREEN DOC REV: CPT | Performed by: INTERNAL MEDICINE

## 2022-09-20 PROCEDURE — 2022F DILAT RTA XM EVC RTNOPTHY: CPT | Performed by: INTERNAL MEDICINE

## 2022-09-20 PROCEDURE — 1036F TOBACCO NON-USER: CPT | Performed by: INTERNAL MEDICINE

## 2022-09-20 PROCEDURE — 3023F SPIROM DOC REV: CPT | Performed by: INTERNAL MEDICINE

## 2022-09-20 PROCEDURE — G8427 DOCREV CUR MEDS BY ELIG CLIN: HCPCS | Performed by: INTERNAL MEDICINE

## 2022-09-20 PROCEDURE — 99204 OFFICE O/P NEW MOD 45 MIN: CPT | Performed by: INTERNAL MEDICINE

## 2022-09-20 PROCEDURE — 3051F HG A1C>EQUAL 7.0%<8.0%: CPT | Performed by: INTERNAL MEDICINE

## 2022-09-20 RX ORDER — BUDESONIDE AND FORMOTEROL FUMARATE DIHYDRATE 160; 4.5 UG/1; UG/1
2 AEROSOL RESPIRATORY (INHALATION) 2 TIMES DAILY
Qty: 1 EACH | Refills: 3 | Status: SHIPPED | OUTPATIENT
Start: 2022-09-20

## 2022-09-20 RX ORDER — METFORMIN HYDROCHLORIDE 500 MG/1
1000 TABLET, EXTENDED RELEASE ORAL 2 TIMES DAILY
Qty: 360 TABLET | Refills: 1 | Status: SHIPPED | OUTPATIENT
Start: 2022-09-20

## 2022-09-20 RX ORDER — DULOXETIN HYDROCHLORIDE 60 MG/1
60 CAPSULE, DELAYED RELEASE ORAL DAILY
Qty: 90 CAPSULE | Refills: 1 | Status: SHIPPED | OUTPATIENT
Start: 2022-09-20

## 2022-09-20 RX ORDER — PANTOPRAZOLE SODIUM 40 MG/1
40 TABLET, DELAYED RELEASE ORAL 2 TIMES DAILY
Qty: 180 TABLET | Refills: 1 | Status: SHIPPED | OUTPATIENT
Start: 2022-09-20

## 2022-09-20 RX ORDER — PYRIDOSTIGMINE BROMIDE 60 MG/1
60 TABLET ORAL 4 TIMES DAILY
Qty: 120 TABLET | Refills: 0
Start: 2022-09-20

## 2022-09-20 RX ORDER — LANOLIN ALCOHOL/MO/W.PET/CERES
400 CREAM (GRAM) TOPICAL DAILY
Qty: 30 TABLET | Refills: 3 | Status: SHIPPED | OUTPATIENT
Start: 2022-09-20

## 2022-09-20 RX ORDER — TIOTROPIUM BROMIDE 18 UG/1
18 CAPSULE ORAL; RESPIRATORY (INHALATION) DAILY
Qty: 90 CAPSULE | Refills: 1 | Status: SHIPPED | OUTPATIENT
Start: 2022-09-20

## 2022-09-20 ASSESSMENT — PATIENT HEALTH QUESTIONNAIRE - PHQ9
SUM OF ALL RESPONSES TO PHQ QUESTIONS 1-9: 0
SUM OF ALL RESPONSES TO PHQ QUESTIONS 1-9: 0
SUM OF ALL RESPONSES TO PHQ9 QUESTIONS 1 & 2: 0
2. FEELING DOWN, DEPRESSED OR HOPELESS: 0
1. LITTLE INTEREST OR PLEASURE IN DOING THINGS: 0
SUM OF ALL RESPONSES TO PHQ QUESTIONS 1-9: 0
SUM OF ALL RESPONSES TO PHQ QUESTIONS 1-9: 0

## 2022-09-20 NOTE — PROGRESS NOTES
History and Physical      Ramez Pop  YOB: 1960    Date of Service:  9/20/2022    Chief Complaint:   Ramez Pop is a 60 y.o. male who presents for complete physical examination. HPI: presents to establish, prev w [facility] and his PCP is moving to [facility]. 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 Was done at [facility], transferred then to [facility] and could not have thombectomy done. Treated w IV heparin per pt. Has had some problems w expressive aphasia. Also sees Dr Trini Dickinson locally for afib and pacemaker. Has had prior MI 2011 and also Dec 2020 and seeing Dr Pietro Arndt. He has hx of MG and been seeing Dr Andra Madsen, dx'd 2017. Had eye drooping, sx are now controlled on mestinon. Been under good control and also is on a shot for a research program.  Is also on chr prednisone, was on 13mg but now 12mg. Hx of COPD and stable on inhalers. Prev smoker but quit also after last MI    Remarkably, he has continued to work regularly. Drives a truck. Ulcerative dx'd in childhood and had total colectomy 12yo and w ostomy in place. Needs ostomy supplies, renews from [facility]. GERD:  Is without sx of heartburn or dysphagia, abd pain. Is also on protonix. He had EGD also w ulcer last yr Sept, started on protonix      HX OF VIT D DEFIC AND NOT ON SUPPLEMENT. WE'LL RECHECK    HAD COVID INFECTION ANDRES THIS YR, DEFERS IMM. DM- dx'd also a yr ago w Dr Judy Montero, Sept.  [facility]. Due for lab.     Lab Results   Component Value Date    LABA1C 7.2 (H) 03/07/2022     Lab Results   Component Value Date    GLUF 105 (H) 11/01/2016    CREATININE 1.0 05/23/2022           Wt Readings from Last 3 Encounters:   09/20/22 279 lb 12.8 oz (126.9 kg)   09/12/22 281 lb 6.4 oz (127.6 kg)   08/05/22 280 lb 12.8 oz (127.4 kg)     BP Readings from Last 3 Encounters:   09/20/22 110/70   09/12/22 132/82   08/05/22 110/74       Patient Active Problem List   Diagnosis    Myasthenia gravis ([facility]    Unspecified ptosis of bilateral eyelids    Cellulitis of left hand    Viral gastroenteritis    SOB (shortness of breath)    Atrial fibrillation, chronic (HCC)    Post PTCA    NSTEMI (non-ST elevated myocardial infarction) ([facility])    Atrial fibrillation with rapid ventricular response (HCC)    Hyperkalemia    Type 2 MI (myocardial infarction) ([facility])    Gastritis and duodenitis    Duodenal ulcer    Coronary artery disease involving native coronary artery    COPD (chronic obstructive pulmonary disease) ([facility])    GI bleed    History of myasthenia gravis    Parastomal hernia without obstruction or gangrene    Anemia    S/P ablation of atrial fibrillation    Closed displaced fracture of anterior column of right acetabulum (HCC)    Cerebrovascular accident ([facility]    Diabetes ([facility]    Arthritis    Obesity (BMI 30.0-34.9)    Ulcerative colitis ([facility]    Other insomnia    Closed displaced fracture of anterior column of right acetabulum with delayed healing    Atrial fibrillation ([facility]    Encounter for monitoring sotalol therapy    Hypercoagulable state due to persistent atrial fibrillation ([facility]    History of blood clots       Preventive Care:  Health Maintenance   Topic Date Due    COVID-19 Vaccine (1) Never done    Pneumococcal 0-64 years Vaccine (1 - PCV) Never done    Diabetic foot exam  Never done    Depression Screen  Never done    HIV screen  Never done    Diabetic microalbuminuria test  Never done    Diabetic retinal exam  Never done    Shingles vaccine (1 of 2) Never done    Colorectal Cancer Screen  06/13/2022    Flu vaccine (1) Never done    Lipids  09/10/2022    A1C test (Diabetic or Prediabetic)  03/07/2023    DTaP/Tdap/Td vaccine (2 - Td or Tdap) 03/07/2032    Hepatitis C screen  Completed    Hepatitis A vaccine  Aged Out    Hib vaccine  Aged Out    Meningococcal (ACWY) vaccine  Aged [employer] History   Administered Date(s) Administered    Tdap (Boostrix, Adacel) 03/07/2022       No Known Allergies  Current Outpatient Medications   Medication Sig Dispense Refill    sotalol (BETAPACE) 80 MG tablet Take 1 tablet by mouth 2 times daily 60 tablet 2    ELIQUIS 5 MG TABS tablet Take 1 tablet by mouth 2 times daily 60 tablet 3    clopidogrel (PLAVIX) 75 MG tablet Take 1 tablet by mouth daily 30 tablet 3    atorvastatin (LIPITOR) 40 MG tablet Take 1 tablet by mouth nightly START 6/25/2021 30 tablet 3    magnesium oxide (MAG-OX) 400 (240 Mg) MG tablet Take 1 tablet by mouth daily 30 tablet 3    DULoxetine (CYMBALTA) 60 MG extended release capsule Take 1 capsule by mouth      metFORMIN (GLUCOPHAGE-XR) 500 MG extended release tablet Take 1 tablet by mouth 2 times daily      budesonide-formoterol (SYMBICORT) 160-4.5 MCG/ACT AERO Inhale 2 puffs into the lungs 2 times daily 1 Inhaler 3    tiotropium (SPIRIVA HANDIHALER) 18 MCG inhalation capsule Inhale 1 capsule into the lungs daily 90 capsule 1    pantoprazole (PROTONIX) 40 MG tablet Take 1 tablet by mouth 2 times daily 60 tablet 1    NONFORMULARY Inject 40 mg/L into the skin every evening Zilucoplan  - patient provided experimental drug from [facility] for Myasthenia Gravis      vitamin B-12 (CYANOCOBALAMIN) 500 MCG tablet Take 1,000 mcg by mouth daily Indications: 8am       PREDNISONE PO Take 13 mg by mouth daily Indications: Takes at  8am       pyridostigmine (MESTINON) 60 MG tablet Take 1 tablet by mouth 4 times daily (Patient taking differently: Take 60 mg by mouth 5 times daily Indications: Takes at 0800, 1200, 1600, 2000) 120 tablet 0    acetaminophen (TYLENOL) 325 MG tablet Take 650 mg by mouth every 4 hours as needed for Pain or Fever      GLUCOSAMINE-CALCIUM-VIT D PO Take by mouth daily  (Patient not taking: Reported on 9/20/2022)       No current facility-administered medications for this visit.      Facility-Administered Medications Ordered in Other Visits   Medication Dose Route Frequency Provider Last Rate Last Admin    Immune Globulin (Human) solution 20 g  20 g IntraVENous Once Bernice Ferrara MD        And    Immune Globulin (Human) solution 20 g  20 g IntraVENous Once Bernice Ferrara MD        Immune Globulin (Human) solution 20 g  20 g IntraVENous Once Bernice Ferrara MD        And    Immune Globulin (Human) solution 20 g  20 g IntraVENous Once Bernice Ferrara MD           Past Medical History:   Diagnosis Date    Arthritis 04/23/2021    Atrial fibrillation, chronic ([facility] 12/2020    Cerebrovascular accident ([facility] 09/09/2021    Closed displaced fracture of anterior column of right acetabulum ([facility] 03/07/2022    COPD (chronic obstructive pulmonary disease) ([facility] 06/15/2021    Coronary artery disease involving native coronary artery of native heart without angina pectoris 06/15/2021    Diabetes ([facility] 09/10/2021    H/O echocardiogram 02/02/2021    EF is estimated at 50%. presence of PVC affected LVEF estimation, Mild TR. Heart disease     stint    History of blood transfusion     Hx of blood clots     Hx of cardiovascular stress test 06/11/2021    Small size severe intensity,perfusion defect in apical wall. Hx of cardiovascular stress test 09/14/2022    Decreased uptake inferiorly due to diaphragmatic artifact partially reversible inferioapical perfusion defect noted. Hx of Doppler echocardiogram 09/14/2022    EF 15% Grade I diastolic dysfunction. mildly dilated LA. Mod TR. Mild pulmonary htn. Hx of Doppler ultrasound 04/09/2021    Left distal SSV shows occlusive chronic SVT. Hypertension     Myasthenia gravis ([facility]     NSTEMI (non-ST elevated myocardial infarction) ([facility] 12/2020    Post PTCA 12/14/2020    Ramus Stented. S/P ablation of atrial fibrillation 09/08/2021    S/P ablation of afib PVI performed by Dr. Tresa Neal.     TIA (transient ischemic attack)     Ulcerative colitis ([facility]      Past Surgical History:   Procedure Laterality Date    CARDIAC SURGERY      ILEOSTOMY OR JEJUNOSTOMY      PACEMAKER INSERTION N/A 2021    PACEMAKER INSERTION PERMANENT performed by Nikos Guajardo MD at [address],[place]. [address] conditional    PTCA  2020    Ramus Stented. TONSILLECTOMY      UPPER GASTROINTESTINAL ENDOSCOPY N/A 06/15/2021    EGD DIAGNOSTIC ONLY performed by Taylor Sheldon MD at [address] [facility] N/A 2021    EGD DIAGNOSTIC ONLY performed by Ermias Lizarraga MD at [address] [facility] ENDOSCOPY     Family History   Family history unknown: Yes     Social History     Socioeconomic History    Marital status:      Spouse name: Not on file    Number of children: Not on file    Years of education: Not on file    Highest education level: Not on file   Occupational History    Not on file   Tobacco Use    Smoking status: Former     Packs/day: 0.25     Types: Cigarettes     Quit date: 2021     Years since quittin.4    Smokeless tobacco: Never   Substance and Sexual Activity    Alcohol use: Yes     Alcohol/week: 1.0 standard drink     Types: 1 Cans of beer per week     Comment: occ    Drug use: No    Sexual activity: Yes     Partners: Female   Other Topics Concern    Not on file   Social History Narrative    Not on file     Social Determinants of Health     Financial Resource Strain: Not on file   Food Insecurity: Not on file   Transportation Needs: Not on file   Physical Activity: Not on file   Stress: Not on file   Social Connections: Not on file   Intimate Partner Violence: Not on file   Housing Stability: Not on file       Review of Systems:  A comprehensive review of systems was negative except for what was noted in the HPI. Physical Exam:   Vitals:    22 1413   BP: 110/70   Site: Right Upper Arm   Position: Sitting   Cuff Size: Large Adult   Pulse: 62   Resp: 24   SpO2: 92%   Weight: 279 lb 12.8 oz (126.9 kg)   Height: 5' 11\" (1.803 m)     Body mass index is 39.02 kg/m². Constitutional: He is oriented to person, place, and time. He appears well-developed and well-nourished.  No distress. HEENT:   Head: Normocephalic and atraumatic. Right Ear: Tympanic membrane, external ear and ear canal normal.   Left Ear: Tympanic membrane, external ear and ear canal normal.   Nose: Nose normal.   Mouth/Throat: Oropharynx is clear and moist and mucous membranes are normal. No oropharyngeal exudate or posterior oropharyngeal erythema. He has no cervical adenopathy. Eyes: Conjunctivae and extraocular motions are normal. Pupils are equal, round, and reactive to light. Neck: Full passive range of motion without pain. Neck supple. No JVD present. No mass and no thyromegaly present. Cardiovascular: Normal rate, regular rhythm, normal heart sounds and intact distal pulses. Exam reveals no gallop and no friction rub. No murmur heard. Pulmonary/Chest: Effort normal and breath sounds normal. No respiratory distress. He has no wheezes, rhonchi or rales. Abdominal: Soft, non-tender. Bowel sounds and aorta are normal. There is no organomegaly, mass or bruit. Musculoskeletal: Normal range of motion, no synovitis. He exhibits no edema. Neurological: He is alert and oriented to person, place, and time. No cranial nerve deficit. Coordination normal.   Skin: Skin is warm, dry and intact. Psychiatric: He has a normal mood and affect. His speech is normal and behavior is normal. Judgment, cognition and memory are normal.         Assessment/Plan:    Myles was seen today for new patient. Diagnoses and all orders for this visit:    Cerebrovascular accident (CVA) due to embolism of left anterior cerebral artery ([address]- W SOME RESIDUAL MILD EXPRESSIVE APHASIA BUT NO OTHER APPARENT DEFICITS. REVIEWED PRIOR HEAD CT AND HAD L ANTERIOR LOCATION OF STROKE. Remains stable neurologically w/o evidence of acute changes/focal deficits. Cont regimen.       Myasthenia gravis ([address]- STABLE TOO ON MEDS W MESTINON, STUDY DRUG AND PRED, CONT EXCELLENT CARE FROM NEURO DR Britni Taveras  -     pyridostigmine (MESTINON) 60 MG tablet; Take 1 tablet by mouth 4 times daily GETS FROM NEURO DR [facility]    Atrial fibrillation, chronic (HCC) - Pt clinically w/o evidence of cardiovascular instability, cont regimen. FOR CONT FU DR Kaylee Riojas EP  -     magnesium oxide (MAG-OX) 400 (240 Mg) MG tablet; Take 1 tablet by mouth daily  -     TSH; Future  -     Magnesium; Future    Coronary artery disease involving native coronary artery of native heart without angina pectoris - Coronary artery disease stable and asymptomatic, will continue to monitor for any signs of instability. Will periodically monitor lipid profile and liver function, cardiac risk factors. Continue current medical regimen. SEEING DR Monserrat Mendez AS WELL  -     magnesium oxide (MAG-OX) 400 (240 Mg) MG tablet; Take 1 tablet by mouth daily  -     Comprehensive Metabolic Panel; Future  -     CBC with Auto Differential; Future  -     Lipid Panel; Future    Chronic bronchitis, unspecified chronic bronchitis type ([facility] - COPD stable on current regiment of inhalers/meds. -     budesonide-formoterol (SYMBICORT) 160-4.5 MCG/ACT AERO; Inhale 2 puffs into the lungs in the morning and 2 puffs in the evening.  -     tiotropium (SPIRIVA HANDIHALER) 18 MCG inhalation capsule; Inhale 1 capsule into the lungs daily    Type 2 diabetes mellitus without complication, without long-term current use of insulin ([facility] - DM relatively well controlled and will continue current regimen. Screening reviewed. See orders. -     metFORMIN (GLUCOPHAGE-XR) 500 MG extended release tablet; Take 2 tablets by mouth 2 times daily  -     Comprehensive Metabolic Panel; Future  -     CBC with Auto Differential; Future  -     Lipid Panel; Future  -     Microalbumin / Creatinine Urine Ratio; Future  -      DIABETES FOOT EXAM  -     Hemoglobin A1C; Future    Screening for malignant neoplasm of prostate- SCR PSA  -     PSA, Prostatic Specific Antigen;  Future    Vitamin D deficiency -LAST LEVEL LOW SO F/U AND IF REMAINS LOW WE'LL SUPPLEMENT  -     Vitamin D 25 Hydroxy; Future    Preventative health care  -     Hepatitis C Antibody; Future    Arthritis- LBP stable on pain regimen, RFs given as noted and will monitor.    -     DULoxetine (CYMBALTA) 60 MG extended release capsule; Take 1 capsule by mouth daily    Gastritis and duodenitis- W HX OF BLEEDING ULCER, MAINTAIN CURRENT THERAPY AND ALSO CHECK CBC  -     pantoprazole (PROTONIX) 40 MG tablet;  Take 1 tablet by mouth 2 times daily    Other ulcerative colitis with other complication ([facility]- RESOLVED W/O COLECTOMY AND W OSTOMY SUPPLIES NEEDING RF  -     Ostomy Supplies KIT; 1 kit by Does not apply route daily

## 2022-09-21 ENCOUNTER — OFFICE VISIT (OUTPATIENT)
Dept: CARDIOLOGY CLINIC | Age: 62
End: 2022-09-21
Payer: MEDICAID

## 2022-09-21 VITALS
BODY MASS INDEX: 39.14 KG/M2 | HEART RATE: 72 BPM | SYSTOLIC BLOOD PRESSURE: 132 MMHG | HEIGHT: 71 IN | DIASTOLIC BLOOD PRESSURE: 84 MMHG | WEIGHT: 279.6 LBS

## 2022-09-21 DIAGNOSIS — I25.10 CORONARY ARTERY DISEASE INVOLVING NATIVE CORONARY ARTERY OF NATIVE HEART WITHOUT ANGINA PECTORIS: ICD-10-CM

## 2022-09-21 DIAGNOSIS — I25.10 CORONARY ARTERY DISEASE INVOLVING NATIVE CORONARY ARTERY OF NATIVE HEART WITHOUT ANGINA PECTORIS: Primary | ICD-10-CM

## 2022-09-21 DIAGNOSIS — E11.9 TYPE 2 DIABETES MELLITUS WITHOUT COMPLICATION, WITHOUT LONG-TERM CURRENT USE OF INSULIN (HCC): Primary | ICD-10-CM

## 2022-09-21 DIAGNOSIS — Z12.5 SCREENING FOR MALIGNANT NEOPLASM OF PROSTATE: ICD-10-CM

## 2022-09-21 DIAGNOSIS — R06.02 SOB (SHORTNESS OF BREATH): ICD-10-CM

## 2022-09-21 DIAGNOSIS — Z00.00 PREVENTATIVE HEALTH CARE: ICD-10-CM

## 2022-09-21 DIAGNOSIS — E66.01 CLASS 2 SEVERE OBESITY DUE TO EXCESS CALORIES WITH SERIOUS COMORBIDITY AND BODY MASS INDEX (BMI) OF 39.0 TO 39.9 IN ADULT (HCC): ICD-10-CM

## 2022-09-21 DIAGNOSIS — I48.20 ATRIAL FIBRILLATION, CHRONIC (HCC): ICD-10-CM

## 2022-09-21 DIAGNOSIS — Z98.890 S/P ABLATION OF ATRIAL FIBRILLATION: ICD-10-CM

## 2022-09-21 DIAGNOSIS — E55.9 VITAMIN D DEFICIENCY: ICD-10-CM

## 2022-09-21 DIAGNOSIS — Z86.79 S/P ABLATION OF ATRIAL FIBRILLATION: ICD-10-CM

## 2022-09-21 DIAGNOSIS — E11.9 TYPE 2 DIABETES MELLITUS WITHOUT COMPLICATION, WITHOUT LONG-TERM CURRENT USE OF INSULIN (HCC): ICD-10-CM

## 2022-09-21 DIAGNOSIS — Z95.0 PACEMAKER: ICD-10-CM

## 2022-09-21 PROBLEM — E66.812 CLASS 2 SEVERE OBESITY DUE TO EXCESS CALORIES WITH SERIOUS COMORBIDITY AND BODY MASS INDEX (BMI) OF 39.0 TO 39.9 IN ADULT: Status: ACTIVE | Noted: 2017-08-07

## 2022-09-21 LAB
A/G RATIO: 1.6 (ref 1.1–2.2)
ALBUMIN SERPL-MCNC: 4.4 G/DL (ref 3.4–5)
ALP BLD-CCNC: 72 U/L (ref 40–129)
ALT SERPL-CCNC: 28 U/L (ref 10–40)
ANION GAP SERPL CALCULATED.3IONS-SCNC: 17 MMOL/L (ref 3–16)
AST SERPL-CCNC: 28 U/L (ref 15–37)
BASOPHILS ABSOLUTE: 0 K/UL (ref 0–0.2)
BASOPHILS RELATIVE PERCENT: 0.6 %
BILIRUB SERPL-MCNC: <0.2 MG/DL (ref 0–1)
BUN BLDV-MCNC: 14 MG/DL (ref 7–20)
CALCIUM SERPL-MCNC: 9.6 MG/DL (ref 8.3–10.6)
CHLORIDE BLD-SCNC: 103 MMOL/L (ref 99–110)
CHOLESTEROL, TOTAL: 175 MG/DL (ref 0–199)
CO2: 22 MMOL/L (ref 21–32)
CREAT SERPL-MCNC: 1.2 MG/DL (ref 0.8–1.3)
CREATININE URINE: 294.1 MG/DL (ref 39–259)
EOSINOPHILS ABSOLUTE: 0.3 K/UL (ref 0–0.6)
EOSINOPHILS RELATIVE PERCENT: 3.6 %
GFR AFRICAN AMERICAN: >60
GFR NON-AFRICAN AMERICAN: >60
GLUCOSE BLD-MCNC: 138 MG/DL (ref 70–99)
HCT VFR BLD CALC: 42.5 % (ref 40.5–52.5)
HDLC SERPL-MCNC: 36 MG/DL (ref 40–60)
HEMOGLOBIN: 14.2 G/DL (ref 13.5–17.5)
HEPATITIS C ANTIBODY INTERPRETATION: NORMAL
LDL CHOLESTEROL CALCULATED: ABNORMAL MG/DL
LDL CHOLESTEROL DIRECT: 92 MG/DL
LYMPHOCYTES ABSOLUTE: 1.5 K/UL (ref 1–5.1)
LYMPHOCYTES RELATIVE PERCENT: 21.4 %
MAGNESIUM: 2.1 MG/DL (ref 1.8–2.4)
MCH RBC QN AUTO: 30.8 PG (ref 26–34)
MCHC RBC AUTO-ENTMCNC: 33.3 G/DL (ref 31–36)
MCV RBC AUTO: 92.5 FL (ref 80–100)
MICROALBUMIN UR-MCNC: 3.5 MG/DL
MICROALBUMIN/CREAT UR-RTO: 11.9 MG/G (ref 0–30)
MONOCYTES ABSOLUTE: 0.7 K/UL (ref 0–1.3)
MONOCYTES RELATIVE PERCENT: 9.2 %
NEUTROPHILS ABSOLUTE: 4.7 K/UL (ref 1.7–7.7)
NEUTROPHILS RELATIVE PERCENT: 65.2 %
PDW BLD-RTO: 15.3 % (ref 12.4–15.4)
PLATELET # BLD: 240 K/UL (ref 135–450)
PMV BLD AUTO: 7.8 FL (ref 5–10.5)
POTASSIUM SERPL-SCNC: 5 MMOL/L (ref 3.5–5.1)
PROSTATE SPECIFIC ANTIGEN: 0.84 NG/ML (ref 0–4)
RBC # BLD: 4.59 M/UL (ref 4.2–5.9)
SODIUM BLD-SCNC: 142 MMOL/L (ref 136–145)
TOTAL PROTEIN: 7.1 G/DL (ref 6.4–8.2)
TRIGL SERPL-MCNC: 388 MG/DL (ref 0–150)
TSH SERPL DL<=0.05 MIU/L-ACNC: 1.61 UIU/ML (ref 0.27–4.2)
VITAMIN D 25-HYDROXY: 16.3 NG/ML
VLDLC SERPL CALC-MCNC: ABNORMAL MG/DL
WBC # BLD: 7.2 K/UL (ref 4–11)

## 2022-09-21 PROCEDURE — G8427 DOCREV CUR MEDS BY ELIG CLIN: HCPCS | Performed by: NURSE PRACTITIONER

## 2022-09-21 PROCEDURE — 99214 OFFICE O/P EST MOD 30 MIN: CPT | Performed by: NURSE PRACTITIONER

## 2022-09-21 PROCEDURE — G8417 CALC BMI ABV UP PARAM F/U: HCPCS | Performed by: NURSE PRACTITIONER

## 2022-09-21 PROCEDURE — 36415 COLL VENOUS BLD VENIPUNCTURE: CPT | Performed by: INTERNAL MEDICINE

## 2022-09-21 PROCEDURE — 3017F COLORECTAL CA SCREEN DOC REV: CPT | Performed by: NURSE PRACTITIONER

## 2022-09-21 PROCEDURE — 1036F TOBACCO NON-USER: CPT | Performed by: NURSE PRACTITIONER

## 2022-09-21 ASSESSMENT — ENCOUNTER SYMPTOMS: SHORTNESS OF BREATH: 1

## 2022-09-21 NOTE — ASSESSMENT & PLAN NOTE
-Discussed the importance of diet and exercise and assisting with weight loss. Patient informed of ideal body weight and high risk mortality associated with obesity. Patient voices understanding.    -Hold off on exercise until after LHC.

## 2022-09-21 NOTE — ASSESSMENT & PLAN NOTE
- Patient having shortness of breath. Echocardiogram shows moderate LVH. Moderate tricuspid regurgitation. EF intact 50%. Stress test abnormal decreased uptake inferiorly due to diaphragmatic artifact. C in 2020 showed moderate disease of LAD, RCA, circumflex. Case discussed with Dr. Jarret Moss will proceed with [facility].

## 2022-09-21 NOTE — PROGRESS NOTES
[facility] PHYSICAL [facility] [address], [address] [facility],Third Floor  Phone: [phone]    Fax [phone]                  Ludin Jenkins MD, Jesús Argueta MD, Mariel Newton MD, [place] Friday, MD Indira Moe MD, Ladan Baig MD, Daniel Caceres MD, [address] [facility], [facility]        Cardiology Progress Note      9/21/2022    RE: Jackson Villagran  (1960)                             Primary cardiologist: Dr. Indira Moe       Subjective:  CC:   1. Coronary artery disease involving native coronary artery of native heart without angina pectoris    2. S/P ablation of atrial fibrillation    3. Class 2 severe obesity due to excess calories with serious comorbidity and body mass index (BMI) of 39.0 to 39.9 in adult ([facility]    4. SOB (shortness of breath)    5. Pacemaker        HPI: Jackson Villagran, who is a  60y.o. year old male with a past medical history as listed below. Patient presents to the office for follow up on CAD, HTN, A. fib, obesity, shortness of breath, PPM, and hyperlipidemia. Patient has had ongoing difficulties with atrial fibrillation management. He also has a history of sick sinus syndrome and had PPM placed 7/7/2021. He was cardioverted multiple times in the past then had atrial fibrillation 9/8/21 and had CVA at that time. He had reduced LVH at that time. He was started on sotalol therapy in May 2022. He reports having increased shortness of breath and fatigue over the last month. Echocardiogram EF intact with moderate LVH. Chest x-ray showed cardiomegaly. He had abnormal stress test and is here to follow-up on testing.     Past Medical History:   Diagnosis Date    Arthritis 04/23/2021    LOW BACK PAIN, IMPROVED ON CYMBALTA    Atrial fibrillation, chronic ([facility] 12/2020    Dr Shira Shelton, also has pacer and is on eliquis    Cerebrovascular accident [facility]) 09/09/2021 expressive aphasia- embolic from cardioversion for afib/ LEFT FRONTAL/ANTERIOR CIRCULATION. Closed displaced fracture of anterior column of right acetabulum ([facility] 03/07/2022    COPD (chronic obstructive pulmonary disease) [facility] 06/15/2021    Coronary artery disease involving native coronary artery of native heart without angina pectoris 06/15/2021    hx of MI 2011 and also 2021- hx of PCI/stent twice. Diabetes ([facility] 09/10/2021    dx'd Sept 2021- by Dr Parminder Cerna. ?may be related to chr steroid use also? H/O echocardiogram 02/02/2021    EF is estimated at 50%. presence of PVC affected LVEF estimation, Mild TR. History of blood transfusion     Hx of blood clots     Hx of cardiovascular stress test 06/11/2021    Small size severe intensity,perfusion defect in apical wall. Hx of cardiovascular stress test 09/14/2022    Decreased uptake inferiorly due to diaphragmatic artifact partially reversible inferioapical perfusion defect noted. Hx of Doppler echocardiogram 09/14/2022    EF 20% Grade I diastolic dysfunction. mildly dilated LA. Mod TR. Mild pulmonary htn. Hx of Doppler ultrasound 04/09/2021    Left distal SSV shows occlusive chronic SVT. Hypertension     Myasthenia gravis ([facility] 2017    sees Dr Leonid Duke and is controlled on a research injection 40mg daily, also mestinon and chr prednisone 12mg daily    NSTEMI (non-ST elevated myocardial infarction) ([facility] 12/2020    Post PTCA 12/14/2020    Ramus Stented. S/P ablation of atrial fibrillation 09/08/2021    S/P ablation of afib PVI performed by Dr. Kaila Beckford.     TIA (transient ischemic attack)     Ulcerative colitis ([facility]        Current Outpatient Medications   Medication Sig Dispense Refill    magnesium oxide (MAG-OX) 400 (240 Mg) MG tablet Take 1 tablet by mouth daily 30 tablet 3    DULoxetine (CYMBALTA) 60 MG extended release capsule Take 1 capsule by mouth daily 90 capsule 1    metFORMIN (GLUCOPHAGE-XR) 500 MG extended release tablet Take 2 tablets by mouth 2 times daily 360 tablet 1    budesonide-formoterol (SYMBICORT) 160-4.5 MCG/ACT AERO Inhale 2 puffs into the lungs in the morning and 2 puffs in the evening. 1 each 3    tiotropium (SPIRIVA HANDIHALER) 18 MCG inhalation capsule Inhale 1 capsule into the lungs daily 90 capsule 1    pantoprazole (PROTONIX) 40 MG tablet Take 1 tablet by mouth 2 times daily 180 tablet 1    pyridostigmine (MESTINON) 60 MG tablet Take 1 tablet by mouth 4 times daily GETS FROM NEURO DR HARRIS 120 tablet 0    Ostomy Supplies KIT 1 kit by Does not apply route daily 1 kit 3    sotalol (BETAPACE) 80 MG tablet Take 1 tablet by mouth 2 times daily 60 tablet 2    ELIQUIS 5 MG TABS tablet Take 1 tablet by mouth 2 times daily 60 tablet 3    clopidogrel (PLAVIX) 75 MG tablet Take 1 tablet by mouth daily 30 tablet 3    atorvastatin (LIPITOR) 40 MG tablet Take 1 tablet by mouth nightly START 6/25/2021 30 tablet 3    NONFORMULARY Inject 40 mg/L into the skin every evening Zilucoplan  - patient provided experimental drug from [facility] for Myasthenia Gravis      vitamin B-12 (CYANOCOBALAMIN) 500 MCG tablet Take 1,000 mcg by mouth daily Indications: 8am       PREDNISONE PO Take 13 mg by mouth daily Indications: Takes at  8am       acetaminophen (TYLENOL) 325 MG tablet Take 650 mg by mouth every 4 hours as needed for Pain or Fever      GLUCOSAMINE-CALCIUM-VIT D PO Take by mouth daily  (Patient not taking: No sig reported)       No current facility-administered medications for this visit.      Facility-Administered Medications Ordered in Other Visits   Medication Dose Route Frequency Provider Last Rate Last Admin    Immune Globulin (Human) solution 20 g  20 g IntraVENous Once Karen Bunch MD        And    Immune Globulin (Human) solution 20 g  20 g IntraVENous Once Karen Bunch MD        Immune Globulin (Human) solution 20 g  20 g IntraVENous Once Karen Bunch MD        And    Immune Globulin (Human) solution 20 g 20 g IntraVENous Once Samantha Schaeffer MD           Review of Systems:  Review of Systems   Respiratory:  Positive for shortness of breath. Cardiovascular:  Negative for chest pain, palpitations and leg swelling. Musculoskeletal: Negative. Skin: Negative. Neurological:  Negative for dizziness and weakness. All other systems reviewed and are negative. Objective:      Physical Exam:  /84 (Site: Left Lower Arm, Position: Sitting, Cuff Size: Medium Adult)   Pulse 72   Ht 5' 11\" (1.803 m)   Wt 279 lb 9.6 oz (126.8 kg)   BMI 39.00 kg/m²   Wt Readings from Last 3 Encounters:   09/21/22 279 lb 9.6 oz (126.8 kg)   09/20/22 279 lb 12.8 oz (126.9 kg)   09/12/22 281 lb 6.4 oz (127.6 kg)     Body mass index is 39 kg/m². Physical exam:  Physical Exam  Constitutional:       Appearance: He is well-developed. He is obese. Cardiovascular:      Rate and Rhythm: Normal rate and regular rhythm. Pulses: Intact distal pulses. Dorsalis pedis pulses are 2+ on the right side and 2+ on the left side. Posterior tibial pulses are 2+ on the right side and 2+ on the left side. Heart sounds: Normal heart sounds, S1 normal and S2 normal.   Pulmonary:      Effort: Pulmonary effort is normal.      Breath sounds: Normal breath sounds. Musculoskeletal:         General: Normal range of motion. Skin:     General: Skin is warm and dry. Neurological:      Mental Status: He is alert and oriented to person, place, and time.         DATA:  No results found for: CKTOTAL, CKMB, CKMBINDEX, TROPONINI  BNP:  No results found for: BNP  PT/INR:  No results found for: PTINR  Lab Results   Component Value Date    LABA1C 7.2 (H) 03/07/2022     No results found for: CHOL, TRIG, HDL, LDLCALC, LDLDIRECT  Lab Results   Component Value Date    ALT 23 03/08/2022    AST 17 03/08/2022     TSH:  No results found for: TSH    Vitals:    09/21/22 0952   BP: 132/84   Pulse: 72       Echo:9/15/22  Limited study due to patients body habitus. Left ventricular function is low normal, LVEF 50%   Moderate left ventricular hypertrophy. Grade I diastolic dysfunction. Mildly dilated left atrium. No significant valvular disease noted. Moderate tricuspid regurgitation; RVSP is 47 mmHg. Mild Pulmonary hypertension . No evidence of pericardial effusion. Stress Test:9/12/22  abnormal, decreased uptake inferiorly due to diaphragmatic artifact. Partially reversible inferior apical perfusion defect. LHC:12/14/20  LMCA: Normal.     LAD: Moderate Lumen Irregularity. over all quality of LAD is diffusely diseased     LCx: Moderate Lumen Irregularity and Diffuse irregularity. RCA: Moderate Lumen Irregularity. Ramus:       Lesion on Ramus: 99% stenosis. Culprit lesion. The ASCVD Risk score (Kelle Bustard., et al., 2013) failed to calculate for the following reasons: The patient has a prior MI or stroke diagnosis      Assessment/ Plan:     Coronary artery disease involving native coronary artery of native heart without angina pectoris   - Patient was NSTEMI 12/14/20 received PCI to ramus. Patient noted to have moderate lumen irregularities in LCA, LCx, RCA. Recent stress test abnormal, partially reversible inferior apical.  There is some diaphragmatic artifact noted. Case discussed with Dr. Karena Iglesias, will proceed with [facility]. SOB (shortness of breath)   - Patient having shortness of breath. Echocardiogram shows moderate LVH. Moderate tricuspid regurgitation. EF intact 50%. Stress test abnormal decreased uptake inferiorly due to diaphragmatic artifact. LHC in 2020 showed moderate disease of LAD, RCA, circumflex. Case discussed with Dr. Karena Iglesias will proceed with [facility]. S/P ablation of atrial fibrillation   - Patient had CVA during ablation in 2021. Is now on sotalol therapy.      Class 2 severe obesity due to excess calories with serious comorbidity and body mass index (BMI) of 39.0 to 39.9 in [facility]) -Discussed the importance of diet and exercise and assisting with weight loss. Patient informed of ideal body weight and high risk mortality associated with obesity. Patient voices understanding.    -Hold off on exercise until after [facility]. Pacemaker   - Stable device. 74.7% AP. Patient seen, interviewed and examined. Testing was reviewed. Patient is encouraged to exercise even a brisk walk for 30 minutes at least 3 to 4 times a week. Lifestyle and risk factor modificatons discussed. Various goals are discussed and questions answered. Continue current medications. Appropriate prescriptions are addressed. Questions answered and patient verbalizes understanding. Call for any problems, questions, or concerns. Pt is to follow up in 1 months for Cardiac management    Electronically signed by Doris Garcia.  JAMIE Rand CNP on 9/21/2022 at 10:23 AM

## 2022-09-21 NOTE — ASSESSMENT & PLAN NOTE
- Patient was NSTEMI 12/14/20 received PCI to ramus. Patient noted to have moderate lumen irregularities in LCA, LCx, RCA. Recent stress test abnormal, partially reversible inferior apical.  There is some diaphragmatic artifact noted. Case discussed with Dr. Betsy Sood, will proceed with [facility].

## 2022-09-22 LAB
ESTIMATED AVERAGE GLUCOSE: 171.4 MG/DL
HBA1C MFR BLD: 7.6 %

## 2022-09-22 NOTE — RESULT ENCOUNTER NOTE
Please call pt, lab indicated persistent low VIt d level so rec to start otc vit D3 1000 units/day and we'll cont to monitor this. His sugar and triglycerides remain sl high, a1c rising  from 7.2--7.6 so pls work aggressively w diet and exercise trying to lose 10# in the next 3months.     Other lab for thyroid fxn, and psa/prostate test are nl range

## 2022-09-28 ENCOUNTER — NURSE ONLY (OUTPATIENT)
Dept: CARDIOLOGY CLINIC | Age: 62
End: 2022-09-28

## 2022-10-04 ENCOUNTER — HOSPITAL ENCOUNTER (OUTPATIENT)
Dept: CARDIAC CATH/INVASIVE PROCEDURES | Age: 62
Discharge: HOME | End: 2022-10-04
Attending: INTERNAL MEDICINE | Admitting: INTERNAL MEDICINE
Payer: MEDICAID

## 2022-10-04 ENCOUNTER — TELEPHONE (OUTPATIENT)
Dept: CARDIOLOGY CLINIC | Age: 62
End: 2022-10-04

## 2022-10-04 ENCOUNTER — COMMUNITY OUTREACH (OUTPATIENT)
Dept: INTERNAL MEDICINE CLINIC | Age: 62
End: 2022-10-04

## 2022-10-04 VITALS
DIASTOLIC BLOOD PRESSURE: 89 MMHG | HEART RATE: 71 BPM | HEIGHT: 71 IN | BODY MASS INDEX: 39.34 KG/M2 | RESPIRATION RATE: 13 BRPM | SYSTOLIC BLOOD PRESSURE: 129 MMHG | WEIGHT: 281 LBS | TEMPERATURE: 96.7 F | OXYGEN SATURATION: 95 %

## 2022-10-04 PROBLEM — R94.39 ABNORMAL STRESS TEST: Status: ACTIVE | Noted: 2022-10-04

## 2022-10-04 LAB — GLUCOSE BLD-MCNC: 164 MG/DL (ref 70–99)

## 2022-10-04 PROCEDURE — 6360000004 HC RX CONTRAST MEDICATION

## 2022-10-04 PROCEDURE — 93458 L HRT ARTERY/VENTRICLE ANGIO: CPT

## 2022-10-04 PROCEDURE — 93458 L HRT ARTERY/VENTRICLE ANGIO: CPT | Performed by: INTERNAL MEDICINE

## 2022-10-04 PROCEDURE — 6360000002 HC RX W HCPCS

## 2022-10-04 PROCEDURE — 82962 GLUCOSE BLOOD TEST: CPT

## 2022-10-04 RX ORDER — DIPHENHYDRAMINE HCL 25 MG
25 TABLET ORAL ONCE
Status: DISCONTINUED | OUTPATIENT
Start: 2022-10-04 | End: 2022-10-04 | Stop reason: HOSPADM

## 2022-10-04 RX ORDER — SODIUM CHLORIDE 9 MG/ML
INJECTION, SOLUTION INTRAVENOUS CONTINUOUS
Status: DISCONTINUED | OUTPATIENT
Start: 2022-10-04 | End: 2022-10-04 | Stop reason: HOSPADM

## 2022-10-04 RX ORDER — DIAZEPAM 5 MG/1
5 TABLET ORAL ONCE
Status: DISCONTINUED | OUTPATIENT
Start: 2022-10-04 | End: 2022-10-04 | Stop reason: HOSPADM

## 2022-10-04 NOTE — PLAN OF CARE
All discharge instructions explained to the patient at this time. No bleeding or hematoma noted along site. Patient walked to the bathroom without difficulty and IV removed per discharge orders. Patient denies any additional needs and all vitals stable for discharge home.   [address] [place] [address] 10/4/2022

## 2022-10-04 NOTE — PROGRESS NOTES
To car per wheelchair and home with wife. Procedure site remains free from active bleeding and hematoma at discharge, arm board in place.

## 2022-10-04 NOTE — TELEPHONE ENCOUNTER
Reminder call given 10/3/22. Instructions reviewed. Patient acknowledged understanding.  Reminded of F/U appointment 10/7/22 @ 4:30

## 2022-10-04 NOTE — DISCHARGE INSTRUCTIONS
Home instruction for Radial site Heart Catheterization:    Do not lift with affected arm for 3 days. Avoid lifting >10lbs. No flexing or bending affected wrist.    Remove dressing the next day, keep area clean and dry until healed. Do not submerge site in water for 3 days. Slight tenderness is normal, but notify doctor if tingling of fingers/hand happens. If bleeding or hematoma (blood collecting under the skin) occurs at site, apply pressure to slow the bleeding and notify doctor immediately. No driving for 3 days. For your procedure you may have been given a sedative to help you relax. This drug will make you sleepy. It is usually given in a vein (by IV). Don't do anything for 24 hours that requires attention to detail. It takes time for the medicine effects to completely wear off. Do not sign any legally binding contracts or documents over the next 24 hours. For your safety, you should not drive or operate any machinery that could be dangerous until the medicine wears off and you can think clearly and react easily.

## 2022-10-07 ENCOUNTER — TELEPHONE (OUTPATIENT)
Dept: CARDIOLOGY CLINIC | Age: 62
End: 2022-10-07

## 2022-10-18 ENCOUNTER — OFFICE VISIT (OUTPATIENT)
Dept: CARDIOLOGY CLINIC | Age: 62
End: 2022-10-18
Payer: MEDICAID

## 2022-10-18 VITALS
HEIGHT: 71 IN | HEART RATE: 79 BPM | WEIGHT: 285.6 LBS | DIASTOLIC BLOOD PRESSURE: 78 MMHG | BODY MASS INDEX: 39.98 KG/M2 | SYSTOLIC BLOOD PRESSURE: 124 MMHG

## 2022-10-18 DIAGNOSIS — R06.02 SOB (SHORTNESS OF BREATH): ICD-10-CM

## 2022-10-18 DIAGNOSIS — I25.10 CORONARY ARTERY DISEASE INVOLVING NATIVE CORONARY ARTERY OF NATIVE HEART WITHOUT ANGINA PECTORIS: Primary | ICD-10-CM

## 2022-10-18 DIAGNOSIS — Z98.890 S/P ABLATION OF ATRIAL FIBRILLATION: ICD-10-CM

## 2022-10-18 DIAGNOSIS — E66.01 CLASS 2 SEVERE OBESITY DUE TO EXCESS CALORIES WITH SERIOUS COMORBIDITY AND BODY MASS INDEX (BMI) OF 39.0 TO 39.9 IN ADULT (HCC): ICD-10-CM

## 2022-10-18 DIAGNOSIS — Z86.79 S/P ABLATION OF ATRIAL FIBRILLATION: ICD-10-CM

## 2022-10-18 DIAGNOSIS — Z95.0 PACEMAKER: ICD-10-CM

## 2022-10-18 PROCEDURE — G8427 DOCREV CUR MEDS BY ELIG CLIN: HCPCS | Performed by: NURSE PRACTITIONER

## 2022-10-18 PROCEDURE — 1036F TOBACCO NON-USER: CPT | Performed by: NURSE PRACTITIONER

## 2022-10-18 PROCEDURE — G8484 FLU IMMUNIZE NO ADMIN: HCPCS | Performed by: NURSE PRACTITIONER

## 2022-10-18 PROCEDURE — 99214 OFFICE O/P EST MOD 30 MIN: CPT | Performed by: NURSE PRACTITIONER

## 2022-10-18 PROCEDURE — 3017F COLORECTAL CA SCREEN DOC REV: CPT | Performed by: NURSE PRACTITIONER

## 2022-10-18 PROCEDURE — G8417 CALC BMI ABV UP PARAM F/U: HCPCS | Performed by: NURSE PRACTITIONER

## 2022-10-18 ASSESSMENT — ENCOUNTER SYMPTOMS: SHORTNESS OF BREATH: 1

## 2022-10-18 NOTE — PATIENT INSTRUCTIONS
Please be informed that if you contact our office outside of normal business hours the physician on call cannot help with any scheduling or rescheduling issues, procedure instruction questions or any type of medication issue. We advise you for any urgent/emergency that you go to the nearest emergency room! PLEASE CALL OUR OFFICE DURING NORMAL BUSINESS HOURS    Monday - Friday   8 am to 5 pm    [place]: [address]: [phone]    [place]:  [phone]      **It is YOUR responsibilty to bring medication bottles and/or updated medication list to [address] [place] [address].  This will allow us to better serve you and all your healthcare needs**

## 2022-10-18 NOTE — PROGRESS NOTES
[facility] ([facility]    [address]  Phone: [phone]    Fax [phone]                  Zaira Leija MD, Dheeraj Weinstein MD, [address], MD, MD Vishal Harrington MD, Garrett Belle MD, Juan Antonio Adames MD, JAMIE Azul APRN Linn Satchel, JAMIE George        Cardiology Progress Note      10/18/2022    RE: Radha Zazueta  (1960)                             Primary cardiologist: Dr. Vishal Napier       Subjective:  CC:   1. Pacemaker    2. S/P ablation of atrial fibrillation    3. Coronary artery disease involving native coronary artery of native heart without angina pectoris    4. Class 2 severe obesity due to excess calories with serious comorbidity and body mass index (BMI) of 39.0 to 39.9 in adult ([facility]    5. SOB (shortness of breath)        HPI: Radha Zazueta, who is a  60y.o. year old male with a past medical history as listed below. Patient presents to the office for follow up on CAD, HTN, A. fib, obesity, shortness of breath, PPM, and hyperlipidemia. Patient has had ongoing difficulties with atrial fibrillation management. He also has a history of sick sinus syndrome and had PPM placed 7/7/2021. He was cardioverted multiple times in the past then had atrial fibrillation 9/8/21 and had CVA at that time. He had reduced LVH at that time. He was started on sotalol therapy in May 2022. He reports having increased shortness of breath and fatigue over the last month. Echocardiogram EF intact with moderate LVH. Chest x-ray showed cardiomegaly. He had abnormal stress test and then normal LHC.      Past Medical History:   Diagnosis Date    Arthritis 04/23/2021    LOW BACK PAIN, IMPROVED ON CYMBALTA    Atrial fibrillation, chronic ([facility] 12/2020    Dr Garrett Iglesias, also has pacer and is on eliquis    Cerebrovascular accident [facility]) 09/09/2021    expressive aphasia- embolic from cardioversion for afib/ LEFT FRONTAL/ANTERIOR CIRCULATION. Closed displaced fracture of anterior column of right acetabulum [facility] 03/07/2022    COPD (chronic obstructive pulmonary disease) ([facility] 06/15/2021    Coronary artery disease involving native coronary artery of native heart without angina pectoris 06/15/2021    hx of MI 2011 and also 2021- hx of PCI/stent twice. Diabetes ([facility] 09/10/2021    dx'd Sept 2021- by Dr Kavon Bill. ?may be related to chr steroid use also? H/O echocardiogram 02/02/2021    EF is estimated at 50%. presence of PVC affected LVEF estimation, Mild TR. History of blood transfusion     Hx of blood clots     Hx of cardiovascular stress test 06/11/2021    Small size severe intensity,perfusion defect in apical wall. Hx of cardiovascular stress test 09/14/2022    Decreased uptake inferiorly due to diaphragmatic artifact partially reversible inferioapical perfusion defect noted. Hx of Doppler echocardiogram 09/14/2022    EF 89% Grade I diastolic dysfunction. mildly dilated LA. Mod TR. Mild pulmonary htn. Hx of Doppler ultrasound 04/09/2021    Left distal SSV shows occlusive chronic SVT. Hypertension     Myasthenia gravis ([facility] 2017    sees Dr Angy Ocampo and is controlled on a research injection 40mg daily, also mestinon and chr prednisone 12mg daily    NSTEMI (non-ST elevated myocardial infarction) ([facility] 12/2020    Post PTCA 12/14/2020    Ramus Stented. S/P ablation of atrial fibrillation 09/08/2021    S/P ablation of afib PVI performed by Dr. Darrel Cody.     TIA (transient ischemic attack)     Ulcerative colitis ([facility]        Current Outpatient Medications   Medication Sig Dispense Refill    magnesium oxide (MAG-OX) 400 (240 Mg) MG tablet Take 1 tablet by mouth daily 30 tablet 3    DULoxetine (CYMBALTA) 60 MG extended release capsule Take 1 capsule by mouth daily 90 capsule 1    metFORMIN (GLUCOPHAGE-XR) 500 MG extended release tablet Take 2 tablets by mouth 2 times daily 360 tablet 1    budesonide-formoterol (SYMBICORT) 160-4.5 MCG/ACT AERO Inhale 2 puffs into the lungs in the morning and 2 puffs in the evening. 1 each 3    tiotropium (SPIRIVA HANDIHALER) 18 MCG inhalation capsule Inhale 1 capsule into the lungs daily 90 capsule 1    pantoprazole (PROTONIX) 40 MG tablet Take 1 tablet by mouth 2 times daily 180 tablet 1    pyridostigmine (MESTINON) 60 MG tablet Take 1 tablet by mouth 4 times daily GETS FROM NEURO DR HARRIS 120 tablet 0    Ostomy Supplies KIT 1 kit by Does not apply route daily 1 kit 3    sotalol (BETAPACE) 80 MG tablet Take 1 tablet by mouth 2 times daily 60 tablet 2    ELIQUIS 5 MG TABS tablet Take 1 tablet by mouth 2 times daily 60 tablet 3    clopidogrel (PLAVIX) 75 MG tablet Take 1 tablet by mouth daily 30 tablet 3    atorvastatin (LIPITOR) 40 MG tablet Take 1 tablet by mouth nightly START 6/25/2021 30 tablet 3    GLUCOSAMINE-CALCIUM-VIT D PO Take by mouth daily      NONFORMULARY Inject 40 mg/L into the skin every evening Zilucoplan  - patient provided experimental drug from [employer] [facility] for Myasthenia Gravis      vitamin B-12 (CYANOCOBALAMIN) 500 MCG tablet Take 1,000 mcg by mouth daily Indications: 8am       PREDNISONE PO Take 13 mg by mouth daily Indications: Takes at  8am       acetaminophen (TYLENOL) 325 MG tablet Take 650 mg by mouth every 4 hours as needed for Pain or Fever       No current facility-administered medications for this visit.      Facility-Administered Medications Ordered in Other Visits   Medication Dose Route Frequency Provider Last Rate Last Admin    Immune Globulin (Human) solution 20 g  20 g IntraVENous Once Diane Kaur MD        And    Immune Globulin (Human) solution 20 g  20 g IntraVENous Once Diane Kaur MD        Immune Globulin (Human) solution 20 g  20 g IntraVENous Once Diane Kaur MD        And    Immune Globulin (Human) solution 20 g  20 g IntraVENous Once Diane Kaur MD Review of Systems:  Review of Systems   Respiratory:  Positive for shortness of breath. Cardiovascular:  Negative for chest pain, palpitations and leg swelling. Musculoskeletal: Negative. Skin: Negative. Neurological:  Negative for dizziness and weakness. All other systems reviewed and are negative. Objective:      Physical Exam:  /78 (Site: Left Upper Arm, Position: Sitting, Cuff Size: Large Adult)   Pulse 79   Ht 5' 11\" (1.803 m)   Wt 285 lb 9.6 oz (129.5 kg)   BMI 39.83 kg/m²   Wt Readings from Last 3 Encounters:   10/18/22 285 lb 9.6 oz (129.5 kg)   10/04/22 281 lb (127.5 kg)   09/21/22 279 lb 9.6 oz (126.8 kg)     Body mass index is 39.83 kg/m². Physical exam:  Physical Exam  Constitutional:       Appearance: He is well-developed. He is obese. Cardiovascular:      Rate and Rhythm: Normal rate and regular rhythm. Pulses: Intact distal pulses. Dorsalis pedis pulses are 2+ on the right side and 2+ on the left side. Posterior tibial pulses are 2+ on the right side and 2+ on the left side. Heart sounds: Normal heart sounds, S1 normal and S2 normal.   Pulmonary:      Effort: Pulmonary effort is normal.      Breath sounds: Normal breath sounds. Musculoskeletal:         General: Normal range of motion. Skin:     General: Skin is warm and dry. Neurological:      Mental Status: He is alert and oriented to person, place, and time.         DATA:  No results found for: CKTOTAL, CKMB, CKMBINDEX, TROPONINI  BNP:  No results found for: BNP  PT/INR:  No results found for: PTINR  Lab Results   Component Value Date    LABA1C 7.6 09/21/2022    LABA1C 7.2 (H) 03/07/2022     Lab Results   Component Value Date    CHOL 175 09/21/2022    TRIG 388 (H) 09/21/2022    HDL 36 (L) 09/21/2022    LDLCALC see below 09/21/2022    LDLDIRECT 92 09/21/2022     Lab Results   Component Value Date    ALT 28 09/21/2022    AST 28 09/21/2022     TSH:    Lab Results   Component Value Patient had CVA during ablation in 2021. Is now on sotalol therapy. Class 2 severe obesity due to excess calories with serious comorbidity and body mass index (BMI) of 39.0 to 39.9 in adult [facility])   -Discussed the importance of diet and exercise and assisting with weight loss. Patient informed of ideal body weight and high risk mortality associated with obesity. Patient voices understanding. Pacemaker   - Stable device. 74.7% AP. Dyslipidemia  -At or near goal low LDL, triglycerides elevated.   -He is to continue current medications (Lipitor 40 mg) Hepatic function panel WNL. No abdominal pain. No myalgias.     -The nature of cardiac risk has been fully discussed with this patient. I have made him aware of his LDL target goal given his cardiovascular risk analysis. I have discussed the appropriate diet. The need for lifelong compliance in order to reduce risk is stressed. A regular exercise program is recommended to help achieve and maintain normal body weight, fitness and improve lipid balance. A  Patient seen, interviewed and examined. Testing was reviewed. Patient is encouraged to exercise even a brisk walk for 30 minutes at least 3 to 4 times a week. Lifestyle and risk factor modificatons discussed. Various goals are discussed and questions answered. Continue current medications. Appropriate prescriptions are addressed. Questions answered and patient verbalizes understanding. Call for any problems, questions, or concerns. Pt is to follow up in 3 months for Cardiac management    Electronically signed by Arsalan Gomez.  JAMIE Dumont CNP on 10/18/2022 at 4:49 PM

## 2022-11-01 ENCOUNTER — PROCEDURE VISIT (OUTPATIENT)
Dept: CARDIOLOGY CLINIC | Age: 62
End: 2022-11-01

## 2022-11-01 DIAGNOSIS — I44.0 AV BLOCK, 1ST DEGREE: ICD-10-CM

## 2022-11-01 DIAGNOSIS — Z95.0 CARDIAC PACEMAKER IN SITU: Primary | ICD-10-CM

## 2022-11-01 DIAGNOSIS — I49.5 SINUS NODE DYSFUNCTION (HCC): ICD-10-CM

## 2022-11-29 ENCOUNTER — TELEPHONE (OUTPATIENT)
Dept: PULMONOLOGY | Age: 62
End: 2022-11-29

## 2022-12-01 ENCOUNTER — APPOINTMENT (OUTPATIENT)
Dept: CT IMAGING | Age: 62
End: 2022-12-01

## 2022-12-01 ENCOUNTER — HOSPITAL ENCOUNTER (EMERGENCY)
Age: 62
Discharge: HOME | End: 2022-12-01
Attending: EMERGENCY MEDICINE

## 2022-12-01 VITALS
OXYGEN SATURATION: 97 % | HEART RATE: 83 BPM | DIASTOLIC BLOOD PRESSURE: 85 MMHG | RESPIRATION RATE: 20 BRPM | BODY MASS INDEX: 39.06 KG/M2 | WEIGHT: 279 LBS | HEIGHT: 71 IN | SYSTOLIC BLOOD PRESSURE: 128 MMHG | TEMPERATURE: 97.8 F

## 2022-12-01 DIAGNOSIS — N20.0 KIDNEY STONE: Primary | ICD-10-CM

## 2022-12-01 DIAGNOSIS — R31.9 HEMATURIA, UNSPECIFIED TYPE: ICD-10-CM

## 2022-12-01 DIAGNOSIS — R10.9 FLANK PAIN: ICD-10-CM

## 2022-12-01 LAB
ALBUMIN SERPL-MCNC: 4.5 GM/DL (ref 3.4–5)
ALP BLD-CCNC: 74 IU/L (ref 40–129)
ALT SERPL-CCNC: 21 U/L (ref 10–40)
ANION GAP SERPL CALCULATED.3IONS-SCNC: 12 MMOL/L (ref 4–16)
AST SERPL-CCNC: 23 IU/L (ref 15–37)
BACTERIA: ABNORMAL /HPF
BASOPHILS ABSOLUTE: 0 K/CU MM
BASOPHILS RELATIVE PERCENT: 0.3 % (ref 0–1)
BILIRUB SERPL-MCNC: 0.7 MG/DL (ref 0–1)
BILIRUBIN URINE: ABNORMAL MG/DL
BLOOD, URINE: ABNORMAL
BUN BLDV-MCNC: 24 MG/DL (ref 6–23)
CALCIUM SERPL-MCNC: 10.1 MG/DL (ref 8.3–10.6)
CAST TYPE: ABNORMAL /HPF
CHLORIDE BLD-SCNC: 101 MMOL/L (ref 99–110)
CLARITY: ABNORMAL
CO2: 24 MMOL/L (ref 21–32)
COLOR: ABNORMAL
COMMENT UA: ABNORMAL
CREAT SERPL-MCNC: 1.5 MG/DL (ref 0.9–1.3)
CRYSTAL TYPE: NEGATIVE /HPF
DIFFERENTIAL TYPE: ABNORMAL
EOSINOPHILS ABSOLUTE: 0.2 K/CU MM
EOSINOPHILS RELATIVE PERCENT: 1.7 % (ref 0–3)
EPITHELIAL CELLS, UA: 1 /HPF
GFR SERPL CREATININE-BSD FRML MDRD: 52 ML/MIN/1.73M2
GLUCOSE BLD-MCNC: 216 MG/DL (ref 70–99)
GLUCOSE, URINE: NEGATIVE MG/DL
HCT VFR BLD CALC: 43.6 % (ref 42–52)
HEMOGLOBIN: 14.5 GM/DL (ref 13.5–18)
IMMATURE NEUTROPHIL %: 0.3 % (ref 0–0.43)
KETONES, URINE: ABNORMAL MG/DL
LEUKOCYTE ESTERASE, URINE: ABNORMAL
LIPASE: 28 IU/L (ref 13–60)
LYMPHOCYTES ABSOLUTE: 1.4 K/CU MM
LYMPHOCYTES RELATIVE PERCENT: 11.6 % (ref 24–44)
MCH RBC QN AUTO: 29.9 PG (ref 27–31)
MCHC RBC AUTO-ENTMCNC: 33.3 % (ref 32–36)
MCV RBC AUTO: 89.9 FL (ref 78–100)
MONOCYTES ABSOLUTE: 1.1 K/CU MM
MONOCYTES RELATIVE PERCENT: 9.3 % (ref 0–4)
NITRITE URINE, QUANTITATIVE: NEGATIVE
PDW BLD-RTO: 13.2 % (ref 11.7–14.9)
PH, URINE: 5 (ref 5–8)
PLATELET # BLD: 303 K/CU MM (ref 140–440)
PMV BLD AUTO: 9.5 FL (ref 7.5–11.1)
POTASSIUM SERPL-SCNC: 4.6 MMOL/L (ref 3.5–5.1)
PROTEIN UA: >300 MG/DL
RBC # BLD: 4.85 M/CU MM (ref 4.6–6.2)
RBC URINE: >50 /HPF (ref 0–3)
SEGMENTED NEUTROPHILS ABSOLUTE COUNT: 9.1 K/CU MM
SEGMENTED NEUTROPHILS RELATIVE PERCENT: 76.8 % (ref 36–66)
SODIUM BLD-SCNC: 137 MMOL/L (ref 135–145)
SPECIFIC GRAVITY UA: 1.02 (ref 1–1.03)
TOTAL IMMATURE NEUTOROPHIL: 0.04 K/CU MM
TOTAL PROTEIN: 8 GM/DL (ref 6.4–8.2)
UROBILINOGEN, URINE: 0.2 MG/DL (ref 0.2–1)
WBC # BLD: 11.8 K/CU MM (ref 4–10.5)
WBC UA: 2 /HPF (ref 0–2)

## 2022-12-01 PROCEDURE — 80053 COMPREHEN METABOLIC PANEL: CPT

## 2022-12-01 PROCEDURE — 85025 COMPLETE CBC W/AUTO DIFF WBC: CPT

## 2022-12-01 PROCEDURE — 83690 ASSAY OF LIPASE: CPT

## 2022-12-01 PROCEDURE — 74176 CT ABD & PELVIS W/O CONTRAST: CPT

## 2022-12-01 PROCEDURE — 96374 THER/PROPH/DIAG INJ IV PUSH: CPT

## 2022-12-01 PROCEDURE — 81001 URINALYSIS AUTO W/SCOPE: CPT

## 2022-12-01 PROCEDURE — 6360000002 HC RX W HCPCS: Performed by: EMERGENCY MEDICINE

## 2022-12-01 PROCEDURE — 2580000003 HC RX 258: Performed by: EMERGENCY MEDICINE

## 2022-12-01 PROCEDURE — 99284 EMERGENCY DEPT VISIT MOD MDM: CPT

## 2022-12-01 PROCEDURE — 87086 URINE CULTURE/COLONY COUNT: CPT

## 2022-12-01 PROCEDURE — 96375 TX/PRO/DX INJ NEW DRUG ADDON: CPT

## 2022-12-01 RX ORDER — TAMSULOSIN HYDROCHLORIDE 0.4 MG/1
0.4 CAPSULE ORAL DAILY
Qty: 10 CAPSULE | Refills: 0 | Status: SHIPPED | OUTPATIENT
Start: 2022-12-01 | End: 2022-12-11

## 2022-12-01 RX ORDER — ONDANSETRON 4 MG/1
4 TABLET, FILM COATED ORAL DAILY PRN
Qty: 30 TABLET | Refills: 0 | Status: SHIPPED | OUTPATIENT
Start: 2022-12-01

## 2022-12-01 RX ORDER — KETOROLAC TROMETHAMINE 30 MG/ML
30 INJECTION, SOLUTION INTRAMUSCULAR; INTRAVENOUS ONCE
Status: COMPLETED | OUTPATIENT
Start: 2022-12-01 | End: 2022-12-01

## 2022-12-01 RX ORDER — MORPHINE SULFATE 4 MG/ML
4 INJECTION, SOLUTION INTRAMUSCULAR; INTRAVENOUS EVERY 30 MIN PRN
Status: DISCONTINUED | OUTPATIENT
Start: 2022-12-01 | End: 2022-12-01 | Stop reason: HOSPADM

## 2022-12-01 RX ORDER — ONDANSETRON 2 MG/ML
4 INJECTION INTRAMUSCULAR; INTRAVENOUS EVERY 30 MIN PRN
Status: DISCONTINUED | OUTPATIENT
Start: 2022-12-01 | End: 2022-12-01 | Stop reason: HOSPADM

## 2022-12-01 RX ORDER — NAPROXEN 500 MG/1
500 TABLET ORAL 2 TIMES DAILY
Qty: 60 TABLET | Refills: 0 | Status: SHIPPED | OUTPATIENT
Start: 2022-12-01

## 2022-12-01 RX ORDER — 0.9 % SODIUM CHLORIDE 0.9 %
1000 INTRAVENOUS SOLUTION INTRAVENOUS ONCE
Status: COMPLETED | OUTPATIENT
Start: 2022-12-01 | End: 2022-12-01

## 2022-12-01 RX ORDER — HYDROCODONE BITARTRATE AND ACETAMINOPHEN 5; 325 MG/1; MG/1
1-2 TABLET ORAL EVERY 6 HOURS PRN
Qty: 15 TABLET | Refills: 0 | Status: SHIPPED | OUTPATIENT
Start: 2022-12-01 | End: 2022-12-04

## 2022-12-01 RX ADMIN — SODIUM CHLORIDE 1000 ML: 9 INJECTION, SOLUTION INTRAVENOUS at 11:30

## 2022-12-01 RX ADMIN — KETOROLAC TROMETHAMINE 30 MG: 30 INJECTION, SOLUTION INTRAMUSCULAR; INTRAVENOUS at 11:33

## 2022-12-01 RX ADMIN — MORPHINE SULFATE 4 MG: 4 INJECTION, SOLUTION INTRAMUSCULAR; INTRAVENOUS at 12:05

## 2022-12-01 RX ADMIN — ONDANSETRON 4 MG: 2 INJECTION INTRAMUSCULAR; INTRAVENOUS at 11:32

## 2022-12-01 ASSESSMENT — PAIN DESCRIPTION - DESCRIPTORS
DESCRIPTORS: ACHING
DESCRIPTORS: ACHING
DESCRIPTORS: DULL;ACHING

## 2022-12-01 ASSESSMENT — PAIN DESCRIPTION - FREQUENCY
FREQUENCY: CONTINUOUS
FREQUENCY: CONTINUOUS

## 2022-12-01 ASSESSMENT — PAIN DESCRIPTION - ORIENTATION
ORIENTATION: RIGHT

## 2022-12-01 ASSESSMENT — ENCOUNTER SYMPTOMS
RESPIRATORY NEGATIVE: 1
NAUSEA: 1
ABDOMINAL PAIN: 1
VOMITING: 1
EYES NEGATIVE: 1
ALLERGIC/IMMUNOLOGIC NEGATIVE: 1

## 2022-12-01 ASSESSMENT — PAIN - FUNCTIONAL ASSESSMENT
PAIN_FUNCTIONAL_ASSESSMENT: ACTIVITIES ARE NOT PREVENTED
PAIN_FUNCTIONAL_ASSESSMENT: 0-10

## 2022-12-01 ASSESSMENT — PAIN SCALES - GENERAL
PAINLEVEL_OUTOF10: 10
PAINLEVEL_OUTOF10: 10
PAINLEVEL_OUTOF10: 5
PAINLEVEL_OUTOF10: 7

## 2022-12-01 ASSESSMENT — PAIN DESCRIPTION - LOCATION
LOCATION: FLANK

## 2022-12-01 ASSESSMENT — PAIN DESCRIPTION - PAIN TYPE
TYPE: ACUTE PAIN
TYPE: ACUTE PAIN

## 2022-12-01 NOTE — Clinical Note
Dagoberto Ash was seen and treated in our emergency department on 12/1/2022. He may return to work on 12/05/2022. If you have any questions or concerns, please don't hesitate to call.       Aleja Mtz, DO

## 2022-12-03 LAB
CULTURE: NORMAL
Lab: NORMAL
SPECIMEN: NORMAL

## 2022-12-20 RX ORDER — SOTALOL HYDROCHLORIDE 80 MG/1
80 TABLET ORAL 2 TIMES DAILY
Qty: 60 TABLET | Refills: 5 | Status: SHIPPED | OUTPATIENT
Start: 2022-12-20

## 2022-12-22 ENCOUNTER — OFFICE VISIT (OUTPATIENT)
Dept: INTERNAL MEDICINE CLINIC | Age: 62
End: 2022-12-22
Payer: MEDICAID

## 2022-12-22 VITALS
SYSTOLIC BLOOD PRESSURE: 134 MMHG | OXYGEN SATURATION: 96 % | WEIGHT: 276 LBS | HEART RATE: 75 BPM | BODY MASS INDEX: 38.49 KG/M2 | DIASTOLIC BLOOD PRESSURE: 82 MMHG | RESPIRATION RATE: 16 BRPM

## 2022-12-22 DIAGNOSIS — N40.1 BENIGN PROSTATIC HYPERPLASIA WITH URINARY HESITANCY: ICD-10-CM

## 2022-12-22 DIAGNOSIS — I25.10 CORONARY ARTERY DISEASE INVOLVING NATIVE CORONARY ARTERY OF NATIVE HEART WITHOUT ANGINA PECTORIS: ICD-10-CM

## 2022-12-22 DIAGNOSIS — I48.20 ATRIAL FIBRILLATION, CHRONIC (HCC): ICD-10-CM

## 2022-12-22 DIAGNOSIS — N20.0 NEPHROLITHIASIS: Primary | ICD-10-CM

## 2022-12-22 DIAGNOSIS — R39.11 BENIGN PROSTATIC HYPERPLASIA WITH URINARY HESITANCY: ICD-10-CM

## 2022-12-22 DIAGNOSIS — N52.9 ERECTILE DYSFUNCTION, UNSPECIFIED ERECTILE DYSFUNCTION TYPE: ICD-10-CM

## 2022-12-22 DIAGNOSIS — E11.9 TYPE 2 DIABETES MELLITUS WITHOUT COMPLICATION, WITHOUT LONG-TERM CURRENT USE OF INSULIN (HCC): ICD-10-CM

## 2022-12-22 DIAGNOSIS — N20.0 NEPHROLITHIASIS: ICD-10-CM

## 2022-12-22 DIAGNOSIS — G70.00 MYASTHENIA GRAVIS (HCC): ICD-10-CM

## 2022-12-22 DIAGNOSIS — H10.31 ACUTE BACTERIAL CONJUNCTIVITIS OF RIGHT EYE: ICD-10-CM

## 2022-12-22 LAB
BACTERIA: ABNORMAL /HPF
BILIRUBIN URINE: NEGATIVE
BLOOD, URINE: ABNORMAL
CHOLESTEROL, TOTAL: 139 MG/DL (ref 0–199)
CLARITY: ABNORMAL
COLOR: YELLOW
COMMENT UA: ABNORMAL
CRYSTALS, UA: ABNORMAL /HPF
EPITHELIAL CELLS, UA: 3 /HPF (ref 0–5)
ESTIMATED AVERAGE GLUCOSE: 180 MG/DL
GLUCOSE URINE: NEGATIVE MG/DL
HBA1C MFR BLD: 7.9 %
HDLC SERPL-MCNC: 34 MG/DL (ref 40–60)
HYALINE CASTS: 10 /LPF (ref 0–8)
KETONES, URINE: ABNORMAL MG/DL
LDL CHOLESTEROL CALCULATED: 72 MG/DL
LEUKOCYTE ESTERASE, URINE: NEGATIVE
MICROSCOPIC EXAMINATION: YES
MUCUS: PRESENT
NITRITE, URINE: NEGATIVE
PH UA: 5.5 (ref 5–8)
PROTEIN UA: 30 MG/DL
RBC UA: 220 /HPF (ref 0–4)
SPECIFIC GRAVITY UA: 1.02 (ref 1–1.03)
TRIGL SERPL-MCNC: 164 MG/DL (ref 0–150)
URINE TYPE: ABNORMAL
UROBILINOGEN, URINE: 1 E.U./DL
VLDLC SERPL CALC-MCNC: 33 MG/DL
WBC UA: 3 /HPF (ref 0–5)

## 2022-12-22 PROCEDURE — G8484 FLU IMMUNIZE NO ADMIN: HCPCS | Performed by: INTERNAL MEDICINE

## 2022-12-22 PROCEDURE — G8417 CALC BMI ABV UP PARAM F/U: HCPCS | Performed by: INTERNAL MEDICINE

## 2022-12-22 PROCEDURE — 1036F TOBACCO NON-USER: CPT | Performed by: INTERNAL MEDICINE

## 2022-12-22 PROCEDURE — 2022F DILAT RTA XM EVC RTNOPTHY: CPT | Performed by: INTERNAL MEDICINE

## 2022-12-22 PROCEDURE — 3051F HG A1C>EQUAL 7.0%<8.0%: CPT | Performed by: INTERNAL MEDICINE

## 2022-12-22 PROCEDURE — 3017F COLORECTAL CA SCREEN DOC REV: CPT | Performed by: INTERNAL MEDICINE

## 2022-12-22 PROCEDURE — G8427 DOCREV CUR MEDS BY ELIG CLIN: HCPCS | Performed by: INTERNAL MEDICINE

## 2022-12-22 PROCEDURE — 99214 OFFICE O/P EST MOD 30 MIN: CPT | Performed by: INTERNAL MEDICINE

## 2022-12-22 RX ORDER — SILDENAFIL CITRATE 20 MG/1
20 TABLET ORAL DAILY PRN
Qty: 30 TABLET | Refills: 3 | Status: SHIPPED | OUTPATIENT
Start: 2022-12-22

## 2022-12-22 RX ORDER — TAMSULOSIN HYDROCHLORIDE 0.4 MG/1
0.4 CAPSULE ORAL DAILY
Qty: 90 CAPSULE | Refills: 1 | Status: SHIPPED | OUTPATIENT
Start: 2022-12-22

## 2022-12-22 RX ORDER — TOBRAMYCIN 3 MG/ML
1 SOLUTION/ DROPS OPHTHALMIC EVERY 6 HOURS
Qty: 1 EACH | Refills: 0 | Status: SHIPPED | OUTPATIENT
Start: 2022-12-22 | End: 2023-01-01

## 2022-12-22 SDOH — ECONOMIC STABILITY: FOOD INSECURITY: WITHIN THE PAST 12 MONTHS, YOU WORRIED THAT YOUR FOOD WOULD RUN OUT BEFORE YOU GOT MONEY TO BUY MORE.: PATIENT DECLINED

## 2022-12-22 SDOH — ECONOMIC STABILITY: FOOD INSECURITY: WITHIN THE PAST 12 MONTHS, THE FOOD YOU BOUGHT JUST DIDN'T LAST AND YOU DIDN'T HAVE MONEY TO GET MORE.: PATIENT DECLINED

## 2022-12-22 ASSESSMENT — SOCIAL DETERMINANTS OF HEALTH (SDOH): HOW HARD IS IT FOR YOU TO PAY FOR THE VERY BASICS LIKE FOOD, HOUSING, MEDICAL CARE, AND HEATING?: PATIENT DECLINED

## 2022-12-22 NOTE — PROGRESS NOTES
Sujata Archuleta  1960 12/22/22    SUBJECTIVE:  had presentation to [facility] ED for R flank pain and R pelvic pain, CT pos for an obstructive stone and hydronehprosis. Started fluids and flomax w benefit. Hematuria has resolved as well as pain. Does try to stay well hydrated. Coronary artery disease has been asymptomatic w/o any ongoing sx of CP, SOB/SHEEHAN, palpitations, lt headedness, diaphoresis. Is continuing medications regularly. Has c/o some fatigue and has had heavy hrs at work. No focal weakness, improves w rest and does not think sx are related to his MG. Continues f/u w Vianey Castillo    DM- been losing wt and also staying on meds w metformin  Lab Results   Component Value Date    LABA1C 7.6 09/21/2022    LABA1C 7.2 (H) 03/07/2022     Lab Results   Component Value Date    GLUF 105 (H) 11/01/2016    LABMICR 3.50 (H) 09/21/2022    LDLCALC see below 09/21/2022    CREATININE 1.5 (H) 12/01/2022     Stream overall better too w flomax so we'll cont    R eye drainage noted w drainage, matting     On cymbalta for mood and also LBP, HELPS BUT HAS SOME MILD ED. HE REQ FOR TRYING VIAGRA, IS NOT ON NTG.  OBJECTIVE:    /82   Pulse 75   Resp 16   Wt 276 lb (125.2 kg)   SpO2 96%   BMI 38.49 kg/m²     Physical Exam  Constitutional:       Appearance: Normal appearance. Eyes:      General:         Right eye: Discharge (mild injection) present. Cardiovascular:      Rate and Rhythm: Normal rate and regular rhythm. Heart sounds: No murmur heard. No gallop. Pulmonary:      Effort: No respiratory distress. Breath sounds: No wheezing. Abdominal:      General: Abdomen is flat. Bowel sounds are normal. There is no distension. Palpations: Abdomen is soft. There is no mass. Tenderness: There is no abdominal tenderness. Hernia: No hernia is present. Musculoskeletal:      Right lower leg: No edema. Left lower leg: No edema.    Neurological:      Mental Status: He is alert.   Psychiatric:         Mood and Affect: Mood normal.       ASSESSMENT:    1. Nephrolithiasis    2. Atrial fibrillation, chronic ([address]    3. Myasthenia gravis ([address]    4. Coronary artery disease involving native coronary artery of native heart without angina pectoris    5. Type 2 diabetes mellitus without complication, without long-term current use of insulin ([address]    6. Benign prostatic hyperplasia with urinary hesitancy    7. Acute bacterial conjunctivitis of right eye    8. Erectile dysfunction, unspecified erectile dysfunction type        PLAN:    Drake Law was seen today for diabetes, eye drainage and fatigue. Diagnoses and all orders for this visit:    Nephrolithiasis- recheck imaging to see if has passed and hydronephrosis has resolved, hematuria has resolved. Cont fluids, restart flomax  -     XR ABDOMEN (KUB) (SINGLE AP VIEW); Future  -     US RETROPERITONEAL LIMITED; Future  -     Urinalysis with Microscopic; Future    Atrial fibrillation, chronic (HCC) - Pt clinically w/o evidence of cardiovascular instability, cont regimen. Is s/p ablation and pacer      Myasthenia gravis [facility])- The current medical regimen is effective;  continue present plan and medications. Coronary artery disease involving native coronary artery of native heart without angina pectoris - Coronary artery disease stable and asymptomatic, will continue to monitor for any signs of instability. Will periodically monitor lipid profile and liver function, cardiac risk factors. Continue current medical regimen.    -     Lipid Panel; Future    Type 2 diabetes mellitus without complication, without long-term current use of insulin ([address] - DM relatively well controlled and will continue current regimen. Screening reviewed. See orders. SIGNIFICANT WT LOSS NOTED AND F/U LAB  -     Lipid Panel;  Future  -     Hemoglobin A1C; Future    Benign prostatic hyperplasia with urinary hesitancy- CONT FLOMAX  -     tamsulosin (FLOMAX) 0.4 MG capsule; Take 1 capsule by mouth daily    Acute bacterial conjunctivitis of right eye -  clinically c/w presentation,  treatment as noted below- To call back one week if not improving.      -     tobramycin (TOBREX) 0.3 % ophthalmic solution; Place 1 drop into the right eye in the morning and 1 drop at noon and 1 drop in the evening and 1 drop before bedtime. Do all this for 10 days. Erectile dysfunction, unspecified erectile dysfunction type STATES WORSE ON CYMBALTA BUT MED HELPFUL FOR HIS BACK PAIN, WE'LL TRY SILDENAFIL PRN  -     sildenafil (REVATIO) 20 MG tablet;  Take 1 tablet by mouth daily as needed (erectile dysfunction)

## 2022-12-23 NOTE — RESULT ENCOUNTER NOTE
Chol, sugars, labs appear in stable range, DM is controlled- BUT AVG SUGAR THE HGB A1C IS SL HIGHER AT 7.9 FROM 7.2 AND 7.6 LAST TWO TIMES SO JATIN DOES NEED A LITTLE MORE WORK W LOW FAT DIET. SECONDLY HIS URINE STILL SHOWS SOME BLOOD PRESENT SO LIKELY STILL HAS A RETAINED STONE.  Lesa Laurel AWAIT THE ABD XRAY AND KIDNEY U/S REPORTS BUT THEN LIKELY WILL STILL NEED REFERRAL TO UROLOGY AS A NEXT STEP.   notify pt please.

## 2022-12-29 ENCOUNTER — HOSPITAL ENCOUNTER (OUTPATIENT)
Age: 62
Discharge: HOME | End: 2022-12-29
Payer: MEDICAID

## 2022-12-29 ENCOUNTER — HOSPITAL ENCOUNTER (OUTPATIENT)
Dept: GENERAL RADIOLOGY | Age: 62
Discharge: HOME | End: 2022-12-29
Payer: MEDICAID

## 2022-12-29 ENCOUNTER — HOSPITAL ENCOUNTER (OUTPATIENT)
Dept: ULTRASOUND IMAGING | Age: 62
Discharge: HOME | End: 2022-12-29
Payer: MEDICAID

## 2022-12-29 DIAGNOSIS — N20.0 NEPHROLITHIASIS: ICD-10-CM

## 2022-12-29 PROCEDURE — 74018 RADEX ABDOMEN 1 VIEW: CPT

## 2022-12-29 PROCEDURE — 76775 US EXAM ABDO BACK WALL LIM: CPT

## 2022-12-30 PROBLEM — N20.0 NEPHROLITHIASIS: Status: ACTIVE | Noted: 2022-12-30

## 2022-12-30 NOTE — RESULT ENCOUNTER NOTE
Please call pt, his prior blocked kidney tube from the stone has resolved but still has other stones present. Since not having any current problems is ok we monitor for now but if any recurring flank pain or blood in urine we need to refer to urology. We could also be more preventative if he wants to look into this further and refer to urology now for evaluation if he wants to be more aggressive, as a second option.

## 2023-02-03 RX ORDER — APIXABAN 5 MG/1
5 TABLET, FILM COATED ORAL 2 TIMES DAILY
Qty: 60 TABLET | Refills: 3 | Status: SHIPPED | OUTPATIENT
Start: 2023-02-03

## 2023-02-03 RX ORDER — ATORVASTATIN CALCIUM 40 MG/1
40 TABLET, FILM COATED ORAL NIGHTLY
Qty: 30 TABLET | Refills: 3 | Status: SHIPPED | OUTPATIENT
Start: 2023-02-03

## 2023-02-04 PROCEDURE — 93296 REM INTERROG EVL PM/IDS: CPT | Performed by: INTERNAL MEDICINE

## 2023-02-04 PROCEDURE — 93294 REM INTERROG EVL PM/LDLS PM: CPT | Performed by: INTERNAL MEDICINE

## 2023-02-06 ENCOUNTER — PROCEDURE VISIT (OUTPATIENT)
Dept: CARDIOLOGY CLINIC | Age: 63
End: 2023-02-06
Payer: MEDICAID

## 2023-02-06 DIAGNOSIS — I49.5 SINUS NODE DYSFUNCTION (HCC): ICD-10-CM

## 2023-02-06 DIAGNOSIS — I44.0 AV BLOCK, 1ST DEGREE: ICD-10-CM

## 2023-02-06 DIAGNOSIS — Z95.0 CARDIAC PACEMAKER IN SITU: ICD-10-CM

## 2023-02-06 RX ORDER — CLOPIDOGREL BISULFATE 75 MG/1
75 TABLET ORAL DAILY
Qty: 30 TABLET | Refills: 3 | OUTPATIENT
Start: 2023-02-06

## 2023-02-07 ENCOUNTER — TELEPHONE (OUTPATIENT)
Dept: CARDIOLOGY CLINIC | Age: 63
End: 2023-02-07

## 2023-02-09 RX ORDER — CLOPIDOGREL BISULFATE 75 MG/1
75 TABLET ORAL DAILY
Qty: 30 TABLET | Refills: 5 | Status: SHIPPED | OUTPATIENT
Start: 2023-02-09

## 2023-02-22 ENCOUNTER — OFFICE VISIT (OUTPATIENT)
Dept: CARDIOLOGY CLINIC | Age: 63
End: 2023-02-22
Payer: MEDICAID

## 2023-02-22 VITALS
OXYGEN SATURATION: 91 % | SYSTOLIC BLOOD PRESSURE: 128 MMHG | BODY MASS INDEX: 39.9 KG/M2 | HEART RATE: 88 BPM | HEIGHT: 71 IN | DIASTOLIC BLOOD PRESSURE: 82 MMHG | WEIGHT: 285 LBS

## 2023-02-22 DIAGNOSIS — I48.0 PAF (PAROXYSMAL ATRIAL FIBRILLATION) (HCC): Primary | ICD-10-CM

## 2023-02-22 PROCEDURE — 99214 OFFICE O/P EST MOD 30 MIN: CPT | Performed by: INTERNAL MEDICINE

## 2023-02-22 PROCEDURE — G8427 DOCREV CUR MEDS BY ELIG CLIN: HCPCS | Performed by: INTERNAL MEDICINE

## 2023-02-22 PROCEDURE — G8417 CALC BMI ABV UP PARAM F/U: HCPCS | Performed by: INTERNAL MEDICINE

## 2023-02-22 PROCEDURE — 1036F TOBACCO NON-USER: CPT | Performed by: INTERNAL MEDICINE

## 2023-02-22 PROCEDURE — 3017F COLORECTAL CA SCREEN DOC REV: CPT | Performed by: INTERNAL MEDICINE

## 2023-02-22 PROCEDURE — G8484 FLU IMMUNIZE NO ADMIN: HCPCS | Performed by: INTERNAL MEDICINE

## 2023-02-22 ASSESSMENT — ENCOUNTER SYMPTOMS
PHOTOPHOBIA: 0
NAUSEA: 0
SHORTNESS OF BREATH: 1
EYE PAIN: 0
VOMITING: 0
CHEST TIGHTNESS: 0
COLOR CHANGE: 0
COUGH: 0
ABDOMINAL PAIN: 0
BLOOD IN STOOL: 0
CONSTIPATION: 0
BACK PAIN: 0
WHEEZING: 0
DIARRHEA: 0

## 2023-02-22 NOTE — PATIENT INSTRUCTIONS
Please be informed that if you contact our office outside of normal business hours the physician on call cannot help with any scheduling or rescheduling issues, procedure instruction questions or any type of medication issue. We advise you for any urgent/emergency that you go to the nearest emergency room! PLEASE CALL OUR OFFICE DURING NORMAL BUSINESS HOURS    Monday - Friday   8 am to 5 pm    [address]: [phone]    [place]:  [phone]    **It is YOUR responsibilty to bring medication bottles and/or updated medication list to [address] [place] [address]. This will allow us to better serve you and all your healthcare needs**    Thank you for allowing us to care for you today! We want to ensure we can follow your treatment plan and we strive to give you the best outcomes and experience possible. If you ever have a life threatening emergency and call 911 - for an ambulance (EMS)   Our providers can only care for you at:   [facility] or [facility]. Even if you have someone take you or you drive yourself we can only care for you in a [facility] facility. Our providers are not setup at the other healthcare locations!

## 2023-02-22 NOTE — PROGRESS NOTES
Saw patient in the office with Dr Betty Stone. My role as Watchman Coordinator explained. Watchman discussed, Brochure provided and [employer] shared. [employer] utilized and filled out. Patient to take the [employer] with them for shared decision making appointment. Explained to the patient:  Part of the FDA approval of the Watchman procedure in 2015 mandated that each procedure must participate in a national registry, this requires several follow up appointments and testing following the procedure. These expectations Include:      7-10 day follow up with the Nurse practitioner or Dr Betty Stone    45 day follow up lab work and TONIO to check positioning of the device. 6 month follow up telephone call     1 year follow up appointment, TONIO and lab work     2  year follow up appointment and lab work . Discussed the importance of blood thinners as prescribed and that the patient cannot come off those blood thinners until discontinued by the implanting physician. Patient has no surgery or procedures planned that would disrupt these needed blood thinners. Denies:  [x]  Nickel or metal allergy  [x]  Contrast allergy  [x]  Anesthesia issues  [x]  Difficulty swallowing  [x]  No procedures/surgeries planned that would interrupt Plavix and ASA for next 6 months    States is prone to difficulty swallowing d/t Myasthenia gravis. States has not had trouble with intubation thus far. All questions and concerns answered. Patient states wants to proceed with Watchman Implant.   Will follow    Lonna Boas RN  Watchman Coordinator

## 2023-02-22 NOTE — PROGRESS NOTES
Electrophysiology fu Note      Reason for consultation:  Atrial fibrillation    Chief complaint :  here to follow up atrial fibrillation management and discuss anticoagulation    Referring physician: Iris Loera      Primary care physician: Alonzo Luong MD      History of Present Illness: This visit 3/8/2023    Patient has shortness of breath off and on and mostly with moderate exertion but he is over all doing well  Patient denies chest pain. Patient has dizziness with sudden movement and it is chronic  Patient does not drink or smoke  Recovered well from stroke  He is worried about blood thinner given his risk of bleeding  Patient here to discuss watchman procedure too       Previous visit: (5/6/2022)      Chief Complaint   Patient presents with    Follow-up     here to follow up for Afib w/RVR on device report. He states he does have some dizziness and SOB with exertion. Dizziness if he gets up to fast, no other cardiac complaints at this time. Patient did fall in March and fractured his Rt hip. Atrial Fibrillation    Shortness of Breath           Previous visit:    Patient is a 59-year-old male with history of hypertension, myasthenia gravis, sick sinus syndrome s/p pacemaker, history of blood clots on anticoagulation, atrial fibrillation referred by Dr. Jan Ho for atrial fibrillation management. Patient reports palpitations on and off and feels tired and weak. Patient also has shortness of breath with exertion at times. Patient reports that he was cardioverted 4 times total.  Patient also reports that he has a history of gastric ulcer and bleeding needing 3 units of blood. He is cleared for anticoagulation now and he is on anticoagulation without any problem. Patient wants to consider ablation therapy which was recommended to him by his cardiologist.    Patient denies any chest pain, dizziness or syncope.   Patient did have pauses in the hospital and had a pacemaker in July    Pastmedical history:   Past Medical History:   Diagnosis Date    Arthritis 04/23/2021    LOW BACK PAIN, IMPROVED ON CYMBALTA    Atrial fibrillation, chronic ([facility] 12/2020    Dr Preeti Atkins, also has pacer and is on eliquis    Cerebrovascular accident [facility]) 09/09/2021    expressive aphasia- embolic from cardioversion for afib/ LEFT FRONTAL/ANTERIOR CIRCULATION. Closed displaced fracture of anterior column of right acetabulum ([facility] 03/07/2022    COPD (chronic obstructive pulmonary disease) ([facility] 06/15/2021    Coronary artery disease involving native coronary artery of native heart without angina pectoris 06/15/2021    hx of MI 2011 and also 2021- hx of PCI/stent twice. Diabetes ([facility] 09/10/2021    dx'd Sept 2021- by Dr Donell Encinas. ?may be related to chr steroid use also? H/O echocardiogram 02/02/2021    EF is estimated at 50%. presence of PVC affected LVEF estimation, Mild TR. History of blood transfusion     Hx of blood clots     Hx of cardiovascular stress test 06/11/2021    Small size severe intensity,perfusion defect in apical wall. Hx of cardiovascular stress test 09/14/2022    Decreased uptake inferiorly due to diaphragmatic artifact partially reversible inferioapical perfusion defect noted. Hx of Doppler echocardiogram 09/14/2022    EF 08% Grade I diastolic dysfunction. mildly dilated LA. Mod TR. Mild pulmonary htn. Hx of Doppler ultrasound 04/09/2021    Left distal SSV shows occlusive chronic SVT. Hypertension     Myasthenia gravis [facility]) 2017    sees Dr Cyndy Haywood and is controlled on a research injection 40mg daily, also mestinon and chr prednisone 12mg daily    Nephrolithiasis 12/30/2022    nonobstructive on u/s 12/22    NSTEMI (non-ST elevated myocardial infarction) ([facility] 12/2020    Post PTCA 12/14/2020    Ramus Stented. S/P ablation of atrial fibrillation 09/08/2021    S/P ablation of afib PVI performed by Dr. Preeti Atkins.     TIA (transient ischemic attack)     Ulcerative colitis [facility])        Surgical history :   Past Surgical History:   Procedure Laterality Date    CARDIAC SURGERY      ablation 9/8/21- Dr Tony Arias      total colectomy at [facility] for ulc colitis    PACEMAKER INSERTION N/A 07/07/2021    PACEMAKER INSERTION PERMANENT performed by Amado Lombard, MD at [address] [place] [address],[address]. [address] conditional    PTCA  12/14/2020    Ramus Stented./also prior stent 2011 after MI    TONSILLECTOMY      UPPER GASTROINTESTINAL ENDOSCOPY N/A 06/15/2021    EGD DIAGNOSTIC ONLY performed by Carter Bernal MD at [place] [address] N/A 07/04/2021    EGD DIAGNOSTIC ONLY performed by Sandrita Adams MD at [facility] ENDOSCOPY       Family history:   Family History   Family history unknown: Yes       Social history :  reports that he quit smoking about 22 months ago. His smoking use included cigarettes. He smoked an average of .25 packs per day. He has never used smokeless tobacco. He reports current alcohol use of about 1.0 standard drink per week. He reports that he does not use drugs.     No Known Allergies    Current Outpatient Medications on File Prior to Visit   Medication Sig Dispense Refill    clopidogrel (PLAVIX) 75 MG tablet Take 1 tablet by mouth daily 30 tablet 5    atorvastatin (LIPITOR) 40 MG tablet Take 1 tablet by mouth nightly START 6/25/2021 30 tablet 3    ELIQUIS 5 MG TABS tablet Take 1 tablet by mouth 2 times daily 60 tablet 3    tamsulosin (FLOMAX) 0.4 MG capsule Take 1 capsule by mouth daily 90 capsule 1    sildenafil (REVATIO) 20 MG tablet Take 1 tablet by mouth daily as needed (erectile dysfunction) 30 tablet 3    sotalol (BETAPACE) 80 MG tablet Take 1 tablet by mouth 2 times daily 60 tablet 5    ondansetron (ZOFRAN) 4 MG tablet Take 1 tablet by mouth daily as needed for Nausea or Vomiting 30 tablet 0    naproxen (NAPROSYN) 500 MG tablet Take 1 tablet by mouth 2 times daily 60 tablet 0    tamsulosin (FLOMAX) 0.4 MG capsule Take 1 capsule by mouth daily for 10 doses 10 capsule 0    magnesium oxide (MAG-OX) 400 (240 Mg) MG tablet Take 1 tablet by mouth daily 30 tablet 3    DULoxetine (CYMBALTA) 60 MG extended release capsule Take 1 capsule by mouth daily 90 capsule 1    metFORMIN (GLUCOPHAGE-XR) 500 MG extended release tablet Take 2 tablets by mouth 2 times daily 360 tablet 1    budesonide-formoterol (SYMBICORT) 160-4.5 MCG/ACT AERO Inhale 2 puffs into the lungs in the morning and 2 puffs in the evening. 1 each 3    tiotropium (SPIRIVA HANDIHALER) 18 MCG inhalation capsule Inhale 1 capsule into the lungs daily 90 capsule 1    pantoprazole (PROTONIX) 40 MG tablet Take 1 tablet by mouth 2 times daily 180 tablet 1    pyridostigmine (MESTINON) 60 MG tablet Take 1 tablet by mouth 4 times daily GETS FROM NEURO DR HARRIS 120 tablet 0    Ostomy Supplies KIT 1 kit by Does not apply route daily 1 kit 3    GLUCOSAMINE-CALCIUM-VIT D PO Take by mouth daily      NONFORMULARY Inject 40 mg/L into the skin every evening Zilucoplan  - patient provided experimental drug from [place] for Myasthenia Gravis      vitamin B-12 (CYANOCOBALAMIN) 500 MCG tablet Take 1,000 mcg by mouth daily Indications: 8am       PREDNISONE PO Take 13 mg by mouth daily Indications: Takes at  8am       acetaminophen (TYLENOL) 325 MG tablet Take 650 mg by mouth every 4 hours as needed for Pain or Fever       Current Facility-Administered Medications on File Prior to Visit   Medication Dose Route Frequency Provider Last Rate Last Admin    Immune Globulin (Human) solution 20 g  20 g IntraVENous Once Bernice Ferrara MD        And    Immune Globulin (Human) solution 20 g  20 g IntraVENous Once Bernice Ferrara MD        Immune Globulin (Human) solution 20 g  20 g IntraVENous Once Bernice Ferrara MD        And    Immune Globulin (Human) solution 20 g  20 g IntraVENous Once Bernice Ferrara MD           Review of Systems:   Review of Systems   Constitutional:  Positive for fatigue. Negative for activity change, chills and fever. HENT:  Negative for congestion, ear pain and tinnitus. Eyes:  Negative for photophobia, pain and visual disturbance. Respiratory:  Positive for shortness of breath (with exertion). Negative for cough, chest tightness and wheezing. Cardiovascular:  Negative for chest pain, palpitations and leg swelling. Gastrointestinal:  Negative for abdominal pain, blood in stool, constipation, diarrhea, nausea and vomiting. Endocrine: Negative for cold intolerance and heat intolerance. Genitourinary:  Negative for dysuria, flank pain and hematuria. Musculoskeletal:  Positive for arthralgias. Negative for back pain, myalgias and neck stiffness. Skin:  Negative for color change and rash. Allergic/Immunologic: Negative for food allergies. Neurological:  Negative for dizziness, weakness, light-headedness, numbness and headaches. Hematological:  Does not bruise/bleed easily. Psychiatric/Behavioral:  Negative for agitation, behavioral problems and confusion. Examination:      Vitals:    02/22/23 1644   BP: 128/82   Site: Right Upper Arm   Position: Sitting   Cuff Size: Large Adult   Pulse: 88   SpO2: 91%   Weight: 285 lb (129.3 kg)   Height: 5' 11\" (1.803 m)        Body mass index is 39.75 kg/m². Physical Exam  Constitutional:       Appearance: Normal appearance. He is not ill-appearing. HENT:      Head: Normocephalic and atraumatic. Mouth/Throat:      Mouth: Mucous membranes are moist.   Eyes:      Conjunctiva/sclera: Conjunctivae normal.   Cardiovascular:      Rate and Rhythm: Normal rate and regular rhythm. Heart sounds: No murmur heard. Pulmonary:      Effort: Pulmonary effort is normal.      Breath sounds: No rales. Abdominal:      General: Abdomen is flat. Palpations: Abdomen is soft. Musculoskeletal:         General: No tenderness. Normal range of motion. Cervical back: Normal range of motion.       Right lower leg: No edema. Left lower leg: No edema. Skin:     General: Skin is warm and dry. Neurological:      General: No focal deficit present. Mental Status: He is alert and oriented to person, place, and time. CBC:   Lab Results   Component Value Date/Time    WBC 11.8 12/01/2022 11:28 AM    HGB 14.5 12/01/2022 11:28 AM    HCT 43.6 12/01/2022 11:28 AM     12/01/2022 11:28 AM     Lipids:  Lab Results   Component Value Date    CHOL 139 12/22/2022    TRIG 164 (H) 12/22/2022    HDL 34 (L) 12/22/2022    LDLCALC 72 12/22/2022    LDLDIRECT 92 09/21/2022     PT/INR:   Lab Results   Component Value Date/Time    INR 1.20 03/08/2022 01:27 AM        BMP:    Lab Results   Component Value Date     12/01/2022    K 4.6 12/01/2022     12/01/2022    CO2 24 12/01/2022    BUN 24 (H) 12/01/2022     CMP:   Lab Results   Component Value Date    AST 23 12/01/2022    PROT 8.0 12/01/2022    BILITOT 0.7 12/01/2022    ALKPHOS 74 12/01/2022     TSH:    Lab Results   Component Value Date/Time    TSH 1.61 09/21/2022 08:19 AM       EKGINTERPRETATION - EKG Interpretation:        Device Assessment:    The device is Medtronic pacemaker - Dual Chamber       MRI Compatible : yes    Device interrogation was performed. Mode: AAIr --- DDDr     Sensing is normal. Impedence is normal.  Threshold is normal.     There has not been interval changes. Estimated battery life is 12.3 years    The underlying rhythm is persistent atrial fibrillation. 75 % atrial paced;  <0.1 % ventricular paced. Atrial Arrhythmia : patient has PAT episodes short runs    Non sustained VT episodes : Nonsustained VT reported are mostly atrial runs    Sustained VT episodes : no    Patient activity reported 2.9 hrs/day    The patient is mostly atrial pacemaker dependent.       Interpreted by    Judy Weeks M.D          IMPRESSION / RECOMMENDATIONS:     Persistent atrial fibrillation - with atrial fibrillation and flutter history sp ablation PV isolation and atrial flutter ablation  History of myasthenia gravis  pacemaker  CVA  COPD  Obesity BMI 40  CAD  Dm-2  History of gastric ulcer with bleeding now cleared for anticoagulation  Ulcerative colitis  Chronic back pain  History of blood clot - its in the history - but no DVT / PE - it  was superficial from IV line as per patient    Patient has short atrial run less than 1 second most of the time - reasonable controlled on sotalol   Patient has multiple episodes over the year but well tolerable  Previous history of post ablation stroke and so dont want ablation    Patient also history of GI bleed from duodenal ulcer and gastric ulcer - discussed about watchman    CHADS-VAS --  4  DM, CAD, Stroke        HAS-BLED Score                            Risk Factors      Points   Hypertension  Uncontrolled, >660 mmHg systolic   No=0   Renal disease  Dialysis, transplant, Cr>2.26 mg/dL or >200 µmol/L   No=0   Liver disease   Cirrhosis or bilirubin >2x normal with AST/ALT/AP >3x normal   No=0   Stroke history   Yes=1   Prior major bleeding or predisposition to bleeding     Yes=1   Labile  INR  Unstable/high INRs, time in therapeutic range <60%   No=0   Age >71   No=0   Medication usage predisposing to bleeding   Aspirin, clopidogrel, NSAIDs   Yes=1   Alcohol use   >7 drinks/week   No=0       HAS-BLED Score:    3:  Risk was 5.8% in one validation study and 3.72 bleeds per 100 patient-years in another validation study. Discussed with patient about left atrial appendage closure device role in detail. We are currently doing only watchman so we discussed about the device in detail.      We discussed that this device is only used for patients who are unable to tolerate long term anticoagulation because of bleeding or falls or other reasons where they are high risk with anticoagulation     Patient may still need to be on anticoagulant and aspirin for 45 days post procedure and we do a TONIO and if TONIO shows there is a good seal of LA appendage with device then we change to aspirin and plavix (or other equivalent) for 6 months and then aspirin only after that. According to the new FDA recommendations patient can be only aspirin and plavix for 6 months post procedure and then aspirin only. We may consider this approach in patient case by case basis provided they do not have any SUSANNE clot at the time of implantation. The risks including, but not limited to, vascular injury, bleeding, infection, radiation exposure, injury to cardiac and surrounding structures (including esophageal and pulmonary vein injury), injury to the normal conduction system of the heart (possibly requiring a pacemaker), Pericardial effusion or cardiac puncture, stroke, myocardial infarction and death were all discussed. The patient considered the risks, benefits and alternatives; decided to proceed with the procedure. Education provided to the patient and also education material given to the patient. I discussed with the patient with his myaesthenia gravis there is a risk with anesthesia again and it would be better to discuss like last time. Also we discussed that risk of stroke especially with a history. Patient wants to come off anticoagulation if he can with his history of ulcerative colitis and his GI bleeding history with duodenal ulcer and gastritis issues. Patient voiced understanding and wants to consider the procedure      Thanks again for allowing me to participate in care of this patient. Please call me if you have any questions. With best regards. Silvestre Hbuer MD, 2/22/2023 5:07 PM     Please note this report has been partially produced using speech recognition software and may contain errors related to that system including errors in grammar, punctuation, and spelling, as well as words and phrases that may be inappropriate.  If there are any questions or concerns please feel free to contact the dictating provider for clarification.

## 2023-02-22 NOTE — LETTER
Demond Morse    PROCEDURE: Watchman Implant    Date of Procedure: 3/9/2023  Time: 830   Arrival Time: 630    Patient Name: Sarah Moore  : 1960  MRN# [id number]: [facility])    Call to Pre-[place] at [phone]  2 days before your procedure    x Please have blood work and chest-x-ray done 23 at [facility], [address] [place] [address] or [facility].     x Please have COVID-19 Test done on 3/6/23 at the [place] [address]. You will need to Quarantine yourself until procedure. x Please do not have anything by mouth after midnight prior to or 8 hours before the procedure.    x You may take your medications with a sip of water in the morning before your procedure or take them with you unless listed below. X  Hold Eliquis the evening dose the day before procedure and the morning dose of the procedure. X  Hold Metformin 24 hours before the procedure. You may restart Metformin 2 days after the procedure on 3/12/2023. IMPORTANT NOTICE TO PATIENTS: Prior Authorization  We will contact your insurance for prior authorization for the CPT code related to the procedure it is the patient's responsibility to contact their insurance to ensure they are following their medical plans provisions or requirements! Patient Signature:  _______________________      Staff: Gina Nelson MA     Staff Given Instructions:_______________________________          Demond Lucia Raddle: Watchman Implant    Date of Procedure: 3/9/23 Time: 830 Arrival Time: 630    Patient Name: Sarah Moore  : 1960  MRN# [id number]    Day of Procedure Cath Lab Holding area Pre op Orders:    ? YOU HAVE MY PERMISSION TO TALK TO THE PATIENT-PLEASE   ? NPO after midnight  ? Anesthesia  ?  TONIO with Anesthesia  ? IV peripheral saline lock x 2 sites  (at least one in preferred left arm). ? Type & Crossmatch with 1 units PRBCs setup and ready in procedure room for    rapid infusion time of procedure. ? ANTICOAGULATION:   Hold Eliquis evening dose the night before the procedure and the morning dose of the procedure  ? If taking Coumadin, PT INR  STAT on arrival day of procedure. ? Female patients <=48years of age >> urine HCG test.  ? Diabetic patients >> Accu check Blood sugar check. ? Document home medications in EPIC and include date and time of last dose. ? Notify Dr. Jose Polanco of abnormal lab results. ? Chest Prep> Clip hair anterior chest and posterior back (clavicle to umbilicus). ? Groin Prep> Clip hair bilateral groins for femoral access (medial). ? If allergy to contrast >> pre treat for contrast allergy with Benadryl 25 mg IV, Solucortef 100 mg IV and Pepcid 20 mg IV 30 mins pre procedure. ? If patient did not take daily dose of 81 mg aspirin this morning please give dose prior to procedure. ? If not currently taking aspirin prior to admission please administer 325 mg Aspirin. Physician Signature:______________________Date:____________Time:___________                               *This consent is applicable for 30 days following patient signature*    [address] / PROCEDURE    I (We), Chuyita Garibay authorize, Dr. Marie Hurd    and such assistants as may be selected by him/her, to perform the following operation/procedures:  Watchman Implant with transesophageal echocardiogram     Note: If unable to obtain consent prior to an emergent procedure, document the emergent reason in the medical record.    This procedure has been explained to my (our) satisfaction and included in the explanation was:   A) the intended benefit, nature, and extent of the procedure to be performed;   B) the significant risks involved and the probability of success;   C) alternative procedures and methods of treatment;   D) the dangers and probable consequences of such alternatives (including no procedure or treatment); E) the expected consequences of the procedure on my future health;   F) whether other qualified individuals would be performing important surgical tasks and / or whether  would be present to advise or support the procedure. I (we) understand that there are other risks of infection and other serious complications in the pre-operative/procedural and postoperative/procedural stages of my (our) care. I (we) have asked all of the questions which I (we) thought were important in deciding whether or not to undergo treatment or diagnosis. These questions have been answered to my (our) satisfaction. I (we) understand that no assurance can be given that the procedure will be a success, and no guarantee or warranty of success has been given to me (us). 2. It has been explained to me (us) that during the course of the operation/procedure, unforeseen conditions may be revealed that necessitate extension of the original procedure(s) or different procedure(s) than those set forth in Paragraph 1. I (we) authorize and request that the above-named physician, his/her assistants or his/her designees, perform procedures as necessary and desirable if deemed to be in my (our) best interest.           3. I acknowledge that other health care personnel may be observing this procedure for the purpose of medical education or other specified purposes as may be necessary as requested and/or approved by my (our) physician. 4. I (we) consent to the disposal by the hospital Pathologist of the removed tissue, parts or organs in accordance with hospital policy. 5. I do_____ do not______ consent to the use of a local infiltration pain blocking agent that will be used by my provider/surgical provider to help alleviate pain during my procedure.      6. I do_____ do not_____ consent to an emergent blood transfusion in the case of a life-threatening situation that requires blood components to be administered. This consent is valid for 24 hours from the beginning of the procedure.     7. This patient does _____ or does not ______currently have a DNR status/order. If DNR order is in place, obtain \"Addendum to the Surgical Consent for ALL Patients with a DNR Order\" to address gurpreet-operative status for limited intervention or DNR suspension.     8. I have read and fully understand the above Consent for Operation/Procedure and that all blanks were completed before I signed the consent.     _____________________________________________ _______/______am/pm   Signature of Patient or legal representative Printed Name / Relationship Date / Time     _____________________________________________  _______/______ am/pm   Witness to Signature Printed Name Date / Time       Informed consent:   I have provided the explanation described above in section 1 to the patient and/or legal representative. I have provided the patient and/or legal representative with an opportunity to ask any questions about the proposed operation/procedure.     ________________________________Dr. Rock Henderson   ______/______ am/pm   Provider / Proceduralist Printed Name Date / Time     Revised 2021                               [facility]     Dr. Rock Henderson     PATIENT NAME:    Aramis Rosario                               :   1960    PROCEDURE: Watchman Implant     DATE OF PROCEDURE: 3/9/2023    DIAGNOSIS:  I48.91, Z01.810, Z79.0                     X MAG    X   PHOS       X   BMP                     X CBC     X    PT    X   PTT                    X     Chest -x-Ray PA & Lateral View      ?PLEASE CALL ABNORMAL RESULTS TO THE REQUESTING PHYSICIAN?    ATTENTION PATIENTS:  You do not have to fast for the lab work.   You must go to the [facility]  or [facility]  to have the lab work done.     Physician Signature:_________________Date:_____________Time:___________                                              [facility] ([facility] PHYS[facility]   Dr. Marveen Collet    PROCEDURE TO SCHEDULE:     [facility] Implant    Patient Name: Sujata Sandoval   : 1960  MRN# [id number] Phone Number: [phone]   Weight:    Wt Readings from Last 3 Encounters:   23 285 lb (129.3 kg)   22 276 lb (125.2 kg)   22 279 lb (126.6 kg)        Insurance: Payor: Claire Lozada / Plan: Claire Lozada / Product Type: *No Product type* /     Date of Procedure: 3/9/2023 Time: 830 Arrival Time: 630    Diagnosis:  I48.91 (Atrial Fibrillation)  Allergies: No Known Allergies         Sai Bowman MA

## 2023-02-23 ENCOUNTER — TELEPHONE (OUTPATIENT)
Dept: CARDIAC CATH/INVASIVE PROCEDURES | Age: 63
End: 2023-02-23

## 2023-02-23 NOTE — TELEPHONE ENCOUNTER
Patient called and notified of date and time for pre testing and Watchman procedure. Talked with his wife Cleave Feeling. No further questions verbalized. Will follow.   Electronically signed by Gina Mcdaniels RN on 2/23/2023 at 3:23 PM [address] [facility]

## 2023-02-25 ENCOUNTER — HOSPITAL ENCOUNTER (EMERGENCY)
Age: 63
Discharge: HOME | End: 2023-02-25
Attending: EMERGENCY MEDICINE
Payer: MEDICAID

## 2023-02-25 ENCOUNTER — APPOINTMENT (OUTPATIENT)
Dept: CT IMAGING | Age: 63
End: 2023-02-25
Payer: MEDICAID

## 2023-02-25 VITALS
SYSTOLIC BLOOD PRESSURE: 116 MMHG | DIASTOLIC BLOOD PRESSURE: 85 MMHG | OXYGEN SATURATION: 95 % | HEART RATE: 68 BPM | RESPIRATION RATE: 15 BRPM | HEIGHT: 71 IN | WEIGHT: 285 LBS | BODY MASS INDEX: 39.9 KG/M2 | TEMPERATURE: 97 F

## 2023-02-25 DIAGNOSIS — R10.9 ACUTE LEFT FLANK PAIN: Primary | ICD-10-CM

## 2023-02-25 DIAGNOSIS — R31.29 OTHER MICROSCOPIC HEMATURIA: ICD-10-CM

## 2023-02-25 DIAGNOSIS — N20.0 NEPHROLITHIASIS: ICD-10-CM

## 2023-02-25 LAB
ALBUMIN SERPL-MCNC: 3.9 GM/DL (ref 3.4–5)
ALP BLD-CCNC: 59 IU/L (ref 40–129)
ALT SERPL-CCNC: 22 U/L (ref 10–40)
ANION GAP SERPL CALCULATED.3IONS-SCNC: 11 MMOL/L (ref 4–16)
AST SERPL-CCNC: 20 IU/L (ref 15–37)
BACTERIA: ABNORMAL /HPF
BASOPHILS ABSOLUTE: 0 K/CU MM
BASOPHILS RELATIVE PERCENT: 0.4 % (ref 0–1)
BILIRUB SERPL-MCNC: 0.3 MG/DL (ref 0–1)
BILIRUBIN URINE: ABNORMAL MG/DL
BLOOD, URINE: ABNORMAL
BUN SERPL-MCNC: 12 MG/DL (ref 6–23)
CALCIUM SERPL-MCNC: 9.2 MG/DL (ref 8.3–10.6)
CAST TYPE: ABNORMAL /HPF
CHLORIDE BLD-SCNC: 102 MMOL/L (ref 99–110)
CLARITY: ABNORMAL
CO2: 25 MMOL/L (ref 21–32)
COLOR: YELLOW
COMMENT UA: ABNORMAL
CREAT SERPL-MCNC: 1.1 MG/DL (ref 0.9–1.3)
CRYSTAL TYPE: NEGATIVE /HPF
DIFFERENTIAL TYPE: ABNORMAL
EOSINOPHILS ABSOLUTE: 0.2 K/CU MM
EOSINOPHILS RELATIVE PERCENT: 2.7 % (ref 0–3)
EPITHELIAL CELLS, UA: 2 /HPF
GFR SERPL CREATININE-BSD FRML MDRD: >60 ML/MIN/1.73M2
GLUCOSE SERPL-MCNC: 266 MG/DL (ref 70–99)
GLUCOSE, URINE: 250 MG/DL
HCT VFR BLD CALC: 39.6 % (ref 42–52)
HEMOGLOBIN: 12.3 GM/DL (ref 13.5–18)
IMMATURE NEUTROPHIL %: 0.3 % (ref 0–0.43)
KETONES, URINE: NEGATIVE MG/DL
LEUKOCYTE ESTERASE, URINE: NEGATIVE
LIPASE: 24 IU/L (ref 13–60)
LYMPHOCYTES ABSOLUTE: 1.5 K/CU MM
LYMPHOCYTES RELATIVE PERCENT: 16.8 % (ref 24–44)
MCH RBC QN AUTO: 27.5 PG (ref 27–31)
MCHC RBC AUTO-ENTMCNC: 31.1 % (ref 32–36)
MCV RBC AUTO: 88.4 FL (ref 78–100)
MONOCYTES ABSOLUTE: 0.8 K/CU MM
MONOCYTES RELATIVE PERCENT: 8.4 % (ref 0–4)
NITRITE URINE, QUANTITATIVE: NEGATIVE
PDW BLD-RTO: 14 % (ref 11.7–14.9)
PH, URINE: 5 (ref 5–8)
PLATELET # BLD: 270 K/CU MM (ref 140–440)
PMV BLD AUTO: 9.8 FL (ref 7.5–11.1)
POTASSIUM SERPL-SCNC: 4.3 MMOL/L (ref 3.5–5.1)
PROTEIN UA: 30 MG/DL
RBC # BLD: 4.48 M/CU MM (ref 4.6–6.2)
RBC URINE: >100 /HPF (ref 0–3)
SEGMENTED NEUTROPHILS ABSOLUTE COUNT: 6.4 K/CU MM
SEGMENTED NEUTROPHILS RELATIVE PERCENT: 71.4 % (ref 36–66)
SODIUM BLD-SCNC: 138 MMOL/L (ref 135–145)
SPECIFIC GRAVITY UA: >1.03 (ref 1–1.03)
TOTAL IMMATURE NEUTOROPHIL: 0.03 K/CU MM
TOTAL PROTEIN: 7 GM/DL (ref 6.4–8.2)
UROBILINOGEN, URINE: 0.2 MG/DL (ref 0.2–1)
WBC # BLD: 9 K/CU MM (ref 4–10.5)
WBC UA: 2 /HPF (ref 0–2)

## 2023-02-25 PROCEDURE — 81001 URINALYSIS AUTO W/SCOPE: CPT

## 2023-02-25 PROCEDURE — 96374 THER/PROPH/DIAG INJ IV PUSH: CPT

## 2023-02-25 PROCEDURE — 83690 ASSAY OF LIPASE: CPT

## 2023-02-25 PROCEDURE — 6360000002 HC RX W HCPCS: Performed by: EMERGENCY MEDICINE

## 2023-02-25 PROCEDURE — 80053 COMPREHEN METABOLIC PANEL: CPT

## 2023-02-25 PROCEDURE — 85025 COMPLETE CBC W/AUTO DIFF WBC: CPT

## 2023-02-25 PROCEDURE — 99284 EMERGENCY DEPT VISIT MOD MDM: CPT

## 2023-02-25 PROCEDURE — 96375 TX/PRO/DX INJ NEW DRUG ADDON: CPT

## 2023-02-25 PROCEDURE — 74176 CT ABD & PELVIS W/O CONTRAST: CPT

## 2023-02-25 RX ORDER — ONDANSETRON 2 MG/ML
4 INJECTION INTRAMUSCULAR; INTRAVENOUS ONCE
Status: COMPLETED | OUTPATIENT
Start: 2023-02-25 | End: 2023-02-25

## 2023-02-25 RX ORDER — FENTANYL CITRATE 50 UG/ML
50 INJECTION, SOLUTION INTRAMUSCULAR; INTRAVENOUS ONCE
Status: COMPLETED | OUTPATIENT
Start: 2023-02-25 | End: 2023-02-25

## 2023-02-25 RX ORDER — KETOROLAC TROMETHAMINE 30 MG/ML
15 INJECTION, SOLUTION INTRAMUSCULAR; INTRAVENOUS ONCE
Status: COMPLETED | OUTPATIENT
Start: 2023-02-25 | End: 2023-02-25

## 2023-02-25 RX ORDER — HYDROCODONE BITARTRATE AND ACETAMINOPHEN 5; 325 MG/1; MG/1
1 TABLET ORAL EVERY 6 HOURS PRN
Qty: 12 TABLET | Refills: 0 | Status: SHIPPED | OUTPATIENT
Start: 2023-02-25 | End: 2023-02-28

## 2023-02-25 RX ADMIN — KETOROLAC TROMETHAMINE 15 MG: 30 INJECTION, SOLUTION INTRAMUSCULAR at 14:07

## 2023-02-25 RX ADMIN — FENTANYL CITRATE 50 MCG: 50 INJECTION INTRAMUSCULAR; INTRAVENOUS at 14:10

## 2023-02-25 RX ADMIN — ONDANSETRON 4 MG: 2 INJECTION INTRAMUSCULAR; INTRAVENOUS at 14:09

## 2023-02-25 ASSESSMENT — PAIN DESCRIPTION - DESCRIPTORS: DESCRIPTORS: ACHING

## 2023-02-25 ASSESSMENT — PAIN - FUNCTIONAL ASSESSMENT: PAIN_FUNCTIONAL_ASSESSMENT: 0-10

## 2023-02-25 ASSESSMENT — PAIN SCALES - GENERAL
PAINLEVEL_OUTOF10: 10
PAINLEVEL_OUTOF10: 10

## 2023-02-25 ASSESSMENT — PAIN DESCRIPTION - PAIN TYPE: TYPE: ACUTE PAIN

## 2023-02-25 ASSESSMENT — PAIN DESCRIPTION - FREQUENCY: FREQUENCY: CONTINUOUS

## 2023-02-25 ASSESSMENT — PAIN DESCRIPTION - ORIENTATION: ORIENTATION: LEFT

## 2023-02-25 NOTE — ED PROVIDER NOTES
Emergency Department Encounter    Patient: Lurdes Stearns  MRN: [id number]  : 1960  Date of Evaluation: 2023  ED Provider:  Fabricio Iglesias DO    Triage Chief Complaint:   Flank Pain    [place]:  Lurdes Stearns is a 58 y.o. male with multiple chronic comorbidities including kidney stones, myasthenia gravis, atrial fibrillation, coronary artery disease, COPD, history of GI bleed, diabetes, ulcerative colitis, arthritis, history of DVTs that presents to the emergency department complaining left flank pain. Patient gives a history. Patient states started having flank pain Thursday evening 2 days ago. Patient states no falls injuries trauma no heavy lifting pulling or straining. Patient history of kidney stones but feels similar. Patient states he does not have a urologist although has not had any kidney stone surgeries. Patient states he been taking Tylenol for pain with minimal relief. Patient states nausea no vomiting diarrhea no abdominal pain dysuria hematuria. Patient states no fever chills and cough not dizzy or lightheaded. Patient states chronic short of breath has COPD. Patient states no chest pain. Patient states pain in the left flank 10 out of 10 on the pain scale worse with moving sharp pain has been constant. Patient states he is also on blood thinners Plavix and Eliquis.     ROS - see HPI, below listed is current ROS at time of my eval:  General:  No fevers, no chills, no weakness  Eyes:  No recent vison changes, no discharge  ENT:  No sore throat, no nasal congestion, no hearing changes  Cardiovascular:  No chest pain, no palpitations  Respiratory:  No shortness of breath, no cough, no wheezing  Gastrointestinal:  No pain, no nausea, no vomiting, no diarrhea  Musculoskeletal: Positive for left flank pain no muscle pain, no joint pain  Skin:  No rash, no pruritis, no easy bruising  Neurologic:  No speech problems, no headache, no extremity numbness, no extremity tingling, no extremity weakness  Psychiatric:  No anxiety  Genitourinary:  No dysuria, no hematuria  Endocrine:  No unexpected weight gain, no unexpected weight loss  Extremities:  no edema, no pain    Past Medical History:   Diagnosis Date    Arthritis 04/23/2021    LOW BACK PAIN, IMPROVED ON CYMBALTA    Atrial fibrillation, chronic ([address] 12/2020    Dr Jasmyn Deleon, also has pacer and is on eliquis    Cerebrovascular accident ([facility] 09/09/2021    expressive aphasia- embolic from cardioversion for afib/ LEFT FRONTAL/ANTERIOR CIRCULATION. Closed displaced fracture of anterior column of right acetabulum ([address] 03/07/2022    COPD (chronic obstructive pulmonary disease) ([facility] 06/15/2021    Coronary artery disease involving native coronary artery of native heart without angina pectoris 06/15/2021    hx of MI 2011 and also 2021- hx of PCI/stent twice. Diabetes ([facility] 09/10/2021    dx'd Sept 2021- by Dr John Keys. ?may be related to chr steroid use also? H/O echocardiogram 02/02/2021    EF is estimated at 50%. presence of PVC affected LVEF estimation, Mild TR. History of blood transfusion     Hx of blood clots     Hx of cardiovascular stress test 06/11/2021    Small size severe intensity,perfusion defect in apical wall. Hx of cardiovascular stress test 09/14/2022    Decreased uptake inferiorly due to diaphragmatic artifact partially reversible inferioapical perfusion defect noted. Hx of Doppler echocardiogram 09/14/2022    EF 75% Grade I diastolic dysfunction. mildly dilated LA. Mod TR. Mild pulmonary htn. Hx of Doppler ultrasound 04/09/2021    Left distal SSV shows occlusive chronic SVT. Hypertension     Myasthenia gravis [facility]) 2017    sees Dr Kehinde Toro and is controlled on a research injection 40mg daily, also mestinon and chr prednisone 12mg daily    Nephrolithiasis 12/30/2022    nonobstructive on u/s 12/22    NSTEMI (non-ST elevated myocardial infarction) ([address] 12/2020    Post PTCA 12/14/2020    Ramus Stented.     S/P ablation of atrial fibrillation 2021    S/P ablation of afib PVI performed by Dr. Flor Isbell. TIA (transient ischemic attack)     Ulcerative colitis [facility])      Past Surgical History:   Procedure Laterality Date    CARDIAC SURGERY      ablation 21- Dr Aureliano Mcfarlane      total colectomy at 12yo for [facility] colitis    PACEMAKER INSERTION N/A 2021    PACEMAKER INSERTION PERMANENT performed by Kike Rosas MD at [address] [place] [address],[facility] [address] conditional    PTCA  2020    Ramus Stented./also prior stent 2011 after MI    TONSILLECTOMY      UPPER GASTROINTESTINAL ENDOSCOPY N/A 06/15/2021    EGD DIAGNOSTIC ONLY performed by Олег Hughes MD at [place] [address] N/A 2021    EGD DIAGNOSTIC ONLY performed by Osmany Gramajo MD at [address] [facility] ENDOSCOPY     Family History   Family history unknown: Yes     Social History     Socioeconomic History    Marital status:      Spouse name: Not on file    Number of children: Not on file    Years of education: Not on file    Highest education level: Not on file   Occupational History    Not on file   Tobacco Use    Smoking status: Former     Packs/day: 0.25     Types: Cigarettes     Quit date: 2021     Years since quittin.8    Smokeless tobacco: Never   Vaping Use    Vaping Use: Never used   Substance and Sexual Activity    Alcohol use:  Yes     Alcohol/week: 1.0 standard drink     Types: 1 Cans of beer per week     Comment: occ    Drug use: No    Sexual activity: Yes     Partners: Female   Other Topics Concern    Not on file   Social History Narrative    Not on file     Social Determinants of Health     Financial Resource Strain: Unknown    Difficulty of Paying Living Expenses: Patient refused   Food Insecurity: Unknown    Worried About Running Out of Food in the Last Year: Patient refused    Ran Out of Food in the Last Year: Patient refused   Transportation Needs: Not on file   Physical Activity: Not on file   Stress: Not on file   Social Connections: Not on file   Intimate Partner Violence: Not on file   Housing Stability: Not on file     Current Facility-Administered Medications   Medication Dose Route Frequency Provider Last Rate Last Admin    ketorolac (TORADOL) injection 15 mg  15 mg IntraVENous Once Penelope Kamara, DO        fentaNYL (SUBLIMAZE) injection 50 mcg  50 mcg IntraVENous Once Penelope Kamara, DO        ondansetron [facility] injection 4 mg  4 mg IntraVENous Once Penelope Bolton, DO         Current Outpatient Medications   Medication Sig Dispense Refill    clopidogrel (PLAVIX) 75 MG tablet Take 1 tablet by mouth daily 30 tablet 5    atorvastatin (LIPITOR) 40 MG tablet Take 1 tablet by mouth nightly START 6/25/2021 30 tablet 3    ELIQUIS 5 MG TABS tablet Take 1 tablet by mouth 2 times daily 60 tablet 3    tamsulosin (FLOMAX) 0.4 MG capsule Take 1 capsule by mouth daily 90 capsule 1    sildenafil (REVATIO) 20 MG tablet Take 1 tablet by mouth daily as needed (erectile dysfunction) 30 tablet 3    sotalol (BETAPACE) 80 MG tablet Take 1 tablet by mouth 2 times daily 60 tablet 5    ondansetron (ZOFRAN) 4 MG tablet Take 1 tablet by mouth daily as needed for Nausea or Vomiting 30 tablet 0    naproxen (NAPROSYN) 500 MG tablet Take 1 tablet by mouth 2 times daily 60 tablet 0    tamsulosin (FLOMAX) 0.4 MG capsule Take 1 capsule by mouth daily for 10 doses 10 capsule 0    magnesium oxide (MAG-OX) 400 (240 Mg) MG tablet Take 1 tablet by mouth daily 30 tablet 3    DULoxetine (CYMBALTA) 60 MG extended release capsule Take 1 capsule by mouth daily 90 capsule 1    metFORMIN (GLUCOPHAGE-XR) 500 MG extended release tablet Take 2 tablets by mouth 2 times daily 360 tablet 1    budesonide-formoterol (SYMBICORT) 160-4.5 MCG/ACT AERO Inhale 2 puffs into the lungs in the morning and 2 puffs in the evening.  1 each 3    tiotropium (SPIRIVA HANDIHALER) 18 MCG inhalation capsule Inhale 1 capsule into the lungs daily 90 capsule 1 pantoprazole (PROTONIX) 40 MG tablet Take 1 tablet by mouth 2 times daily 180 tablet 1    pyridostigmine (MESTINON) 60 MG tablet Take 1 tablet by mouth 4 times daily GETS FROM NEURO DR HARRIS 120 tablet 0    Ostomy Supplies KIT 1 kit by Does not apply route daily 1 kit 3    GLUCOSAMINE-CALCIUM-VIT D PO Take by mouth daily      NONFORMULARY Inject 40 mg/L into the skin every evening Zilucoplan  - patient provided experimental drug from [place] for Myasthenia Gravis      vitamin B-12 (CYANOCOBALAMIN) 500 MCG tablet Take 1,000 mcg by mouth daily Indications: 8am       PREDNISONE PO Take 13 mg by mouth daily Indications: Takes at  8am       acetaminophen (TYLENOL) 325 MG tablet Take 650 mg by mouth every 4 hours as needed for Pain or Fever       Facility-Administered Medications Ordered in Other Encounters   Medication Dose Route Frequency Provider Last Rate Last Admin    Immune Globulin (Human) solution 20 g  20 g IntraVENous Once Rashida Meier MD        And    Immune Globulin (Human) solution 20 g  20 g IntraVENous Once Rashida Meier MD        Immune Globulin (Human) solution 20 g  20 g IntraVENous Once Rashida Meier MD        And    Immune Globulin (Human) solution 20 g  20 g IntraVENous Once Rashida Meier MD         No Known Allergies    Nursing Notes Reviewed    Physical Exam:  Triage VS:    ED Triage Vitals   Enc Vitals Group      BP 02/25/23 1326 116/85      Heart Rate 02/25/23 1324 68      Resp 02/25/23 1324 15      Temp 02/25/23 1324 97 °F (36.1 °C)      Temp Source 02/25/23 1324 Skin      SpO2 02/25/23 1324 95 %      Weight 02/25/23 1324 285 lb (129.3 kg)      Height 02/25/23 1324 5' 11\" (1.803 m)      Head Circumference --       Peak Flow --       Pain Score --       Pain Loc --       Pain Edu? --       Excl. in [address]? --        My pulse ox interpretation is - normal    General appearance:  No acute distress. Skin:  Warm. Dry. Eye:  Extraocular movements intact.      Ears, nose, mouth and throat:  Oral mucosa moist   Neck:  Trachea midline. Extremity:  No swelling. Normal ROM     Heart:  Regular rate and rhythm, normal S1 & S2, no extra heart sounds. Perfusion:  intact  Respiratory:  Lungs clear to auscultation bilaterally. Respirations nonlabored. Abdominal: Morbidly obese abdomen with ostomy pouch, normal bowel sounds. Soft. Nontender. Non distended. Back: Left flank CVA tenderness to palpation     Neurological:  Alert and oriented times 3. No focal neuro deficits.              Psychiatric:  Appropriate    I have reviewed and interpreted all of the currently available lab results from this visit (if applicable):  Results for orders placed or performed during the hospital encounter of 02/25/23   CBC with Diff   Result Value Ref Range    WBC 9.0 4.0 - 10.5 K/CU MM    RBC 4.48 (L) 4.6 - 6.2 M/CU MM    Hemoglobin 12.3 (L) 13.5 - 18.0 GM/DL    Hematocrit 39.6 (L) 42 - 52 %    MCV 88.4 78 - 100 FL    MCH 27.5 27 - 31 PG    MCHC 31.1 (L) 32.0 - 36.0 %    RDW 14.0 11.7 - 14.9 %    Platelets 901 080 - 265 K/CU MM    MPV 9.8 7.5 - 11.1 FL    Differential Type AUTOMATED DIFFERENTIAL     Segs Relative 71.4 (H) 36 - 66 %    Lymphocytes % 16.8 (L) 24 - 44 %    Monocytes % 8.4 (H) 0 - 4 %    Eosinophils % 2.7 0 - 3 %    Basophils % 0.4 0 - 1 %    Segs Absolute 6.4 K/CU MM    Lymphocytes Absolute 1.5 K/CU MM    Monocytes Absolute 0.8 K/CU MM    Eosinophils Absolute 0.2 K/CU MM    Basophils Absolute 0.0 K/CU MM    Immature Neutrophil % 0.3 0 - 0.43 %    Total Immature Neutrophil 0.03 K/CU MM   CMP   Result Value Ref Range    Sodium 138 135 - 145 MMOL/L    Potassium 4.3 3.5 - 5.1 MMOL/L    Chloride 102 99 - 110 mMol/L    CO2 25 21 - 32 MMOL/L    BUN 12 6 - 23 MG/DL    Creatinine 1.1 0.9 - 1.3 MG/DL    Est, Glom Filt Rate >60 >60 mL/min/1.73m2    Glucose 266 (H) 70 - 99 MG/DL    Calcium 9.2 8.3 - 10.6 MG/DL    Albumin 3.9 3.4 - 5.0 GM/DL    Total Protein 7.0 6.4 - 8.2 GM/DL    Total Bilirubin 0.3 0.0 - 1.0 MG/DL    ALT 22 10 - 40 U/L    AST 20 15 - 37 IU/L    Alkaline Phosphatase 59 40 - 129 IU/L    Anion Gap 11 4 - 16   Lipase   Result Value Ref Range    Lipase 24 13 - 60 IU/L   Urinalysis with Reflex to Culture    Specimen: Urine   Result Value Ref Range    Color, UA YELLOW YELLOW    Clarity, UA CLOUDY (A) CLEAR    Glucose, Urine 250 (A) NEGATIVE MG/DL    Bilirubin Urine SMALL NUMBER OR AMOUNT OBSERVED (A) NEGATIVE MG/DL    Ketones, Urine NEGATIVE NEGATIVE MG/DL    Specific Gravity, UA >1.030 1.001 - 1.035    Blood, Urine LARGE NUMBER OR AMOUNT OBSERVED (A) NEGATIVE    pH, Urine 5.0 5.0 - 8.0    Protein, UA 30 (A) NEGATIVE MG/DL    Urobilinogen, Urine 0.2 0.2 - 1.0 MG/DL    Nitrite Urine, Quantitative NEGATIVE NEGATIVE    Leukocyte Esterase, Urine NEGATIVE NEGATIVE   Urine Microscopic with Reflex to Culture   Result Value Ref Range    RBC, UA >100 (H) 0 - 3 /HPF    WBC, UA 2 0 - 2 /HPF    Epithelial Cells, UA 2 /HPF    Cast Type NO CAST FORMS SEEN NO CAST FORMS SEEN /HPF    Bacteria, UA RARE (A) NEGATIVE /HPF    Crystal Type NEGATIVE NEGATIVE /HPF    Urinalysis Comments MUCOUS PRESENT       Radiographs (if obtained):  Radiologist's Report Reviewed:  CT ABDOMEN PELVIS WO CONTRAST Additional Contrast? None   Final Result   No acute abnormality in the abdomen or pelvis on the noncontrast CT.                EKG (if obtained): (All EKG's are interpreted by myself in the absence of a cardiologist)    Medications   ketorolac (TORADOL) injection 15 mg (15 mg IntraVENous Given 2/25/23 1407)   fentaNYL (SUBLIMAZE) injection 50 mcg (50 mcg IntraVENous Given 2/25/23 1410)   ondansetron (ZOFRAN) injection 4 mg (4 mg IntraVENous Given 2/25/23 1409)       MDM:  Supriya Cochran is a 58 y.o. male with multiple chronic comorbidities including kidney stones, myasthenia gravis, atrial fibrillation, coronary artery disease, COPD, history of GI bleed, diabetes, ulcerative colitis, arthritis, history of DVTs that presents to the emergency department complaining left flank pain. Patient gives a history. Patient states started having flank pain Thursday evening 2 days ago. Patient states no falls injuries trauma no heavy lifting pulling or straining. Patient history of kidney stones but feels similar. Patient states he does not have a urologist although has not had any kidney stone surgeries. Patient states he been taking Tylenol for pain with minimal relief. Patient states nausea no vomiting diarrhea no abdominal pain dysuria hematuria. Patient states no fever chills and cough not dizzy or lightheaded. Patient states chronic short of breath has COPD. Patient states no chest pain. Patient states pain in the left flank 10 out of 10 on the pain scale worse with moving sharp pain has been constant. Patient states he is also on blood thinners Plavix and Eliquis. On physical exam patient does have left flank CVA tenderness to palpation. Differential does include pyelonephritis, kidney stones, lumbar fracture. Patient was ordered laboratory studies CT abdomen pelvis and urinalysis. Patient was ordered Toradol 15 mg IV, fentanyl 50 mcg IV, Zofran 4 mg IV in the emergency department. Laboratory studies unremarked for any acute process. Patient normal white blood cell count platelets. Patient mild anemia hemoglobin 12.3. Patient normal sodium potassium kidney function. Patient normal calcium. Glucose elevated 266. Patient normal liver enzymes, normal lipase. Urinalysis with 250 glucose, greater than 100 red blood cells only 2 white blood cells and rare bacteria will be sent for culture. Patient has had blood in the urine chronically it appears. No urinary tract infection at this time. CT of the abdomen pelvis reveals no acute abnormality in the abdomen or pelvis on the CT. Patient does have a 5 mm nonobstructing stone in the right similar to previous CT scans.   Patient was updated on these laboratory imaging study findings. Did get some relief with above medication given earlier. I discussed with patient we will give him number to the urologist Rc Edouard is to follow-up in 2 days for reevaluation of microscopic blood in the urine or flank pain. Patient will placed on [place] 5/325 every 6-8 hours as needed for pain. No drink or drive while taking. Patient did get some relief with above medication but still having some left flank pain. I discussed with patient to return immediately to the emerged part with any worsening symptoms. Clinical Impression:  1. Acute left flank pain    2. Nephrolithiasis    3. Other microscopic hematuria      Disposition referral (if applicable):  [place], [address] [facility] [address] [address] [phone]    Schedule an appointment as soon as possible for a visit in 2 days  For evaluation of flank pain, blood in the urine. Call for an appointment. Vangie Ortega MD  [address] [facility] [address]  [phone]    Schedule an appointment as soon as possible for a visit in 2 days  For reevaluation and follow-up. Call for an appointment. Disposition medications (if applicable):  New Prescriptions    No medications on file     ED Provider Disposition Time  DISPOSITION  9117      Comment: Please note this report has been produced using speech recognition software and may contain errors related to that system including errors in grammar, punctuation, and spelling, as well as words and phrases that may be inappropriate. Efforts were made to edit the dictations.         Nicolle Bocanegra,   02/25/23 1253

## 2023-02-25 NOTE — ED TRIAGE NOTES
Pt arrives with complaints of left side flank pain that started yesterday,history of kidney stones, denies any fever, no urinary complaints.

## 2023-02-25 NOTE — DISCHARGE INSTRUCTIONS
Follow-up with a neurologist Dr. Gordon Presume in 2 to 3 days for reevaluation of flank pain blood in the urine. Call for an appointment  Follow-up with your primary care physician in 2 to 3 days for reevaluation. Call for an appointment  Take Norco needed for pain. No drinking or driving while taking  Return to the emergency department immediately any pain fever chills nausea vomiting dizzy lightheadedness or any worsening symptoms.

## 2023-03-03 ENCOUNTER — TELEPHONE (OUTPATIENT)
Dept: CARDIAC CATH/INVASIVE PROCEDURES | Age: 63
End: 2023-03-03

## 2023-03-06 ENCOUNTER — HOSPITAL ENCOUNTER (OUTPATIENT)
Dept: GENERAL RADIOLOGY | Age: 63
Discharge: HOME | End: 2023-03-06
Payer: MEDICAID

## 2023-03-06 ENCOUNTER — HOSPITAL ENCOUNTER (OUTPATIENT)
Age: 63
Discharge: HOME | End: 2023-03-06
Payer: MEDICAID

## 2023-03-06 ENCOUNTER — OFFICE VISIT (OUTPATIENT)
Dept: INTERNAL MEDICINE CLINIC | Age: 63
End: 2023-03-06
Payer: MEDICAID

## 2023-03-06 ENCOUNTER — HOSPITAL ENCOUNTER (OUTPATIENT)
Age: 63
Setting detail: SPECIMEN
Discharge: HOME | End: 2023-03-06
Payer: MEDICAID

## 2023-03-06 VITALS
OXYGEN SATURATION: 95 % | HEART RATE: 76 BPM | DIASTOLIC BLOOD PRESSURE: 78 MMHG | RESPIRATION RATE: 16 BRPM | SYSTOLIC BLOOD PRESSURE: 132 MMHG

## 2023-03-06 DIAGNOSIS — I48.20 ATRIAL FIBRILLATION, CHRONIC (HCC): Primary | ICD-10-CM

## 2023-03-06 DIAGNOSIS — I25.10 CORONARY ARTERY DISEASE INVOLVING NATIVE CORONARY ARTERY OF NATIVE HEART WITHOUT ANGINA PECTORIS: ICD-10-CM

## 2023-03-06 DIAGNOSIS — Z01.818 PRE-PROCEDURAL EXAMINATION: ICD-10-CM

## 2023-03-06 DIAGNOSIS — M54.42 ACUTE LEFT-SIDED LOW BACK PAIN WITH LEFT-SIDED SCIATICA: ICD-10-CM

## 2023-03-06 DIAGNOSIS — E11.9 TYPE 2 DIABETES MELLITUS WITHOUT COMPLICATION, WITHOUT LONG-TERM CURRENT USE OF INSULIN (HCC): ICD-10-CM

## 2023-03-06 LAB
ANION GAP SERPL CALCULATED.3IONS-SCNC: 15 MMOL/L (ref 4–16)
APTT: 43.1 SECONDS (ref 25.1–37.1)
BUN SERPL-MCNC: 15 MG/DL (ref 6–23)
CALCIUM SERPL-MCNC: 9.4 MG/DL (ref 8.3–10.6)
CHLORIDE BLD-SCNC: 101 MMOL/L (ref 99–110)
CO2: 22 MMOL/L (ref 21–32)
CREAT SERPL-MCNC: 1.3 MG/DL (ref 0.9–1.3)
GFR SERPL CREATININE-BSD FRML MDRD: >60 ML/MIN/1.73M2
GLUCOSE SERPL-MCNC: 227 MG/DL (ref 70–99)
HCT VFR BLD CALC: 38.1 % (ref 42–52)
HEMOGLOBIN: 11.4 GM/DL (ref 13.5–18)
INR BLD: 1.02 INDEX
MAGNESIUM: 1.9 MG/DL (ref 1.8–2.4)
MCH RBC QN AUTO: 27.3 PG (ref 27–31)
MCHC RBC AUTO-ENTMCNC: 29.9 % (ref 32–36)
MCV RBC AUTO: 91.1 FL (ref 78–100)
PDW BLD-RTO: 14.6 % (ref 11.7–14.9)
PHOSPHORUS: 3.4 MG/DL (ref 2.5–4.9)
PLATELET # BLD: 295 K/CU MM (ref 140–440)
PMV BLD AUTO: 10.2 FL (ref 7.5–11.1)
POTASSIUM SERPL-SCNC: 5.1 MMOL/L (ref 3.5–5.1)
PROTHROMBIN TIME: 13.2 SECONDS (ref 11.7–14.5)
RBC # BLD: 4.18 M/CU MM (ref 4.6–6.2)
SODIUM BLD-SCNC: 138 MMOL/L (ref 135–145)
WBC # BLD: 9.6 K/CU MM (ref 4–10.5)

## 2023-03-06 PROCEDURE — U0005 INFEC AGEN DETEC AMPLI PROBE: HCPCS

## 2023-03-06 PROCEDURE — 1036F TOBACCO NON-USER: CPT | Performed by: INTERNAL MEDICINE

## 2023-03-06 PROCEDURE — 80048 BASIC METABOLIC PNL TOTAL CA: CPT

## 2023-03-06 PROCEDURE — 3046F HEMOGLOBIN A1C LEVEL >9.0%: CPT | Performed by: INTERNAL MEDICINE

## 2023-03-06 PROCEDURE — 96372 THER/PROPH/DIAG INJ SC/IM: CPT | Performed by: INTERNAL MEDICINE

## 2023-03-06 PROCEDURE — G8417 CALC BMI ABV UP PARAM F/U: HCPCS | Performed by: INTERNAL MEDICINE

## 2023-03-06 PROCEDURE — 36415 COLL VENOUS BLD VENIPUNCTURE: CPT

## 2023-03-06 PROCEDURE — 71046 X-RAY EXAM CHEST 2 VIEWS: CPT

## 2023-03-06 PROCEDURE — 85027 COMPLETE CBC AUTOMATED: CPT

## 2023-03-06 PROCEDURE — G8427 DOCREV CUR MEDS BY ELIG CLIN: HCPCS | Performed by: INTERNAL MEDICINE

## 2023-03-06 PROCEDURE — 85610 PROTHROMBIN TIME: CPT

## 2023-03-06 PROCEDURE — 83735 ASSAY OF MAGNESIUM: CPT

## 2023-03-06 PROCEDURE — 99214 OFFICE O/P EST MOD 30 MIN: CPT | Performed by: INTERNAL MEDICINE

## 2023-03-06 PROCEDURE — 85730 THROMBOPLASTIN TIME PARTIAL: CPT

## 2023-03-06 PROCEDURE — U0003 INFECTIOUS AGENT DETECTION BY NUCLEIC ACID (DNA OR RNA); SEVERE ACUTE RESPIRATORY SYNDROME CORONAVIRUS 2 (SARS-COV-2) (CORONAVIRUS DISEASE [COVID-19]), AMPLIFIED PROBE TECHNIQUE, MAKING USE OF HIGH THROUGHPUT TECHNOLOGIES AS DESCRIBED BY CMS-2020-01-R: HCPCS

## 2023-03-06 PROCEDURE — 84100 ASSAY OF PHOSPHORUS: CPT

## 2023-03-06 PROCEDURE — 2022F DILAT RTA XM EVC RTNOPTHY: CPT | Performed by: INTERNAL MEDICINE

## 2023-03-06 PROCEDURE — 3017F COLORECTAL CA SCREEN DOC REV: CPT | Performed by: INTERNAL MEDICINE

## 2023-03-06 PROCEDURE — G8484 FLU IMMUNIZE NO ADMIN: HCPCS | Performed by: INTERNAL MEDICINE

## 2023-03-06 RX ORDER — TRIAMCINOLONE ACETONIDE 40 MG/ML
40 INJECTION, SUSPENSION INTRA-ARTICULAR; INTRAMUSCULAR ONCE
Status: COMPLETED | OUTPATIENT
Start: 2023-03-06 | End: 2023-03-06

## 2023-03-06 RX ORDER — TIZANIDINE 2 MG/1
2 TABLET ORAL 2 TIMES DAILY PRN
Qty: 60 TABLET | Refills: 0 | Status: SHIPPED | OUTPATIENT
Start: 2023-03-06

## 2023-03-06 RX ADMIN — TRIAMCINOLONE ACETONIDE 40 MG: 40 INJECTION, SUSPENSION INTRA-ARTICULAR; INTRAMUSCULAR at 11:45

## 2023-03-06 ASSESSMENT — PATIENT HEALTH QUESTIONNAIRE - PHQ9
1. LITTLE INTEREST OR PLEASURE IN DOING THINGS: 0
SUM OF ALL RESPONSES TO PHQ QUESTIONS 1-9: 0
SUM OF ALL RESPONSES TO PHQ9 QUESTIONS 1 & 2: 0
2. FEELING DOWN, DEPRESSED OR HOPELESS: 0
SUM OF ALL RESPONSES TO PHQ QUESTIONS 1-9: 0

## 2023-03-06 NOTE — PROGRESS NOTES
Moira Hinkle  1960 03/06/23    SUBJECTIVE:  atr fib w hx of CVA, paroxysmal.  Is sched for watchman procedure w Dr Genoveva Hill later this week. Moira Hinkle is being referred for Left Atrial Appendage Closure with WATCHMAN device for management of stroke risk resulting from non-valvular atrial fibrillation. Based on their past history, it has been determined that they are poor candidates for long-term oral-anticoagulation, however may be tolerant of short term treatment with anti-thrombotic therapy as necessary. Patient is high risk for stroke or systemic thromboembolism. Patient [id number]  is calculated:      Risk   Factors  Component Value   C CHF No 0   H HTN No 0   A2 Age >= 76 No,  (61 y.o.) 0   D DM Yes 1   S2 Prior Stroke/TIA Yes 2   V Vascular Disease Yes 1   A Age 74-69 No,  (61 y.o.) 0   Sc Sex male 0    [id number]  Score  4       Specifically regarding risk of anticoagulation they have demonstrated:  History of bleeding (eg. Intracerebral, subdural, GI, retro-peritoneal)  Intolerance oral anticoagulation  Increased bleeding risk (e.g. Thrombocytopenia, anemia)  High risk of recurrent falls  Occupation related high bleeding risk  Need for prolonged dual anti platelet therapy  Severe renal failure  Poor compliance with anticoagulant therapy    We have discussed their unique stroke and bleeding risk both on and off oral-anticoagulation, and the rationale for this referral.  Based on both stroke and bleeding risk, a shared decision has been made to pursue closure of the left atrial appendage as an alternative to oral anticoagulant therapy for stroke prophylaxis and to reduce their long term risk of incidence of bleeding. Signed:    Brooklyn Corrales MD, 3/6/2023, 11:29 AM    SPECIFICALLY HE'S HAD PROBLEMS W BLEEDING RECURRING AT HIS OSTOMY. ALSO HAS NEED FOR STAYING ON BOTH PLAVIX AND ASA SINCE HAS CAD AND ALSO STENTS. Hypertension: Stable.  Denies CP, SOB, cough, visual changes, dizziness, palpitations or HA. Dm- SUGARS IMPROVED TO 7S, BUT SL HIGHER LAST YR IN DEC. Lab Results   Component Value Date    LABA1C 7.9 12/22/2022    LABA1C 7.6 09/21/2022    LABA1C 7.2 (H) 03/07/2022     Lab Results   Component Value Date    GLUF 105 (H) 11/01/2016    LABMICR YES 12/22/2022    LDLCALC 72 12/22/2022    CREATININE 1.1 02/25/2023     DID HAVE FLARE OF LBP LAST WEEK, WAS SEEN IN [facility] ED.  L GROIN PAIN NOTED TOO. ABD PELV CT NEG FOR STONES. HAD PARTIAL RESPONSE TO [address]. 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  HAS SPASMS, TENDERNESS NOTED ON EXAM TODAY. DENIES LEG NUMBNESS OR WEAKNESS. OBJECTIVE:    /78   Pulse 76   Resp 16   SpO2 95%     Physical Exam  Constitutional:       Appearance: Normal appearance. Cardiovascular:      Rate and Rhythm: Normal rate and regular rhythm. Heart sounds: No murmur heard. No gallop. Pulmonary:      Effort: No respiratory distress. Breath sounds: No wheezing. Abdominal:      General: Abdomen is flat. Bowel sounds are normal. There is no distension. Palpations: Abdomen is soft. There is no mass. Tenderness: There is no abdominal tenderness. Hernia: No hernia is present. Musculoskeletal:         General: Tenderness (L PARASPINAL LUMBAR REGION, W SPASMS APPRECIATED ON PALPATION. ) present. Right lower leg: No edema. Left lower leg: No edema. Neurological:      Mental Status: He is alert. Psychiatric:         Mood and Affect: Mood normal.       ASSESSMENT:    1. Atrial fibrillation, chronic ([address]    2. Coronary artery disease involving native coronary artery of native heart without angina pectoris    3. Type 2 diabetes mellitus without complication, without long-term current use of insulin ([address]    4.  Acute left-sided low back pain with left-sided sciatica        PLAN:    Padmini Braden was seen today for pre-op exam.    Diagnoses and all orders for this visit:    Atrial fibrillation, chronic ([address]- APPEARS MEDICALLY APPROPRIATE FOR WATCHMAN, SCHED FOR LATER THIS WEEK     Coronary artery disease involving native coronary artery of native heart without angina pectoris - Coronary artery disease stable and asymptomatic, will continue to monitor for any signs of instability. Will periodically monitor lipid profile and liver function, cardiac risk fac       Dm-. DM relatively well controlled and will continue current regimen. Screening reviewed. See orders. Acute left-sided low back pain with left-sided sciatica- PROB SPRAIN, WE'LL REC REST AND MUSCLE RELAXANT, STEROID INJ X1.  to call back one week if not improving.      -     tiZANidine (ZANAFLEX) 2 MG tablet;  Take 1 tablet by mouth 2 times daily as needed (BACK PAIN AND SPASMS)  -     triamcinolone acetonide (KENALOG-40) injection 40 mg

## 2023-03-07 LAB
SARS-COV-2 RNA RESP QL NAA+PROBE: DETECTED
SOURCE: ABNORMAL

## 2023-03-08 ENCOUNTER — TELEPHONE (OUTPATIENT)
Dept: CARDIAC CATH/INVASIVE PROCEDURES | Age: 63
End: 2023-03-08

## 2023-03-08 DIAGNOSIS — I25.10 CORONARY ARTERY DISEASE INVOLVING NATIVE CORONARY ARTERY OF NATIVE HEART WITHOUT ANGINA PECTORIS: ICD-10-CM

## 2023-03-08 DIAGNOSIS — I48.20 ATRIAL FIBRILLATION, CHRONIC (HCC): ICD-10-CM

## 2023-03-08 RX ORDER — LANOLIN ALCOHOL/MO/W.PET/CERES
400 CREAM (GRAM) TOPICAL DAILY
Qty: 30 TABLET | Refills: 3 | Status: SHIPPED | OUTPATIENT
Start: 2023-03-08

## 2023-03-08 NOTE — TELEPHONE ENCOUNTER
Patient was Covid positve.  Wife Notified of results and that procedure will be cancelled at this time.  Patiemt tired but feeling ok otherwise.  Plan for Watchman in April.  Electronically signed by Jessica Rick RN on 3/8/2023 at 1:43 PM Watchman Care Coordinator

## 2023-03-20 ENCOUNTER — TELEPHONE (OUTPATIENT)
Dept: CARDIAC CATH/INVASIVE PROCEDURES | Age: 63
End: 2023-03-20

## 2023-03-20 DIAGNOSIS — I48.20 ATRIAL FIBRILLATION, CHRONIC (HCC): Primary | ICD-10-CM

## 2023-03-20 NOTE — TELEPHONE ENCOUNTER
Watchman procedure to be rescheduled for 4/13/2023 at 0830. Called the wife regarding date and time. States this is a good time. Aicha at the office notified to schedule. Will follow.   Electronically signed by Wilber Guerrero RN on 3/20/2023 at [address]

## 2023-03-21 ENCOUNTER — TELEPHONE (OUTPATIENT)
Dept: CARDIAC CATH/INVASIVE PROCEDURES | Age: 63
End: 2023-03-21

## 2023-03-21 NOTE — TELEPHONE ENCOUNTER
Watchman scheduled. Patients wife notified of dates and times for pre testing/procedure. Will follow.   Electronically signed by Tamica Pinto RN on 3/21/2023 at 5:42 PM [address] [facility]

## 2023-03-27 DIAGNOSIS — K29.90 GASTRITIS AND DUODENITIS: ICD-10-CM

## 2023-03-27 RX ORDER — PANTOPRAZOLE SODIUM 40 MG/1
40 TABLET, DELAYED RELEASE ORAL 2 TIMES DAILY
Qty: 180 TABLET | Refills: 1 | Status: SHIPPED | OUTPATIENT
Start: 2023-03-27

## 2023-03-31 ENCOUNTER — TELEPHONE (OUTPATIENT)
Dept: CARDIOLOGY CLINIC | Age: 63
End: 2023-03-31

## 2023-04-13 ENCOUNTER — HOSPITAL ENCOUNTER (INPATIENT)
Dept: CARDIAC CATH/INVASIVE PROCEDURES | Age: 63
LOS: 1 days | Discharge: HOME | DRG: 175 | End: 2023-04-13
Attending: INTERNAL MEDICINE | Admitting: INTERNAL MEDICINE
Payer: MEDICAID

## 2023-04-13 VITALS
RESPIRATION RATE: 16 BRPM | BODY MASS INDEX: 39.9 KG/M2 | HEIGHT: 71 IN | TEMPERATURE: 97.1 F | HEART RATE: 76 BPM | DIASTOLIC BLOOD PRESSURE: 106 MMHG | SYSTOLIC BLOOD PRESSURE: 145 MMHG | OXYGEN SATURATION: 97 % | WEIGHT: 285 LBS

## 2023-04-13 PROBLEM — Z95.818 PRESENCE OF WATCHMAN LEFT ATRIAL APPENDAGE CLOSURE DEVICE: Status: ACTIVE | Noted: 2023-04-13

## 2023-04-13 LAB
BASE EXCESS MIXED: 8.5 (ref 0–1.2)
BASE EXCESS: ABNORMAL (ref 0–3.3)
CO2: 19 MMOL/L (ref 21–32)
EGFR, POC: >60 ML/MIN/1.73M2
GLUCOSE BLD-MCNC: 158 MG/DL (ref 70–99)
GLUCOSE BLD-MCNC: 170 MG/DL (ref 70–99)
GLUCOSE BLD-MCNC: 197 MG/DL (ref 70–99)
HCO3 ARTERIAL: 17.7 MMOL/L (ref 18–23)
HCT VFR BLD CALC: 28 % (ref 42–52)
HEMOGLOBIN: 9.5 GM/DL (ref 13.5–18)
LV EF: 58 %
LVEF MODALITY: NORMAL
O2 SATURATION: 99.9 % (ref 96–97)
PCO2 ARTERIAL: 38.2 MMHG (ref 32–45)
PH BLOOD: 7.27 (ref 7.34–7.45)
PO2 ARTERIAL: 354.3 MMHG (ref 75–100)
POC CALCIUM: 1.02 MMOL/L (ref 1.12–1.32)
POC CHLORIDE: 117 MMOL/L (ref 98–109)
POC CREATININE: 1 MG/DL (ref 0.9–1.3)
POTASSIUM SERPL-SCNC: 3.8 MMOL/L (ref 3.5–4.5)
SODIUM BLD-SCNC: 146 MMOL/L (ref 138–146)
SOURCE, BLOOD GAS: ABNORMAL

## 2023-04-13 PROCEDURE — 3700000000 HC ANESTHESIA ATTENDED CARE

## 2023-04-13 PROCEDURE — 86850 RBC ANTIBODY SCREEN: CPT

## 2023-04-13 PROCEDURE — 2720000010 HC SURG SUPPLY STERILE

## 2023-04-13 PROCEDURE — C1769 GUIDE WIRE: HCPCS

## 2023-04-13 PROCEDURE — C1894 INTRO/SHEATH, NON-LASER: HCPCS

## 2023-04-13 PROCEDURE — B24BZZ4 ULTRASONOGRAPHY OF HEART WITH AORTA, TRANSESOPHAGEAL: ICD-10-PCS | Performed by: INTERNAL MEDICINE

## 2023-04-13 PROCEDURE — 1200000000 HC SEMI PRIVATE

## 2023-04-13 PROCEDURE — 33340 PERQ CLSR TCAT L ATR APNDGE: CPT | Performed by: INTERNAL MEDICINE

## 2023-04-13 PROCEDURE — 2580000003 HC RX 258

## 2023-04-13 PROCEDURE — 33340 PERQ CLSR TCAT L ATR APNDGE: CPT

## 2023-04-13 PROCEDURE — 93308 TTE F-UP OR LMTD: CPT

## 2023-04-13 PROCEDURE — C1760 CLOSURE DEV, VASC: HCPCS

## 2023-04-13 PROCEDURE — 86900 BLOOD TYPING SEROLOGIC ABO: CPT

## 2023-04-13 PROCEDURE — 86922 COMPATIBILITY TEST ANTIGLOB: CPT

## 2023-04-13 PROCEDURE — 3700000001 HC ADD 15 MINUTES (ANESTHESIA)

## 2023-04-13 PROCEDURE — 6360000004 HC RX CONTRAST MEDICATION

## 2023-04-13 PROCEDURE — 7100000000 HC PACU RECOVERY - FIRST 15 MIN

## 2023-04-13 PROCEDURE — 02L73DK OCCLUSION OF LEFT ATRIAL APPENDAGE WITH INTRALUMINAL DEVICE, PERCUTANEOUS APPROACH: ICD-10-PCS | Performed by: INTERNAL MEDICINE

## 2023-04-13 PROCEDURE — 82962 GLUCOSE BLOOD TEST: CPT

## 2023-04-13 PROCEDURE — 80051 ELECTROLYTE PANEL: CPT

## 2023-04-13 PROCEDURE — 2500000003 HC RX 250 WO HCPCS

## 2023-04-13 PROCEDURE — 85018 HEMOGLOBIN: CPT

## 2023-04-13 PROCEDURE — 93312 ECHO TRANSESOPHAGEAL: CPT

## 2023-04-13 PROCEDURE — 82565 ASSAY OF CREATININE: CPT

## 2023-04-13 PROCEDURE — 82803 BLOOD GASES ANY COMBINATION: CPT

## 2023-04-13 PROCEDURE — 2709999900 HC NON-CHARGEABLE SUPPLY

## 2023-04-13 PROCEDURE — 7100000001 HC PACU RECOVERY - ADDTL 15 MIN

## 2023-04-13 PROCEDURE — 86901 BLOOD TYPING SEROLOGIC RH(D): CPT

## 2023-04-13 PROCEDURE — 6360000002 HC RX W HCPCS

## 2023-04-13 PROCEDURE — 85014 HEMATOCRIT: CPT

## 2023-04-13 RX ORDER — MEPERIDINE HYDROCHLORIDE 25 MG/ML
12.5 INJECTION INTRAMUSCULAR; INTRAVENOUS; SUBCUTANEOUS EVERY 5 MIN PRN
Status: DISCONTINUED | OUTPATIENT
Start: 2023-04-13 | End: 2023-04-13 | Stop reason: HOSPADM

## 2023-04-13 RX ORDER — FENTANYL CITRATE 50 UG/ML
25 INJECTION, SOLUTION INTRAMUSCULAR; INTRAVENOUS EVERY 5 MIN PRN
Status: DISCONTINUED | OUTPATIENT
Start: 2023-04-13 | End: 2023-04-13 | Stop reason: HOSPADM

## 2023-04-13 RX ORDER — SODIUM CHLORIDE 0.9 % (FLUSH) 0.9 %
5-40 SYRINGE (ML) INJECTION EVERY 12 HOURS SCHEDULED
Status: DISCONTINUED | OUTPATIENT
Start: 2023-04-13 | End: 2023-04-13 | Stop reason: HOSPADM

## 2023-04-13 RX ORDER — SODIUM CHLORIDE, SODIUM LACTATE, POTASSIUM CHLORIDE, CALCIUM CHLORIDE 600; 310; 30; 20 MG/100ML; MG/100ML; MG/100ML; MG/100ML
INJECTION, SOLUTION INTRAVENOUS CONTINUOUS
Status: DISCONTINUED | OUTPATIENT
Start: 2023-04-13 | End: 2023-04-13 | Stop reason: HOSPADM

## 2023-04-13 RX ORDER — SODIUM CHLORIDE 0.9 % (FLUSH) 0.9 %
5-40 SYRINGE (ML) INJECTION PRN
Status: DISCONTINUED | OUTPATIENT
Start: 2023-04-13 | End: 2023-04-13 | Stop reason: HOSPADM

## 2023-04-13 RX ORDER — LABETALOL HYDROCHLORIDE 5 MG/ML
10 INJECTION, SOLUTION INTRAVENOUS
Status: DISCONTINUED | OUTPATIENT
Start: 2023-04-13 | End: 2023-04-13 | Stop reason: HOSPADM

## 2023-04-13 RX ORDER — SODIUM CHLORIDE 9 MG/ML
INJECTION, SOLUTION INTRAVENOUS PRN
Status: COMPLETED | OUTPATIENT
Start: 2023-04-13 | End: 2023-04-13

## 2023-04-13 RX ORDER — OXYCODONE HYDROCHLORIDE 5 MG/1
5 TABLET ORAL
Status: DISCONTINUED | OUTPATIENT
Start: 2023-04-13 | End: 2023-04-13 | Stop reason: HOSPADM

## 2023-04-13 RX ORDER — ONDANSETRON 2 MG/ML
4 INJECTION INTRAMUSCULAR; INTRAVENOUS
Status: DISCONTINUED | OUTPATIENT
Start: 2023-04-13 | End: 2023-04-13 | Stop reason: HOSPADM

## 2023-04-13 RX ORDER — IPRATROPIUM BROMIDE AND ALBUTEROL SULFATE 2.5; .5 MG/3ML; MG/3ML
1 SOLUTION RESPIRATORY (INHALATION)
Status: DISCONTINUED | OUTPATIENT
Start: 2023-04-13 | End: 2023-04-13 | Stop reason: HOSPADM

## 2023-04-13 RX ORDER — DIPHENHYDRAMINE HYDROCHLORIDE 50 MG/ML
12.5 INJECTION INTRAMUSCULAR; INTRAVENOUS
Status: DISCONTINUED | OUTPATIENT
Start: 2023-04-13 | End: 2023-04-13 | Stop reason: HOSPADM

## 2023-04-13 RX ORDER — ASPIRIN 81 MG/1
81 TABLET ORAL DAILY
COMMUNITY

## 2023-04-13 RX ORDER — HYDRALAZINE HYDROCHLORIDE 20 MG/ML
10 INJECTION INTRAMUSCULAR; INTRAVENOUS
Status: DISCONTINUED | OUTPATIENT
Start: 2023-04-13 | End: 2023-04-13 | Stop reason: HOSPADM

## 2023-04-13 RX ORDER — DROPERIDOL 2.5 MG/ML
0.62 INJECTION, SOLUTION INTRAMUSCULAR; INTRAVENOUS EVERY 10 MIN PRN
Status: DISCONTINUED | OUTPATIENT
Start: 2023-04-13 | End: 2023-04-13 | Stop reason: HOSPADM

## 2023-04-13 ASSESSMENT — ENCOUNTER SYMPTOMS
PHOTOPHOBIA: 0
EYE PAIN: 0
COUGH: 0
COLOR CHANGE: 0
CONSTIPATION: 0
CHEST TIGHTNESS: 0
VOMITING: 0
ABDOMINAL PAIN: 0
BACK PAIN: 0
SHORTNESS OF BREATH: 1
DIARRHEA: 0
BLOOD IN STOOL: 0
WHEEZING: 0
NAUSEA: 0

## 2023-04-13 ASSESSMENT — PAIN SCALES - GENERAL
PAINLEVEL_OUTOF10: 0

## 2023-04-13 NOTE — H&P
Persistent atrial fibrillation - with atrial fibrillation and flutter history sp ablation PV isolation and atrial flutter ablation  History of myasthenia gravis  pacemaker  CVA  COPD  Obesity BMI 40  CAD  Dm-2  History of gastric ulcer with bleeding now cleared for anticoagulation  Ulcerative colitis  Chronic back pain  History of blood clot - its in the history - but no DVT / PE - it  was superficial from IV line as per patient    Patient here for watchman procedure    Patient has short atrial run less than 1 second most of the time - reasonable controlled on sotalol   Patient has multiple episodes over the year but well tolerable  Previous history of post ablation stroke and so dont want ablation    Patient also history of GI bleed from duodenal ulcer and gastric ulcer - discussed about watchman    CHADS-VAS --  4  DM, CAD, Stroke        HAS-BLED Score                            Risk Factors      Points   Hypertension  Uncontrolled, >320 mmHg systolic   No=0   Renal disease  Dialysis, transplant, Cr>2.26 mg/dL or >200 µmol/L   No=0   Liver disease   Cirrhosis or bilirubin >2x normal with AST/ALT/AP >3x normal   No=0   Stroke history   Yes=1   Prior major bleeding or predisposition to bleeding     Yes=1   Labile  INR  Unstable/high INRs, time in therapeutic range <60%   No=0   Age >71   No=0   Medication usage predisposing to bleeding   Aspirin, clopidogrel, NSAIDs   Yes=1   Alcohol use   >7 drinks/week   No=0       HAS-BLED Score:    3:  Risk was 5.8% in one validation study and 3.72 bleeds per 100 patient-years in another validation study. Discussed with patient about left atrial appendage closure device role in detail. We are currently doing only watchman so we discussed about the device in detail.      We discussed that this device is only used for patients who are unable to tolerate long term anticoagulation because of bleeding or falls or other reasons where they are high risk with anticoagulation

## 2023-04-13 NOTE — PROGRESS NOTES
Right femoral site wnl, no bleeding or hematoma noted. Figure 8 suture removed per [employer]. D-stat dry and tegaderm applied.

## 2023-04-13 NOTE — DISCHARGE INSTRUCTIONS
Please take aspirin when you get home  Resume aspirin and plavix tomorrow morning. Watchman Discharge Instructions:    Please follow these instructions carefully and call the [facility] at [phone] with    any questions or concerns. AFTER YOU GO HOME:                 Drink extra fluids for 2 days (Do not exceed fluid restrictions if they are previously prescribed)                 You may resume your normal diet                 No smoking                 Relax and take it easy                 Do NOT make any important or legal decisions 24 hrs because of anesthesia effects                 Do NOT drive or operate machines at home or work 3 days                 Do NOT drink alcohol         CARE OF GROIN SITES:                 For the first 24 hours, check the puncture site every 1-2 hours while awake. The nursing staff will also assess while you are in the hospital.                 For 2 days, when you cough, sneeze or laugh hold your hand over the puncture site and press firmly on/above the site. Remove the bandage after 24 hours. If there is minor oozing, apply another bandage and remove it after 12 hours. It is normal to have a small bruise or pea size lump at the site. You may shower after your procedure. Do NOT soak in a tub bath, or use a hot tub or pool for at least 1 week. Do NOT scrub the site. Do NOT put lotion or powder near the puncture site. No stooping or squatting 3 days                 Do NOT do any heavy activity such as exercise, lifting, or straining for 3 days                 No housework, yard work, or any activities that make your sweat for a week                 Do NOT lift more than 10 pounds for 3 days         BLEEDING:                 If you start bleeding from the groin site, lie down flat and press firmly on/above the site for 10 minutes.                  Call 911 right away with heavy bleeding or

## 2023-04-13 NOTE — PROGRESS NOTES
1057-pt. Arrived to pacu via cart from cath lab. Pt. Is  attached to the monitor and alarms are on. Received report from [address] [place] [address] and [employer], CRNA. Pt. Has a right groin site. Dressing to groin site is clean dry and intact. No redness, drainage, or hematoma noted. Pt. Skin is warm and dry. Pulses are intact +2 bilaterally pedal and post tib. Pt. IV infusing without any issues. Pt. Is wearing a simple mask at 6L. And Afib on the monitor. HR 60'-70's.   1121- artline removed. Pressure held. Dressing placed. 1130-pt. Is resting with eyes closed but awakens easily. Pt. Is aox4. He denies pain or n/v. Pt. Has tolerated ice chips. Pt. Is wearing 2L via NC. Pt. Dressing to right groin is clean dry and intact. No bleeding or hematoma noted. 1142- gave bedside report to Janae Huerta RN.

## 2023-04-13 NOTE — OP NOTE
[facility]    Procedure Note      Procedure Performed by: Abel Mcpherson MD       Procedures performed:     Percutaneous transcatheter closure of the left atrial appendage with endocardial implant   Transeptal Puncture  TONIO      Indication of procedure:      Patient with hx of persistent atrial fibrillation, COPD, CVA, DM-2, History of GI bleed, Myaesthenia gravis with CHADS-VAS score of 4 and Has bled score of 3 with high risk of bleeding with anticoagulation and high risk of stroke with out anticoagulation here for SUSANNE closure device watchman implantation    Patient has been seen by General cardiology and shared decision making has been made for pursuing SUSANNE closure. Patient here for SUSANNE closure with Watchman device. Arterial line placed by me    Sedation : General anesthesia      Details of procedure: The patient was brought to the electrophysiology laboratory in stable condition. The patient was in a fasting and non-sedated state. The risks, benefits and alternatives of the procedure were discussed with the patient. The risks including, but not limited to, the risks of vascular injury, bleeding, infection, device malfunction, lead dislodgement, radiation exposure, injury to cardiac and surrounding structures (including pneumothorax), stroke, myocardial infarction and death were discussed in detail. The patient opted to proceed with the device implantation. Written informed consent was signed and placed in the chart. Prophylactic antibiotic was given. The patient was prepped and draped in a sterile fashion. A timeout protocol was completed to identify the patient and the procedure being performed.  Patient underwent general anesthesia by anesthesiology team.       Transesophageal echocardiography was performed and measurements of left atrial appendage,

## 2023-04-13 NOTE — DISCHARGE SUMMARY
staff and provided written and verbal instructions. The above note has ang reviewed and I agree with the history, physical examination and treatment plan. Necessary changes to the note has been made in history, physical examination and plan to the above note.     Nelly Nelson M.D 04/13/23

## 2023-04-17 ENCOUNTER — TELEPHONE (OUTPATIENT)
Dept: CARDIOLOGY CLINIC | Age: 63
End: 2023-04-17

## 2023-04-17 LAB
ABO/RH: NORMAL
ANTIBODY SCREEN: NEGATIVE
COMPONENT: NORMAL
CROSSMATCH RESULT: NORMAL
STATUS: NORMAL
TRANSFUSION STATUS: NORMAL
UNIT DIVISION: 0
UNIT NUMBER: NORMAL

## 2023-04-28 DIAGNOSIS — J42 CHRONIC BRONCHITIS, UNSPECIFIED CHRONIC BRONCHITIS TYPE (HCC): ICD-10-CM

## 2023-04-28 RX ORDER — TIOTROPIUM BROMIDE 18 UG/1
18 CAPSULE ORAL; RESPIRATORY (INHALATION) DAILY
Qty: 90 CAPSULE | Refills: 1 | Status: SHIPPED | OUTPATIENT
Start: 2023-04-28

## 2023-05-12 ENCOUNTER — APPOINTMENT (OUTPATIENT)
Dept: CT IMAGING | Age: 63
DRG: 465 | End: 2023-05-12
Payer: MEDICAID

## 2023-05-12 ENCOUNTER — HOSPITAL ENCOUNTER (INPATIENT)
Age: 63
LOS: 2 days | Discharge: HOME | DRG: 465 | End: 2023-05-14
Attending: EMERGENCY MEDICINE | Admitting: STUDENT IN AN ORGANIZED HEALTH CARE EDUCATION/TRAINING PROGRAM
Payer: MEDICAID

## 2023-05-12 ENCOUNTER — APPOINTMENT (OUTPATIENT)
Dept: GENERAL RADIOLOGY | Age: 63
DRG: 465 | End: 2023-05-12
Payer: MEDICAID

## 2023-05-12 ENCOUNTER — OFFICE VISIT (OUTPATIENT)
Dept: INTERNAL MEDICINE CLINIC | Age: 63
End: 2023-05-12
Payer: MEDICAID

## 2023-05-12 VITALS
RESPIRATION RATE: 16 BRPM | HEART RATE: 72 BPM | OXYGEN SATURATION: 94 % | SYSTOLIC BLOOD PRESSURE: 88 MMHG | DIASTOLIC BLOOD PRESSURE: 62 MMHG

## 2023-05-12 DIAGNOSIS — I95.89 HYPOTENSION DUE TO HYPOVOLEMIA: ICD-10-CM

## 2023-05-12 DIAGNOSIS — E11.9 TYPE 2 DIABETES MELLITUS WITHOUT COMPLICATION, WITHOUT LONG-TERM CURRENT USE OF INSULIN (HCC): ICD-10-CM

## 2023-05-12 DIAGNOSIS — E86.1 HYPOTENSION DUE TO HYPOVOLEMIA: ICD-10-CM

## 2023-05-12 DIAGNOSIS — R31.0 GROSS HEMATURIA: ICD-10-CM

## 2023-05-12 DIAGNOSIS — E86.0 DEHYDRATION: ICD-10-CM

## 2023-05-12 DIAGNOSIS — N40.1 BENIGN PROSTATIC HYPERPLASIA WITH URINARY HESITANCY: ICD-10-CM

## 2023-05-12 DIAGNOSIS — N17.9 AKI (ACUTE KIDNEY INJURY) (HCC): ICD-10-CM

## 2023-05-12 DIAGNOSIS — H10.31 ACUTE BACTERIAL CONJUNCTIVITIS OF RIGHT EYE: ICD-10-CM

## 2023-05-12 DIAGNOSIS — N17.9 ACUTE KIDNEY INJURY (HCC): ICD-10-CM

## 2023-05-12 DIAGNOSIS — N20.0 NEPHROLITHIASIS: ICD-10-CM

## 2023-05-12 DIAGNOSIS — D64.9 ANEMIA, UNSPECIFIED TYPE: ICD-10-CM

## 2023-05-12 DIAGNOSIS — Z86.718 HISTORY OF BLOOD CLOTS: ICD-10-CM

## 2023-05-12 DIAGNOSIS — M54.42 ACUTE LEFT-SIDED LOW BACK PAIN WITH LEFT-SIDED SCIATICA: ICD-10-CM

## 2023-05-12 DIAGNOSIS — R53.81 MALAISE: Primary | ICD-10-CM

## 2023-05-12 DIAGNOSIS — I48.20 ATRIAL FIBRILLATION, CHRONIC (HCC): ICD-10-CM

## 2023-05-12 DIAGNOSIS — M19.90 ARTHRITIS: ICD-10-CM

## 2023-05-12 DIAGNOSIS — N20.1 URETEROLITHIASIS: ICD-10-CM

## 2023-05-12 DIAGNOSIS — I95.9 HYPOTENSION, UNSPECIFIED HYPOTENSION TYPE: Primary | ICD-10-CM

## 2023-05-12 DIAGNOSIS — R39.11 BENIGN PROSTATIC HYPERPLASIA WITH URINARY HESITANCY: ICD-10-CM

## 2023-05-12 LAB
ALBUMIN SERPL-MCNC: 3.8 GM/DL (ref 3.4–5)
ALP BLD-CCNC: 66 IU/L (ref 40–129)
ALT SERPL-CCNC: 13 U/L (ref 10–40)
ANION GAP SERPL CALCULATED.3IONS-SCNC: 13 MMOL/L (ref 4–16)
AST SERPL-CCNC: 17 IU/L (ref 15–37)
BACTERIA: NEGATIVE /HPF
BASE EXCESS MIXED: 0.3 (ref 0–1.2)
BASE EXCESS: ABNORMAL (ref 0–3.3)
BASOPHILS ABSOLUTE: 0 K/CU MM
BASOPHILS RELATIVE PERCENT: 0.2 % (ref 0–1)
BILIRUB SERPL-MCNC: 0.6 MG/DL (ref 0–1)
BILIRUBIN URINE: ABNORMAL MG/DL
BLOOD, URINE: ABNORMAL
BUN SERPL-MCNC: 16 MG/DL (ref 6–23)
CALCIUM SERPL-MCNC: 9.4 MG/DL (ref 8.3–10.6)
CAST TYPE: NORMAL /HPF
CHLORIDE BLD-SCNC: 99 MMOL/L (ref 99–110)
CLARITY: CLEAR
CO2 CONTENT: 24.4 MMOL/L (ref 19–24)
CO2: 23 MMOL/L (ref 21–32)
COLOR: YELLOW
CREAT SERPL-MCNC: 2.1 MG/DL (ref 0.9–1.3)
CRYSTAL TYPE: NEGATIVE /HPF
DIFFERENTIAL TYPE: ABNORMAL
EOSINOPHILS ABSOLUTE: 0 K/CU MM
EOSINOPHILS RELATIVE PERCENT: 0.3 % (ref 0–3)
EPITHELIAL CELLS, UA: 0 /HPF
GFR SERPL CREATININE-BSD FRML MDRD: 35 ML/MIN/1.73M2
GLUCOSE BLD-MCNC: 141 MG/DL (ref 70–99)
GLUCOSE SERPL-MCNC: 173 MG/DL (ref 70–99)
GLUCOSE, URINE: NEGATIVE MG/DL
HCO3 VENOUS: 23.3 MMOL/L (ref 19–25)
HCT VFR BLD CALC: 34.3 % (ref 42–52)
HEMOGLOBIN: 10.6 GM/DL (ref 13.5–18)
IMMATURE NEUTROPHIL %: 0.2 % (ref 0–0.43)
KETONES, URINE: NEGATIVE MG/DL
LEUKOCYTE ESTERASE, URINE: NEGATIVE
LYMPHOCYTES ABSOLUTE: 0.8 K/CU MM
LYMPHOCYTES RELATIVE PERCENT: 7.6 % (ref 24–44)
MCH RBC QN AUTO: 26.3 PG (ref 27–31)
MCHC RBC AUTO-ENTMCNC: 30.9 % (ref 32–36)
MCV RBC AUTO: 85.1 FL (ref 78–100)
MONOCYTES ABSOLUTE: 0.9 K/CU MM
MONOCYTES RELATIVE PERCENT: 8.8 % (ref 0–4)
NITRITE URINE, QUANTITATIVE: NEGATIVE
O2 SAT, VEN: 84.1 % (ref 50–70)
PCO2, VEN: 33.9 MMHG (ref 38–52)
PDW BLD-RTO: 15.6 % (ref 11.7–14.9)
PH VENOUS: 7.45 (ref 7.32–7.42)
PH, URINE: 5 (ref 5–8)
PLATELET # BLD: 252 K/CU MM (ref 140–440)
PMV BLD AUTO: 10.2 FL (ref 7.5–11.1)
PO2, VEN: 46.2 MMHG (ref 28–48)
POTASSIUM SERPL-SCNC: 5.1 MMOL/L (ref 3.5–5.1)
PROTEIN UA: 30 MG/DL
RBC # BLD: 4.03 M/CU MM (ref 4.6–6.2)
RBC URINE: 2 /HPF (ref 0–3)
SEGMENTED NEUTROPHILS ABSOLUTE COUNT: 8.2 K/CU MM
SEGMENTED NEUTROPHILS RELATIVE PERCENT: 82.9 % (ref 36–66)
SODIUM BLD-SCNC: 135 MMOL/L (ref 135–145)
SOURCE, BLOOD GAS: ABNORMAL
SPECIFIC GRAVITY UA: 1.02 (ref 1–1.03)
TOTAL IMMATURE NEUTOROPHIL: 0.02 K/CU MM
TOTAL PROTEIN: 7.6 GM/DL (ref 6.4–8.2)
TROPONIN T: <0.01 NG/ML
UROBILINOGEN, URINE: 0.2 MG/DL (ref 0.2–1)
WBC # BLD: 9.9 K/CU MM (ref 4–10.5)
WBC UA: 2 /HPF (ref 0–2)

## 2023-05-12 PROCEDURE — 2580000003 HC RX 258: Performed by: EMERGENCY MEDICINE

## 2023-05-12 PROCEDURE — 81001 URINALYSIS AUTO W/SCOPE: CPT

## 2023-05-12 PROCEDURE — 6360000002 HC RX W HCPCS: Performed by: EMERGENCY MEDICINE

## 2023-05-12 PROCEDURE — 80053 COMPREHEN METABOLIC PANEL: CPT

## 2023-05-12 PROCEDURE — 84484 ASSAY OF TROPONIN QUANT: CPT

## 2023-05-12 PROCEDURE — 96374 THER/PROPH/DIAG INJ IV PUSH: CPT

## 2023-05-12 PROCEDURE — 85025 COMPLETE CBC W/AUTO DIFF WBC: CPT

## 2023-05-12 PROCEDURE — 96361 HYDRATE IV INFUSION ADD-ON: CPT

## 2023-05-12 PROCEDURE — 2022F DILAT RTA XM EVC RTNOPTHY: CPT | Performed by: INTERNAL MEDICINE

## 2023-05-12 PROCEDURE — G8417 CALC BMI ABV UP PARAM F/U: HCPCS | Performed by: INTERNAL MEDICINE

## 2023-05-12 PROCEDURE — 1200000000 HC SEMI PRIVATE

## 2023-05-12 PROCEDURE — 3017F COLORECTAL CA SCREEN DOC REV: CPT | Performed by: INTERNAL MEDICINE

## 2023-05-12 PROCEDURE — 6370000000 HC RX 637 (ALT 250 FOR IP): Performed by: INTERNAL MEDICINE

## 2023-05-12 PROCEDURE — 71045 X-RAY EXAM CHEST 1 VIEW: CPT

## 2023-05-12 PROCEDURE — 2580000003 HC RX 258: Performed by: INTERNAL MEDICINE

## 2023-05-12 PROCEDURE — G8427 DOCREV CUR MEDS BY ELIG CLIN: HCPCS | Performed by: INTERNAL MEDICINE

## 2023-05-12 PROCEDURE — 74177 CT ABD & PELVIS W/CONTRAST: CPT

## 2023-05-12 PROCEDURE — 70450 CT HEAD/BRAIN W/O DYE: CPT

## 2023-05-12 PROCEDURE — 1111F DSCHRG MED/CURRENT MED MERGE: CPT | Performed by: INTERNAL MEDICINE

## 2023-05-12 PROCEDURE — 1036F TOBACCO NON-USER: CPT | Performed by: INTERNAL MEDICINE

## 2023-05-12 PROCEDURE — 93005 ELECTROCARDIOGRAM TRACING: CPT | Performed by: EMERGENCY MEDICINE

## 2023-05-12 PROCEDURE — 99285 EMERGENCY DEPT VISIT HI MDM: CPT

## 2023-05-12 PROCEDURE — 6360000004 HC RX CONTRAST MEDICATION: Performed by: EMERGENCY MEDICINE

## 2023-05-12 PROCEDURE — 96375 TX/PRO/DX INJ NEW DRUG ADDON: CPT

## 2023-05-12 PROCEDURE — 6360000002 HC RX W HCPCS: Performed by: INTERNAL MEDICINE

## 2023-05-12 PROCEDURE — 82962 GLUCOSE BLOOD TEST: CPT

## 2023-05-12 PROCEDURE — 3046F HEMOGLOBIN A1C LEVEL >9.0%: CPT | Performed by: INTERNAL MEDICINE

## 2023-05-12 PROCEDURE — 99214 OFFICE O/P EST MOD 30 MIN: CPT | Performed by: INTERNAL MEDICINE

## 2023-05-12 RX ORDER — TOBRAMYCIN 3 MG/ML
1 SOLUTION/ DROPS OPHTHALMIC EVERY 6 HOURS
Qty: 1 EACH | Refills: 0 | Status: SHIPPED | OUTPATIENT
Start: 2023-05-12 | End: 2023-05-22

## 2023-05-12 RX ORDER — TIZANIDINE 2 MG/1
2 TABLET ORAL 2 TIMES DAILY PRN
Status: DISCONTINUED | OUTPATIENT
Start: 2023-05-12 | End: 2023-05-14 | Stop reason: HOSPADM

## 2023-05-12 RX ORDER — SODIUM CHLORIDE 0.9 % (FLUSH) 0.9 %
5-40 SYRINGE (ML) INJECTION PRN
Status: DISCONTINUED | OUTPATIENT
Start: 2023-05-12 | End: 2023-05-14 | Stop reason: HOSPADM

## 2023-05-12 RX ORDER — DULOXETIN HYDROCHLORIDE 30 MG/1
60 CAPSULE, DELAYED RELEASE ORAL DAILY
Status: DISCONTINUED | OUTPATIENT
Start: 2023-05-13 | End: 2023-05-14 | Stop reason: HOSPADM

## 2023-05-12 RX ORDER — 0.9 % SODIUM CHLORIDE 0.9 %
1000 INTRAVENOUS SOLUTION INTRAVENOUS ONCE
Status: COMPLETED | OUTPATIENT
Start: 2023-05-12 | End: 2023-05-12

## 2023-05-12 RX ORDER — FENTANYL CITRATE 50 UG/ML
25 INJECTION, SOLUTION INTRAMUSCULAR; INTRAVENOUS ONCE
Status: COMPLETED | OUTPATIENT
Start: 2023-05-12 | End: 2023-05-12

## 2023-05-12 RX ORDER — DULOXETIN HYDROCHLORIDE 60 MG/1
60 CAPSULE, DELAYED RELEASE ORAL DAILY
Qty: 90 CAPSULE | Refills: 1 | Status: SHIPPED | OUTPATIENT
Start: 2023-05-12

## 2023-05-12 RX ORDER — SODIUM CHLORIDE 9 MG/ML
500 INJECTION, SOLUTION INTRAVENOUS PRN
Status: DISCONTINUED | OUTPATIENT
Start: 2023-05-12 | End: 2023-05-14 | Stop reason: HOSPADM

## 2023-05-12 RX ORDER — SODIUM CHLORIDE, SODIUM LACTATE, POTASSIUM CHLORIDE, CALCIUM CHLORIDE 600; 310; 30; 20 MG/100ML; MG/100ML; MG/100ML; MG/100ML
INJECTION, SOLUTION INTRAVENOUS ONCE
Status: COMPLETED | OUTPATIENT
Start: 2023-05-12 | End: 2023-05-12

## 2023-05-12 RX ORDER — METFORMIN HYDROCHLORIDE 500 MG/1
1000 TABLET, EXTENDED RELEASE ORAL 2 TIMES DAILY
Qty: 360 TABLET | Refills: 1 | Status: ON HOLD | OUTPATIENT
Start: 2023-05-12 | End: 2023-05-14 | Stop reason: HOSPADM

## 2023-05-12 RX ORDER — ONDANSETRON 2 MG/ML
4 INJECTION INTRAMUSCULAR; INTRAVENOUS EVERY 6 HOURS PRN
Status: DISCONTINUED | OUTPATIENT
Start: 2023-05-12 | End: 2023-05-12 | Stop reason: ALTCHOICE

## 2023-05-12 RX ORDER — CLOPIDOGREL BISULFATE 75 MG/1
75 TABLET ORAL DAILY
Status: DISCONTINUED | OUTPATIENT
Start: 2023-05-13 | End: 2023-05-13

## 2023-05-12 RX ORDER — POLYETHYLENE GLYCOL 3350 17 G/17G
17 POWDER, FOR SOLUTION ORAL DAILY PRN
Status: DISCONTINUED | OUTPATIENT
Start: 2023-05-12 | End: 2023-05-14 | Stop reason: HOSPADM

## 2023-05-12 RX ORDER — BUDESONIDE AND FORMOTEROL FUMARATE DIHYDRATE 160; 4.5 UG/1; UG/1
2 AEROSOL RESPIRATORY (INHALATION) 2 TIMES DAILY
Status: DISCONTINUED | OUTPATIENT
Start: 2023-05-12 | End: 2023-05-14 | Stop reason: HOSPADM

## 2023-05-12 RX ORDER — GLUCAGON 1 MG/ML
1 KIT INJECTION PRN
Status: DISCONTINUED | OUTPATIENT
Start: 2023-05-12 | End: 2023-05-14 | Stop reason: HOSPADM

## 2023-05-12 RX ORDER — ACETAMINOPHEN 325 MG/1
650 TABLET ORAL EVERY 6 HOURS PRN
Status: DISCONTINUED | OUTPATIENT
Start: 2023-05-12 | End: 2023-05-14 | Stop reason: HOSPADM

## 2023-05-12 RX ORDER — ATORVASTATIN CALCIUM 40 MG/1
40 TABLET, FILM COATED ORAL NIGHTLY
Status: DISCONTINUED | OUTPATIENT
Start: 2023-05-12 | End: 2023-05-14 | Stop reason: HOSPADM

## 2023-05-12 RX ORDER — DEXTROSE MONOHYDRATE 100 MG/ML
1000 INJECTION, SOLUTION INTRAVENOUS CONTINUOUS PRN
Status: DISCONTINUED | OUTPATIENT
Start: 2023-05-12 | End: 2023-05-14 | Stop reason: HOSPADM

## 2023-05-12 RX ORDER — LANOLIN ALCOHOL/MO/W.PET/CERES
1000 CREAM (GRAM) TOPICAL DAILY
Status: DISCONTINUED | OUTPATIENT
Start: 2023-05-13 | End: 2023-05-14 | Stop reason: HOSPADM

## 2023-05-12 RX ORDER — TOBRAMYCIN 3 MG/ML
1 SOLUTION/ DROPS OPHTHALMIC EVERY 6 HOURS
Status: DISCONTINUED | OUTPATIENT
Start: 2023-05-13 | End: 2023-05-14 | Stop reason: HOSPADM

## 2023-05-12 RX ORDER — ONDANSETRON 4 MG/1
4 TABLET, ORALLY DISINTEGRATING ORAL EVERY 8 HOURS PRN
Status: DISCONTINUED | OUTPATIENT
Start: 2023-05-12 | End: 2023-05-12 | Stop reason: ALTCHOICE

## 2023-05-12 RX ORDER — PYRIDOSTIGMINE BROMIDE 60 MG/1
60 TABLET ORAL 4 TIMES DAILY
Status: DISCONTINUED | OUTPATIENT
Start: 2023-05-12 | End: 2023-05-14 | Stop reason: HOSPADM

## 2023-05-12 RX ORDER — MORPHINE SULFATE 2 MG/ML
2 INJECTION, SOLUTION INTRAMUSCULAR; INTRAVENOUS ONCE
Status: COMPLETED | OUTPATIENT
Start: 2023-05-12 | End: 2023-05-12

## 2023-05-12 RX ORDER — PANTOPRAZOLE SODIUM 40 MG/1
40 TABLET, DELAYED RELEASE ORAL 2 TIMES DAILY
Status: DISCONTINUED | OUTPATIENT
Start: 2023-05-12 | End: 2023-05-14 | Stop reason: HOSPADM

## 2023-05-12 RX ORDER — LANOLIN ALCOHOL/MO/W.PET/CERES
400 CREAM (GRAM) TOPICAL DAILY
Status: DISCONTINUED | OUTPATIENT
Start: 2023-05-13 | End: 2023-05-14 | Stop reason: HOSPADM

## 2023-05-12 RX ORDER — SODIUM CHLORIDE 0.9 % (FLUSH) 0.9 %
5-40 SYRINGE (ML) INJECTION EVERY 12 HOURS SCHEDULED
Status: DISCONTINUED | OUTPATIENT
Start: 2023-05-12 | End: 2023-05-14 | Stop reason: HOSPADM

## 2023-05-12 RX ORDER — MORPHINE SULFATE 2 MG/ML
2 INJECTION, SOLUTION INTRAMUSCULAR; INTRAVENOUS EVERY 4 HOURS PRN
Status: DISCONTINUED | OUTPATIENT
Start: 2023-05-12 | End: 2023-05-13

## 2023-05-12 RX ORDER — SODIUM CHLORIDE 9 MG/ML
1000 INJECTION, SOLUTION INTRAVENOUS CONTINUOUS
Status: DISPENSED | OUTPATIENT
Start: 2023-05-12 | End: 2023-05-13

## 2023-05-12 RX ORDER — SOTALOL HYDROCHLORIDE 80 MG/1
80 TABLET ORAL 2 TIMES DAILY
Status: DISCONTINUED | OUTPATIENT
Start: 2023-05-12 | End: 2023-05-14 | Stop reason: HOSPADM

## 2023-05-12 RX ORDER — TAMSULOSIN HYDROCHLORIDE 0.4 MG/1
0.4 CAPSULE ORAL DAILY
Qty: 90 CAPSULE | Refills: 1 | Status: SHIPPED | OUTPATIENT
Start: 2023-05-12

## 2023-05-12 RX ORDER — INSULIN LISPRO 100 [IU]/ML
0-8 INJECTION, SOLUTION INTRAVENOUS; SUBCUTANEOUS
Status: DISCONTINUED | OUTPATIENT
Start: 2023-05-13 | End: 2023-05-14 | Stop reason: HOSPADM

## 2023-05-12 RX ORDER — TIZANIDINE 2 MG/1
2 TABLET ORAL 2 TIMES DAILY PRN
Qty: 60 TABLET | Refills: 1 | Status: SHIPPED | OUTPATIENT
Start: 2023-05-12

## 2023-05-12 RX ORDER — ONDANSETRON 2 MG/ML
4 INJECTION INTRAMUSCULAR; INTRAVENOUS ONCE
Status: COMPLETED | OUTPATIENT
Start: 2023-05-12 | End: 2023-05-12

## 2023-05-12 RX ORDER — ACETAMINOPHEN 650 MG/1
650 SUPPOSITORY RECTAL EVERY 6 HOURS PRN
Status: DISCONTINUED | OUTPATIENT
Start: 2023-05-12 | End: 2023-05-14 | Stop reason: HOSPADM

## 2023-05-12 RX ORDER — ASPIRIN 81 MG/1
81 TABLET ORAL DAILY
Status: DISCONTINUED | OUTPATIENT
Start: 2023-05-13 | End: 2023-05-13

## 2023-05-12 RX ORDER — INSULIN LISPRO 100 [IU]/ML
0-4 INJECTION, SOLUTION INTRAVENOUS; SUBCUTANEOUS NIGHTLY
Status: DISCONTINUED | OUTPATIENT
Start: 2023-05-12 | End: 2023-05-14 | Stop reason: HOSPADM

## 2023-05-12 RX ADMIN — ONDANSETRON 4 MG: 2 INJECTION INTRAMUSCULAR; INTRAVENOUS at 19:16

## 2023-05-12 RX ADMIN — PYRIDOSTIGMINE BROMIDE 60 MG: 60 TABLET ORAL at 23:43

## 2023-05-12 RX ADMIN — SODIUM CHLORIDE, PRESERVATIVE FREE 5 ML: 5 INJECTION INTRAVENOUS at 23:00

## 2023-05-12 RX ADMIN — MORPHINE SULFATE 2 MG: 2 INJECTION, SOLUTION INTRAMUSCULAR; INTRAVENOUS at 19:14

## 2023-05-12 RX ADMIN — SODIUM CHLORIDE 1000 ML: 9 INJECTION, SOLUTION INTRAVENOUS at 16:12

## 2023-05-12 RX ADMIN — IOPAMIDOL 75 ML: 755 INJECTION, SOLUTION INTRAVENOUS at 17:00

## 2023-05-12 RX ADMIN — MORPHINE SULFATE 2 MG: 2 INJECTION, SOLUTION INTRAMUSCULAR; INTRAVENOUS at 23:38

## 2023-05-12 RX ADMIN — SODIUM CHLORIDE 1000 ML: 9 INJECTION, SOLUTION INTRAVENOUS at 22:52

## 2023-05-12 RX ADMIN — PANTOPRAZOLE SODIUM 40 MG: 40 TABLET, DELAYED RELEASE ORAL at 23:44

## 2023-05-12 RX ADMIN — ATORVASTATIN CALCIUM 40 MG: 40 TABLET, FILM COATED ORAL at 23:43

## 2023-05-12 RX ADMIN — FENTANYL CITRATE 25 MCG: 50 INJECTION, SOLUTION INTRAMUSCULAR; INTRAVENOUS at 17:58

## 2023-05-12 RX ADMIN — SODIUM CHLORIDE, POTASSIUM CHLORIDE, SODIUM LACTATE AND CALCIUM CHLORIDE: 600; 310; 30; 20 INJECTION, SOLUTION INTRAVENOUS at 20:03

## 2023-05-12 SDOH — ECONOMIC STABILITY: HOUSING INSECURITY
IN THE LAST 12 MONTHS, WAS THERE A TIME WHEN YOU DID NOT HAVE A STEADY PLACE TO SLEEP OR SLEPT IN A SHELTER (INCLUDING NOW)?: NO

## 2023-05-12 SDOH — ECONOMIC STABILITY: FOOD INSECURITY: WITHIN THE PAST 12 MONTHS, YOU WORRIED THAT YOUR FOOD WOULD RUN OUT BEFORE YOU GOT MONEY TO BUY MORE.: NEVER TRUE

## 2023-05-12 SDOH — ECONOMIC STABILITY: INCOME INSECURITY: HOW HARD IS IT FOR YOU TO PAY FOR THE VERY BASICS LIKE FOOD, HOUSING, MEDICAL CARE, AND HEATING?: NOT HARD AT ALL

## 2023-05-12 SDOH — ECONOMIC STABILITY: FOOD INSECURITY: WITHIN THE PAST 12 MONTHS, THE FOOD YOU BOUGHT JUST DIDN'T LAST AND YOU DIDN'T HAVE MONEY TO GET MORE.: NEVER TRUE

## 2023-05-12 ASSESSMENT — PAIN DESCRIPTION - ORIENTATION
ORIENTATION: RIGHT
ORIENTATION: RIGHT
ORIENTATION: RIGHT;LEFT
ORIENTATION: RIGHT;LEFT

## 2023-05-12 ASSESSMENT — PAIN SCALES - GENERAL
PAINLEVEL_OUTOF10: 10
PAINLEVEL_OUTOF10: 10
PAINLEVEL_OUTOF10: 6
PAINLEVEL_OUTOF10: 10
PAINLEVEL_OUTOF10: 10

## 2023-05-12 ASSESSMENT — PAIN DESCRIPTION - DESCRIPTORS
DESCRIPTORS: SHARP
DESCRIPTORS: SHARP
DESCRIPTORS: ACHING

## 2023-05-12 ASSESSMENT — PAIN DESCRIPTION - FREQUENCY: FREQUENCY: CONTINUOUS

## 2023-05-12 ASSESSMENT — PAIN DESCRIPTION - LOCATION
LOCATION: HIP
LOCATION: HIP
LOCATION: GROIN;HIP
LOCATION: HIP

## 2023-05-12 ASSESSMENT — PAIN - FUNCTIONAL ASSESSMENT
PAIN_FUNCTIONAL_ASSESSMENT: 0-10
PAIN_FUNCTIONAL_ASSESSMENT: PREVENTS OR INTERFERES SOME ACTIVE ACTIVITIES AND ADLS

## 2023-05-12 ASSESSMENT — PAIN DESCRIPTION - PAIN TYPE: TYPE: ACUTE PAIN

## 2023-05-12 NOTE — PROGRESS NOTES
Dmitriy Daniel  1960 05/12/23    SUBJECTIVE:    Michael Driscoll had watchman approx a month ago. States since then has had some recurring issues w diffuse aches and some weakness, sl sob. Also w episodic lt headedness w standing has fallen twice this week. No sx fever, chills, palpitations. Did have hematuria for two days this week 2 and 3d ago w some pain. Also w some dysuria. Also some groin pain noted. Feels has some spasms at his ostomy. Had nonobstructive kidney stones fr CT in ED 2/25. Recheck bp both L and R arm are low ~38 systolic. DM-  last a1c was ok in Dec but not had a1c checked for this yr. Lab Results   Component Value Date    LABA1C 7.9 12/22/2022    LABA1C 7.6 09/21/2022    LABA1C 7.2 (H) 03/07/2022     Lab Results   Component Value Date    GLUF 105 (H) 11/01/2016    LABMICR YES 12/22/2022    LDLCALC 72 12/22/2022    CREATININE 1.0 04/13/2023       C/o pink eye on r w exposure to granddtr  OBJECTIVE:    BP 88/62   Pulse 72   Resp 16   SpO2 94%     Physical Exam  Constitutional:       Appearance: Normal appearance. He is ill-appearing (FATIGUED, SL LETHARGIC). Eyes:      General:         Right eye: Discharge present. Cardiovascular:      Rate and Rhythm: Normal rate and regular rhythm. Heart sounds: No murmur heard. No gallop. Pulmonary:      Effort: No respiratory distress. Breath sounds: No wheezing. Abdominal:      General: Abdomen is flat. Bowel sounds are normal. There is no distension. Palpations: Abdomen is soft. There is no mass. Tenderness: There is no abdominal tenderness. Hernia: No hernia is present. Musculoskeletal:      Right lower leg: No edema. Left lower leg: No edema. Neurological:      Mental Status: He is alert. Psychiatric:         Mood and Affect: Mood normal.       ASSESSMENT:    1. Hypotension, unspecified hypotension type    2. Gross hematuria    3. Nephrolithiasis    4.  Acute left-sided low back pain

## 2023-05-13 ENCOUNTER — ANESTHESIA (OUTPATIENT)
Dept: OPERATING ROOM | Age: 63
End: 2023-05-13
Payer: MEDICAID

## 2023-05-13 ENCOUNTER — APPOINTMENT (OUTPATIENT)
Dept: GENERAL RADIOLOGY | Age: 63
DRG: 465 | End: 2023-05-13
Payer: MEDICAID

## 2023-05-13 ENCOUNTER — ANESTHESIA EVENT (OUTPATIENT)
Dept: OPERATING ROOM | Age: 63
End: 2023-05-13
Payer: MEDICAID

## 2023-05-13 LAB
ANION GAP SERPL CALCULATED.3IONS-SCNC: 12 MMOL/L (ref 4–16)
BASOPHILS ABSOLUTE: 0 K/CU MM
BASOPHILS RELATIVE PERCENT: 0.3 % (ref 0–1)
BUN SERPL-MCNC: 13 MG/DL (ref 6–23)
CALCIUM SERPL-MCNC: 8.7 MG/DL (ref 8.3–10.6)
CHLORIDE BLD-SCNC: 103 MMOL/L (ref 99–110)
CO2: 22 MMOL/L (ref 21–32)
CREAT SERPL-MCNC: 1.8 MG/DL (ref 0.9–1.3)
DIFFERENTIAL TYPE: ABNORMAL
EOSINOPHILS ABSOLUTE: 0.2 K/CU MM
EOSINOPHILS RELATIVE PERCENT: 1.5 % (ref 0–3)
GFR SERPL CREATININE-BSD FRML MDRD: 42 ML/MIN/1.73M2
GLUCOSE BLD-MCNC: 150 MG/DL (ref 70–99)
GLUCOSE BLD-MCNC: 155 MG/DL (ref 70–99)
GLUCOSE BLD-MCNC: 195 MG/DL (ref 70–99)
GLUCOSE BLD-MCNC: 202 MG/DL (ref 70–99)
GLUCOSE SERPL-MCNC: 128 MG/DL (ref 70–99)
HCT VFR BLD CALC: 30.1 % (ref 42–52)
HEMOGLOBIN: 9 GM/DL (ref 13.5–18)
IMMATURE NEUTROPHIL %: 0.3 % (ref 0–0.43)
LYMPHOCYTES ABSOLUTE: 1.2 K/CU MM
LYMPHOCYTES RELATIVE PERCENT: 11.7 % (ref 24–44)
MCH RBC QN AUTO: 26.1 PG (ref 27–31)
MCHC RBC AUTO-ENTMCNC: 29.9 % (ref 32–36)
MCV RBC AUTO: 87.2 FL (ref 78–100)
MONOCYTES ABSOLUTE: 1.1 K/CU MM
MONOCYTES RELATIVE PERCENT: 11.2 % (ref 0–4)
NUCLEATED RBC %: 0 %
PDW BLD-RTO: 15.6 % (ref 11.7–14.9)
PLATELET # BLD: 210 K/CU MM (ref 140–440)
PMV BLD AUTO: 9.5 FL (ref 7.5–11.1)
POTASSIUM SERPL-SCNC: 4.6 MMOL/L (ref 3.5–5.1)
RBC # BLD: 3.45 M/CU MM (ref 4.6–6.2)
SEGMENTED NEUTROPHILS ABSOLUTE COUNT: 7.4 K/CU MM
SEGMENTED NEUTROPHILS RELATIVE PERCENT: 75 % (ref 36–66)
SODIUM BLD-SCNC: 137 MMOL/L (ref 135–145)
TOTAL IMMATURE NEUTOROPHIL: 0.03 K/CU MM
TOTAL NUCLEATED RBC: 0 K/CU MM
WBC # BLD: 9.9 K/CU MM (ref 4–10.5)

## 2023-05-13 PROCEDURE — 6370000000 HC RX 637 (ALT 250 FOR IP): Performed by: INTERNAL MEDICINE

## 2023-05-13 PROCEDURE — 94761 N-INVAS EAR/PLS OXIMETRY MLT: CPT

## 2023-05-13 PROCEDURE — C2617 STENT, NON-COR, TEM W/O DEL: HCPCS | Performed by: SPECIALIST

## 2023-05-13 PROCEDURE — 0T768DZ DILATION OF RIGHT URETER WITH INTRALUMINAL DEVICE, VIA NATURAL OR ARTIFICIAL OPENING ENDOSCOPIC: ICD-10-PCS | Performed by: SPECIALIST

## 2023-05-13 PROCEDURE — 82962 GLUCOSE BLOOD TEST: CPT

## 2023-05-13 PROCEDURE — 94664 DEMO&/EVAL PT USE INHALER: CPT

## 2023-05-13 PROCEDURE — 2580000003 HC RX 258: Performed by: STUDENT IN AN ORGANIZED HEALTH CARE EDUCATION/TRAINING PROGRAM

## 2023-05-13 PROCEDURE — 85025 COMPLETE CBC W/AUTO DIFF WBC: CPT

## 2023-05-13 PROCEDURE — 3600000003 HC SURGERY LEVEL 3 BASE: Performed by: SPECIALIST

## 2023-05-13 PROCEDURE — 3600000013 HC SURGERY LEVEL 3 ADDTL 15MIN: Performed by: SPECIALIST

## 2023-05-13 PROCEDURE — BT1D1ZZ FLUOROSCOPY OF RIGHT KIDNEY, URETER AND BLADDER USING LOW OSMOLAR CONTRAST: ICD-10-PCS | Performed by: SPECIALIST

## 2023-05-13 PROCEDURE — 3700000000 HC ANESTHESIA ATTENDED CARE: Performed by: SPECIALIST

## 2023-05-13 PROCEDURE — C1769 GUIDE WIRE: HCPCS | Performed by: SPECIALIST

## 2023-05-13 PROCEDURE — 76000 FLUOROSCOPY <1 HR PHYS/QHP: CPT

## 2023-05-13 PROCEDURE — 6360000002 HC RX W HCPCS: Performed by: INTERNAL MEDICINE

## 2023-05-13 PROCEDURE — 94640 AIRWAY INHALATION TREATMENT: CPT

## 2023-05-13 PROCEDURE — 80048 BASIC METABOLIC PNL TOTAL CA: CPT

## 2023-05-13 PROCEDURE — 2709999900 HC NON-CHARGEABLE SUPPLY: Performed by: SPECIALIST

## 2023-05-13 PROCEDURE — 2580000003 HC RX 258: Performed by: INTERNAL MEDICINE

## 2023-05-13 PROCEDURE — 3700000001 HC ADD 15 MINUTES (ANESTHESIA): Performed by: SPECIALIST

## 2023-05-13 PROCEDURE — 36415 COLL VENOUS BLD VENIPUNCTURE: CPT

## 2023-05-13 PROCEDURE — 6360000002 HC RX W HCPCS: Performed by: STUDENT IN AN ORGANIZED HEALTH CARE EDUCATION/TRAINING PROGRAM

## 2023-05-13 PROCEDURE — 1200000000 HC SEMI PRIVATE

## 2023-05-13 PROCEDURE — 6370000000 HC RX 637 (ALT 250 FOR IP): Performed by: STUDENT IN AN ORGANIZED HEALTH CARE EDUCATION/TRAINING PROGRAM

## 2023-05-13 DEVICE — VARIABLE LENGTH URETERAL STENT
Type: IMPLANTABLE DEVICE | Site: URETER | Status: FUNCTIONAL
Brand: CONTOUR VL™

## 2023-05-13 RX ORDER — ASPIRIN 81 MG/1
81 TABLET ORAL DAILY
Status: DISCONTINUED | OUTPATIENT
Start: 2023-05-13 | End: 2023-05-13 | Stop reason: CLARIF

## 2023-05-13 RX ORDER — PROPOFOL 10 MG/ML
INJECTION, EMULSION INTRAVENOUS CONTINUOUS PRN
Status: DISCONTINUED | OUTPATIENT
Start: 2023-05-13 | End: 2023-05-13 | Stop reason: SDUPTHER

## 2023-05-13 RX ORDER — SODIUM CHLORIDE 9 MG/ML
INJECTION, SOLUTION INTRAVENOUS PRN
Status: DISCONTINUED | OUTPATIENT
Start: 2023-05-13 | End: 2023-05-14 | Stop reason: HOSPADM

## 2023-05-13 RX ORDER — SODIUM CHLORIDE 9 MG/ML
INJECTION, SOLUTION INTRAVENOUS CONTINUOUS
Status: DISPENSED | OUTPATIENT
Start: 2023-05-13 | End: 2023-05-13

## 2023-05-13 RX ORDER — TAMSULOSIN HYDROCHLORIDE 0.4 MG/1
0.4 CAPSULE ORAL DAILY
Status: DISCONTINUED | OUTPATIENT
Start: 2023-05-13 | End: 2023-05-13

## 2023-05-13 RX ORDER — PROPOFOL 10 MG/ML
INJECTION, EMULSION INTRAVENOUS PRN
Status: DISCONTINUED | OUTPATIENT
Start: 2023-05-13 | End: 2023-05-13 | Stop reason: SDUPTHER

## 2023-05-13 RX ORDER — MEPERIDINE HYDROCHLORIDE 25 MG/ML
12.5 INJECTION INTRAMUSCULAR; INTRAVENOUS; SUBCUTANEOUS EVERY 5 MIN PRN
Status: DISCONTINUED | OUTPATIENT
Start: 2023-05-13 | End: 2023-05-14 | Stop reason: HOSPADM

## 2023-05-13 RX ORDER — SODIUM CHLORIDE 0.9 % (FLUSH) 0.9 %
5-40 SYRINGE (ML) INJECTION EVERY 12 HOURS SCHEDULED
Status: DISCONTINUED | OUTPATIENT
Start: 2023-05-13 | End: 2023-05-14 | Stop reason: HOSPADM

## 2023-05-13 RX ORDER — CLOPIDOGREL BISULFATE 75 MG/1
75 TABLET ORAL DAILY
Status: DISCONTINUED | OUTPATIENT
Start: 2023-05-13 | End: 2023-05-14 | Stop reason: HOSPADM

## 2023-05-13 RX ORDER — TAMSULOSIN HYDROCHLORIDE 0.4 MG/1
0.4 CAPSULE ORAL DAILY
Status: DISCONTINUED | OUTPATIENT
Start: 2023-05-14 | End: 2023-05-14 | Stop reason: HOSPADM

## 2023-05-13 RX ORDER — FENTANYL CITRATE 50 UG/ML
50 INJECTION, SOLUTION INTRAMUSCULAR; INTRAVENOUS EVERY 5 MIN PRN
Status: DISCONTINUED | OUTPATIENT
Start: 2023-05-13 | End: 2023-05-14 | Stop reason: HOSPADM

## 2023-05-13 RX ORDER — HEPARIN SODIUM 5000 [USP'U]/ML
5000 INJECTION, SOLUTION INTRAVENOUS; SUBCUTANEOUS EVERY 8 HOURS SCHEDULED
Status: DISCONTINUED | OUTPATIENT
Start: 2023-05-13 | End: 2023-05-14 | Stop reason: HOSPADM

## 2023-05-13 RX ORDER — DROPERIDOL 2.5 MG/ML
0.62 INJECTION, SOLUTION INTRAMUSCULAR; INTRAVENOUS
Status: ACTIVE | OUTPATIENT
Start: 2023-05-13 | End: 2023-05-14

## 2023-05-13 RX ORDER — SODIUM CHLORIDE, SODIUM LACTATE, POTASSIUM CHLORIDE, CALCIUM CHLORIDE 600; 310; 30; 20 MG/100ML; MG/100ML; MG/100ML; MG/100ML
INJECTION, SOLUTION INTRAVENOUS CONTINUOUS PRN
Status: DISCONTINUED | OUTPATIENT
Start: 2023-05-13 | End: 2023-05-13 | Stop reason: SDUPTHER

## 2023-05-13 RX ORDER — LABETALOL HYDROCHLORIDE 5 MG/ML
10 INJECTION, SOLUTION INTRAVENOUS
Status: DISCONTINUED | OUTPATIENT
Start: 2023-05-13 | End: 2023-05-14 | Stop reason: HOSPADM

## 2023-05-13 RX ORDER — OXYCODONE HYDROCHLORIDE 5 MG/1
5 TABLET ORAL
Status: ACTIVE | OUTPATIENT
Start: 2023-05-13 | End: 2023-05-14

## 2023-05-13 RX ORDER — HYDRALAZINE HYDROCHLORIDE 20 MG/ML
10 INJECTION INTRAMUSCULAR; INTRAVENOUS
Status: DISCONTINUED | OUTPATIENT
Start: 2023-05-13 | End: 2023-05-14 | Stop reason: HOSPADM

## 2023-05-13 RX ORDER — LIDOCAINE HYDROCHLORIDE 20 MG/ML
INJECTION, SOLUTION INTRAVENOUS PRN
Status: DISCONTINUED | OUTPATIENT
Start: 2023-05-13 | End: 2023-05-13 | Stop reason: SDUPTHER

## 2023-05-13 RX ORDER — ASPIRIN 81 MG/1
81 TABLET, CHEWABLE ORAL DAILY
Status: DISCONTINUED | OUTPATIENT
Start: 2023-05-13 | End: 2023-05-14 | Stop reason: HOSPADM

## 2023-05-13 RX ORDER — SODIUM CHLORIDE 0.9 % (FLUSH) 0.9 %
5-40 SYRINGE (ML) INJECTION PRN
Status: DISCONTINUED | OUTPATIENT
Start: 2023-05-13 | End: 2023-05-14 | Stop reason: HOSPADM

## 2023-05-13 RX ORDER — OXYCODONE HYDROCHLORIDE 5 MG/1
5 TABLET ORAL EVERY 6 HOURS PRN
Status: DISCONTINUED | OUTPATIENT
Start: 2023-05-13 | End: 2023-05-14 | Stop reason: HOSPADM

## 2023-05-13 RX ORDER — ONDANSETRON 2 MG/ML
4 INJECTION INTRAMUSCULAR; INTRAVENOUS
Status: ACTIVE | OUTPATIENT
Start: 2023-05-13 | End: 2023-05-14

## 2023-05-13 RX ORDER — IPRATROPIUM BROMIDE AND ALBUTEROL SULFATE 2.5; .5 MG/3ML; MG/3ML
1 SOLUTION RESPIRATORY (INHALATION)
Status: ACTIVE | OUTPATIENT
Start: 2023-05-13 | End: 2023-05-14

## 2023-05-13 RX ADMIN — TOBRAMYCIN 1 DROP: 3 SOLUTION OPHTHALMIC at 12:14

## 2023-05-13 RX ADMIN — BUDESONIDE AND FORMOTEROL FUMARATE DIHYDRATE 2 PUFF: 160; 4.5 AEROSOL RESPIRATORY (INHALATION) at 08:57

## 2023-05-13 RX ADMIN — SODIUM CHLORIDE, SODIUM LACTATE, POTASSIUM CHLORIDE, CALCIUM CHLORIDE: 600; 310; 30; 20 INJECTION, SOLUTION INTRAVENOUS at 10:57

## 2023-05-13 RX ADMIN — PYRIDOSTIGMINE BROMIDE 60 MG: 60 TABLET ORAL at 12:14

## 2023-05-13 RX ADMIN — PYRIDOSTIGMINE BROMIDE 60 MG: 60 TABLET ORAL at 08:26

## 2023-05-13 RX ADMIN — PREDNISONE 13 MG: 1 TABLET ORAL at 11:59

## 2023-05-13 RX ADMIN — DULOXETINE HYDROCHLORIDE 60 MG: 30 CAPSULE, DELAYED RELEASE ORAL at 08:26

## 2023-05-13 RX ADMIN — SODIUM CHLORIDE, PRESERVATIVE FREE 10 ML: 5 INJECTION INTRAVENOUS at 08:26

## 2023-05-13 RX ADMIN — MORPHINE SULFATE 2 MG: 2 INJECTION, SOLUTION INTRAMUSCULAR; INTRAVENOUS at 03:57

## 2023-05-13 RX ADMIN — PANTOPRAZOLE SODIUM 40 MG: 40 TABLET, DELAYED RELEASE ORAL at 20:34

## 2023-05-13 RX ADMIN — Medication 400 MG: at 08:26

## 2023-05-13 RX ADMIN — SOTALOL HYDROCHLORIDE 80 MG: 80 TABLET ORAL at 08:26

## 2023-05-13 RX ADMIN — CYANOCOBALAMIN TAB 1000 MCG 1000 MCG: 1000 TAB at 08:26

## 2023-05-13 RX ADMIN — TOBRAMYCIN 1 DROP: 3 SOLUTION OPHTHALMIC at 16:46

## 2023-05-13 RX ADMIN — SOTALOL HYDROCHLORIDE 80 MG: 80 TABLET ORAL at 02:22

## 2023-05-13 RX ADMIN — INSULIN LISPRO 2 UNITS: 100 INJECTION, SOLUTION INTRAVENOUS; SUBCUTANEOUS at 17:27

## 2023-05-13 RX ADMIN — CEFTRIAXONE SODIUM 1000 MG: 1 INJECTION, POWDER, FOR SOLUTION INTRAMUSCULAR; INTRAVENOUS at 10:16

## 2023-05-13 RX ADMIN — SODIUM CHLORIDE, PRESERVATIVE FREE 10 ML: 5 INJECTION INTRAVENOUS at 23:19

## 2023-05-13 RX ADMIN — PYRIDOSTIGMINE BROMIDE 60 MG: 60 TABLET ORAL at 20:34

## 2023-05-13 RX ADMIN — HEPARIN SODIUM 5000 UNITS: 5000 INJECTION INTRAVENOUS; SUBCUTANEOUS at 14:02

## 2023-05-13 RX ADMIN — SODIUM CHLORIDE: 9 INJECTION, SOLUTION INTRAVENOUS at 10:13

## 2023-05-13 RX ADMIN — ATORVASTATIN CALCIUM 40 MG: 40 TABLET, FILM COATED ORAL at 20:34

## 2023-05-13 RX ADMIN — PROPOFOL 50 MG: 10 INJECTION, EMULSION INTRAVENOUS at 11:04

## 2023-05-13 RX ADMIN — ASPIRIN 81 MG CHEWABLE TABLET 81 MG: 81 TABLET CHEWABLE at 14:02

## 2023-05-13 RX ADMIN — PROPOFOL 140 MCG/KG/MIN: 10 INJECTION, EMULSION INTRAVENOUS at 11:07

## 2023-05-13 RX ADMIN — CLOPIDOGREL BISULFATE 75 MG: 75 TABLET ORAL at 14:02

## 2023-05-13 RX ADMIN — LIDOCAINE HYDROCHLORIDE 110 MG: 20 INJECTION, SOLUTION INTRAVENOUS at 11:04

## 2023-05-13 RX ADMIN — OXYCODONE HYDROCHLORIDE 5 MG: 5 TABLET ORAL at 16:45

## 2023-05-13 RX ADMIN — PYRIDOSTIGMINE BROMIDE 60 MG: 60 TABLET ORAL at 16:46

## 2023-05-13 RX ADMIN — OXYCODONE HYDROCHLORIDE 5 MG: 5 TABLET ORAL at 10:16

## 2023-05-13 RX ADMIN — SOTALOL HYDROCHLORIDE 80 MG: 80 TABLET ORAL at 20:35

## 2023-05-13 RX ADMIN — HEPARIN SODIUM 5000 UNITS: 5000 INJECTION INTRAVENOUS; SUBCUTANEOUS at 22:19

## 2023-05-13 RX ADMIN — BUDESONIDE AND FORMOTEROL FUMARATE DIHYDRATE 2 PUFF: 160; 4.5 AEROSOL RESPIRATORY (INHALATION) at 20:33

## 2023-05-13 RX ADMIN — TIOTROPIUM BROMIDE INHALATION SPRAY 2 PUFF: 3.12 SPRAY, METERED RESPIRATORY (INHALATION) at 08:57

## 2023-05-13 RX ADMIN — PANTOPRAZOLE SODIUM 40 MG: 40 TABLET, DELAYED RELEASE ORAL at 08:26

## 2023-05-13 RX ADMIN — OXYCODONE HYDROCHLORIDE 5 MG: 5 TABLET ORAL at 23:19

## 2023-05-13 ASSESSMENT — PAIN DESCRIPTION - ORIENTATION
ORIENTATION: LEFT
ORIENTATION: LEFT

## 2023-05-13 ASSESSMENT — PAIN - FUNCTIONAL ASSESSMENT
PAIN_FUNCTIONAL_ASSESSMENT: ACTIVITIES ARE NOT PREVENTED

## 2023-05-13 ASSESSMENT — PAIN DESCRIPTION - LOCATION
LOCATION: HIP

## 2023-05-13 ASSESSMENT — PAIN SCALES - GENERAL
PAINLEVEL_OUTOF10: 8
PAINLEVEL_OUTOF10: 5
PAINLEVEL_OUTOF10: 9
PAINLEVEL_OUTOF10: 9
PAINLEVEL_OUTOF10: 7
PAINLEVEL_OUTOF10: 10

## 2023-05-13 ASSESSMENT — LIFESTYLE VARIABLES: SMOKING_STATUS: 0

## 2023-05-13 ASSESSMENT — PAIN DESCRIPTION - DESCRIPTORS: DESCRIPTORS: ACHING

## 2023-05-13 ASSESSMENT — PAIN SCALES - WONG BAKER: WONGBAKER_NUMERICALRESPONSE: 0

## 2023-05-13 ASSESSMENT — ENCOUNTER SYMPTOMS: SHORTNESS OF BREATH: 1

## 2023-05-13 NOTE — ANESTHESIA POSTPROCEDURE EVALUATION
Department of Anesthesiology  Postprocedure Note    Patient: Candance Lennox  MRN: [id number]  YOB: 1960  Date of evaluation: 5/13/2023      Procedure Summary     Date: 05/13/23 Room / Location: [address]    Anesthesia Start: 1114 Anesthesia Stop: 4186    Procedure: [address] (Right: Ureter) Diagnosis:       Kidney stone on right side      (Kidney stone on right side [N20.0])    Surgeons: Tamara Holley MD Responsible Provider: Fabiola Sherwood MD    Anesthesia Type: MAC ASA Status: 4 - Emergent          Anesthesia Type: MAC    Miguel Phase I:      Miguel Phase II:        Anesthesia Post Evaluation    Patient location during evaluation: floor  Patient participation: complete - patient participated  Level of consciousness: sleepy but conscious  Pain score: 0  Airway patency: patent  Nausea & Vomiting: no nausea and no vomiting  Complications: no  Cardiovascular status: blood pressure returned to baseline and hemodynamically stable  Respiratory status: acceptable and nasal cannula  Hydration status: stable  Comments: Pt baseline preop sats 89 on RA. Leave pt on 2L nc post procedure.

## 2023-05-13 NOTE — ANESTHESIA PRE PROCEDURE
body mass index (BMI) of 39.0 to 39.9 in adult ([facility]) E66.01, Z68.39    Ulcerative colitis ([facility]) K51.90    Other insomnia G47.09    Closed displaced fracture of anterior column of right acetabulum with delayed healing S32.431G    Encounter for monitoring sotalol therapy Z51.81, Z79.899    Hypercoagulable state due to persistent atrial fibrillation ([facility]) D68.69, I48.19    History of blood clots Z86.718    Vitamin D deficiency E55.9    Pacemaker Z95.0    Abnormal stress test R94.39    Nephrolithiasis N20.0    Presence of Watchman left atrial appendage closure device Z95.818    Acute kidney injury ([address] N17.9       Past Medical History:        Diagnosis Date    Arthritis 04/23/2021    LOW BACK PAIN, IMPROVED ON CYMBALTA    Atrial fibrillation, chronic ([address] 12/2020    Dr Joseph Hastings, also has pacer and is on eliquis    Cerebrovascular accident ([address] 09/09/2021    expressive aphasia- embolic from cardioversion for afib/ LEFT FRONTAL/ANTERIOR CIRCULATION.  Closed displaced fracture of anterior column of right acetabulum ([address] 03/07/2022    COPD (chronic obstructive pulmonary disease) ([address] 06/15/2021    Coronary artery disease involving native coronary artery of native heart without angina pectoris 06/15/2021    hx of MI 2011 and also 2021- hx of PCI/stent twice.  Diabetes ([address] 09/10/2021    dx'd Sept 2021- by Dr Chloe Falcon. ?may be related to chr steroid use also?  H/O echocardiogram 02/02/2021    EF is estimated at 50%. presence of PVC affected LVEF estimation, Mild TR.  History of blood transfusion     Hx of blood clots     Hx of cardiovascular stress test 06/11/2021    Small size severe intensity,perfusion defect in apical wall.  Hx of cardiovascular stress test 09/14/2022    Decreased uptake inferiorly due to diaphragmatic artifact partially reversible inferioapical perfusion defect noted.  Hx of Doppler echocardiogram 09/14/2022    EF 86% Grade I diastolic dysfunction. mildly dilated LA.

## 2023-05-14 VITALS
SYSTOLIC BLOOD PRESSURE: 125 MMHG | WEIGHT: 285 LBS | BODY MASS INDEX: 39.9 KG/M2 | TEMPERATURE: 97.7 F | DIASTOLIC BLOOD PRESSURE: 93 MMHG | OXYGEN SATURATION: 98 % | RESPIRATION RATE: 16 BRPM | HEART RATE: 80 BPM | HEIGHT: 71 IN

## 2023-05-14 LAB
ANION GAP SERPL CALCULATED.3IONS-SCNC: 11 MMOL/L (ref 4–16)
BASOPHILS ABSOLUTE: 0 K/CU MM
BASOPHILS RELATIVE PERCENT: 0.5 % (ref 0–1)
BUN SERPL-MCNC: 16 MG/DL (ref 6–23)
CALCIUM SERPL-MCNC: 8.7 MG/DL (ref 8.3–10.6)
CHLORIDE BLD-SCNC: 107 MMOL/L (ref 99–110)
CO2: 23 MMOL/L (ref 21–32)
CREAT SERPL-MCNC: 1.6 MG/DL (ref 0.9–1.3)
DIFFERENTIAL TYPE: ABNORMAL
EOSINOPHILS ABSOLUTE: 0.2 K/CU MM
EOSINOPHILS RELATIVE PERCENT: 2.7 % (ref 0–3)
GFR SERPL CREATININE-BSD FRML MDRD: 48 ML/MIN/1.73M2
GLUCOSE BLD-MCNC: 132 MG/DL (ref 70–99)
GLUCOSE BLD-MCNC: 191 MG/DL (ref 70–99)
GLUCOSE SERPL-MCNC: 108 MG/DL (ref 70–99)
HCT VFR BLD CALC: 30.2 % (ref 42–52)
HEMOGLOBIN: 8.8 GM/DL (ref 13.5–18)
IMMATURE NEUTROPHIL %: 0.3 % (ref 0–0.43)
LYMPHOCYTES ABSOLUTE: 1.1 K/CU MM
LYMPHOCYTES RELATIVE PERCENT: 19 % (ref 24–44)
MCH RBC QN AUTO: 25.7 PG (ref 27–31)
MCHC RBC AUTO-ENTMCNC: 29.1 % (ref 32–36)
MCV RBC AUTO: 88.3 FL (ref 78–100)
MONOCYTES ABSOLUTE: 0.7 K/CU MM
MONOCYTES RELATIVE PERCENT: 11.5 % (ref 0–4)
NUCLEATED RBC %: 0 %
PDW BLD-RTO: 15.8 % (ref 11.7–14.9)
PLATELET # BLD: 224 K/CU MM (ref 140–440)
PMV BLD AUTO: 9.7 FL (ref 7.5–11.1)
POTASSIUM SERPL-SCNC: 4.6 MMOL/L (ref 3.5–5.1)
RBC # BLD: 3.42 M/CU MM (ref 4.6–6.2)
SEGMENTED NEUTROPHILS ABSOLUTE COUNT: 3.9 K/CU MM
SEGMENTED NEUTROPHILS RELATIVE PERCENT: 66 % (ref 36–66)
SODIUM BLD-SCNC: 141 MMOL/L (ref 135–145)
TOTAL IMMATURE NEUTOROPHIL: 0.02 K/CU MM
TOTAL NUCLEATED RBC: 0 K/CU MM
WBC # BLD: 5.9 K/CU MM (ref 4–10.5)

## 2023-05-14 PROCEDURE — 6360000002 HC RX W HCPCS: Performed by: STUDENT IN AN ORGANIZED HEALTH CARE EDUCATION/TRAINING PROGRAM

## 2023-05-14 PROCEDURE — 2580000003 HC RX 258: Performed by: STUDENT IN AN ORGANIZED HEALTH CARE EDUCATION/TRAINING PROGRAM

## 2023-05-14 PROCEDURE — 94761 N-INVAS EAR/PLS OXIMETRY MLT: CPT

## 2023-05-14 PROCEDURE — 85025 COMPLETE CBC W/AUTO DIFF WBC: CPT

## 2023-05-14 PROCEDURE — 6370000000 HC RX 637 (ALT 250 FOR IP): Performed by: INTERNAL MEDICINE

## 2023-05-14 PROCEDURE — 82962 GLUCOSE BLOOD TEST: CPT

## 2023-05-14 PROCEDURE — 94640 AIRWAY INHALATION TREATMENT: CPT

## 2023-05-14 PROCEDURE — 36415 COLL VENOUS BLD VENIPUNCTURE: CPT

## 2023-05-14 PROCEDURE — 6370000000 HC RX 637 (ALT 250 FOR IP): Performed by: STUDENT IN AN ORGANIZED HEALTH CARE EDUCATION/TRAINING PROGRAM

## 2023-05-14 PROCEDURE — 80048 BASIC METABOLIC PNL TOTAL CA: CPT

## 2023-05-14 PROCEDURE — 2580000003 HC RX 258: Performed by: INTERNAL MEDICINE

## 2023-05-14 RX ORDER — FERROUS SULFATE 325(65) MG
325 TABLET ORAL
Qty: 30 TABLET | Refills: 5 | Status: SHIPPED | OUTPATIENT
Start: 2023-05-14

## 2023-05-14 RX ORDER — OXYCODONE HYDROCHLORIDE 5 MG/1
5 TABLET ORAL EVERY 6 HOURS PRN
Qty: 16 TABLET | Refills: 0 | Status: SHIPPED | OUTPATIENT
Start: 2023-05-14 | End: 2023-05-18

## 2023-05-14 RX ADMIN — DULOXETINE HYDROCHLORIDE 60 MG: 30 CAPSULE, DELAYED RELEASE ORAL at 08:20

## 2023-05-14 RX ADMIN — TOBRAMYCIN 1 DROP: 3 SOLUTION OPHTHALMIC at 12:21

## 2023-05-14 RX ADMIN — PANTOPRAZOLE SODIUM 40 MG: 40 TABLET, DELAYED RELEASE ORAL at 08:21

## 2023-05-14 RX ADMIN — CYANOCOBALAMIN TAB 1000 MCG 1000 MCG: 1000 TAB at 08:21

## 2023-05-14 RX ADMIN — CEFTRIAXONE SODIUM 1000 MG: 1 INJECTION, POWDER, FOR SOLUTION INTRAMUSCULAR; INTRAVENOUS at 09:41

## 2023-05-14 RX ADMIN — SODIUM CHLORIDE, PRESERVATIVE FREE 10 ML: 5 INJECTION INTRAVENOUS at 08:26

## 2023-05-14 RX ADMIN — ASPIRIN 81 MG CHEWABLE TABLET 81 MG: 81 TABLET CHEWABLE at 08:21

## 2023-05-14 RX ADMIN — PREDNISONE 13 MG: 1 TABLET ORAL at 08:20

## 2023-05-14 RX ADMIN — SOTALOL HYDROCHLORIDE 80 MG: 80 TABLET ORAL at 08:20

## 2023-05-14 RX ADMIN — HEPARIN SODIUM 5000 UNITS: 5000 INJECTION INTRAVENOUS; SUBCUTANEOUS at 05:41

## 2023-05-14 RX ADMIN — BUDESONIDE AND FORMOTEROL FUMARATE DIHYDRATE 2 PUFF: 160; 4.5 AEROSOL RESPIRATORY (INHALATION) at 10:58

## 2023-05-14 RX ADMIN — PYRIDOSTIGMINE BROMIDE 60 MG: 60 TABLET ORAL at 08:21

## 2023-05-14 RX ADMIN — CLOPIDOGREL BISULFATE 75 MG: 75 TABLET ORAL at 08:21

## 2023-05-14 RX ADMIN — PYRIDOSTIGMINE BROMIDE 60 MG: 60 TABLET ORAL at 12:20

## 2023-05-14 RX ADMIN — Medication 400 MG: at 08:21

## 2023-05-14 RX ADMIN — TAMSULOSIN HYDROCHLORIDE 0.4 MG: 0.4 CAPSULE ORAL at 08:21

## 2023-05-14 RX ADMIN — TIOTROPIUM BROMIDE INHALATION SPRAY 2 PUFF: 3.12 SPRAY, METERED RESPIRATORY (INHALATION) at 11:00

## 2023-05-14 ASSESSMENT — PAIN SCALES - GENERAL: PAINLEVEL_OUTOF10: 0

## 2023-05-15 ENCOUNTER — PROCEDURE VISIT (OUTPATIENT)
Dept: CARDIOLOGY CLINIC | Age: 63
End: 2023-05-15

## 2023-05-15 DIAGNOSIS — Z95.0 CARDIAC PACEMAKER IN SITU: Primary | ICD-10-CM

## 2023-05-15 DIAGNOSIS — I44.0 AV BLOCK, 1ST DEGREE: ICD-10-CM

## 2023-05-15 DIAGNOSIS — I49.5 SINUS NODE DYSFUNCTION (HCC): ICD-10-CM

## 2023-05-15 LAB
EKG ATRIAL RATE: 76 BPM
EKG DIAGNOSIS: NORMAL
EKG P AXIS: 101 DEGREES
EKG P-R INTERVAL: 136 MS
EKG Q-T INTERVAL: 434 MS
EKG QRS DURATION: 120 MS
EKG QTC CALCULATION (BAZETT): 488 MS
EKG R AXIS: 47 DEGREES
EKG T AXIS: -22 DEGREES
EKG VENTRICULAR RATE: 76 BPM

## 2023-05-15 PROCEDURE — 93010 ELECTROCARDIOGRAM REPORT: CPT | Performed by: INTERNAL MEDICINE

## 2023-05-20 NOTE — DISCHARGE SUMMARY
Discharge Summary    Name:  Radu Hutchinson /Age/Sex: 1960  (58 y.o. male)   MRN & CSN:  [id number] & [id number] Admission Date/Time: 2023  3:37 PM   Attending:  No att. providers found Discharging Physician: Aman Proctor MD     Hospital Course:   Radu Hutchinson is a 58 y.o.  male  who presents with Acute kidney injury ([address]    HPI  \"Aramis Suárez is a 58 y.o. male was referred to ED from PCPs office. Patient was noted to have low blood pressure at PCPs office. Patient reported that he has not been feeling good since . Slipped and fell on , started having left hip pain. Patient felt dizzy and fell down again on Tuesday. Patient noted to have right flank pain, hematuria, frequency on Tuesday and Wednesday. Patient reported that urine got clear on Thursday, but pain persisted in his right flank, radiating into his right groin. Denied any fever, chills. Denied any chest pain, shortness of breath, denied any constipation or diarrhea. Vitals on presentation to ED-/92, HR 71, RR 14, temp 97.1, saturating 96% on room air. Labs were significant for creatinine 2.1, troponin<0.010, LFTs within normal range, hemoglobin 10.6.  UA not suggestive of infection. Chest x-ray-no acute process, CT abdomen/pelvis-multiple moderately obstructing right mid ureteral calculi, nonobstructing right nephrolithiasis. Patient received NS bolus, fentanyl, morphine, Zofran in ED. Urology was consulted and patient transferred to [facility]. \"       The following problems have been addressed during this hospitalization. Obstructing right ureteral calculi causing LILA. CT abdomen/pelvis-mildly moderately obstructing right mid ureteral calculus. Urology consulted. S/p cyst and stent placement. Doing well post-op. No sign of UTI. Received IVF. Received antiemetics, analgesics as needed      Hypotension-currently resolved. Could be secondary to volume depletion. Patient's blood pressure was SBP 84 on admission.

## 2023-05-23 ENCOUNTER — OFFICE VISIT (OUTPATIENT)
Dept: INTERNAL MEDICINE CLINIC | Age: 63
End: 2023-05-23
Payer: MEDICAID

## 2023-05-23 VITALS
SYSTOLIC BLOOD PRESSURE: 128 MMHG | WEIGHT: 268 LBS | BODY MASS INDEX: 37.38 KG/M2 | RESPIRATION RATE: 17 BRPM | DIASTOLIC BLOOD PRESSURE: 82 MMHG | HEART RATE: 83 BPM | OXYGEN SATURATION: 97 %

## 2023-05-23 DIAGNOSIS — N20.0 NEPHROLITHIASIS: ICD-10-CM

## 2023-05-23 DIAGNOSIS — I95.9 HYPOTENSION, UNSPECIFIED HYPOTENSION TYPE: Primary | ICD-10-CM

## 2023-05-23 DIAGNOSIS — N17.9 AKI (ACUTE KIDNEY INJURY) (HCC): ICD-10-CM

## 2023-05-23 DIAGNOSIS — I95.9 HYPOTENSION, UNSPECIFIED HYPOTENSION TYPE: ICD-10-CM

## 2023-05-23 LAB
ANION GAP SERPL CALCULATED.3IONS-SCNC: 12 MMOL/L (ref 3–16)
BUN SERPL-MCNC: 19 MG/DL (ref 7–20)
CALCIUM SERPL-MCNC: 9.7 MG/DL (ref 8.3–10.6)
CHLORIDE SERPL-SCNC: 109 MMOL/L (ref 99–110)
CO2 SERPL-SCNC: 19 MMOL/L (ref 21–32)
CREAT SERPL-MCNC: 1.5 MG/DL (ref 0.8–1.3)
DEPRECATED RDW RBC AUTO: 17.7 % (ref 12.4–15.4)
GFR SERPLBLD CREATININE-BSD FMLA CKD-EPI: 52 ML/MIN/{1.73_M2}
GLUCOSE SERPL-MCNC: 136 MG/DL (ref 70–99)
HCT VFR BLD AUTO: 34 % (ref 40.5–52.5)
HGB BLD-MCNC: 10.6 G/DL (ref 13.5–17.5)
MCH RBC QN AUTO: 26.4 PG (ref 26–34)
MCHC RBC AUTO-ENTMCNC: 31.3 G/DL (ref 31–36)
MCV RBC AUTO: 84.6 FL (ref 80–100)
PLATELET # BLD AUTO: 376 K/UL (ref 135–450)
PMV BLD AUTO: 7.9 FL (ref 5–10.5)
POTASSIUM SERPL-SCNC: 5.2 MMOL/L (ref 3.5–5.1)
RBC # BLD AUTO: 4.02 M/UL (ref 4.2–5.9)
SODIUM SERPL-SCNC: 140 MMOL/L (ref 136–145)
WBC # BLD AUTO: 9.8 K/UL (ref 4–11)

## 2023-05-23 PROCEDURE — 3017F COLORECTAL CA SCREEN DOC REV: CPT | Performed by: INTERNAL MEDICINE

## 2023-05-23 PROCEDURE — G8417 CALC BMI ABV UP PARAM F/U: HCPCS | Performed by: INTERNAL MEDICINE

## 2023-05-23 PROCEDURE — 1036F TOBACCO NON-USER: CPT | Performed by: INTERNAL MEDICINE

## 2023-05-23 PROCEDURE — G8427 DOCREV CUR MEDS BY ELIG CLIN: HCPCS | Performed by: INTERNAL MEDICINE

## 2023-05-23 PROCEDURE — 36415 COLL VENOUS BLD VENIPUNCTURE: CPT | Performed by: INTERNAL MEDICINE

## 2023-05-23 PROCEDURE — 1111F DSCHRG MED/CURRENT MED MERGE: CPT | Performed by: INTERNAL MEDICINE

## 2023-05-23 PROCEDURE — 99214 OFFICE O/P EST MOD 30 MIN: CPT | Performed by: INTERNAL MEDICINE

## 2023-05-23 RX ORDER — OXYCODONE HYDROCHLORIDE AND ACETAMINOPHEN 5; 325 MG/1; MG/1
1 TABLET ORAL EVERY 8 HOURS PRN
Qty: 21 TABLET | Refills: 0 | Status: SHIPPED | OUTPATIENT
Start: 2023-05-23 | End: 2023-05-30

## 2023-05-23 RX ORDER — ONDANSETRON 4 MG/1
4 TABLET, FILM COATED ORAL 3 TIMES DAILY PRN
Qty: 30 TABLET | Refills: 0 | Status: SHIPPED | OUTPATIENT
Start: 2023-05-23

## 2023-05-23 NOTE — PROGRESS NOTES
Ellie Bill  1960 05/23/23    SUBJECTIVE:  seen for hosp f/u, had kidney stones on L req cysto and stent. Still significant LBP, flank pain. Had anemia, is back on iron supplement. Back to work as well. Had LILA and improved after IV hydration. F/u w urology is this Fri. Dc summary:    Ellie Bill is a 58 y.o.  male  who presents with Acute kidney injury ([address]     HPI  \"Aramis Parmar March is a 58 y.o. male was referred to ED from PCPs office. Patient was noted to have low blood pressure at PCPs office. Patient reported that he has not been feeling good since Sunday. Slipped and fell on Sunday, started having left hip pain. Patient felt dizzy and fell down again on Tuesday. Patient noted to have right flank pain, hematuria, frequency on Tuesday and Wednesday. Patient reported that urine got clear on Thursday, but pain persisted in his right flank, radiating into his right groin. Denied any fever, chills. Denied any chest pain, shortness of breath, denied any constipation or diarrhea. Vitals on presentation to ED-/92, HR 71, RR 14, temp 97.1, saturating 96% on room air. Labs were significant for creatinine 2.1, troponin<0.010, LFTs within normal range, hemoglobin 10.6.  UA not suggestive of infection. Chest x-ray-no acute process, CT abdomen/pelvis-multiple moderately obstructing right mid ureteral calculi, nonobstructing right nephrolithiasis. Patient received NS bolus, fentanyl, morphine, Zofran in ED. Urology was consulted and patient transferred to [facility]. \"         The following problems have been addressed during this hospitalization. Obstructing right ureteral calculi causing LILA. CT abdomen/pelvis-mildly moderately obstructing right mid ureteral calculus. Urology consulted. S/p cyst and stent placement. Doing well post-op. No sign of UTI. Received IVF. Received antiemetics, analgesics as needed      Hypotension-currently resolved. Could be secondary to volume depletion.

## 2023-05-24 ENCOUNTER — OFFICE VISIT (OUTPATIENT)
Dept: CARDIOLOGY CLINIC | Age: 63
End: 2023-05-24
Payer: MEDICAID

## 2023-05-24 VITALS
HEIGHT: 71 IN | DIASTOLIC BLOOD PRESSURE: 80 MMHG | BODY MASS INDEX: 37.69 KG/M2 | WEIGHT: 269.2 LBS | HEART RATE: 85 BPM | SYSTOLIC BLOOD PRESSURE: 110 MMHG

## 2023-05-24 DIAGNOSIS — Z86.79 S/P ABLATION OF ATRIAL FIBRILLATION: ICD-10-CM

## 2023-05-24 DIAGNOSIS — I48.19 HYPERCOAGULABLE STATE DUE TO PERSISTENT ATRIAL FIBRILLATION (HCC): ICD-10-CM

## 2023-05-24 DIAGNOSIS — D68.69 HYPERCOAGULABLE STATE DUE TO PERSISTENT ATRIAL FIBRILLATION (HCC): ICD-10-CM

## 2023-05-24 DIAGNOSIS — Z51.81 ENCOUNTER FOR MONITORING SOTALOL THERAPY: ICD-10-CM

## 2023-05-24 DIAGNOSIS — Z98.890 S/P ABLATION OF ATRIAL FIBRILLATION: ICD-10-CM

## 2023-05-24 DIAGNOSIS — Z95.818 PRESENCE OF WATCHMAN LEFT ATRIAL APPENDAGE CLOSURE DEVICE: Primary | ICD-10-CM

## 2023-05-24 DIAGNOSIS — I48.19 PERSISTENT ATRIAL FIBRILLATION (HCC): ICD-10-CM

## 2023-05-24 DIAGNOSIS — Z79.899 ENCOUNTER FOR MONITORING SOTALOL THERAPY: ICD-10-CM

## 2023-05-24 PROCEDURE — 99214 OFFICE O/P EST MOD 30 MIN: CPT | Performed by: NURSE PRACTITIONER

## 2023-05-24 PROCEDURE — G8427 DOCREV CUR MEDS BY ELIG CLIN: HCPCS | Performed by: NURSE PRACTITIONER

## 2023-05-24 PROCEDURE — 93000 ELECTROCARDIOGRAM COMPLETE: CPT | Performed by: NURSE PRACTITIONER

## 2023-05-24 PROCEDURE — 1036F TOBACCO NON-USER: CPT | Performed by: NURSE PRACTITIONER

## 2023-05-24 PROCEDURE — 3017F COLORECTAL CA SCREEN DOC REV: CPT | Performed by: NURSE PRACTITIONER

## 2023-05-24 PROCEDURE — G8417 CALC BMI ABV UP PARAM F/U: HCPCS | Performed by: NURSE PRACTITIONER

## 2023-05-24 PROCEDURE — 1111F DSCHRG MED/CURRENT MED MERGE: CPT | Performed by: NURSE PRACTITIONER

## 2023-05-24 ASSESSMENT — ENCOUNTER SYMPTOMS
SHORTNESS OF BREATH: 1
EYE PAIN: 0
COLOR CHANGE: 0
DIARRHEA: 0
VOMITING: 0
NAUSEA: 0
WHEEZING: 0
ABDOMINAL PAIN: 0
COUGH: 0
CHEST TIGHTNESS: 0
SINUS PAIN: 0
SINUS PRESSURE: 0
BLOOD IN STOOL: 0
CONSTIPATION: 0
BACK PAIN: 0

## 2023-05-24 NOTE — PROGRESS NOTES
Electrophysiology Follow up  Note      Reason for consultation:  Atrial fibrillation    Chief complaint:  here for follow up on watchman device    Referring physician: Michelle Joya      Primary care physician: Tawnya Wade MD      History of Present Illness: This visit 5/24/2023  Patient is here today for follow-up on Watchman device. Patient was recently hospitalized with hematuria. Patient had ureteral stent placed as he had several kidney stones. Patient was stopped of his Plavix and aspirin for 1 day and it was resumed after the procedure. Patient has chronic shortness of breath and dizziness. Patient denies chest pain, palpitations, lightheadedness, near-syncope. Previous visit 3/8/2023    Patient has shortness of breath off and on and mostly with moderate exertion but he is over all doing well  Patient denies chest pain. Patient has dizziness with sudden movement and it is chronic  Patient does not drink or smoke  Recovered well from stroke  He is worried about blood thinner given his risk of bleeding  Patient here to discuss watchman procedure too       Previous visit: (5/6/2022)      Chief Complaint   Patient presents with    Follow-up     here to follow up for Afib w/RVR on device report. He states he does have some dizziness and SOB with exertion. Dizziness if he gets up to fast, no other cardiac complaints at this time. Patient did fall in March and fractured his Rt hip. Atrial Fibrillation    Shortness of Breath           Previous visit:    Patient is a 58-year-old male with history of hypertension, myasthenia gravis, sick sinus syndrome s/p pacemaker, history of blood clots on anticoagulation, atrial fibrillation referred by Dr. Carrie Hodge for atrial fibrillation management. Patient reports palpitations on and off and feels tired and weak. Patient also has shortness of breath with exertion at times.   Patient reports that he was cardioverted 4

## 2023-05-25 ENCOUNTER — TELEPHONE (OUTPATIENT)
Dept: CARDIOLOGY CLINIC | Age: 63
End: 2023-05-25

## 2023-06-05 ENCOUNTER — TELEPHONE (OUTPATIENT)
Dept: CARDIOLOGY CLINIC | Age: 63
End: 2023-06-05

## 2023-06-05 NOTE — TELEPHONE ENCOUNTER
Proceed with surgery no further testing needed   Low    risk for surgery  Cannot stop aspirin or Plavix at this time he is s/p atrial appendage closure within a month

## 2023-06-05 NOTE — TELEPHONE ENCOUNTER
Cardiologist: Dr. Pedro Luis Jurado  Surgeon: Dr. Olu Sanchez  [facility]  Anesthesia: General  Date: 6/21/2023  FAX# [phone]  # [phone]    Last OV 5/24/2023 w/Karen    Status post watchman  Status post ablation of atrial fibrillation and atrial flutter  Hypercoagulable state  History of GI bleed  Obstructing right ureteral calculi-status post ureteral stent     Patient reports that he is feeling okay at this point. Right femoral groin site is healed well  Patient did have some hematuria and noted to have kidney stones and ureteral stent was placed     Patient is needing to have post TONIO status post watchman in 45 to 59 days. We will get patient scheduled for this soon as possible     Patient to continue Plavix and aspirin at this time     EKG obtained patient noted to be in sinus rhythm with PACs. Patient is not having atrial fibrillation  QTc within range to continue sotalol 80 mg twice daily           Last EKG- 5/24/2023        NM- 9/15/2022  abnormal stress test, normal LVEF, NORMAL   EDV, Decreased uptake inferiorly due to diaphragmatic artifact. partially   reversible inferioapical perfusion defect noted. Echo- 4/13/2023   This is a limited echo s/p Watchman procedure. This is a limited echocardiogram.   Left ventricular systolic function is normal.   Ejection fraction is visually estimated at 55-60%. Mildly dilated left ventricle. Mild mitral regurgitation. Localized pericardial fluid noted in the left atrioventricular groove   (unchanged from previous echo).         Cath- 10/4/2022   patent Ramus stent, moderate CAD in rest of coronaries         Pacemaker insert-7/7/2021      Watchman- 4/13/2023          Plavix    Aspirin

## 2023-06-07 ENCOUNTER — HOSPITAL ENCOUNTER (OUTPATIENT)
Dept: NON INVASIVE DIAGNOSTICS | Age: 63
Discharge: HOME | End: 2023-06-07
Payer: MEDICAID

## 2023-06-07 ENCOUNTER — ANESTHESIA (OUTPATIENT)
Dept: NON INVASIVE DIAGNOSTICS | Age: 63
End: 2023-06-07

## 2023-06-07 ENCOUNTER — HOSPITAL ENCOUNTER (OUTPATIENT)
Age: 63
Discharge: HOME | End: 2023-06-07
Payer: MEDICAID

## 2023-06-07 ENCOUNTER — ANESTHESIA EVENT (OUTPATIENT)
Dept: NON INVASIVE DIAGNOSTICS | Age: 63
End: 2023-06-07

## 2023-06-07 VITALS
HEART RATE: 71 BPM | DIASTOLIC BLOOD PRESSURE: 96 MMHG | RESPIRATION RATE: 18 BRPM | SYSTOLIC BLOOD PRESSURE: 150 MMHG | OXYGEN SATURATION: 94 %

## 2023-06-07 LAB
ANION GAP SERPL CALCULATED.3IONS-SCNC: 12 MMOL/L (ref 4–16)
APTT: 35.6 SECONDS (ref 25.1–37.1)
BUN SERPL-MCNC: 17 MG/DL (ref 6–23)
CALCIUM SERPL-MCNC: 8.9 MG/DL (ref 8.3–10.6)
CHLORIDE BLD-SCNC: 106 MMOL/L (ref 99–110)
CO2: 20 MMOL/L (ref 21–32)
CREAT SERPL-MCNC: 1.4 MG/DL (ref 0.9–1.3)
GFR SERPL CREATININE-BSD FRML MDRD: 56 ML/MIN/1.73M2
GLUCOSE SERPL-MCNC: 149 MG/DL (ref 70–99)
HCT VFR BLD CALC: 38.5 % (ref 42–52)
HEMOGLOBIN: 11.3 GM/DL (ref 13.5–18)
INR BLD: 0.86 INDEX
LV EF: 55 %
LVEF MODALITY: NORMAL
MAGNESIUM: 2.1 MG/DL (ref 1.8–2.4)
MCH RBC QN AUTO: 27.8 PG (ref 27–31)
MCHC RBC AUTO-ENTMCNC: 29.4 % (ref 32–36)
MCV RBC AUTO: 94.6 FL (ref 78–100)
PDW BLD-RTO: 19.6 % (ref 11.7–14.9)
PHOSPHORUS: 4 MG/DL (ref 2.5–4.9)
PLATELET # BLD: 240 K/CU MM (ref 140–440)
PMV BLD AUTO: 9.8 FL (ref 7.5–11.1)
POTASSIUM SERPL-SCNC: 4.8 MMOL/L (ref 3.5–5.1)
PROTHROMBIN TIME: 10.9 SECONDS (ref 11.7–14.5)
RBC # BLD: 4.07 M/CU MM (ref 4.6–6.2)
SODIUM BLD-SCNC: 138 MMOL/L (ref 135–145)
WBC # BLD: 6.7 K/CU MM (ref 4–10.5)

## 2023-06-07 PROCEDURE — 83735 ASSAY OF MAGNESIUM: CPT

## 2023-06-07 PROCEDURE — 3700000000 HC ANESTHESIA ATTENDED CARE

## 2023-06-07 PROCEDURE — 85730 THROMBOPLASTIN TIME PARTIAL: CPT

## 2023-06-07 PROCEDURE — 6360000002 HC RX W HCPCS

## 2023-06-07 PROCEDURE — 93312 ECHO TRANSESOPHAGEAL: CPT

## 2023-06-07 PROCEDURE — 84100 ASSAY OF PHOSPHORUS: CPT

## 2023-06-07 PROCEDURE — 7100000000 HC PACU RECOVERY - FIRST 15 MIN

## 2023-06-07 PROCEDURE — 85027 COMPLETE CBC AUTOMATED: CPT

## 2023-06-07 PROCEDURE — 80048 BASIC METABOLIC PNL TOTAL CA: CPT

## 2023-06-07 PROCEDURE — 7100000001 HC PACU RECOVERY - ADDTL 15 MIN

## 2023-06-07 PROCEDURE — 2580000003 HC RX 258

## 2023-06-07 PROCEDURE — 2500000003 HC RX 250 WO HCPCS

## 2023-06-07 PROCEDURE — 85610 PROTHROMBIN TIME: CPT

## 2023-06-07 PROCEDURE — 93312 ECHO TRANSESOPHAGEAL: CPT | Performed by: INTERNAL MEDICINE

## 2023-06-07 PROCEDURE — 36415 COLL VENOUS BLD VENIPUNCTURE: CPT

## 2023-06-07 PROCEDURE — 93325 DOPPLER ECHO COLOR FLOW MAPG: CPT | Performed by: INTERNAL MEDICINE

## 2023-06-07 RX ORDER — LIDOCAINE HYDROCHLORIDE 20 MG/ML
INJECTION, SOLUTION INFILTRATION; PERINEURAL PRN
Status: DISCONTINUED | OUTPATIENT
Start: 2023-06-07 | End: 2023-06-07 | Stop reason: SDUPTHER

## 2023-06-07 RX ORDER — SODIUM CHLORIDE 9 MG/ML
INJECTION, SOLUTION INTRAVENOUS CONTINUOUS PRN
Status: DISCONTINUED | OUTPATIENT
Start: 2023-06-07 | End: 2023-06-07 | Stop reason: SDUPTHER

## 2023-06-07 RX ORDER — PROPOFOL 10 MG/ML
INJECTION, EMULSION INTRAVENOUS PRN
Status: DISCONTINUED | OUTPATIENT
Start: 2023-06-07 | End: 2023-06-07 | Stop reason: SDUPTHER

## 2023-06-07 RX ADMIN — PROPOFOL 130 MG: 10 INJECTION, EMULSION INTRAVENOUS at 10:44

## 2023-06-07 RX ADMIN — LIDOCAINE HYDROCHLORIDE 100 MG: 20 INJECTION, SOLUTION INFILTRATION; PERINEURAL at 10:44

## 2023-06-07 RX ADMIN — SODIUM CHLORIDE: 9 INJECTION, SOLUTION INTRAVENOUS at 10:41

## 2023-06-07 ASSESSMENT — ENCOUNTER SYMPTOMS
ABDOMINAL PAIN: 0
SINUS PRESSURE: 0
BACK PAIN: 0
DIARRHEA: 0
BLOOD IN STOOL: 0
WHEEZING: 0
COLOR CHANGE: 0
CONSTIPATION: 0
SHORTNESS OF BREATH: 1
CHEST TIGHTNESS: 0
SHORTNESS OF BREATH: 1
COUGH: 0
SINUS PAIN: 0
NAUSEA: 0
EYE PAIN: 0
VOMITING: 0

## 2023-06-07 ASSESSMENT — LIFESTYLE VARIABLES: SMOKING_STATUS: 0

## 2023-06-07 NOTE — ANESTHESIA PRE PROCEDURE
Blocker:  Dose within 24 Hrs      ROS comment: AV dual-paced rhythm   Abnormal ECG   When compared with ECG of 06-JUL-2021 15:51,   Electronic ventricular pacemaker has replaced Sinus rhythm   Confirmed by RICHARD Motley ([id number]) on 7/8/2021 6:26:15 PM      Summary   Severe mitral regurgitation is present. Moderate tricuspid regurgitation is present. There was no thrombus noted in the left atrial appendage. Shocked using 200 joules. Converted to junctional escape rhythm. Ejection fraction is visually estimated at 50%. Signature      ------------------------------------------------------------------   Electronically signed by Fatmata Coronado MD (Interpreting   physician) on 07/06/2021 at 06:32 PM     Neuro/Psych:   (+) CVA (after ablation developed PE and eventually CVA resolved. Watchman placed):, neuromuscular disease: myasthenia gravis, TIA,             GI/Hepatic/Renal:   (+) PUD, renal disease: kidney stones, morbid obesity (bmi 40.9)         ROS comment: Ulcerative colitis. Endo/Other:    (+) DiabetesType II DM, , blood dyscrasia: anticoagulation therapy and anemia:., .                 Abdominal:   (+) obese,           Vascular:   + DVT, . Other Findings:             Anesthesia Plan      MAC     ASA 4       Induction: intravenous. Anesthetic plan and risks discussed with patient. Plan discussed with CRNA. Attending anesthesiologist reviewed and agrees with Preprocedure content        Pre Anesthesia Assessment complete. Chart reviewed on 6/7/2023. This is a chart review only.         JAMIE Cabral - CRNA   6/7/2023

## 2023-06-07 NOTE — ANESTHESIA POSTPROCEDURE EVALUATION
Department of Anesthesiology  Postprocedure Note    Patient: Jan March  MRN: [id number]  YOB: 1960  Date of evaluation: 6/7/2023      Procedure Summary     Date: 06/07/23 Room / Location: [facility]    Anesthesia Start: 1041 Anesthesia Stop: 9312    Procedure: TRANSESOPHAGEAL ECHOCARDIOGRAM Diagnosis: Unspecified atrial fibrillation    Scheduled Providers:  Responsible Provider: Jyothi Cantu MD    Anesthesia Type: MAC ASA Status: 4          Anesthesia Type: No value filed.     Miguel Phase I: Miguel Score: 10    Miguel Phase II:        Anesthesia Post Evaluation    Patient location during evaluation: bedside  Patient participation: complete - patient participated  Level of consciousness: awake  Pain score: 0  Airway patency: patent  Nausea & Vomiting: no nausea and no vomiting  Complications: no  Cardiovascular status: blood pressure returned to baseline and hemodynamically stable  Respiratory status: acceptable and room air  Hydration status: euvolemic

## 2023-06-07 NOTE — H&P
Electrophysiology H&p Note      Reason for consultation:  Atrial fibrillation    Chief complaint:  Here for post watchman TONIO    Referring physician: Jessy Herrera      Primary care physician: Gaby Loco MD      History of Present Illness: Today visit (06/07/23)    Patient here for post watchman TONIO. No change in H&P noted from previous clinic visit. Previous visit 5/24/2023  Patient is here today for follow-up on Watchman device. Patient was recently hospitalized with hematuria. Patient had ureteral stent placed as he had several kidney stones. Patient was stopped of his Plavix and aspirin for 1 day and it was resumed after the procedure. Patient has chronic shortness of breath and dizziness. Patient denies chest pain, palpitations, lightheadedness, near-syncope. Previous visit 3/8/2023    Patient has shortness of breath off and on and mostly with moderate exertion but he is over all doing well  Patient denies chest pain. Patient has dizziness with sudden movement and it is chronic  Patient does not drink or smoke  Recovered well from stroke  He is worried about blood thinner given his risk of bleeding  Patient here to discuss watchman procedure too       Previous visit: (5/6/2022)      Chief Complaint   Patient presents with    Follow-up     here to follow up for Afib w/RVR on device report. He states he does have some dizziness and SOB with exertion. Dizziness if he gets up to fast, no other cardiac complaints at this time. Patient did fall in March and fractured his Rt hip. Atrial Fibrillation    Shortness of Breath           Previous visit:    Patient is a 77-year-old male with history of hypertension, myasthenia gravis, sick sinus syndrome s/p pacemaker, history of blood clots on anticoagulation, atrial fibrillation referred by Dr. Mitra Campuzano for atrial fibrillation management. Patient reports palpitations on and off and feels tired and weak.

## 2023-06-25 NOTE — ED PROVIDER NOTES
[address] [facility] [place]      TRIAGE CHIEF COMPLAINT:   Flank Pain (Pt reports right flank pain since yesterday. Pt noticed blood in urine yesterday, then pain came. Pt has not taken anything for pain. Pain to right flank and wraps around front. Pt c/o 10/10 pain right now. )      [place]:  Jan March is a 60 y.o. male that presents with complaint of possible kidney stone. Patient states yesterday he developed right-sided flank pain abdominal pain nausea vomiting hematuria. History of kidney stones this feels similar. Denies any fever chest pain shortness of breath that is new, no dysuria. No other questions or concerns she has a history of colostomy bag denies any complications there. Peralta Hidden REVIEW OF SYSTEMS:  At least 10 systems reviewed and otherwise acutely negative except as in the [address] Review of Systems   Constitutional: Negative. HENT: Negative. Eyes: Negative. Respiratory: Negative. Cardiovascular: Negative. Gastrointestinal:  Positive for abdominal pain, nausea and vomiting. Endocrine: Negative. Genitourinary:  Positive for flank pain and hematuria. Skin: Negative. Allergic/Immunologic: Negative. Neurological: Negative. Hematological: Negative. Psychiatric/Behavioral: Negative. All other systems reviewed and are negative. Past Medical History:   Diagnosis Date    Arthritis 04/23/2021    LOW BACK PAIN, IMPROVED ON CYMBALTA    Atrial fibrillation, chronic ([address] 12/2020    Dr Edmundo Mai, also has pacer and is on eliquis    Cerebrovascular accident [facility]) 09/09/2021    expressive aphasia- embolic from cardioversion for afib/ LEFT FRONTAL/ANTERIOR CIRCULATION.     Closed displaced fracture of anterior column of right acetabulum ([address] 03/07/2022    COPD (chronic obstructive pulmonary disease) ([address] 06/15/2021    Coronary artery disease involving native coronary artery of native heart without angina pectoris 06/15/2021    hx of MI 2011 and also 2021- hx of Procedure:  LABOR ANALGESIA    Relevant Problems   ANESTHESIA (within normal limits)      GYN   (+) 39 weeks gestation of pregnancy      HEMATOLOGY   (+) Anemia affecting pregnancy in third trimester      PULMONARY   (+) Asthma        Physical Exam    Airway    Mallampati score: II         Dental   No notable dental hx     Cardiovascular  Rhythm: regular, Rate: normal, Cardiovascular exam normal    Pulmonary  Pulmonary exam normal Breath sounds clear to auscultation,     Other Findings        Anesthesia Plan  ASA Score- 2     Anesthesia Type- epidural with ASA Monitors  Additional Monitors:   Airway Plan:           Plan Factors-    Chart reviewed  Existing labs reviewed  Patient summary reviewed  Patient is not a current smoker  Induction-     Postoperative Plan-     Informed Consent- Anesthetic plan and risks discussed with patient  PCI/stent twice. Diabetes ([facility] 09/10/2021    dx'd Sept 2021- by Dr Abram Pantoja. ?may be related to chr steroid use also? H/O echocardiogram 02/02/2021    EF is estimated at 50%. presence of PVC affected LVEF estimation, Mild TR. History of blood transfusion     Hx of blood clots     Hx of cardiovascular stress test 06/11/2021    Small size severe intensity,perfusion defect in apical wall. Hx of cardiovascular stress test 09/14/2022    Decreased uptake inferiorly due to diaphragmatic artifact partially reversible inferioapical perfusion defect noted. Hx of Doppler echocardiogram 09/14/2022    EF 63% Grade I diastolic dysfunction. mildly dilated LA. Mod TR. Mild pulmonary htn. Hx of Doppler ultrasound 04/09/2021    Left distal SSV shows occlusive chronic SVT. Hypertension     Myasthenia gravis ([facility] 2017    sees Dr Emiliano Cartagena and is controlled on a research injection 40mg daily, also mestinon and chr prednisone 12mg daily    NSTEMI (non-ST elevated myocardial infarction) ([facility] 12/2020    Post PTCA 12/14/2020    Ramus Stented. S/P ablation of atrial fibrillation 09/08/2021    S/P ablation of afib PVI performed by Dr. Prateek Malcolm.     TIA (transient ischemic attack)     Ulcerative colitis [facility])      Past Surgical History:   Procedure Laterality Date    CARDIAC SURGERY      ablation 9/8/21- Dr Venu Grover      total colectomy at 14yo for [facility] colitis    PACEMAKER INSERTION N/A 07/07/2021    PACEMAKER INSERTION PERMANENT performed by Wood Del Rosario MD at [address],[place]. [address] conditional    PTCA  12/14/2020    Ramus Stented./also prior stent 2011 after MI    TONSILLECTOMY      UPPER GASTROINTESTINAL ENDOSCOPY N/A 06/15/2021    EGD DIAGNOSTIC ONLY performed by Adele Tracey MD at [address] [place] [address] J N/A 07/04/2021    EGD DIAGNOSTIC ONLY performed by Jude Villalpando MD at [facility] ENDOSCOPY     Family History   Family history unknown: Yes     Social History Socioeconomic History    Marital status:      Spouse name: Not on file    Number of children: Not on file    Years of education: Not on file    Highest education level: Not on file   Occupational History    Not on file   Tobacco Use    Smoking status: Former     Packs/day: 0.25     Types: Cigarettes     Quit date: 2021     Years since quittin.6    Smokeless tobacco: Never   Substance and Sexual Activity    Alcohol use: Yes     Alcohol/week: 1.0 standard drink     Types: 1 Cans of beer per week     Comment: occ    Drug use: No    Sexual activity: Yes     Partners: Female   Other Topics Concern    Not on file   Social History Narrative    Not on file     Social Determinants of Health     Financial Resource Strain: Not on file   Food Insecurity: Not on file   Transportation Needs: Not on file   Physical Activity: Not on file   Stress: Not on file   Social Connections: Not on file   Intimate Partner Violence: Not on file   Housing Stability: Not on file     Current Facility-Administered Medications   Medication Dose Route Frequency Provider Last Rate Last Admin    morphine injection 4 mg  4 mg IntraVENous Q30 Min PRN Ardine Smith, DO   4 mg at 22 1205    ondansetron (ZOFRAN) injection 4 mg  4 mg IntraVENous Q30 Min PRN Ardine Smith, DO   4 mg at 22 1132     Current Outpatient Medications   Medication Sig Dispense Refill    HYDROcodone-acetaminophen (NORCO) 5-325 MG per tablet Take 1-2 tablets by mouth every 6 hours as needed for Pain for up to 3 days.  15 tablet 0    ondansetron (ZOFRAN) 4 MG tablet Take 1 tablet by mouth daily as needed for Nausea or Vomiting 30 tablet 0    naproxen (NAPROSYN) 500 MG tablet Take 1 tablet by mouth 2 times daily 60 tablet 0    tamsulosin (FLOMAX) 0.4 MG capsule Take 1 capsule by mouth daily for 10 doses 10 capsule 0    magnesium oxide (MAG-OX) 400 (240 Mg) MG tablet Take 1 tablet by mouth daily 30 tablet 3    DULoxetine (CYMBALTA) 60 MG extended release capsule Take 1 capsule by mouth daily 90 capsule 1    metFORMIN (GLUCOPHAGE-XR) 500 MG extended release tablet Take 2 tablets by mouth 2 times daily 360 tablet 1    budesonide-formoterol (SYMBICORT) 160-4.5 MCG/ACT AERO Inhale 2 puffs into the lungs in the morning and 2 puffs in the evening.  1 each 3    tiotropium (SPIRIVA HANDIHALER) 18 MCG inhalation capsule Inhale 1 capsule into the lungs daily 90 capsule 1    pantoprazole (PROTONIX) 40 MG tablet Take 1 tablet by mouth 2 times daily 180 tablet 1    pyridostigmine (MESTINON) 60 MG tablet Take 1 tablet by mouth 4 times daily GETS FROM NEURO DR HARRIS 120 tablet 0    Ostomy Supplies KIT 1 kit by Does not apply route daily 1 kit 3    sotalol (BETAPACE) 80 MG tablet Take 1 tablet by mouth 2 times daily 60 tablet 2    ELIQUIS 5 MG TABS tablet Take 1 tablet by mouth 2 times daily 60 tablet 3    clopidogrel (PLAVIX) 75 MG tablet Take 1 tablet by mouth daily 30 tablet 3    atorvastatin (LIPITOR) 40 MG tablet Take 1 tablet by mouth nightly START 6/25/2021 30 tablet 3    GLUCOSAMINE-CALCIUM-VIT D PO Take by mouth daily      NONFORMULARY Inject 40 mg/L into the skin every evening Zilucoplan  - patient provided experimental drug from [facility] for Myasthenia Gravis      vitamin B-12 (CYANOCOBALAMIN) 500 MCG tablet Take 1,000 mcg by mouth daily Indications: 8am       PREDNISONE PO Take 13 mg by mouth daily Indications: Takes at  8am       acetaminophen (TYLENOL) 325 MG tablet Take 650 mg by mouth every 4 hours as needed for Pain or Fever       Facility-Administered Medications Ordered in Other Encounters   Medication Dose Route Frequency Provider Last Rate Last Admin    Immune Globulin (Human) solution 20 g  20 g IntraVENous Once Alba Brice MD        And    Immune Globulin (Human) solution 20 g  20 g IntraVENous Once Alba Brice MD        Immune Globulin (Human) solution 20 g  20 g IntraVENous Once Alba Brice MD        And    Immune Globulin (Human) solution 20 g  20 g IntraVENous Once Tanmay Warner MD          No Known Allergies  Current Facility-Administered Medications   Medication Dose Route Frequency Provider Last Rate Last Admin    morphine injection 4 mg  4 mg IntraVENous Q30 Min PRN Marylou Helen, DO   4 mg at 12/01/22 1205    ondansetron (ZOFRAN) injection 4 mg  4 mg IntraVENous Q30 Min PRN Marylou Helen, DO   4 mg at 12/01/22 1132     Current Outpatient Medications   Medication Sig Dispense Refill    HYDROcodone-acetaminophen (NORCO) 5-325 MG per tablet Take 1-2 tablets by mouth every 6 hours as needed for Pain for up to 3 days. 15 tablet 0    ondansetron (ZOFRAN) 4 MG tablet Take 1 tablet by mouth daily as needed for Nausea or Vomiting 30 tablet 0    naproxen (NAPROSYN) 500 MG tablet Take 1 tablet by mouth 2 times daily 60 tablet 0    tamsulosin (FLOMAX) 0.4 MG capsule Take 1 capsule by mouth daily for 10 doses 10 capsule 0    magnesium oxide (MAG-OX) 400 (240 Mg) MG tablet Take 1 tablet by mouth daily 30 tablet 3    DULoxetine (CYMBALTA) 60 MG extended release capsule Take 1 capsule by mouth daily 90 capsule 1    metFORMIN (GLUCOPHAGE-XR) 500 MG extended release tablet Take 2 tablets by mouth 2 times daily 360 tablet 1    budesonide-formoterol (SYMBICORT) 160-4.5 MCG/ACT AERO Inhale 2 puffs into the lungs in the morning and 2 puffs in the evening.  1 each 3    tiotropium (SPIRIVA HANDIHALER) 18 MCG inhalation capsule Inhale 1 capsule into the lungs daily 90 capsule 1    pantoprazole (PROTONIX) 40 MG tablet Take 1 tablet by mouth 2 times daily 180 tablet 1    pyridostigmine (MESTINON) 60 MG tablet Take 1 tablet by mouth 4 times daily GETS FROM NEURO DR HARRIS 120 tablet 0    Ostomy Supplies KIT 1 kit by Does not apply route daily 1 kit 3    sotalol (BETAPACE) 80 MG tablet Take 1 tablet by mouth 2 times daily 60 tablet 2    ELIQUIS 5 MG TABS tablet Take 1 tablet by mouth 2 times daily 60 tablet 3    clopidogrel (PLAVIX) 75 MG tablet Take 1 tablet by mouth daily 30 tablet 3    atorvastatin (LIPITOR) 40 MG tablet Take 1 tablet by mouth nightly START 6/25/2021 30 tablet 3    GLUCOSAMINE-CALCIUM-VIT D PO Take by mouth daily      NONFORMULARY Inject 40 mg/L into the skin every evening Zilucoplan  - patient provided experimental drug from [place] for Myasthenia Gravis      vitamin B-12 (CYANOCOBALAMIN) 500 MCG tablet Take 1,000 mcg by mouth daily Indications: 8am       PREDNISONE PO Take 13 mg by mouth daily Indications: Takes at  8am       acetaminophen (TYLENOL) 325 MG tablet Take 650 mg by mouth every 4 hours as needed for Pain or Fever       Facility-Administered Medications Ordered in Other Encounters   Medication Dose Route Frequency Provider Last Rate Last Admin    Immune Globulin (Human) solution 20 g  20 g IntraVENous Once Terri Christensen MD        And    Immune Globulin (Human) solution 20 g  20 g IntraVENous Once Terri Christensen MD        Immune Globulin (Human) solution 20 g  20 g IntraVENous Once Terri hCristensen MD        And    Immune Globulin (Human) solution 20 g  20 g IntraVENous Once Terri Christensen MD           Nursing Notes Reviewed    VITAL SIGNS:  ED Triage Vitals   Enc Vitals Group      BP       Pulse       Resp       Temp       Temp src       SpO2       Weight       Height       Head Circumference       Peak Flow       Pain Score       Pain Loc       Pain Edu? Excl. in [address] PHYSICAL EXAM:  Physical Exam  Vitals and nursing note reviewed. Constitutional:       General: He is not in acute distress. Appearance: Normal appearance. He is not ill-appearing, toxic-appearing or diaphoretic. HENT:      Head: Normocephalic and atraumatic. Right Ear: External ear normal.      Left Ear: External ear normal.   Eyes:      General: No scleral icterus. Right eye: No discharge. Left eye: No discharge. Extraocular Movements: Extraocular movements intact. Conjunctiva/sclera: Conjunctivae normal.   Cardiovascular:      Rate and Rhythm: Normal rate and regular rhythm. Pulmonary:      Effort: Pulmonary effort is normal. No respiratory distress. Breath sounds: Normal breath sounds. No stridor. No wheezing, rhonchi or rales. Abdominal:      General: The ostomy site is clean. Bowel sounds are normal. There is no distension. Palpations: Abdomen is soft. There is no mass. Tenderness: There is abdominal tenderness. There is right CVA tenderness. There is no guarding or rebound. Hernia: No hernia is present. Comments: Colostomy bag right lower quadrant, clean dry intact   Musculoskeletal:         General: No swelling, tenderness, deformity or signs of injury. Normal range of motion. Cervical back: Normal range of motion. No rigidity. Right lower leg: No edema. Left lower leg: No edema. Skin:     General: Skin is warm. Coloration: Skin is not jaundiced or pale. Findings: No bruising, erythema, lesion or rash. Neurological:      General: No focal deficit present. Mental Status: He is alert and oriented to person, place, and time. Cranial Nerves: No cranial nerve deficit. Sensory: No sensory deficit. Motor: No weakness. Coordination: Coordination normal.   Psychiatric:         Mood and Affect: Mood normal.         Behavior: Behavior normal.         Thought Content:  Thought content normal.         Judgment: Judgment normal.         I have reviewed andinterpreted all of the currently available lab results from this visit (if applicable):    Results for orders placed or performed during the hospital encounter of 12/01/22   CBC with Auto Differential   Result Value Ref Range    WBC 11.8 (H) 4.0 - 10.5 K/CU MM    RBC 4.85 4.6 - 6.2 M/CU MM    Hemoglobin 14.5 13.5 - 18.0 GM/DL    Hematocrit 43.6 42 - 52 %    MCV 89.9 78 - 100 FL    MCH 29.9 27 - 31 PG    MCHC 33.3 32.0 - 36.0 %    RDW 13.2 11.7 - 14.9 %    Platelets 571 692 - 716 K/CU MM    MPV 9.5 7.5 - 11.1 FL    Differential Type AUTOMATED DIFFERENTIAL     Segs Relative 76.8 (H) 36 - 66 %    Lymphocytes % 11.6 (L) 24 - 44 %    Monocytes % 9.3 (H) 0 - 4 %    Eosinophils % 1.7 0 - 3 %    Basophils % 0.3 0 - 1 %    Segs Absolute 9.1 K/CU MM    Lymphocytes Absolute 1.4 K/CU MM    Monocytes Absolute 1.1 K/CU MM    Eosinophils Absolute 0.2 K/CU MM    Basophils Absolute 0.0 K/CU MM    Immature Neutrophil % 0.3 0 - 0.43 %    Total Immature Neutrophil 0.04 K/CU MM   Comprehensive Metabolic Panel   Result Value Ref Range    Sodium 137 135 - 145 MMOL/L    Potassium 4.6 3.5 - 5.1 MMOL/L    Chloride 101 99 - 110 mMol/L    CO2 24 21 - 32 MMOL/L    BUN 24 (H) 6 - 23 MG/DL    Creatinine 1.5 (H) 0.9 - 1.3 MG/DL    Est, Glom Filt Rate 52 (L) >60 mL/min/1.73m2    Glucose 216 (H) 70 - 99 MG/DL    Calcium 10.1 8.3 - 10.6 MG/DL    Albumin 4.5 3.4 - 5.0 GM/DL    Total Protein 8.0 6.4 - 8.2 GM/DL    Total Bilirubin 0.7 0.0 - 1.0 MG/DL    ALT 21 10 - 40 U/L    AST 23 15 - 37 IU/L    Alkaline Phosphatase 74 40 - 129 IU/L    Anion Gap 12 4 - 16   Lipase   Result Value Ref Range    Lipase 28 13 - 60 IU/L   Urinalysis   Result Value Ref Range    Color, UA RED (A) YELLOW    Clarity, UA CLOUDY (A) CLEAR    Glucose, Urine NEGATIVE NEGATIVE MG/DL    Bilirubin Urine SMALL NUMBER OR AMOUNT OBSERVED (A) NEGATIVE MG/DL    Ketones, Urine TRACE (A) NEGATIVE MG/DL    Specific Gravity, UA 1.025 1.001 - 1.035    Blood, Urine LARGE NUMBER OR AMOUNT OBSERVED (A) NEGATIVE    pH, Urine 5.0 5.0 - 8.0    Protein, UA >300 (HH) NEGATIVE MG/DL    Urobilinogen, Urine 0.2 0.2 - 1.0 MG/DL    Nitrite Urine, Quantitative NEGATIVE NEGATIVE    Leukocyte Esterase, Urine TRACE (A) NEGATIVE    Urinalysis Comments       BLOOD AND LEUKOCYTE ESTERASE RESULTS FROM SPUN URINE, ALL OTHER TESTS FROM UNSPUN URINE   Microscopic Urinalysis   Result Value Ref Range    RBC, UA >50 (H) 0 - 3 /HPF    WBC, UA 2 0 - 2 /HPF Epithelial Cells, UA 1 /HPF    Cast Type NO CAST FORMS SEEN NO CAST FORMS SEEN /HPF    Bacteria, UA MANY (A) NEGATIVE /HPF    Crystal Type NEGATIVE NEGATIVE /HPF        Radiographs (if obtained):  [] The following radiograph was interpreted by myself in the absence of a radiologist:  [x] Radiologist's Report Reviewed:    CT Abd/pelv    EKG (if obtained): (All EKG's are interpreted by myself in the absence of a cardiologist)    MDM:    Patient with complaint of right-sided flank pain abdominal pain nausea vomiting hematuria. Again history of kidney stones feels like he is passing 1. States symptoms started yesterday. Denies any fever chest pain shortness of breath dysuria bowel or bladder symptoms otherwise. He has a history of colostomy bag, no complications there. On arrival he appears well vital signs are stable he does have right-sided flank pain abdominal pain. Will get labs, imaging given pain nausea medicine IV fluids. Labs do show hematuria. Patient rechecked doing well. Pain controlled. Imaging does show a punctate kidney stone right UVJ with some hydronephrosis. Urine has blood no infection. Otherwise work-up negative. Patient's pain is controlled discharge him home with pain nausea medicine and urology follow-up given return precautions and follow-up information. Discharged with Flomax Norco Zofran and Naprosyn stable. Discharge. Appears nontoxic nonseptic.   Okay with plan    CLINICAL IMPRESSION:  Final diagnoses:   Flank pain   Hematuria, unspecified type   Kidney stone       (Please note that portions of this note may have been completed with a voice recognition program. Efforts were made to edit the dictations but occasionally words aremis-transcribed.)    DISPOSITION REFERRAL (if applicable):  Doris Dominguez MD  [address] [place] [address]  [phone]          [facility]  [address]  [phone]    If symptoms douglas Parisi [address]  [phone]    Schedule an appointment as soon as possible for a visit in 1 day  Urology      DISPOSITION MEDICATIONS (if applicable):  New Prescriptions    HYDROCODONE-ACETAMINOPHEN (NORCO) 5-325 MG PER TABLET    Take 1-2 tablets by mouth every 6 hours as needed for Pain for up to 3 days.     NAPROXEN (NAPROSYN) 500 MG TABLET    Take 1 tablet by mouth 2 times daily    ONDANSETRON (ZOFRAN) 4 MG TABLET    Take 1 tablet by mouth daily as needed for Nausea or Vomiting    TAMSULOSIN (FLOMAX) 0.4 MG CAPSULE    Take 1 capsule by mouth daily for 10 doses          DO Kristine Partida DO  12/01/22 3845

## 2023-06-26 ENCOUNTER — HOSPITAL ENCOUNTER (OUTPATIENT)
Dept: GENERAL RADIOLOGY | Age: 63
Discharge: HOME | End: 2023-06-26
Payer: MEDICAID

## 2023-06-26 ENCOUNTER — HOSPITAL ENCOUNTER (OUTPATIENT)
Age: 63
Discharge: HOME | End: 2023-06-26
Payer: MEDICAID

## 2023-06-26 DIAGNOSIS — N20.0 URIC ACID NEPHROLITHIASIS: ICD-10-CM

## 2023-06-26 PROCEDURE — 74018 RADEX ABDOMEN 1 VIEW: CPT

## 2023-06-28 ENCOUNTER — CLINICAL DOCUMENTATION (OUTPATIENT)
Dept: NON INVASIVE DIAGNOSTICS | Age: 63
End: 2023-06-28

## 2023-06-28 NOTE — PROGRESS NOTES
Spoke with     Patient needs urology procedure and is concerned about plavix and risk of bleeding for the procedure  It would be okay to start lovenox and stop plavix and continue perioperatively as per recommendations with lovenox and restart plavix after the procedure as urology recommends in 3 days post procedure. During this time coverage with lovenox will be an approach.      will discuss with patient

## 2023-07-05 DIAGNOSIS — I48.20 ATRIAL FIBRILLATION, CHRONIC (HCC): ICD-10-CM

## 2023-07-05 DIAGNOSIS — I25.10 CORONARY ARTERY DISEASE INVOLVING NATIVE CORONARY ARTERY OF NATIVE HEART WITHOUT ANGINA PECTORIS: ICD-10-CM

## 2023-07-05 RX ORDER — MAGNESIUM OXIDE 400 MG/1
TABLET ORAL
Qty: 30 TABLET | Refills: 3 | Status: SHIPPED | OUTPATIENT
Start: 2023-07-05

## 2023-07-10 RX ORDER — ATORVASTATIN CALCIUM 40 MG/1
40 TABLET, FILM COATED ORAL NIGHTLY
Qty: 90 TABLET | Refills: 3 | Status: SHIPPED | OUTPATIENT
Start: 2023-07-10

## 2023-07-31 ENCOUNTER — TELEPHONE (OUTPATIENT)
Dept: CARDIOLOGY CLINIC | Age: 63
End: 2023-07-31

## 2023-07-31 DIAGNOSIS — M54.42 ACUTE LEFT-SIDED LOW BACK PAIN WITH LEFT-SIDED SCIATICA: ICD-10-CM

## 2023-07-31 RX ORDER — TIZANIDINE 2 MG/1
2 TABLET ORAL 2 TIMES DAILY PRN
Qty: 60 TABLET | Refills: 1 | Status: SHIPPED | OUTPATIENT
Start: 2023-07-31

## 2023-08-04 ENCOUNTER — TELEPHONE (OUTPATIENT)
Dept: CARDIOLOGY CLINIC | Age: 63
End: 2023-08-04

## 2023-08-04 RX ORDER — SOTALOL HYDROCHLORIDE 80 MG/1
80 TABLET ORAL 2 TIMES DAILY
Qty: 60 TABLET | Refills: 5 | Status: SHIPPED | OUTPATIENT
Start: 2023-08-04

## 2023-08-08 DIAGNOSIS — J42 CHRONIC BRONCHITIS, UNSPECIFIED CHRONIC BRONCHITIS TYPE (HCC): ICD-10-CM

## 2023-08-08 RX ORDER — BUDESONIDE AND FORMOTEROL FUMARATE DIHYDRATE 160; 4.5 UG/1; UG/1
AEROSOL RESPIRATORY (INHALATION)
Qty: 10.2 G | Refills: 3 | Status: SHIPPED | OUTPATIENT
Start: 2023-08-08

## 2023-08-10 ENCOUNTER — TELEPHONE (OUTPATIENT)
Dept: CARDIOLOGY CLINIC | Age: 63
End: 2023-08-10

## 2023-08-10 NOTE — TELEPHONE ENCOUNTER
Cardiologist: Dr. Giorgi Reynolds  Surgeon: Dr. Tree Callahan  Surgery: [facility]  Anesthesia: General  Date: 8/28/2023  FAX# [phone]  # [phone]    Last OV 5/24/2023 w/Karen     Status post watchman  Status post ablation of atrial fibrillation and atrial flutter  Hypercoagulable state  History of GI bleed  Obstructing right ureteral calculi-status post ureteral stent     Patient reports that he is feeling okay at this point. Right femoral groin site is healed well  Patient did have some hematuria and noted to have kidney stones and ureteral stent was placed     Patient is needing to have post TONIO status post watchman in 45 to 59 days. We will get patient scheduled for this soon as possible     Patient to continue Plavix and aspirin at this time     EKG obtained patient noted to be in sinus rhythm with PACs. Patient is not having atrial fibrillation  QTc within range to continue sotalol 80 mg twice daily              Last EKG- 5/24/2023           NM- 9/15/2022  abnormal stress test, normal LVEF, NORMAL   EDV, Decreased uptake inferiorly due to diaphragmatic artifact. partially   reversible inferioapical perfusion defect noted. Echo- 4/13/2023   This is a limited echo s/p Watchman procedure. This is a limited echocardiogram.   Left ventricular systolic function is normal.   Ejection fraction is visually estimated at 55-60%. Mildly dilated left ventricle. Mild mitral regurgitation. Localized pericardial fluid noted in the left atrioventricular groove   (unchanged from previous echo).            Cath- 10/4/2022   patent Ramus stent, moderate CAD in rest of coronaries           Pacemaker insert-7/7/2021        Watchman- 4/13/2023              Plavix     Aspirin

## 2023-08-16 RX ORDER — CLOPIDOGREL BISULFATE 75 MG/1
75 TABLET ORAL DAILY
Qty: 90 TABLET | Refills: 3 | Status: SHIPPED | OUTPATIENT
Start: 2023-08-16

## 2023-08-21 NOTE — PROGRESS NOTES
Left VM with callback number, please call PAT nurse for assessment, surgery instructions and to arrange for pretesting labs

## 2023-08-22 ENCOUNTER — PROCEDURE VISIT (OUTPATIENT)
Dept: CARDIOLOGY CLINIC | Age: 63
End: 2023-08-22

## 2023-08-22 DIAGNOSIS — Z95.0 CARDIAC PACEMAKER IN SITU: Primary | ICD-10-CM

## 2023-08-22 DIAGNOSIS — I49.5 SINUS NODE DYSFUNCTION (HCC): ICD-10-CM

## 2023-08-22 DIAGNOSIS — I44.0 AV BLOCK, 1ST DEGREE: ICD-10-CM

## 2023-08-22 NOTE — PROGRESS NOTES
Contacted patient for PAT assessment, he requests that PAT nurse contact his wife to review home medications and share surgery instructions, left VM with callback number on spouse phone, patient also states due to his work schedule he is unable to come in for pretesting labs, orders entered for [employer]

## 2023-08-25 ENCOUNTER — ANESTHESIA EVENT (OUTPATIENT)
Dept: OPERATING ROOM | Age: 63
End: 2023-08-25
Payer: MEDICAID

## 2023-08-25 NOTE — PROGRESS NOTES
8/25/23 - Notified patient surgery @ [facility] on  8/28/23 @ 1415, arrival [phone]. NPO status and morning medications reviewed. Understanding verbalized.

## 2023-08-28 ENCOUNTER — ANESTHESIA (OUTPATIENT)
Dept: OPERATING ROOM | Age: 63
End: 2023-08-28
Payer: MEDICAID

## 2023-08-28 ENCOUNTER — APPOINTMENT (OUTPATIENT)
Dept: GENERAL RADIOLOGY | Age: 63
End: 2023-08-28
Attending: SPECIALIST
Payer: MEDICAID

## 2023-08-28 ENCOUNTER — HOSPITAL ENCOUNTER (OUTPATIENT)
Age: 63
Setting detail: OUTPATIENT SURGERY
Discharge: HOME | End: 2023-08-28
Attending: SPECIALIST | Admitting: SPECIALIST
Payer: MEDICAID

## 2023-08-28 VITALS
TEMPERATURE: 97 F | RESPIRATION RATE: 15 BRPM | DIASTOLIC BLOOD PRESSURE: 100 MMHG | HEIGHT: 71 IN | SYSTOLIC BLOOD PRESSURE: 140 MMHG | WEIGHT: 285 LBS | BODY MASS INDEX: 39.9 KG/M2 | OXYGEN SATURATION: 93 % | HEART RATE: 70 BPM

## 2023-08-28 DIAGNOSIS — G89.18 POST-OPERATIVE PAIN: Primary | ICD-10-CM

## 2023-08-28 LAB
ANION GAP SERPL CALCULATED.3IONS-SCNC: 11 MMOL/L (ref 4–16)
BASOPHILS ABSOLUTE: 0 K/CU MM
BASOPHILS RELATIVE PERCENT: 0.4 % (ref 0–1)
BUN SERPL-MCNC: 13 MG/DL (ref 6–23)
CALCIUM SERPL-MCNC: 9.2 MG/DL (ref 8.3–10.6)
CHLORIDE BLD-SCNC: 104 MMOL/L (ref 99–110)
CO2: 23 MMOL/L (ref 21–32)
CREAT SERPL-MCNC: 1.1 MG/DL (ref 0.9–1.3)
DIFFERENTIAL TYPE: ABNORMAL
EOSINOPHILS ABSOLUTE: 0.3 K/CU MM
EOSINOPHILS RELATIVE PERCENT: 4.1 % (ref 0–3)
GFR SERPL CREATININE-BSD FRML MDRD: >60 ML/MIN/1.73M2
GLUCOSE BLD-MCNC: 155 MG/DL (ref 70–99)
GLUCOSE BLD-MCNC: 191 MG/DL (ref 70–99)
GLUCOSE SERPL-MCNC: 185 MG/DL (ref 70–99)
HCT VFR BLD CALC: 40.2 % (ref 42–52)
HEMOGLOBIN: 13.1 GM/DL (ref 13.5–18)
IMMATURE NEUTROPHIL %: 0.4 % (ref 0–0.43)
LYMPHOCYTES ABSOLUTE: 1.5 K/CU MM
LYMPHOCYTES RELATIVE PERCENT: 21.4 % (ref 24–44)
MCH RBC QN AUTO: 30.2 PG (ref 27–31)
MCHC RBC AUTO-ENTMCNC: 32.6 % (ref 32–36)
MCV RBC AUTO: 92.6 FL (ref 78–100)
MONOCYTES ABSOLUTE: 0.7 K/CU MM
MONOCYTES RELATIVE PERCENT: 9.7 % (ref 0–4)
NUCLEATED RBC %: 0 %
PDW BLD-RTO: 13.3 % (ref 11.7–14.9)
PLATELET # BLD: 219 K/CU MM (ref 140–440)
PMV BLD AUTO: 9.4 FL (ref 7.5–11.1)
POTASSIUM SERPL-SCNC: 4.3 MMOL/L (ref 3.5–5.1)
RBC # BLD: 4.34 M/CU MM (ref 4.6–6.2)
SEGMENTED NEUTROPHILS ABSOLUTE COUNT: 4.6 K/CU MM
SEGMENTED NEUTROPHILS RELATIVE PERCENT: 64 % (ref 36–66)
SODIUM BLD-SCNC: 138 MMOL/L (ref 135–145)
TOTAL IMMATURE NEUTOROPHIL: 0.03 K/CU MM
TOTAL NUCLEATED RBC: 0 K/CU MM
WBC # BLD: 7.1 K/CU MM (ref 4–10.5)

## 2023-08-28 PROCEDURE — 80048 BASIC METABOLIC PNL TOTAL CA: CPT

## 2023-08-28 PROCEDURE — 7100000001 HC PACU RECOVERY - ADDTL 15 MIN: Performed by: SPECIALIST

## 2023-08-28 PROCEDURE — 6370000000 HC RX 637 (ALT 250 FOR IP): Performed by: ANESTHESIOLOGY

## 2023-08-28 PROCEDURE — 6360000002 HC RX W HCPCS: Performed by: ANESTHESIOLOGY

## 2023-08-28 PROCEDURE — 6360000002 HC RX W HCPCS: Performed by: NURSE ANESTHETIST, CERTIFIED REGISTERED

## 2023-08-28 PROCEDURE — 6360000002 HC RX W HCPCS: Performed by: SPECIALIST

## 2023-08-28 PROCEDURE — 3700000001 HC ADD 15 MINUTES (ANESTHESIA): Performed by: SPECIALIST

## 2023-08-28 PROCEDURE — 76000 FLUOROSCOPY <1 HR PHYS/QHP: CPT

## 2023-08-28 PROCEDURE — 82962 GLUCOSE BLOOD TEST: CPT

## 2023-08-28 PROCEDURE — 3600000013 HC SURGERY LEVEL 3 ADDTL 15MIN: Performed by: SPECIALIST

## 2023-08-28 PROCEDURE — 3700000000 HC ANESTHESIA ATTENDED CARE: Performed by: SPECIALIST

## 2023-08-28 PROCEDURE — C1758 CATHETER, URETERAL: HCPCS | Performed by: SPECIALIST

## 2023-08-28 PROCEDURE — 2580000003 HC RX 258: Performed by: SPECIALIST

## 2023-08-28 PROCEDURE — 7100000011 HC PHASE II RECOVERY - ADDTL 15 MIN: Performed by: SPECIALIST

## 2023-08-28 PROCEDURE — 2709999900 HC NON-CHARGEABLE SUPPLY: Performed by: SPECIALIST

## 2023-08-28 PROCEDURE — 7100000000 HC PACU RECOVERY - FIRST 15 MIN: Performed by: SPECIALIST

## 2023-08-28 PROCEDURE — 3600000003 HC SURGERY LEVEL 3 BASE: Performed by: SPECIALIST

## 2023-08-28 PROCEDURE — C1769 GUIDE WIRE: HCPCS | Performed by: SPECIALIST

## 2023-08-28 PROCEDURE — 7100000010 HC PHASE II RECOVERY - FIRST 15 MIN: Performed by: SPECIALIST

## 2023-08-28 PROCEDURE — 85025 COMPLETE CBC W/AUTO DIFF WBC: CPT

## 2023-08-28 PROCEDURE — 2720000010 HC SURG SUPPLY STERILE: Performed by: SPECIALIST

## 2023-08-28 RX ORDER — OXYCODONE HYDROCHLORIDE AND ACETAMINOPHEN 5; 325 MG/1; MG/1
1 TABLET ORAL ONCE
Status: COMPLETED | OUTPATIENT
Start: 2023-08-28 | End: 2023-08-28

## 2023-08-28 RX ORDER — AMOXICILLIN AND CLAVULANATE POTASSIUM 875; 125 MG/1; MG/1
1 TABLET, FILM COATED ORAL 2 TIMES DAILY
Qty: 10 TABLET | Refills: 0 | Status: SHIPPED | OUTPATIENT
Start: 2023-08-28 | End: 2023-09-02

## 2023-08-28 RX ORDER — LIDOCAINE HYDROCHLORIDE 20 MG/ML
INJECTION, SOLUTION INTRAVENOUS PRN
Status: DISCONTINUED | OUTPATIENT
Start: 2023-08-28 | End: 2023-08-28 | Stop reason: SDUPTHER

## 2023-08-28 RX ORDER — PROCHLORPERAZINE EDISYLATE 5 MG/ML
5 INJECTION INTRAMUSCULAR; INTRAVENOUS
Status: DISCONTINUED | OUTPATIENT
Start: 2023-08-28 | End: 2023-08-28 | Stop reason: HOSPADM

## 2023-08-28 RX ORDER — ONDANSETRON 2 MG/ML
4 INJECTION INTRAMUSCULAR; INTRAVENOUS
Status: DISCONTINUED | OUTPATIENT
Start: 2023-08-28 | End: 2023-08-28 | Stop reason: HOSPADM

## 2023-08-28 RX ORDER — LABETALOL HYDROCHLORIDE 5 MG/ML
10 INJECTION, SOLUTION INTRAVENOUS
Status: DISCONTINUED | OUTPATIENT
Start: 2023-08-28 | End: 2023-08-28 | Stop reason: HOSPADM

## 2023-08-28 RX ORDER — FENTANYL CITRATE 50 UG/ML
25 INJECTION, SOLUTION INTRAMUSCULAR; INTRAVENOUS EVERY 5 MIN PRN
Status: DISCONTINUED | OUTPATIENT
Start: 2023-08-28 | End: 2023-08-28 | Stop reason: HOSPADM

## 2023-08-28 RX ORDER — LABETALOL HYDROCHLORIDE 5 MG/ML
10 INJECTION, SOLUTION INTRAVENOUS
Status: DISCONTINUED | OUTPATIENT
Start: 2023-08-28 | End: 2023-08-28 | Stop reason: SDUPTHER

## 2023-08-28 RX ORDER — DEXAMETHASONE SODIUM PHOSPHATE 4 MG/ML
INJECTION, SOLUTION INTRA-ARTICULAR; INTRALESIONAL; INTRAMUSCULAR; INTRAVENOUS; SOFT TISSUE PRN
Status: DISCONTINUED | OUTPATIENT
Start: 2023-08-28 | End: 2023-08-28 | Stop reason: SDUPTHER

## 2023-08-28 RX ORDER — FENTANYL CITRATE 50 UG/ML
INJECTION, SOLUTION INTRAMUSCULAR; INTRAVENOUS PRN
Status: DISCONTINUED | OUTPATIENT
Start: 2023-08-28 | End: 2023-08-28 | Stop reason: SDUPTHER

## 2023-08-28 RX ORDER — FENTANYL CITRATE 50 UG/ML
50 INJECTION, SOLUTION INTRAMUSCULAR; INTRAVENOUS EVERY 5 MIN PRN
Status: DISCONTINUED | OUTPATIENT
Start: 2023-08-28 | End: 2023-08-28 | Stop reason: HOSPADM

## 2023-08-28 RX ORDER — PHENYLEPHRINE HCL IN 0.9% NACL 1 MG/10 ML
SYRINGE (ML) INTRAVENOUS PRN
Status: DISCONTINUED | OUTPATIENT
Start: 2023-08-28 | End: 2023-08-28 | Stop reason: SDUPTHER

## 2023-08-28 RX ORDER — SODIUM CHLORIDE 9 MG/ML
INJECTION, SOLUTION INTRAVENOUS PRN
Status: DISCONTINUED | OUTPATIENT
Start: 2023-08-28 | End: 2023-08-28 | Stop reason: SDUPTHER

## 2023-08-28 RX ORDER — SODIUM CHLORIDE 0.9 % (FLUSH) 0.9 %
5-40 SYRINGE (ML) INJECTION PRN
Status: DISCONTINUED | OUTPATIENT
Start: 2023-08-28 | End: 2023-08-28 | Stop reason: HOSPADM

## 2023-08-28 RX ORDER — SODIUM CHLORIDE, SODIUM LACTATE, POTASSIUM CHLORIDE, CALCIUM CHLORIDE 600; 310; 30; 20 MG/100ML; MG/100ML; MG/100ML; MG/100ML
INJECTION, SOLUTION INTRAVENOUS CONTINUOUS
Status: DISCONTINUED | OUTPATIENT
Start: 2023-08-28 | End: 2023-08-28 | Stop reason: HOSPADM

## 2023-08-28 RX ORDER — HYDRALAZINE HYDROCHLORIDE 20 MG/ML
10 INJECTION INTRAMUSCULAR; INTRAVENOUS
Status: DISCONTINUED | OUTPATIENT
Start: 2023-08-28 | End: 2023-08-28 | Stop reason: HOSPADM

## 2023-08-28 RX ORDER — FENTANYL CITRATE 50 UG/ML
50 INJECTION, SOLUTION INTRAMUSCULAR; INTRAVENOUS EVERY 5 MIN PRN
Status: DISCONTINUED | OUTPATIENT
Start: 2023-08-28 | End: 2023-08-28 | Stop reason: SDUPTHER

## 2023-08-28 RX ORDER — FENTANYL CITRATE 50 UG/ML
25 INJECTION, SOLUTION INTRAMUSCULAR; INTRAVENOUS EVERY 5 MIN PRN
Status: DISCONTINUED | OUTPATIENT
Start: 2023-08-28 | End: 2023-08-28 | Stop reason: SDUPTHER

## 2023-08-28 RX ORDER — OXYCODONE HYDROCHLORIDE AND ACETAMINOPHEN 5; 325 MG/1; MG/1
1 TABLET ORAL EVERY 6 HOURS PRN
Qty: 12 TABLET | Refills: 0 | Status: SHIPPED | OUTPATIENT
Start: 2023-08-28 | End: 2023-08-31

## 2023-08-28 RX ORDER — HYDRALAZINE HYDROCHLORIDE 20 MG/ML
10 INJECTION INTRAMUSCULAR; INTRAVENOUS
Status: DISCONTINUED | OUTPATIENT
Start: 2023-08-28 | End: 2023-08-28 | Stop reason: SDUPTHER

## 2023-08-28 RX ORDER — SODIUM CHLORIDE 9 MG/ML
INJECTION, SOLUTION INTRAVENOUS PRN
Status: DISCONTINUED | OUTPATIENT
Start: 2023-08-28 | End: 2023-08-28 | Stop reason: HOSPADM

## 2023-08-28 RX ORDER — SODIUM CHLORIDE 0.9 % (FLUSH) 0.9 %
5-40 SYRINGE (ML) INJECTION EVERY 12 HOURS SCHEDULED
Status: DISCONTINUED | OUTPATIENT
Start: 2023-08-28 | End: 2023-08-28 | Stop reason: HOSPADM

## 2023-08-28 RX ORDER — MEPERIDINE HYDROCHLORIDE 25 MG/ML
12.5 INJECTION INTRAMUSCULAR; INTRAVENOUS; SUBCUTANEOUS EVERY 5 MIN PRN
Status: DISCONTINUED | OUTPATIENT
Start: 2023-08-28 | End: 2023-08-28 | Stop reason: HOSPADM

## 2023-08-28 RX ORDER — PROPOFOL 10 MG/ML
INJECTION, EMULSION INTRAVENOUS PRN
Status: DISCONTINUED | OUTPATIENT
Start: 2023-08-28 | End: 2023-08-28 | Stop reason: SDUPTHER

## 2023-08-28 RX ORDER — DIPHENHYDRAMINE HYDROCHLORIDE 50 MG/ML
12.5 INJECTION INTRAMUSCULAR; INTRAVENOUS
Status: DISCONTINUED | OUTPATIENT
Start: 2023-08-28 | End: 2023-08-28 | Stop reason: HOSPADM

## 2023-08-28 RX ADMIN — Medication 100 MCG: at 15:11

## 2023-08-28 RX ADMIN — PROPOFOL 150 MG: 10 INJECTION, EMULSION INTRAVENOUS at 14:38

## 2023-08-28 RX ADMIN — Medication 100 MCG: at 15:02

## 2023-08-28 RX ADMIN — Medication 3000 MG: at 14:34

## 2023-08-28 RX ADMIN — OXYCODONE AND ACETAMINOPHEN 1 TABLET: 5; 325 TABLET ORAL at 16:31

## 2023-08-28 RX ADMIN — LIDOCAINE HYDROCHLORIDE 50 MG: 20 INJECTION, SOLUTION INTRAVENOUS at 14:37

## 2023-08-28 RX ADMIN — FENTANYL CITRATE 50 MCG: 50 INJECTION, SOLUTION INTRAMUSCULAR; INTRAVENOUS at 14:38

## 2023-08-28 RX ADMIN — FENTANYL CITRATE 50 MCG: 50 INJECTION, SOLUTION INTRAMUSCULAR; INTRAVENOUS at 14:40

## 2023-08-28 RX ADMIN — Medication 100 MCG: at 15:18

## 2023-08-28 RX ADMIN — FENTANYL CITRATE 50 MCG: 50 INJECTION, SOLUTION INTRAMUSCULAR; INTRAVENOUS at 15:43

## 2023-08-28 RX ADMIN — DEXAMETHASONE SODIUM PHOSPHATE 8 MG: 4 INJECTION, SOLUTION INTRAMUSCULAR; INTRAVENOUS at 14:34

## 2023-08-28 RX ADMIN — SODIUM CHLORIDE, POTASSIUM CHLORIDE, SODIUM LACTATE AND CALCIUM CHLORIDE: 600; 310; 30; 20 INJECTION, SOLUTION INTRAVENOUS at 13:16

## 2023-08-28 RX ADMIN — Medication 100 MCG: at 14:55

## 2023-08-28 ASSESSMENT — PAIN SCALES - GENERAL
PAINLEVEL_OUTOF10: 8
PAINLEVEL_OUTOF10: 9
PAINLEVEL_OUTOF10: 7
PAINLEVEL_OUTOF10: 9

## 2023-08-28 ASSESSMENT — PAIN DESCRIPTION - FREQUENCY
FREQUENCY: CONTINUOUS

## 2023-08-28 ASSESSMENT — ENCOUNTER SYMPTOMS: SHORTNESS OF BREATH: 1

## 2023-08-28 ASSESSMENT — PAIN DESCRIPTION - ORIENTATION
ORIENTATION: LOWER

## 2023-08-28 ASSESSMENT — PAIN DESCRIPTION - DESCRIPTORS
DESCRIPTORS: PRESSURE

## 2023-08-28 ASSESSMENT — COPD QUESTIONNAIRES: CAT_SEVERITY: MODERATE

## 2023-08-28 ASSESSMENT — PAIN - FUNCTIONAL ASSESSMENT
PAIN_FUNCTIONAL_ASSESSMENT: PREVENTS OR INTERFERES SOME ACTIVE ACTIVITIES AND ADLS
PAIN_FUNCTIONAL_ASSESSMENT: 0-10

## 2023-08-28 ASSESSMENT — PAIN DESCRIPTION - LOCATION
LOCATION: ABDOMEN

## 2023-08-28 ASSESSMENT — PAIN DESCRIPTION - ONSET
ONSET: ON-GOING

## 2023-08-28 ASSESSMENT — PAIN DESCRIPTION - PAIN TYPE
TYPE: SURGICAL PAIN

## 2023-08-28 NOTE — BRIEF OP NOTE
Brief Postoperative Note      Patient: Antoine Castillo  YOB: 1960  MRN: [id number]    Date of Procedure: 8/28/2023    Pre-Op Diagnosis Codes:     * Calculus of kidney [N20.0]    Post-Op Diagnosis: Same       Procedure(s):  RIGHT CYSTOSCOPY URETEROSCOPY  RETROGRADE PYELOGRAM STONE MANIPULATION HOLIIUM  LASER LITHOTRIPSY POSS STENT PLACEMENT  CYSTOSCOPY RETROGRADE PYELOGRAM    Surgeon(s):  Mary Quijano MD    Assistant:  * No surgical staff found *    Anesthesia: General    Estimated Blood Loss (mL): Minimal    Complications: None    Specimens:   * No specimens in log *    Implants:  * No implants in log *      Drains:   Ileostomy/Jejunostomy RLQ Ileostomy (Active)       Findings: 8mm right prox stone      Electronically signed by Mary Quijano MD on 8/28/2023 at 3:21 PM

## 2023-08-28 NOTE — PROGRESS NOTES
1213- Patient arrived back to [facility]. Report given to this nurse from  [address] [place] [address] [facility] pain/ pressure 9/10 in lower abdomen patient repositioned. Beverage of choice offered to patient. Call light in reach and bed in lowest position, spouse at bedside. 1645- Call to Dr. Quinten Quinones new orders received. 1630- medication given per order. 1640- IV removed per patients request.  5921- Patient states pain is now 7/10. Patient voided 150 ml  bloody/clear urine in urinal. Discharge instructions given to patient and his spouse both verbalized understanding. Patient sitting on side of bed getting dressed assisted by spouse. 1710-Patient escorted to car via wheelchair transported home by spouse.

## 2023-08-28 NOTE — DISCHARGE INSTRUCTIONS
[facility]  [phone]    Do not drive, work around [address] or use equipment. Do not drink any alcoholic beverages. Do not smoke while alone. Avoid making important decisions. Plan to spend a quiet, relaxed evening @ home. Resume normal activities as you begin to feel better. Eat lightly for your first meal, then gradually increase your diet to what is normal for you. In case of nausea, avoid food and drink only clear liquids. Resume food as nausea ceases. Notify your surgeon if you experience fever, chills, large amount of bleeding, difficulty breathing, persistent nausea and vomiting or any other disturbing problem. Call for a follow-up appointment with your surgeon.

## 2023-08-28 NOTE — PROGRESS NOTES
1530: Arrived to PACU from OR. Monitors applied, alarms on. Report obtained from [facility] and [address] [place] [address]. 1543: Urinal given per request, ,medicated for pressure feeling like he has to urinate. 1600: Dozing. 1610: Unable to urinate at this time. Denies need for further pain at this time. Transported to [facility]. Wife at bedside.

## 2023-08-29 NOTE — ANESTHESIA POSTPROCEDURE EVALUATION
Department of Anesthesiology  Postprocedure Note    Patient: Chilo Yu  MRN: [id number]  YOB: 1960  Date of evaluation: 8/29/2023      Procedure Summary     Date: 08/28/23 Room / Location: [address] [place] [address]    Anesthesia Start: 1434 Anesthesia Stop: 0061    Procedures:       RIGHT CYSTOSCOPY URETEROSCOPY  [address] LITHOTRIPSY POSS STENT PLACEMENT (Right: Ureter)      CYSTOSCOPY RETROGRADE PYELOGRAM (Ureter) Diagnosis:       Calculus of kidney      (Calculus of kidney [N20.0])    Surgeons: Karen Hutchinson MD Responsible Provider: Jessica Blue MD    Anesthesia Type: general ASA Status: 4          Anesthesia Type: No value filed.     Miguel Phase I: Miguel Score: 9    Miguel Phase II: Miguel Score: 10      Anesthesia Post Evaluation    Patient location during evaluation: PACU  Patient participation: complete - patient participated  Level of consciousness: awake and alert  Pain score: 0  Airway patency: patent  Nausea & Vomiting: no nausea and no vomiting  Complications: no  Cardiovascular status: blood pressure returned to baseline  Respiratory status: acceptable  Hydration status: euvolemic  Pain management: adequate

## 2023-08-29 NOTE — OP NOTE
[address], [address] [place] [address]                                OPERATIVE REPORT    PATIENT NAME: Jogre Suarez                     :        1960  MED REC NO:   [id number]                          ROOM:  ACCOUNT NO:   [de-identified]                           ADMIT DATE: 2023  PROVIDER:     Tanika Brenner MD    DATE OF PROCEDURE:  2023    PREOPERATIVE DIAGNOSIS:  Right ureteral calculus. POSTOPERATIVE DIAGNOSIS:  Right ureteral calculus. OPERATION PERFORMED:  Cystoscopy, right ureter stent removal, right  ureteroscopy, laser lithotripsy. SURGEON:  Tanika Brenner MD    ANESTHESIA:  General anesthesia. ESTIMATED BLOOD LOSS:  None. COMPILATIONS:  None. BRIEF HISTORY:  A 50-year-old gentleman recently this year underwent a  WATCHMAN procedure, had a stent placed secondary to 8 mm right proximal  stone due to the anticoagulant. We had hold the procedure. He has had  several UTIs, trying to reschedule. I did physically speak with his  cardiologist and electrophysiologist, Dr. Roe Amador about the holding the  Plavix for his recent procedure whether waiting six months of proceeding  ahead and he was agreeable to proceeding ahead per his recommendations. The patient is here now for ureteroscopy stone manipulation. DESCRIPTION OF THE PROCEDURE:  The patient was identified in the holding  area, taken to procedure room, and placed in the dorsal lithotomy  position. The patient's genital region was prepped and draped in the  usual sterile fashion. A 21-Wolof cystoscope sheath was introduced per  urethra under direct visualization. At the beginning of the case, I did  place a sensor wire up and gets a stent all the way up into the kidney. There was _____ (1:15) at the distal end of the stent. This was grasped  up with a flexible grasper and the stent was removed intact without  problems.   I did place a second guidewire up the right ureter, a  navigator ureteral access sheath into the right distal ureter and one in  the working wires. A LithoVue flexible uteroscope was then passed under  direct elevation all the way to the proximal ureter with stone could be  seen, the 8 mm stone, actually two stones are 8 and 4. A 365-micron  holmium laser fiber setting of 0.8 Joule at a rate of 20, stones were  fragmented into multiple small passable fragments. I did go up all the  way up into the kidney. There were some encrustations, calcifications  there, which were broken up in the upper pole calyx into dust.  At this  point, with elect to not to replace the stent. Ureteroscope access  sheath, safety wire removed. The patient taken to recovery room and  tolerated the procedure well. DISCHARGE SUMMARY:  The patient was discharged from same-day surgery in  stable condition. We will see him back in the office in the next couple  weeks post resuscitation.         Kane Lee MD    D: 08/28/2023 15:26:01       T: 08/28/2023 15:30:51     [email or web address]  Job#: [id number]     Doc#: [id number]    CC:

## 2023-09-26 DIAGNOSIS — K29.90 GASTRITIS AND DUODENITIS: ICD-10-CM

## 2023-09-26 RX ORDER — PANTOPRAZOLE SODIUM 40 MG/1
40 TABLET, DELAYED RELEASE ORAL 2 TIMES DAILY
Qty: 180 TABLET | Refills: 1 | Status: SHIPPED | OUTPATIENT
Start: 2023-09-26

## 2023-09-27 ENCOUNTER — TELEPHONE (OUTPATIENT)
Dept: CARDIOLOGY CLINIC | Age: 63
End: 2023-09-27

## 2023-09-27 NOTE — TELEPHONE ENCOUNTER
Wife called there power was shut off today   [place] is sending a medical certificate he has a pace maker , if they can get today by 3 can turn there power back on

## 2023-10-02 DIAGNOSIS — M54.42 ACUTE LEFT-SIDED LOW BACK PAIN WITH LEFT-SIDED SCIATICA: ICD-10-CM

## 2023-10-02 RX ORDER — TIZANIDINE 2 MG/1
TABLET ORAL
Qty: 60 TABLET | Refills: 1 | Status: SHIPPED | OUTPATIENT
Start: 2023-10-02

## 2023-10-17 ENCOUNTER — OFFICE VISIT (OUTPATIENT)
Dept: INTERNAL MEDICINE CLINIC | Age: 63
End: 2023-10-17
Payer: MEDICAID

## 2023-10-17 VITALS
HEIGHT: 71 IN | BODY MASS INDEX: 38.19 KG/M2 | SYSTOLIC BLOOD PRESSURE: 116 MMHG | DIASTOLIC BLOOD PRESSURE: 80 MMHG | OXYGEN SATURATION: 93 % | RESPIRATION RATE: 18 BRPM | WEIGHT: 272.8 LBS | HEART RATE: 61 BPM

## 2023-10-17 DIAGNOSIS — J42 CHRONIC BRONCHITIS, UNSPECIFIED CHRONIC BRONCHITIS TYPE (HCC): ICD-10-CM

## 2023-10-17 DIAGNOSIS — E11.9 TYPE 2 DIABETES MELLITUS WITHOUT COMPLICATION, WITHOUT LONG-TERM CURRENT USE OF INSULIN (HCC): Primary | ICD-10-CM

## 2023-10-17 DIAGNOSIS — E11.41 DIABETIC MONONEUROPATHY ASSOCIATED WITH TYPE 2 DIABETES MELLITUS (HCC): ICD-10-CM

## 2023-10-17 DIAGNOSIS — I48.20 ATRIAL FIBRILLATION, CHRONIC (HCC): ICD-10-CM

## 2023-10-17 DIAGNOSIS — G70.00 MYASTHENIA GRAVIS (HCC): ICD-10-CM

## 2023-10-17 PROBLEM — E11.40 DIABETIC NEUROPATHY ASSOCIATED WITH TYPE 2 DIABETES MELLITUS (HCC): Status: ACTIVE | Noted: 2023-10-17

## 2023-10-17 PROCEDURE — G8427 DOCREV CUR MEDS BY ELIG CLIN: HCPCS | Performed by: INTERNAL MEDICINE

## 2023-10-17 PROCEDURE — 3023F SPIROM DOC REV: CPT | Performed by: INTERNAL MEDICINE

## 2023-10-17 PROCEDURE — 2022F DILAT RTA XM EVC RTNOPTHY: CPT | Performed by: INTERNAL MEDICINE

## 2023-10-17 PROCEDURE — G8484 FLU IMMUNIZE NO ADMIN: HCPCS | Performed by: INTERNAL MEDICINE

## 2023-10-17 PROCEDURE — 99214 OFFICE O/P EST MOD 30 MIN: CPT | Performed by: INTERNAL MEDICINE

## 2023-10-17 PROCEDURE — 3017F COLORECTAL CA SCREEN DOC REV: CPT | Performed by: INTERNAL MEDICINE

## 2023-10-17 PROCEDURE — 1036F TOBACCO NON-USER: CPT | Performed by: INTERNAL MEDICINE

## 2023-10-17 PROCEDURE — 3046F HEMOGLOBIN A1C LEVEL >9.0%: CPT | Performed by: INTERNAL MEDICINE

## 2023-10-17 PROCEDURE — G8417 CALC BMI ABV UP PARAM F/U: HCPCS | Performed by: INTERNAL MEDICINE

## 2023-10-17 NOTE — PROGRESS NOTES
for neuropathy)      Pulses: normal         Neurological:      Mental Status: He is alert. Psychiatric:         Mood and Affect: Mood normal.         ASSESSMENT:    1. Type 2 diabetes mellitus without complication, without long-term current use of insulin ([address])    2. Atrial fibrillation, chronic ([address])    3. Myasthenia gravis ([address])    4. Chronic bronchitis, unspecified chronic bronchitis type ([address])    5. Diabetic mononeuropathy associated with type 2 diabetes mellitus ([address])        PLAN:    Yoselyn Carrillo was seen today for 6 month follow-up. Diagnoses and all orders for this visit:    Type 2 diabetes mellitus without complication, without long-term current use of insulin ([address])- we'll f/u lab, hopefully a1c is better, aiming for low 7s. Check lab,   -     Comprehensive Metabolic Panel; Future  -     Lipid Panel; Future  -     Microalbumin / Creatinine Urine Ratio; Future  -      DIABETES FOOT EXAM  -     Hemoglobin A1C; Future    Atrial fibrillation, chronic (HCC) - Pt clinically w/o evidence of cardiovascular instability, cont regimen. SOME FATIGUE NOTED WHICH HE ATTRIBUTES TO AFIB, HE'LL D/W CARDIOLOGY IF ANY FURTHER TREATMENT OPTIONS  -     TSH; Future  -     CBC; Future  -     Magnesium; Future    Myasthenia gravis ([address])- STABLE AND SEEING DR Vita Vázquez NEXT WEEK    Chronic bronchitis, unspecified chronic bronchitis type ([address]) - COPD stable on current regiment of inhalers/meds.       Diabetic mononeuropathy associated with type 2 diabetes mellitus ([address])- NOTED ON EXAM, CONT MONITOR, SCR B12 TOO  -     Vitamin B12; Future

## 2023-10-20 ENCOUNTER — OFFICE VISIT (OUTPATIENT)
Dept: CARDIOLOGY CLINIC | Age: 63
End: 2023-10-20

## 2023-10-20 VITALS
BODY MASS INDEX: 38.3 KG/M2 | HEART RATE: 74 BPM | DIASTOLIC BLOOD PRESSURE: 64 MMHG | WEIGHT: 273.6 LBS | HEIGHT: 71 IN | OXYGEN SATURATION: 95 % | SYSTOLIC BLOOD PRESSURE: 90 MMHG

## 2023-10-20 DIAGNOSIS — I48.19 HYPERCOAGULABLE STATE DUE TO PERSISTENT ATRIAL FIBRILLATION (HCC): ICD-10-CM

## 2023-10-20 DIAGNOSIS — Z79.899 ENCOUNTER FOR MONITORING SOTALOL THERAPY: ICD-10-CM

## 2023-10-20 DIAGNOSIS — D68.69 HYPERCOAGULABLE STATE DUE TO PERSISTENT ATRIAL FIBRILLATION (HCC): ICD-10-CM

## 2023-10-20 DIAGNOSIS — I25.10 CORONARY ARTERY DISEASE INVOLVING NATIVE CORONARY ARTERY OF NATIVE HEART WITHOUT ANGINA PECTORIS: ICD-10-CM

## 2023-10-20 DIAGNOSIS — Z98.890 S/P ABLATION OF ATRIAL FIBRILLATION: ICD-10-CM

## 2023-10-20 DIAGNOSIS — Z95.818 PRESENCE OF WATCHMAN LEFT ATRIAL APPENDAGE CLOSURE DEVICE: Primary | ICD-10-CM

## 2023-10-20 DIAGNOSIS — Z86.79 S/P ABLATION OF ATRIAL FIBRILLATION: ICD-10-CM

## 2023-10-20 DIAGNOSIS — Z51.81 ENCOUNTER FOR MONITORING SOTALOL THERAPY: ICD-10-CM

## 2023-10-20 RX ORDER — PREDNISONE 1 MG/1
TABLET ORAL
COMMUNITY
Start: 2023-10-02

## 2023-10-20 RX ORDER — PREDNISONE 10 MG/1
TABLET ORAL
COMMUNITY
Start: 2023-10-06

## 2023-10-20 RX ORDER — MAGNESIUM OXIDE 400 MG/1
1 TABLET ORAL DAILY
Qty: 30 TABLET | Refills: 3 | Status: SHIPPED | OUTPATIENT
Start: 2023-10-20

## 2023-10-20 RX ORDER — METOPROLOL SUCCINATE 25 MG/1
25 TABLET, EXTENDED RELEASE ORAL NIGHTLY
Qty: 30 TABLET | Refills: 3 | Status: SHIPPED | OUTPATIENT
Start: 2023-10-20

## 2023-10-20 ASSESSMENT — ENCOUNTER SYMPTOMS
COUGH: 0
PHOTOPHOBIA: 0
CHEST TIGHTNESS: 0
SHORTNESS OF BREATH: 1
ABDOMINAL DISTENTION: 0
SINUS PAIN: 0
BACK PAIN: 0
ABDOMINAL PAIN: 0
WHEEZING: 0
BLOOD IN STOOL: 0
SINUS PRESSURE: 0
COLOR CHANGE: 0

## 2023-10-20 NOTE — PROGRESS NOTES
Electrophysiology Follow up  Note      Reason for consultation:  Atrial fibrillation    Chief complaint:  here for follow up on watchman    Referring physician: Kaur Rodriguez      Primary care physician: Washington Leo MD      History of Present Illness: This visit 10/20/2023  Patient is here for 6 month follow  up s/p watchman. He reports that he is having shortness of breath with exertion. He states that he was told he was in atrial fibrillation when he had his blood pressure and heart rate checked at the urology office. He reports that he is having fatigue. He denies chest pain, palpitations, lightheadedness, dizziness, edema or syncope. Previous visit 5/24/2023  Patient is here today for follow-up on Watchman device. Patient was recently hospitalized with hematuria. Patient had ureteral stent placed as he had several kidney stones. Patient was stopped of his Plavix and aspirin for 1 day and it was resumed after the procedure. Patient has chronic shortness of breath and dizziness. Patient denies chest pain, palpitations, lightheadedness, near-syncope. Previous visit 3/8/2023    Patient has shortness of breath off and on and mostly with moderate exertion but he is over all doing well  Patient denies chest pain. Patient has dizziness with sudden movement and it is chronic  Patient does not drink or smoke  Recovered well from stroke  He is worried about blood thinner given his risk of bleeding  Patient here to discuss watchman procedure too       Previous visit: (5/6/2022)      Chief Complaint   Patient presents with    Follow-up     here to follow up for Afib w/RVR on device report. He states he does have some dizziness and SOB with exertion. Dizziness if he gets up to fast, no other cardiac complaints at this time. Patient did fall in March and fractured his Rt hip.      Atrial Fibrillation    Shortness of Breath           Previous visit:    Patient is

## 2023-10-25 DIAGNOSIS — M81.0 OSTEOPOROSIS, UNSPECIFIED OSTEOPOROSIS TYPE, UNSPECIFIED PATHOLOGICAL FRACTURE PRESENCE: Primary | ICD-10-CM

## 2023-10-25 DIAGNOSIS — G47.30 SLEEP APNEA, UNSPECIFIED TYPE: ICD-10-CM

## 2023-11-10 ENCOUNTER — TELEPHONE (OUTPATIENT)
Dept: CARDIOLOGY CLINIC | Age: 63
End: 2023-11-10

## 2023-11-10 NOTE — TELEPHONE ENCOUNTER
Called patients wife and advised that since patient has a pacemaker it's ok to take both medications. Patients wife stated understanding, no other c/o noted.

## 2023-11-10 NOTE — TELEPHONE ENCOUNTER
Pharmacist said pt don't usually take sotalol and metoprolol, please confirm if pt is to take these and let Tiffanie  know.

## 2023-11-20 ENCOUNTER — OFFICE VISIT (OUTPATIENT)
Dept: CARDIOLOGY CLINIC | Age: 63
End: 2023-11-20

## 2023-11-20 VITALS
OXYGEN SATURATION: 98 % | DIASTOLIC BLOOD PRESSURE: 76 MMHG | SYSTOLIC BLOOD PRESSURE: 124 MMHG | HEIGHT: 71 IN | HEART RATE: 69 BPM | WEIGHT: 276.4 LBS | BODY MASS INDEX: 38.69 KG/M2

## 2023-11-20 DIAGNOSIS — Z95.818 PRESENCE OF WATCHMAN LEFT ATRIAL APPENDAGE CLOSURE DEVICE: Primary | ICD-10-CM

## 2023-11-20 DIAGNOSIS — Z95.0 CARDIAC PACEMAKER IN SITU: ICD-10-CM

## 2023-11-20 DIAGNOSIS — Z51.81 ENCOUNTER FOR MONITORING SOTALOL THERAPY: ICD-10-CM

## 2023-11-20 DIAGNOSIS — I48.20 ATRIAL FIBRILLATION, CHRONIC (HCC): ICD-10-CM

## 2023-11-20 DIAGNOSIS — I48.0 PAF (PAROXYSMAL ATRIAL FIBRILLATION) (HCC): ICD-10-CM

## 2023-11-20 DIAGNOSIS — E66.01 CLASS 2 SEVERE OBESITY DUE TO EXCESS CALORIES WITH SERIOUS COMORBIDITY AND BODY MASS INDEX (BMI) OF 39.0 TO 39.9 IN ADULT (HCC): ICD-10-CM

## 2023-11-20 DIAGNOSIS — Z95.0 PACEMAKER: ICD-10-CM

## 2023-11-20 DIAGNOSIS — I49.5 SINUS NODE DYSFUNCTION (HCC): ICD-10-CM

## 2023-11-20 DIAGNOSIS — Z01.810 PREOP CARDIOVASCULAR EXAM: ICD-10-CM

## 2023-11-20 DIAGNOSIS — I25.10 CORONARY ARTERY DISEASE INVOLVING NATIVE CORONARY ARTERY OF NATIVE HEART WITHOUT ANGINA PECTORIS: ICD-10-CM

## 2023-11-20 DIAGNOSIS — D68.69 HYPERCOAGULABLE STATE DUE TO PERSISTENT ATRIAL FIBRILLATION (HCC): ICD-10-CM

## 2023-11-20 DIAGNOSIS — Z79.899 ENCOUNTER FOR MONITORING SOTALOL THERAPY: ICD-10-CM

## 2023-11-20 DIAGNOSIS — I48.19 HYPERCOAGULABLE STATE DUE TO PERSISTENT ATRIAL FIBRILLATION (HCC): ICD-10-CM

## 2023-11-20 NOTE — PATIENT INSTRUCTIONS
Please be informed that if you contact our office outside of normal business hours the physician on call cannot help with any scheduling or rescheduling issues, procedure instruction questions or any type of medication issue. We advise you for any urgent/emergency that you go to the nearest emergency room! PLEASE CALL OUR OFFICE DURING NORMAL BUSINESS HOURS    Monday - Friday   8 am to 5 pm    [place]: [address] [place]: [phone]    [place]:  [phone]    **It is YOUR responsibilty to bring medication bottles and/or updated medication list to [facility]. This will allow us to better serve you and all your healthcare needs**    Thank you for allowing us to care for you today! We want to ensure we can follow your treatment plan and we strive to give you the best outcomes and experience possible. If you ever have a life threatening emergency and call 911 - for an ambulance (EMS)   Our providers can only care for you at:   [facility] or [facility]. Even if you have someone take you or you drive yourself we can only care for you in a [facility] facility. Our providers are not setup at the other healthcare locations! [facility] Locations - No appointment necessary. Sites open Monday to Friday. Call your preferred location for test preparation, business   hours and other information you need. [employer] accepts [employer]. [address] [place] [address]. [address] [place], [facility]  Phone: [phone]       We are committed to providing you the best care possible. If you receive a survey after visiting one of our offices, please take time to share your experience concerning your physician office visit. These surveys are confidential and no health information about you is shared. We are eager to improve for you and we are counting on your feedback to help make that happen.

## 2023-11-20 NOTE — PROGRESS NOTES
Brittani Redman MD        OFFICE  FOLLOWUP NOTE    Chief complaints: patient is here for management of CAD, HTN, AFIB, DYSLPIDEMIA, myasthenia gravisCAD, HTN, AFIB, DYSLPIDEMIA, myasthenia gravis, stroke,  Subjective: patient feels better, no chest pain, no shortness of breath, no dizziness, no palpitations    HPI Whit Brewster is a 58 y. o.year old who  has a past medical history of Arthritis, Atrial fibrillation, chronic ([address]), Cerebrovascular accident [facility]), Closed displaced fracture of anterior column of right acetabulum ([address]), COPD (chronic obstructive pulmonary disease) ([address]), Coronary artery disease involving native coronary artery of native heart without angina pectoris, Diabetes ([address]), Diabetic neuropathy associated with type 2 diabetes mellitus ([address]), H/O echocardiogram, History of blood transfusion, Hx of blood clots, Hx of cardiovascular stress test, Hx of cardiovascular stress test, Hx of Doppler echocardiogram, Hx of Doppler ultrasound, Hypertension, Ileostomy in place ([address]), Myasthenia gravis ([address]), Nephrolithiasis, NSTEMI (non-ST elevated myocardial infarction) ([address]), Post PTCA, Presence of Watchman left atrial appendage closure device, S/P ablation of atrial fibrillation, TIA (transient ischemic attack), and Ulcerative colitis ([address]).  and presents for management of CAD, HTN, AFIB, DYSLPIDEMIA, myasthenia gravisCAD, HTN, AFIB, DYSLPIDEMIA, myasthenia gravis, stroke,, which are well controlled      Current Outpatient Medications   Medication Sig Dispense Refill    predniSONE (DELTASONE) 10 MG tablet TAKE ONE TABLET BY MOUTH EACH MORNING WITH THE 1MG PREDNISONE. TAKE WITH FOOD      predniSONE (DELTASONE) 1 MG tablet TAKE 2 TABLETS BY MOUTH EVERY DAY WITH THE 10 MG PREDNISONE TABLET TAKE WITH FOOD      magnesium oxide (MAG-OX) 400 MG tablet Take 1 tablet by mouth daily 30 tablet 3    metoprolol succinate (TOPROL XL) 25 MG extended release tablet Take 1 tablet by mouth at bedtime 30 tablet 3

## 2023-11-24 DIAGNOSIS — M54.42 ACUTE LEFT-SIDED LOW BACK PAIN WITH LEFT-SIDED SCIATICA: ICD-10-CM

## 2023-11-27 RX ORDER — TIZANIDINE 2 MG/1
TABLET ORAL
Qty: 60 TABLET | Refills: 1 | Status: SHIPPED | OUTPATIENT
Start: 2023-11-27

## 2023-11-28 ENCOUNTER — OFFICE VISIT (OUTPATIENT)
Dept: INTERNAL MEDICINE CLINIC | Age: 63
End: 2023-11-28
Payer: MEDICAID

## 2023-11-28 VITALS
SYSTOLIC BLOOD PRESSURE: 116 MMHG | DIASTOLIC BLOOD PRESSURE: 70 MMHG | HEART RATE: 81 BPM | OXYGEN SATURATION: 96 % | RESPIRATION RATE: 19 BRPM

## 2023-11-28 DIAGNOSIS — J42 CHRONIC BRONCHITIS, UNSPECIFIED CHRONIC BRONCHITIS TYPE (HCC): ICD-10-CM

## 2023-11-28 DIAGNOSIS — J20.9 ACUTE BRONCHITIS, UNSPECIFIED ORGANISM: ICD-10-CM

## 2023-11-28 DIAGNOSIS — E11.9 TYPE 2 DIABETES MELLITUS WITHOUT COMPLICATION, WITHOUT LONG-TERM CURRENT USE OF INSULIN (HCC): Primary | ICD-10-CM

## 2023-11-28 PROCEDURE — 3023F SPIROM DOC REV: CPT | Performed by: INTERNAL MEDICINE

## 2023-11-28 PROCEDURE — 3046F HEMOGLOBIN A1C LEVEL >9.0%: CPT | Performed by: INTERNAL MEDICINE

## 2023-11-28 PROCEDURE — G8427 DOCREV CUR MEDS BY ELIG CLIN: HCPCS | Performed by: INTERNAL MEDICINE

## 2023-11-28 PROCEDURE — 1036F TOBACCO NON-USER: CPT | Performed by: INTERNAL MEDICINE

## 2023-11-28 PROCEDURE — G8417 CALC BMI ABV UP PARAM F/U: HCPCS | Performed by: INTERNAL MEDICINE

## 2023-11-28 PROCEDURE — 3017F COLORECTAL CA SCREEN DOC REV: CPT | Performed by: INTERNAL MEDICINE

## 2023-11-28 PROCEDURE — 2022F DILAT RTA XM EVC RTNOPTHY: CPT | Performed by: INTERNAL MEDICINE

## 2023-11-28 PROCEDURE — G8484 FLU IMMUNIZE NO ADMIN: HCPCS | Performed by: INTERNAL MEDICINE

## 2023-11-28 PROCEDURE — 99214 OFFICE O/P EST MOD 30 MIN: CPT | Performed by: INTERNAL MEDICINE

## 2023-11-28 RX ORDER — GLYBURIDE-METFORMIN HYDROCHLORIDE 5; 500 MG/1; MG/1
1 TABLET ORAL 2 TIMES DAILY WITH MEALS
Qty: 60 TABLET | Refills: 3 | Status: SHIPPED | OUTPATIENT
Start: 2023-11-28

## 2023-11-28 RX ORDER — DOXYCYCLINE HYCLATE 100 MG
100 TABLET ORAL 2 TIMES DAILY
Qty: 14 TABLET | Refills: 0 | Status: SHIPPED | OUTPATIENT
Start: 2023-11-28 | End: 2023-12-05

## 2023-11-28 NOTE — PATIENT INSTRUCTIONS
TAKE THE METFORMIN AS SOON AS YOU FILL SCRIPT THIS AFTERNOON, THEN ANOTHER DOSE AT SUPPER. TAKE AGAIN IN AM AND BEFORE EATING, COME TO OFFICE TO RECHECK SUGAR.

## 2023-11-28 NOTE — PROGRESS NOTES
Ifeoma Mccain  1960 11/28/23    SUBJECTIVE:  been ill the past 5d w onset of cough and fever, decr appetite and weakness. Denies n/v. Sinuses congested, clear drainage. NOW MUCH BETTER, SEE BELOW    R eye also has been red w sl drainage. DM- SUGAR WAS HIGH DURING ACUTE ILLNESS. Lab Results   Component Value Date    LABA1C 7.9 12/22/2022    LABA1C 7.6 09/21/2022    LABA1C 7.2 (H) 03/07/2022     Lab Results   Component Value Date    GLUF 105 (H) 11/01/2016    MALBCR 11.9 09/21/2022    LDLCALC 72 12/22/2022    CREATININE 1.1 08/28/2023     Had CLD exam last week Friday 11/24- sugar was high, was SOB w illness. STATES PRIOR SOB AND COUGH HAVE NOW RESOLVED, NO FURTHER SOB, NO COUGHING. HAS CATARACT SURGERY PLANNED BILAT ALSO W DR Genesis Gresham. OBJECTIVE:    /70   Pulse 81   Resp 19   SpO2 96%     Physical Exam  Constitutional:       Appearance: Normal appearance. Cardiovascular:      Rate and Rhythm: Normal rate and regular rhythm. Heart sounds: No murmur heard. No gallop. Pulmonary:      Effort: No respiratory distress. Breath sounds: No wheezing. Abdominal:      General: Abdomen is flat. Bowel sounds are normal. There is no distension. Palpations: Abdomen is soft. There is no mass. Tenderness: There is no abdominal tenderness. Hernia: No hernia is present. Musculoskeletal:      Right lower leg: No edema. Left lower leg: No edema. Neurological:      Mental Status: He is alert. Psychiatric:         Mood and Affect: Mood normal.         ASSESSMENT:    1. Type 2 diabetes mellitus without complication, without long-term current use of insulin ([address])    2. Chronic bronchitis, unspecified chronic bronchitis type ([address])    3. Acute bronchitis, unspecified organism        PLAN:    John Paul Santamaria was seen today for cough and congestion.     Diagnoses and all orders for this visit:    Type 2 diabetes mellitus without complication, without long-term current

## 2023-11-30 ENCOUNTER — TELEPHONE (OUTPATIENT)
Dept: CARDIOLOGY CLINIC | Age: 63
End: 2023-11-30

## 2023-11-30 ENCOUNTER — PROCEDURE VISIT (OUTPATIENT)
Dept: CARDIOLOGY CLINIC | Age: 63
End: 2023-11-30

## 2023-11-30 DIAGNOSIS — I44.0 AV BLOCK, 1ST DEGREE: ICD-10-CM

## 2023-11-30 DIAGNOSIS — I49.5 SINUS NODE DYSFUNCTION (HCC): ICD-10-CM

## 2023-11-30 DIAGNOSIS — Z95.0 CARDIAC PACEMAKER IN SITU: Primary | ICD-10-CM

## 2023-12-05 DIAGNOSIS — M19.90 ARTHRITIS: ICD-10-CM

## 2023-12-05 RX ORDER — DULOXETIN HYDROCHLORIDE 60 MG/1
60 CAPSULE, DELAYED RELEASE ORAL DAILY
Qty: 90 CAPSULE | Refills: 1 | Status: SHIPPED | OUTPATIENT
Start: 2023-12-05

## 2023-12-28 DIAGNOSIS — N40.1 BENIGN PROSTATIC HYPERPLASIA WITH URINARY HESITANCY: ICD-10-CM

## 2023-12-28 DIAGNOSIS — R39.11 BENIGN PROSTATIC HYPERPLASIA WITH URINARY HESITANCY: ICD-10-CM

## 2023-12-28 RX ORDER — TAMSULOSIN HYDROCHLORIDE 0.4 MG/1
0.4 CAPSULE ORAL DAILY
Qty: 90 CAPSULE | Refills: 1 | Status: SHIPPED | OUTPATIENT
Start: 2023-12-28

## 2024-03-05 ENCOUNTER — PROCEDURE VISIT (OUTPATIENT)
Dept: CARDIOLOGY CLINIC | Age: 64
End: 2024-03-05

## 2024-03-05 DIAGNOSIS — I44.0 AV BLOCK, 1ST DEGREE: ICD-10-CM

## 2024-03-05 DIAGNOSIS — Z95.0 CARDIAC PACEMAKER IN SITU: Primary | ICD-10-CM

## 2024-03-05 DIAGNOSIS — I49.5 SINUS NODE DYSFUNCTION (HCC): ICD-10-CM

## 2024-03-22 ENCOUNTER — TELEPHONE (OUTPATIENT)
Dept: CARDIOLOGY CLINIC | Age: 64
End: 2024-03-22

## 2024-03-22 NOTE — TELEPHONE ENCOUNTER
Called patient to r/s apt with Karen d/t provider being out. No ans lm for patient to call back.

## 2024-03-26 ENCOUNTER — TELEPHONE (OUTPATIENT)
Dept: CARDIOLOGY CLINIC | Age: 64
End: 2024-03-26

## 2024-03-26 NOTE — TELEPHONE ENCOUNTER
Called patient, patients number no longer working, called patients spouse's number, no ans lm asking patient to call. Need to r/s 4/19 apt with Karen as provider is out.

## 2024-04-01 RX ORDER — SOTALOL HYDROCHLORIDE 80 MG/1
80 TABLET ORAL 2 TIMES DAILY
Qty: 60 TABLET | Refills: 5 | Status: SHIPPED | OUTPATIENT
Start: 2024-04-01

## 2024-04-02 ENCOUNTER — TELEPHONE (OUTPATIENT)
Dept: CARDIOLOGY CLINIC | Age: 64
End: 2024-04-02

## 2024-04-02 NOTE — TELEPHONE ENCOUNTER
----- Message from Rock Henderson MD sent at 4/1/2024  8:49 PM EDT -----  Device Assessment:      The device is Medtronic pacemaker - Dual Chamber chamber      MRI Compatible : yes    Device interrogation was performed.    Mode: AAIR --- DDDR     Sensing is normal. Impedence is normal.  Threshold is normal.     There has not been interval changes.     Estimated battery life is 11.4 years     The underlying rhythm is AS,VS.  32.3 % atrial paced; 0.1 % ventricular paced.      Atrial Arrhythmia : 34 episodes. 22.8% burden    Non sustained VT episodes : 598 episodes    Fast A and V: 100 episodes    Sustained VT episodes : No    Patient activity reported 1.4 hrs/day    The patient is pacemaker dependent.      Please schedule an appointment with me to discuss the pacemaker results    Interpreted by    Rock Henderson M.D

## 2024-04-08 ENCOUNTER — TELEPHONE (OUTPATIENT)
Dept: CARDIOLOGY CLINIC | Age: 64
End: 2024-04-08

## 2024-04-08 NOTE — TELEPHONE ENCOUNTER
Called patient, no ans, need to r/s apt with Karen d/t provider out of office and f/u up with   Dr Henderson for f/u on PPM. No ans have tried 3x to reach patient will send letter asking patient to call the office.

## 2024-04-29 ENCOUNTER — APPOINTMENT (OUTPATIENT)
Dept: CT IMAGING | Age: 64
End: 2024-04-29

## 2024-04-29 ENCOUNTER — APPOINTMENT (OUTPATIENT)
Dept: GENERAL RADIOLOGY | Age: 64
End: 2024-04-29

## 2024-04-29 ENCOUNTER — HOSPITAL ENCOUNTER (EMERGENCY)
Age: 64
Discharge: HOME | End: 2024-04-29

## 2024-04-29 VITALS
RESPIRATION RATE: 16 BRPM | OXYGEN SATURATION: 94 % | SYSTOLIC BLOOD PRESSURE: 126 MMHG | HEART RATE: 108 BPM | TEMPERATURE: 97.6 F | DIASTOLIC BLOOD PRESSURE: 93 MMHG

## 2024-04-29 DIAGNOSIS — R79.89 ELEVATED BRAIN NATRIURETIC PEPTIDE (BNP) LEVEL: ICD-10-CM

## 2024-04-29 DIAGNOSIS — J90 PLEURAL EFFUSION: ICD-10-CM

## 2024-04-29 DIAGNOSIS — J44.1 COPD EXACERBATION (HCC): Primary | ICD-10-CM

## 2024-04-29 LAB
ALBUMIN SERPL-MCNC: 4 GM/DL (ref 3.4–5)
ALP BLD-CCNC: 74 IU/L (ref 40–129)
ALT SERPL-CCNC: 79 U/L (ref 10–40)
ANION GAP SERPL CALCULATED.3IONS-SCNC: 13 MMOL/L (ref 7–16)
AST SERPL-CCNC: 38 IU/L (ref 15–37)
BASOPHILS ABSOLUTE: 0 K/CU MM
BASOPHILS RELATIVE PERCENT: 0.4 % (ref 0–1)
BILIRUB SERPL-MCNC: 0.4 MG/DL (ref 0–1)
BUN SERPL-MCNC: 15 MG/DL (ref 6–23)
CALCIUM SERPL-MCNC: 9.1 MG/DL (ref 8.3–10.6)
CHLORIDE BLD-SCNC: 107 MMOL/L (ref 99–110)
CO2: 19 MMOL/L (ref 21–32)
CREAT SERPL-MCNC: 1.3 MG/DL (ref 0.9–1.3)
DIFFERENTIAL TYPE: ABNORMAL
EOSINOPHILS ABSOLUTE: 0.1 K/CU MM
EOSINOPHILS RELATIVE PERCENT: 0.6 % (ref 0–3)
GFR SERPL CREATININE-BSD FRML MDRD: 61 ML/MIN/1.73M2
GLUCOSE SERPL-MCNC: 246 MG/DL (ref 70–99)
HCT VFR BLD CALC: 43.5 % (ref 42–52)
HEMOGLOBIN: 13.4 GM/DL (ref 13.5–18)
IMMATURE NEUTROPHIL %: 0.1 % (ref 0–0.43)
LYMPHOCYTES ABSOLUTE: 1.1 K/CU MM
LYMPHOCYTES RELATIVE PERCENT: 12.6 % (ref 24–44)
MAGNESIUM: 2 MG/DL (ref 1.8–2.4)
MCH RBC QN AUTO: 29.6 PG (ref 27–31)
MCHC RBC AUTO-ENTMCNC: 30.8 % (ref 32–36)
MCV RBC AUTO: 96 FL (ref 78–100)
MONOCYTES ABSOLUTE: 0.5 K/CU MM
MONOCYTES RELATIVE PERCENT: 6.1 % (ref 0–4)
NEUTROPHILS RELATIVE PERCENT: 80.2 % (ref 36–66)
NUCLEATED RBC %: 0 %
PDW BLD-RTO: 15.4 % (ref 11.7–14.9)
PLATELET # BLD: 278 K/CU MM (ref 140–440)
PMV BLD AUTO: 9.6 FL (ref 7.5–11.1)
POTASSIUM SERPL-SCNC: 5.2 MMOL/L (ref 3.5–5.1)
PRO-BNP: ABNORMAL PG/ML
RBC # BLD: 4.53 M/CU MM (ref 4.6–6.2)
SEGMENTED NEUTROPHILS ABSOLUTE COUNT: 6.7 K/CU MM
SODIUM BLD-SCNC: 139 MMOL/L (ref 135–145)
TOTAL IMMATURE NEUTOROPHIL: 0.01 K/CU MM
TOTAL NUCLEATED RBC: 0 K/CU MM
TOTAL PROTEIN: 6.7 GM/DL (ref 6.4–8.2)
TROPONIN, HIGH SENSITIVITY: 39 NG/L (ref 0–22)
TROPONIN, HIGH SENSITIVITY: 41 NG/L (ref 0–22)
WBC # BLD: 8.4 K/CU MM (ref 4–10.5)

## 2024-04-29 PROCEDURE — 71275 CT ANGIOGRAPHY CHEST: CPT

## 2024-04-29 PROCEDURE — 93005 ELECTROCARDIOGRAM TRACING: CPT | Performed by: EMERGENCY MEDICINE

## 2024-04-29 PROCEDURE — 99285 EMERGENCY DEPT VISIT HI MDM: CPT

## 2024-04-29 PROCEDURE — 94640 AIRWAY INHALATION TREATMENT: CPT

## 2024-04-29 PROCEDURE — 6370000000 HC RX 637 (ALT 250 FOR IP): Performed by: PHYSICIAN ASSISTANT

## 2024-04-29 PROCEDURE — 71045 X-RAY EXAM CHEST 1 VIEW: CPT

## 2024-04-29 PROCEDURE — 84484 ASSAY OF TROPONIN QUANT: CPT

## 2024-04-29 PROCEDURE — 6360000002 HC RX W HCPCS: Performed by: PHYSICIAN ASSISTANT

## 2024-04-29 PROCEDURE — 96374 THER/PROPH/DIAG INJ IV PUSH: CPT

## 2024-04-29 PROCEDURE — 6360000004 HC RX CONTRAST MEDICATION: Performed by: PHYSICIAN ASSISTANT

## 2024-04-29 PROCEDURE — 85025 COMPLETE CBC W/AUTO DIFF WBC: CPT

## 2024-04-29 PROCEDURE — 83880 ASSAY OF NATRIURETIC PEPTIDE: CPT

## 2024-04-29 PROCEDURE — 83735 ASSAY OF MAGNESIUM: CPT

## 2024-04-29 PROCEDURE — 80053 COMPREHEN METABOLIC PANEL: CPT

## 2024-04-29 RX ORDER — FUROSEMIDE 20 MG/1
20 TABLET ORAL DAILY
Qty: 3 TABLET | Refills: 0 | Status: ON HOLD | OUTPATIENT
Start: 2024-04-29 | End: 2024-05-02

## 2024-04-29 RX ORDER — IPRATROPIUM BROMIDE AND ALBUTEROL SULFATE 2.5; .5 MG/3ML; MG/3ML
3 SOLUTION RESPIRATORY (INHALATION) ONCE
Status: COMPLETED | OUTPATIENT
Start: 2024-04-29 | End: 2024-04-29

## 2024-04-29 RX ORDER — FUROSEMIDE 10 MG/ML
40 INJECTION INTRAMUSCULAR; INTRAVENOUS ONCE
Status: COMPLETED | OUTPATIENT
Start: 2024-04-29 | End: 2024-04-29

## 2024-04-29 RX ADMIN — FUROSEMIDE 40 MG: 10 INJECTION, SOLUTION INTRAMUSCULAR; INTRAVENOUS at 19:54

## 2024-04-29 RX ADMIN — IPRATROPIUM BROMIDE AND ALBUTEROL SULFATE 3 DOSE: .5; 2.5 SOLUTION RESPIRATORY (INHALATION) at 19:35

## 2024-04-29 RX ADMIN — IOPAMIDOL 75 ML: 755 INJECTION, SOLUTION INTRAVENOUS at 20:15

## 2024-04-29 ASSESSMENT — LIFESTYLE VARIABLES
HOW OFTEN DO YOU HAVE A DRINK CONTAINING ALCOHOL: NEVER
HOW MANY STANDARD DRINKS CONTAINING ALCOHOL DO YOU HAVE ON A TYPICAL DAY: PATIENT DOES NOT DRINK

## 2024-04-29 NOTE — ED PROVIDER NOTES
EKG interpreted by me  Atrial fibrillation with rate of 107 bpm.  No ST elevation no previous to compare       Quyen Denis MD  04/29/24 [id number]    
09/14/2022    Decreased uptake inferiorly due to diaphragmatic artifact partially reversible inferioapical perfusion defect noted.    Hx of Doppler echocardiogram 09/14/2022    EF 50% Grade I diastolic dysfunction. mildly dilated LA. Mod TR. Mild pulmonary htn.    Hx of Doppler ultrasound 04/09/2021    Left distal SSV shows occlusive chronic SVT.    Hypertension     Ileostomy in place ([facility]) 1971    Myasthenia gravis ([facility]) 2017    sees Dr Echavarria and is controlled on a research injection 40mg daily, also mestinon and chr prednisone 12mg daily    Nephrolithiasis 12/30/2022    nonobstructive on u/s 12/22    NSTEMI (non-ST elevated myocardial infarction) ([facility]) 12/2020    Post PTCA 12/14/2020    Ramus Stented.    Presence of Watchman left atrial appendage closure device     S/P ablation of atrial fibrillation 09/08/2021    S/P ablation of afib PVI performed by Dr. Henderson.    TIA (transient ischemic attack)     Ulcerative colitis ([facility])      Past Surgical History:   Procedure Laterality Date    BLADDER SURGERY Right 5/13/2023    CYSTOSCOPY RETROGRADE PYELOGRAM STENT INSERTION performed by Vivien Padilla MD at [facility] OR    CARDIAC SURGERY      ablation 9/8/21- Dr Henderson    CYSTOSCOPY N/A 8/28/2023    CYSTOSCOPY RETROGRADE PYELOGRAM performed by Vivien Padilla MD at [facility] OR    ILEOSTOMY OR JEJUNOSTOMY      total colectomy at [facility] for [facility] colitis    LITHOTRIPSY Right 8/28/2023    RIGHT CYSTOSCOPY URETEROSCOPY  RETROGRADE PYELOGRAM STONE MANIPULATION HOLIIUM  LASER LITHOTRIPSY POSS STENT PLACEMENT performed by Vivien Padilla MD at [facility] OR    OTHER SURGICAL HISTORY N/A 04/13/2023    Watchman 24 MM SUSANNE closure device implanted by Dr. Henderson.    PACEMAKER INSERTION N/A 07/07/2021    PACEMAKER INSERTION PERMANENT performed by Lc Matthew MD at [facility] OR-[facility] mri conditional    PTCA  12/14/2020    Ramus Stented./also prior stent 2011 after MI    TONSILLECTOMY      UPPER GASTROINTESTINAL ENDOSCOPY N/A 06/15/2021

## 2024-04-30 ENCOUNTER — TELEPHONE (OUTPATIENT)
Dept: INTERNAL MEDICINE CLINIC | Age: 64
End: 2024-04-30

## 2024-04-30 NOTE — TELEPHONE ENCOUNTER
----- Message from Tyler Diaz MD sent at 4/30/2024  1:05 PM EDT -----  angel Ariza call Aramis.  I saw he was in ED yesterday and appears he's in CHF.  He'd missed last appt sched w me earlier this mo, SEE IF WE CAN SCHEDULE FOR AN OUTPT F/U W HIM IN A WEEK IF POSSIBLE.  SOONER IF IS STILL NOT DOING WELL.  ----- Message -----  From: Discharge Provider, Automatic  Sent: 4/30/2024  10:21 AM EDT  To: Tyler Diaz MD

## 2024-04-30 NOTE — PROGRESS NOTES
I called and spoke to Laura. Aramis's phone is turned off right now, and he is without insurance. He is unable to follow up right now with you due to no insurance. They did start Lasix at ER per Laura. I advised her if she does not feel it is helping to let us know. He will start Lasix today for three days. I also told her to go to ER again if symptoms worsen, and I would let you know what was going on.

## 2024-04-30 NOTE — TELEPHONE ENCOUNTER
Called Laura as Aramis's phone is shut off at the moment. They are unable to make an appointment at the moment due to no insurance. He did start Lasix. I let her know to call us if that did not help. I also advised if SOB worsens to return to ER.

## 2024-05-01 ENCOUNTER — TELEPHONE (OUTPATIENT)
Dept: INTERNAL MEDICINE CLINIC | Age: 64
End: 2024-05-01

## 2024-05-01 ENCOUNTER — CARE COORDINATION (OUTPATIENT)
Dept: CARE COORDINATION | Age: 64
End: 2024-05-01

## 2024-05-01 LAB
EKG ATRIAL RATE: 115 BPM
EKG DIAGNOSIS: NORMAL
EKG Q-T INTERVAL: 364 MS
EKG QRS DURATION: 116 MS
EKG QTC CALCULATION (BAZETT): 485 MS
EKG R AXIS: -41 DEGREES
EKG T AXIS: -40 DEGREES
EKG VENTRICULAR RATE: 107 BPM

## 2024-05-01 PROCEDURE — 93010 ELECTROCARDIOGRAM REPORT: CPT | Performed by: INTERNAL MEDICINE

## 2024-05-01 NOTE — PROGRESS NOTES
Phone can not accept calls at this time.  Note from care coord. Stated: -ACM will place SW referral for assistance with insurance/medications

## 2024-05-01 NOTE — CARE COORDINATION
Patient Current Location: Home: [address] [place] [id number]     Phone call to Pt and his wife, Laura. Pt and wife were in agreement with completing initial assessment and with ongoing support.     Initial Contact Social Work Note - Ambulatory  5/1/2024      Date of referral: 5/1/24  Referral received from: NORY Diallo  Reason for referral: financial strain, no insurance    Previous  referral: No  If yes, brief summary of outcome: n/a    Two Identifiers Verified: Yes    Insurance Provider: none    Support System:  Spouse/Partner and Adult Child/Children     Status:  n/a    Community Providers:  n/a    ADL Assistance Needed: N/A    Housing/Living Concerns or Home Modification Needs: Pt and wife reside together     Transportation Concern: none reported    Medication Cost Concern: No insurance, cannot afford medications / ostomy supplies. Pt's wife was advised to call [facility]'s pharmacy to determine cost of meds with [employer] or other discount cards. SW is asking for list of medications that they cannot afford, will make referral to Med Assist.     Medication Adherence Concern: Pt is not taking medications as prescribed as he cannot currently afford them.    Financial Concern(s): Filed bankruptcy following Pt heart attack and bills piling up- have 241 / week taken out. Pt has not been able to work often due to not feeling well. No insurance, cannot afford medications.     Income (only if applicable): 55,000 last year, Pt is paid weekly, has not worked recently.Income - March 29- brought home pay - 475 made 838.06 - weekly - gets the percentage of the run is that he makes  Pt made 55,000 last year         Ability to Read/Write: Yes    Advance Care Plan:  N/A    Other: Lost medicaid end of December, has appointment with Social Security office scheduled for 5/14 for intermediate / medicare    Declined list of food pantries, daughter assists with cost of food etc.    Identified Needs:  Cost of

## 2024-05-01 NOTE — CARE COORDINATION
Ambulatory Care Coordination  ED Follow up Call    Reason for ED visit:  Worsening SOB for 3-4 weeks. Patient has myasthenia gravis & thought this could've been cause. Patient is in a clinical trial for this and picks up medicine at [facility] through Dr. Echavarria. The [facility] saw patient and noticed SOB and recommended he go to the ED at [facility]. Patient went home since spouse was in [place] and decided to go to local ED.    Status:     improved - slightly    Did you call your PCP prior to going to the ED?  No      Did you receive a discharge instructions from the Emergency Room? Yes  Review of Instructions:     Understands what to report/when to return?:  Yes   Understands discharge instructions?:  Yes   Following discharge instructions?:  Yes   If not why? N/A    Are there any new complaints of pain? No  New Pain Meds? No    Constipation prophylaxis needed?  N/A    If you have a wound is the dressing clean, dry, and intact? N/A  Understands wound care regimen? N/A    Are there any other complaints/concerns that you wish to tell your provider?   SOB significantly improved after receiving IV Lasix in the ED. Tuesday, SOB returned and felt about the same as before going to ED. Laura picked up Rx for Lasix and gave one pill. The output wasn't as good as what it was in the hospital after IV Lasix, but SOB slightly improved. Laura has a total of 3 days of Lasix (last dose Thursday, 5/2). Laura consulted with Dr. Echavarria who advised if Lasix does not help the SOB to return to ED. AC encouraged patient to call PCP or Dr. Echavarria with any questions or concerns for patient. Laura verbalized understanding.     FU appts/Provider:    No future appointments.        New Medications?:   Yes      Medication Reconciliation by phone - No  Understands Medications?  Yes  Taking Medications? Taking priority medications d/t not having insurance  Can you swallow your pills?  Yes    Any further needs in the home i.e. Equipment?  Not

## 2024-05-01 NOTE — TELEPHONE ENCOUNTER
----- Message from Tyler Diaz MD sent at 4/30/2024  4:36 PM EDT -----  Annita, at minimum he should be following up w his cardiologist Dr Henderson.  Even if does not have insurance, I think our office visit is not much w fee for service please make sure I'm still happy to see him and Katalina also may have some programs to help assist him with other tests and lab, medications too.  ----- Message -----  From: Annita Rodriguez MA  Sent: 4/30/2024   3:33 PM EDT  To: Tyler Diaz MD          ----- Message -----  From: Tyler Diaz MD  Sent: 4/30/2024   1:06 PM EDT  To: MARGIE Davis, angel call Aramis.  I saw he was in ED yesterday and appears he's in CHF.  He'd missed last appt sched w me earlier this mo, SEE IF WE CAN SCHEDULE FOR AN OUTPT F/U W HIM IN A WEEK IF POSSIBLE.  SOONER IF IS STILL NOT DOING WELL.  ----- Message -----  From: Discharge Provider, Automatic  Sent: 4/30/2024  10:21 AM EDT  To: Tyler Daiz MD

## 2024-05-01 NOTE — TELEPHONE ENCOUNTER
Attempt to reach patient.  states unable to take calls at this time.  Care coord. Note -ACM will place SW referral for assistance with insurance/medications

## 2024-05-03 ENCOUNTER — CARE COORDINATION (OUTPATIENT)
Dept: CARE COORDINATION | Age: 64
End: 2024-05-03

## 2024-05-03 NOTE — CARE COORDINATION
Patient Current Location: Home: [address] [place] [id number]     Phone call to Pt to determine if they have reached out to Pt pharmacy to discuss discount card for medications prescribed.    Pt answered the phone and was pleasant. Pt reported his wife dropped off the list of medications at the pharmacy and is going to  list today to see what the cost would be with discount cards. SW encouraged Pt / wife to call this SW if need assistance and could send referral to [facility]. Discussed it is income based and not sure if they would qualify, but we could send referral and allow them to determine if they can assist. Pt was appreciative of assistance.     MARY plan of care: SW will follow up with Pt regarding cost of medications on 5/7

## 2024-05-04 ENCOUNTER — APPOINTMENT (OUTPATIENT)
Dept: GENERAL RADIOLOGY | Age: 64
End: 2024-05-04

## 2024-05-04 ENCOUNTER — HOSPITAL ENCOUNTER (INPATIENT)
Age: 64
LOS: 5 days | Discharge: HOME | End: 2024-05-09
Attending: EMERGENCY MEDICINE

## 2024-05-04 DIAGNOSIS — I50.33 ACUTE ON CHRONIC DIASTOLIC CONGESTIVE HEART FAILURE (HCC): ICD-10-CM

## 2024-05-04 DIAGNOSIS — R79.89 TROPONIN I ABOVE REFERENCE RANGE: ICD-10-CM

## 2024-05-04 DIAGNOSIS — R06.02 SOB (SHORTNESS OF BREATH): ICD-10-CM

## 2024-05-04 DIAGNOSIS — I51.9 SEVERE LEFT VENTRICULAR SYSTOLIC DYSFUNCTION: ICD-10-CM

## 2024-05-04 DIAGNOSIS — I50.9 ACUTE CONGESTIVE HEART FAILURE, UNSPECIFIED HEART FAILURE TYPE (HCC): ICD-10-CM

## 2024-05-04 DIAGNOSIS — R06.09 DYSPNEA ON EXERTION: Primary | ICD-10-CM

## 2024-05-04 DIAGNOSIS — I48.92 ATRIAL FLUTTER, UNSPECIFIED TYPE (HCC): ICD-10-CM

## 2024-05-04 PROBLEM — R06.00 DYSPNEA: Status: ACTIVE | Noted: 2024-05-04

## 2024-05-04 LAB
ALBUMIN SERPL-MCNC: 3.9 GM/DL (ref 3.4–5)
ALP BLD-CCNC: 74 IU/L (ref 40–128)
ALT SERPL-CCNC: 34 U/L (ref 10–40)
ANION GAP SERPL CALCULATED.3IONS-SCNC: 12 MMOL/L (ref 7–16)
AST SERPL-CCNC: 22 IU/L (ref 15–37)
BASE EXCESS: 4 (ref 0–3)
BASOPHILS ABSOLUTE: 0.1 K/CU MM
BASOPHILS RELATIVE PERCENT: 0.4 % (ref 0–1)
BILIRUB SERPL-MCNC: 0.4 MG/DL (ref 0–1)
BUN SERPL-MCNC: 18 MG/DL (ref 6–23)
CALCIUM SERPL-MCNC: 9.4 MG/DL (ref 8.3–10.6)
CHLORIDE BLD-SCNC: 105 MMOL/L (ref 99–110)
CO2: 17 MMOL/L (ref 21–32)
COMMENT: ABNORMAL
CREAT SERPL-MCNC: 1.1 MG/DL (ref 0.9–1.3)
DIFFERENTIAL TYPE: ABNORMAL
EOSINOPHILS ABSOLUTE: 0.2 K/CU MM
EOSINOPHILS RELATIVE PERCENT: 1.4 % (ref 0–3)
GFR, ESTIMATED: 74 ML/MIN/1.73M2
GLUCOSE BLD-MCNC: 182 MG/DL (ref 70–99)
GLUCOSE SERPL-MCNC: 175 MG/DL (ref 70–99)
HCO3 VENOUS: 18.8 MMOL/L (ref 22–29)
HCT VFR BLD CALC: 48.6 % (ref 42–52)
HEMOGLOBIN: 15.2 GM/DL (ref 13.5–18)
IMMATURE NEUTROPHIL %: 0.4 % (ref 0–0.43)
IRON: 123 UG/DL (ref 59–158)
LYMPHOCYTES ABSOLUTE: 1.2 K/CU MM
LYMPHOCYTES RELATIVE PERCENT: 10 % (ref 24–44)
MAGNESIUM: 2.4 MG/DL (ref 1.8–2.4)
MCH RBC QN AUTO: 29.7 PG (ref 27–31)
MCHC RBC AUTO-ENTMCNC: 31.3 % (ref 32–36)
MCV RBC AUTO: 94.9 FL (ref 78–100)
MONOCYTES ABSOLUTE: 0.8 K/CU MM
MONOCYTES RELATIVE PERCENT: 7.2 % (ref 0–4)
NEUTROPHILS ABSOLUTE: 9.2 K/CU MM
NEUTROPHILS RELATIVE PERCENT: 80.6 % (ref 36–66)
NUCLEATED RBC %: 0 %
O2 SAT, VEN: 95.6 % (ref 50–70)
PCO2, VEN: 27 MMHG (ref 41–51)
PCT TRANSFERRIN: 28 % (ref 10–44)
PDW BLD-RTO: 14.9 % (ref 11.7–14.9)
PH VENOUS: 7.45 (ref 7.32–7.43)
PLATELET # BLD: 313 K/CU MM (ref 140–440)
PMV BLD AUTO: 9.4 FL (ref 7.5–11.1)
PO2, VEN: 175 MMHG (ref 28–48)
POTASSIUM SERPL-SCNC: 5.3 MMOL/L (ref 3.5–5.1)
PRO-BNP: 4814 PG/ML
RBC # BLD: 5.12 M/CU MM (ref 4.6–6.2)
SODIUM BLD-SCNC: 134 MMOL/L (ref 135–145)
T4 FREE SERPL-MCNC: 0.94 NG/DL (ref 0.9–1.8)
TOTAL IMMATURE NEUTOROPHIL: 0.05 K/CU MM
TOTAL IRON BINDING CAPACITY: 432 UG/DL (ref 250–450)
TOTAL NUCLEATED RBC: 0 K/CU MM
TOTAL PROTEIN: 7.4 GM/DL (ref 6.4–8.2)
TROPONIN, HIGH SENSITIVITY: 51 NG/L (ref 0–22)
TROPONIN, HIGH SENSITIVITY: 52 NG/L (ref 0–22)
TSH SERPL DL<=0.005 MIU/L-ACNC: 0.93 UIU/ML (ref 0.27–4.2)
UNSATURATED IRON BINDING CAPACITY: 309 UG/DL (ref 110–370)
WBC # BLD: 11.5 K/CU MM (ref 4–10.5)

## 2024-05-04 PROCEDURE — 6360000002 HC RX W HCPCS: Performed by: EMERGENCY MEDICINE

## 2024-05-04 PROCEDURE — 2580000003 HC RX 258: Performed by: INTERNAL MEDICINE

## 2024-05-04 PROCEDURE — 96374 THER/PROPH/DIAG INJ IV PUSH: CPT

## 2024-05-04 PROCEDURE — 6370000000 HC RX 637 (ALT 250 FOR IP): Performed by: INTERNAL MEDICINE

## 2024-05-04 PROCEDURE — 83550 IRON BINDING TEST: CPT

## 2024-05-04 PROCEDURE — 83540 ASSAY OF IRON: CPT

## 2024-05-04 PROCEDURE — 84484 ASSAY OF TROPONIN QUANT: CPT

## 2024-05-04 PROCEDURE — 83735 ASSAY OF MAGNESIUM: CPT

## 2024-05-04 PROCEDURE — 82805 BLOOD GASES W/O2 SATURATION: CPT

## 2024-05-04 PROCEDURE — 85025 COMPLETE CBC W/AUTO DIFF WBC: CPT

## 2024-05-04 PROCEDURE — 36415 COLL VENOUS BLD VENIPUNCTURE: CPT

## 2024-05-04 PROCEDURE — 6370000000 HC RX 637 (ALT 250 FOR IP): Performed by: EMERGENCY MEDICINE

## 2024-05-04 PROCEDURE — 6360000002 HC RX W HCPCS: Performed by: INTERNAL MEDICINE

## 2024-05-04 PROCEDURE — 80053 COMPREHEN METABOLIC PANEL: CPT

## 2024-05-04 PROCEDURE — 71045 X-RAY EXAM CHEST 1 VIEW: CPT

## 2024-05-04 PROCEDURE — 83880 ASSAY OF NATRIURETIC PEPTIDE: CPT

## 2024-05-04 PROCEDURE — 94761 N-INVAS EAR/PLS OXIMETRY MLT: CPT

## 2024-05-04 PROCEDURE — 84439 ASSAY OF FREE THYROXINE: CPT

## 2024-05-04 PROCEDURE — 82962 GLUCOSE BLOOD TEST: CPT

## 2024-05-04 PROCEDURE — 99285 EMERGENCY DEPT VISIT HI MDM: CPT

## 2024-05-04 PROCEDURE — 1200000000 HC SEMI PRIVATE

## 2024-05-04 PROCEDURE — 83036 HEMOGLOBIN GLYCOSYLATED A1C: CPT

## 2024-05-04 PROCEDURE — 84443 ASSAY THYROID STIM HORMONE: CPT

## 2024-05-04 RX ORDER — ASPIRIN 81 MG/1
324 TABLET, CHEWABLE ORAL ONCE
Status: COMPLETED | OUTPATIENT
Start: 2024-05-04 | End: 2024-05-04

## 2024-05-04 RX ORDER — ONDANSETRON 4 MG/1
4 TABLET, ORALLY DISINTEGRATING ORAL EVERY 8 HOURS PRN
Status: CANCELLED | OUTPATIENT
Start: 2024-05-04

## 2024-05-04 RX ORDER — INSULIN LISPRO 100 [IU]/ML
0-4 INJECTION, SOLUTION INTRAVENOUS; SUBCUTANEOUS NIGHTLY
Status: DISCONTINUED | OUTPATIENT
Start: 2024-05-04 | End: 2024-05-09 | Stop reason: HOSPADM

## 2024-05-04 RX ORDER — MAGNESIUM SULFATE IN WATER 40 MG/ML
2000 INJECTION, SOLUTION INTRAVENOUS PRN
Status: DISCONTINUED | OUTPATIENT
Start: 2024-05-04 | End: 2024-05-09 | Stop reason: HOSPADM

## 2024-05-04 RX ORDER — SOTALOL HYDROCHLORIDE 80 MG/1
80 TABLET ORAL 2 TIMES DAILY
Status: DISCONTINUED | OUTPATIENT
Start: 2024-05-04 | End: 2024-05-09 | Stop reason: HOSPADM

## 2024-05-04 RX ORDER — FUROSEMIDE 10 MG/ML
20 INJECTION INTRAMUSCULAR; INTRAVENOUS ONCE
Status: COMPLETED | OUTPATIENT
Start: 2024-05-04 | End: 2024-05-04

## 2024-05-04 RX ORDER — PYRIDOSTIGMINE BROMIDE 60 MG/1
60 TABLET ORAL 4 TIMES DAILY
Status: DISCONTINUED | OUTPATIENT
Start: 2024-05-04 | End: 2024-05-09 | Stop reason: HOSPADM

## 2024-05-04 RX ORDER — ONDANSETRON 2 MG/ML
4 INJECTION INTRAMUSCULAR; INTRAVENOUS EVERY 6 HOURS PRN
Status: CANCELLED | OUTPATIENT
Start: 2024-05-04

## 2024-05-04 RX ORDER — PANTOPRAZOLE SODIUM 40 MG/1
40 TABLET, DELAYED RELEASE ORAL 2 TIMES DAILY
Status: DISCONTINUED | OUTPATIENT
Start: 2024-05-04 | End: 2024-05-09 | Stop reason: HOSPADM

## 2024-05-04 RX ORDER — DULOXETIN HYDROCHLORIDE 30 MG/1
60 CAPSULE, DELAYED RELEASE ORAL DAILY
Status: DISCONTINUED | OUTPATIENT
Start: 2024-05-05 | End: 2024-05-09 | Stop reason: HOSPADM

## 2024-05-04 RX ORDER — PREDNISONE 1 MG/1
2 TABLET ORAL DAILY
Status: DISCONTINUED | OUTPATIENT
Start: 2024-05-05 | End: 2024-05-09 | Stop reason: HOSPADM

## 2024-05-04 RX ORDER — POLYETHYLENE GLYCOL 3350 17 G/17G
17 POWDER, FOR SOLUTION ORAL DAILY PRN
Status: DISCONTINUED | OUTPATIENT
Start: 2024-05-04 | End: 2024-05-09 | Stop reason: HOSPADM

## 2024-05-04 RX ORDER — LANOLIN ALCOHOL/MO/W.PET/CERES
400 CREAM (GRAM) TOPICAL DAILY
Status: DISCONTINUED | OUTPATIENT
Start: 2024-05-04 | End: 2024-05-09 | Stop reason: HOSPADM

## 2024-05-04 RX ORDER — CLOPIDOGREL BISULFATE 75 MG/1
75 TABLET ORAL DAILY
Status: DISCONTINUED | OUTPATIENT
Start: 2024-05-05 | End: 2024-05-09 | Stop reason: HOSPADM

## 2024-05-04 RX ORDER — SODIUM CHLORIDE 9 MG/ML
INJECTION, SOLUTION INTRAVENOUS PRN
Status: DISCONTINUED | OUTPATIENT
Start: 2024-05-04 | End: 2024-05-09 | Stop reason: HOSPADM

## 2024-05-04 RX ORDER — PREDNISONE 10 MG/1
10 TABLET ORAL DAILY
Status: DISCONTINUED | OUTPATIENT
Start: 2024-05-05 | End: 2024-05-09 | Stop reason: HOSPADM

## 2024-05-04 RX ORDER — ATORVASTATIN CALCIUM 40 MG/1
40 TABLET, FILM COATED ORAL NIGHTLY
Status: DISCONTINUED | OUTPATIENT
Start: 2024-05-04 | End: 2024-05-09 | Stop reason: HOSPADM

## 2024-05-04 RX ORDER — ACETAMINOPHEN 325 MG/1
650 TABLET ORAL EVERY 6 HOURS PRN
Status: DISCONTINUED | OUTPATIENT
Start: 2024-05-04 | End: 2024-05-07 | Stop reason: SDUPTHER

## 2024-05-04 RX ORDER — SODIUM CHLORIDE 0.9 % (FLUSH) 0.9 %
5-40 SYRINGE (ML) INJECTION EVERY 12 HOURS SCHEDULED
Status: DISCONTINUED | OUTPATIENT
Start: 2024-05-04 | End: 2024-05-09 | Stop reason: HOSPADM

## 2024-05-04 RX ORDER — ENOXAPARIN SODIUM 100 MG/ML
30 INJECTION SUBCUTANEOUS 2 TIMES DAILY
Status: DISCONTINUED | OUTPATIENT
Start: 2024-05-04 | End: 2024-05-08

## 2024-05-04 RX ORDER — BUDESONIDE AND FORMOTEROL FUMARATE DIHYDRATE 160; 4.5 UG/1; UG/1
2 AEROSOL RESPIRATORY (INHALATION) 2 TIMES DAILY
Status: DISCONTINUED | OUTPATIENT
Start: 2024-05-04 | End: 2024-05-09 | Stop reason: HOSPADM

## 2024-05-04 RX ORDER — ACETAMINOPHEN 650 MG/1
650 SUPPOSITORY RECTAL EVERY 6 HOURS PRN
Status: DISCONTINUED | OUTPATIENT
Start: 2024-05-04 | End: 2024-05-09 | Stop reason: HOSPADM

## 2024-05-04 RX ORDER — POTASSIUM CHLORIDE 20 MEQ/1
40 TABLET, EXTENDED RELEASE ORAL PRN
Status: DISCONTINUED | OUTPATIENT
Start: 2024-05-04 | End: 2024-05-09 | Stop reason: HOSPADM

## 2024-05-04 RX ORDER — FERROUS SULFATE 325(65) MG
325 TABLET ORAL
Status: DISCONTINUED | OUTPATIENT
Start: 2024-05-05 | End: 2024-05-09 | Stop reason: HOSPADM

## 2024-05-04 RX ORDER — DEXTROSE MONOHYDRATE 100 MG/ML
INJECTION, SOLUTION INTRAVENOUS CONTINUOUS PRN
Status: DISCONTINUED | OUTPATIENT
Start: 2024-05-04 | End: 2024-05-09 | Stop reason: HOSPADM

## 2024-05-04 RX ORDER — LANOLIN ALCOHOL/MO/W.PET/CERES
1000 CREAM (GRAM) TOPICAL DAILY
Status: DISCONTINUED | OUTPATIENT
Start: 2024-05-04 | End: 2024-05-09 | Stop reason: HOSPADM

## 2024-05-04 RX ORDER — SODIUM CHLORIDE 0.9 % (FLUSH) 0.9 %
5-40 SYRINGE (ML) INJECTION PRN
Status: DISCONTINUED | OUTPATIENT
Start: 2024-05-04 | End: 2024-05-09 | Stop reason: HOSPADM

## 2024-05-04 RX ORDER — POTASSIUM CHLORIDE 7.45 MG/ML
10 INJECTION INTRAVENOUS PRN
Status: DISCONTINUED | OUTPATIENT
Start: 2024-05-04 | End: 2024-05-09 | Stop reason: HOSPADM

## 2024-05-04 RX ORDER — INSULIN LISPRO 100 [IU]/ML
0-4 INJECTION, SOLUTION INTRAVENOUS; SUBCUTANEOUS
Status: DISCONTINUED | OUTPATIENT
Start: 2024-05-05 | End: 2024-05-09 | Stop reason: HOSPADM

## 2024-05-04 RX ORDER — TAMSULOSIN HYDROCHLORIDE 0.4 MG/1
0.4 CAPSULE ORAL DAILY
Status: DISCONTINUED | OUTPATIENT
Start: 2024-05-04 | End: 2024-05-09 | Stop reason: HOSPADM

## 2024-05-04 RX ORDER — METOPROLOL SUCCINATE 25 MG/1
25 TABLET, EXTENDED RELEASE ORAL NIGHTLY
Status: DISCONTINUED | OUTPATIENT
Start: 2024-05-04 | End: 2024-05-09 | Stop reason: HOSPADM

## 2024-05-04 RX ORDER — GLUCAGON 1 MG/ML
1 KIT INJECTION PRN
Status: DISCONTINUED | OUTPATIENT
Start: 2024-05-04 | End: 2024-05-09 | Stop reason: HOSPADM

## 2024-05-04 RX ADMIN — ENOXAPARIN SODIUM 30 MG: 100 INJECTION SUBCUTANEOUS at 20:25

## 2024-05-04 RX ADMIN — PANTOPRAZOLE SODIUM 40 MG: 40 TABLET, DELAYED RELEASE ORAL at 20:25

## 2024-05-04 RX ADMIN — ATORVASTATIN CALCIUM 40 MG: 40 TABLET, FILM COATED ORAL at 20:25

## 2024-05-04 RX ADMIN — PYRIDOSTIGMINE BROMIDE 60 MG: 60 TABLET ORAL at 20:24

## 2024-05-04 RX ADMIN — Medication 1000 MCG: at 21:36

## 2024-05-04 RX ADMIN — ASPIRIN 324 MG: 81 TABLET, CHEWABLE ORAL at 17:25

## 2024-05-04 RX ADMIN — FUROSEMIDE 20 MG: 10 INJECTION, SOLUTION INTRAMUSCULAR; INTRAVENOUS at 17:25

## 2024-05-04 RX ADMIN — ASPIRIN 324 MG: 81 TABLET, CHEWABLE ORAL at 20:24

## 2024-05-04 RX ADMIN — TAMSULOSIN HYDROCHLORIDE 0.4 MG: 0.4 CAPSULE ORAL at 21:36

## 2024-05-04 RX ADMIN — METOPROLOL SUCCINATE 25 MG: 25 TABLET, EXTENDED RELEASE ORAL at 20:25

## 2024-05-04 RX ADMIN — CHOLECALCIFEROL (VITAMIN D3) 10 MCG (400 UNIT) TABLET 400 UNITS: at 21:36

## 2024-05-04 RX ADMIN — SODIUM CHLORIDE, PRESERVATIVE FREE 10 ML: 5 INJECTION INTRAVENOUS at 20:30

## 2024-05-04 RX ADMIN — SOTALOL HYDROCHLORIDE 80 MG: 80 TABLET ORAL at 20:25

## 2024-05-04 ASSESSMENT — PAIN SCALES - GENERAL: PAINLEVEL_OUTOF10: 0

## 2024-05-04 NOTE — ED NOTES
ED TO INPATIENT SBAR HANDOFF    Patient Name: Aramis Rosario   :  1960  64 y.o.   Preferred Name    Family/Caregiver Present yes   Restraints no   C-SSRS: Risk of Suicide: No Risk  Sitter no   Sepsis Risk Score Sepsis Risk Score: 0.7      Situation  Chief Complaint   Patient presents with    Shortness of Breath     Pt presents to the ED with complaints of shortness of breath. States that he has COPD. States he was seen on Monday and prescribed lasix. States that he was told that he had fluid on his lungs. States that he took all of the medication and began to feel fine. Shortness of breath returned today.      Brief Description of Patient's Condition: Pt arrived to ED via walk in with c/o SOB. Pt being admitted for cardiology evaluation and IV diuresis. Trop elevated. Pt ambulatory and A&Ox4.  Mental Status: oriented, alert, coherent, logical, thought processes intact, and able to concentrate and follow conversation  Arrived from: home    Imaging:   XR CHEST PORTABLE   Final Result      No acute radiographic abnormality of the chest.      Electronically signed by Ellis Rangel MD        Abnormal labs:   Abnormal Labs Reviewed   COMPREHENSIVE METABOLIC PANEL - Abnormal; Notable for the following components:       Result Value    Sodium 134 (*)     Potassium 5.3 (*)     CO2 17 (*)     Glucose 175 (*)     All other components within normal limits   CBC WITH AUTO DIFFERENTIAL - Abnormal; Notable for the following components:    WBC 11.5 (*)     MCHC 31.3 (*)     Neutrophils % 80.6 (*)     Lymphocytes % 10.0 (*)     Monocytes % 7.2 (*)     All other components within normal limits   TROPONIN - Abnormal; Notable for the following components:    Troponin, High Sensitivity 52 (*)     All other components within normal limits   BLOOD GAS, VENOUS - Abnormal; Notable for the following components:    pH, Irving 7.45 (*)     pCO2, Irving 27 (*)     pO2, Irving 175 (*)     Base Excess 4 (*)     HCO3, Venous 18.8 (*)     O2  nonobstructive on u/s 12/22    NSTEMI (non-ST elevated myocardial infarction) ([facility]) 12/2020    Post PTCA 12/14/2020    Ramus Stented.    Presence of Watchman left atrial appendage closure device     S/P ablation of atrial fibrillation 09/08/2021    S/P ablation of afib PVI performed by Dr. Henderson.    TIA (transient ischemic attack)     Ulcerative colitis (HCC)        Assessment    Vitals: MEWS Score: 3  Level of Consciousness: Alert (0)   Vitals:    05/04/24 1530 05/04/24 1600 05/04/24 1630 05/04/24 1700   BP: 107/86 91/72 (!) 124/94 (!) 114/92   Pulse: (!) 108 (!) 108 (!) 108 (!) 108   Resp: 15 19 15 23   Temp:       TempSrc:       SpO2: 97% 93% 95% 96%   Weight:       Height:         PO Status:   O2 Flow Rate: O2 Device: None (Room air)    Cardiac Rhythm: tachycardic  Last documented pain medication administered:   NIH Score: NIH     Active LDA's:   Peripheral IV 05/04/24 Right Antecubital (Active)   Site Assessment Clean, dry & intact 05/04/24 1654   Line Status Blood return noted;Brisk blood return;Flushed 05/04/24 1654   Line Care Connections checked and tightened 05/04/24 1654   Phlebitis Assessment No symptoms 05/04/24 1654   Infiltration Assessment 0 05/04/24 1654   Dressing Status New dressing applied 05/04/24 1654   Dressing Type Transparent 05/04/24 1654   Dressing Intervention New 05/04/24 1654       Peripheral IV 05/04/24 Left;Ventral Forearm (Active)   Site Assessment Clean, dry & intact 05/04/24 1655   Line Status Blood return noted;Brisk blood return;Flushed 05/04/24 1655   Line Care Connections checked and tightened 05/04/24 1655   Phlebitis Assessment No symptoms 05/04/24 1655   Infiltration Assessment 0 05/04/24 1655   Dressing Status New dressing applied 05/04/24 1655   Dressing Type Transparent 05/04/24 1655   Dressing Intervention New 05/04/24 1655       Pertinent or High Risk Medications/Drips: no   If Yes, please provide details:   Blood Product Administration: no  If Yes, please provide

## 2024-05-04 NOTE — ED PROVIDER NOTES
Triage Chief Complaint:   Shortness of Breath (Pt presents to the ED with complaints of shortness of breath. States that he has COPD. States he was seen on Monday and prescribed lasix. States that he was told that he had fluid on his lungs. States that he took all of the medication and began to feel fine. Shortness of breath returned today. )    Eyak:  Aramis Rosario is a 64 y.o. male that presents with shortness of breath.  Patient reports for the past week or so he has been having increased shortness of breath.  Patient reports that his issues were progressive up until Monday when he came into the emergency department and was diagnosed with \"fluid on his lungs\".  Patient was started on Lasix for a few days which he reports helped however symptoms are now returning and worsening.  Patient reports that he even with minimal exertion has to take breaks.  Patient is no longer able to climb a full set of  stairs without having to stop.  No chest pain.  No fevers or coughing.  No leg swelling.  Patient has noted some increased difficulty with urinating.    Patient does have underlying COPD.  No home oxygen use.  No history of CHF although patient reports \"it runs in my family\".  Patient is on Plavix for anticoagulation.  Patient does have a history of chronic atrial fibrillation status post Watchman procedure.    Patient is having trouble following up with his PCP because he lost his insurance.      ROS:  General:  No fevers, no chills, no weakness  Eyes:  No recent vison changes, no discharge  ENT:  No sore throat, no nasal congestion, no hearing changes  Cardiovascular:  No chest pain, no palpitations  Respiratory:  + shortness of breath, no cough, no wheezing  Gastrointestinal:  No pain, no nausea, no vomiting, no diarrhea  Musculoskeletal:  No muscle pain, no joint pain  Skin:  No rash, no pruritis, no easy bruising  Neurologic:  No speech problems, no headache, no extremity numbness, no extremity tingling, no     Smokeless tobacco: Never   Vaping Use    Vaping Use: Never used   Substance and Sexual Activity    Alcohol use: Not Currently     Alcohol/week: 1.0 standard drink of alcohol     Types: 1 Cans of beer per week     Comment: occ. caffeine: 1 cup coffee a day    Drug use: No    Sexual activity: Yes     Partners: Female   Other Topics Concern    Not on file   Social History Narrative    Not on file     Social Determinants of Health     Financial Resource Strain: Low Risk  (5/12/2023)    Overall Financial Resource Strain (CARDIA)     Difficulty of Paying Living Expenses: Not hard at all   Food Insecurity: Not on file (5/12/2023)   Transportation Needs: Unknown (5/12/2023)    PRAPARE - Transportation     Lack of Transportation (Medical): Not on file     Lack of Transportation (Non-Medical): No   Physical Activity: Not on file   Stress: Not on file   Social Connections: Not on file   Intimate Partner Violence: Not on file   Housing Stability: Unknown (5/12/2023)    Housing Stability Vital Sign     Unable to Pay for Housing in the Last Year: Not on file     Number of Places Lived in the Last Year: Not on file     Unstable Housing in the Last Year: No     No current facility-administered medications for this encounter.     Current Outpatient Medications   Medication Sig Dispense Refill    furosemide (LASIX) 20 MG tablet Take 1 tablet by mouth daily for 3 days 3 tablet 0    sotalol (BETAPACE) 80 MG tablet Take 1 tablet by mouth 2 times daily 60 tablet 5    tamsulosin (FLOMAX) 0.4 MG capsule Take 1 capsule by mouth daily 90 capsule 1    DULoxetine (CYMBALTA) 60 MG extended release capsule Take 1 capsule by mouth daily 90 capsule 1    glyBURIDE-metFORMIN (GLUCOVANCE) 5-500 MG per tablet Take 1 tablet by mouth 2 times daily (with meals) 60 tablet 3    tiZANidine (ZANAFLEX) 2 MG tablet Take 1 tablet by mouth 2 times daily as needed (BACK PAIN AND SPASMS) AVOID ALCOHOL/CAUSES DROWSINESS 60 tablet 1    predniSONE (DELTASONE) 10

## 2024-05-04 NOTE — H&P
V2.0  History and Physical      Name:  Aramis Rosario /Age/Sex: 1960  (64 y.o. male)   MRN & CSN:  [id number] & [id number] Encounter Date/Time: 2024 5:26 PM EDT   Location:  ED26/ED-26 PCP: Tyler Diaz MD       Hospital Day: 1    Assessment and Plan:   Aramis Rosario is a 64 y.o. male     Shortness of breath-appears to have new onset acute CHF  -Admit  -IV Lasix  -Check echocardiogram      Myasthenia gravis  -On prednisone 12 mg a day which can potentially cause fluid retention  -On a clinical trial at [facility] with a daily subcu injection-continue  Atrial fibrillation status post Watchman device  -History of 4 cardioversions  -Status post ablation of atrial fibrillation and atrial flutter  Hypercoagulable state due to atrial fibrillation  Sick sinus syndrome status post pacemaker  History of blood clots  History of bleeding gastric ulcer which required transfusions  Hypertension  Obesity with BMI 36.9        Disposition:   Current Living situation: lives with wife  Expected Disposition:   Estimated D/C:     Diet No diet orders on file   DVT Prophylaxis [x] Lovenox, []  Heparin, [] SCDs, [] Ambulation,  [] Eliquis, [] Xarelto, [] Coumadin   Code Status Prior -discussed with patient, and he wants to be a full code   Surrogate Decision Maker/ POA Wife       Drugs that require monitoring for toxicity include Lovenox and the method of monitoring was CBC        History from:     patient    History of Present Illness:     Chief Complaint:   Aramis Rosario is a 64 y.o. male     I asked him what brought him to the hospital and he replied as follows.    \"I was here Monday [which was 5 days ago] in ER\"  \"I have been having shortness of breath\"  \"I have been tired\"  \"When I walk up the steps to go to the bathroom I have to take a break and I feel like I am wore out\"  \"They said I have fluid on lungs\" - referring to what he was told in ED today.    \"I have copd too\"  \"They told me it's a double whammy\"    \"I  Noncontrast images were obtained.  Multiplanar reconstructions were utilized. Maximum intensity projection images were evaluated.  Up-to-date CT equipment and radiation dose reduction techniques were employed. COMPARISON: CT of abdomen and pelvis 5/12/2023.. FINDINGS: Vasculature:Excellent opacification of pulmonary arteries. No significant filling defect identified. Lungs and pleura: Trace right effusion and right dependent airspace disease suggesting atelectasis or early pneumonia. Left lung is grossly clear. Mediastinum: Mild cardiomegaly. Previous coronary artery stent placement. The aorta is unopacified. Borderline-enlarged paratracheal lymph nodes presumably reactive. Borderline-enlarged right hilar lymph nodes. Neck and chest wall: No supraclavicular lymphadenopathy. No axillary lymphadenopathy. Upper abdomen: No acute upper abdominal abnormality. Bones: Mild superior endplate compression fracture at T4 not appreciably changed when compared to MRI from 3/8/2022. No acute osseous abnormality.     1. No evidence of pulmonary thromboembolism. 2. Trace right effusion and mild dependent airspace disease suggesting atelectasis versus less likely early pneumonia. 3. Stable T4 compression fracture. 4. Borderline enlarged mediastinal lymph nodes presumably reactive. Electronically signed by Berhane Santos MD    XR CHEST PORTABLE    Result Date: 4/29/2024  History: Shortness of breath. Comparison: 5/12/2023. Findings: Portable AP view of the chest was obtained. New mild blunting of the right costophrenic angle could indicate a trace right pleural effusion. Minimal right basilar atelectasis. Lungs otherwise clear. Pulmonary vascularity is normal. Cardiomegaly  is stable. Stable left subclavian dual-lead pacemaker. No pneumothorax.     1. New trace right pleural effusion with mild right basilar atelectasis. Electronically signed by Cachorro Jaimes MD        Electronically signed by KRAIG BRAN MD on 5/4/2024 at

## 2024-05-05 LAB
ANION GAP SERPL CALCULATED.3IONS-SCNC: 13 MMOL/L (ref 7–16)
BASE EXCESS: 4 (ref 0–3)
BASOPHILS ABSOLUTE: 0.1 K/CU MM
BASOPHILS RELATIVE PERCENT: 0.5 % (ref 0–1)
BUN SERPL-MCNC: 23 MG/DL (ref 6–23)
CALCIUM SERPL-MCNC: 9.5 MG/DL (ref 8.3–10.6)
CHLORIDE BLD-SCNC: 105 MMOL/L (ref 99–110)
CHOLEST SERPL-MCNC: 197 MG/DL
CO2: 20 MMOL/L (ref 21–32)
CREAT SERPL-MCNC: 1.2 MG/DL (ref 0.9–1.3)
DIFFERENTIAL TYPE: ABNORMAL
EOSINOPHILS ABSOLUTE: 0.2 K/CU MM
EOSINOPHILS RELATIVE PERCENT: 1.4 % (ref 0–3)
ESTIMATED AVERAGE GLUCOSE: 157 MG/DL
GFR, ESTIMATED: 67 ML/MIN/1.73M2
GLUCOSE BLD-MCNC: 148 MG/DL (ref 70–99)
GLUCOSE BLD-MCNC: 185 MG/DL (ref 70–99)
GLUCOSE BLD-MCNC: 194 MG/DL (ref 70–99)
GLUCOSE BLD-MCNC: 223 MG/DL (ref 70–99)
GLUCOSE SERPL-MCNC: 132 MG/DL (ref 70–99)
HBA1C MFR BLD: 7.1 % (ref 4.2–6.3)
HCO3 VENOUS: 20.8 MMOL/L (ref 22–29)
HCT VFR BLD CALC: 49.5 % (ref 42–52)
HDLC SERPL-MCNC: 39 MG/DL
HEMOGLOBIN: 15.1 GM/DL (ref 13.5–18)
IMMATURE NEUTROPHIL %: 0.4 % (ref 0–0.43)
LDLC SERPL CALC-MCNC: 118 MG/DL
LYMPHOCYTES ABSOLUTE: 1.9 K/CU MM
LYMPHOCYTES RELATIVE PERCENT: 18 % (ref 24–44)
MAGNESIUM: 2.4 MG/DL (ref 1.8–2.4)
MCH RBC QN AUTO: 29.3 PG (ref 27–31)
MCHC RBC AUTO-ENTMCNC: 30.5 % (ref 32–36)
MCV RBC AUTO: 95.9 FL (ref 78–100)
MONOCYTES ABSOLUTE: 1 K/CU MM
MONOCYTES RELATIVE PERCENT: 9.6 % (ref 0–4)
NEUTROPHILS ABSOLUTE: 7.4 K/CU MM
NEUTROPHILS RELATIVE PERCENT: 70.1 % (ref 36–66)
NUCLEATED RBC %: 0 %
O2 SAT, VEN: 87.1 % (ref 50–70)
PCO2, VEN: 36 MMHG (ref 41–51)
PDW BLD-RTO: 15 % (ref 11.7–14.9)
PH VENOUS: 7.37 (ref 7.32–7.43)
PLATELET # BLD: 306 K/CU MM (ref 140–440)
PMV BLD AUTO: 9.3 FL (ref 7.5–11.1)
PO2, VEN: 60 MMHG (ref 28–48)
POTASSIUM SERPL-SCNC: 5.2 MMOL/L (ref 3.5–5.1)
RBC # BLD: 5.16 M/CU MM (ref 4.6–6.2)
SODIUM BLD-SCNC: 138 MMOL/L (ref 135–145)
TOTAL IMMATURE NEUTOROPHIL: 0.04 K/CU MM
TOTAL NUCLEATED RBC: 0 K/CU MM
TRIGL SERPL-MCNC: 198 MG/DL
WBC # BLD: 10.5 K/CU MM (ref 4–10.5)

## 2024-05-05 PROCEDURE — 6370000000 HC RX 637 (ALT 250 FOR IP): Performed by: INTERNAL MEDICINE

## 2024-05-05 PROCEDURE — 85025 COMPLETE CBC W/AUTO DIFF WBC: CPT

## 2024-05-05 PROCEDURE — 82805 BLOOD GASES W/O2 SATURATION: CPT

## 2024-05-05 PROCEDURE — 36415 COLL VENOUS BLD VENIPUNCTURE: CPT

## 2024-05-05 PROCEDURE — 94761 N-INVAS EAR/PLS OXIMETRY MLT: CPT

## 2024-05-05 PROCEDURE — 94664 DEMO&/EVAL PT USE INHALER: CPT

## 2024-05-05 PROCEDURE — 80061 LIPID PANEL: CPT

## 2024-05-05 PROCEDURE — 99223 1ST HOSP IP/OBS HIGH 75: CPT | Performed by: INTERNAL MEDICINE

## 2024-05-05 PROCEDURE — 94640 AIRWAY INHALATION TREATMENT: CPT

## 2024-05-05 PROCEDURE — 80048 BASIC METABOLIC PNL TOTAL CA: CPT

## 2024-05-05 PROCEDURE — 6360000002 HC RX W HCPCS: Performed by: INTERNAL MEDICINE

## 2024-05-05 PROCEDURE — 2580000003 HC RX 258: Performed by: INTERNAL MEDICINE

## 2024-05-05 PROCEDURE — 83735 ASSAY OF MAGNESIUM: CPT

## 2024-05-05 PROCEDURE — 82962 GLUCOSE BLOOD TEST: CPT

## 2024-05-05 PROCEDURE — 1200000000 HC SEMI PRIVATE

## 2024-05-05 RX ORDER — SOTALOL HYDROCHLORIDE 80 MG/1
80 TABLET ORAL ONCE
Status: COMPLETED | OUTPATIENT
Start: 2024-05-05 | End: 2024-05-05

## 2024-05-05 RX ORDER — FUROSEMIDE 10 MG/ML
40 INJECTION INTRAMUSCULAR; INTRAVENOUS 2 TIMES DAILY
Status: DISCONTINUED | OUTPATIENT
Start: 2024-05-05 | End: 2024-05-09

## 2024-05-05 RX ADMIN — PYRIDOSTIGMINE BROMIDE 60 MG: 60 TABLET ORAL at 09:13

## 2024-05-05 RX ADMIN — ENOXAPARIN SODIUM 30 MG: 100 INJECTION SUBCUTANEOUS at 20:43

## 2024-05-05 RX ADMIN — SODIUM CHLORIDE, PRESERVATIVE FREE 10 ML: 5 INJECTION INTRAVENOUS at 10:03

## 2024-05-05 RX ADMIN — FUROSEMIDE 40 MG: 10 INJECTION, SOLUTION INTRAMUSCULAR; INTRAVENOUS at 17:58

## 2024-05-05 RX ADMIN — PYRIDOSTIGMINE BROMIDE 60 MG: 60 TABLET ORAL at 12:01

## 2024-05-05 RX ADMIN — TAMSULOSIN HYDROCHLORIDE 0.4 MG: 0.4 CAPSULE ORAL at 09:12

## 2024-05-05 RX ADMIN — PREDNISONE 2 MG: 1 TABLET ORAL at 09:14

## 2024-05-05 RX ADMIN — Medication 400 MG: at 09:12

## 2024-05-05 RX ADMIN — PREDNISONE 10 MG: 10 TABLET ORAL at 09:13

## 2024-05-05 RX ADMIN — PANTOPRAZOLE SODIUM 40 MG: 40 TABLET, DELAYED RELEASE ORAL at 09:13

## 2024-05-05 RX ADMIN — ENOXAPARIN SODIUM 30 MG: 100 INJECTION SUBCUTANEOUS at 09:13

## 2024-05-05 RX ADMIN — PYRIDOSTIGMINE BROMIDE 60 MG: 60 TABLET ORAL at 17:57

## 2024-05-05 RX ADMIN — BUDESONIDE AND FORMOTEROL FUMARATE DIHYDRATE 2 PUFF: 160; 4.5 AEROSOL RESPIRATORY (INHALATION) at 07:26

## 2024-05-05 RX ADMIN — CHOLECALCIFEROL (VITAMIN D3) 10 MCG (400 UNIT) TABLET 400 UNITS: at 09:12

## 2024-05-05 RX ADMIN — ATORVASTATIN CALCIUM 40 MG: 40 TABLET, FILM COATED ORAL at 20:44

## 2024-05-05 RX ADMIN — DULOXETINE HYDROCHLORIDE 60 MG: 30 CAPSULE, DELAYED RELEASE ORAL at 09:12

## 2024-05-05 RX ADMIN — SOTALOL HYDROCHLORIDE 80 MG: 80 TABLET ORAL at 20:44

## 2024-05-05 RX ADMIN — SOTALOL HYDROCHLORIDE 80 MG: 80 TABLET ORAL at 12:58

## 2024-05-05 RX ADMIN — Medication 1000 MCG: at 09:11

## 2024-05-05 RX ADMIN — INSULIN LISPRO 1 UNITS: 100 INJECTION, SOLUTION INTRAVENOUS; SUBCUTANEOUS at 18:16

## 2024-05-05 RX ADMIN — METOPROLOL SUCCINATE 25 MG: 25 TABLET, EXTENDED RELEASE ORAL at 20:43

## 2024-05-05 RX ADMIN — PANTOPRAZOLE SODIUM 40 MG: 40 TABLET, DELAYED RELEASE ORAL at 20:45

## 2024-05-05 RX ADMIN — FERROUS SULFATE TAB 325 MG (65 MG ELEMENTAL FE) 325 MG: 325 (65 FE) TAB at 09:12

## 2024-05-05 RX ADMIN — SODIUM CHLORIDE, PRESERVATIVE FREE 10 ML: 5 INJECTION INTRAVENOUS at 20:45

## 2024-05-05 RX ADMIN — CLOPIDOGREL BISULFATE 75 MG: 75 TABLET ORAL at 09:12

## 2024-05-05 RX ADMIN — FUROSEMIDE 40 MG: 10 INJECTION, SOLUTION INTRAMUSCULAR; INTRAVENOUS at 12:01

## 2024-05-05 RX ADMIN — PYRIDOSTIGMINE BROMIDE 60 MG: 60 TABLET ORAL at 20:43

## 2024-05-05 NOTE — PROGRESS NOTES
V2.0    [facility] Progress Note      Name:  Aramis Rosario /Age/Sex: 1960  (64 y.o. male)   MRN & CSN:  [id number] & [id number] Encounter Date/Time: 2024 12:18 PM EDT   Location:  [address] [place] [address]- PCP: Tyler Diaz MD     Attending:Mihir Patton MD       Hospital Day: 2    Assessment and Recommendations   Aramis Rosario is a 64 y.o. male     May 5  -Diuresing well  -Patient reports that he was told that he may potentially be discharged tomorrow  -Lung bases were clear on exam today    Acute heart failure with preserved ejection fraction  -No prior history of CHF per patient  -TONIO from 2023 done to follow-up on the Watchman device showed a normal ejection fraction  -TTE from this admission will be done on the next week day  -Lasix 40 mg IV twice daily  -On Toprol-XL  -Appreciate cardiology consult    CAD status post PCI of the ramus intermedius  -Troponins are elevated-likely due to type II MI with demand ischemia secondary to CHF exacerbation per cardiology, and no ischemic workup planned at this time per cardiology    Myasthenia gravis  -On prednisone 12 mg a day which can potentially cause fluid retention, but this medication is essential and the dose should not be changed except by his neurologist at [facility]  -On a clinical trial at [facility] with zilucoplan daily injections-continue while inpatient.  Reviewed package insert.  Serious IV effects include meningitis and pancreatitis.  Clinically has no signs of either.  -On pyridostigmine    Atrial fibrillation status post Watchman device  -Has had 4 cardioversions  -Has had an ablation of atrial fibrillation and atrial flutter  -Continue sotalol    Hypercoagulable state due to atrial fibrillation-status post Watchman device    Sick sinus syndrome status post pacemaker    History of blood clots    History of bleeding gastric ulcer which required transfusions    Hypertension    Obesity with BMI 36.9      Diet ADULT DIET; Regular; No Added Salt (3-4  40 mg Oral BID    cholecalciferol  400 Units Oral Daily    insulin lispro  0-4 Units SubCUTAneous TID WC    insulin lispro  0-4 Units SubCUTAneous Nightly    sodium chloride flush  5-40 mL IntraVENous 2 times per day    enoxaparin  30 mg SubCUTAneous BID    Zilucoplan Sodium  0.81 mL SubCUTAneous Nightly      Infusions:    dextrose      sodium chloride       PRN Meds: glucose, 4 tablet, PRN  dextrose bolus, 125 mL, PRN   Or  dextrose bolus, 250 mL, PRN  glucagon (rDNA), 1 mg, PRN  dextrose, , Continuous PRN  sodium chloride flush, 5-40 mL, PRN  sodium chloride, , PRN  polyethylene glycol, 17 g, Daily PRN  acetaminophen, 650 mg, Q6H PRN   Or  acetaminophen, 650 mg, Q6H PRN  potassium chloride, 40 mEq, PRN   Or  potassium alternative oral replacement, 40 mEq, PRN   Or  potassium chloride, 10 mEq, PRN  magnesium sulfate, 2,000 mg, PRN        Labs and Imaging   XR CHEST PORTABLE    Result Date: 5/4/2024  EXAM: XR CHEST PORTABLE. DATE: 5/4/2024 15:17 EDT. INDICATION: recurrent sob COMPARISON: None TECHNIQUE: Standard per department protocol. FINDINGS: Medical devices: Left chest dual-lead pacemaker is unchanged. Stable cardiomegaly. There is minimal linear scarring or atelectasis in the lung bases. Osseous structures unremarkable.     No acute radiographic abnormality of the chest. Electronically signed by Ellis Rangel MD    CTA PULMONARY W CONTRAST    Result Date: 4/29/2024  CLINICAL HISTORY: sob EXAM: CT Pulmonary Angiogram with and without contrast TECHNIQUE: Contiguous axial images were obtained from the lung apices through the bases after the administration of 80 mL of Isovue-370. Noncontrast images were obtained.  Multiplanar reconstructions were utilized. Maximum intensity projection images were evaluated.  Up-to-date CT equipment and radiation dose reduction techniques were employed. COMPARISON: CT of abdomen and pelvis 5/12/2023.. FINDINGS: Vasculature:Excellent opacification of pulmonary arteries. No

## 2024-05-05 NOTE — PLAN OF CARE
Problem: Discharge Planning  Goal: Discharge to home or other facility with appropriate resources  5/5/2024 0022 by Magda Gonzalez RN  Outcome: Progressing  5/4/2024 1849 by Navdeep Lui RN  Outcome: Progressing     Problem: Pain  Goal: Verbalizes/displays adequate comfort level or baseline comfort level  5/5/2024 0022 by Magda Gonzalez RN  Outcome: Progressing  5/4/2024 1849 by Navdeep Lui RN  Outcome: Progressing     Problem: ABCDS Injury Assessment  Goal: Absence of physical injury  5/5/2024 0022 by Magda Gonzalez RN  Outcome: Progressing  5/4/2024 1849 by Navdeep Lui RN  Outcome: Progressing

## 2024-05-05 NOTE — CONSULTS
[facility] [place] [id number]                              CONSULTATION      PATIENT NAME: JATIN BOOGIE               : 1960  MED REC NO: [id number]                      ROOM: 3010  ACCOUNT NO: [id number]                       ADMIT DATE: 2024  PROVIDER: Josafat Cuellar MD      HISTORY OF PRESENT ILLNESS:  The patient is a 64-year-old gentleman with multiple medical problems including coronary artery disease, diabetes, atrial fibrillation, myasthenia gravis, ulcerative colitis.    PAST SURGICAL HISTORY:  Remarkable for bladder surgery, cardiac surgery, cystoscopy, tonsillectomy, upper endoscopy.    FAMILY HISTORY:  Noncontributory.    SOCIAL HISTORY:  He quit smoking, but has history of smoking in the past.  No history of alcohol or drug abuse.    MEDICATIONS:  Reviewed.    ALLERGIES:  HE HAS NO KNOWN ALLERGIES.      REVIEW OF SYSTEMS:  Ten to fourteen-point review of systems was reviewed and is negative except for what is mentioned history of present illness.    PHYSICAL EXAMINATION:  GENERAL:  The patient is alert, oriented x3, in no acute respiratory distress.  VITAL SIGNS:  His blood pressure is 102/83 mmHg, pulse of 104 per minute, and respiratory rate of 14 per minute.  He is afebrile.  His saturation is 99% on room air.  HEENT:  There is mild JVD.  There is no lymphadenopathy.  NECK:  Supple.  His Mallampati score is 4.   LUNGS:  Diminished breath sounds.  Very occasional basilar crackles.   HEART:  Normal S1, S2.  ABDOMEN:  Benign.  There is no evidence of any organomegaly.  The bowel sounds are present.   NEUROLOGIC:  He is awake and responsive, answering questions appropriately, and moving his extremities.    DIAGNOSTIC DATA:  His chest x-ray showed no acute disease.  His BNP was 4814.  His BNP 5 days ago was 11,000.  His CBC showed a white count of 11.5, hemoglobin 15.2, hematocrit 48.6.  His electrolytes

## 2024-05-05 NOTE — CONSULTS
INPATIENT CARDIOLOGY CONSULT NOTE         Reason for consultation:   Shortness of breath, CHF    History of present illness:Aramis is a 64 y.o.year old who is admitted with   Chief Complaint   Patient presents with    Shortness of Breath     Pt presents to the ED with complaints of shortness of breath. States that he has COPD. States he was seen on Monday and prescribed lasix. States that he was told that he had fluid on his lungs. States that he took all of the medication and began to feel fine. Shortness of breath returned today.      Patient is a pleasant 64-year-old gentleman known to our practice, follows up with Dr. Bamuann as outpatient presents to the hospital with chief complaint of shortness of breath and lower extremity edema.  Patient has prior history of myasthenia gravis, sick sinus syndrome status post pacer, coronary disease s/p PCI, history of atrial fibrillation and GI bleeding s/p Watchman procedure and ablation, presents to the hospital with shortness of breath.    Patient mentioned that he lost his insurance a month ago and has been somewhat noncompliant with medication however he has been taking his aspirin and Plavix regularly.    proBNP is elevated at 4000      Last echocardiogram in June 2023 showed EF of 55%      Last left heart cardiac catheterization in October 2022 showed patent stent in the ramus intermedius otherwise mild luminal irregularities.    EKG shows sinus rhythm with first-degree AV block    Pertinent Lab Personally Review     Recent Labs     05/05/24  0148   WBC 10.5   HGB 15.1   HCT 49.5         Recent Labs     05/05/24  0148      K 5.2*      CO2 20*   BUN 23   CREATININE 1.2     Recent Labs     05/04/24  1545   AST 22   ALT 34   BILITOT 0.4   ALKPHOS 74     Lab Results   Component Value Date    PROBNP 4,814 (H) 05/04/2024    PROBNP 11,303 (H) 04/29/2024    PROBNP 1,879 (H) 07/03/2021         Past medical history:    has a past medical history of  Arthritis, Atrial fibrillation, chronic ([facility]), Cerebrovascular accident ([facility]), Closed displaced fracture of anterior column of right acetabulum ([facility]), COPD (chronic obstructive pulmonary disease) ([facility]), Coronary artery disease involving native coronary artery of native heart without angina pectoris, Diabetes ([facility]), Diabetic neuropathy associated with type 2 diabetes mellitus ([facility]), H/O echocardiogram, History of blood transfusion, Hx of blood clots, Hx of cardiovascular stress test, Hx of cardiovascular stress test, Hx of Doppler echocardiogram, Hx of Doppler ultrasound, Hypertension, Ileostomy in place ([facility]), Myasthenia gravis ([facility]), Nephrolithiasis, NSTEMI (non-ST elevated myocardial infarction) ([facility]), Post PTCA, Presence of Watchman left atrial appendage closure device, S/P ablation of atrial fibrillation, TIA (transient ischemic attack), and Ulcerative colitis ([facility]).  Past surgical history:   has a past surgical history that includes Jejunostomy; Tonsillectomy; Cardiac surgery; Percutaneous Transluminal Coronary Angio (12/14/2020); Upper gastrointestinal endoscopy (N/A, 06/15/2021); Upper gastrointestinal endoscopy (N/A, 07/04/2021); Pacemaker insertion (N/A, 07/07/2021); other surgical history (N/A, 04/13/2023); Bladder surgery (Right, 5/13/2023); Lithotripsy (Right, 8/28/2023); and Cystoscopy (N/A, 8/28/2023).  Social History:   reports that he quit smoking about 3 years ago. His smoking use included cigarettes. He has never used smokeless tobacco. He reports that he does not currently use alcohol after a past usage of about 1.0 standard drink of alcohol per week. He reports that he does not use drugs.  Family history:   no family history of CAD, STROKE of DM    No Known Allergies    furosemide (LASIX) injection 40 mg, BID  sotalol (BETAPACE) tablet 80 mg, Once  magnesium oxide (MAG-OX) tablet 400 mg, Daily  budesonide-formoterol (SYMBICORT) 160-4.5 MCG/ACT inhaler 2 puff, BID  tiotropium (SPIRIVA RESPIMAT)

## 2024-05-06 ENCOUNTER — APPOINTMENT (OUTPATIENT)
Dept: NON INVASIVE DIAGNOSTICS | Age: 64
End: 2024-05-06
Attending: INTERNAL MEDICINE

## 2024-05-06 PROBLEM — I50.33 ACUTE ON CHRONIC DIASTOLIC CONGESTIVE HEART FAILURE (HCC): Status: ACTIVE | Noted: 2024-05-06

## 2024-05-06 LAB
ALBUMIN SERPL-MCNC: 4.2 GM/DL (ref 3.4–5)
ALP BLD-CCNC: 73 IU/L (ref 40–128)
ALT SERPL-CCNC: 31 U/L (ref 10–40)
ANION GAP SERPL CALCULATED.3IONS-SCNC: 14 MMOL/L (ref 7–16)
AST SERPL-CCNC: 28 IU/L (ref 15–37)
BASOPHILS ABSOLUTE: 0.1 K/CU MM
BASOPHILS RELATIVE PERCENT: 0.6 % (ref 0–1)
BILIRUB SERPL-MCNC: 0.7 MG/DL (ref 0–1)
BUN SERPL-MCNC: 26 MG/DL (ref 6–23)
CALCIUM SERPL-MCNC: 9.4 MG/DL (ref 8.3–10.6)
CHLORIDE BLD-SCNC: 103 MMOL/L (ref 99–110)
CO2: 21 MMOL/L (ref 21–32)
CREAT SERPL-MCNC: 1.4 MG/DL (ref 0.9–1.3)
DIFFERENTIAL TYPE: ABNORMAL
ECHO AO ASC DIAM: 3.8 CM
ECHO AO ASCENDING AORTA INDEX: 1.6 CM/M2
ECHO AO ROOT DIAM: 3.3 CM
ECHO AO ROOT INDEX: 1.39 CM/M2
ECHO AV AREA PEAK VELOCITY: 2.4 CM2
ECHO AV AREA VTI: 2.7 CM2
ECHO AV AREA/BSA PEAK VELOCITY: 1 CM2/M2
ECHO AV AREA/BSA VTI: 1.1 CM2/M2
ECHO AV MEAN GRADIENT: 3 MMHG
ECHO AV MEAN VELOCITY: 0.9 M/S
ECHO AV PEAK GRADIENT: 4 MMHG
ECHO AV PEAK VELOCITY: 1 M/S
ECHO AV VELOCITY RATIO: 0.8
ECHO AV VTI: 15.1 CM
ECHO BSA: 2.45 M2
ECHO EST RA PRESSURE: 3 MMHG
ECHO IVC PROX: 1.9 CM
ECHO LA AREA 4C: 27.3 CM2
ECHO LA DIAMETER INDEX: 2.03 CM/M2
ECHO LA DIAMETER: 4.8 CM
ECHO LA MAJOR AXIS: 6.8 CM
ECHO LA TO AORTIC ROOT RATIO: 1.45
ECHO LA VOL MOD A4C: 90 ML (ref 18–58)
ECHO LA VOLUME INDEX MOD A4C: 38 ML/M2 (ref 16–34)
ECHO LV E' LATERAL VELOCITY: 10 CM/S
ECHO LV EDV A4C: 129 ML
ECHO LV EDV INDEX A4C: 54 ML/M2
ECHO LV EJECTION FRACTION A4C: 24 %
ECHO LV ESV A4C: 99 ML
ECHO LV ESV INDEX A4C: 42 ML/M2
ECHO LV FRACTIONAL SHORTENING: 11 % (ref 28–44)
ECHO LV INTERNAL DIMENSION DIASTOLE INDEX: 2.24 CM/M2
ECHO LV INTERNAL DIMENSION DIASTOLIC: 5.3 CM (ref 4.2–5.9)
ECHO LV INTERNAL DIMENSION SYSTOLIC INDEX: 1.98 CM/M2
ECHO LV INTERNAL DIMENSION SYSTOLIC: 4.7 CM
ECHO LV IVSD: 1.3 CM (ref 0.6–1)
ECHO LV MASS 2D: 271.6 G (ref 88–224)
ECHO LV MASS INDEX 2D: 114.6 G/M2 (ref 49–115)
ECHO LV POSTERIOR WALL DIASTOLIC: 1.2 CM (ref 0.6–1)
ECHO LV RELATIVE WALL THICKNESS RATIO: 0.45
ECHO LVOT AREA: 3.1 CM2
ECHO LVOT AV VTI INDEX: 0.87
ECHO LVOT DIAM: 2 CM
ECHO LVOT MEAN GRADIENT: 1 MMHG
ECHO LVOT PEAK GRADIENT: 2 MMHG
ECHO LVOT PEAK VELOCITY: 0.8 M/S
ECHO LVOT STROKE VOLUME INDEX: 17.4 ML/M2
ECHO LVOT SV: 41.1 ML
ECHO LVOT VTI: 13.1 CM
ECHO MV A VELOCITY: 0.36 M/S
ECHO MV E DECELERATION TIME (DT): 173 MS
ECHO MV E VELOCITY: 0.83 M/S
ECHO MV E/A RATIO: 2.31
ECHO MV E/E' LATERAL: 8.3
ECHO RIGHT VENTRICULAR SYSTOLIC PRESSURE (RVSP): 29 MMHG
ECHO RV MID DIMENSION: 3.7 CM
ECHO TV REGURGITANT MAX VELOCITY: 2.54 M/S
ECHO TV REGURGITANT PEAK GRADIENT: 26 MMHG
EOSINOPHILS ABSOLUTE: 0.3 K/CU MM
EOSINOPHILS RELATIVE PERCENT: 2.9 % (ref 0–3)
GFR, ESTIMATED: 56 ML/MIN/1.73M2
GLUCOSE BLD-MCNC: 151 MG/DL (ref 70–99)
GLUCOSE BLD-MCNC: 209 MG/DL (ref 70–99)
GLUCOSE BLD-MCNC: 291 MG/DL (ref 70–99)
GLUCOSE BLD-MCNC: 320 MG/DL (ref 70–99)
GLUCOSE SERPL-MCNC: 134 MG/DL (ref 70–99)
HCT VFR BLD CALC: 49.3 % (ref 42–52)
HEMOGLOBIN: 15.5 GM/DL (ref 13.5–18)
IMMATURE NEUTROPHIL %: 0.5 % (ref 0–0.43)
LYMPHOCYTES ABSOLUTE: 1.8 K/CU MM
LYMPHOCYTES RELATIVE PERCENT: 20.8 % (ref 24–44)
MAGNESIUM: 2.3 MG/DL (ref 1.8–2.4)
MCH RBC QN AUTO: 30 PG (ref 27–31)
MCHC RBC AUTO-ENTMCNC: 31.4 % (ref 32–36)
MCV RBC AUTO: 95.5 FL (ref 78–100)
MONOCYTES ABSOLUTE: 0.9 K/CU MM
MONOCYTES RELATIVE PERCENT: 9.8 % (ref 0–4)
NEUTROPHILS ABSOLUTE: 5.7 K/CU MM
NEUTROPHILS RELATIVE PERCENT: 65.4 % (ref 36–66)
NUCLEATED RBC %: 0 %
PDW BLD-RTO: 14.6 % (ref 11.7–14.9)
PHOSPHORUS: 4.6 MG/DL (ref 2.5–4.9)
PLATELET # BLD: 274 K/CU MM (ref 140–440)
PMV BLD AUTO: 9.1 FL (ref 7.5–11.1)
POTASSIUM SERPL-SCNC: 4.3 MMOL/L (ref 3.5–5.1)
PRO-BNP: 1693 PG/ML
PROCALCITONIN SERPL-MCNC: 0.06 NG/ML
RBC # BLD: 5.16 M/CU MM (ref 4.6–6.2)
SODIUM BLD-SCNC: 138 MMOL/L (ref 135–145)
TOTAL IMMATURE NEUTOROPHIL: 0.04 K/CU MM
TOTAL NUCLEATED RBC: 0 K/CU MM
TOTAL PROTEIN: 7.3 GM/DL (ref 6.4–8.2)
WBC # BLD: 8.7 K/CU MM (ref 4–10.5)

## 2024-05-06 PROCEDURE — C8929 TTE W OR WO FOL WCON,DOPPLER: HCPCS

## 2024-05-06 PROCEDURE — 84100 ASSAY OF PHOSPHORUS: CPT

## 2024-05-06 PROCEDURE — 1200000000 HC SEMI PRIVATE

## 2024-05-06 PROCEDURE — 2580000003 HC RX 258: Performed by: INTERNAL MEDICINE

## 2024-05-06 PROCEDURE — 36415 COLL VENOUS BLD VENIPUNCTURE: CPT

## 2024-05-06 PROCEDURE — APPNB180 APP NON BILLABLE TIME > 60 MINS: Performed by: NURSE PRACTITIONER

## 2024-05-06 PROCEDURE — 93306 TTE W/DOPPLER COMPLETE: CPT | Performed by: INTERNAL MEDICINE

## 2024-05-06 PROCEDURE — 83735 ASSAY OF MAGNESIUM: CPT

## 2024-05-06 PROCEDURE — 94640 AIRWAY INHALATION TREATMENT: CPT

## 2024-05-06 PROCEDURE — 6370000000 HC RX 637 (ALT 250 FOR IP): Performed by: INTERNAL MEDICINE

## 2024-05-06 PROCEDURE — 6370000000 HC RX 637 (ALT 250 FOR IP): Performed by: NURSE PRACTITIONER

## 2024-05-06 PROCEDURE — 6360000004 HC RX CONTRAST MEDICATION: Performed by: INTERNAL MEDICINE

## 2024-05-06 PROCEDURE — 6360000002 HC RX W HCPCS: Performed by: INTERNAL MEDICINE

## 2024-05-06 PROCEDURE — 80053 COMPREHEN METABOLIC PANEL: CPT

## 2024-05-06 PROCEDURE — 82962 GLUCOSE BLOOD TEST: CPT

## 2024-05-06 PROCEDURE — 93005 ELECTROCARDIOGRAM TRACING: CPT | Performed by: INTERNAL MEDICINE

## 2024-05-06 PROCEDURE — 94761 N-INVAS EAR/PLS OXIMETRY MLT: CPT

## 2024-05-06 PROCEDURE — 85025 COMPLETE CBC W/AUTO DIFF WBC: CPT

## 2024-05-06 PROCEDURE — 84145 PROCALCITONIN (PCT): CPT

## 2024-05-06 PROCEDURE — 83880 ASSAY OF NATRIURETIC PEPTIDE: CPT

## 2024-05-06 PROCEDURE — 99233 SBSQ HOSP IP/OBS HIGH 50: CPT | Performed by: INTERNAL MEDICINE

## 2024-05-06 RX ORDER — SPIRONOLACTONE 50 MG/1
25 TABLET, FILM COATED ORAL DAILY
Status: DISCONTINUED | OUTPATIENT
Start: 2024-05-06 | End: 2024-05-09 | Stop reason: HOSPADM

## 2024-05-06 RX ORDER — LOSARTAN POTASSIUM 25 MG/1
25 TABLET ORAL EVERY EVENING
Status: DISCONTINUED | OUTPATIENT
Start: 2024-05-07 | End: 2024-05-09 | Stop reason: HOSPADM

## 2024-05-06 RX ADMIN — METOPROLOL SUCCINATE 25 MG: 25 TABLET, EXTENDED RELEASE ORAL at 21:20

## 2024-05-06 RX ADMIN — PERFLUTREN 1.5 ML: 6.52 INJECTION, SUSPENSION INTRAVENOUS at 09:56

## 2024-05-06 RX ADMIN — ATORVASTATIN CALCIUM 40 MG: 40 TABLET, FILM COATED ORAL at 21:20

## 2024-05-06 RX ADMIN — DULOXETINE HYDROCHLORIDE 60 MG: 30 CAPSULE, DELAYED RELEASE ORAL at 10:34

## 2024-05-06 RX ADMIN — ENOXAPARIN SODIUM 30 MG: 100 INJECTION SUBCUTANEOUS at 10:35

## 2024-05-06 RX ADMIN — INSULIN LISPRO 2 UNITS: 100 INJECTION, SOLUTION INTRAVENOUS; SUBCUTANEOUS at 12:37

## 2024-05-06 RX ADMIN — EMPAGLIFLOZIN 10 MG: 10 TABLET, FILM COATED ORAL at 17:44

## 2024-05-06 RX ADMIN — BUDESONIDE AND FORMOTEROL FUMARATE DIHYDRATE 2 PUFF: 160; 4.5 AEROSOL RESPIRATORY (INHALATION) at 10:32

## 2024-05-06 RX ADMIN — PYRIDOSTIGMINE BROMIDE 60 MG: 60 TABLET ORAL at 17:45

## 2024-05-06 RX ADMIN — PANTOPRAZOLE SODIUM 40 MG: 40 TABLET, DELAYED RELEASE ORAL at 10:34

## 2024-05-06 RX ADMIN — TAMSULOSIN HYDROCHLORIDE 0.4 MG: 0.4 CAPSULE ORAL at 10:34

## 2024-05-06 RX ADMIN — CHOLECALCIFEROL (VITAMIN D3) 10 MCG (400 UNIT) TABLET 400 UNITS: at 10:34

## 2024-05-06 RX ADMIN — SODIUM CHLORIDE, PRESERVATIVE FREE 10 ML: 5 INJECTION INTRAVENOUS at 21:26

## 2024-05-06 RX ADMIN — FUROSEMIDE 40 MG: 10 INJECTION, SOLUTION INTRAMUSCULAR; INTRAVENOUS at 17:45

## 2024-05-06 RX ADMIN — ENOXAPARIN SODIUM 30 MG: 100 INJECTION SUBCUTANEOUS at 21:20

## 2024-05-06 RX ADMIN — PREDNISONE 2 MG: 1 TABLET ORAL at 10:36

## 2024-05-06 RX ADMIN — CLOPIDOGREL BISULFATE 75 MG: 75 TABLET ORAL at 10:34

## 2024-05-06 RX ADMIN — FUROSEMIDE 40 MG: 10 INJECTION, SOLUTION INTRAMUSCULAR; INTRAVENOUS at 10:33

## 2024-05-06 RX ADMIN — PYRIDOSTIGMINE BROMIDE 60 MG: 60 TABLET ORAL at 21:19

## 2024-05-06 RX ADMIN — PREDNISONE 10 MG: 10 TABLET ORAL at 10:35

## 2024-05-06 RX ADMIN — PYRIDOSTIGMINE BROMIDE 60 MG: 60 TABLET ORAL at 10:33

## 2024-05-06 RX ADMIN — TIOTROPIUM BROMIDE INHALATION SPRAY 2 PUFF: 3.12 SPRAY, METERED RESPIRATORY (INHALATION) at 10:32

## 2024-05-06 RX ADMIN — SODIUM CHLORIDE, PRESERVATIVE FREE 10 ML: 5 INJECTION INTRAVENOUS at 10:41

## 2024-05-06 RX ADMIN — FERROUS SULFATE TAB 325 MG (65 MG ELEMENTAL FE) 325 MG: 325 (65 FE) TAB at 10:34

## 2024-05-06 RX ADMIN — INSULIN LISPRO 3 UNITS: 100 INJECTION, SOLUTION INTRAVENOUS; SUBCUTANEOUS at 17:44

## 2024-05-06 RX ADMIN — BUDESONIDE AND FORMOTEROL FUMARATE DIHYDRATE 2 PUFF: 160; 4.5 AEROSOL RESPIRATORY (INHALATION) at 20:57

## 2024-05-06 RX ADMIN — PYRIDOSTIGMINE BROMIDE 60 MG: 60 TABLET ORAL at 12:38

## 2024-05-06 RX ADMIN — PANTOPRAZOLE SODIUM 40 MG: 40 TABLET, DELAYED RELEASE ORAL at 21:20

## 2024-05-06 RX ADMIN — SPIRONOLACTONE 25 MG: 50 TABLET ORAL at 17:44

## 2024-05-06 RX ADMIN — Medication 400 MG: at 10:33

## 2024-05-06 RX ADMIN — SOTALOL HYDROCHLORIDE 80 MG: 80 TABLET ORAL at 21:19

## 2024-05-06 RX ADMIN — Medication 1000 MCG: at 10:34

## 2024-05-06 NOTE — CARE COORDINATION
Pt lives with family.  Pt has a PCP. Pt does not have insurance.  Financial counselor will follow up with pt regarding Medicaid or financial assistance.  LSW made referral to [facility] to help with any discharging meds.  No other needs at this time.

## 2024-05-06 NOTE — PROGRESS NOTES
Met with patient and spouse. Introduced myself as the Heart failure education RKAREEM. Patient works as a . He lost his insurance last month and has not been able to afford medications and physician appts.   Social Work, Carol, is working with patient to complete [facility] paperwork. Patient is also applying for Medicaid.      Admitting diagnosis-Dyspnea  Cardiologist- Pastor  Heart Failure Education Nurse consulted- yes   Ejection fraction 20 % as of 5/4/24  Pro BNP- 4814 on 5/4/24  Hospital follow up appt- Patient requests to schedule his own follow up when Medicaid is approved.    STrongly encouraged to keep regular follow up for medication optimization.  Cardiac rehabilitation referral- discussed with patient. States he will consider.  ICD information- discussed possibility  Patient informed of Heart Failure Clinic at the [facility]  Smoking Cessation information and referral- N/A   Pneumonia vaccine-   PCP- Joe   Patient has a digital scale? Yes   Transportation- family shares one vehicle     Able to obtain medications without difficulty?- see above   Heart failure specific Medications- Metoprolol,  Lasix, Potassium    Reviewed Heart failure patient education book with patient. Heart failure education included: Type of heart failure, How it is diagnosed, causes, symptoms, medications, diet, daily weights, exercise and fluid control. Recommendation for Mediterranean or DASH diet made.  Questions answered.      Patient's heart failure medications reviewed and information given on each.     Reviewed the Stop Light Handout with patient and instructed when to call his provider.   Patient was engaged and attentive during education session.    The following handouts were reviewed with patient and patient was given a copy of the following : Heart Failure education booklet, the 'Stop Light' Handout,  a link to the American [employer] Association's Healthier Living with Heart Failure Interactive

## 2024-05-06 NOTE — PROGRESS NOTES
4 Eyes Skin Assessment     NAME:  Aramis Rosario  YOB: 1960  MEDICAL RECORD NUMBER:  [id number]    The patient is being assessed for  Admission    I agree that at least one RN has performed a thorough Head to Toe Skin Assessment on the patient. ALL assessment sites listed below have been assessed.      Areas assessed by both nurses:    Head, Face, Ears, Shoulders, Back, Chest, Arms, Elbows, Hands, Sacrum. Buttock, Coccyx, Ischium, Legs. Feet and Heels, and Under Medical Devices         Does the Patient have a Wound? No noted wound(s)       Niol Prevention initiated by RN: No  Wound Care Orders initiated by RN: No    Pressure Injury (Stage 3,4, Unstageable, DTI, NWPT, and Complex wounds) if present, place Wound referral order by RN under : No    New Ostomies, if present place, Ostomy referral order under : Yes     Nurse 1 eSignature: Electronically signed by Bryan Mcdermott RN on 5/6/24 at 11:05 AM EDT    **SHARE this note so that the co-signing nurse can place an eSignature**    Nurse 2 eSignature: Electronically signed by Monet Cantu RN on 5/6/24 at 11:05 AM EDT

## 2024-05-06 NOTE — PROGRESS NOTES
CARDIOLOGY PROGRESS NOTE                                              Name:  Aramis Rosario /Age/Sex: 1960  (64 y.o. male)   MRN & CSN:  [id number] & [id number] Admission Date/Time: 2024  2:50 PM   Location:  [address] [place] [address] PCP: Tyler Diza MD       Admit Date:  2024  Hospital Day: 3      SUBJECTIVE:     Seen patient as follow up as consultation for chf / shortness of breath    No chest pain.  shortness of breath is improved   No palpations    TELEMETRY: Atrial fibrillation       No intake or output data in the 24 hours ending 24 1615        CARDIOLOGY ATTENDING ADDENDUM    I have seen, spoken to and examined this patient personally, independent of the NP/PAC. I have reviewed the hospital care given to date and reviewed all pertinent labs and imaging. I have spoken with patient, nursing staff and provided written and verbal instructions .The above note has been reviewed. I have spent substantive (>50%) amount of time in formulating patient care.            Physical Exam:       Head: normal  Eye: normal  Chest:   + Basilar crackles   Cardiovascular:  S1S2           MEDICAL DECISION MAKING :            Acute heart Failure: Systolic heart failure    Last echocardiogram shows preserved LV ejection fraction  Echocardiogram 2024 shows significantly reduced EF  Will proceed with left heart cardiac catheterization in the morning  Continue with IV Lasix 40 mg twice daily  Obtain serial chest x-rays to assess for response  Recommend close monitoring of daily weights  Strict intake output monitoring  Low-salt diet  Continue with Toprol-XL  Start losartan 25 mg tomorrow night  Start aldactone 25 mg daily  Start SLGT2i - jardiance 10 mg daily  Will need assistance for cost.        History of coronary disease s/p PCI to ramus intermedius  Elevated Troponins:   Due to reduced EF will check C in morning.   He denies chest pain  Continue with Plavix 75 mg daily  Risk factor modification      Sick  sinus syndrome status post pacemaker  atrial fibrillation s/p watchman and ablation procedure  Continue with sotalol  Will rate control for now. If ischemia induced may improve post LHC.   Plavix only due to watchman.   History of GI bleeding with stable hemoglobin/hematocrit.  Hyperlipidemia: Continue with statin therapy  Hypomagnesemia: Replete magnesium       Dr. KATARINA Zapata MD      +++++++++++++++++++++++++++++++++++++++        Electronically signed by JAMIE Russell - CNP on 5/6/2024 at 4:15 PM

## 2024-05-06 NOTE — PROGRESS NOTES
V2.0    [facility] [place]      Name:  Aramis Rosario /Age/Sex: 1960  (64 y.o. male)   MRN & CSN:  [id number] & [id number] Encounter Date/Time: 2024 12:18 PM EDT   Location:  [address] [place] [address]- PCP: Tyler Diaz MD     Attending:Rachel Blake MD       Hospital Day: 3    Assessment and Recommendations   Aramis Rosario is a 64 y.o. male     May 6  -Echo with new EF of 20%  Plan for [facility] tomorrow    May 5  -Diuresing well  -Patient reports that he was told that he may potentially be discharged tomorrow  -Lung bases were clear on exam today    Acute heart failure with preserved ejection fraction  -No prior history of CHF per patient  -TONIO from 2023 done to follow-up on the Watchman device showed a normal ejection fraction  -TTE from this admission will be done on the next week day  -Lasix 40 mg IV twice daily  -On Toprol-XL  -Appreciate cardiology consult    CAD status post PCI of the ramus intermedius  -Troponins are elevated-likely due to type II MI with demand ischemia secondary to CHF exacerbation per cardiology, and no ischemic workup planned at this time per cardiology    Myasthenia gravis  -On prednisone 12 mg a day which can potentially cause fluid retention, but this medication is essential and the dose should not be changed except by his neurologist at [facility]  -On a clinical trial at [facility] with zilucoplan daily injections-continue while inpatient.  Reviewed package insert.  Serious IV effects include meningitis and pancreatitis.  Clinically has no signs of either.  -On pyridostigmine    Atrial fibrillation status post Watchman device  -Has had 4 cardioversions  -Has had an ablation of atrial fibrillation and atrial flutter  -Continue sotalol    Hypercoagulable state due to atrial fibrillation-status post Watchman device    Sick sinus syndrome status post pacemaker    History of blood clots    History of bleeding gastric ulcer which required transfusions    Hypertension    Obesity with BMI  Trace right effusion and right dependent airspace disease suggesting atelectasis or early pneumonia. Left lung is grossly clear. Mediastinum: Mild cardiomegaly. Previous coronary artery stent placement. The aorta is unopacified. Borderline-enlarged paratracheal lymph nodes presumably reactive. Borderline-enlarged right hilar lymph nodes. Neck and chest wall: No supraclavicular lymphadenopathy. No axillary lymphadenopathy. Upper abdomen: No acute upper abdominal abnormality. Bones: Mild superior endplate compression fracture at T4 not appreciably changed when compared to MRI from 3/8/2022. No acute osseous abnormality.     1. No evidence of pulmonary thromboembolism. 2. Trace right effusion and mild dependent airspace disease suggesting atelectasis versus less likely early pneumonia. 3. Stable T4 compression fracture. 4. Borderline enlarged mediastinal lymph nodes presumably reactive. Electronically signed by Berhane Santos MD    XR CHEST PORTABLE    Result Date: 4/29/2024  History: Shortness of breath. Comparison: 5/12/2023. Findings: Portable AP view of the chest was obtained. New mild blunting of the right costophrenic angle could indicate a trace right pleural effusion. Minimal right basilar atelectasis. Lungs otherwise clear. Pulmonary vascularity is normal. Cardiomegaly  is stable. Stable left subclavian dual-lead pacemaker. No pneumothorax.     1. New trace right pleural effusion with mild right basilar atelectasis. Electronically signed by Cachorro Jaimes MD      CBC:   Recent Labs     05/04/24  1545 05/05/24  0148   WBC 11.5* 10.5   HGB 15.2 15.1    306     BMP:    Recent Labs     05/04/24  1545 05/05/24  0148   * 138   K 5.3* 5.2*    105   CO2 17* 20*   BUN 18 23   CREATININE 1.1 1.2   GLUCOSE 175* 132*     Hepatic:   Recent Labs     05/04/24  1545   AST 22   ALT 34   BILITOT 0.4   ALKPHOS 74     Lipids:   Lab Results   Component Value Date/Time    CHOL 197 05/05/2024 01:48 AM    HDL 39

## 2024-05-06 NOTE — CONSULTS
Comprehensive Nutrition Assessment    Type and Reason for Visit:  Initial, Consult, Positive Nutrition Screen, Patient Education (heart failure ed, wt loss, poor intake/appetite)    Nutrition Recommendations/Plan:   Continue current diet     Malnutrition Assessment:  Malnutrition Status:  No malnutrition (05/06/24 1314)    Context:  Acute Illness       Nutrition Assessment:    Pt admitted with acute on chronic diastolic heart failure. H/O myasthenia gravis, CAD s/p PCI, COPD, chronic atrial fibrillation s/p Watchman procedure and pacer placement. He reports a lower appetite and some wt loss PTA. Eating better here, consuming greater than half to all of meals, denies need for oral supplement. His wt is fairly stable over the past yr, mild fluid fluctuation noted. Lost from 285 lb to 265 lb a couple yrs ago per Epic history. Provided/reviewed Heart Failure Nutrition Therapy (Nutrition Care Manual) and Plan Your Portions (ADA). Will continue to follow as moderate nutrition risk.    Nutrition Related Findings:    BUN 26, Cr 1.4, GFR 56, Glu 291, A1c 7.1   Wound Type: None       Current Nutrition Intake & Therapies:    Average Meal Intake: 51-75%, %  Average Supplements Intake: None Ordered  ADULT DIET; Regular; No Added Salt (3-4 gm)    Anthropometric Measures:  Height: 180.3 cm (5' 11\")  Ideal Body Weight (IBW): 172 lbs (78 kg)    Admission Body Weight: 120 kg (264 lb 8.8 oz)  Current Body Weight: 119.7 kg (263 lb 14.3 oz),   IBW.    Current BMI (kg/m2): 36.8  Usual Body Weight: 121.6 kg (268 lb) (5/23/23)  % Weight Change (Calculated): -1.5                    BMI Categories: Obese Class 2 (BMI 35.0 -39.9)    Estimated Daily Nutrient Needs:  Energy Requirements Based On: Formula  Weight Used for Energy Requirements: Current  Energy (kcal/day): 2400 (MSJ)  Weight Used for Protein Requirements: Ideal  Protein (g/day): 78-94 (1-1.2 g/kg IBW)  Method Used for Fluid Requirements: 1 ml/kcal  Fluid (ml/day): per  Cardiology    Nutrition Diagnosis:   Predicted inadequate energy intake related to (poor appetite in illness) as evidenced by poor intake prior to admission    Nutrition Interventions:   Food and/or Nutrient Delivery: Continue Current Diet  Nutrition Education/Counseling: Education completed  Coordination of Nutrition Care: Continue to monitor while inpatient  Plan of Care discussed with: Pt, SO    Goals:     Goals: Meet at least 75% of estimated needs       Nutrition Monitoring and Evaluation:   Behavioral-Environmental Outcomes: None Identified  Food/Nutrient Intake Outcomes: Food and Nutrient Intake  Physical Signs/Symptoms Outcomes: Biochemical Data, Chewing or Swallowing, Fluid Status or Edema, Meal Time Behavior, Nutrition Focused Physical Findings, Weight    Discharge Planning:    No discharge needs at this time     Ligia Kilgore RD, LD  Contact: [phone]

## 2024-05-06 NOTE — CARE COORDINATION
Received referral from CM.  Patient may need assistance with discharge meds.  Will follow patient as requested.

## 2024-05-06 NOTE — PROGRESS NOTES
Pulmonary and Critical Care  Progress Note    Subjective:   The patient has improved.Apnea link results not available.  Shortness of breath has improved  Chest pain none.  Addressing respiratory complaints Patient is negative for  hemoptysis and cyanosis  CONSTITUTIONAL:  negative for fevers and chills      Past Medical History:     has a past medical history of Arthritis, Atrial fibrillation, chronic ([facility]), Cerebrovascular accident ([facility]), Closed displaced fracture of anterior column of right acetabulum ([facility]), COPD (chronic obstructive pulmonary disease) ([facility]), Coronary artery disease involving native coronary artery of native heart without angina pectoris, Diabetes ([facility]), Diabetic neuropathy associated with type 2 diabetes mellitus ([facility]), H/O echocardiogram, History of blood transfusion, Hx of blood clots, Hx of cardiovascular stress test, Hx of cardiovascular stress test, Hx of Doppler echocardiogram, Hx of Doppler ultrasound, Hypertension, Ileostomy in place ([facility]), Myasthenia gravis ([facility]), Nephrolithiasis, NSTEMI (non-ST elevated myocardial infarction) ([facility]), Post PTCA, Presence of Watchman left atrial appendage closure device, S/P ablation of atrial fibrillation, TIA (transient ischemic attack), and Ulcerative colitis ([facility]).   has a past surgical history that includes Jejunostomy; Tonsillectomy; Cardiac surgery; Percutaneous Transluminal Coronary Angio (12/14/2020); Upper gastrointestinal endoscopy (N/A, 06/15/2021); Upper gastrointestinal endoscopy (N/A, 07/04/2021); Pacemaker insertion (N/A, 07/07/2021); other surgical history (N/A, 04/13/2023); Bladder surgery (Right, 5/13/2023); Lithotripsy (Right, 8/28/2023); and Cystoscopy (N/A, 8/28/2023).   reports that he quit smoking about 3 years ago. His smoking use included cigarettes. He has never used smokeless tobacco. He reports that he does not currently use alcohol after a past usage of about 1.0 standard drink of alcohol per week. He reports that he does not  use drugs.  Family history:  Family history is unknown by patient.    No Known Allergies  Social History:    Reviewed; no changes    Objective:   PHYSICAL EXAM:        VITALS:  BP 98/84   Pulse (!) 104   Temp 98 °F (36.7 °C) (Oral)   Resp 16   Ht 1.803 m (5' 11\")   Wt 119.7 kg (264 lb)   SpO2 97%   BMI 36.82 kg/m²     24HR INTAKE/OUTPUT:  No intake or output data in the 24 hours ending 05/06/24 1111    CONSTITUTIONAL:  awake, alert, cooperative, no apparent distress, and appears stated age  LUNGS:  decreased breath sounds  CARDIOVASCULAR:  normal S1 and S2 and negative JVD  ABD:Abdomen soft, non-tender. BS normal. No masses,  No organomegaly  NEURO:Alert and oriented x3. Gait normal. Reflexes and motor strength normal and symmetric. Cranial nerves 2-12 and sensation grossly intact.  DATA:    CBC:  Recent Labs     05/04/24  1545 05/05/24  0148 05/06/24  0754   WBC 11.5* 10.5 8.7   RBC 5.12 5.16 5.16   HGB 15.2 15.1 15.5   HCT 48.6 49.5 49.3    306 274   MCV 94.9 95.9 95.5   MCH 29.7 29.3 30.0   MCHC 31.3* 30.5* 31.4*   RDW 14.9 15.0* 14.6      BMP:  Recent Labs     05/04/24  1545 05/05/24  0148 05/06/24  0754   * 138 138   K 5.3* 5.2* 4.3    105 103   CO2 17* 20* 21   BUN 18 23 26*   CREATININE 1.1 1.2 1.4*   CALCIUM 9.4 9.5 9.4   GLUCOSE 175* 132* 134*      ABG:  No results for input(s): \"PH\", \"PO2ART\", \"ZHQ9IXH\", \"HCO3\", \"BEART\", \"O2SAT\" in the last 72 hours.  Lab Results   Component Value Date    PROBNP 1,693 (H) 05/06/2024    PROBNP 4,814 (H) 05/04/2024    PROBNP 11,303 (H) 04/29/2024     No results found for: \"CULTRESP\"    Radiology Review:  Pertinent images / reports were reviewed as a part of this visit.    Assessment:     Patient Active Problem List   Diagnosis    Myasthenia gravis (HCC)    Unspecified ptosis of bilateral eyelids    SOB (shortness of breath)    Atrial fibrillation, chronic (HCC)    Post PTCA    NSTEMI (non-ST elevated myocardial infarction) ([facility])    Atrial

## 2024-05-07 ENCOUNTER — CARE COORDINATION (OUTPATIENT)
Dept: CARE COORDINATION | Age: 64
End: 2024-05-07

## 2024-05-07 LAB
ANION GAP SERPL CALCULATED.3IONS-SCNC: 13 MMOL/L (ref 7–16)
BASOPHILS ABSOLUTE: 0.1 K/CU MM
BASOPHILS RELATIVE PERCENT: 0.5 % (ref 0–1)
BUN SERPL-MCNC: 25 MG/DL (ref 6–23)
CALCIUM SERPL-MCNC: 9.3 MG/DL (ref 8.3–10.6)
CHLORIDE BLD-SCNC: 104 MMOL/L (ref 99–110)
CO2: 22 MMOL/L (ref 21–32)
CREAT SERPL-MCNC: 1.4 MG/DL (ref 0.9–1.3)
DIFFERENTIAL TYPE: ABNORMAL
ECHO BSA: 2.45 M2
EOSINOPHILS ABSOLUTE: 0.2 K/CU MM
EOSINOPHILS RELATIVE PERCENT: 2.3 % (ref 0–3)
GFR, ESTIMATED: 56 ML/MIN/1.73M2
GLUCOSE BLD-MCNC: 147 MG/DL (ref 70–99)
GLUCOSE BLD-MCNC: 160 MG/DL (ref 70–99)
GLUCOSE BLD-MCNC: 172 MG/DL (ref 70–99)
GLUCOSE BLD-MCNC: 207 MG/DL (ref 70–99)
GLUCOSE SERPL-MCNC: 132 MG/DL (ref 70–99)
HCT VFR BLD CALC: 49.3 % (ref 42–52)
HEMOGLOBIN: 15.8 GM/DL (ref 13.5–18)
IMMATURE NEUTROPHIL %: 0.5 % (ref 0–0.43)
LYMPHOCYTES ABSOLUTE: 1.8 K/CU MM
LYMPHOCYTES RELATIVE PERCENT: 18.2 % (ref 24–44)
MAGNESIUM: 2.5 MG/DL (ref 1.8–2.4)
MCH RBC QN AUTO: 29.7 PG (ref 27–31)
MCHC RBC AUTO-ENTMCNC: 32 % (ref 32–36)
MCV RBC AUTO: 92.7 FL (ref 78–100)
MONOCYTES ABSOLUTE: 0.9 K/CU MM
MONOCYTES RELATIVE PERCENT: 9.4 % (ref 0–4)
NEUTROPHILS ABSOLUTE: 6.9 K/CU MM
NEUTROPHILS RELATIVE PERCENT: 69.1 % (ref 36–66)
NUCLEATED RBC %: 0 %
PDW BLD-RTO: 14.2 % (ref 11.7–14.9)
PLATELET # BLD: 278 K/CU MM (ref 140–440)
PMV BLD AUTO: 9.2 FL (ref 7.5–11.1)
POTASSIUM SERPL-SCNC: 4.5 MMOL/L (ref 3.5–5.1)
PRO-BNP: 1234 PG/ML
RBC # BLD: 5.32 M/CU MM (ref 4.6–6.2)
SODIUM BLD-SCNC: 139 MMOL/L (ref 135–145)
TOTAL IMMATURE NEUTOROPHIL: 0.05 K/CU MM
TOTAL NUCLEATED RBC: 0 K/CU MM
WBC # BLD: 10 K/CU MM (ref 4–10.5)

## 2024-05-07 PROCEDURE — 83735 ASSAY OF MAGNESIUM: CPT

## 2024-05-07 PROCEDURE — 94761 N-INVAS EAR/PLS OXIMETRY MLT: CPT

## 2024-05-07 PROCEDURE — 6360000004 HC RX CONTRAST MEDICATION: Performed by: INTERNAL MEDICINE

## 2024-05-07 PROCEDURE — 99233 SBSQ HOSP IP/OBS HIGH 50: CPT | Performed by: INTERNAL MEDICINE

## 2024-05-07 PROCEDURE — 36415 COLL VENOUS BLD VENIPUNCTURE: CPT

## 2024-05-07 PROCEDURE — 2140000000 HC CCU INTERMEDIATE R&B

## 2024-05-07 PROCEDURE — 80048 BASIC METABOLIC PNL TOTAL CA: CPT

## 2024-05-07 PROCEDURE — 6370000000 HC RX 637 (ALT 250 FOR IP): Performed by: INTERNAL MEDICINE

## 2024-05-07 PROCEDURE — 2580000003 HC RX 258: Performed by: INTERNAL MEDICINE

## 2024-05-07 PROCEDURE — 93458 L HRT ARTERY/VENTRICLE ANGIO: CPT | Performed by: INTERNAL MEDICINE

## 2024-05-07 PROCEDURE — 2709999900 HC NON-CHARGEABLE SUPPLY: Performed by: INTERNAL MEDICINE

## 2024-05-07 PROCEDURE — B2111ZZ FLUOROSCOPY OF MULTIPLE CORONARY ARTERIES USING LOW OSMOLAR CONTRAST: ICD-10-PCS | Performed by: INTERNAL MEDICINE

## 2024-05-07 PROCEDURE — C1769 GUIDE WIRE: HCPCS | Performed by: INTERNAL MEDICINE

## 2024-05-07 PROCEDURE — 6370000000 HC RX 637 (ALT 250 FOR IP): Performed by: NURSE PRACTITIONER

## 2024-05-07 PROCEDURE — B2151ZZ FLUOROSCOPY OF LEFT HEART USING LOW OSMOLAR CONTRAST: ICD-10-PCS | Performed by: INTERNAL MEDICINE

## 2024-05-07 PROCEDURE — 85025 COMPLETE CBC W/AUTO DIFF WBC: CPT

## 2024-05-07 PROCEDURE — 94640 AIRWAY INHALATION TREATMENT: CPT

## 2024-05-07 PROCEDURE — 83880 ASSAY OF NATRIURETIC PEPTIDE: CPT

## 2024-05-07 PROCEDURE — 99223 1ST HOSP IP/OBS HIGH 75: CPT | Performed by: INTERNAL MEDICINE

## 2024-05-07 PROCEDURE — 2500000003 HC RX 250 WO HCPCS: Performed by: INTERNAL MEDICINE

## 2024-05-07 PROCEDURE — 6360000002 HC RX W HCPCS: Performed by: INTERNAL MEDICINE

## 2024-05-07 PROCEDURE — 4A023N7 MEASUREMENT OF CARDIAC SAMPLING AND PRESSURE, LEFT HEART, PERCUTANEOUS APPROACH: ICD-10-PCS | Performed by: INTERNAL MEDICINE

## 2024-05-07 PROCEDURE — C1894 INTRO/SHEATH, NON-LASER: HCPCS | Performed by: INTERNAL MEDICINE

## 2024-05-07 PROCEDURE — 93005 ELECTROCARDIOGRAM TRACING: CPT | Performed by: INTERNAL MEDICINE

## 2024-05-07 PROCEDURE — 82962 GLUCOSE BLOOD TEST: CPT

## 2024-05-07 RX ORDER — NITROGLYCERIN 20 MG/100ML
INJECTION INTRAVENOUS PRN
Status: DISCONTINUED | OUTPATIENT
Start: 2024-05-07 | End: 2024-05-07 | Stop reason: HOSPADM

## 2024-05-07 RX ORDER — VERAPAMIL HYDROCHLORIDE 2.5 MG/ML
INJECTION, SOLUTION INTRAVENOUS PRN
Status: DISCONTINUED | OUTPATIENT
Start: 2024-05-07 | End: 2024-05-07 | Stop reason: HOSPADM

## 2024-05-07 RX ORDER — 0.9 % SODIUM CHLORIDE 0.9 %
INTRAVENOUS SOLUTION INTRAVENOUS CONTINUOUS PRN
Status: COMPLETED | OUTPATIENT
Start: 2024-05-07 | End: 2024-05-07

## 2024-05-07 RX ORDER — SODIUM CHLORIDE 9 MG/ML
INJECTION, SOLUTION INTRAVENOUS PRN
Status: DISCONTINUED | OUTPATIENT
Start: 2024-05-07 | End: 2024-05-09 | Stop reason: HOSPADM

## 2024-05-07 RX ORDER — ACETAMINOPHEN 325 MG/1
650 TABLET ORAL EVERY 4 HOURS PRN
Status: DISCONTINUED | OUTPATIENT
Start: 2024-05-07 | End: 2024-05-09 | Stop reason: HOSPADM

## 2024-05-07 RX ORDER — SODIUM CHLORIDE 0.9 % (FLUSH) 0.9 %
5-40 SYRINGE (ML) INJECTION PRN
Status: DISCONTINUED | OUTPATIENT
Start: 2024-05-07 | End: 2024-05-09 | Stop reason: HOSPADM

## 2024-05-07 RX ORDER — SODIUM CHLORIDE 0.9 % (FLUSH) 0.9 %
5-40 SYRINGE (ML) INJECTION EVERY 12 HOURS SCHEDULED
Status: DISCONTINUED | OUTPATIENT
Start: 2024-05-07 | End: 2024-05-09 | Stop reason: HOSPADM

## 2024-05-07 RX ORDER — HEPARIN SODIUM 10000 [USP'U]/ML
INJECTION, SOLUTION INTRAVENOUS; SUBCUTANEOUS PRN
Status: DISCONTINUED | OUTPATIENT
Start: 2024-05-07 | End: 2024-05-07 | Stop reason: HOSPADM

## 2024-05-07 RX ADMIN — BUDESONIDE AND FORMOTEROL FUMARATE DIHYDRATE 2 PUFF: 160; 4.5 AEROSOL RESPIRATORY (INHALATION) at 10:55

## 2024-05-07 RX ADMIN — CHOLECALCIFEROL (VITAMIN D3) 10 MCG (400 UNIT) TABLET 400 UNITS: at 11:35

## 2024-05-07 RX ADMIN — TIOTROPIUM BROMIDE INHALATION SPRAY 2 PUFF: 3.12 SPRAY, METERED RESPIRATORY (INHALATION) at 10:55

## 2024-05-07 RX ADMIN — Medication 400 MG: at 11:35

## 2024-05-07 RX ADMIN — SOTALOL HYDROCHLORIDE 80 MG: 80 TABLET ORAL at 20:29

## 2024-05-07 RX ADMIN — FUROSEMIDE 40 MG: 10 INJECTION, SOLUTION INTRAMUSCULAR; INTRAVENOUS at 11:32

## 2024-05-07 RX ADMIN — ATORVASTATIN CALCIUM 40 MG: 40 TABLET, FILM COATED ORAL at 20:29

## 2024-05-07 RX ADMIN — FUROSEMIDE 40 MG: 10 INJECTION, SOLUTION INTRAMUSCULAR; INTRAVENOUS at 17:57

## 2024-05-07 RX ADMIN — SODIUM CHLORIDE, PRESERVATIVE FREE 10 ML: 5 INJECTION INTRAVENOUS at 20:46

## 2024-05-07 RX ADMIN — SODIUM CHLORIDE, PRESERVATIVE FREE 10 ML: 5 INJECTION INTRAVENOUS at 11:33

## 2024-05-07 RX ADMIN — Medication 1000 MCG: at 11:34

## 2024-05-07 RX ADMIN — LOSARTAN POTASSIUM 25 MG: 25 TABLET, FILM COATED ORAL at 17:58

## 2024-05-07 RX ADMIN — TAMSULOSIN HYDROCHLORIDE 0.4 MG: 0.4 CAPSULE ORAL at 11:34

## 2024-05-07 RX ADMIN — SPIRONOLACTONE 25 MG: 50 TABLET ORAL at 11:34

## 2024-05-07 RX ADMIN — PYRIDOSTIGMINE BROMIDE 60 MG: 60 TABLET ORAL at 20:29

## 2024-05-07 RX ADMIN — FERROUS SULFATE TAB 325 MG (65 MG ELEMENTAL FE) 325 MG: 325 (65 FE) TAB at 11:35

## 2024-05-07 RX ADMIN — SODIUM CHLORIDE, PRESERVATIVE FREE 10 ML: 5 INJECTION INTRAVENOUS at 20:45

## 2024-05-07 RX ADMIN — PYRIDOSTIGMINE BROMIDE 60 MG: 60 TABLET ORAL at 11:35

## 2024-05-07 RX ADMIN — DULOXETINE HYDROCHLORIDE 60 MG: 30 CAPSULE, DELAYED RELEASE ORAL at 11:34

## 2024-05-07 RX ADMIN — METOPROLOL SUCCINATE 25 MG: 25 TABLET, EXTENDED RELEASE ORAL at 20:29

## 2024-05-07 RX ADMIN — PANTOPRAZOLE SODIUM 40 MG: 40 TABLET, DELAYED RELEASE ORAL at 11:34

## 2024-05-07 RX ADMIN — PREDNISONE 2 MG: 1 TABLET ORAL at 11:35

## 2024-05-07 RX ADMIN — PYRIDOSTIGMINE BROMIDE 60 MG: 60 TABLET ORAL at 17:57

## 2024-05-07 RX ADMIN — PREDNISONE 10 MG: 10 TABLET ORAL at 11:35

## 2024-05-07 RX ADMIN — SOTALOL HYDROCHLORIDE 80 MG: 80 TABLET ORAL at 11:34

## 2024-05-07 RX ADMIN — PANTOPRAZOLE SODIUM 40 MG: 40 TABLET, DELAYED RELEASE ORAL at 20:29

## 2024-05-07 RX ADMIN — ENOXAPARIN SODIUM 30 MG: 100 INJECTION SUBCUTANEOUS at 20:28

## 2024-05-07 ASSESSMENT — ENCOUNTER SYMPTOMS
COLOR CHANGE: 0
NAUSEA: 0
BLOOD IN STOOL: 0
BACK PAIN: 0
PHOTOPHOBIA: 0
VOMITING: 0
CONSTIPATION: 0
COUGH: 0
DIARRHEA: 0
EYE PAIN: 0
CHEST TIGHTNESS: 0
SHORTNESS OF BREATH: 1
ABDOMINAL PAIN: 0
WHEEZING: 0

## 2024-05-07 ASSESSMENT — PAIN SCALES - GENERAL
PAINLEVEL_OUTOF10: 0
PAINLEVEL_OUTOF10: 0

## 2024-05-07 NOTE — PROGRESS NOTES
Pt identified as a candidate for COPD/GOLD assessment.  Patient would benefit if a pft were to be ordered, after discharged and able to complete PFT testing as an out-patient. Pt identified as a candidate for COPD/GOLD assessment.  Patient would benefit if a pft were to be ordered, after discharged and able to complete PFT testing as an out-patient.

## 2024-05-07 NOTE — PROGRESS NOTES
Patient left floor for heart cath. Personal belongings taken to room 3111. Family at bedside and aware of patient transfer. Patient denies any further needs at this time.

## 2024-05-07 NOTE — PROGRESS NOTES
Procedure note  Left cardiac catheterization selective coronary angiography and LV pressures    Indication  Low EF    Findings  Left main is a large tubular vessel has no significant stenosis  Left and descending artery is calcified has luminal irregularities does not appear to be have any flow-limiting lesions  The diagonals have diffuse disease  The circumflex vessel has mild disease  The ramus has a stent which is patent  The right coronary artery has a stenosis in its mid segment which is about 50% unchanged from the previous cath is a dominant vessel  The LV pressures were normal    Assessment and plan  Will continue with medical therapy patient probably need negative chronotropic given that his tachycardia is probably causing his cardiomyopathy  Will continue with medical therapy  Patient already has a Watchman device and a pacemaker might need upgrade to ICD

## 2024-05-07 NOTE — PROGRESS NOTES
Pulmonary and Critical Care  Progress Note    Subjective:   The patient has improved.Apnea link consistent with mod LENIN.AHI 16.  Shortness of breath has improved  Chest pain none.  Addressing respiratory complaints Patient is negative for  hemoptysis and cyanosis  CONSTITUTIONAL:  negative for fevers and chills      Past Medical History:     has a past medical history of Arthritis, Atrial fibrillation, chronic ([facility]), Cerebrovascular accident ([facility]), Closed displaced fracture of anterior column of right acetabulum ([facility]), COPD (chronic obstructive pulmonary disease) ([facility]), Coronary artery disease involving native coronary artery of native heart without angina pectoris, Diabetes ([facility]), Diabetic neuropathy associated with type 2 diabetes mellitus ([facility]), H/O echocardiogram, History of blood transfusion, Hx of blood clots, Hx of cardiovascular stress test, Hx of cardiovascular stress test, Hx of Doppler echocardiogram, Hx of Doppler ultrasound, Hypertension, Ileostomy in place ([facility]), Myasthenia gravis ([facility]), Nephrolithiasis, NSTEMI (non-ST elevated myocardial infarction) ([facility]), Post PTCA, Presence of Watchman left atrial appendage closure device, S/P ablation of atrial fibrillation, TIA (transient ischemic attack), and Ulcerative colitis ([facility]).   has a past surgical history that includes Jejunostomy; Tonsillectomy; Cardiac surgery; Percutaneous Transluminal Coronary Angio (12/14/2020); Upper gastrointestinal endoscopy (N/A, 06/15/2021); Upper gastrointestinal endoscopy (N/A, 07/04/2021); Pacemaker insertion (N/A, 07/07/2021); other surgical history (N/A, 04/13/2023); Bladder surgery (Right, 5/13/2023); Lithotripsy (Right, 8/28/2023); and Cystoscopy (N/A, 8/28/2023).   reports that he quit smoking about 3 years ago. His smoking use included cigarettes. He has never used smokeless tobacco. He reports that he does not currently use alcohol after a past usage of about 1.0 standard drink of alcohol per week. He reports that he

## 2024-05-07 NOTE — CARE COORDINATION
[facility]ist on unit to complete a application for monthly help.  MA is following for help on discharging meds.

## 2024-05-07 NOTE — PLAN OF CARE
Problem: Discharge Planning  Goal: Discharge to home or other facility with appropriate resources  5/7/2024 1150 by Ruchi Allison RN  Outcome: Progressing  Flowsheets (Taken 5/7/2024 0749)  Discharge to home or other facility with appropriate resources:   Identify barriers to discharge with patient and caregiver   Arrange for needed discharge resources and transportation as appropriate   Identify discharge learning needs (meds, wound care, etc)   Refer to discharge planning if patient needs post-hospital services based on physician order or complex needs related to functional status, cognitive ability or social support system  5/6/2024 2323 by Magda Goznalez RN  Outcome: Progressing     Problem: Pain  Goal: Verbalizes/displays adequate comfort level or baseline comfort level  5/7/2024 1150 by Ruchi Allison RN  Outcome: Progressing  Flowsheets (Taken 5/7/2024 0745)  Verbalizes/displays adequate comfort level or baseline comfort level:   Encourage patient to monitor pain and request assistance   Assess pain using appropriate pain scale   Administer analgesics based on type and severity of pain and evaluate response  5/6/2024 2323 by Magda Gonzalez RN  Outcome: Progressing     Problem: ABCDS Injury Assessment  Goal: Absence of physical injury  5/7/2024 1150 by Ruchi Allison RN  Outcome: Progressing  Flowsheets (Taken 5/7/2024 0749)  Absence of Physical Injury: Implement safety measures based on patient assessment  5/6/2024 2323 by Magda Gonzalez RN  Outcome: Progressing     Problem: Nutrition Deficit:  Goal: Optimize nutritional status  5/7/2024 1150 by Ruchi Allison RN  Outcome: Progressing  5/6/2024 2323 by Magda Gonzalez RN  Outcome: Progressing     Problem: Safety - Adult  Goal: Free from fall injury  5/7/2024 1150 by Ruchi Allison RN  Outcome: Progressing  Flowsheets (Taken 5/7/2024 0749)  Free From Fall Injury: Based on caregiver fall risk screen, instruct family/caregiver to ask for assistance

## 2024-05-07 NOTE — PROGRESS NOTES
Daily    tamsulosin  0.4 mg Oral Daily    clopidogrel  75 mg Oral Daily    pantoprazole  40 mg Oral BID    cholecalciferol  400 Units Oral Daily    insulin lispro  0-4 Units SubCUTAneous TID WC    insulin lispro  0-4 Units SubCUTAneous Nightly    sodium chloride flush  5-40 mL IntraVENous 2 times per day    enoxaparin  30 mg SubCUTAneous BID    Zilucoplan Sodium  0.81 mL SubCUTAneous Nightly      Infusions:    sodium chloride      dextrose      sodium chloride       PRN Meds: sodium chloride flush, 5-40 mL, PRN  sodium chloride, , PRN  acetaminophen, 650 mg, Q4H PRN  glucose, 4 tablet, PRN  dextrose bolus, 125 mL, PRN   Or  dextrose bolus, 250 mL, PRN  glucagon (rDNA), 1 mg, PRN  dextrose, , Continuous PRN  sodium chloride flush, 5-40 mL, PRN  sodium chloride, , PRN  polyethylene glycol, 17 g, Daily PRN  acetaminophen, 650 mg, Q6H PRN  potassium chloride, 40 mEq, PRN   Or  potassium alternative oral replacement, 40 mEq, PRN   Or  potassium chloride, 10 mEq, PRN  magnesium sulfate, 2,000 mg, PRN        Labs and Imaging   XR CHEST PORTABLE    Result Date: 5/4/2024  EXAM: XR CHEST PORTABLE. DATE: 5/4/2024 15:17 EDT. INDICATION: recurrent sob COMPARISON: None TECHNIQUE: Standard per department protocol. FINDINGS: Medical devices: Left chest dual-lead pacemaker is unchanged. Stable cardiomegaly. There is minimal linear scarring or atelectasis in the lung bases. Osseous structures unremarkable.     No acute radiographic abnormality of the chest. Electronically signed by Ellis Rangel MD    CTA PULMONARY W CONTRAST    Result Date: 4/29/2024  CLINICAL HISTORY: sob EXAM: CT Pulmonary Angiogram with and without contrast TECHNIQUE: Contiguous axial images were obtained from the lung apices through the bases after the administration of 80 mL of Isovue-370. Noncontrast images were obtained.  Multiplanar reconstructions were utilized. Maximum intensity projection images were evaluated.  Up-to-date CT equipment and radiation  dose reduction techniques were employed. COMPARISON: CT of abdomen and pelvis 5/12/2023.. FINDINGS: Vasculature:Excellent opacification of pulmonary arteries. No significant filling defect identified. Lungs and pleura: Trace right effusion and right dependent airspace disease suggesting atelectasis or early pneumonia. Left lung is grossly clear. Mediastinum: Mild cardiomegaly. Previous coronary artery stent placement. The aorta is unopacified. Borderline-enlarged paratracheal lymph nodes presumably reactive. Borderline-enlarged right hilar lymph nodes. Neck and chest wall: No supraclavicular lymphadenopathy. No axillary lymphadenopathy. Upper abdomen: No acute upper abdominal abnormality. Bones: Mild superior endplate compression fracture at T4 not appreciably changed when compared to MRI from 3/8/2022. No acute osseous abnormality.     1. No evidence of pulmonary thromboembolism. 2. Trace right effusion and mild dependent airspace disease suggesting atelectasis versus less likely early pneumonia. 3. Stable T4 compression fracture. 4. Borderline enlarged mediastinal lymph nodes presumably reactive. Electronically signed by Berhane Santos MD    XR CHEST PORTABLE    Result Date: 4/29/2024  History: Shortness of breath. Comparison: 5/12/2023. Findings: Portable AP view of the chest was obtained. New mild blunting of the right costophrenic angle could indicate a trace right pleural effusion. Minimal right basilar atelectasis. Lungs otherwise clear. Pulmonary vascularity is normal. Cardiomegaly  is stable. Stable left subclavian dual-lead pacemaker. No pneumothorax.     1. New trace right pleural effusion with mild right basilar atelectasis. Electronically signed by Cachorro Jaimes MD      CBC:   Recent Labs     05/05/24  0148 05/06/24  0754 05/07/24  0306   WBC 10.5 8.7 10.0   HGB 15.1 15.5 15.8    274 278     BMP:    Recent Labs     05/05/24  0148 05/06/24  0754 05/07/24  0306    138 139   K 5.2* 4.3 4.5

## 2024-05-07 NOTE — CONSULTS
also?    Diabetic neuropathy associated with type 2 diabetes mellitus ([facility])     H/O echocardiogram 02/02/2021    EF is estimated at 50%.presence of PVC affected LVEF estimation, Mild TR.    History of blood transfusion     Hx of blood clots     Hx of cardiovascular stress test 06/11/2021    Small size severe intensity,perfusion defect in apical wall.    Hx of cardiovascular stress test 09/14/2022    Decreased uptake inferiorly due to diaphragmatic artifact partially reversible inferioapical perfusion defect noted.    Hx of Doppler echocardiogram 09/14/2022    EF 50% Grade I diastolic dysfunction. mildly dilated LA. Mod TR. Mild pulmonary htn.    Hx of Doppler ultrasound 04/09/2021    Left distal SSV shows occlusive chronic SVT.    Hypertension     Ileostomy in place ([facility]) 1971    Myasthenia gravis ([facility]) 2017    sees Dr Echavarria and is controlled on a research injection 40mg daily, also mestinon and chr prednisone 12mg daily    Nephrolithiasis 12/30/2022    nonobstructive on u/s 12/22    NSTEMI (non-ST elevated myocardial infarction) ([facility]) 12/2020    Post PTCA 12/14/2020    Ramus Stented.    Presence of Watchman left atrial appendage closure device     S/P ablation of atrial fibrillation 09/08/2021    S/P ablation of afib PVI performed by Dr. Henderson.    TIA (transient ischemic attack)     Ulcerative colitis ([facility])        Surgical history :   Past Surgical History:   Procedure Laterality Date    BLADDER SURGERY Right 5/13/2023    CYSTOSCOPY RETROGRADE PYELOGRAM STENT INSERTION performed by Vivien Padilla MD at [facility] OR    CARDIAC SURGERY      ablation 9/8/21- Dr Henderson    CYSTOSCOPY N/A 8/28/2023    CYSTOSCOPY RETROGRADE PYELOGRAM performed by Vivien Padilla MD at [facility] OR    ILEOSTOMY OR JEJUNOSTOMY      total colectomy at [facility] for ulc colitis    LITHOTRIPSY Right 8/28/2023    RIGHT CYSTOSCOPY URETEROSCOPY  RETROGRADE PYELOGRAM STONE MANIPULATION HOLIIUM  LASER LITHOTRIPSY POSS STENT PLACEMENT performed by   Extraocular Movements: Extraocular movements intact.      Conjunctiva/sclera: Conjunctivae normal.      Pupils: Pupils are equal, round, and reactive to light.   Cardiovascular:      Rate and Rhythm: Regular rhythm. Tachycardia present.      Heart sounds: No murmur (grade 2/6 systolic murmur) heard.  Pulmonary:      Effort: Pulmonary effort is normal. No respiratory distress.      Breath sounds: Rhonchi and rales present. No wheezing.   Abdominal:      General: Abdomen is flat. Bowel sounds are normal. There is no distension.      Palpations: Abdomen is soft.      Tenderness: There is no abdominal tenderness.   Musculoskeletal:         General: No tenderness.      Cervical back: Normal range of motion. No tenderness.      Right lower leg: Edema present.      Left lower leg: Edema present.   Skin:     General: Skin is warm.   Neurological:      General: No focal deficit present.      Mental Status: He is alert and oriented to person, place, and time.      Cranial Nerves: No cranial nerve deficit.   Psychiatric:         Mood and Affect: Mood normal.               CBC:   Lab Results   Component Value Date/Time    WBC 10.0 05/07/2024 03:06 AM    HGB 15.8 05/07/2024 03:06 AM    HCT 49.3 05/07/2024 03:06 AM     05/07/2024 03:06 AM     Lipids:  Lab Results   Component Value Date    CHOL 197 05/05/2024    TRIG 198 (H) 05/05/2024    HDL 39 (L) 05/05/2024    LDLDIRECT 92 09/21/2022     PT/INR:   Lab Results   Component Value Date/Time    INR 0.86 06/07/2023 09:28 AM        BMP:    Lab Results   Component Value Date     05/07/2024    K 4.5 05/07/2024     05/07/2024    CO2 22 05/07/2024    BUN 25 (H) 05/07/2024     CMP:   Lab Results   Component Value Date    AST 28 05/06/2024    ALT 31 05/06/2024    BILITOT 0.7 05/06/2024    ALKPHOS 73 05/06/2024     TSH:    Lab Results   Component Value Date/Time    TSH 1.61 09/21/2022 08:19 AM       EKGINTERPRETATION - EKG Interpretation:      Device Assessment:      The

## 2024-05-07 NOTE — CARE COORDINATION
Outreach deferred due to IP stay at [facility]. Pt was referred to Med Assist and will apply for medicaid.     MARY plan of care: SW will make next outreach to Pt upon d/c home. MARY scheduled next outreach for 5/14.

## 2024-05-07 NOTE — PLAN OF CARE
Problem: Discharge Planning  Goal: Discharge to home or other facility with appropriate resources  Outcome: Progressing     Problem: Pain  Goal: Verbalizes/displays adequate comfort level or baseline comfort level  Outcome: Progressing     Problem: ABCDS Injury Assessment  Goal: Absence of physical injury  Outcome: Progressing     Problem: Nutrition Deficit:  Goal: Optimize nutritional status  Outcome: Progressing     Problem: Safety - Adult  Goal: Free from fall injury  Outcome: Progressing

## 2024-05-07 NOTE — PROGRESS NOTES
CARDIOLOGY PROGRESS NOTE                                              Name:  Aramis Rosario /Age/Sex: 1960  (64 y.o. male)   MRN & CSN:  [id number] & [id number] Admission Date/Time: 2024  2:50 PM   Location:  [facility]/3111-A PCP: Tyler Diaz MD       Admit Date:  2024  Hospital Day: 4      SUBJECTIVE:     Seen patient as follow up as consultation for chf / shortness of breath    No chest pain.  shortness of breath is improved   No palpations    TELEMETRY: Atrial fibrillation, nonsustained V. tach      Physical Exam:       Head: normal  Eye: normal  Chest:   + Basilar crackles   Cardiovascular:  S1S2           MEDICAL DECISION MAKING :            Acute heart Failure: Systolic heart failure    Echocardiogram 2024 shows significantly reduced EF  Will proceed with left heart cardiac catheterization   Continue with IV Lasix 40 mg twice daily  Recommend close monitoring of daily weights  Strict intake output monitoring  Low-salt diet      Continue with Toprol-XL  Start losartan 25 mg    Start aldactone 25 mg daily tomorrow  Start SLGT2i - jardiance 10 mg daily  Will need assistance for cost.        History of coronary disease s/p PCI to ramus intermedius  Elevated Troponins:   Due to reduced EF will check LHC today, discussed with Dr. Romero  He denies chest pain  Continue with Plavix 75 mg daily  Risk factor modification      Sick sinus syndrome status post pacemaker  atrial fibrillation s/p watchman and ablation procedure  Frequent PVCs and nonsustained V. tach on device interrogation    ?  Tachycardia induced cardiomyopathy    Request EP evaluation  Continue with sotalol  Will rate control for now. If ischemia induced may improve post LHC.   Plavix only due to watchman.     Can consider cardioversion    History of GI bleeding with stable hemoglobin/hematocrit.  Hyperlipidemia: Continue with statin therapy  Hypomagnesemia: Replete magnesium       Dr. KATARINA Zapata,

## 2024-05-08 ENCOUNTER — CARE COORDINATION (OUTPATIENT)
Dept: CARE COORDINATION | Age: 64
End: 2024-05-08

## 2024-05-08 ENCOUNTER — APPOINTMENT (OUTPATIENT)
Dept: NON INVASIVE DIAGNOSTICS | Age: 64
End: 2024-05-08
Attending: INTERNAL MEDICINE

## 2024-05-08 ENCOUNTER — ANESTHESIA EVENT (OUTPATIENT)
Dept: NON INVASIVE DIAGNOSTICS | Age: 64
DRG: 286 | End: 2024-05-08

## 2024-05-08 ENCOUNTER — ANESTHESIA (OUTPATIENT)
Dept: NON INVASIVE DIAGNOSTICS | Age: 64
DRG: 286 | End: 2024-05-08

## 2024-05-08 PROBLEM — I51.9 SEVERE LEFT VENTRICULAR SYSTOLIC DYSFUNCTION: Status: ACTIVE | Noted: 2024-05-08

## 2024-05-08 PROBLEM — I51.89 SEVERE LEFT VENTRICULAR SYSTOLIC DYSFUNCTION: Status: ACTIVE | Noted: 2024-05-08

## 2024-05-08 LAB
ANION GAP SERPL CALCULATED.3IONS-SCNC: 12 MMOL/L (ref 7–16)
BUN SERPL-MCNC: 27 MG/DL (ref 6–23)
CALCIUM SERPL-MCNC: 9.2 MG/DL (ref 8.3–10.6)
CHLORIDE BLD-SCNC: 103 MMOL/L (ref 99–110)
CO2: 21 MMOL/L (ref 21–32)
CREAT SERPL-MCNC: 1.5 MG/DL (ref 0.9–1.3)
ECHO BSA: 2.4 M2
EKG ATRIAL RATE: 102 BPM
EKG ATRIAL RATE: 52 BPM
EKG ATRIAL RATE: 63 BPM
EKG DIAGNOSIS: NORMAL
EKG P AXIS: 57 DEGREES
EKG P-R INTERVAL: 282 MS
EKG Q-T INTERVAL: 374 MS
EKG Q-T INTERVAL: 392 MS
EKG Q-T INTERVAL: 424 MS
EKG QRS DURATION: 118 MS
EKG QRS DURATION: 124 MS
EKG QRS DURATION: 126 MS
EKG QTC CALCULATION (BAZETT): 510 MS
EKG QTC CALCULATION (BAZETT): 533 MS
EKG QTC CALCULATION (BAZETT): 552 MS
EKG R AXIS: -34 DEGREES
EKG R AXIS: -76 DEGREES
EKG R AXIS: 35 DEGREES
EKG T AXIS: -53 DEGREES
EKG T AXIS: -61 DEGREES
EKG T AXIS: -75 DEGREES
EKG VENTRICULAR RATE: 102 BPM
EKG VENTRICULAR RATE: 111 BPM
EKG VENTRICULAR RATE: 112 BPM
GFR, ESTIMATED: 51 ML/MIN/1.73M2
GLUCOSE BLD-MCNC: 153 MG/DL (ref 70–99)
GLUCOSE BLD-MCNC: 162 MG/DL (ref 70–99)
GLUCOSE BLD-MCNC: 185 MG/DL (ref 70–99)
GLUCOSE SERPL-MCNC: 145 MG/DL (ref 70–99)
MAGNESIUM: 2.7 MG/DL (ref 1.8–2.4)
POTASSIUM SERPL-SCNC: 4 MMOL/L (ref 3.5–5.1)
SODIUM BLD-SCNC: 136 MMOL/L (ref 135–145)

## 2024-05-08 PROCEDURE — 3700000000 HC ANESTHESIA ATTENDED CARE: Performed by: INTERNAL MEDICINE

## 2024-05-08 PROCEDURE — 6370000000 HC RX 637 (ALT 250 FOR IP): Performed by: INTERNAL MEDICINE

## 2024-05-08 PROCEDURE — 2140000000 HC CCU INTERMEDIATE R&B

## 2024-05-08 PROCEDURE — 2580000003 HC RX 258: Performed by: INTERNAL MEDICINE

## 2024-05-08 PROCEDURE — 93010 ELECTROCARDIOGRAM REPORT: CPT | Performed by: INTERNAL MEDICINE

## 2024-05-08 PROCEDURE — 93321 DOPPLER ECHO F-UP/LMTD STD: CPT | Performed by: INTERNAL MEDICINE

## 2024-05-08 PROCEDURE — 94640 AIRWAY INHALATION TREATMENT: CPT

## 2024-05-08 PROCEDURE — 2500000003 HC RX 250 WO HCPCS: Performed by: NURSE ANESTHETIST, CERTIFIED REGISTERED

## 2024-05-08 PROCEDURE — 36415 COLL VENOUS BLD VENIPUNCTURE: CPT

## 2024-05-08 PROCEDURE — 99233 SBSQ HOSP IP/OBS HIGH 50: CPT | Performed by: INTERNAL MEDICINE

## 2024-05-08 PROCEDURE — B24BZZ4 ULTRASONOGRAPHY OF HEART WITH AORTA, TRANSESOPHAGEAL: ICD-10-PCS | Performed by: INTERNAL MEDICINE

## 2024-05-08 PROCEDURE — 94761 N-INVAS EAR/PLS OXIMETRY MLT: CPT

## 2024-05-08 PROCEDURE — 93325 DOPPLER ECHO COLOR FLOW MAPG: CPT | Performed by: INTERNAL MEDICINE

## 2024-05-08 PROCEDURE — 3700000001 HC ADD 15 MINUTES (ANESTHESIA): Performed by: INTERNAL MEDICINE

## 2024-05-08 PROCEDURE — 7100000011 HC PHASE II RECOVERY - ADDTL 15 MIN: Performed by: INTERNAL MEDICINE

## 2024-05-08 PROCEDURE — 99232 SBSQ HOSP IP/OBS MODERATE 35: CPT | Performed by: INTERNAL MEDICINE

## 2024-05-08 PROCEDURE — 83735 ASSAY OF MAGNESIUM: CPT

## 2024-05-08 PROCEDURE — 5A09357 ASSISTANCE WITH RESPIRATORY VENTILATION, LESS THAN 24 CONSECUTIVE HOURS, CONTINUOUS POSITIVE AIRWAY PRESSURE: ICD-10-PCS | Performed by: STUDENT IN AN ORGANIZED HEALTH CARE EDUCATION/TRAINING PROGRAM

## 2024-05-08 PROCEDURE — 6370000000 HC RX 637 (ALT 250 FOR IP): Performed by: NURSE PRACTITIONER

## 2024-05-08 PROCEDURE — 7100000010 HC PHASE II RECOVERY - FIRST 15 MIN: Performed by: INTERNAL MEDICINE

## 2024-05-08 PROCEDURE — 93005 ELECTROCARDIOGRAM TRACING: CPT | Performed by: INTERNAL MEDICINE

## 2024-05-08 PROCEDURE — 6360000002 HC RX W HCPCS: Performed by: NURSE ANESTHETIST, CERTIFIED REGISTERED

## 2024-05-08 PROCEDURE — 80048 BASIC METABOLIC PNL TOTAL CA: CPT

## 2024-05-08 PROCEDURE — 82962 GLUCOSE BLOOD TEST: CPT

## 2024-05-08 PROCEDURE — 94660 CPAP INITIATION&MGMT: CPT

## 2024-05-08 PROCEDURE — 93312 ECHO TRANSESOPHAGEAL: CPT

## 2024-05-08 PROCEDURE — 93312 ECHO TRANSESOPHAGEAL: CPT | Performed by: INTERNAL MEDICINE

## 2024-05-08 PROCEDURE — 5A2204Z RESTORATION OF CARDIAC RHYTHM, SINGLE: ICD-10-PCS | Performed by: INTERNAL MEDICINE

## 2024-05-08 PROCEDURE — 92960 CARDIOVERSION ELECTRIC EXT: CPT | Performed by: INTERNAL MEDICINE

## 2024-05-08 RX ORDER — MIDODRINE HYDROCHLORIDE 5 MG/1
5 TABLET ORAL
Status: DISCONTINUED | OUTPATIENT
Start: 2024-05-08 | End: 2024-05-09 | Stop reason: HOSPADM

## 2024-05-08 RX ORDER — PROPOFOL 10 MG/ML
INJECTION, EMULSION INTRAVENOUS PRN
Status: DISCONTINUED | OUTPATIENT
Start: 2024-05-08 | End: 2024-05-08 | Stop reason: SDUPTHER

## 2024-05-08 RX ORDER — SODIUM CHLORIDE 9 MG/ML
INJECTION, SOLUTION INTRAVENOUS CONTINUOUS PRN
Status: DISCONTINUED | OUTPATIENT
Start: 2024-05-08 | End: 2024-05-08 | Stop reason: SDUPTHER

## 2024-05-08 RX ORDER — ETOMIDATE 2 MG/ML
INJECTION INTRAVENOUS PRN
Status: DISCONTINUED | OUTPATIENT
Start: 2024-05-08 | End: 2024-05-08 | Stop reason: SDUPTHER

## 2024-05-08 RX ORDER — ASPIRIN 81 MG/1
81 TABLET, CHEWABLE ORAL DAILY
Status: DISCONTINUED | OUTPATIENT
Start: 2024-05-08 | End: 2024-05-09 | Stop reason: HOSPADM

## 2024-05-08 RX ADMIN — Medication 1000 MCG: at 09:55

## 2024-05-08 RX ADMIN — TIOTROPIUM BROMIDE INHALATION SPRAY 2 PUFF: 3.12 SPRAY, METERED RESPIRATORY (INHALATION) at 12:02

## 2024-05-08 RX ADMIN — PROPOFOL 10 MG: 10 INJECTION, EMULSION INTRAVENOUS at 14:15

## 2024-05-08 RX ADMIN — MIDODRINE HYDROCHLORIDE 5 MG: 5 TABLET ORAL at 15:55

## 2024-05-08 RX ADMIN — METOPROLOL SUCCINATE 25 MG: 25 TABLET, EXTENDED RELEASE ORAL at 21:39

## 2024-05-08 RX ADMIN — SODIUM CHLORIDE, PRESERVATIVE FREE 10 ML: 5 INJECTION INTRAVENOUS at 21:40

## 2024-05-08 RX ADMIN — SODIUM CHLORIDE: 9 INJECTION, SOLUTION INTRAVENOUS at 13:56

## 2024-05-08 RX ADMIN — PYRIDOSTIGMINE BROMIDE 60 MG: 60 TABLET ORAL at 15:55

## 2024-05-08 RX ADMIN — BUDESONIDE AND FORMOTEROL FUMARATE DIHYDRATE 2 PUFF: 160; 4.5 AEROSOL RESPIRATORY (INHALATION) at 12:01

## 2024-05-08 RX ADMIN — PREDNISONE 10 MG: 10 TABLET ORAL at 09:53

## 2024-05-08 RX ADMIN — PYRIDOSTIGMINE BROMIDE 60 MG: 60 TABLET ORAL at 09:54

## 2024-05-08 RX ADMIN — ATORVASTATIN CALCIUM 40 MG: 40 TABLET, FILM COATED ORAL at 21:39

## 2024-05-08 RX ADMIN — ASPIRIN 81 MG: 81 TABLET, CHEWABLE ORAL at 15:56

## 2024-05-08 RX ADMIN — EMPAGLIFLOZIN 10 MG: 10 TABLET, FILM COATED ORAL at 09:54

## 2024-05-08 RX ADMIN — ETOMIDATE 2 MG: 2 INJECTION INTRAVENOUS at 14:06

## 2024-05-08 RX ADMIN — PANTOPRAZOLE SODIUM 40 MG: 40 TABLET, DELAYED RELEASE ORAL at 21:39

## 2024-05-08 RX ADMIN — ETOMIDATE 2 MG: 2 INJECTION INTRAVENOUS at 14:07

## 2024-05-08 RX ADMIN — SODIUM CHLORIDE, PRESERVATIVE FREE 10 ML: 5 INJECTION INTRAVENOUS at 21:41

## 2024-05-08 RX ADMIN — PROPOFOL 30 MG: 10 INJECTION, EMULSION INTRAVENOUS at 14:08

## 2024-05-08 RX ADMIN — TAMSULOSIN HYDROCHLORIDE 0.4 MG: 0.4 CAPSULE ORAL at 09:54

## 2024-05-08 RX ADMIN — PYRIDOSTIGMINE BROMIDE 60 MG: 60 TABLET ORAL at 21:39

## 2024-05-08 RX ADMIN — PROPOFOL 20 MG: 10 INJECTION, EMULSION INTRAVENOUS at 14:12

## 2024-05-08 RX ADMIN — PROPOFOL 30 MG: 10 INJECTION, EMULSION INTRAVENOUS at 14:10

## 2024-05-08 RX ADMIN — SODIUM CHLORIDE, PRESERVATIVE FREE 10 ML: 5 INJECTION INTRAVENOUS at 09:55

## 2024-05-08 RX ADMIN — PREDNISONE 2 MG: 1 TABLET ORAL at 09:56

## 2024-05-08 RX ADMIN — CHOLECALCIFEROL (VITAMIN D3) 10 MCG (400 UNIT) TABLET 400 UNITS: at 09:54

## 2024-05-08 RX ADMIN — Medication 400 MG: at 09:54

## 2024-05-08 RX ADMIN — FERROUS SULFATE TAB 325 MG (65 MG ELEMENTAL FE) 325 MG: 325 (65 FE) TAB at 09:53

## 2024-05-08 RX ADMIN — PANTOPRAZOLE SODIUM 40 MG: 40 TABLET, DELAYED RELEASE ORAL at 09:54

## 2024-05-08 RX ADMIN — DULOXETINE HYDROCHLORIDE 60 MG: 30 CAPSULE, DELAYED RELEASE ORAL at 09:54

## 2024-05-08 RX ADMIN — CLOPIDOGREL BISULFATE 75 MG: 75 TABLET ORAL at 11:18

## 2024-05-08 ASSESSMENT — PAIN SCALES - GENERAL
PAINLEVEL_OUTOF10: 0

## 2024-05-08 NOTE — PROGRESS NOTES
CARDIOLOGY PROGRESS NOTE                                              Name:  Aramis Rosario /Age/Sex: 1960  (64 y.o. male)   MRN & CSN:  [id number] & [id number] Admission Date/Time: 2024  2:50 PM   Location:  [facility]/3111-A PCP: Tyler Diaz MD       Admit Date:  2024  Hospital Day: 5      SUBJECTIVE:     Seen patient as follow up as consultation for CHF/ Shortness of breath    No chest pain.  No shortness of breath  + palpations    TELEMETRY: Atrial fibrillation             CARDIOLOGY ATTENDING ADDENDUM    I have seen, spoken to and examined this patient personally, independent of the NP/PAC. I have reviewed the hospital care given to date and reviewed all pertinent labs and imaging. I have spoken with patient, nursing staff and provided written and verbal instructions .The above note has been reviewed. I have spent substantive (>50%) amount of time in formulating patient care.               Physical Exam:       Head: normal  Eye: normal  Chest:    Clear,   0 Basilar crackles   Cardiovascular:  S1S2  IRIR  Abdomen: soft   Extremities:  + edema  Pulses :   palpable      MEDICAL DECISION MAKING :      Acute heart Failure: Systolic heart failure    Echocardiogram 2024 shows significantly reduced EF  Continue with IV Lasix 40 mg twice daily, switch to oral Lasix in the morning  Recommend close monitoring of daily weights  Strict intake output monitoring  Low-salt diet     Continue with Toprol-XL  Continue  losartan 25 mg    Continue aldactone 25 mg daily tomorrow  Continue  SLGT2i - jardiance 10 mg daily  Will need assistance for cost.          History of coronary disease s/p PCI to ramus intermedius  Elevated Troponins:   LHC unchanged from previous  He denies chest pain  Continue with Plavix 75 mg daily  Risk factor modification        Sick sinus syndrome status post pacemaker  atrial fibrillation s/p watchman and ablation procedure  Frequent PVCs and nonsustained V. tach on device  interrogation     ?  Tachycardia induced cardiomyopathy  Sotalol stopped due to reduced EF  Plavix only due to watchman.   Plan for TONIO guided cardioversion today     History of GI bleeding with stable hemoglobin/hematocrit.  Hyperlipidemia: Continue with statin therapy  Hypomagnesemia: Replete magnesium   Dizziness  Felt weak this morning  Blood pressure low.   Start midodrine 5 mg TID.         [facility] 5/7/2024    Procedure note  Left cardiac catheterization selective coronary angiography and LV pressures     Indication  Low EF     Findings  Left main is a large tubular vessel has no significant stenosis  Left and descending artery is calcified has luminal irregularities does not appear to be have any flow-limiting lesions  The diagonals have diffuse disease  The circumflex vessel has mild disease  The ramus has a stent which is patent  The right coronary artery has a stenosis in its mid segment which is about 50% unchanged from the previous cath is a dominant vessel  The LV pressures were normal            Dr. KATARINA Zapata MD      +++++++++++++++++++++++++++++++++++++++          Intake/Output Summary (Last 24 hours) at 5/8/2024 0629  Last data filed at 5/7/2024 1448  Gross per 24 hour   Intake --   Output 5 ml   Net -5 ml          Electronically signed by JAMIE Russell CNP on 5/8/2024 at 6:29 AM

## 2024-05-08 NOTE — PROGRESS NOTES
05/08/24 1610   Encounter Summary   Encounter Overview/Reason Initial Encounter   Service Provided For Patient   Referral/Consult From [facility]   Support System Spouse;Children;Family members   Last Encounter  05/08/24  (Patient was visiting with family. Patient said that the doctor changed his medication and hopes to feel better soon. Patient and his family appreciated the  for the visit.)   Complexity of Encounter Low   Begin Time 1600   End Time  1615   Total Time Calculated 15 min   Spiritual/Emotional needs   Type Spiritual Support   Grief, Loss, and Adjustments   Type Adjustment to illness   Assessment/Intervention/Outcome   Assessment Calm;Coping;Hopeful;Peaceful   Intervention Active listening;Empowerment;Sustaining Presence/Ministry of presence   Outcome Coping;Encouraged;Engaged in conversation;Expressed Gratitude;Receptive   Plan and Referrals   Plan/Referrals Continue Support (comment)

## 2024-05-08 NOTE — CARE COORDINATION
Care Management outreach deferred at this time as patient is currently admitted inpatient at [facility].

## 2024-05-08 NOTE — ANESTHESIA POSTPROCEDURE EVALUATION
Department of Anesthesiology  Postprocedure Note    Patient: Aramis Rosario  MRN: [id number]  YOB: 1960  Date of evaluation: 5/8/2024    Procedure Summary       Date: 05/08/24 Room / Location: [facility] Noninvasive Cardiology    Anesthesia Start: 1357 Anesthesia Stop: 1433    Procedure: TONIO W/ POSSIBLE CARDIOVERSION (PRN CONTRAST/BUBBLE/3D) Diagnosis: Atrial flutter, unspecified type (HCC)    Scheduled Providers: Devin Zapata MD Responsible Provider: Caesar Huerta MD    Anesthesia Type: MAC ASA Status: 4            Anesthesia Type: No value filed.    Miguel Phase I: Miguel Score: 10    Miguel Phase II:      Anesthesia Post Evaluation    Patient location during evaluation: bedside  Patient participation: complete - patient participated  Level of consciousness: awake and alert  Pain score: 0  Airway patency: patent  Nausea & Vomiting: no vomiting and no nausea  Cardiovascular status: blood pressure returned to baseline and hemodynamically stable  Respiratory status: acceptable, spontaneous ventilation, nonlabored ventilation and nasal cannula  Hydration status: stable  Pain management: adequate    No notable events documented.

## 2024-05-08 NOTE — PROGRESS NOTES
Multiplanar reconstructions were utilized. Maximum intensity projection images were evaluated.  Up-to-date CT equipment and radiation dose reduction techniques were employed. COMPARISON: CT of abdomen and pelvis 5/12/2023.. FINDINGS: Vasculature:Excellent opacification of pulmonary arteries. No significant filling defect identified. Lungs and pleura: Trace right effusion and right dependent airspace disease suggesting atelectasis or early pneumonia. Left lung is grossly clear. Mediastinum: Mild cardiomegaly. Previous coronary artery stent placement. The aorta is unopacified. Borderline-enlarged paratracheal lymph nodes presumably reactive. Borderline-enlarged right hilar lymph nodes. Neck and chest wall: No supraclavicular lymphadenopathy. No axillary lymphadenopathy. Upper abdomen: No acute upper abdominal abnormality. Bones: Mild superior endplate compression fracture at T4 not appreciably changed when compared to MRI from 3/8/2022. No acute osseous abnormality.     1. No evidence of pulmonary thromboembolism. 2. Trace right effusion and mild dependent airspace disease suggesting atelectasis versus less likely early pneumonia. 3. Stable T4 compression fracture. 4. Borderline enlarged mediastinal lymph nodes presumably reactive. Electronically signed by Berhane Santos MD    XR CHEST PORTABLE    Result Date: 4/29/2024  History: Shortness of breath. Comparison: 5/12/2023. Findings: Portable AP view of the chest was obtained. New mild blunting of the right costophrenic angle could indicate a trace right pleural effusion. Minimal right basilar atelectasis. Lungs otherwise clear. Pulmonary vascularity is normal. Cardiomegaly  is stable. Stable left subclavian dual-lead pacemaker. No pneumothorax.     1. New trace right pleural effusion with mild right basilar atelectasis. Electronically signed by Cachorro Jaimes MD      CBC:   Recent Labs     05/06/24  0754 05/07/24  0306   WBC 8.7 10.0   HGB 15.5 15.8    278  MD Nikole on 5/8/2024 at 6:10 PM

## 2024-05-08 NOTE — ANESTHESIA PRE PROCEDURE
Department of Anesthesiology  Preprocedure Note       Name:  Aramis Rosario   Age:  64 y.o.  :  1960                                          MRN:  [id number]         Date:  2024      Surgeon: Surgeon(s):  Devin Zapata MD    Procedure: * No procedures listed *    Medications prior to admission:   Prior to Admission medications    Medication Sig Start Date End Date Taking? Authorizing Provider   furosemide (LASIX) 20 MG tablet Take 1 tablet by mouth daily for 3 days 24  rPadeep Holt PA-C   sotalol (BETAPACE) 80 MG tablet Take 1 tablet by mouth 2 times daily 24   Karen Gamino APRN - CNP   tamsulosin (FLOMAX) 0.4 MG capsule Take 1 capsule by mouth daily 23   Tyler Diaz MD   DULoxetine (CYMBALTA) 60 MG extended release capsule Take 1 capsule by mouth daily 23   Tyler Diaz MD   glyBURIDE-metFORMIN (GLUCOVANCE) 5-500 MG per tablet Take 1 tablet by mouth 2 times daily (with meals) 23   Tyler Diaz MD   tiZANidine (ZANAFLEX) 2 MG tablet Take 1 tablet by mouth 2 times daily as needed (BACK PAIN AND SPASMS) AVOID ALCOHOL/CAUSES DROWSINESS 23   Tyler Diaz MD   predniSONE (DELTASONE) 10 MG tablet TAKE ONE TABLET BY MOUTH EACH MORNING WITH THE 1MG PREDNISONE. TAKE WITH FOOD 10/6/23   Mingo Villar MD   predniSONE (DELTASONE) 1 MG tablet TAKE 2 TABLETS BY MOUTH EVERY DAY WITH THE 10 MG PREDNISONE TABLET TAKE WITH FOOD 10/2/23   Mingo Villar MD   magnesium oxide (MAG-OX) 400 MG tablet Take 1 tablet by mouth daily 10/20/23   Karen Gamino APRN - CNP   metoprolol succinate (TOPROL XL) 25 MG extended release tablet Take 1 tablet by mouth at bedtime 10/20/23   Karen Gamino APRN - CNP   pantoprazole (PROTONIX) 40 MG tablet Take 1 tablet by mouth 2 times daily 23   Tyler Diaz MD   Cholecalciferol 400 UNIT TABS tablet Take 1 tablet by mouth daily    Mingo Villar MD   clopidogrel  Grade I diastolic dysfunction. mildly dilated LA. Mod TR. Mild pulmonary htn.    Hx of Doppler ultrasound 04/09/2021    Left distal SSV shows occlusive chronic SVT.    Hypertension     Ileostomy in place ([facility]) 1971    Myasthenia gravis ([facility]) 2017    sees Dr Echavarria and is controlled on a research injection 40mg daily, also mestinon and chr prednisone 12mg daily    Nephrolithiasis 12/30/2022    nonobstructive on u/s 12/22    NSTEMI (non-ST elevated myocardial infarction) ([facility]) 12/2020    Post PTCA 12/14/2020    Ramus Stented.    Presence of Watchman left atrial appendage closure device     S/P ablation of atrial fibrillation 09/08/2021    S/P ablation of afib PVI performed by Dr. Henderson.    TIA (transient ischemic attack)     Ulcerative colitis ([facility])        Past Surgical History:        Procedure Laterality Date    BLADDER SURGERY Right 5/13/2023    CYSTOSCOPY RETROGRADE PYELOGRAM STENT INSERTION performed by Vivien Padilla MD at [facility] OR    CARDIAC PROCEDURE N/A 5/7/2024    Left heart cath / coronary angiography performed by Irene Cardona MD at [facility] CARDIAC CATH LAB    CARDIAC SURGERY      ablation 9/8/21- Dr Henderson    CYSTOSCOPY N/A 8/28/2023    CYSTOSCOPY RETROGRADE PYELOGRAM performed by Vivien Padilla MD at [facility] OR    ILEOSTOMY OR JEJUNOSTOMY      total colectomy at [facility] for ulc colitis    LITHOTRIPSY Right 8/28/2023    RIGHT CYSTOSCOPY URETEROSCOPY  RETROGRADE PYELOGRAM STONE MANIPULATION HOLIIUM  LASER LITHOTRIPSY POSS STENT PLACEMENT performed by Vivien Padilla MD at [facility] OR    OTHER SURGICAL HISTORY N/A 04/13/2023    Watchman 24 MM SUSANNE closure device implanted by Dr. Henderson.    PACEMAKER INSERTION N/A 07/07/2021    PACEMAKER INSERTION PERMANENT performed by Lc Matthew MD at [facility] OR-[facility] conditional    PTCA  12/14/2020    Ramus Stented./also prior stent 2011 after MI    TONSILLECTOMY      UPPER GASTROINTESTINAL ENDOSCOPY N/A 06/15/2021    EGD DIAGNOSTIC ONLY performed by Dio CAMPBELL

## 2024-05-08 NOTE — PROGRESS NOTES
POST FALL MANAGEMENT    Aramis Rosario  MEDICAL RECORD NUMBER:  [id number]  AGE: 64 y.o.   GENDER: male  : 1960  TODAYS DATE:  2024    Details     Fall Occurred: Yes    Was the Fall Witnessed:  Yes Angelina OLIVERA      Brief Review of Event:Pt ambulated independently with supervision by Angelina to bathroom. Pt leaned against door and slid down to floor. Once on floor pt leaned over onto right elbow for support. Pt states he was dizzy and \"this happens occasionally when I'm tired and don't feel good.\" Fall was witnessed and reported to myself from the NA's mentioned.         Who found the patient: Angelina OLIVERA      Where was the patient at the time of the fall: [address]      Patient Comments: See above review       Date Fall Occurred:  May 8, 2024 .       Time Fall Occurred: 8:25a.m.     Assessment     Post Fall Head to Toe Assessment Completed: Yes    Post Fall Predictive Analytic Score Reviewed: Yes     Post Fall Vitals Completed: Yes    Post Fall Neuro Checks Completed: Yes    Injury Occurred(if yes, describe injury):  no           Did the Patient Experience:(Check Kal all that apply)    [] Patient hit head  [] Loss of consciousness  [] Change in mental status following the fall  [] Patient is on an anticoagulant medication      CT Performed:  no    Follow-up     Persons Notified of Fall:  (Provide names of persons notified)   [x] Physician: Dr. Agustin  [] ALLYSON:  [] Nursing Supervisior:  [x] Manager:Toñito  [] Pharmacist:  [] Family:  [x] Other:Aisha      Electronically signed by Ananht Beasley RN 2024 at 9:48 AM

## 2024-05-08 NOTE — PROGRESS NOTES
Admit Date:  5/4/2024    Admission diagnosis / Complaint : Shortness of breath      Subjective:  Mr. Rosario is feeling better but tired. Shortness of breath improved      Objective:   BP 98/74   Pulse (!) 101   Temp 98.3 °F (36.8 °C) (Oral)   Resp 20   Ht 1.803 m (5' 11\")   Wt 114.8 kg (253 lb)   SpO2 95%   BMI 35.29 kg/m²     Intake/Output Summary (Last 24 hours) at 5/8/2024 0909  Last data filed at 5/7/2024 1448  Gross per 24 hour   Intake --   Output 5 ml   Net -5 ml       TELEMETRY: Atrial flutter    has a past medical history of Arthritis, Atrial fibrillation, chronic ([facility]), Cerebrovascular accident ([facility]), Closed displaced fracture of anterior column of right acetabulum ([facility]), COPD (chronic obstructive pulmonary disease) ([facility]), Coronary artery disease involving native coronary artery of native heart without angina pectoris, Diabetes ([facility]), Diabetic neuropathy associated with type 2 diabetes mellitus ([facility]), H/O echocardiogram, History of blood transfusion, Hx of blood clots, Hx of cardiovascular stress test, Hx of cardiovascular stress test, Hx of Doppler echocardiogram, Hx of Doppler ultrasound, Hypertension, Ileostomy in place ([facility]), Myasthenia gravis ([facility]), Nephrolithiasis, NSTEMI (non-ST elevated myocardial infarction) ([facility]), Post PTCA, Presence of Watchman left atrial appendage closure device, S/P ablation of atrial fibrillation, TIA (transient ischemic attack), and Ulcerative colitis ([facility]).   has a past surgical history that includes Jejunostomy; Tonsillectomy; Cardiac surgery; Percutaneous Transluminal Coronary Angio (12/14/2020); Upper gastrointestinal endoscopy (N/A, 06/15/2021); Upper gastrointestinal endoscopy (N/A, 07/04/2021); Pacemaker insertion (N/A, 07/07/2021); other surgical history (N/A, 04/13/2023); Bladder surgery (Right, 5/13/2023); Lithotripsy (Right, 8/28/2023); Cystoscopy (N/A, 8/28/2023); and Cardiac procedure (N/A, 5/7/2024).  Physical Exam:  General:  Awake, alert, NAD  Skin:

## 2024-05-08 NOTE — CARE COORDINATION
MARY mailed out [employer] form to pt at request of Rachna Elizabeth who will follow up.

## 2024-05-08 NOTE — PROGRESS NOTES
A Pulse Ox Trend with an Apnea screen was completed on 05/06/2024 starting at 00:16 by Marcia Chen RRT. This writer found a copy of the results in the patient's soft chart. The print-out was signed by a Physician. The order needs discontinued.

## 2024-05-08 NOTE — PROCEDURES
Procedure:     TONIO and Cardioversion    Indication: Atrial fibrillation    ASA/Mallapati score:  3/3    After obtaining the informed consent pt was sedated with anesthesia assistance    A  200 J   synchronised  shock was given.    Pt converted to  Sinus rythym         Pt remained clinically and hemodynamically stable.    TONIO before procedure did not show any atrial clot .  Patient is s/p appendage closure via Watchman  Patient was anticoagulated before the procedure.     Impression:    TONIO guided successful Cardioversion     Plan:    Continue with DAPT (no anticoagulation recommended per Dr. Henderson post watchman)  Antiarrhythmic medication as per Dr. Henderson  Continue with beta-blocker    Dr. KATARINA Zapata

## 2024-05-08 NOTE — PROGRESS NOTES
Pulmonary and Critical Care  Progress Note    Subjective:   The patient is still waiting for Apap.  Shortness of breath has improved  Chest pain none.  Addressing respiratory complaints Patient is negative for  hemoptysis and cyanosis  CONSTITUTIONAL:  negative for fevers and chills      Past Medical History:     has a past medical history of Arthritis, Atrial fibrillation, chronic ([facility]), Cerebrovascular accident ([facility]), Closed displaced fracture of anterior column of right acetabulum ([facility]), COPD (chronic obstructive pulmonary disease) ([facility]), Coronary artery disease involving native coronary artery of native heart without angina pectoris, Diabetes ([facility]), Diabetic neuropathy associated with type 2 diabetes mellitus ([facility]), H/O echocardiogram, History of blood transfusion, Hx of blood clots, Hx of cardiovascular stress test, Hx of cardiovascular stress test, Hx of Doppler echocardiogram, Hx of Doppler ultrasound, Hypertension, Ileostomy in place ([facility]), Myasthenia gravis ([facility]), Nephrolithiasis, NSTEMI (non-ST elevated myocardial infarction) ([facility]), Post PTCA, Presence of Watchman left atrial appendage closure device, S/P ablation of atrial fibrillation, TIA (transient ischemic attack), and Ulcerative colitis ([facility]).   has a past surgical history that includes Jejunostomy; Tonsillectomy; Cardiac surgery; Percutaneous Transluminal Coronary Angio (12/14/2020); Upper gastrointestinal endoscopy (N/A, 06/15/2021); Upper gastrointestinal endoscopy (N/A, 07/04/2021); Pacemaker insertion (N/A, 07/07/2021); other surgical history (N/A, 04/13/2023); Bladder surgery (Right, 5/13/2023); Lithotripsy (Right, 8/28/2023); Cystoscopy (N/A, 8/28/2023); and Cardiac procedure (N/A, 5/7/2024).   reports that he quit smoking about 3 years ago. His smoking use included cigarettes. He has never used smokeless tobacco. He reports that he does not currently use alcohol after a past usage of about 1.0 standard drink of alcohol per week. He reports

## 2024-05-09 ENCOUNTER — TELEPHONE (OUTPATIENT)
Dept: CARDIOLOGY CLINIC | Age: 64
End: 2024-05-09

## 2024-05-09 VITALS
OXYGEN SATURATION: 94 % | BODY MASS INDEX: 35.56 KG/M2 | HEIGHT: 71 IN | WEIGHT: 253.97 LBS | RESPIRATION RATE: 21 BRPM | SYSTOLIC BLOOD PRESSURE: 113 MMHG | HEART RATE: 86 BPM | DIASTOLIC BLOOD PRESSURE: 72 MMHG | TEMPERATURE: 97.8 F

## 2024-05-09 LAB
ANION GAP SERPL CALCULATED.3IONS-SCNC: 13 MMOL/L (ref 7–16)
BUN SERPL-MCNC: 28 MG/DL (ref 6–23)
CALCIUM SERPL-MCNC: 9.2 MG/DL (ref 8.3–10.6)
CHLORIDE BLD-SCNC: 99 MMOL/L (ref 99–110)
CO2: 25 MMOL/L (ref 21–32)
CREAT SERPL-MCNC: 1.6 MG/DL (ref 0.9–1.3)
GFR, ESTIMATED: 48 ML/MIN/1.73M2
GLUCOSE BLD-MCNC: 144 MG/DL (ref 70–99)
GLUCOSE BLD-MCNC: 156 MG/DL (ref 70–99)
GLUCOSE SERPL-MCNC: 138 MG/DL (ref 70–99)
MAGNESIUM: 2.8 MG/DL (ref 1.8–2.4)
POTASSIUM SERPL-SCNC: 4 MMOL/L (ref 3.5–5.1)
SODIUM BLD-SCNC: 137 MMOL/L (ref 135–145)

## 2024-05-09 PROCEDURE — 94640 AIRWAY INHALATION TREATMENT: CPT

## 2024-05-09 PROCEDURE — 2580000003 HC RX 258: Performed by: INTERNAL MEDICINE

## 2024-05-09 PROCEDURE — 83735 ASSAY OF MAGNESIUM: CPT

## 2024-05-09 PROCEDURE — 6370000000 HC RX 637 (ALT 250 FOR IP): Performed by: NURSE PRACTITIONER

## 2024-05-09 PROCEDURE — 6370000000 HC RX 637 (ALT 250 FOR IP): Performed by: INTERNAL MEDICINE

## 2024-05-09 PROCEDURE — 99233 SBSQ HOSP IP/OBS HIGH 50: CPT | Performed by: INTERNAL MEDICINE

## 2024-05-09 PROCEDURE — 99231 SBSQ HOSP IP/OBS SF/LOW 25: CPT | Performed by: INTERNAL MEDICINE

## 2024-05-09 PROCEDURE — 94761 N-INVAS EAR/PLS OXIMETRY MLT: CPT

## 2024-05-09 PROCEDURE — 80048 BASIC METABOLIC PNL TOTAL CA: CPT

## 2024-05-09 PROCEDURE — 6360000002 HC RX W HCPCS: Performed by: INTERNAL MEDICINE

## 2024-05-09 PROCEDURE — 36415 COLL VENOUS BLD VENIPUNCTURE: CPT

## 2024-05-09 PROCEDURE — 82962 GLUCOSE BLOOD TEST: CPT

## 2024-05-09 RX ORDER — MAGNESIUM OXIDE 400 MG/1
1 TABLET ORAL DAILY
Qty: 30 TABLET | Refills: 3 | Status: SHIPPED | OUTPATIENT
Start: 2024-05-09

## 2024-05-09 RX ORDER — FUROSEMIDE 40 MG/1
40 TABLET ORAL DAILY
Qty: 60 TABLET | Refills: 0 | Status: SHIPPED | OUTPATIENT
Start: 2024-05-09 | End: 2024-05-09 | Stop reason: HOSPADM

## 2024-05-09 RX ORDER — FUROSEMIDE 40 MG/1
40 TABLET ORAL 2 TIMES DAILY
Status: DISCONTINUED | OUTPATIENT
Start: 2024-05-09 | End: 2024-05-09 | Stop reason: HOSPADM

## 2024-05-09 RX ORDER — LOSARTAN POTASSIUM 25 MG/1
25 TABLET ORAL EVERY EVENING
Qty: 30 TABLET | Refills: 0 | Status: SHIPPED | OUTPATIENT
Start: 2024-05-09

## 2024-05-09 RX ORDER — SPIRONOLACTONE 25 MG/1
25 TABLET ORAL DAILY
Qty: 30 TABLET | Refills: 0 | Status: SHIPPED | OUTPATIENT
Start: 2024-05-10

## 2024-05-09 RX ORDER — FUROSEMIDE 10 MG/ML
40 INJECTION INTRAMUSCULAR; INTRAVENOUS 2 TIMES DAILY
Qty: 30 EACH | Refills: 0 | Status: SHIPPED | OUTPATIENT
Start: 2024-05-09 | End: 2024-05-09 | Stop reason: HOSPADM

## 2024-05-09 RX ORDER — FUROSEMIDE 40 MG/1
40 TABLET ORAL 2 TIMES DAILY
Qty: 60 TABLET | Refills: 0 | Status: SHIPPED | OUTPATIENT
Start: 2024-05-09

## 2024-05-09 RX ORDER — MIDODRINE HYDROCHLORIDE 5 MG/1
5 TABLET ORAL
Qty: 90 TABLET | Refills: 0 | Status: SHIPPED | OUTPATIENT
Start: 2024-05-09

## 2024-05-09 RX ORDER — ASPIRIN 81 MG/1
81 TABLET, CHEWABLE ORAL DAILY
Qty: 30 TABLET | Refills: 0 | Status: SHIPPED | OUTPATIENT
Start: 2024-05-10

## 2024-05-09 RX ADMIN — EMPAGLIFLOZIN 10 MG: 10 TABLET, FILM COATED ORAL at 08:29

## 2024-05-09 RX ADMIN — MIDODRINE HYDROCHLORIDE 5 MG: 5 TABLET ORAL at 08:29

## 2024-05-09 RX ADMIN — DULOXETINE HYDROCHLORIDE 60 MG: 30 CAPSULE, DELAYED RELEASE ORAL at 08:25

## 2024-05-09 RX ADMIN — CLOPIDOGREL BISULFATE 75 MG: 75 TABLET ORAL at 08:28

## 2024-05-09 RX ADMIN — PREDNISONE 10 MG: 10 TABLET ORAL at 08:25

## 2024-05-09 RX ADMIN — SODIUM CHLORIDE, PRESERVATIVE FREE 10 ML: 5 INJECTION INTRAVENOUS at 08:30

## 2024-05-09 RX ADMIN — Medication 1000 MCG: at 08:29

## 2024-05-09 RX ADMIN — SODIUM CHLORIDE, PRESERVATIVE FREE 10 ML: 5 INJECTION INTRAVENOUS at 08:31

## 2024-05-09 RX ADMIN — FUROSEMIDE 40 MG: 10 INJECTION, SOLUTION INTRAMUSCULAR; INTRAVENOUS at 08:30

## 2024-05-09 RX ADMIN — ASPIRIN 81 MG: 81 TABLET, CHEWABLE ORAL at 08:30

## 2024-05-09 RX ADMIN — MIDODRINE HYDROCHLORIDE 5 MG: 5 TABLET ORAL at 13:55

## 2024-05-09 RX ADMIN — PYRIDOSTIGMINE BROMIDE 60 MG: 60 TABLET ORAL at 13:54

## 2024-05-09 RX ADMIN — TIOTROPIUM BROMIDE INHALATION SPRAY 2 PUFF: 3.12 SPRAY, METERED RESPIRATORY (INHALATION) at 08:39

## 2024-05-09 RX ADMIN — PREDNISONE 2 MG: 1 TABLET ORAL at 08:33

## 2024-05-09 RX ADMIN — TAMSULOSIN HYDROCHLORIDE 0.4 MG: 0.4 CAPSULE ORAL at 08:29

## 2024-05-09 RX ADMIN — PANTOPRAZOLE SODIUM 40 MG: 40 TABLET, DELAYED RELEASE ORAL at 08:29

## 2024-05-09 RX ADMIN — Medication 400 MG: at 08:30

## 2024-05-09 RX ADMIN — PYRIDOSTIGMINE BROMIDE 60 MG: 60 TABLET ORAL at 08:29

## 2024-05-09 RX ADMIN — CHOLECALCIFEROL (VITAMIN D3) 10 MCG (400 UNIT) TABLET 400 UNITS: at 08:29

## 2024-05-09 RX ADMIN — BUDESONIDE AND FORMOTEROL FUMARATE DIHYDRATE 2 PUFF: 160; 4.5 AEROSOL RESPIRATORY (INHALATION) at 08:39

## 2024-05-09 RX ADMIN — FERROUS SULFATE TAB 325 MG (65 MG ELEMENTAL FE) 325 MG: 325 (65 FE) TAB at 08:29

## 2024-05-09 RX ADMIN — SPIRONOLACTONE 25 MG: 50 TABLET ORAL at 08:29

## 2024-05-09 ASSESSMENT — PAIN SCALES - GENERAL
PAINLEVEL_OUTOF10: 0
PAINLEVEL_OUTOF10: 0

## 2024-05-09 NOTE — PLAN OF CARE
Problem: Discharge Planning  Goal: Discharge to home or other facility with appropriate resources  5/9/2024 1432 by Radha Adrian RN  Outcome: Progressing  Flowsheets (Taken 5/9/2024 0830)  Discharge to home or other facility with appropriate resources:   Identify barriers to discharge with patient and caregiver   Identify discharge learning needs (meds, wound care, etc)   Refer to discharge planning if patient needs post-hospital services based on physician order or complex needs related to functional status, cognitive ability or social support system  5/9/2024 0105 by Christelle Villagran RN  Outcome: Progressing     Problem: Pain  Goal: Verbalizes/displays adequate comfort level or baseline comfort level  5/9/2024 1432 by Radha Adrian RN  Outcome: Progressing  5/9/2024 0105 by Christelle Villagran RN  Outcome: Progressing     Problem: ABCDS Injury Assessment  Goal: Absence of physical injury  5/9/2024 1432 by Radha Adrian RN  Outcome: Progressing  5/9/2024 0105 by Christelle Villagran RN  Outcome: Progressing     Problem: Nutrition Deficit:  Goal: Optimize nutritional status  5/9/2024 1432 by Radha Adrian RN  Outcome: Progressing  5/9/2024 0105 by Christelle Villagran RN  Outcome: Progressing     Problem: Safety - Adult  Goal: Free from fall injury  5/9/2024 1432 by Radha Adrian RN  Outcome: Progressing  5/9/2024 0105 by Christelle Villagran RN  Outcome: Progressing     Problem: Chronic Conditions and Co-morbidities  Goal: Patient's chronic conditions and co-morbidity symptoms are monitored and maintained or improved  5/9/2024 1432 by Radha Adrian RN  Outcome: Progressing  Flowsheets (Taken 5/9/2024 0830)  Care Plan - Patient's Chronic Conditions and Co-Morbidity Symptoms are Monitored and Maintained or Improved: Monitor and assess patient's chronic conditions and comorbid symptoms for stability, deterioration, or improvement  5/9/2024 0105 by Christelle Villagran RN  Outcome: Progressing

## 2024-05-09 NOTE — PROGRESS NOTES
This nurse PS Dr Agustin with orthostatic BP's. Waiting for response before giving 1700/1800 meds.

## 2024-05-09 NOTE — PROGRESS NOTES
CARDIOLOGY PROGRESS NOTE                                              Name:  Aramis Rosario /Age/Sex: 1960  (64 y.o. male)   MRN & CSN:  [id number] & [id number] Admission Date/Time: 2024  2:50 PM   Location:  [facility]/3111- PCP: Tyler Diaz MD       Admit Date:  2024  Hospital Day: 6      SUBJECTIVE:     Seen patient as follow up as consultation for CHF/ Shortness of breath    No chest pain.  No shortness of breath  No palpations    TELEMETRY: Atrial fibrillation           CARDIOLOGY ATTENDING ADDENDUM    I have seen, spoken to and examined this patient personally, independent of the NP/PAC. I have reviewed the hospital care given to date and reviewed all pertinent labs and imaging. I have spoken with patient, nursing staff and provided written and verbal instructions .The above note has been reviewed. I have spent substantive (>50%) amount of time in formulating patient care.               Physical Exam:       Head: normal  Eye: normal  Chest:    Clear,   0 Basilar crackles   Cardiovascular:  S1S2    Abdomen: soft   Extremities:    0 edema  Pulses :   palpable      MEDICAL DECISION MAKING :       Acute heart Failure: Systolic heart failure  Likely tachycardia induced cardiomyopathy    Echocardiogram 2024 shows significantly reduced EF  Change to PO lasix 40 mg BID   Low-salt diet     Continue with Toprol-XL  Continue  losartan 25 mg    Continue aldactone 25 mg daily tomorrow  Continue  SLGT2i - jardiance 10 mg daily      History of coronary disease s/p PCI to ramus intermedius  Elevated Troponins:   LHC unchanged from previous  He denies chest pain  Continue with Plavix 75 mg daily  Risk factor modification      Sick sinus syndrome status post pacemaker  atrial fibrillation s/p watchman and ablation procedure  Frequent PVCs and nonsustained V. tach on device interrogation     Status post tea-colored cardioversion, converted to sinus rhythm  Likely tachycardia induced  cardiomyopathy  Discussed with Dr. Henderson  Not a candidate for sotalol or Tikosyn due to prolonged Qtc and cardiomyopathy    History of GI bleeding with stable hemoglobin/hematocrit.    Case discussed with hospitalist physician  Outpatient cardiology follow-up      [facility] 5/7/2024    Procedure note  Left cardiac catheterization selective coronary angiography and LV pressures     Indication  Low EF     Findings  Left main is a large tubular vessel has no significant stenosis  Left and descending artery is calcified has luminal irregularities does not appear to be have any flow-limiting lesions  The diagonals have diffuse disease  The circumflex vessel has mild disease  The ramus has a stent which is patent  The right coronary artery has a stenosis in its mid segment which is about 50% unchanged from the previous cath is a dominant vessel  The LV pressures were normal       Dr. KATARINA Zapata MD           Intake/Output Summary (Last 24 hours) at 5/9/2024 1429  Last data filed at 5/9/2024 1141  Gross per 24 hour   Intake 150 ml   Output 800 ml   Net -650 ml            Electronically signed by JAMIE Russell - CNP on 5/9/2024 at 2:29 PM

## 2024-05-09 NOTE — PLAN OF CARE
Problem: Discharge Planning  Goal: Discharge to home or other facility with appropriate resources  5/9/2024 1536 by Radha Adrian RN  Outcome: Completed  5/9/2024 1433 by Radha Adrian RN  Outcome: Progressing  5/9/2024 1432 by Radha Adrian RN  Outcome: Progressing  Flowsheets (Taken 5/9/2024 0830)  Discharge to home or other facility with appropriate resources:   Identify barriers to discharge with patient and caregiver   Identify discharge learning needs (meds, wound care, etc)   Refer to discharge planning if patient needs post-hospital services based on physician order or complex needs related to functional status, cognitive ability or social support system     Problem: Pain  Goal: Verbalizes/displays adequate comfort level or baseline comfort level  5/9/2024 1536 by Radha Adrian RN  Outcome: Completed  5/9/2024 1433 by Radha Adrian RN  Outcome: Progressing  5/9/2024 1432 by Radha Adrian RN  Outcome: Progressing     Problem: ABCDS Injury Assessment  Goal: Absence of physical injury  5/9/2024 1536 by Radha Adrian RN  Outcome: Completed  5/9/2024 1433 by Radha Adrian RN  Outcome: Progressing  5/9/2024 1432 by Radha Adrian RN  Outcome: Progressing     Problem: Nutrition Deficit:  Goal: Optimize nutritional status  5/9/2024 1536 by Radha Adrian RN  Outcome: Completed  5/9/2024 1433 by Radha Adrian RN  Outcome: Progressing  5/9/2024 1432 by Radha Adrian RN  Outcome: Progressing     Problem: Safety - Adult  Goal: Free from fall injury  5/9/2024 1536 by Radha Adrian RN  Outcome: Completed  5/9/2024 1433 by Radha Adrian RN  Outcome: Progressing  5/9/2024 1432 by Radha Adrian RN  Outcome: Progressing

## 2024-05-09 NOTE — DISCHARGE INSTRUCTIONS
Please carefully review your medication changes. Repeat your blood tests in approximately 5-7 days and follow up with your primary care provider, cardiologist and electrophysiologist outpatient. All the best!

## 2024-05-09 NOTE — PROGRESS NOTES
Pulmonary and Critical Care  Progress Note    Subjective:   The patient used Apap.Getting used to it.  Shortness of breath has improved  Chest pain none.  Addressing respiratory complaints Patient is negative for  hemoptysis and cyanosis  CONSTITUTIONAL:  negative for fevers and chills      Past Medical History:     has a past medical history of Arthritis, Atrial fibrillation, chronic ([facility]), Cerebrovascular accident ([facility]), Closed displaced fracture of anterior column of right acetabulum ([facility]), COPD (chronic obstructive pulmonary disease) ([facility]), Coronary artery disease involving native coronary artery of native heart without angina pectoris, Diabetes ([facility]), Diabetic neuropathy associated with type 2 diabetes mellitus ([facility]), H/O echocardiogram, History of blood transfusion, Hx of blood clots, Hx of cardiovascular stress test, Hx of cardiovascular stress test, Hx of Doppler echocardiogram, Hx of Doppler ultrasound, Hypertension, Ileostomy in place ([facility]), Myasthenia gravis ([facility]), Nephrolithiasis, NSTEMI (non-ST elevated myocardial infarction) ([facility]), Post PTCA, Presence of Watchman left atrial appendage closure device, S/P ablation of atrial fibrillation, TIA (transient ischemic attack), and Ulcerative colitis ([facility]).   has a past surgical history that includes Jejunostomy; Tonsillectomy; Cardiac surgery; Percutaneous Transluminal Coronary Angio (12/14/2020); Upper gastrointestinal endoscopy (N/A, 06/15/2021); Upper gastrointestinal endoscopy (N/A, 07/04/2021); Pacemaker insertion (N/A, 07/07/2021); other surgical history (N/A, 04/13/2023); Bladder surgery (Right, 5/13/2023); Lithotripsy (Right, 8/28/2023); Cystoscopy (N/A, 8/28/2023); and Cardiac procedure (N/A, 5/7/2024).   reports that he quit smoking about 3 years ago. His smoking use included cigarettes. He has never used smokeless tobacco. He reports that he does not currently use alcohol after a past usage of about 1.0 standard drink of alcohol per week. He reports  that he does not use drugs.  Family history:  Family history is unknown by patient.    No Known Allergies  Social History:    Reviewed; no changes    Objective:   PHYSICAL EXAM:        VITALS:  /62   Pulse 81   Temp 97.7 °F (36.5 °C) (Oral)   Resp 15   Ht 1.803 m (5' 11\")   Wt 115.2 kg (253 lb 15.5 oz)   SpO2 94%   BMI 35.42 kg/m²     24HR INTAKE/OUTPUT:    Intake/Output Summary (Last 24 hours) at 5/9/2024 1047  Last data filed at 5/8/2024 1432  Gross per 24 hour   Intake 150 ml   Output --   Net 150 ml       CONSTITUTIONAL:  awake, alert, cooperative, no apparent distress, and appears stated age  LUNGS:  decreased breath sounds  CARDIOVASCULAR:  normal S1 and S2 and negative JVD  ABD:Abdomen soft, non-tender. BS normal. No masses,  No organomegaly  NEURO:Alert and oriented x3. Gait normal. Reflexes and motor strength normal and symmetric. Cranial nerves 2-12 and sensation grossly intact.  DATA:    CBC:  Recent Labs     05/07/24  0306   WBC 10.0   RBC 5.32   HGB 15.8   HCT 49.3      MCV 92.7   MCH 29.7   MCHC 32.0   RDW 14.2      BMP:  Recent Labs     05/07/24  0306 05/08/24  0503 05/09/24  0431    136 137   K 4.5 4.0 4.0    103 99   CO2 22 21 25   BUN 25* 27* 28*   CREATININE 1.4* 1.5* 1.6*   CALCIUM 9.3 9.2 9.2   GLUCOSE 132* 145* 138*      ABG:  No results for input(s): \"PH\", \"PO2ART\", \"[id number]\", \"HCO3\", \"BEART\", \"O2SAT\" in the last 72 hours.  Lab Results   Component Value Date    PROBNP 1,234 (H) 05/07/2024    PROBNP 1,693 (H) 05/06/2024    PROBNP 4,814 (H) 05/04/2024     No results found for: \"CULTRESP\"    Radiology Review:  Pertinent images / reports were reviewed as a part of this visit.    Assessment:     Patient Active Problem List   Diagnosis    Myasthenia gravis (HCC)    Unspecified ptosis of bilateral eyelids    SOB (shortness of breath)    Atrial fibrillation, chronic (HCC)    Post PTCA    NSTEMI (non-ST elevated myocardial infarction) ([facility])    Atrial fibrillation with

## 2024-05-09 NOTE — PROGRESS NOTES
Outpatient Pharmacy Progress Note for Meds-to-Beds    Total number of Prescriptions Filled: 5    Additional Documentation:  Medication(s) were delivered to the patient's room prior to discharge      Thank you for letting us serve your patients.  [address] [address]    Phone: [phone]    Fax: [phone]

## 2024-05-09 NOTE — PLAN OF CARE
Problem: Discharge Planning  Goal: Discharge to home or other facility with appropriate resources  5/9/2024 1433 by Radha Adrian RN  Outcome: Progressing  5/9/2024 1432 by Radha Adrian RN  Outcome: Progressing  Flowsheets (Taken 5/9/2024 0830)  Discharge to home or other facility with appropriate resources:   Identify barriers to discharge with patient and caregiver   Identify discharge learning needs (meds, wound care, etc)   Refer to discharge planning if patient needs post-hospital services based on physician order or complex needs related to functional status, cognitive ability or social support system  5/9/2024 0105 by Christelle Villagran RN  Outcome: Progressing     Problem: Pain  Goal: Verbalizes/displays adequate comfort level or baseline comfort level  5/9/2024 1433 by Radha Adrian RN  Outcome: Progressing  5/9/2024 1432 by Radha Adrian RN  Outcome: Progressing  5/9/2024 0105 by Christelle Villagran RN  Outcome: Progressing     Problem: ABCDS Injury Assessment  Goal: Absence of physical injury  5/9/2024 1433 by Radha Adrian RN  Outcome: Progressing  5/9/2024 1432 by Radha Adrian RN  Outcome: Progressing  5/9/2024 0105 by Christelle Villagran RN  Outcome: Progressing     Problem: Nutrition Deficit:  Goal: Optimize nutritional status  5/9/2024 1433 by Radha Adrian RN  Outcome: Progressing  5/9/2024 1432 by Radha Adrian RN  Outcome: Progressing  5/9/2024 0105 by Christelle Villagran RN  Outcome: Progressing     Problem: Safety - Adult  Goal: Free from fall injury  5/9/2024 1433 by Radha Adrian RN  Outcome: Progressing  5/9/2024 1432 by Radha Adrian RN  Outcome: Progressing  5/9/2024 0105 by Christelle Villagran RN  Outcome: Progressing     Problem: Chronic Conditions and Co-morbidities  Goal: Patient's chronic conditions and co-morbidity symptoms are monitored and maintained or improved  5/9/2024 1433 by Radha Adrian RN  Outcome: Progressing  5/9/2024 1432 by Radha Adrian RN  Outcome: Progressing  Flowsheets (Taken

## 2024-05-09 NOTE — CARE COORDINATION
Contacted by [facility] pharmacy to assist with medication cost(s) at time of discharge.  Approved a 1x voucher for four medications.  Faxed voucher to [facility] pharmacy.    Added patient to the flagged list. Pt completed [employer] application awaiting proof of income and Medicaid denial to determine ongoing assistance. Spoke to spouse (authorized by pt) advised pt to complete a Medicaid application, as with no income pt may qualify for Medicaid.  She stated she will have daughter help complete Medicaid application online.

## 2024-05-09 NOTE — CARE COORDINATION
Spoke with Med Assist Team regarding assistance for patient.  We would be able to provide a 1x $200 max voucher at discharge.  With no insurance we will not be able to help with his inhalers or the mentioned Jardiance.  All are well over $300 each.  Patient did do an application for our program but we are still waiting on his proof of income.  I believe he should qualify for Medicaid based on the information he provided to financial counselor and we will require proof of that application as well.  If he is denied we will need an actual letter from [employer] stating the denial and reasoning.  Once we have that I can then look at applying for Patient Assistance Programs through the [employer].  This IS NOT a fast process and can take 4-6 weeks for them to determine eligibility.     Please consider the cost of new medications when discharging due to the limited assistance we can provide him.    He did state he has a phone interview/call scheduled with Medicare since he is 64, however unsure that he will be able to get his Medicare A for free due to his number of required work quarters.    We will continue to follow for discharge needs.

## 2024-05-09 NOTE — PROGRESS NOTES
Admit Date:  5/4/2024    Admission diagnosis / Complaint :  Atrial flutter with RVR      Subjective:  Mr. Rosario feels better. Wants to take his medications now. Wants to know when he can go back to work    Objective:   /62   Pulse 81   Temp 97.7 °F (36.5 °C) (Oral)   Resp 15   Ht 1.803 m (5' 11\")   Wt 115.2 kg (253 lb 15.5 oz)   SpO2 94%   BMI 35.42 kg/m²     Intake/Output Summary (Last 24 hours) at 5/9/2024 1354  Last data filed at 5/9/2024 1141  Gross per 24 hour   Intake 150 ml   Output 800 ml   Net -650 ml       TELEMETRY: atrial paced      has a past medical history of Arthritis, Atrial fibrillation, chronic ([facility]), Cerebrovascular accident ([facility]), Closed displaced fracture of anterior column of right acetabulum ([facility]), COPD (chronic obstructive pulmonary disease) ([facility]), Coronary artery disease involving native coronary artery of native heart without angina pectoris, Diabetes ([facility]), Diabetic neuropathy associated with type 2 diabetes mellitus ([facility]), H/O echocardiogram, History of blood transfusion, Hx of blood clots, Hx of cardiovascular stress test, Hx of cardiovascular stress test, Hx of Doppler echocardiogram, Hx of Doppler ultrasound, Hypertension, Ileostomy in place ([facility]), Myasthenia gravis ([facility]), Nephrolithiasis, NSTEMI (non-ST elevated myocardial infarction) ([facility]), Post PTCA, Presence of Watchman left atrial appendage closure device, S/P ablation of atrial fibrillation, TIA (transient ischemic attack), and Ulcerative colitis ([facility]).   has a past surgical history that includes Jejunostomy; Tonsillectomy; Cardiac surgery; Percutaneous Transluminal Coronary Angio (12/14/2020); Upper gastrointestinal endoscopy (N/A, 06/15/2021); Upper gastrointestinal endoscopy (N/A, 07/04/2021); Pacemaker insertion (N/A, 07/07/2021); other surgical history (N/A, 04/13/2023); Bladder surgery (Right, 5/13/2023); Lithotripsy (Right, 8/28/2023); Cystoscopy (N/A, 8/28/2023); and Cardiac procedure (N/A,

## 2024-05-09 NOTE — PLAN OF CARE
Problem: Discharge Planning  Goal: Discharge to home or other facility with appropriate resources  Outcome: Progressing     Problem: Pain  Goal: Verbalizes/displays adequate comfort level or baseline comfort level  Outcome: Progressing     Problem: ABCDS Injury Assessment  Goal: Absence of physical injury  Outcome: Progressing     Problem: Nutrition Deficit:  Goal: Optimize nutritional status  Outcome: Progressing     Problem: Safety - Adult  Goal: Free from fall injury  Outcome: Progressing     Problem: Chronic Conditions and Co-morbidities  Goal: Patient's chronic conditions and co-morbidity symptoms are monitored and maintained or improved  Outcome: Progressing

## 2024-05-10 ENCOUNTER — CARE COORDINATION (OUTPATIENT)
Dept: CASE MANAGEMENT | Age: 64
End: 2024-05-10

## 2024-05-10 DIAGNOSIS — I48.20 ATRIAL FIBRILLATION, CHRONIC (HCC): Primary | ICD-10-CM

## 2024-05-10 LAB
EKG ATRIAL RATE: 68 BPM
EKG ATRIAL RATE: 86 BPM
EKG DIAGNOSIS: NORMAL
EKG DIAGNOSIS: NORMAL
EKG P AXIS: 92 DEGREES
EKG P-R INTERVAL: 174 MS
EKG Q-T INTERVAL: 420 MS
EKG Q-T INTERVAL: 434 MS
EKG QRS DURATION: 114 MS
EKG QRS DURATION: 128 MS
EKG QTC CALCULATION (BAZETT): 519 MS
EKG QTC CALCULATION (BAZETT): 550 MS
EKG R AXIS: -58 DEGREES
EKG R AXIS: 101 DEGREES
EKG T AXIS: -67 DEGREES
EKG T AXIS: -88 DEGREES
EKG VENTRICULAR RATE: 103 BPM
EKG VENTRICULAR RATE: 86 BPM

## 2024-05-10 PROCEDURE — 93010 ELECTROCARDIOGRAM REPORT: CPT | Performed by: INTERNAL MEDICINE

## 2024-05-10 NOTE — CARE COORDINATION
Care Transitions Initial Follow Up Call    Call within 2 business days of discharge: Yes    Patient Current Location:  Home: [address] [place] [address]    Care Transition Nurse contacted Patient's Wife by telephone to perform post hospital discharge assessment. Verified Patient name and  with Wife as identifiers. Provided introduction to self, and explanation of the Care Transition Nurse role.     Patient: Aramis Rosario Patient : 1960   MRN: [id number]  Reason for Admission: Ac HF, A Fib/Flutter  Discharge Date: 24 RARS: Readmission Risk Score: 11.7  Facility: [facility] 24-24    Last Discharge Facility       Date Complaint Diagnosis Description Type Department Provider    24 Shortness of Breath Dyspnea on exertion ... ED to Hosp-Admission (Discharged) (ADMITTED) Kiersten Middleton MD; Jonathan Church...     Challenges to be reviewed by the provider   Additional needs identified to be addressed with provider: No       Method of communication with provider: none.    Reviewed CHF Zone Mgt:   Daily weights in a.m. before breakfast, after voiding  Keep written log of weights for review  Notify MD immediately of weight gain/loss 3# or more in 1-7days  Take all rx as prescribed, keep scheduled MD appts  Low sodium diet- read labels; avoid/limit high sodium foods such as fast food, canned/boxed/processed foods, frozen meals, soups, cheeses, breads, chips, soda.  Do not add salt to food  Adhere to MD recommended fluid restriction  Notify MD immediately if experiencing increased sob, orthopnea, edema, weight gain, feeling of uneasiness, chest pain, increased cough, confusion, wheezing or chest tightness. Call 911 if noting acute distress    Wife provides the following Patient information as he is sleeping. Reports Patient doing well. Denies cp, sob, edema, orthopnea, palps, ac distress. No scale for daily wts--Wife to obtain when she can afford. Not eligible for RPM as no insurance coverage.

## 2024-05-13 ENCOUNTER — TELEPHONE (OUTPATIENT)
Dept: CARDIOLOGY CLINIC | Age: 64
End: 2024-05-13

## 2024-05-14 ENCOUNTER — CARE COORDINATION (OUTPATIENT)
Dept: CARE COORDINATION | Age: 64
End: 2024-05-14

## 2024-05-14 NOTE — CARE COORDINATION
Patient Current Location: Home: [address] [place], [place] [id number]     "[employer] Financial assistance mailed out of 5/8     [facility] - helping with cost of dr appointments    Heart dr on 5/15 - helped with bill     PCP - Dr. Diaz - will cover follow up cost    Reapplied for medicaid / food stamps due to no income currently    Pt has phone interview this afternoon with Social Security    SW sent secure email to Karli Sanchez with [employer] to confirm if she had completed "[employer] Financial Assistance norman with Pt.     MARY plan of care: SW will follow up with Pt regarding cost of meds and dr visits / medicaid application

## 2024-05-15 ENCOUNTER — OFFICE VISIT (OUTPATIENT)
Dept: CARDIOLOGY CLINIC | Age: 64
End: 2024-05-15

## 2024-05-15 VITALS
DIASTOLIC BLOOD PRESSURE: 64 MMHG | HEART RATE: 54 BPM | WEIGHT: 254.6 LBS | BODY MASS INDEX: 35.64 KG/M2 | HEIGHT: 71 IN | SYSTOLIC BLOOD PRESSURE: 112 MMHG | OXYGEN SATURATION: 97 %

## 2024-05-15 DIAGNOSIS — I48.20 ATRIAL FIBRILLATION, CHRONIC (HCC): ICD-10-CM

## 2024-05-15 DIAGNOSIS — I48.0 PAF (PAROXYSMAL ATRIAL FIBRILLATION) (HCC): Primary | ICD-10-CM

## 2024-05-15 PROCEDURE — 99214 OFFICE O/P EST MOD 30 MIN: CPT | Performed by: INTERNAL MEDICINE

## 2024-05-15 PROCEDURE — 1123F ACP DISCUSS/DSCN MKR DOCD: CPT | Performed by: INTERNAL MEDICINE

## 2024-05-15 PROCEDURE — 93000 ELECTROCARDIOGRAM COMPLETE: CPT | Performed by: INTERNAL MEDICINE

## 2024-05-15 RX ORDER — DIGOXIN 125 MCG
125 TABLET ORAL EVERY OTHER DAY
Qty: 30 TABLET | Refills: 3 | Status: SHIPPED | OUTPATIENT
Start: 2024-05-15 | End: 2024-05-31 | Stop reason: SDUPTHER

## 2024-05-15 NOTE — PATIENT INSTRUCTIONS
**It is YOUR responsibilty to bring medication bottles and/or updated medication list to EACH APPOINTMENT. This will allow us to better serve you and all your healthcare needs**   Thank you for allowing us to care for you today!   We want to ensure we can follow your treatment plan and we strive to give you the best outcomes and experience possible.   If you ever have a life threatening emergency and call 911 - for an ambulance (EMS)   Our providers can only care for you at:   [facility] or [facility].   Even if you have someone take you or you drive yourself we can only care for you in a [facility]. Our providers are not setup at the other healthcare locations!   Please be informed that if you contact our office outside of normal business hours the physician on call cannot help with any scheduling or rescheduling issues, procedure instruction questions or any type of medication issue.    We advise you for any urgent/emergency that you go to the nearest emergency room!    PLEASE CALL OUR OFFICE DURING NORMAL BUSINESS HOURS    Monday - Friday   8 am to 5 pm    [place]: [phone]    [place]: [phone]    [place]:  [phone]  We are committed to providing you the best care possible.    If you receive a survey after visiting one of our offices, please take time to share your experience concerning your physician office visit.  These surveys are confidential and no health information about you is shared.    We are eager to improve for you and we are counting on your feedback to help make that happen.

## 2024-05-15 NOTE — PROGRESS NOTES
Right 5/13/2023    CYSTOSCOPY RETROGRADE PYELOGRAM STENT INSERTION performed by Vivien Padilla MD at [facility] OR    CARDIAC PROCEDURE N/A 5/7/2024    Left heart cath / coronary angiography performed by Irene Cardona MD at [facility] CARDIAC CATH LAB    CARDIAC SURGERY      ablation 9/8/21- Dr Henderson    CYSTOSCOPY N/A 8/28/2023    CYSTOSCOPY RETROGRADE PYELOGRAM performed by Vivien Padilla MD at [facility] OR    ILEOSTOMY OR JEJUNOSTOMY      total colectomy at [facility] for ulc colitis    LITHOTRIPSY Right 8/28/2023    RIGHT CYSTOSCOPY URETEROSCOPY  RETROGRADE PYELOGRAM STONE MANIPULATION HOLIIUM  LASER LITHOTRIPSY POSS STENT PLACEMENT performed by Vivien Padilla MD at [facility] OR    OTHER SURGICAL HISTORY N/A 04/13/2023    Watchman 24 MM SUSANNE closure device implanted by Dr. Henderson.    PACEMAKER INSERTION N/A 07/07/2021    PACEMAKER INSERTION PERMANENT performed by Lc Matthew MD at [facility] OR-[facility]ra mri conditional    PTCA  12/14/2020    Ramus Stented./also prior stent 2011 after MI    TONSILLECTOMY      UPPER GASTROINTESTINAL ENDOSCOPY N/A 06/15/2021    EGD DIAGNOSTIC ONLY performed by Dio Thibodeaux MD at [facility] ENDOSCOPY    UPPER GASTROINTESTINAL ENDOSCOPY N/A 07/04/2021    EGD DIAGNOSTIC ONLY performed by Erik Blair MD at [facility] ENDOSCOPY       Family history - no sudden cardiac death     Social history :  reports that he quit smoking about 3 years ago. His smoking use included cigarettes. He has never used smokeless tobacco. He reports that he does not currently use alcohol after a past usage of about 1.0 standard drink of alcohol per week. He reports that he does not use drugs.    No Known Allergies    Current Outpatient Medications on File Prior to Visit   Medication Sig Dispense Refill    magnesium oxide (MAG-OX) 400 MG tablet Take 1 tablet by mouth daily 30 tablet 3    aspirin 81 MG chewable tablet Take 1 tablet by mouth daily 30 tablet 0    empagliflozin (JARDIANCE) 10 MG tablet Take 1 tablet by mouth daily 30

## 2024-05-17 ENCOUNTER — CARE COORDINATION (OUTPATIENT)
Dept: CARE COORDINATION | Age: 64
End: 2024-05-17

## 2024-05-17 SDOH — HEALTH STABILITY: PHYSICAL HEALTH: ON AVERAGE, HOW MANY DAYS PER WEEK DO YOU ENGAGE IN MODERATE TO STRENUOUS EXERCISE (LIKE A BRISK WALK)?: 0 DAYS

## 2024-05-17 SDOH — HEALTH STABILITY: PHYSICAL HEALTH: ON AVERAGE, HOW MANY MINUTES DO YOU ENGAGE IN EXERCISE AT THIS LEVEL?: 0 MIN

## 2024-05-17 ASSESSMENT — SOCIAL DETERMINANTS OF HEALTH (SDOH)
IN A TYPICAL WEEK, HOW MANY TIMES DO YOU TALK ON THE PHONE WITH FAMILY, FRIENDS, OR NEIGHBORS?: TWICE A WEEK
DO YOU BELONG TO ANY CLUBS OR ORGANIZATIONS SUCH AS CHURCH GROUPS UNIONS, FRATERNAL OR ATHLETIC GROUPS, OR SCHOOL GROUPS?: NO
HOW OFTEN DO YOU ATTEND CHURCH OR RELIGIOUS SERVICES?: NEVER
HOW OFTEN DO YOU GET TOGETHER WITH FRIENDS OR RELATIVES?: TWICE A WEEK
HOW OFTEN DO YOU ATTENT MEETINGS OF THE CLUB OR ORGANIZATION YOU BELONG TO?: NEVER

## 2024-05-17 NOTE — CARE COORDINATION
Protocol     Offered patient enrollment in the Remote Patient Monitoring (RPM) program for in-home monitoring: Patient is not eligible for RPM program because: PCP does not participate in RPM program at this time .    Lab Results       None            Care Coordination Interventions    Referral from Primary Care Provider: No  Suggested Interventions and Community Resources          Goals Addressed    None         Future Appointments   Date Time Provider Department Center   2024 12:00 PM Rock Henderson MD [facility] Heart [facility]   2024  2:30 PM Tyler Diaz MD [facility]   ,   Diabetes Assessment    Medic Alert ID: No  Meal Planning: Avoidance of concentrated sweets   How often do you test your blood sugar?: No Testing   Do you have barriers with adherence to non-pharmacologic self-management interventions? (Nutrition/Exercise/Self-Monitoring): Yes   Have you ever had to go to the ED for symptoms of low blood sugar?: No       No patient-reported symptoms        ,   Congestive Heart Failure Assessment    Are you currently restricting fluids?: 2000cc  Do you understand a low sodium diet?: Yes  Do you understand how to read food labels?: Yes  How many restaurant meals do you eat per week?: 0  Do you salt your food before tasting it?: No     No patient-reported symptoms      Symptoms:  CHF associated fatigue: Pos      Symptom course: stable  Salt intake watch compared to last visit: improved     ,   COPD Assessment    Does the patient understand envrionmental exposure?: Yes  Is the patient able to verbalize Rescue vs. Long Acting medications?: Yes     No patient-reported symptoms         Symptoms:          , and   General Assessment    Do you have any symptoms that are causing concern?: No       Heart Failure Education outreach Date/Time: 2024 11:04 AM    Ambulatory Care Manager (ACM) contacted the family by telephone to perform Ambulatory Care Coordination. Verified name and  with family as

## 2024-05-28 ENCOUNTER — CARE COORDINATION (OUTPATIENT)
Dept: CARE COORDINATION | Age: 64
End: 2024-05-28

## 2024-05-28 RX ORDER — METOPROLOL SUCCINATE 25 MG/1
25 TABLET, EXTENDED RELEASE ORAL NIGHTLY
Qty: 30 TABLET | Refills: 3 | Status: SHIPPED | OUTPATIENT
Start: 2024-05-28

## 2024-05-28 NOTE — CARE COORDINATION
Patient Current Location: Home: [address] [place] [id number]     Phone call to Pt. Pt's wife, emergency contact, Laura answered stating Pt is tired and is resting. Hasn't heard back regarding medicaid, they were going to get in contact with Aramis's employer to verify Pt has not been working, approved for SNAP.     Pt's wife reported she spoke with Gloria with [employer] - needs to get list of meds to get medicine approved    Utility assistance: Pt's wife reported they got shut off notice for electric and water. Pt's wife called and can get a medical certificate - shut off is scheduled for Friday. Pt is having electric company fax medical certificate to Pt Dr office to have turned back on for 30 days. MARY discussed obtaining application for utility assistance for HEAP and PIPP through [facility]. SW offered to provide address. Pt reported she is aware of address.     SSD - Pt phone interview is scheduled for July 11    MARY plan of care: SW will follow up with Pt regarding utility assistance on 6/4.

## 2024-05-30 ENCOUNTER — CARE COORDINATION (OUTPATIENT)
Dept: CARE COORDINATION | Age: 64
End: 2024-05-30

## 2024-05-30 NOTE — CARE COORDINATION
Ambulatory Care Coordination Note  2024    Patient Current Location:  Home: [address] [place] [id number]     ACM contacted the spouse/partner by telephone. Verified name and  with spouse/partner as identifiers. Provided introduction to self, and explanation of the ACM role.     Challenges to be reviewed by the provider   Additional needs identified to be addressed with provider: No  none               Method of communication with provider: none.    ACM: Piper Diallo RN    ACM placed call to patient today for follow-up outreach. Patient's spouse, Laura, answered and stated Aramis was resting. Laura reports Aramis has been doing ok. They received a denial notice today from Medicaid - reason denied is that Aramis is in the process of signing up for Medicare. Laura reports this is very discouraging for them and now they feel like they're back at square one and will have to put Aramis's heart procedure on hold. Laura reports they won't know about Medicare/SSD until the phone interview scheduled on 24.   CC Protocol completed. Patient's barriers to medication adherence are financial. Patient is working with [employer] and will be receiving $200 medication voucher monthly starting in . Patient's daughter assists with cost of medications when she can. Patient is not on home oxygen. Patient is independent of all ADL's, but takes a little extra time to get things done. Patient's wife Laura helps out when needed. Patient lives in a home with his spouse. Current DME includes cane, walker, wheelchair & shower chair. Laura reports they have this equipment from a prior hip surgery, but patient does not need to use any of it right now. Patient does not have a nebulizer or BiPAP/CPAP. Aramis has been very discouraged/stressed recently due to his inability to work and provide income for his family.   Patient's chronic health conditions include Myasthenia Gravis, CHF, COPD & DM2. ACM confirmed

## 2024-05-31 RX ORDER — LOSARTAN POTASSIUM 25 MG/1
25 TABLET ORAL EVERY EVENING
Qty: 30 TABLET | Refills: 0 | Status: SHIPPED | OUTPATIENT
Start: 2024-05-31

## 2024-05-31 RX ORDER — FUROSEMIDE 40 MG/1
40 TABLET ORAL 2 TIMES DAILY
Qty: 60 TABLET | Refills: 0 | Status: SHIPPED | OUTPATIENT
Start: 2024-05-31

## 2024-05-31 RX ORDER — DIGOXIN 125 MCG
125 TABLET ORAL EVERY OTHER DAY
Qty: 30 TABLET | Refills: 3 | Status: SHIPPED | OUTPATIENT
Start: 2024-05-31

## 2024-05-31 RX ORDER — METOPROLOL SUCCINATE 25 MG/1
25 TABLET, EXTENDED RELEASE ORAL NIGHTLY
Qty: 30 TABLET | Refills: 3 | OUTPATIENT
Start: 2024-05-31

## 2024-05-31 RX ORDER — MIDODRINE HYDROCHLORIDE 5 MG/1
5 TABLET ORAL
Qty: 90 TABLET | Refills: 0 | Status: SHIPPED | OUTPATIENT
Start: 2024-05-31

## 2024-05-31 RX ORDER — SPIRONOLACTONE 25 MG/1
25 TABLET ORAL DAILY
Qty: 30 TABLET | Refills: 0 | Status: SHIPPED | OUTPATIENT
Start: 2024-05-31

## 2024-05-31 ASSESSMENT — ENCOUNTER SYMPTOMS: SHORTNESS OF BREATH: 1

## 2024-05-31 NOTE — TELEPHONE ENCOUNTER
appt 6/24  Wife called he is getting help   W/ Medicist and Metoprolol  & Digoxen need resent   New voucher starts in Ramonita   90 day on all refills

## 2024-06-04 ENCOUNTER — CARE COORDINATION (OUTPATIENT)
Dept: CARE COORDINATION | Age: 64
End: 2024-06-04

## 2024-06-04 NOTE — CARE COORDINATION
Patient Current Location: Home: [address] [place] [id number]     Phone call to Pt to follow up regarding [employer] financial assistance. Pt's number cannot accept calls at this time.   Phone call to Pt's wife, emergency contact, Laura.   Pt's wife denied receiving [employer] application in the mail. SW offered to send via email. MARY will send to Pt's daughter's email address at [email or web address] and will request that coworker, Saba SAAVEDRA resends via postal mail     MARY plan of care: MARY will follow up with Pt regarding [employer] financial assistance norman in one week.

## 2024-06-05 ENCOUNTER — CARE COORDINATION (OUTPATIENT)
Dept: CARE COORDINATION | Age: 64
End: 2024-06-05

## 2024-06-07 ENCOUNTER — HOSPITAL ENCOUNTER (EMERGENCY)
Age: 64
Discharge: LEFT BEFORE DISCHARGE | End: 2024-06-07

## 2024-06-07 ENCOUNTER — OFFICE VISIT (OUTPATIENT)
Dept: CARDIOLOGY CLINIC | Age: 64
End: 2024-06-07

## 2024-06-07 VITALS
HEART RATE: 87 BPM | BODY MASS INDEX: 36.82 KG/M2 | RESPIRATION RATE: 22 BRPM | HEIGHT: 71 IN | SYSTOLIC BLOOD PRESSURE: 106 MMHG | TEMPERATURE: 97.8 F | OXYGEN SATURATION: 96 % | DIASTOLIC BLOOD PRESSURE: 76 MMHG | WEIGHT: 263 LBS

## 2024-06-07 VITALS
HEIGHT: 71 IN | OXYGEN SATURATION: 83 % | WEIGHT: 263.6 LBS | BODY MASS INDEX: 36.9 KG/M2 | DIASTOLIC BLOOD PRESSURE: 62 MMHG | HEART RATE: 72 BPM | SYSTOLIC BLOOD PRESSURE: 96 MMHG

## 2024-06-07 DIAGNOSIS — R06.02 SOB (SHORTNESS OF BREATH): Primary | ICD-10-CM

## 2024-06-07 DIAGNOSIS — R07.9 CHEST PAIN, UNSPECIFIED TYPE: ICD-10-CM

## 2024-06-07 DIAGNOSIS — E27.2: Primary | ICD-10-CM

## 2024-06-07 PROCEDURE — 6370000000 HC RX 637 (ALT 250 FOR IP)

## 2024-06-07 PROCEDURE — 99283 EMERGENCY DEPT VISIT LOW MDM: CPT

## 2024-06-07 RX ORDER — PREDNISONE 10 MG/1
TABLET ORAL
Qty: 10 TABLET | Refills: 0 | Status: SHIPPED | OUTPATIENT
Start: 2024-06-07 | End: 2024-06-14 | Stop reason: SDUPTHER

## 2024-06-07 RX ORDER — PREDNISONE 1 MG/1
TABLET ORAL
Qty: 10 TABLET | Refills: 0 | Status: SHIPPED | OUTPATIENT
Start: 2024-06-07 | End: 2024-06-14

## 2024-06-07 RX ORDER — PREDNISONE 20 MG/1
60 TABLET ORAL ONCE
Status: COMPLETED | OUTPATIENT
Start: 2024-06-07 | End: 2024-06-07

## 2024-06-07 RX ADMIN — PREDNISONE 60 MG: 20 TABLET ORAL at 16:25

## 2024-06-07 ASSESSMENT — PAIN - FUNCTIONAL ASSESSMENT
PAIN_FUNCTIONAL_ASSESSMENT: NONE - DENIES PAIN
PAIN_FUNCTIONAL_ASSESSMENT: ACTIVITIES ARE NOT PREVENTED
PAIN_FUNCTIONAL_ASSESSMENT: NONE - DENIES PAIN

## 2024-06-07 ASSESSMENT — PAIN DESCRIPTION - LOCATION: LOCATION: BACK

## 2024-06-07 ASSESSMENT — PAIN DESCRIPTION - DESCRIPTORS: DESCRIPTORS: ACHING;THROBBING

## 2024-06-07 ASSESSMENT — PAIN DESCRIPTION - ORIENTATION: ORIENTATION: LOWER

## 2024-06-07 ASSESSMENT — PAIN SCALES - GENERAL: PAINLEVEL_OUTOF10: 10

## 2024-06-07 ASSESSMENT — PAIN DESCRIPTION - PAIN TYPE: TYPE: CHRONIC PAIN

## 2024-06-07 NOTE — ED NOTES
Patient arrives to ED after being sent from Dr. Winchester office. Patient is needing some steroids filled. Patient is trying to get another insurance set up and having a lapse of time while that is being processed. Dr. Henderson did not want patient to be without due to patient taking these steroids for the last 15 years. Patient is alert and oriented.

## 2024-06-07 NOTE — CARE COORDINATION
Patient is currently active with [employer].  His daughter came into our office yesterday to drop off financial assistance paper work, which was for hospital/dr office bills (I sent this to [facility] via ).   She also requested his monthly voucher.  I called [facility] to request a current printout of his medications.  I then updated his voucher and faxed to the pharmacy. This voucher included: atorvastatin, clopidogrel, digoxin, duloxetine, furosemide, glyburide/metformin, losartan, metoprolol, midodrine, predisone (1 and 10mg), spironolactone and tamsulosin.  The voucher is for $200 (the max we cover) and I reminded the daughter of this, she stated she was aware and it should cover everything.  Voucher is also only good for #30 day supplies.    Patient has received his max amount for June.

## 2024-06-07 NOTE — PROGRESS NOTES
to go by themselves as already they are worried about bills. Patient is medicare age and hospital should be able to get his paperwork. I am worried about patient if he is unable to take his medications and with his LVEF it can be very dangerous.           Thanks again for allowing me to participate in care of this patient. Please call me if you have any questions.    With best regards.      Rock Henderson MD, 6/7/2024 12:26 PM     Please note this report has been partially produced using speech recognition software and may contain errors related to that system including errors in grammar, punctuation, and spelling, as well as words and phrases that may be inappropriate. If there are any questions or concerns please feel free to contact the dictating provider for clarification.

## 2024-06-07 NOTE — PATIENT INSTRUCTIONS
Please be informed that if you contact our office outside of normal business hours the physician on call cannot help with any scheduling or rescheduling issues, procedure instruction questions or any type of medication issue.    We advise you for any urgent/emergency that you go to the nearest emergency room!    PLEASE CALL OUR OFFICE DURING NORMAL BUSINESS HOURS    Monday - Friday   8 am to 5 pm    [place]: [phone]    [place]: [phone]    [place]:  [phone]  We are committed to providing you the best care possible.    If you receive a survey after visiting one of our offices, please take time to share your experience concerning your physician office visit.  These surveys are confidential and no health information about you is shared.    We are eager to improve for you and we are counting on your feedback to help make that happen.

## 2024-06-07 NOTE — CARE COORDINATION
CM received a consult on patient. Patient has medication that he is unable to  at pharmacy. Patient unable to  medication. CM gave patient a Good RX Card and instructed patient on how to use it. CM also discussed Medicaid application and was going to assist patient with completing this but patient stated that he filed for Medicare and this kept patient from getting Medicaid. CM alerted Leonor A Silz, APRN-CNP. She is reaching out to patients' doctor before discharging patient.

## 2024-06-08 NOTE — ED PROVIDER NOTES
[facility] EMERGENCY DEPARTMENT  EMERGENCY DEPARTMENT ENCOUNTER        Pt Name: Aramis Rosario  MRN: [id number]  Birthdate 1960  Date of evaluation: 6/7/2024  Provider: JAMIE German - KANIKA  PCP: Tyler Diaz MD  Note Started: 1:09 AM EDT 6/8/24      ALLYSON. I have evaluated this patient.        CHIEF COMPLAINT       Chief Complaint   Patient presents with    Medication Refill     Sent from Dr. Henderson-        HISTORY OF PRESENT ILLNESS: 1 or more Elements     History From: Wife and patient    Limitations to history : None    Social Determinants Significantly Affecting Health : None    Chief Complaint: Medication refill    Aramis Rosario is a 64 y.o. male history of myasthenia's gravis history of CAD NSTEMI type 2 diabetes, presents to ED after his visit with Dr. Mosquera.  His wife and himself stated that Dr. Hall just told him since he was out of his prednisone that he needed to come to the emergency department to obtain a prescription and medications to prevent adrenal failure.  Patient denies any increasing shortness of breath.  Denies any chest pain.  Denies any fevers cough congestion.  States that he is at his baseline.  Wife also agrees that he is at his baseline health.  Stated that they are very low on income currently and are unable to afford their outpatient prescriptions.  Stated that they would like to be provided the medicine by the ED.    Nursing Notes were all reviewed and agreed with or any disagreements were addressed in the HPI.    REVIEW OF SYSTEMS :      Review of Systems    Positives and Pertinent negatives as per HPI.     SURGICAL HISTORY     Past Surgical History:   Procedure Laterality Date    BLADDER SURGERY Right 5/13/2023    CYSTOSCOPY RETROGRADE PYELOGRAM STENT INSERTION performed by Vivien Padilla MD at [facility] OR    CARDIAC PROCEDURE N/A 5/7/2024    Left heart cath / coronary angiography performed by Irene Cardona MD at [facility]    magnesium oxide (MAG-OX) 400 MG tablet Take 1 tablet by mouth daily, Disp-30 tablet, R-3This prescription was filled on 4/16/2024. Any refills authorized will be placed on file.Normal      aspirin 81 MG chewable tablet Take 1 tablet by mouth daily, Disp-30 tablet, R-0Normal      empagliflozin (JARDIANCE) 10 MG tablet Take 1 tablet by mouth daily, Disp-30 tablet, R-0Normal      tamsulosin (FLOMAX) 0.4 MG capsule Take 1 capsule by mouth daily, Disp-90 capsule, R-1Normal      DULoxetine (CYMBALTA) 60 MG extended release capsule Take 1 capsule by mouth daily, Disp-90 capsule, R-1This prescription was filled on 9/13/2023. Any refills authorized will be placed on file.Normal      glyBURIDE-metFORMIN (GLUCOVANCE) 5-500 MG per tablet Take 1 tablet by mouth 2 times daily (with meals), Disp-60 tablet, R-3Normal      tiZANidine (ZANAFLEX) 2 MG tablet Take 1 tablet by mouth 2 times daily as needed (BACK PAIN AND SPASMS) AVOID ALCOHOL/CAUSES DROWSINESS, Disp-60 tablet, R-1Normal      pantoprazole (PROTONIX) 40 MG tablet Take 1 tablet by mouth 2 times daily, Disp-180 tablet, R-1Normal      Cholecalciferol 400 UNIT TABS tablet Take 1 tablet by mouth dailyHistorical Med      clopidogrel (PLAVIX) 75 MG tablet Take 1 tablet by mouth daily, Disp-90 tablet, R-3Normal      SYMBICORT 160-4.5 MCG/ACT AERO Inhale 2 puffs into the lungs in the morning and 2 puffs in the evening., Disp-10.2 g, R-3Normal      atorvastatin (LIPITOR) 40 MG tablet Take 1 tablet by mouth nightly START 6/25/2021, Disp-90 tablet, R-3Normal      ferrous sulfate (IRON 325) 325 (65 Fe) MG tablet Take 1 tablet by mouth daily (with breakfast), Disp-30 tablet, R-5Normal      tiotropium (SPIRIVA HANDIHALER) 18 MCG inhalation capsule Inhale 1 capsule into the lungs daily, Disp-90 capsule, R-1Normal      sildenafil (REVATIO) 20 MG tablet Take 1 tablet by mouth daily as needed (erectile dysfunction), Disp-30 tablet, R-3Print      pyridostigmine (MESTINON) 60 MG tablet Take

## 2024-06-11 ENCOUNTER — PROCEDURE VISIT (OUTPATIENT)
Dept: CARDIOLOGY CLINIC | Age: 64
End: 2024-06-11

## 2024-06-11 ENCOUNTER — CARE COORDINATION (OUTPATIENT)
Dept: CARE COORDINATION | Age: 64
End: 2024-06-11

## 2024-06-11 DIAGNOSIS — I44.0 AV BLOCK, 1ST DEGREE: ICD-10-CM

## 2024-06-11 DIAGNOSIS — Z95.0 CARDIAC PACEMAKER IN SITU: Primary | ICD-10-CM

## 2024-06-11 DIAGNOSIS — I49.5 SINUS NODE DYSFUNCTION (HCC): ICD-10-CM

## 2024-06-11 NOTE — CARE COORDINATION
Patient Current Location: Home: [address] [place] [id number]     SW did conduct chart review. Pt is involved with [employer] and they have submitted application for [employer] Assistance via [employer].       Attempted phone call to Pt, his phone cannot accept calls at this time.   Phone call to Pt's wife, emergency contact, Laura to follow up regarding medications. Pt's wife reported Pt went to cardiologist on 6/7 who sent Pt to hospital for medications. Pt's wife reported they wanted to keep Pt for further testing. Pt has scheduled to meet with PCP on 6/14.     Dtr picked up prednisone from pharmacy - 10 day rx    [employer] - SW reviewed med assist note with Pt. Pt reports plans to follow up with pharmacy to determine if medications are ready, stating she usually receives a text when they are ready and has not received one yet.     MARY plan of care: MARY will follow up with Pt and wife regarding medications / [facility] financial assistance on 6/18

## 2024-06-12 ENCOUNTER — CARE COORDINATION (OUTPATIENT)
Dept: CARE COORDINATION | Age: 64
End: 2024-06-12

## 2024-06-12 NOTE — CARE COORDINATION
supply of 1 mg prednisone tablets, but patient only has about 3-4 days of 10 mg tablets remaining. Laura states prednisone is prescribed by Dr. Echavarria, Neurologist at [facility]. Patient has a video visit with PA on Friday, 6/14 and will discuss prednisone concerns at that time. Laura confirmed patient is now taking digoxin.   Laura expresses frustration with waiting for scheduled Medicare/SSD phone interview scheduled for 7/11/24.    Plan:  -ACM will follow up in 2 weeks  -CHF, COPD, DM2 assessment  -Med Rec    Offered patient enrollment in the Remote Patient Monitoring (RPM) program for in-home monitoring: Patient is not eligible for RPM program because: insurance coverage.    Lab Results       None            Care Coordination Interventions    Referral from Primary Care Provider: No  Suggested Interventions and Community Resources          Goals Addressed    None         Future Appointments   Date Time Provider Department Center   6/14/2024  2:00 PM Tyler Diaz MD [facility]    and   General Assessment    Do you have any symptoms that are causing concern?: No

## 2024-06-14 ENCOUNTER — OFFICE VISIT (OUTPATIENT)
Dept: INTERNAL MEDICINE CLINIC | Age: 64
End: 2024-06-14

## 2024-06-14 VITALS
WEIGHT: 263 LBS | HEART RATE: 98 BPM | HEIGHT: 71 IN | RESPIRATION RATE: 16 BRPM | BODY MASS INDEX: 36.82 KG/M2 | SYSTOLIC BLOOD PRESSURE: 124 MMHG | DIASTOLIC BLOOD PRESSURE: 78 MMHG | OXYGEN SATURATION: 46 %

## 2024-06-14 DIAGNOSIS — E11.9 TYPE 2 DIABETES MELLITUS WITHOUT COMPLICATION, WITHOUT LONG-TERM CURRENT USE OF INSULIN (HCC): ICD-10-CM

## 2024-06-14 DIAGNOSIS — I48.91 ATRIAL FIBRILLATION WITH RAPID VENTRICULAR RESPONSE (HCC): ICD-10-CM

## 2024-06-14 DIAGNOSIS — J42 CHRONIC BRONCHITIS, UNSPECIFIED CHRONIC BRONCHITIS TYPE (HCC): ICD-10-CM

## 2024-06-14 DIAGNOSIS — E11.41 DIABETIC MONONEUROPATHY ASSOCIATED WITH TYPE 2 DIABETES MELLITUS (HCC): ICD-10-CM

## 2024-06-14 DIAGNOSIS — K51.818 OTHER ULCERATIVE COLITIS WITH OTHER COMPLICATION (HCC): ICD-10-CM

## 2024-06-14 DIAGNOSIS — G70.00 MYASTHENIA GRAVIS (HCC): ICD-10-CM

## 2024-06-14 DIAGNOSIS — I50.21 ACUTE SYSTOLIC CHF (CONGESTIVE HEART FAILURE) (HCC): Primary | ICD-10-CM

## 2024-06-14 DIAGNOSIS — I42.0 DILATED CARDIOMYOPATHY (HCC): ICD-10-CM

## 2024-06-14 PROBLEM — I48.19 HYPERCOAGULABLE STATE DUE TO PERSISTENT ATRIAL FIBRILLATION (HCC): Status: RESOLVED | Noted: 2022-06-03 | Resolved: 2024-06-14

## 2024-06-14 PROBLEM — D68.69 HYPERCOAGULABLE STATE DUE TO PERSISTENT ATRIAL FIBRILLATION (HCC): Status: RESOLVED | Noted: 2022-06-03 | Resolved: 2024-06-14

## 2024-06-14 PROCEDURE — 1123F ACP DISCUSS/DSCN MKR DOCD: CPT | Performed by: INTERNAL MEDICINE

## 2024-06-14 PROCEDURE — 3051F HG A1C>EQUAL 7.0%<8.0%: CPT | Performed by: INTERNAL MEDICINE

## 2024-06-14 PROCEDURE — 99214 OFFICE O/P EST MOD 30 MIN: CPT | Performed by: INTERNAL MEDICINE

## 2024-06-14 RX ORDER — ATORVASTATIN CALCIUM 40 MG/1
40 TABLET, FILM COATED ORAL NIGHTLY
Qty: 30 TABLET | Refills: 3 | Status: SHIPPED | OUTPATIENT
Start: 2024-06-14

## 2024-06-14 RX ORDER — FUROSEMIDE 40 MG/1
40 TABLET ORAL 2 TIMES DAILY
Qty: 60 TABLET | Refills: 2 | Status: SHIPPED | OUTPATIENT
Start: 2024-06-14

## 2024-06-14 RX ORDER — SPIRONOLACTONE 25 MG/1
25 TABLET ORAL DAILY
Qty: 30 TABLET | Refills: 2 | Status: SHIPPED | OUTPATIENT
Start: 2024-06-14

## 2024-06-14 RX ORDER — PREDNISONE 10 MG/1
10 TABLET ORAL DAILY
Qty: 30 TABLET | Refills: 2 | Status: SHIPPED | OUTPATIENT
Start: 2024-06-14

## 2024-06-14 RX ORDER — GLYBURIDE-METFORMIN HYDROCHLORIDE 5; 500 MG/1; MG/1
1 TABLET ORAL 2 TIMES DAILY WITH MEALS
Qty: 60 TABLET | Refills: 2 | Status: SHIPPED | OUTPATIENT
Start: 2024-06-14

## 2024-06-14 RX ORDER — LOSARTAN POTASSIUM 25 MG/1
25 TABLET ORAL EVERY EVENING
Qty: 30 TABLET | Refills: 2 | Status: SHIPPED | OUTPATIENT
Start: 2024-06-14

## 2024-06-14 RX ORDER — MIDODRINE HYDROCHLORIDE 5 MG/1
5 TABLET ORAL
Qty: 90 TABLET | Refills: 2 | Status: SHIPPED | OUTPATIENT
Start: 2024-06-14

## 2024-06-14 SDOH — ECONOMIC STABILITY: FOOD INSECURITY: WITHIN THE PAST 12 MONTHS, YOU WORRIED THAT YOUR FOOD WOULD RUN OUT BEFORE YOU GOT MONEY TO BUY MORE.: OFTEN TRUE

## 2024-06-14 SDOH — ECONOMIC STABILITY: FOOD INSECURITY: WITHIN THE PAST 12 MONTHS, THE FOOD YOU BOUGHT JUST DIDN'T LAST AND YOU DIDN'T HAVE MONEY TO GET MORE.: OFTEN TRUE

## 2024-06-14 SDOH — ECONOMIC STABILITY: INCOME INSECURITY: HOW HARD IS IT FOR YOU TO PAY FOR THE VERY BASICS LIKE FOOD, HOUSING, MEDICAL CARE, AND HEATING?: HARD

## 2024-06-14 ASSESSMENT — PATIENT HEALTH QUESTIONNAIRE - PHQ9
1. LITTLE INTEREST OR PLEASURE IN DOING THINGS: MORE THAN HALF THE DAYS
3. TROUBLE FALLING OR STAYING ASLEEP: MORE THAN HALF THE DAYS
SUM OF ALL RESPONSES TO PHQ9 QUESTIONS 1 & 2: 4
DEPRESSION UNABLE TO ASSESS: FUNCTIONAL CAPACITY MOTIVATION LIMITS ACCURACY
5. POOR APPETITE OR OVEREATING: MORE THAN HALF THE DAYS
7. TROUBLE CONCENTRATING ON THINGS, SUCH AS READING THE NEWSPAPER OR WATCHING TELEVISION: MORE THAN HALF THE DAYS
9. THOUGHTS THAT YOU WOULD BE BETTER OFF DEAD, OR OF HURTING YOURSELF: NOT AT ALL
SUM OF ALL RESPONSES TO PHQ QUESTIONS 1-9: 14
10. IF YOU CHECKED OFF ANY PROBLEMS, HOW DIFFICULT HAVE THESE PROBLEMS MADE IT FOR YOU TO DO YOUR WORK, TAKE CARE OF THINGS AT HOME, OR GET ALONG WITH OTHER PEOPLE: VERY DIFFICULT
SUM OF ALL RESPONSES TO PHQ QUESTIONS 1-9: 14
6. FEELING BAD ABOUT YOURSELF - OR THAT YOU ARE A FAILURE OR HAVE LET YOURSELF OR YOUR FAMILY DOWN: MORE THAN HALF THE DAYS
SUM OF ALL RESPONSES TO PHQ QUESTIONS 1-9: 14
2. FEELING DOWN, DEPRESSED OR HOPELESS: MORE THAN HALF THE DAYS
SUM OF ALL RESPONSES TO PHQ QUESTIONS 1-9: 14

## 2024-06-14 NOTE — PROGRESS NOTES
Status: He is alert.   Psychiatric:         Mood and Affect: Mood normal.       ASSESSMENT:    1. Type 2 diabetes mellitus without complication, without long-term current use of insulin (HCC)    2. Myasthenia gravis (HCC)    3. Other ulcerative colitis with other complication (HCC)    4. Diabetic mononeuropathy associated with type 2 diabetes mellitus (HCC)    5. Chronic bronchitis, unspecified chronic bronchitis type (HCC)    6. Atrial fibrillation with rapid ventricular response (HCC)    7. Acute systolic CHF (congestive heart failure) ([facility])    8. Dilated cardiomyopathy (HCC)        PLAN:    Aramis was seen today for follow-up from hospital, follow up after procedure and stress.    Diagnoses and all orders for this visit:    Acute systolic CHF (congestive heart failure) ([facility])- clinically his CHF is much improved I suspect his EF will also recover after stabiilization on meds and espec w both rate controlling and chf med regimen.    -     spironolactone (ALDACTONE) 25 MG tablet; Take 1 tablet by mouth daily  -     midodrine (PROAMATINE) 5 MG tablet; Take 1 tablet by mouth 3 times daily (with meals)  -     losartan (COZAAR) 25 MG tablet; Take 1 tablet by mouth every evening  -     furosemide (LASIX) 40 MG tablet; Take 1 tablet by mouth in the morning and 1 tablet in the evening.  -     Comprehensive Metabolic Panel; Future  -     Magnesium; Future  -     CBC; Future  -     Brain Natriuretic Peptide; Future    Atrial fibrillation with rapid ventricular response (HCC) - Pt clinically w/o evidence of cardiovascular instability, cont regimen.    -     Digoxin Level; Future  -     Comprehensive Metabolic Panel; Future  -     Magnesium; Future  -     CBC; Future    Dilated cardiomyopathy (HCC)- he defers ablation, we'll cont meds and cont f/u cardiology  -     spironolactone (ALDACTONE) 25 MG tablet; Take 1 tablet by mouth daily  -     losartan (COZAAR) 25 MG tablet; Take 1 tablet by mouth every evening  -     Brain

## 2024-06-18 ENCOUNTER — CARE COORDINATION (OUTPATIENT)
Dept: CARE COORDINATION | Age: 64
End: 2024-06-18

## 2024-06-18 NOTE — CARE COORDINATION
Patient Current Location: Home: [address] [place] [id number]     Phone call to Pt to follow up regarding [facility] financial assistance     [employer] financial assistance - denied due to clarification of household proof of income and bank documents. Daughter will resend application and documents to [employer]. SW confirmed they have the contact number for [employer] to ask questions for clarification.    Received 54,000 medical bill from [employer].     MARY plan of care: MARY will make next outreach to Pt to follow up regarding mercy financial assistance on 7/9

## 2024-07-02 ENCOUNTER — CARE COORDINATION (OUTPATIENT)
Dept: CARE COORDINATION | Age: 64
End: 2024-07-02

## 2024-07-02 NOTE — CARE COORDINATION
Ambulatory Care Coordination Note     2024 1:18 PM     Patient Current Location:  Home: [address] [place] [address]     ACM contacted the spouse/partner  by telephone. Verified name and  with spouse/partner as identifiers.         ACM: Piper Diallo RN     Challenges to be reviewed by the provider   Additional needs identified to be addressed with provider No  none         Method of communication with provider: none.    Care Summary Note: ACM placed call to patient today for follow-up outreach, but patient's phone is not accepting calls at this time. ACM placed call to patient's spouse Laura. Laura states patient is not having a very good day today - stating he is weak in the legs. Laura reports this happens sometimes and is a symptom of his myasthenia gravis. Laura reports patient does not have any swelling currently and his breathing and blood sugar have all been well-controlled. See CHF, COPD, & DM assessments completed below. Laura declines med rec at this time, but states patient has all of his medications and she will be picking up refills today with [employer] voucher. Laura and patient are looking forward to scheduled social security call on 24. Laura denies any symptoms or concerns to report to provider at this time.     Offered patient enrollment in the Remote Patient Monitoring (RPM) program for in-home monitoring: Patient is not eligible for RPM program because: PCP does not participate in RPM program at this time .     Assessments Completed:   Diabetes Assessment    Medic Alert ID: No  Meal Planning: Avoidance of concentrated sweets   How often do you test your blood sugar?: No Testing   Do you have barriers with adherence to non-pharmacologic self-management interventions? (Nutrition/Exercise/Self-Monitoring): Yes   Have you ever had to go to the ED for symptoms of low blood sugar?: No       No patient-reported symptoms         ,   Congestive Heart Failure

## 2024-07-09 ENCOUNTER — CARE COORDINATION (OUTPATIENT)
Dept: CARE COORDINATION | Age: 64
End: 2024-07-09

## 2024-07-09 NOTE — CARE COORDINATION
Attempted phone call to Pt / Pt's wife to discuss status of [facility] financial assistance application. No answer, vm message left requesting return call, contact number provided.     MARY plan of care: MARY will make next outreach attempt on 7/26 to follow up regarding [facility] financial assistance.

## 2024-07-16 ENCOUNTER — CARE COORDINATION (OUTPATIENT)
Dept: CARE COORDINATION | Age: 64
End: 2024-07-16

## 2024-07-16 NOTE — CARE COORDINATION
Ambulatory Care Coordination Note     2024 3:54 PM     Patient Current Location:  Home: [address] [place] [address]     ACM contacted the patient by telephone. Verified name and  with patient as identifiers.         ACM: Piper Diallo RN     Challenges to be reviewed by the provider   Additional needs identified to be addressed with provider Yes  Patient states he has ongoing dizziness and has fallen about 4 times since last hospitalization  (24). Patient denies injury or loss of consciousness with falls. States his legs just give out at random times. Denies that this happens with rising out of a seated position or with quick movements. States it happens while seated, or happens while standing or with walking. Patient concerned that most of his medications have side effect of dizziness. Ok with discussing at PCP OV on 24.         Method of communication with provider: chart routing.    Care Summary Note: ACM placed call to patient today for follow-up outreach. Patient reports that he has been feeling good today, but has his good days and bad days. Patient states good days are beginning to outweigh the bad recently.     COPD & CHF assessments completed. See below. Patient reports his breathing has been good and denies any increased SOB, cough or sputum. Patient states his BLE is at his baseline and reports swelling has decreased significantly over the last month. Patient does report having ongoing dizziness since last hospitalization (24) and states he's fallen about 4 times since then. Patient denies injury or loss of consciousness with falls. States his legs just give out at random times. Denies that this happens with rising out of a seated position or with quick movements. States it happens while seated, or happens while standing or with walking. Patient concerned that most of his medications have side effect of dizziness. Ok with discussing at PCP OV on 24. ACM will

## 2024-07-26 ENCOUNTER — CARE COORDINATION (OUTPATIENT)
Dept: CARE COORDINATION | Age: 64
End: 2024-07-26

## 2024-07-26 ENCOUNTER — OFFICE VISIT (OUTPATIENT)
Dept: INTERNAL MEDICINE CLINIC | Age: 64
End: 2024-07-26

## 2024-07-26 VITALS
DIASTOLIC BLOOD PRESSURE: 60 MMHG | SYSTOLIC BLOOD PRESSURE: 100 MMHG | HEIGHT: 71 IN | RESPIRATION RATE: 16 BRPM | WEIGHT: 261 LBS | OXYGEN SATURATION: 91 % | BODY MASS INDEX: 36.54 KG/M2 | HEART RATE: 58 BPM

## 2024-07-26 DIAGNOSIS — I50.21 ACUTE SYSTOLIC CHF (CONGESTIVE HEART FAILURE) (HCC): ICD-10-CM

## 2024-07-26 DIAGNOSIS — N18.30 TYPE 2 DIABETES MELLITUS WITH STAGE 3 CHRONIC KIDNEY DISEASE, WITHOUT LONG-TERM CURRENT USE OF INSULIN, UNSPECIFIED WHETHER STAGE 3A OR 3B CKD (HCC): ICD-10-CM

## 2024-07-26 DIAGNOSIS — E66.01 SEVERE OBESITY (BMI 35.0-39.9) WITH COMORBIDITY (HCC): ICD-10-CM

## 2024-07-26 DIAGNOSIS — N52.9 ERECTILE DYSFUNCTION, UNSPECIFIED ERECTILE DYSFUNCTION TYPE: ICD-10-CM

## 2024-07-26 DIAGNOSIS — E11.9 TYPE 2 DIABETES MELLITUS WITHOUT COMPLICATION, WITHOUT LONG-TERM CURRENT USE OF INSULIN (HCC): ICD-10-CM

## 2024-07-26 DIAGNOSIS — I48.20 ATRIAL FIBRILLATION, CHRONIC (HCC): ICD-10-CM

## 2024-07-26 DIAGNOSIS — I50.21 ACUTE SYSTOLIC CHF (CONGESTIVE HEART FAILURE) (HCC): Primary | ICD-10-CM

## 2024-07-26 DIAGNOSIS — I42.0 DILATED CARDIOMYOPATHY (HCC): ICD-10-CM

## 2024-07-26 DIAGNOSIS — E11.22 TYPE 2 DIABETES MELLITUS WITH STAGE 3 CHRONIC KIDNEY DISEASE, WITHOUT LONG-TERM CURRENT USE OF INSULIN, UNSPECIFIED WHETHER STAGE 3A OR 3B CKD (HCC): ICD-10-CM

## 2024-07-26 DIAGNOSIS — M54.42 ACUTE LEFT-SIDED LOW BACK PAIN WITH LEFT-SIDED SCIATICA: ICD-10-CM

## 2024-07-26 PROCEDURE — 36415 COLL VENOUS BLD VENIPUNCTURE: CPT | Performed by: INTERNAL MEDICINE

## 2024-07-26 RX ORDER — SPIRONOLACTONE 25 MG/1
12.5 TABLET ORAL DAILY
Qty: 30 TABLET | Refills: 2 | Status: SHIPPED | OUTPATIENT
Start: 2024-07-26

## 2024-07-26 RX ORDER — SILDENAFIL CITRATE 20 MG/1
20 TABLET ORAL DAILY PRN
Qty: 30 TABLET | Refills: 3 | Status: SHIPPED | OUTPATIENT
Start: 2024-07-26

## 2024-07-26 RX ORDER — GLIPIZIDE 5 MG/1
5 TABLET ORAL 2 TIMES DAILY
Qty: 60 TABLET | Refills: 3 | Status: SHIPPED | OUTPATIENT
Start: 2024-07-26

## 2024-07-26 RX ORDER — MIDODRINE HYDROCHLORIDE 10 MG/1
10 TABLET ORAL 3 TIMES DAILY
Qty: 90 TABLET | Refills: 2 | Status: SHIPPED | OUTPATIENT
Start: 2024-07-26

## 2024-07-26 RX ORDER — TIZANIDINE 2 MG/1
2 TABLET ORAL 2 TIMES DAILY PRN
Qty: 60 TABLET | Refills: 1 | Status: SHIPPED | OUTPATIENT
Start: 2024-07-26

## 2024-07-26 NOTE — PATIENT INSTRUCTIONS
We'll switch the metformin/glyburide to metformin and glipizide both separate meds twice/day    Decr the aldactone to 1/2 tab daily    Increase the midodrine to 10mg 3x/day.

## 2024-07-26 NOTE — CARE COORDINATION
Patient Current Location: Home: [address] [place] [id number]     Phone call to Pt. Pt's wife, Laura, emergency contact answered.     SS application completed over the phone on 7/11  - approved, will receive this month and did receive back pay. First check will arrive on 8/2.     [facility] financial assistance - received a letter today that stated they were denied - proof of income, clarification of household and bank statement. Pt's wife reported they did send this information and is not sure why they were denied. Pt's wife reports she plans to call to follow up and will let this SW know if any further assistance is needed.     Spoke with [facility] last week and they said medical card is pending. Information was sent to someone who no longer works at [facility] so they were given an extension, paperwork is due 8/5.     MARY plan of care: SW will follow up with Pt / wife in 2 weeks to follow up regarding mercy financial assistance and JFS.

## 2024-07-26 NOTE — PROGRESS NOTES
Aramis Rosario  1960 07/26/24    SUBJECTIVE:    ATR FIB AND CHF, HR STEADY, DENIES PALPITATIONS, SOME SHEEHAN/SOB BUT DENIES SX CP    DM- NOT BEEN ON GLYBURIDE AS NOT AVAIL  PHARMACY, HAS BEEN ON METFORMIN.  NOT CHECKED SUGARS RECENTLY.    Lab Results   Component Value Date    LABA1C 7.1 (H) 05/04/2024    LABA1C 7.9 12/22/2022    LABA1C 7.6 09/21/2022     Lab Results   Component Value Date    GLUF 105 (H) 11/01/2016    MALBCR 11.9 09/21/2022    CREATININE 1.6 (H) 05/09/2024     Coronary artery disease has been  w/o any ongoing sx of CP,palpitations, lt diaphoresis.  Is continuing medications regularly.    DOES NOT HAVE BP CUFF AT HOME BUT SOMETIMES LT HEADED.  CHECKED L ARM BP /60.      OBESITY, WT STABLE, CURRENTLY DECONDITIONED BUT WORKING UP TO MORE EXERCISE    CARDIOMYOPATHY, EF WHEN HAD AFIB W RVR WAS AS LOW AS 20%, ON STABLE REGIMEN NOW.  HE DEFERS FU CARDIO AT THIS TIME.      OBJECTIVE:    /60   Pulse 58   Resp 16   Ht 1.803 m (5' 10.98\")   Wt 118.4 kg (261 lb)   SpO2 91%   BMI 36.42 kg/m²     Physical Exam  Constitutional:       Appearance: Normal appearance.   Cardiovascular:      Rate and Rhythm: Normal rate. Rhythm irregular.      Heart sounds: No murmur heard.     No gallop.   Pulmonary:      Effort: No respiratory distress.      Breath sounds: No wheezing.   Abdominal:      General: Abdomen is flat. Bowel sounds are normal. There is no distension.      Palpations: Abdomen is soft. There is no mass.      Tenderness: There is no abdominal tenderness.      Hernia: No hernia is present.   Musculoskeletal:      Right lower leg: No edema.      Left lower leg: No edema.   Neurological:      Mental Status: He is alert.   Psychiatric:         Mood and Affect: Mood normal.         ASSESSMENT:    1. Acute systolic CHF (congestive heart failure) (HCC)    2. Type 2 diabetes mellitus without complication, without long-term current use of insulin (HCC)    3. Severe obesity (BMI 35.0-39.9) with

## 2024-07-27 LAB
ALBUMIN SERPL-MCNC: 4.4 G/DL (ref 3.4–5)
ALBUMIN/GLOB SERPL: 1.5 {RATIO} (ref 1.1–2.2)
ALP SERPL-CCNC: 89 U/L (ref 40–129)
ALT SERPL-CCNC: 25 U/L (ref 10–40)
ANION GAP SERPL CALCULATED.3IONS-SCNC: 15 MMOL/L (ref 3–16)
AST SERPL-CCNC: 22 U/L (ref 15–37)
BILIRUB SERPL-MCNC: 0.6 MG/DL (ref 0–1)
BUN SERPL-MCNC: 17 MG/DL (ref 7–20)
CALCIUM SERPL-MCNC: 10.1 MG/DL (ref 8.3–10.6)
CHLORIDE SERPL-SCNC: 100 MMOL/L (ref 99–110)
CHOLEST SERPL-MCNC: 173 MG/DL (ref 0–199)
CO2 SERPL-SCNC: 22 MMOL/L (ref 21–32)
CREAT SERPL-MCNC: 1.4 MG/DL (ref 0.8–1.3)
DEPRECATED RDW RBC AUTO: 16.9 % (ref 12.4–15.4)
DIGOXIN SERPL-MCNC: <0.3 NG/ML (ref 0.8–2)
GFR SERPLBLD CREATININE-BSD FMLA CKD-EPI: 56 ML/MIN/{1.73_M2}
GLUCOSE SERPL-MCNC: 187 MG/DL (ref 70–99)
HCT VFR BLD AUTO: 42.4 % (ref 40.5–52.5)
HDLC SERPL-MCNC: 37 MG/DL (ref 40–60)
HGB BLD-MCNC: 14 G/DL (ref 13.5–17.5)
LDLC SERPL CALC-MCNC: ABNORMAL MG/DL
LDLC SERPL-MCNC: 90 MG/DL
MAGNESIUM SERPL-MCNC: 2.3 MG/DL (ref 1.8–2.4)
MCH RBC QN AUTO: 30.7 PG (ref 26–34)
MCHC RBC AUTO-ENTMCNC: 33.1 G/DL (ref 31–36)
MCV RBC AUTO: 92.7 FL (ref 80–100)
NT-PROBNP SERPL-MCNC: 397 PG/ML (ref 0–124)
PLATELET # BLD AUTO: 230 K/UL (ref 135–450)
PMV BLD AUTO: 8 FL (ref 5–10.5)
POTASSIUM SERPL-SCNC: 4.5 MMOL/L (ref 3.5–5.1)
PROT SERPL-MCNC: 7.4 G/DL (ref 6.4–8.2)
RBC # BLD AUTO: 4.57 M/UL (ref 4.2–5.9)
SODIUM SERPL-SCNC: 137 MMOL/L (ref 136–145)
TRIGL SERPL-MCNC: 364 MG/DL (ref 0–150)
TSH SERPL DL<=0.005 MIU/L-ACNC: 1.24 UIU/ML (ref 0.27–4.2)
VLDLC SERPL CALC-MCNC: ABNORMAL MG/DL
WBC # BLD AUTO: 12.5 K/UL (ref 4–11)

## 2024-07-28 NOTE — RESULT ENCOUNTER NOTE
Please call pt, lab ok incl chol level, kidney fxn.  Magnesium level now nl- too high before, also his heart failure # is markedly improved from 11,000 three mo ago to 1200 two mo ago now nearly nl at 397- suggesting his heart fxn is also improving.  ONE ISSUE ON MEDS, HE'D PREVIOUSLY BEEN ON ELIQUIS BLOOD THINNER BUT STOPPED THIS AFTER LOST INSURANCE.  IF MEDS ARE NOW BEING COVERED-- IF HE'S BETTER OVER THE NEXT MONTH WE'LL CONSIDER RESTARTING THIS TOO AT AUGUST APPT. no

## 2024-08-08 ENCOUNTER — CARE COORDINATION (OUTPATIENT)
Dept: CARE COORDINATION | Age: 64
End: 2024-08-08

## 2024-08-08 NOTE — CARE COORDINATION
Ambulatory Care Coordination Note     2024 3:14 PM     Patient Current Location:  Home: [address] [place] [address]     ACM contacted the spouse/partner by telephone. Verified name and  with spouse/partner as identifiers.         ACM: Piper Diallo RN     Challenges to be reviewed by the provider   Additional needs identified to be addressed with provider No  none         Method of communication with provider: none.    Care Summary Note: ACM placed call to patient's spouse, Laura, today for follow-up outreach. Patient reports Aramis has been tired today due to increased activity, and he's currently resting. Other than being tired, Laura reports Aramis is doing and feeling good and denies any symptoms or concerns to report to provider at this time.     Laura reports Aramis's SS was approved and he received a check for back payment on 24. His next check should be received on 24. Patient now has Medicare A&B. Medicaid is still pending. Laura reports she received a call from someone at [facility] stating they found patient's paperwork in the desk of an employee who no longer works there and they would work on processing the information. Laura reports they just received a letter today stating all of the information requested has to be re-submitted to [facility] and it must be turned in by 8/15/24. Patient's daughter is working on getting the required documentation re-submitted. Laura reports she didn't contact [employer] this month because she wasn't sure if patient would qualify since he received SS back pay. ACM encouraged Laura to contact [employer] to determine eligibility for ongoing medication needs until Medicaid is approved. Laura verbalized understanding.     ACM reviewed AVS from 24 PCP OV with Laura. Laura reports patient has been taking medications as ordered/changed at this OV. Laura states patient's dizziness hasn't really changed since midodrine has been

## 2024-08-09 ENCOUNTER — CARE COORDINATION (OUTPATIENT)
Dept: CARE COORDINATION | Age: 64
End: 2024-08-09

## 2024-08-09 NOTE — CARE COORDINATION
Patient Current Location: Home: [address] [place] [address]     Phone call to Pt's wife, emergency contact, Laura Darden Financial assistance - hasn't had a chance to call them - will send the paperwork back in  Med assist - going to call to see if they can assist   Social Security - will receive first check next week   JFS - medicaid [place] - gave them until 8/5 to send information back in, daughter sent everything through email   BP cuff kit - Pt's wife reported Dr. Diaz is assisting with obtaining new BP cuff kit.    MARY plan of care: SW will make next outreach to Pt / wife on 8/30 to follow up regarding [facility] financial assistance, applying for medicaid and BP cuff kit

## 2024-08-13 ENCOUNTER — PROCEDURE VISIT (OUTPATIENT)
Dept: CARDIOLOGY CLINIC | Age: 64
End: 2024-08-13

## 2024-08-13 DIAGNOSIS — I44.0 AV BLOCK, 1ST DEGREE: ICD-10-CM

## 2024-08-13 DIAGNOSIS — Z95.0 CARDIAC PACEMAKER IN SITU: Primary | ICD-10-CM

## 2024-08-13 DIAGNOSIS — I49.5 SINUS NODE DYSFUNCTION (HCC): ICD-10-CM

## 2024-08-27 ENCOUNTER — OFFICE VISIT (OUTPATIENT)
Dept: INTERNAL MEDICINE CLINIC | Age: 64
End: 2024-08-27
Payer: MEDICARE

## 2024-08-27 VITALS
BODY MASS INDEX: 35.69 KG/M2 | WEIGHT: 255.8 LBS | SYSTOLIC BLOOD PRESSURE: 110 MMHG | HEART RATE: 71 BPM | DIASTOLIC BLOOD PRESSURE: 80 MMHG | OXYGEN SATURATION: 97 %

## 2024-08-27 DIAGNOSIS — I42.0 DILATED CARDIOMYOPATHY (HCC): ICD-10-CM

## 2024-08-27 DIAGNOSIS — E11.9 TYPE 2 DIABETES MELLITUS WITHOUT COMPLICATION, WITHOUT LONG-TERM CURRENT USE OF INSULIN (HCC): Primary | ICD-10-CM

## 2024-08-27 DIAGNOSIS — I48.20 ATRIAL FIBRILLATION, CHRONIC (HCC): ICD-10-CM

## 2024-08-27 DIAGNOSIS — I50.21 ACUTE SYSTOLIC CHF (CONGESTIVE HEART FAILURE) (HCC): ICD-10-CM

## 2024-08-27 PROCEDURE — G8417 CALC BMI ABV UP PARAM F/U: HCPCS | Performed by: INTERNAL MEDICINE

## 2024-08-27 PROCEDURE — 99214 OFFICE O/P EST MOD 30 MIN: CPT | Performed by: INTERNAL MEDICINE

## 2024-08-27 PROCEDURE — G2211 COMPLEX E/M VISIT ADD ON: HCPCS | Performed by: INTERNAL MEDICINE

## 2024-08-27 PROCEDURE — 1036F TOBACCO NON-USER: CPT | Performed by: INTERNAL MEDICINE

## 2024-08-27 PROCEDURE — G8427 DOCREV CUR MEDS BY ELIG CLIN: HCPCS | Performed by: INTERNAL MEDICINE

## 2024-08-27 PROCEDURE — 2022F DILAT RTA XM EVC RTNOPTHY: CPT | Performed by: INTERNAL MEDICINE

## 2024-08-27 PROCEDURE — 3017F COLORECTAL CA SCREEN DOC REV: CPT | Performed by: INTERNAL MEDICINE

## 2024-08-27 PROCEDURE — 3051F HG A1C>EQUAL 7.0%<8.0%: CPT | Performed by: INTERNAL MEDICINE

## 2024-08-27 PROCEDURE — 1123F ACP DISCUSS/DSCN MKR DOCD: CPT | Performed by: INTERNAL MEDICINE

## 2024-08-27 NOTE — PROGRESS NOTES
Aramis Rosario  1960 08/27/24    SUBJECTIVE:    Cardiomyopathy w no significant CAD;/crit stenosis noted on C May but EF was ~20%.  Still very SOB w little activity, often needs to sit down to urinate, not enough strength to maintain standing.     Atr fib, not on a/c, rate appears stable.  We'll set up for f/u w Dr Baumann to barbi, also to determine if needs to restart anticoagulation?       HTN- bp is better.we discussed now try to decr his proamatine.      DM- sugars improving.  Lab Results   Component Value Date    LABA1C 7.1 (H) 05/04/2024    LABA1C 7.9 12/22/2022    LABA1C 7.6 09/21/2022     Lab Results   Component Value Date    GLUF 105 (H) 11/01/2016    MALBCR 11.9 09/21/2022    CREATININE 1.4 (H) 07/26/2024     Chr syst CHF, BNP markedly improved last mo to ~400 fr prior 11k in hospital        OBJECTIVE:    /76 (Site: Left Upper Arm, Position: Sitting, Cuff Size: Large Adult)   Pulse 71   Wt 116 kg (255 lb 12.8 oz)   SpO2 97%   BMI 35.69 kg/m²     Physical Exam  Constitutional:       Appearance: Normal appearance.   Cardiovascular:      Rate and Rhythm: Normal rate. Rhythm irregular.      Heart sounds: No murmur heard.     No gallop.   Pulmonary:      Effort: No respiratory distress.      Breath sounds: No wheezing.   Abdominal:      General: Abdomen is flat. Bowel sounds are normal. There is no distension.      Palpations: Abdomen is soft. There is no mass.      Tenderness: There is no abdominal tenderness.      Hernia: No hernia is present.   Musculoskeletal:      Right lower leg: No edema.      Left lower leg: No edema.   Neurological:      Mental Status: He is alert.   Psychiatric:         Mood and Affect: Mood normal.         ASSESSMENT:    1. Type 2 diabetes mellitus without complication, without long-term current use of insulin (HCC)    2. Acute systolic CHF (congestive heart failure) (HCC)    3. Dilated cardiomyopathy (HCC)    4. Atrial fibrillation, chronic ([facility])

## 2024-08-30 ENCOUNTER — CARE COORDINATION (OUTPATIENT)
Dept: CARE COORDINATION | Age: 64
End: 2024-08-30

## 2024-08-30 NOTE — CARE COORDINATION
SW conducted chart review and contacted [employer] for benefit summary update. Pt's medicaid is still pending as Pt had to resubmit documents by 8/15 and he is now receiving SNAP benefits.    SW will follow up with Pt on 9/10 regarding medicaid status, ACP and BP cuff kit.

## 2024-09-03 ENCOUNTER — CARE COORDINATION (OUTPATIENT)
Dept: CARE COORDINATION | Age: 64
End: 2024-09-03

## 2024-09-03 NOTE — CARE COORDINATION
Ambulatory Care Coordination Note     9/3/2024 12:29 PM     Patient Current Location:  Home: [address] [place] [address]     ACM contacted the spouse/partner by telephone. Verified name and  with spouse/partner as identifiers.         ACM: Piper Diallo RN     Challenges to be reviewed by the provider   Additional needs identified to be addressed with provider No  none         Method of communication with provider: none.    Care Summary Note: ACM placed call to patient today for follow-up outreach. Patient's wife, Laura, answered. Laura reports patient is doing ok, he continues to have some dizziness and is very tired. Laura reports patient completed PCP OV on 24 and hasn't changed since that OV. Laura states PCP placed referral to Cardiology Dr. Baumann (per patient's request to see this specific provider) but they haven't heard from the [facility] yet. ACM encouraged Laura to contact the [facility] if they haven't received a call by Friday. Laura DOWNS. Per chart review, PCP also ordered labs that patient has not yet completed. Laura states they are awaiting patient's SS check so they can afford gas to get lab work completed.     Laura reports they did receive the scale from the Care Management/CHF teodoro. Patient has been taking daily weights and weight has been stable between 254-255 lbs. ACM educated Laura on daily weights and when to notify provider. Laura DOWNS.     Pharmacy did not have lancets or BP cuff last week. Laura will call pharmacy today to check on status of these items.     CHF, COPD & DM assessments completed. Patient continues to have SOB with exertion and fatigue (Laura reports this is his new baseline). Laura denies any new cough, SOB at rest or changes in sputum/color. Patient has still not been checking blood sugar because pharmacy did not have lancets.     Laura states they have not heard back yet regarding the status of patient's Medicaid application.

## 2024-09-10 ENCOUNTER — CARE COORDINATION (OUTPATIENT)
Dept: CARE COORDINATION | Age: 64
End: 2024-09-10

## 2024-09-16 ENCOUNTER — OFFICE VISIT (OUTPATIENT)
Dept: CARDIOLOGY CLINIC | Age: 64
End: 2024-09-16
Payer: MEDICARE

## 2024-09-16 VITALS
SYSTOLIC BLOOD PRESSURE: 102 MMHG | BODY MASS INDEX: 36.34 KG/M2 | WEIGHT: 259.6 LBS | OXYGEN SATURATION: 96 % | DIASTOLIC BLOOD PRESSURE: 74 MMHG | HEART RATE: 50 BPM | HEIGHT: 71 IN

## 2024-09-16 DIAGNOSIS — Z86.79 S/P ABLATION OF ATRIAL FIBRILLATION: ICD-10-CM

## 2024-09-16 DIAGNOSIS — I25.10 CORONARY ARTERY DISEASE INVOLVING NATIVE CORONARY ARTERY OF NATIVE HEART WITHOUT ANGINA PECTORIS: ICD-10-CM

## 2024-09-16 DIAGNOSIS — R06.02 SOB (SHORTNESS OF BREATH): ICD-10-CM

## 2024-09-16 DIAGNOSIS — I50.22 CHRONIC SYSTOLIC (CONGESTIVE) HEART FAILURE (HCC): ICD-10-CM

## 2024-09-16 DIAGNOSIS — R06.02 SHORTNESS OF BREATH: Primary | ICD-10-CM

## 2024-09-16 DIAGNOSIS — Z98.890 S/P ABLATION OF ATRIAL FIBRILLATION: ICD-10-CM

## 2024-09-16 DIAGNOSIS — E66.01 CLASS 2 SEVERE OBESITY DUE TO EXCESS CALORIES WITH SERIOUS COMORBIDITY AND BODY MASS INDEX (BMI) OF 39.0 TO 39.9 IN ADULT (HCC): ICD-10-CM

## 2024-09-16 DIAGNOSIS — Z95.0 PACEMAKER: ICD-10-CM

## 2024-09-16 PROCEDURE — 3017F COLORECTAL CA SCREEN DOC REV: CPT | Performed by: NURSE PRACTITIONER

## 2024-09-16 PROCEDURE — G8427 DOCREV CUR MEDS BY ELIG CLIN: HCPCS | Performed by: NURSE PRACTITIONER

## 2024-09-16 PROCEDURE — G8417 CALC BMI ABV UP PARAM F/U: HCPCS | Performed by: NURSE PRACTITIONER

## 2024-09-16 PROCEDURE — 99214 OFFICE O/P EST MOD 30 MIN: CPT | Performed by: NURSE PRACTITIONER

## 2024-09-16 PROCEDURE — 1036F TOBACCO NON-USER: CPT | Performed by: NURSE PRACTITIONER

## 2024-09-16 PROCEDURE — 1123F ACP DISCUSS/DSCN MKR DOCD: CPT | Performed by: NURSE PRACTITIONER

## 2024-09-16 ASSESSMENT — ENCOUNTER SYMPTOMS: SHORTNESS OF BREATH: 1

## 2024-09-17 PROCEDURE — 93296 REM INTERROG EVL PM/IDS: CPT | Performed by: INTERNAL MEDICINE

## 2024-09-17 PROCEDURE — 93294 REM INTERROG EVL PM/LDLS PM: CPT | Performed by: INTERNAL MEDICINE

## 2024-09-24 ENCOUNTER — CARE COORDINATION (OUTPATIENT)
Dept: CARE COORDINATION | Age: 64
End: 2024-09-24

## 2024-09-25 ENCOUNTER — CARE COORDINATION (OUTPATIENT)
Dept: CARE COORDINATION | Age: 64
End: 2024-09-25

## 2024-09-27 ENCOUNTER — TELEPHONE (OUTPATIENT)
Dept: INTERNAL MEDICINE CLINIC | Age: 64
End: 2024-09-27

## 2024-10-07 NOTE — PROGRESS NOTES
Aramis Rosario  1960  10/07/24    SUBJECTIVE:    Last week had n/v/d.  Pushed fluids,     Bp is improved today, echo is planned for his cardiomyopathy.    We'll now try weaning down the proamatine from 1- TID to 5mg TID.    Atr fib, echo pending, deferring to cardiology if needs a/c.    DM- sugars stable on meds and pending f.u lab.  Today A1c steady at 7.4  Lab Results   Component Value Date    LABA1C 7.4 10/08/2024    LABA1C 7.1 (H) 05/04/2024    LABA1C 7.9 12/22/2022     Lab Results   Component Value Date    GLUF 105 (H) 11/01/2016    MALBCR 11.9 09/21/2022    CREATININE 1.4 (H) 07/26/2024         He c/o some poor sleep.  Req for prn trazodone which was beneficial trying his wife/s med.     OBJECTIVE:    /70   Pulse 67   Resp 20   Ht 1.803 m (5' 10.98\")   Wt 117 kg (258 lb)   SpO2 97%   BMI 36.00 kg/m²     Physical Exam  Constitutional:       Appearance: Normal appearance.   Cardiovascular:      Rate and Rhythm: Normal rate. Rhythm irregular.      Heart sounds: No murmur heard.     No gallop.   Pulmonary:      Effort: No respiratory distress.      Breath sounds: No wheezing.   Abdominal:      General: Abdomen is flat. Bowel sounds are normal. There is no distension.      Palpations: Abdomen is soft. There is no mass.      Tenderness: There is no abdominal tenderness.      Hernia: No hernia is present.   Musculoskeletal:      Right lower leg: No edema.      Left lower leg: No edema.   Neurological:      Mental Status: He is alert.   Psychiatric:         Mood and Affect: Mood normal.         ASSESSMENT:    1. Type 2 diabetes mellitus without complication, without long-term current use of insulin ([facility])    2. Myasthenia gravis ([facility])    3. Chronic bronchitis, unspecified chronic bronchitis type ([facility])    4. Arthritis    5. Acute systolic CHF (congestive heart failure) ([facility])    6. Dilated cardiomyopathy (HCC)    7. Benign prostatic hyperplasia with urinary hesitancy    8. Hyperlipemia, mixed    9.

## 2024-10-08 ENCOUNTER — OFFICE VISIT (OUTPATIENT)
Dept: INTERNAL MEDICINE CLINIC | Age: 64
End: 2024-10-08
Payer: MEDICARE

## 2024-10-08 ENCOUNTER — HOSPITAL ENCOUNTER (OUTPATIENT)
Age: 64
Discharge: HOME | End: 2024-10-08
Payer: MEDICARE

## 2024-10-08 VITALS
SYSTOLIC BLOOD PRESSURE: 130 MMHG | OXYGEN SATURATION: 97 % | HEIGHT: 71 IN | WEIGHT: 258 LBS | HEART RATE: 67 BPM | DIASTOLIC BLOOD PRESSURE: 70 MMHG | BODY MASS INDEX: 36.12 KG/M2 | RESPIRATION RATE: 20 BRPM

## 2024-10-08 DIAGNOSIS — J42 CHRONIC BRONCHITIS, UNSPECIFIED CHRONIC BRONCHITIS TYPE (HCC): ICD-10-CM

## 2024-10-08 DIAGNOSIS — E11.9 TYPE 2 DIABETES MELLITUS WITHOUT COMPLICATION, WITHOUT LONG-TERM CURRENT USE OF INSULIN (HCC): ICD-10-CM

## 2024-10-08 DIAGNOSIS — I50.21 ACUTE SYSTOLIC CHF (CONGESTIVE HEART FAILURE) (HCC): ICD-10-CM

## 2024-10-08 DIAGNOSIS — G70.00 MYASTHENIA GRAVIS (HCC): ICD-10-CM

## 2024-10-08 DIAGNOSIS — I48.20 ATRIAL FIBRILLATION, CHRONIC (HCC): ICD-10-CM

## 2024-10-08 DIAGNOSIS — N40.1 BENIGN PROSTATIC HYPERPLASIA WITH URINARY HESITANCY: ICD-10-CM

## 2024-10-08 DIAGNOSIS — E78.2 HYPERLIPEMIA, MIXED: ICD-10-CM

## 2024-10-08 DIAGNOSIS — M19.90 ARTHRITIS: ICD-10-CM

## 2024-10-08 DIAGNOSIS — F51.01 PRIMARY INSOMNIA: ICD-10-CM

## 2024-10-08 DIAGNOSIS — R39.11 BENIGN PROSTATIC HYPERPLASIA WITH URINARY HESITANCY: ICD-10-CM

## 2024-10-08 DIAGNOSIS — I42.0 DILATED CARDIOMYOPATHY (HCC): ICD-10-CM

## 2024-10-08 DIAGNOSIS — E11.9 TYPE 2 DIABETES MELLITUS WITHOUT COMPLICATION, WITHOUT LONG-TERM CURRENT USE OF INSULIN (HCC): Primary | ICD-10-CM

## 2024-10-08 LAB
ALBUMIN SERPL-MCNC: 4 G/DL (ref 3.4–5)
ALBUMIN/GLOB SERPL: 1.5 {RATIO} (ref 1.1–2.2)
ALP SERPL-CCNC: 63 U/L (ref 40–129)
ALT SERPL-CCNC: 26 U/L (ref 10–40)
ANION GAP SERPL CALCULATED.3IONS-SCNC: 15 MMOL/L (ref 9–17)
AST SERPL-CCNC: 20 U/L (ref 15–37)
BILIRUB SERPL-MCNC: 0.3 MG/DL (ref 0–1)
BUN SERPL-MCNC: 18 MG/DL (ref 7–20)
CALCIUM SERPL-MCNC: 10 MG/DL (ref 8.3–10.6)
CHLORIDE SERPL-SCNC: 102 MMOL/L (ref 99–110)
CHOLEST SERPL-MCNC: 150 MG/DL (ref 125–199)
CO2 SERPL-SCNC: 22 MMOL/L (ref 21–32)
CREAT SERPL-MCNC: 1.5 MG/DL (ref 0.8–1.3)
DATE LAST DOSE: NORMAL
DIGOXIN DOSE TIME: NORMAL
DIGOXIN DOSE: NORMAL MG
DIGOXIN SERPL-MCNC: 0.9 NG/ML (ref 0.8–2)
ERYTHROCYTE [DISTWIDTH] IN BLOOD BY AUTOMATED COUNT: 13.2 % (ref 11.7–14.9)
GFR, ESTIMATED: 46 ML/MIN/1.73M2
GLUCOSE SERPL-MCNC: 135 MG/DL (ref 74–99)
HBA1C MFR BLD: 7.4 %
HCT VFR BLD AUTO: 40.3 % (ref 42–52)
HDLC SERPL-MCNC: 27 MG/DL
HGB BLD-MCNC: 13.1 G/DL (ref 13.5–18)
LDLC SERPL DIRECT ASSAY-MCNC: 58 MG/DL
MCH RBC QN AUTO: 31.7 PG (ref 27–31)
MCHC RBC AUTO-ENTMCNC: 32.5 G/DL (ref 32–36)
MCV RBC AUTO: 97.6 FL (ref 78–100)
PLATELET # BLD AUTO: 266 K/UL (ref 140–440)
PMV BLD AUTO: 9.6 FL (ref 7.5–11.1)
POTASSIUM SERPL-SCNC: 4.2 MMOL/L (ref 3.5–5.1)
PROT SERPL-MCNC: 6.7 G/DL (ref 6.4–8.2)
RBC # BLD AUTO: 4.13 M/UL (ref 4.6–6.2)
SODIUM SERPL-SCNC: 139 MMOL/L (ref 136–145)
TRIGL SERPL-MCNC: 516 MG/DL
WBC OTHER # BLD: 10 K/UL (ref 4–10.5)

## 2024-10-08 PROCEDURE — 80061 LIPID PANEL: CPT

## 2024-10-08 PROCEDURE — G8484 FLU IMMUNIZE NO ADMIN: HCPCS | Performed by: INTERNAL MEDICINE

## 2024-10-08 PROCEDURE — 83036 HEMOGLOBIN GLYCOSYLATED A1C: CPT | Performed by: INTERNAL MEDICINE

## 2024-10-08 PROCEDURE — G8417 CALC BMI ABV UP PARAM F/U: HCPCS | Performed by: INTERNAL MEDICINE

## 2024-10-08 PROCEDURE — 3017F COLORECTAL CA SCREEN DOC REV: CPT | Performed by: INTERNAL MEDICINE

## 2024-10-08 PROCEDURE — 99214 OFFICE O/P EST MOD 30 MIN: CPT | Performed by: INTERNAL MEDICINE

## 2024-10-08 PROCEDURE — G2211 COMPLEX E/M VISIT ADD ON: HCPCS | Performed by: INTERNAL MEDICINE

## 2024-10-08 PROCEDURE — 2022F DILAT RTA XM EVC RTNOPTHY: CPT | Performed by: INTERNAL MEDICINE

## 2024-10-08 PROCEDURE — 1036F TOBACCO NON-USER: CPT | Performed by: INTERNAL MEDICINE

## 2024-10-08 PROCEDURE — 80053 COMPREHEN METABOLIC PANEL: CPT

## 2024-10-08 PROCEDURE — 3023F SPIROM DOC REV: CPT | Performed by: INTERNAL MEDICINE

## 2024-10-08 PROCEDURE — 83721 ASSAY OF BLOOD LIPOPROTEIN: CPT

## 2024-10-08 PROCEDURE — 80162 ASSAY OF DIGOXIN TOTAL: CPT

## 2024-10-08 PROCEDURE — 36415 COLL VENOUS BLD VENIPUNCTURE: CPT

## 2024-10-08 PROCEDURE — 3051F HG A1C>EQUAL 7.0%<8.0%: CPT | Performed by: INTERNAL MEDICINE

## 2024-10-08 PROCEDURE — 1123F ACP DISCUSS/DSCN MKR DOCD: CPT | Performed by: INTERNAL MEDICINE

## 2024-10-08 PROCEDURE — G8427 DOCREV CUR MEDS BY ELIG CLIN: HCPCS | Performed by: INTERNAL MEDICINE

## 2024-10-08 PROCEDURE — 85027 COMPLETE CBC AUTOMATED: CPT

## 2024-10-08 RX ORDER — FUROSEMIDE 40 MG
40 TABLET ORAL 2 TIMES DAILY
Qty: 60 TABLET | Refills: 2 | Status: SHIPPED | OUTPATIENT
Start: 2024-10-08

## 2024-10-08 RX ORDER — SPIRONOLACTONE 25 MG/1
12.5 TABLET ORAL DAILY
Qty: 30 TABLET | Refills: 2 | Status: SHIPPED | OUTPATIENT
Start: 2024-10-08

## 2024-10-08 RX ORDER — PYRIDOSTIGMINE BROMIDE 60 MG/1
60 TABLET ORAL 3 TIMES DAILY
Qty: 90 TABLET | Refills: 2 | Status: SHIPPED | OUTPATIENT
Start: 2024-10-08

## 2024-10-08 RX ORDER — TAMSULOSIN HYDROCHLORIDE 0.4 MG/1
0.4 CAPSULE ORAL DAILY
Qty: 90 CAPSULE | Refills: 1 | Status: SHIPPED | OUTPATIENT
Start: 2024-10-08

## 2024-10-08 RX ORDER — PREDNISONE 10 MG/1
10 TABLET ORAL DAILY
Qty: 30 TABLET | Refills: 5 | Status: SHIPPED | OUTPATIENT
Start: 2024-10-08

## 2024-10-08 RX ORDER — DULOXETIN HYDROCHLORIDE 60 MG/1
60 CAPSULE, DELAYED RELEASE ORAL DAILY
Qty: 90 CAPSULE | Refills: 1 | Status: SHIPPED | OUTPATIENT
Start: 2024-10-08

## 2024-10-08 RX ORDER — TRAZODONE HYDROCHLORIDE 50 MG/1
50 TABLET, FILM COATED ORAL NIGHTLY
Qty: 90 TABLET | Refills: 1 | Status: SHIPPED | OUTPATIENT
Start: 2024-10-08

## 2024-10-08 RX ORDER — BUDESONIDE AND FORMOTEROL FUMARATE DIHYDRATE 160; 4.5 UG/1; UG/1
2 AEROSOL RESPIRATORY (INHALATION) 2 TIMES DAILY
Qty: 10.2 G | Refills: 3 | Status: SHIPPED | OUTPATIENT
Start: 2024-10-08

## 2024-10-08 RX ORDER — MIDODRINE HYDROCHLORIDE 5 MG/1
5 TABLET ORAL 3 TIMES DAILY
Qty: 90 TABLET | Refills: 2 | Status: SHIPPED | OUTPATIENT
Start: 2024-10-08

## 2024-10-08 RX ORDER — LOSARTAN POTASSIUM 25 MG/1
25 TABLET ORAL EVERY EVENING
Qty: 30 TABLET | Refills: 2 | Status: SHIPPED | OUTPATIENT
Start: 2024-10-08

## 2024-10-09 ENCOUNTER — TELEPHONE (OUTPATIENT)
Dept: INTERNAL MEDICINE CLINIC | Age: 64
End: 2024-10-09

## 2024-10-09 ENCOUNTER — CARE COORDINATION (OUTPATIENT)
Dept: CARE COORDINATION | Age: 64
End: 2024-10-09

## 2024-10-09 DIAGNOSIS — I48.91 ATRIAL FIBRILLATION WITH RAPID VENTRICULAR RESPONSE (HCC): Primary | ICD-10-CM

## 2024-10-09 DIAGNOSIS — I50.33 ACUTE ON CHRONIC DIASTOLIC CONGESTIVE HEART FAILURE (HCC): ICD-10-CM

## 2024-10-09 DIAGNOSIS — R06.00 DYSPNEA, UNSPECIFIED TYPE: ICD-10-CM

## 2024-10-09 DIAGNOSIS — Z98.890 S/P ABLATION OF ATRIAL FIBRILLATION: ICD-10-CM

## 2024-10-09 DIAGNOSIS — I25.10 CORONARY ARTERY DISEASE INVOLVING NATIVE CORONARY ARTERY OF NATIVE HEART WITHOUT ANGINA PECTORIS: ICD-10-CM

## 2024-10-09 DIAGNOSIS — Z95.0 PACEMAKER: ICD-10-CM

## 2024-10-09 DIAGNOSIS — Z86.79 S/P ABLATION OF ATRIAL FIBRILLATION: ICD-10-CM

## 2024-10-09 DIAGNOSIS — N18.30 TYPE 2 DIABETES MELLITUS WITH STAGE 3 CHRONIC KIDNEY DISEASE, WITHOUT LONG-TERM CURRENT USE OF INSULIN, UNSPECIFIED WHETHER STAGE 3A OR 3B CKD (HCC): ICD-10-CM

## 2024-10-09 DIAGNOSIS — E11.22 TYPE 2 DIABETES MELLITUS WITH STAGE 3 CHRONIC KIDNEY DISEASE, WITHOUT LONG-TERM CURRENT USE OF INSULIN, UNSPECIFIED WHETHER STAGE 3A OR 3B CKD (HCC): ICD-10-CM

## 2024-10-09 RX ORDER — BLOOD PRESSURE TEST KIT
1 KIT MISCELLANEOUS DAILY
Qty: 1 KIT | Refills: 0 | Status: SHIPPED | OUTPATIENT
Start: 2024-10-09

## 2024-10-09 NOTE — TELEPHONE ENCOUNTER
Requesting order for BP machine.    Fax order to Mariel @ [phone] per request of patient and wife.

## 2024-10-09 NOTE — RESULT ENCOUNTER NOTE
Please call pt, lab ok incl chol level. 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

## 2024-10-09 NOTE — CARE COORDINATION
Outreach deferred. SW conducted chart review and identified that RX faxed on this date to [facility] for BP cuff kit.     MARY plan of care: MARY will follow up with Pt / [facility] on 10/18 to obtain status of RX for BP cuff kit.

## 2024-10-11 ENCOUNTER — TELEPHONE (OUTPATIENT)
Dept: CARDIOLOGY CLINIC | Age: 64
End: 2024-10-11

## 2024-10-11 NOTE — TELEPHONE ENCOUNTER
Test preformed on 10/9, next OV 10/16    Informed pt of results and reminded him of next OV       Echo (TTE) complete         Left Ventricle: Reduced left ventricular systolic function with a visually estimated EF of 35 - 40%. Left ventricle size is normal. Mildly increased wall thickness. Global hypokinesis with akenetic septal wall. Grade II diastolic dysfunction with increased LAP.    Aortic Valve: Sclerotic but non-stenotic.    Mitral Valve: Moderate regurgitation.    Tricuspid Valve: Mild regurgitation. The estimated RVSP is 33 mmHg.    Left Atrium: Left atrium is moderately dilated.    Pericardium: No pericardial effusion.    Image quality is technically difficult. Technically difficult study due to patient's body habitus.    Compared to previous echo in 5/2024, EF improved from 20% to 35%.

## 2024-10-18 ENCOUNTER — CARE COORDINATION (OUTPATIENT)
Dept: CARE COORDINATION | Age: 64
End: 2024-10-18

## 2024-10-18 NOTE — CARE COORDINATION
Patient Current Location: Home: [address] [place] [id number]     Phone call to Pt's spouse, Laura, emergency contact who denied that Pt has received his BP cuff kit. Discussed plan to contact [facility] for update.     Phone call to Sterling with [facility] for update regarding RX for BP cuff kit. Verifying with insurance.     Phone call to Pt to provide update.     MARY plan of care: MARY will make next outreach to Pt in one week to see if it has been received, will follow up with [employer] as needed.

## 2024-10-21 ENCOUNTER — CARE COORDINATION (OUTPATIENT)
Dept: CARE COORDINATION | Age: 64
End: 2024-10-21

## 2024-10-21 NOTE — CARE COORDINATION
Ambulatory Care Coordination Note     10/21/2024 11:15 AM     Patient outreach attempt by this ACM today to perform care management follow up . ACM was unable to reach the patient by telephone today;  patient phone is not able to accept calls at this time.      ACM: Piper Diallo, RN     Care Summary Note: ACM attempted to contact patient today for follow-up. ACM will outreach patient at a later date.     PCP/Specialist follow up:   Future Appointments         Provider Specialty Dept Phone    10/23/2024 11:20 AM Cirilo Coleman, APRN - CNP Cardiology [phone]    1/8/2025 1:00 PM Tyler Diaz MD Internal Medicine [phone]            Follow Up:   Plan for next ACM outreach in approximately 1 week to complete:  - follow-up appointment with PCP 10/8/24 (med changes), Cardiology 10/23/24  - assess readiness for CM graduation .

## 2024-10-22 RX ORDER — GLYBURIDE-METFORMIN HYDROCHLORIDE 5; 500 MG/1; MG/1
TABLET ORAL
Qty: 60 TABLET | Refills: 2 | Status: SHIPPED | OUTPATIENT
Start: 2024-10-22

## 2024-10-23 RX ORDER — METOPROLOL SUCCINATE 25 MG/1
25 TABLET, EXTENDED RELEASE ORAL NIGHTLY
Qty: 30 TABLET | Refills: 3 | OUTPATIENT
Start: 2024-10-23

## 2024-10-25 ENCOUNTER — CARE COORDINATION (OUTPATIENT)
Dept: CARE COORDINATION | Age: 64
End: 2024-10-25

## 2024-10-25 NOTE — CARE COORDINATION
Patient Current Location: Home: [address] [place] [id number]     Phone call to Pt to follow up regarding BP cuff kit. Pt's spouse, Laura answered stating they have not yet received it. MARY discussed plan to call [facility] with update.     Phone call to [employer] to obtain update.  They were waiting on demographics, received today, will update Pt when it is going to be shipped out.    Phone call to Pt spouse to provide update.     MARY plan of care: SW will contact Pt on 10/31 to confirm he has received BP cuff and will call [facility] for update if not.

## 2024-10-29 ENCOUNTER — CARE COORDINATION (OUTPATIENT)
Dept: CARE COORDINATION | Age: 64
End: 2024-10-29

## 2024-10-29 DIAGNOSIS — M54.42 ACUTE LEFT-SIDED LOW BACK PAIN WITH LEFT-SIDED SCIATICA: ICD-10-CM

## 2024-10-29 RX ORDER — TIZANIDINE 2 MG/1
TABLET ORAL
Qty: 60 TABLET | Refills: 1 | Status: SHIPPED | OUTPATIENT
Start: 2024-10-29

## 2024-10-29 NOTE — CARE COORDINATION
DM - including when to notify provider of changes in condition and when to seek emergency treatment. Laura DOWNS.     Offered patient enrollment in the Remote Patient Monitoring (RPM) program for in-home monitoring: Patient is not eligible for RPM program because: PCP does not participate in RPM program at this time .     Assessments Completed:   General Assessment    Do you have any symptoms that are causing concern?: No          Medications Reviewed:   Patient denies any changes with medications and reports taking all medications as prescribed.    Advance Care Planning:   Reviewed during previous call      Care Planning:   Education Documentation  Educate reporting changes in condition, taught by Piper Diallo RN at 10/29/2024  3:35 PM.  Learner: Significant Other, Caregiver  Readiness: Acceptance  Method: Explanation  Response: Verbalizes Understanding    Educate Patient on When to Call for Symptoms, taught by Piper Diallo RN at 10/29/2024  3:35 PM.  Learner: Significant Other, Caregiver  Readiness: Acceptance  Method: Explanation  Response: Verbalizes Understanding    heart failure self-management, taught by Piper Diallo RN at 10/29/2024  3:35 PM.  Learner: Significant Other, Caregiver  Readiness: Acceptance  Method: Explanation  Response: Verbalizes Understanding    Diet, taught by Piper Diallo RN at 10/29/2024  3:35 PM.  Learner: Significant Other, Caregiver  Readiness: Acceptance  Method: Explanation  Response: Verbalizes Understanding    WHEN TO CALL THE DOCTOR CHF, taught by Piper Diallo RN at 10/29/2024  3:35 PM.  Learner: Significant Other, Caregiver  Readiness: Acceptance  Method: Explanation  Response: Verbalizes Understanding    Monitoring Weight, taught by Piper Diallo RN at 10/29/2024  3:35 PM.  Learner: Significant Other, Caregiver  Readiness: Acceptance  Method: Explanation  Response: Verbalizes Understanding    Education

## 2024-10-30 ASSESSMENT — ENCOUNTER SYMPTOMS: SHORTNESS OF BREATH: 1

## 2024-10-30 NOTE — PROGRESS NOTES
[place] [address]  Phone: [phone]    Fax [phone]    Irene Cardona MD, [facility]  Jhoan Cornell MD, [facility]   Kraig Lindo MD, [facility] MD Pillo Laura MD, [facility]  Ahsan Beauchamp MD, [facility]    Nic Zapata MD, [facility]   Jessica Zhao, APRN  Penelope Amezcua, APRN  Karen Gamino, APRN  Cirilo Coleman, APRN        Cardiology Progress Note      10/31/2024    RE: Aramis Rosario  (1960)                             Primary cardiologist: Dr. Zapata       Subjective:  CC:   1. Chronic systolic (congestive) heart failure    2. Atrial fibrillation, chronic (HCC)    3. Coronary artery disease involving native coronary artery of native heart without angina pectoris    4. Pacemaker    5. S/P ablation of atrial fibrillation    6. Class 2 severe obesity due to excess calories with serious comorbidity and body mass index (BMI) of 39.0 to 39.9 in adult        HPI: Aramis Rosario, who is a  64 y.o. year old male with a past medical history as listed below.  Patient presents to the office for follow up on CAD, HTN, CHF, A. Fib-S/P ablation, obesity, shortness of breath, PPM, dizziness, and hyperlipidemia.  Patient has had ongoing difficulties with atrial fibrillation management.  He also has a history of sick sinus syndrome and had PPM placed 7/7/2021.  He was cardioverted multiple times in the past then had atrial fibrillation 9/8/21 with CVA at that time. He had complications with bleeding and had watchman placed 4/13/2023.  Patient has a history of myasthenia gravis and follows with neurologist at [facility].  He had reduced LVH at that time and re-occurrence in May of 2024.  Patient had LHC at that time which showed patent stents.  Follow-up echocardiogram shows slight improvement in LVEF now 35-40%.      Past Medical History:   Diagnosis Date    Arthritis 04/23/2021    LOW BACK PAIN, IMPROVED ON CYMBALTA    Atrial fibrillation, chronic (HCC) 12/2020

## 2024-10-31 ENCOUNTER — CARE COORDINATION (OUTPATIENT)
Dept: CARE COORDINATION | Age: 64
End: 2024-10-31

## 2024-10-31 ENCOUNTER — OFFICE VISIT (OUTPATIENT)
Dept: CARDIOLOGY CLINIC | Age: 64
End: 2024-10-31
Payer: MEDICARE

## 2024-10-31 VITALS
DIASTOLIC BLOOD PRESSURE: 62 MMHG | WEIGHT: 261 LBS | HEART RATE: 54 BPM | HEIGHT: 71 IN | OXYGEN SATURATION: 99 % | BODY MASS INDEX: 36.54 KG/M2 | SYSTOLIC BLOOD PRESSURE: 122 MMHG

## 2024-10-31 DIAGNOSIS — Z95.0 PACEMAKER: ICD-10-CM

## 2024-10-31 DIAGNOSIS — Z98.890 S/P ABLATION OF ATRIAL FIBRILLATION: ICD-10-CM

## 2024-10-31 DIAGNOSIS — Z86.79 S/P ABLATION OF ATRIAL FIBRILLATION: ICD-10-CM

## 2024-10-31 DIAGNOSIS — I50.22 CHRONIC SYSTOLIC (CONGESTIVE) HEART FAILURE (HCC): Primary | ICD-10-CM

## 2024-10-31 DIAGNOSIS — I25.10 CORONARY ARTERY DISEASE INVOLVING NATIVE CORONARY ARTERY OF NATIVE HEART WITHOUT ANGINA PECTORIS: ICD-10-CM

## 2024-10-31 DIAGNOSIS — E66.812 CLASS 2 SEVERE OBESITY DUE TO EXCESS CALORIES WITH SERIOUS COMORBIDITY AND BODY MASS INDEX (BMI) OF 39.0 TO 39.9 IN ADULT: ICD-10-CM

## 2024-10-31 DIAGNOSIS — E66.01 CLASS 2 SEVERE OBESITY DUE TO EXCESS CALORIES WITH SERIOUS COMORBIDITY AND BODY MASS INDEX (BMI) OF 39.0 TO 39.9 IN ADULT: ICD-10-CM

## 2024-10-31 DIAGNOSIS — I48.20 ATRIAL FIBRILLATION, CHRONIC (HCC): ICD-10-CM

## 2024-10-31 PROCEDURE — 1036F TOBACCO NON-USER: CPT | Performed by: NURSE PRACTITIONER

## 2024-10-31 PROCEDURE — 3017F COLORECTAL CA SCREEN DOC REV: CPT | Performed by: NURSE PRACTITIONER

## 2024-10-31 PROCEDURE — G8427 DOCREV CUR MEDS BY ELIG CLIN: HCPCS | Performed by: NURSE PRACTITIONER

## 2024-10-31 PROCEDURE — 1123F ACP DISCUSS/DSCN MKR DOCD: CPT | Performed by: NURSE PRACTITIONER

## 2024-10-31 PROCEDURE — 99214 OFFICE O/P EST MOD 30 MIN: CPT | Performed by: NURSE PRACTITIONER

## 2024-10-31 PROCEDURE — G8484 FLU IMMUNIZE NO ADMIN: HCPCS | Performed by: NURSE PRACTITIONER

## 2024-10-31 PROCEDURE — G8417 CALC BMI ABV UP PARAM F/U: HCPCS | Performed by: NURSE PRACTITIONER

## 2024-10-31 RX ORDER — METOPROLOL TARTRATE 25 MG/1
25 TABLET, FILM COATED ORAL 2 TIMES DAILY
Qty: 180 TABLET | Refills: 1 | Status: SHIPPED | OUTPATIENT
Start: 2024-10-31

## 2024-10-31 RX ORDER — METOPROLOL SUCCINATE 25 MG/1
25 TABLET, EXTENDED RELEASE ORAL NIGHTLY
Qty: 90 TABLET | Refills: 3 | Status: SHIPPED | OUTPATIENT
Start: 2024-10-31 | End: 2024-10-31

## 2024-10-31 NOTE — CARE COORDINATION
Patient Current Location: Home: [address] [place] [id number]     ACP - declined to complete, Pt's wife will be decision maker and daughter will be secondary who are next of kin    BP cuff kit - have not received or heard from [facility]. SW offered to contact for update.     Phone call to [employer] for update regarding RX for BP cuff kit. Finalization dept on 10/30, will call this SW with update, provided contact information    Received return call from [facility] stating Medicare does not cover BP cuff, and if medicare does not cover any of it, medicaid will not either.     Phone call to [facility]iel with [facility] to make referral for BP cuff kit. Already has acct, verified insurance, sending to ordering dept, insurance will be verified         MARY plan of care: MARY will make next outreach on 11/7 to follow up regarding status of order for BP cuff.

## 2024-11-07 ENCOUNTER — CARE COORDINATION (OUTPATIENT)
Dept: CARE COORDINATION | Age: 64
End: 2024-11-07

## 2024-11-07 NOTE — CARE COORDINATION
Phone call to Pt's wife, emergency contact, Laura who confirmed they have not heard anything regarding BP cuff. SW offered to call for follow up. Pt's wife was in agreement.     Phone call to [facility] for update on status of RX for BP cuff kit.   Cost of medicaid vs what pt will pay  Digital in XL is not covered  Digital in regular is covered - goes up to 16.5 inches  Or pump in x large is covered  Wrist -goes up to 8.5 inches    Merged call to Pt's wife who measured Pt's arm at 12 inches. Standard digital cuff was ordered.   Will take 3-5 days to receive, will send text and email with tracking number.     MARY plan of care: MARY will make next outreach on 11/18 to follow up regarding BP cuff

## 2024-11-08 RX ORDER — DIGOXIN 125 MCG
125 TABLET ORAL EVERY OTHER DAY
Qty: 45 TABLET | Refills: 1 | Status: SHIPPED | OUTPATIENT
Start: 2024-11-08

## 2024-11-18 ENCOUNTER — CARE COORDINATION (OUTPATIENT)
Dept: CARE COORDINATION | Age: 64
End: 2024-11-18

## 2024-11-18 NOTE — CARE COORDINATION
Patient Current Location: Home: [address] [place], [place] [id number]     Phone call to Pt's wife, emergency contact, Laura who reported she has not received BP cuff.     Phone call to [facility] for update on status of RX for BP cuff. Request sent to PCP office to sign RX.     Attempted phone call to Pt's wife to provide update. Call did not go through        SW plan of care: SW will make next outreach on 11/25 regarding BP cuff.

## 2024-11-25 ENCOUNTER — CARE COORDINATION (OUTPATIENT)
Dept: CARE COORDINATION | Age: 64
End: 2024-11-25

## 2024-11-25 NOTE — CARE COORDINATION
Patient Current Location: Home: [address] [place] [id number]     Phone call to [facility] to follow up regarding rx for BP cuff, was delivered today and Aramis signed for it.     Phone call to Pt who confirmed he received BP cuff. Pt was appreciative of this SW's support. SW discussed resolving from SW services and encouraged him to contact this SW with any future needs or concerns.     SW plan of care: SW will resolve from SW services as goals are met.

## 2024-12-01 DIAGNOSIS — M54.42 ACUTE LEFT-SIDED LOW BACK PAIN WITH LEFT-SIDED SCIATICA: ICD-10-CM

## 2024-12-02 RX ORDER — TIZANIDINE 2 MG/1
TABLET ORAL
Qty: 60 TABLET | Refills: 1 | Status: SHIPPED | OUTPATIENT
Start: 2024-12-02

## 2024-12-25 DIAGNOSIS — I50.21 ACUTE SYSTOLIC CHF (CONGESTIVE HEART FAILURE) (HCC): ICD-10-CM

## 2024-12-25 DIAGNOSIS — I42.0 DILATED CARDIOMYOPATHY (HCC): ICD-10-CM

## 2024-12-26 RX ORDER — LOSARTAN POTASSIUM 25 MG/1
25 TABLET ORAL EVERY EVENING
Qty: 30 TABLET | Refills: 2 | Status: SHIPPED | OUTPATIENT
Start: 2024-12-26

## 2024-12-31 ENCOUNTER — TELEPHONE (OUTPATIENT)
Dept: INTERNAL MEDICINE CLINIC | Age: 64
End: 2024-12-31

## 2024-12-31 DIAGNOSIS — J42 CHRONIC BRONCHITIS, UNSPECIFIED CHRONIC BRONCHITIS TYPE (HCC): Primary | ICD-10-CM

## 2024-12-31 DIAGNOSIS — J42 CHRONIC BRONCHITIS, UNSPECIFIED CHRONIC BRONCHITIS TYPE (HCC): ICD-10-CM

## 2024-12-31 RX ORDER — FLUTICASONE PROPIONATE AND SALMETEROL 250; 50 UG/1; UG/1
POWDER RESPIRATORY (INHALATION)
Qty: 180 EACH | OUTPATIENT
Start: 2024-12-31

## 2024-12-31 RX ORDER — FLUTICASONE PROPIONATE AND SALMETEROL 250; 50 UG/1; UG/1
1 POWDER RESPIRATORY (INHALATION) EVERY 12 HOURS
Qty: 60 EACH | Refills: 3 | Status: SHIPPED | OUTPATIENT
Start: 2024-12-31

## 2024-12-31 NOTE — TELEPHONE ENCOUNTER
No longer covered by insurance. Prefered Advair, Breo Ellipta or Dulera were fax from [facility].   budesonide-formoterol (SYMBICORT)      Pharmacy: [facility]

## 2024-12-31 NOTE — TELEPHONE ENCOUNTER
Called wife Laura to notify patient. Also gave her price range with good RX for metformin and glyburide.

## 2025-01-10 ENCOUNTER — TELEPHONE (OUTPATIENT)
Dept: INTERNAL MEDICINE CLINIC | Age: 65
End: 2025-01-10

## 2025-01-10 NOTE — TELEPHONE ENCOUNTER
Patient wife called stating Julia is requesting some kind of documentation stating why he need's more than his usual ostomy supply order in order for them to get more.

## 2025-01-10 NOTE — TELEPHONE ENCOUNTER
Called and spoke to Laura. They are allotted 4 per month. She stated they are precut to stoma size and fit snug, however skin still becomes irritated from bodily fluids. Some days they are using 3-4 wafers/bags. They use a skin prep pad and use stoma/ wafer paste. She stated they have trouble getting to stay on due to his patchy skin. Would you like me to write a letter stating this? I did let her know we can try but insurance usually will not pay for 3 per day

## 2025-01-13 DIAGNOSIS — M54.42 ACUTE LEFT-SIDED LOW BACK PAIN WITH LEFT-SIDED SCIATICA: ICD-10-CM

## 2025-01-13 RX ORDER — TIZANIDINE 2 MG/1
2 TABLET ORAL EVERY 8 HOURS PRN
Qty: 60 TABLET | Refills: 1 | Status: SHIPPED | OUTPATIENT
Start: 2025-01-13

## 2025-01-20 DIAGNOSIS — I50.21 ACUTE SYSTOLIC CHF (CONGESTIVE HEART FAILURE) (HCC): ICD-10-CM

## 2025-01-20 RX ORDER — FUROSEMIDE 40 MG/1
TABLET ORAL
Qty: 60 TABLET | Refills: 0 | Status: SHIPPED | OUTPATIENT
Start: 2025-01-20

## 2025-01-31 PROCEDURE — 93294 REM INTERROG EVL PM/LDLS PM: CPT | Performed by: INTERNAL MEDICINE

## 2025-01-31 PROCEDURE — 93296 REM INTERROG EVL PM/IDS: CPT | Performed by: INTERNAL MEDICINE

## 2025-02-04 ENCOUNTER — TELEPHONE (OUTPATIENT)
Dept: CARDIOLOGY CLINIC | Age: 65
End: 2025-02-04

## 2025-02-04 NOTE — TELEPHONE ENCOUNTER
----- Message from MARGIE PATINO MA sent at 2/4/2025 11:07 AM EST -----  Patient needs appt w/ Dr. Henderson to discuss afib w/RVR and VT seen on device. Thank you.

## 2025-02-11 ENCOUNTER — TELEPHONE (OUTPATIENT)
Dept: CARDIOLOGY CLINIC | Age: 65
End: 2025-02-11

## 2025-02-18 DIAGNOSIS — I50.21 ACUTE SYSTOLIC CHF (CONGESTIVE HEART FAILURE) (HCC): ICD-10-CM

## 2025-02-18 RX ORDER — FUROSEMIDE 40 MG/1
TABLET ORAL
Qty: 60 TABLET | Refills: 0 | OUTPATIENT
Start: 2025-02-18

## 2025-02-18 RX ORDER — GLYBURIDE-METFORMIN HYDROCHLORIDE 5; 500 MG/1; MG/1
1 TABLET ORAL 2 TIMES DAILY WITH MEALS
Qty: 60 TABLET | Refills: 0 | Status: SHIPPED | OUTPATIENT
Start: 2025-02-18

## 2025-02-25 ENCOUNTER — TELEPHONE (OUTPATIENT)
Dept: INTERNAL MEDICINE CLINIC | Age: 65
End: 2025-02-25

## 2025-02-25 RX ORDER — TIZANIDINE 2 MG/1
TABLET ORAL
Qty: 60 TABLET | OUTPATIENT
Start: 2025-02-25

## 2025-02-25 NOTE — TELEPHONE ENCOUNTER
Dr. Vee called from [facility].   glyBURIDE-metFORMIN combo not on formulary , considered tier 3. They can be ordered separately tier 1.  Please advise.    Pharmacy :  [facility]

## 2025-02-25 NOTE — TELEPHONE ENCOUNTER
Also notified [facility] ok to cancel RX. New will be sent in next week. Left all info on prescriber line.

## 2025-03-01 NOTE — PROGRESS NOTES
Aramis Rosario  1960 03/03/25    SUBJECTIVE:   DM- not checking sugars, has lost wt.  Lab pending fr earlier this am.  Ins won't cover glucovance, needs these split to separate meds.    Lab Results   Component Value Date    LABA1C 7.4 10/08/2024    LABA1C 7.1 (H) 05/04/2024    LABA1C 7.9 12/22/2022     Lab Results   Component Value Date    GLUF 105 (H) 11/01/2016    CREATININE 1.5 (H) 10/08/2024     Atr fib, s/p pacer, also w EF last in Oct 35-40%.      Wt is down, prev CHF appears much better.      OBJECTIVE:    /78   Pulse 76   Resp 20   Ht 1.803 m (5' 10.98\")   Wt 113.4 kg (250 lb)   SpO2 100%   BMI 34.88 kg/m²     Physical Exam  Constitutional:       Appearance: Normal appearance.   Cardiovascular:      Rate and Rhythm: Normal rate. Rhythm irregular.      Heart sounds: No murmur heard.     No gallop.   Pulmonary:      Effort: No respiratory distress.      Breath sounds: No wheezing.   Abdominal:      General: Abdomen is flat. Bowel sounds are normal. There is no distension.      Palpations: Abdomen is soft. There is no mass.      Tenderness: There is no abdominal tenderness.      Hernia: No hernia is present.   Musculoskeletal:      Right lower leg: No edema.      Left lower leg: No edema.   Neurological:      Mental Status: He is alert.   Psychiatric:         Mood and Affect: Mood normal.         ASSESSMENT:    1. Type 2 diabetes mellitus with stage 3 chronic kidney disease, without long-term current use of insulin, unspecified whether stage 3a or 3b CKD (HCC)    2. Acute systolic CHF (congestive heart failure) (HCC)    3. Dilated cardiomyopathy (HCC)    4. Gastritis and duodenitis    5. Atrial fibrillation with rapid ventricular response (HCC)    6. Benign prostatic hyperplasia with urinary hesitancy    7. Myasthenia gravis ([facility])        PLAN:  Assessment & Plan    For diabetes update lab and we will split the Glucovance 2 separate glipizide and metformin dosing for better coverage of

## 2025-03-03 ENCOUNTER — OFFICE VISIT (OUTPATIENT)
Dept: INTERNAL MEDICINE CLINIC | Age: 65
End: 2025-03-03
Payer: MEDICARE

## 2025-03-03 ENCOUNTER — HOSPITAL ENCOUNTER (OUTPATIENT)
Age: 65
Discharge: HOME | End: 2025-03-03
Payer: MEDICARE

## 2025-03-03 ENCOUNTER — TELEPHONE (OUTPATIENT)
Dept: CARDIOLOGY CLINIC | Age: 65
End: 2025-03-03

## 2025-03-03 VITALS
BODY MASS INDEX: 35 KG/M2 | WEIGHT: 250 LBS | DIASTOLIC BLOOD PRESSURE: 78 MMHG | RESPIRATION RATE: 20 BRPM | HEART RATE: 76 BPM | OXYGEN SATURATION: 100 % | HEIGHT: 71 IN | SYSTOLIC BLOOD PRESSURE: 134 MMHG

## 2025-03-03 DIAGNOSIS — K29.90 GASTRITIS AND DUODENITIS: ICD-10-CM

## 2025-03-03 DIAGNOSIS — I42.0 DILATED CARDIOMYOPATHY (HCC): ICD-10-CM

## 2025-03-03 DIAGNOSIS — I48.20 ATRIAL FIBRILLATION, CHRONIC (HCC): ICD-10-CM

## 2025-03-03 DIAGNOSIS — N40.1 BENIGN PROSTATIC HYPERPLASIA WITH URINARY HESITANCY: ICD-10-CM

## 2025-03-03 DIAGNOSIS — E11.9 TYPE 2 DIABETES MELLITUS WITHOUT COMPLICATION, WITHOUT LONG-TERM CURRENT USE OF INSULIN (HCC): ICD-10-CM

## 2025-03-03 DIAGNOSIS — E11.22 TYPE 2 DIABETES MELLITUS WITH STAGE 3 CHRONIC KIDNEY DISEASE, WITHOUT LONG-TERM CURRENT USE OF INSULIN, UNSPECIFIED WHETHER STAGE 3A OR 3B CKD (HCC): Primary | ICD-10-CM

## 2025-03-03 DIAGNOSIS — I48.91 ATRIAL FIBRILLATION WITH RAPID VENTRICULAR RESPONSE (HCC): ICD-10-CM

## 2025-03-03 DIAGNOSIS — I50.21 ACUTE SYSTOLIC CHF (CONGESTIVE HEART FAILURE) (HCC): ICD-10-CM

## 2025-03-03 DIAGNOSIS — E78.2 HYPERLIPEMIA, MIXED: ICD-10-CM

## 2025-03-03 DIAGNOSIS — N18.30 TYPE 2 DIABETES MELLITUS WITH STAGE 3 CHRONIC KIDNEY DISEASE, WITHOUT LONG-TERM CURRENT USE OF INSULIN, UNSPECIFIED WHETHER STAGE 3A OR 3B CKD (HCC): Primary | ICD-10-CM

## 2025-03-03 DIAGNOSIS — G70.00 MYASTHENIA GRAVIS (HCC): ICD-10-CM

## 2025-03-03 DIAGNOSIS — R39.11 BENIGN PROSTATIC HYPERPLASIA WITH URINARY HESITANCY: ICD-10-CM

## 2025-03-03 LAB
ALBUMIN SERPL-MCNC: 4.1 G/DL (ref 3.4–5)
ALBUMIN/GLOB SERPL: 1.4 {RATIO} (ref 1.1–2.2)
ALP SERPL-CCNC: 59 U/L (ref 40–129)
ALT SERPL-CCNC: 16 U/L (ref 10–40)
ANION GAP SERPL CALCULATED.3IONS-SCNC: 15 MMOL/L (ref 9–17)
AST SERPL-CCNC: 18 U/L (ref 15–37)
BILIRUB SERPL-MCNC: 0.2 MG/DL (ref 0–1)
BUN SERPL-MCNC: 12 MG/DL (ref 7–20)
CALCIUM SERPL-MCNC: 9.8 MG/DL (ref 8.3–10.6)
CHLORIDE SERPL-SCNC: 103 MMOL/L (ref 99–110)
CHOLEST SERPL-MCNC: 217 MG/DL (ref 125–199)
CO2 SERPL-SCNC: 23 MMOL/L (ref 21–32)
CREAT SERPL-MCNC: 1.4 MG/DL (ref 0.8–1.3)
CREAT UR-MCNC: 347 MG/DL (ref 39–259)
DATE LAST DOSE: ABNORMAL
DIGOXIN DOSE TIME: ABNORMAL
DIGOXIN DOSE: ABNORMAL MG
DIGOXIN SERPL-MCNC: 0.4 NG/ML (ref 0.8–2)
GFR, ESTIMATED: 52 ML/MIN/1.73M2
GLUCOSE SERPL-MCNC: 68 MG/DL (ref 74–99)
HDLC SERPL-MCNC: 31 MG/DL
LDLC SERPL DIRECT ASSAY-MCNC: NORMAL MG/DL
MAGNESIUM SERPL-MCNC: 2.3 MG/DL (ref 1.8–2.4)
MICROALBUMIN UR-MCNC: 5 MG/L
MICROALBUMIN/CREAT UR-RTO: 2 MCG/MG CREAT (ref 0–2)
POTASSIUM SERPL-SCNC: 3.9 MMOL/L (ref 3.5–5.1)
PROT SERPL-MCNC: 7 G/DL (ref 6.4–8.2)
SODIUM SERPL-SCNC: 141 MMOL/L (ref 136–145)
TRIGL SERPL-MCNC: 453 MG/DL
TSH SERPL DL<=0.05 MIU/L-ACNC: 2.15 UIU/ML (ref 0.27–4.2)

## 2025-03-03 PROCEDURE — 3046F HEMOGLOBIN A1C LEVEL >9.0%: CPT | Performed by: INTERNAL MEDICINE

## 2025-03-03 PROCEDURE — 80053 COMPREHEN METABOLIC PANEL: CPT

## 2025-03-03 PROCEDURE — 83721 ASSAY OF BLOOD LIPOPROTEIN: CPT

## 2025-03-03 PROCEDURE — 1160F RVW MEDS BY RX/DR IN RCRD: CPT | Performed by: INTERNAL MEDICINE

## 2025-03-03 PROCEDURE — 82570 ASSAY OF URINE CREATININE: CPT

## 2025-03-03 PROCEDURE — 99214 OFFICE O/P EST MOD 30 MIN: CPT | Performed by: INTERNAL MEDICINE

## 2025-03-03 PROCEDURE — 2022F DILAT RTA XM EVC RTNOPTHY: CPT | Performed by: INTERNAL MEDICINE

## 2025-03-03 PROCEDURE — 84443 ASSAY THYROID STIM HORMONE: CPT

## 2025-03-03 PROCEDURE — G2211 COMPLEX E/M VISIT ADD ON: HCPCS | Performed by: INTERNAL MEDICINE

## 2025-03-03 PROCEDURE — 83735 ASSAY OF MAGNESIUM: CPT

## 2025-03-03 PROCEDURE — 80162 ASSAY OF DIGOXIN TOTAL: CPT

## 2025-03-03 PROCEDURE — G8417 CALC BMI ABV UP PARAM F/U: HCPCS | Performed by: INTERNAL MEDICINE

## 2025-03-03 PROCEDURE — G8427 DOCREV CUR MEDS BY ELIG CLIN: HCPCS | Performed by: INTERNAL MEDICINE

## 2025-03-03 PROCEDURE — 1036F TOBACCO NON-USER: CPT | Performed by: INTERNAL MEDICINE

## 2025-03-03 PROCEDURE — 1123F ACP DISCUSS/DSCN MKR DOCD: CPT | Performed by: INTERNAL MEDICINE

## 2025-03-03 PROCEDURE — 82043 UR ALBUMIN QUANTITATIVE: CPT

## 2025-03-03 PROCEDURE — 3017F COLORECTAL CA SCREEN DOC REV: CPT | Performed by: INTERNAL MEDICINE

## 2025-03-03 PROCEDURE — 80061 LIPID PANEL: CPT

## 2025-03-03 PROCEDURE — 1159F MED LIST DOCD IN RCRD: CPT | Performed by: INTERNAL MEDICINE

## 2025-03-03 RX ORDER — METOPROLOL TARTRATE 25 MG/1
25 TABLET, FILM COATED ORAL 2 TIMES DAILY
Qty: 60 TABLET | Refills: 5 | Status: SHIPPED | OUTPATIENT
Start: 2025-03-03

## 2025-03-03 RX ORDER — LOSARTAN POTASSIUM 25 MG/1
25 TABLET ORAL EVERY EVENING
Qty: 30 TABLET | Refills: 2 | Status: SHIPPED | OUTPATIENT
Start: 2025-03-03

## 2025-03-03 RX ORDER — GLIPIZIDE 5 MG/1
5 TABLET ORAL
Qty: 60 TABLET | Refills: 5 | Status: SHIPPED | OUTPATIENT
Start: 2025-03-03

## 2025-03-03 RX ORDER — SPIRONOLACTONE 25 MG/1
12.5 TABLET ORAL DAILY
Qty: 15 TABLET | Refills: 5 | Status: SHIPPED | OUTPATIENT
Start: 2025-03-03

## 2025-03-03 RX ORDER — GLIPIZIDE 5 MG/1
5 TABLET ORAL 2 TIMES DAILY
Qty: 180 TABLET | Refills: 1 | Status: CANCELLED | OUTPATIENT
Start: 2025-03-03

## 2025-03-03 RX ORDER — PANTOPRAZOLE SODIUM 40 MG/1
40 TABLET, DELAYED RELEASE ORAL DAILY
Qty: 30 TABLET | Refills: 5 | Status: SHIPPED | OUTPATIENT
Start: 2025-03-03

## 2025-03-03 RX ORDER — TAMSULOSIN HYDROCHLORIDE 0.4 MG/1
0.4 CAPSULE ORAL DAILY
Qty: 30 CAPSULE | Refills: 5 | Status: SHIPPED | OUTPATIENT
Start: 2025-03-03

## 2025-03-03 RX ORDER — PYRIDOSTIGMINE BROMIDE 60 MG/1
60 TABLET ORAL 3 TIMES DAILY
Qty: 90 TABLET | Refills: 5 | Status: SHIPPED | OUTPATIENT
Start: 2025-03-03

## 2025-03-03 RX ORDER — FUROSEMIDE 40 MG/1
40 TABLET ORAL DAILY
Qty: 30 TABLET | Refills: 5 | Status: SHIPPED | OUTPATIENT
Start: 2025-03-03

## 2025-03-03 RX ORDER — DIGOXIN 125 MCG
125 TABLET ORAL EVERY OTHER DAY
Qty: 15 TABLET | Refills: 5 | Status: SHIPPED | OUTPATIENT
Start: 2025-03-03

## 2025-03-04 DIAGNOSIS — E11.9 TYPE 2 DIABETES MELLITUS WITHOUT COMPLICATION, WITHOUT LONG-TERM CURRENT USE OF INSULIN (HCC): ICD-10-CM

## 2025-03-04 RX ORDER — ATORVASTATIN CALCIUM 40 MG/1
40 TABLET, FILM COATED ORAL NIGHTLY
Qty: 30 TABLET | Refills: 3 | Status: SHIPPED | OUTPATIENT
Start: 2025-03-04

## 2025-03-04 NOTE — RESULT ENCOUNTER NOTE
Please CALL - labs all ok except chol remains sl high.  Pls make sure he's still taking the lipitor regularly and does need some work on low fat diet.  If does not improve in the next few months we'll need to consider incr lipitor from 40-80mg max dose

## 2025-03-06 DIAGNOSIS — E11.9 TYPE 2 DIABETES MELLITUS WITHOUT COMPLICATION, WITHOUT LONG-TERM CURRENT USE OF INSULIN (HCC): ICD-10-CM

## 2025-03-07 RX ORDER — ATORVASTATIN CALCIUM 40 MG/1
40 TABLET, FILM COATED ORAL NIGHTLY
Qty: 90 TABLET | OUTPATIENT
Start: 2025-03-07

## 2025-03-13 ENCOUNTER — TELEPHONE (OUTPATIENT)
Dept: CARDIOLOGY CLINIC | Age: 65
End: 2025-03-13

## 2025-03-18 DIAGNOSIS — F51.01 PRIMARY INSOMNIA: ICD-10-CM

## 2025-03-18 RX ORDER — TRAZODONE HYDROCHLORIDE 50 MG/1
50 TABLET ORAL NIGHTLY
Qty: 90 TABLET | Refills: 1 | Status: SHIPPED | OUTPATIENT
Start: 2025-03-18

## 2025-03-31 DIAGNOSIS — M19.90 ARTHRITIS: ICD-10-CM

## 2025-03-31 RX ORDER — DULOXETIN HYDROCHLORIDE 60 MG/1
CAPSULE, DELAYED RELEASE ORAL DAILY
Qty: 90 CAPSULE | Refills: 1 | Status: SHIPPED | OUTPATIENT
Start: 2025-03-31

## 2025-04-04 ENCOUNTER — OFFICE VISIT (OUTPATIENT)
Age: 65
End: 2025-04-04
Payer: MEDICARE

## 2025-04-04 VITALS
WEIGHT: 250.8 LBS | DIASTOLIC BLOOD PRESSURE: 76 MMHG | HEART RATE: 77 BPM | BODY MASS INDEX: 35.9 KG/M2 | HEIGHT: 70 IN | SYSTOLIC BLOOD PRESSURE: 128 MMHG

## 2025-04-04 DIAGNOSIS — R06.02 SHORTNESS OF BREATH: ICD-10-CM

## 2025-04-04 DIAGNOSIS — Z95.0 PACEMAKER: ICD-10-CM

## 2025-04-04 DIAGNOSIS — I48.19 PERSISTENT ATRIAL FIBRILLATION WITH RVR (HCC): Primary | ICD-10-CM

## 2025-04-04 PROCEDURE — G8417 CALC BMI ABV UP PARAM F/U: HCPCS | Performed by: INTERNAL MEDICINE

## 2025-04-04 PROCEDURE — 1159F MED LIST DOCD IN RCRD: CPT | Performed by: INTERNAL MEDICINE

## 2025-04-04 PROCEDURE — 1123F ACP DISCUSS/DSCN MKR DOCD: CPT | Performed by: INTERNAL MEDICINE

## 2025-04-04 PROCEDURE — 3017F COLORECTAL CA SCREEN DOC REV: CPT | Performed by: INTERNAL MEDICINE

## 2025-04-04 PROCEDURE — 99214 OFFICE O/P EST MOD 30 MIN: CPT | Performed by: INTERNAL MEDICINE

## 2025-04-04 PROCEDURE — 1036F TOBACCO NON-USER: CPT | Performed by: INTERNAL MEDICINE

## 2025-04-04 PROCEDURE — G8427 DOCREV CUR MEDS BY ELIG CLIN: HCPCS | Performed by: INTERNAL MEDICINE

## 2025-04-04 PROCEDURE — 93000 ELECTROCARDIOGRAM COMPLETE: CPT | Performed by: INTERNAL MEDICINE

## 2025-04-04 NOTE — PROGRESS NOTES
AM        BMP:    Lab Results   Component Value Date     03/03/2025    K 3.9 03/03/2025     03/03/2025    CO2 23 03/03/2025    BUN 12 03/03/2025     CMP:   Lab Results   Component Value Date    AST 18 03/03/2025    ALT 16 03/03/2025    BILITOT 0.2 03/03/2025    ALKPHOS 59 03/03/2025     TSH:    Lab Results   Component Value Date/Time    TSH 2.15 03/03/2025 09:25 AM       EKGINTERPRETATION - EKG Interpretation:  atrial fibrillation             Vitals:    04/04/25 1059   BP: 128/76   BP Site: Left Upper Arm   Patient Position: Sitting   BP Cuff Size: Medium Adult   Pulse: 77   Weight: 113.8 kg (250 lb 12.8 oz)   Height: 1.778 m (5' 10\")        IMPRESSION / RECOMMENDATIONS:     Persistent atypical atrial flutter with RVR vs coarse atrial fib with RVR  Severe LV systolic dysfunction - ? Ischemic vs tachycardic induced cardiomyopathy  History of watchman in 4/13/2023  CAD  Myaesthesia gravis  COPD  History of GI bleed but none since watchman device placed.   Chronic Back Pain  History of PV isolation and CTI ablation  History of CVA        Patient is having multiple episodes of atrial fibrillation with RVR.  Patient is complaining of chest pain and shortness of breath.  Patient has not seen me since July.  His LVEF was 35 to 40% then.  Recommend to get an echocardiogram and a stress test.  I think it would be reasonable for him to consider AV node ablation and a BiV pacer upgrade because patient is having RVR episodes and he already previous ablation was done and he had a CVA and he is concerned about going under general anesthesia and having another CVA.  He also has myasthenia gravis.  Also compliance with the medication is a question.    Will get an echocardiogram and stress test and then schedule for AV node ablation in the biv pacemaker    Lexiscan did not show any ischemia    The risks including, but not limited to, vascular injury, bleeding, infection, radiation exposure, injury to cardiac and

## 2025-04-08 DIAGNOSIS — I51.89 SEVERE LEFT VENTRICULAR SYSTOLIC DYSFUNCTION (LVSD): ICD-10-CM

## 2025-04-08 DIAGNOSIS — I48.20 CHRONIC A-FIB (HCC): ICD-10-CM

## 2025-04-08 DIAGNOSIS — I51.89 SEVERE LEFT VENTRICULAR SYSTOLIC DYSFUNCTION: Primary | ICD-10-CM

## 2025-04-08 DIAGNOSIS — I48.11 LONGSTANDING PERSISTENT ATRIAL FIBRILLATION (HCC): ICD-10-CM

## 2025-04-08 PROBLEM — I42.9 CARDIOMYOPATHY: Status: ACTIVE | Noted: 2025-04-08

## 2025-04-22 ENCOUNTER — HOSPITAL ENCOUNTER (OUTPATIENT)
Age: 65
Discharge: HOME | End: 2025-04-22
Payer: MEDICARE

## 2025-04-22 ENCOUNTER — CLINICAL SUPPORT (OUTPATIENT)
Age: 65
End: 2025-04-22

## 2025-04-22 ENCOUNTER — HOSPITAL ENCOUNTER (OUTPATIENT)
Dept: GENERAL RADIOLOGY | Age: 65
Discharge: HOME | End: 2025-04-22
Payer: MEDICARE

## 2025-04-22 DIAGNOSIS — I51.89 SEVERE LEFT VENTRICULAR SYSTOLIC DYSFUNCTION: ICD-10-CM

## 2025-04-22 DIAGNOSIS — I48.20 CHRONIC A-FIB (HCC): ICD-10-CM

## 2025-04-22 DIAGNOSIS — I48.20 ATRIAL FIBRILLATION, CHRONIC (HCC): Primary | ICD-10-CM

## 2025-04-22 LAB
ANION GAP SERPL CALCULATED.3IONS-SCNC: 15 MMOL/L (ref 9–17)
BUN SERPL-MCNC: 15 MG/DL (ref 7–20)
CALCIUM SERPL-MCNC: 9.6 MG/DL (ref 8.3–10.6)
CHLORIDE SERPL-SCNC: 104 MMOL/L (ref 99–110)
CO2 SERPL-SCNC: 20 MMOL/L (ref 21–32)
CREAT SERPL-MCNC: 1.6 MG/DL (ref 0.8–1.3)
ERYTHROCYTE [DISTWIDTH] IN BLOOD BY AUTOMATED COUNT: 13.1 % (ref 11.7–14.9)
GFR, ESTIMATED: 44 ML/MIN/1.73M2
GLUCOSE SERPL-MCNC: 152 MG/DL (ref 74–99)
HCT VFR BLD AUTO: 46.6 % (ref 42–52)
HGB BLD-MCNC: 15.1 G/DL (ref 13.5–18)
INR PPP: 0.9
MAGNESIUM SERPL-MCNC: 2.6 MG/DL (ref 1.8–2.4)
MCH RBC QN AUTO: 30.4 PG (ref 27–31)
MCHC RBC AUTO-ENTMCNC: 32.4 G/DL (ref 32–36)
MCV RBC AUTO: 93.8 FL (ref 78–100)
PARTIAL THROMBOPLASTIN TIME: 30 SEC (ref 25.1–37.1)
PHOSPHATE SERPL-MCNC: 2.7 MG/DL (ref 2.5–4.9)
PLATELET # BLD AUTO: 252 K/UL (ref 140–440)
PMV BLD AUTO: 9.6 FL (ref 7.5–11.1)
POTASSIUM SERPL-SCNC: 4.1 MMOL/L (ref 3.5–5.1)
PROTHROMBIN TIME: 12.8 SEC (ref 11.7–14.5)
RBC # BLD AUTO: 4.97 M/UL (ref 4.6–6.2)
SODIUM SERPL-SCNC: 139 MMOL/L (ref 136–145)
WBC OTHER # BLD: 12 K/UL (ref 4–10.5)

## 2025-04-22 PROCEDURE — 85730 THROMBOPLASTIN TIME PARTIAL: CPT

## 2025-04-22 PROCEDURE — 83735 ASSAY OF MAGNESIUM: CPT

## 2025-04-22 PROCEDURE — 84100 ASSAY OF PHOSPHORUS: CPT

## 2025-04-22 PROCEDURE — 85027 COMPLETE CBC AUTOMATED: CPT

## 2025-04-22 PROCEDURE — 71046 X-RAY EXAM CHEST 2 VIEWS: CPT

## 2025-04-22 PROCEDURE — 85610 PROTHROMBIN TIME: CPT

## 2025-04-22 PROCEDURE — 80048 BASIC METABOLIC PNL TOTAL CA: CPT

## 2025-04-22 NOTE — PROGRESS NOTES
Patient here in office and educated on 4/22/2025, scheduled for AVN ablation with BiV PPM upgrade on 4/29/2025 @ 1330, with arrival @ 1130, @ [facility]; consents signed. Copy of orders given for labs and CXR due 4/22/2025 at [facility]. Instructions given to patient to :NPO after midnight including water the night before procedure. May take rest of morning medications day of procedure except the following; Hold Metformin the day before 4/28/2025, the day of and two days after procedure resuming on 5/2/2025. Advised patient to plan for an overnight stay in the hospital. Patient voiced understanding. Copies of consent & info scanned in chart.

## 2025-04-25 RX ORDER — TIZANIDINE 2 MG/1
TABLET ORAL
Qty: 60 TABLET | Refills: 3 | Status: SHIPPED | OUTPATIENT
Start: 2025-04-25

## 2025-04-29 ENCOUNTER — APPOINTMENT (OUTPATIENT)
Dept: GENERAL RADIOLOGY | Age: 65
End: 2025-04-29
Attending: INTERNAL MEDICINE
Payer: MEDICARE

## 2025-04-29 ENCOUNTER — HOSPITAL ENCOUNTER (OUTPATIENT)
Age: 65
Setting detail: OBSERVATION
Discharge: HOME | End: 2025-04-30
Attending: INTERNAL MEDICINE | Admitting: INTERNAL MEDICINE
Payer: MEDICARE

## 2025-04-29 DIAGNOSIS — I51.89 LEFT VENTRICULAR SYSTOLIC DYSFUNCTION (LVSD), NYHA CLASS 3: ICD-10-CM

## 2025-04-29 DIAGNOSIS — I42.9 CARDIOMYOPATHY (HCC): ICD-10-CM

## 2025-04-29 DIAGNOSIS — I51.89 SEVERE LEFT VENTRICULAR SYSTOLIC DYSFUNCTION (LVSD): ICD-10-CM

## 2025-04-29 DIAGNOSIS — Z48.89 ENCOUNTER FOR OTHER SPECIFIED SURGICAL AFTERCARE: ICD-10-CM

## 2025-04-29 DIAGNOSIS — I48.19 PERSISTENT ATRIAL FIBRILLATION WITH RVR (HCC): Primary | ICD-10-CM

## 2025-04-29 PROBLEM — Z98.890 HX OF ATRIOVENTRICULAR NODE ABLATION: Status: ACTIVE | Noted: 2025-04-29

## 2025-04-29 LAB
ABO + RH BLD: NORMAL
BLOOD BANK SAMPLE EXPIRATION: NORMAL
BLOOD GROUP ANTIBODIES SERPL: NEGATIVE
ECHO BSA: 2.34 M2
GLUCOSE BLD-MCNC: 99 MG/DL (ref 74–99)

## 2025-04-29 PROCEDURE — 6360000004 HC RX CONTRAST MEDICATION: Performed by: INTERNAL MEDICINE

## 2025-04-29 PROCEDURE — C2621 PMKR, OTHER THAN SING/DUAL: HCPCS | Performed by: INTERNAL MEDICINE

## 2025-04-29 PROCEDURE — 2709999900 HC NON-CHARGEABLE SUPPLY: Performed by: INTERNAL MEDICINE

## 2025-04-29 PROCEDURE — 6360000002 HC RX W HCPCS

## 2025-04-29 PROCEDURE — 86900 BLOOD TYPING SEROLOGIC ABO: CPT

## 2025-04-29 PROCEDURE — C1893 INTRO/SHEATH, FIXED,NON-PEEL: HCPCS | Performed by: INTERNAL MEDICINE

## 2025-04-29 PROCEDURE — C1894 INTRO/SHEATH, NON-LASER: HCPCS | Performed by: INTERNAL MEDICINE

## 2025-04-29 PROCEDURE — C1889 IMPLANT/INSERT DEVICE, NOC: HCPCS | Performed by: INTERNAL MEDICINE

## 2025-04-29 PROCEDURE — C1769 GUIDE WIRE: HCPCS | Performed by: INTERNAL MEDICINE

## 2025-04-29 PROCEDURE — G0378 HOSPITAL OBSERVATION PER HR: HCPCS

## 2025-04-29 PROCEDURE — 71045 X-RAY EXAM CHEST 1 VIEW: CPT

## 2025-04-29 PROCEDURE — 36415 COLL VENOUS BLD VENIPUNCTURE: CPT

## 2025-04-29 PROCEDURE — C1887 CATHETER, GUIDING: HCPCS | Performed by: INTERNAL MEDICINE

## 2025-04-29 PROCEDURE — 33208 INSRT HEART PM ATRIAL & VENT: CPT | Performed by: INTERNAL MEDICINE

## 2025-04-29 PROCEDURE — 6370000000 HC RX 637 (ALT 250 FOR IP)

## 2025-04-29 PROCEDURE — C1892 INTRO/SHEATH,FIXED,PEEL-AWAY: HCPCS | Performed by: INTERNAL MEDICINE

## 2025-04-29 PROCEDURE — C1730 CATH, EP, 19 OR FEW ELECT: HCPCS | Performed by: INTERNAL MEDICINE

## 2025-04-29 PROCEDURE — C1732 CATH, EP, DIAG/ABL, 3D/VECT: HCPCS | Performed by: INTERNAL MEDICINE

## 2025-04-29 PROCEDURE — 6360000004 HC RX CONTRAST MEDICATION

## 2025-04-29 PROCEDURE — 86850 RBC ANTIBODY SCREEN: CPT

## 2025-04-29 PROCEDURE — 82962 GLUCOSE BLOOD TEST: CPT

## 2025-04-29 PROCEDURE — 93650 ICAR CATH ABLTJ AV NODE FUNC: CPT | Performed by: INTERNAL MEDICINE

## 2025-04-29 PROCEDURE — 33229 REMV&REPLC PM GEN MULT LEADS: CPT | Performed by: INTERNAL MEDICINE

## 2025-04-29 PROCEDURE — 6360000002 HC RX W HCPCS: Performed by: INTERNAL MEDICINE

## 2025-04-29 PROCEDURE — 94761 N-INVAS EAR/PLS OXIMETRY MLT: CPT

## 2025-04-29 PROCEDURE — 86901 BLOOD TYPING SEROLOGIC RH(D): CPT

## 2025-04-29 PROCEDURE — C1900 LEAD, CORONARY VENOUS: HCPCS | Performed by: INTERNAL MEDICINE

## 2025-04-29 PROCEDURE — 7100000011 HC PHASE II RECOVERY - ADDTL 15 MIN: Performed by: INTERNAL MEDICINE

## 2025-04-29 PROCEDURE — 2720000010 HC SURG SUPPLY STERILE: Performed by: INTERNAL MEDICINE

## 2025-04-29 PROCEDURE — 7100000010 HC PHASE II RECOVERY - FIRST 15 MIN: Performed by: INTERNAL MEDICINE

## 2025-04-29 DEVICE — CRTP W4TR01 PERCEPTA QUAD CRTP MRI US
Type: IMPLANTABLE DEVICE | Site: CHEST | Status: FUNCTIONAL
Brand: PERCEPTA™ QUAD CRT-P MRI SURESCAN™

## 2025-04-29 DEVICE — LEAD 429888 MRI CANT US
Type: IMPLANTABLE DEVICE | Site: HEART | Status: FUNCTIONAL
Brand: ATTAIN PERFORMA™ MRI SURESCAN™

## 2025-04-29 DEVICE — ENVELOPE CMRM6122 ABSORB MED MR
Type: IMPLANTABLE DEVICE | Site: CHEST | Status: FUNCTIONAL
Brand: TYRX™

## 2025-04-29 RX ORDER — BUDESONIDE AND FORMOTEROL FUMARATE DIHYDRATE 160; 4.5 UG/1; UG/1
2 AEROSOL RESPIRATORY (INHALATION)
Status: DISCONTINUED | OUTPATIENT
Start: 2025-04-29 | End: 2025-04-30 | Stop reason: HOSPADM

## 2025-04-29 RX ORDER — MIDODRINE HYDROCHLORIDE 5 MG/1
5 TABLET ORAL 3 TIMES DAILY
Status: DISCONTINUED | OUTPATIENT
Start: 2025-04-29 | End: 2025-04-30 | Stop reason: HOSPADM

## 2025-04-29 RX ORDER — TRAZODONE HYDROCHLORIDE 50 MG/1
50 TABLET ORAL NIGHTLY
Status: DISCONTINUED | OUTPATIENT
Start: 2025-04-29 | End: 2025-04-30 | Stop reason: HOSPADM

## 2025-04-29 RX ORDER — SODIUM CHLORIDE 9 MG/ML
INJECTION, SOLUTION INTRAVENOUS PRN
Status: DISCONTINUED | OUTPATIENT
Start: 2025-04-29 | End: 2025-04-30 | Stop reason: HOSPADM

## 2025-04-29 RX ORDER — SPIRONOLACTONE 25 MG/1
12.5 TABLET ORAL DAILY
Status: DISCONTINUED | OUTPATIENT
Start: 2025-04-30 | End: 2025-04-30 | Stop reason: HOSPADM

## 2025-04-29 RX ORDER — GLUCAGON 1 MG/ML
1 KIT INJECTION PRN
Status: DISCONTINUED | OUTPATIENT
Start: 2025-04-29 | End: 2025-04-30 | Stop reason: HOSPADM

## 2025-04-29 RX ORDER — GLIPIZIDE 5 MG/1
5 TABLET ORAL
Status: DISCONTINUED | OUTPATIENT
Start: 2025-04-30 | End: 2025-04-30 | Stop reason: HOSPADM

## 2025-04-29 RX ORDER — SODIUM CHLORIDE 0.9 % (FLUSH) 0.9 %
5-40 SYRINGE (ML) INJECTION PRN
Status: DISCONTINUED | OUTPATIENT
Start: 2025-04-29 | End: 2025-04-30 | Stop reason: HOSPADM

## 2025-04-29 RX ORDER — SODIUM CHLORIDE 0.9 % (FLUSH) 0.9 %
5-40 SYRINGE (ML) INJECTION EVERY 12 HOURS SCHEDULED
Status: DISCONTINUED | OUTPATIENT
Start: 2025-04-29 | End: 2025-04-30 | Stop reason: HOSPADM

## 2025-04-29 RX ORDER — METOPROLOL TARTRATE 25 MG/1
25 TABLET, FILM COATED ORAL 2 TIMES DAILY
Status: DISCONTINUED | OUTPATIENT
Start: 2025-04-29 | End: 2025-04-30 | Stop reason: HOSPADM

## 2025-04-29 RX ORDER — MIDAZOLAM HYDROCHLORIDE 1 MG/ML
INJECTION, SOLUTION INTRAMUSCULAR; INTRAVENOUS PRN
Status: DISCONTINUED | OUTPATIENT
Start: 2025-04-29 | End: 2025-04-29 | Stop reason: HOSPADM

## 2025-04-29 RX ORDER — TAMSULOSIN HYDROCHLORIDE 0.4 MG/1
0.4 CAPSULE ORAL DAILY
Status: DISCONTINUED | OUTPATIENT
Start: 2025-04-30 | End: 2025-04-30 | Stop reason: HOSPADM

## 2025-04-29 RX ORDER — DULOXETIN HYDROCHLORIDE 30 MG/1
60 CAPSULE, DELAYED RELEASE ORAL DAILY
Status: DISCONTINUED | OUTPATIENT
Start: 2025-04-29 | End: 2025-04-30 | Stop reason: HOSPADM

## 2025-04-29 RX ORDER — PYRIDOSTIGMINE BROMIDE 60 MG/1
60 TABLET ORAL 3 TIMES DAILY
Status: DISCONTINUED | OUTPATIENT
Start: 2025-04-29 | End: 2025-04-30 | Stop reason: HOSPADM

## 2025-04-29 RX ORDER — ACETAMINOPHEN 325 MG/1
650 TABLET ORAL EVERY 4 HOURS PRN
Status: DISCONTINUED | OUTPATIENT
Start: 2025-04-29 | End: 2025-04-30 | Stop reason: HOSPADM

## 2025-04-29 RX ORDER — PANTOPRAZOLE SODIUM 40 MG/1
40 TABLET, DELAYED RELEASE ORAL DAILY
Status: DISCONTINUED | OUTPATIENT
Start: 2025-04-30 | End: 2025-04-30 | Stop reason: HOSPADM

## 2025-04-29 RX ORDER — TRAMADOL HYDROCHLORIDE 50 MG/1
50 TABLET ORAL EVERY 6 HOURS PRN
Status: DISCONTINUED | OUTPATIENT
Start: 2025-04-29 | End: 2025-04-30 | Stop reason: HOSPADM

## 2025-04-29 RX ORDER — ASPIRIN 81 MG/1
81 TABLET, CHEWABLE ORAL DAILY
Status: DISCONTINUED | OUTPATIENT
Start: 2025-04-30 | End: 2025-04-30 | Stop reason: HOSPADM

## 2025-04-29 RX ORDER — TIZANIDINE 2 MG/1
2 TABLET ORAL EVERY 8 HOURS PRN
Status: DISCONTINUED | OUTPATIENT
Start: 2025-04-29 | End: 2025-04-30 | Stop reason: HOSPADM

## 2025-04-29 RX ORDER — DEXTROSE MONOHYDRATE 100 MG/ML
INJECTION, SOLUTION INTRAVENOUS CONTINUOUS PRN
Status: DISCONTINUED | OUTPATIENT
Start: 2025-04-29 | End: 2025-04-30 | Stop reason: HOSPADM

## 2025-04-29 RX ORDER — IOPAMIDOL 755 MG/ML
INJECTION, SOLUTION INTRAVASCULAR PRN
Status: DISCONTINUED | OUTPATIENT
Start: 2025-04-29 | End: 2025-04-29 | Stop reason: HOSPADM

## 2025-04-29 RX ORDER — FENTANYL CITRATE 50 UG/ML
INJECTION, SOLUTION INTRAMUSCULAR; INTRAVENOUS PRN
Status: DISCONTINUED | OUTPATIENT
Start: 2025-04-29 | End: 2025-04-29 | Stop reason: HOSPADM

## 2025-04-29 RX ORDER — CLOPIDOGREL BISULFATE 75 MG/1
75 TABLET ORAL DAILY
Status: DISCONTINUED | OUTPATIENT
Start: 2025-04-29 | End: 2025-04-30 | Stop reason: HOSPADM

## 2025-04-29 RX ORDER — FUROSEMIDE 40 MG/1
40 TABLET ORAL DAILY
Status: DISCONTINUED | OUTPATIENT
Start: 2025-04-30 | End: 2025-04-30 | Stop reason: HOSPADM

## 2025-04-29 RX ORDER — LOSARTAN POTASSIUM 25 MG/1
25 TABLET ORAL EVERY EVENING
Status: DISCONTINUED | OUTPATIENT
Start: 2025-04-29 | End: 2025-04-30 | Stop reason: HOSPADM

## 2025-04-29 RX ORDER — LANOLIN ALCOHOL/MO/W.PET/CERES
1000 CREAM (GRAM) TOPICAL DAILY
Status: DISCONTINUED | OUTPATIENT
Start: 2025-04-29 | End: 2025-04-30 | Stop reason: HOSPADM

## 2025-04-29 RX ORDER — ATORVASTATIN CALCIUM 40 MG/1
40 TABLET, FILM COATED ORAL NIGHTLY
Status: DISCONTINUED | OUTPATIENT
Start: 2025-04-30 | End: 2025-04-30 | Stop reason: HOSPADM

## 2025-04-29 RX ADMIN — MIDODRINE HYDROCHLORIDE 5 MG: 5 TABLET ORAL at 20:29

## 2025-04-29 RX ADMIN — TRAZODONE HYDROCHLORIDE 50 MG: 50 TABLET ORAL at 20:29

## 2025-04-29 RX ADMIN — METFORMIN HYDROCHLORIDE 500 MG: 500 TABLET, FILM COATED ORAL at 20:29

## 2025-04-29 RX ADMIN — LOSARTAN POTASSIUM 25 MG: 25 TABLET, FILM COATED ORAL at 20:34

## 2025-04-29 RX ADMIN — CLOPIDOGREL BISULFATE 75 MG: 75 TABLET, FILM COATED ORAL at 20:29

## 2025-04-29 RX ADMIN — PYRIDOSTIGMINE BROMIDE 60 MG: 60 TABLET ORAL at 20:29

## 2025-04-29 RX ADMIN — METOPROLOL TARTRATE 25 MG: 25 TABLET, FILM COATED ORAL at 20:29

## 2025-04-29 RX ADMIN — DULOXETINE HYDROCHLORIDE 60 MG: 30 CAPSULE, DELAYED RELEASE ORAL at 20:29

## 2025-04-29 ASSESSMENT — ENCOUNTER SYMPTOMS
COUGH: 0
COLOR CHANGE: 0
BLOOD IN STOOL: 0
VOMITING: 0
DIARRHEA: 0
WHEEZING: 0
NAUSEA: 0
ABDOMINAL PAIN: 0
CHEST TIGHTNESS: 0
EYE PAIN: 0
BACK PAIN: 0
CONSTIPATION: 0
PHOTOPHOBIA: 0
SHORTNESS OF BREATH: 1

## 2025-04-29 NOTE — H&P
Electrophysiology H&p Note      Reason for consultation:  severe LV systolic dysfunction    Chief complaint : here for AV node ablation and biv pacemaker implantation upgrade    Referring physician:       Primary care physician: Tyler Diaz MD      History of Present Illness:       Today visit (04/29/25)    Patient here for AV node ablation and biv pacemaker implantation upgrade. No change in H&P noted from previous clinic visit.     Previous visit (4/4/2025)    Patient is having shortness of breath and also chest pains and it has been having for few days.  Patient takes aspirin and it helped him.  Patient also has shortness of breath and with exertion and is also having rapid heart rates.  Patient here to discuss about his options.  Patient has not seen me since July.      Previous visit: (6/7/2024)      Chief Complaint   Patient presents with    Follow-up     Pt's wife states that pt has been out of prednisone (1 week) and plavix (2 weeks). She states that there was an issue with med assist that they are working to resolve now. He also has not started digoxin because they have not been able to afford it yet and they are waiting for it to be resolved through med assist.     Dizziness     Pt states that he has dizziness that is not new. He states that it is the same as last visit.     Chest Pain     Pt states that he has chest pain off and on. The last time was Tuesday night. He took 2 baby aspirins and the pain lasted less than a minute.     Shortness of Breath     SOB that is the same as last visit     Edema     Pt states that he has swelling in his legs and feet. He states that the left leg is worse. Pt's wife states that the swelling had gone down after he was in the hospital last month but is worse now.      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

## 2025-04-29 NOTE — PROGRESS NOTES
4 Eyes Skin Assessment     NAME:  Aramis Rosario  YOB: 1960  MEDICAL RECORD NUMBER:  [id number]    The patient is being assessed for  Admission    I agree that at least one RN has performed a thorough Head to Toe Skin Assessment on the patient. ALL assessment sites listed below have been assessed.      Areas assessed by both nurses:    Head, Face, Ears, Shoulders, Back, Chest, Arms, Elbows, Hands, Sacrum. Buttock, Coccyx, Ischium, Legs. Feet and Heels, and Under Medical Devices         Does the Patient have a Wound? Yes wound(s) were present on assessment. LDA wound assessment was Initiated and completed by RN  Incision to groin     Redness to justin butttocks  Nilo Prevention initiated by RN: No  Wound Care Orders initiated by RN: No    Pressure Injury (Stage 3,4, Unstageable, DTI, NWPT, and Complex wounds) if present, place Wound referral order by RN under : No    New Ostomies, if present place, Ostomy referral order under : No     Nurse 1 eSignature: Electronically signed by Radha Alberto RN on 4/29/25 at 6:56 PM EDT    **SHARE this note so that the co-signing nurse can place an eSignature**    Nurse 2 eSignature: Electronically signed by Rachna Raza RN on 4/30/25 at 9:53 AM EDT

## 2025-04-29 NOTE — PROGRESS NOTES
Report called to Claudine CROSS RN. Family informed inpatient bed ready and family could go to room, room 2009. Family took patient belongings.  Pt transported per Paola RN and Jody RN.

## 2025-04-30 ENCOUNTER — TELEPHONE (OUTPATIENT)
Dept: INTERNAL MEDICINE CLINIC | Age: 65
End: 2025-04-30

## 2025-04-30 ENCOUNTER — APPOINTMENT (OUTPATIENT)
Dept: NON INVASIVE DIAGNOSTICS | Age: 65
End: 2025-04-30
Attending: INTERNAL MEDICINE
Payer: MEDICARE

## 2025-04-30 VITALS
TEMPERATURE: 97.5 F | OXYGEN SATURATION: 97 % | HEART RATE: 80 BPM | RESPIRATION RATE: 22 BRPM | DIASTOLIC BLOOD PRESSURE: 77 MMHG | HEIGHT: 71 IN | WEIGHT: 240 LBS | SYSTOLIC BLOOD PRESSURE: 117 MMHG | BODY MASS INDEX: 33.6 KG/M2

## 2025-04-30 PROBLEM — I48.19 PERSISTENT ATRIAL FIBRILLATION WITH RVR (HCC): Status: ACTIVE | Noted: 2022-05-23

## 2025-04-30 LAB
ECHO BSA: 2.34 M2
ECHO EST RA PRESSURE: 3 MMHG
ECHO IVC PROX: 1.4 CM
ECHO LV EDV A4C: 94 ML
ECHO LV EDV INDEX A4C: 41 ML/M2
ECHO LV EF PHYSICIAN: 50 %
ECHO LV EJECTION FRACTION A4C: 54 %
ECHO LV ESV A4C: 43 ML
ECHO LV ESV INDEX A4C: 19 ML/M2
ECHO LV FRACTIONAL SHORTENING: 29 % (ref 28–44)
ECHO LV INTERNAL DIMENSION DIASTOLE INDEX: 1.8 CM/M2
ECHO LV INTERNAL DIMENSION DIASTOLIC: 4.1 CM (ref 4.2–5.9)
ECHO LV INTERNAL DIMENSION SYSTOLIC INDEX: 1.27 CM/M2
ECHO LV INTERNAL DIMENSION SYSTOLIC: 2.9 CM
ECHO LV IVSD: 1.2 CM (ref 0.6–1)
ECHO LV MASS 2D: 161.4 G (ref 88–224)
ECHO LV MASS INDEX 2D: 70.8 G/M2 (ref 49–115)
ECHO LV POSTERIOR WALL DIASTOLIC: 1.1 CM (ref 0.6–1)
ECHO LV RELATIVE WALL THICKNESS RATIO: 0.54
ECHO RIGHT VENTRICULAR SYSTOLIC PRESSURE (RVSP): 37 MMHG
ECHO RV MID DIMENSION: 3.6 CM
ECHO TV REGURGITANT MAX VELOCITY: 2.92 M/S
ECHO TV REGURGITANT PEAK GRADIENT: 34 MMHG
GLUCOSE BLD-MCNC: 144 MG/DL (ref 74–99)
GLUCOSE BLD-MCNC: 182 MG/DL (ref 74–99)

## 2025-04-30 PROCEDURE — 6370000000 HC RX 637 (ALT 250 FOR IP)

## 2025-04-30 PROCEDURE — 94640 AIRWAY INHALATION TREATMENT: CPT

## 2025-04-30 PROCEDURE — 2500000003 HC RX 250 WO HCPCS

## 2025-04-30 PROCEDURE — 6360000004 HC RX CONTRAST MEDICATION: Performed by: INTERNAL MEDICINE

## 2025-04-30 PROCEDURE — 93325 DOPPLER ECHO COLOR FLOW MAPG: CPT | Performed by: INTERNAL MEDICINE

## 2025-04-30 PROCEDURE — G0378 HOSPITAL OBSERVATION PER HR: HCPCS

## 2025-04-30 PROCEDURE — 6360000002 HC RX W HCPCS

## 2025-04-30 PROCEDURE — 94761 N-INVAS EAR/PLS OXIMETRY MLT: CPT

## 2025-04-30 PROCEDURE — 93321 DOPPLER ECHO F-UP/LMTD STD: CPT | Performed by: INTERNAL MEDICINE

## 2025-04-30 PROCEDURE — C8924 2D TTE W OR W/O FOL W/CON,FU: HCPCS

## 2025-04-30 PROCEDURE — 93308 TTE F-UP OR LMTD: CPT | Performed by: INTERNAL MEDICINE

## 2025-04-30 PROCEDURE — 99238 HOSP IP/OBS DSCHRG MGMT 30/<: CPT | Performed by: NURSE PRACTITIONER

## 2025-04-30 PROCEDURE — 93281 PM DEVICE PROGR EVAL MULTI: CPT | Performed by: INTERNAL MEDICINE

## 2025-04-30 PROCEDURE — 82962 GLUCOSE BLOOD TEST: CPT

## 2025-04-30 PROCEDURE — 93321 DOPPLER ECHO F-UP/LMTD STD: CPT

## 2025-04-30 RX ORDER — PREDNISONE 10 MG/1
10 TABLET ORAL DAILY
Qty: 30 TABLET | OUTPATIENT
Start: 2025-04-30

## 2025-04-30 RX ADMIN — Medication 1000 MCG: at 09:41

## 2025-04-30 RX ADMIN — TAMSULOSIN HYDROCHLORIDE 0.4 MG: 0.4 CAPSULE ORAL at 09:40

## 2025-04-30 RX ADMIN — FUROSEMIDE 40 MG: 40 TABLET ORAL at 09:42

## 2025-04-30 RX ADMIN — SODIUM CHLORIDE, PRESERVATIVE FREE 10 ML: 5 INJECTION INTRAVENOUS at 01:01

## 2025-04-30 RX ADMIN — CLOPIDOGREL BISULFATE 75 MG: 75 TABLET, FILM COATED ORAL at 09:42

## 2025-04-30 RX ADMIN — BUDESONIDE AND FORMOTEROL FUMARATE DIHYDRATE 2 PUFF: 160; 4.5 AEROSOL RESPIRATORY (INHALATION) at 08:43

## 2025-04-30 RX ADMIN — GLIPIZIDE 5 MG: 5 TABLET ORAL at 09:41

## 2025-04-30 RX ADMIN — MIDODRINE HYDROCHLORIDE 5 MG: 5 TABLET ORAL at 09:41

## 2025-04-30 RX ADMIN — WATER 1000 MG: 1 INJECTION INTRAMUSCULAR; INTRAVENOUS; SUBCUTANEOUS at 09:40

## 2025-04-30 RX ADMIN — CHOLECALCIFEROL (VITAMIN D3) 10 MCG (400 UNIT) TABLET 400 UNITS: at 09:41

## 2025-04-30 RX ADMIN — ASPIRIN 81 MG: 81 TABLET, CHEWABLE ORAL at 09:41

## 2025-04-30 RX ADMIN — SODIUM CHLORIDE, PRESERVATIVE FREE 10 ML: 5 INJECTION INTRAVENOUS at 09:43

## 2025-04-30 RX ADMIN — SULFUR HEXAFLUORIDE 2 ML: 60.7; .19; .19 INJECTION, POWDER, LYOPHILIZED, FOR SUSPENSION INTRAVENOUS; INTRAVESICAL at 09:36

## 2025-04-30 RX ADMIN — SPIRONOLACTONE 12.5 MG: 25 TABLET ORAL at 09:42

## 2025-04-30 RX ADMIN — PYRIDOSTIGMINE BROMIDE 60 MG: 60 TABLET ORAL at 09:40

## 2025-04-30 RX ADMIN — METOPROLOL TARTRATE 25 MG: 25 TABLET, FILM COATED ORAL at 09:40

## 2025-04-30 RX ADMIN — TIZANIDINE 2 MG: 2 TABLET ORAL at 10:10

## 2025-04-30 RX ADMIN — DULOXETINE HYDROCHLORIDE 60 MG: 30 CAPSULE, DELAYED RELEASE ORAL at 09:40

## 2025-04-30 RX ADMIN — PANTOPRAZOLE SODIUM 40 MG: 40 TABLET, DELAYED RELEASE ORAL at 09:42

## 2025-04-30 RX ADMIN — TIOTROPIUM BROMIDE INHALATION SPRAY 2 PUFF: 3.12 SPRAY, METERED RESPIRATORY (INHALATION) at 08:42

## 2025-04-30 RX ADMIN — WATER 1000 MG: 1 INJECTION INTRAMUSCULAR; INTRAVENOUS; SUBCUTANEOUS at 01:00

## 2025-04-30 ASSESSMENT — PAIN DESCRIPTION - ORIENTATION: ORIENTATION: LEFT;UPPER

## 2025-04-30 ASSESSMENT — PAIN - FUNCTIONAL ASSESSMENT: PAIN_FUNCTIONAL_ASSESSMENT: ACTIVITIES ARE NOT PREVENTED

## 2025-04-30 ASSESSMENT — PAIN DESCRIPTION - LOCATION: LOCATION: INCISION;CHEST

## 2025-04-30 ASSESSMENT — PAIN DESCRIPTION - DESCRIPTORS: DESCRIPTORS: ACHING;SORE

## 2025-04-30 ASSESSMENT — PAIN SCALES - GENERAL: PAINLEVEL_OUTOF10: 8

## 2025-04-30 NOTE — DISCHARGE INSTRUCTIONS
Avoid raising the arm above shoulder for 4 weeks  No driving for 10 days  No lifting more than 10 lbs with the left hand  Follow up appointment with Doctor for wound check in 10 days  Notify Doctor if pain, fever, chills, swelling or bleeding in the surgical area  Please avoid sleeping on the surgical side.   Please do not allow anyone other than Dr Henderson's staff to remove or redress the surgical site. If the dressing were to fall off or fall partially off before the 10 day follow up appointment, please call the office ASAP.  These instructions have been discussed with patient.

## 2025-04-30 NOTE — DISCHARGE SUMMARY
[facility]  Discharge Summary    Aramis Rosario  :  1960  MRN:  [id number]    Admit date:  2025  Discharge date:      Admitting Physician:  Rock Henderson MD    Discharge Diagnoses:      1. Sp Ablation of  AV node  2. S/p BIV pacemaker  3. Chronic atrial fibrillation  4. Hypercoagulable due to atrial fibrillation  5. S/p watchman      Admission Condition:  fair    Discharged Condition:  good    Hospital Course:   Patient admitted post ablation of AV node ablation and BIV pacemaker for observation. Patient tolerated the procedure and post procedure stay was uneventful. Chest xray reviewed- no pneumothorax. Pacemaker interrogated. Patient was stable for discharge to be followed as an out patient.     Current Discharge Medication List             Details   DULoxetine (CYMBALTA) 60 MG extended release capsule TAKE 1 CAPSULE BY MOUTH EVERY DAY  Qty: 90 capsule, Refills: 1    Comments: ZERO refills remain on this prescription. Your patient is requesting advance approval of refills for this medication to PREVENT ANY MISSED DOSES  Associated Diagnoses: Arthritis      traZODone (DESYREL) 50 MG tablet TAKE 1 TABLET BY MOUTH EVERY NIGHT AT BEDTIME  Qty: 90 tablet, Refills: 1    Comments: ZERO refills remain on this prescription. Your patient is requesting advance approval of refills for this medication to PREVENT ANY MISSED DOSES  Associated Diagnoses: Primary insomnia      atorvastatin (LIPITOR) 40 MG tablet Take 1 tablet by mouth nightly  Qty: 30 tablet, Refills: 3    Associated Diagnoses: Type 2 diabetes mellitus without complication, without long-term current use of insulin (HCC)      glipiZIDE (GLUCOTROL) 5 MG tablet Take 1 tablet by mouth 2 times daily (before meals)  Qty: 60 tablet, Refills: 5    Associated Diagnoses: Type 2 diabetes mellitus with stage 3 chronic kidney disease, without long-term current use of insulin, unspecified whether stage 3a or 3b CKD (HCC)      losartan (COZAAR) 25 MG tablet Take 1

## 2025-04-30 NOTE — PROGRESS NOTES
AVS reviewed with patient, all questions answered, LDAS removed. Pt waiting for wife to arrive for DC.

## 2025-04-30 NOTE — TELEPHONE ENCOUNTER
Laura called requesting you fill   predniSONE (DELTASONE) 10 MG tablet    She said you will fill it for them at times. Please advise

## 2025-05-01 ENCOUNTER — TELEPHONE (OUTPATIENT)
Dept: INTERNAL MEDICINE CLINIC | Age: 65
End: 2025-05-01

## 2025-05-01 DIAGNOSIS — G70.00 MYASTHENIA GRAVIS (HCC): ICD-10-CM

## 2025-05-01 RX ORDER — PREDNISONE 10 MG/1
10 TABLET ORAL DAILY
Qty: 30 TABLET | Refills: 2 | Status: SHIPPED | OUTPATIENT
Start: 2025-05-01

## 2025-05-01 NOTE — TELEPHONE ENCOUNTER
Care Transitions Initial Follow Up Call    Outreach made within 2 business days of discharge: Yes    Patient: Aramis Rosario Patient : 1960   MRN: [id number]  Discharge Date: 25       Spoke with: Aramis     Discharge department/facility: [facility] Interactive Patient Contact:  Was patient able to fill all prescriptions: Yes  Was patient instructed to bring all medications to the follow-up visit: Yes  Is patient taking all medications as directed in the discharge summary? Yes  Does patient understand their discharge instructions: Yes  Does patient have questions or concerns that need addressed prior to 7-14 day follow up office visit: no        Scheduled appointment with PCP within 7-14 days    Follow Up  Future Appointments   Date Time Provider Department Center   9/3/2025  2:00 PM Tyler Diaz MD [facility] DE[facility]       Tai Johnson MA

## 2025-05-01 NOTE — PROGRESS NOTES
4/29/2025                      Discharge date:       Admitting Physician:  Rock Henderson MD     Discharge Diagnoses:       1. Sp Ablation of  AV node  2. S/p BIV pacemaker  3. Chronic atrial fibrillation  4. Hypercoagulable due to atrial fibrillation  5. S/p watchman        Admission Condition:  fair     Discharged Condition:  good     Hospital Course:   Patient admitted post ablation of AV node ablation and BIV pacemaker for observation. Patient tolerated the procedure and post procedure stay was uneventful. Chest xray reviewed- no pneumothorax. Pacemaker interrogated. Patient was stable for discharge to be followed as an out patient.       Post-Discharge Transitional Care  Follow Up      Aramis Rosario   YOB: 1960    Date of Office Visit:  5/2/2025  Date of Hospital Admission: 4/29/25  Date of Hospital Discharge: 4/30/25  Risk of hospital readmission (high >=14%. Medium >=10%) :Readmission Risk Score: 11.7      Care management risk score Rising risk (score 2-5) and Complex Care (Scores >=6): No Risk Score On File     Non face to face  following discharge, date last encounter closed (first attempt may have been earlier): 05/01/2025    Call initiated 2 business days of discharge: Yes  Aramis appears stable with some improvement after hospitalization and AV sherman ablation with pacemaker placement.  His heart rate had been at a maximum of 170, now down to around 100, blood pressure is also improved and was previously very hypotensive requiring ProAmatine.  We will now try to taper this off decreasing from 5 mg 3 times daily down to twice daily, if systolic blood pressure remains 110 or greater in the next week he will then try to decrease to 2.5 mg twice daily.  Continues close follow-up with cardiology and is to call for follow-up.  Weight is down, clinically his previous severe congestive heart failure appears to be stabilizing as well.  We will follow-up electrolytes, renal function and

## 2025-05-02 ENCOUNTER — OFFICE VISIT (OUTPATIENT)
Dept: INTERNAL MEDICINE CLINIC | Age: 65
End: 2025-05-02

## 2025-05-02 VITALS — SYSTOLIC BLOOD PRESSURE: 128 MMHG | OXYGEN SATURATION: 97 % | HEART RATE: 102 BPM | DIASTOLIC BLOOD PRESSURE: 74 MMHG

## 2025-05-02 DIAGNOSIS — Z09 HOSPITAL DISCHARGE FOLLOW-UP: Primary | ICD-10-CM

## 2025-05-02 DIAGNOSIS — G70.00 MYASTHENIA GRAVIS (HCC): ICD-10-CM

## 2025-05-02 DIAGNOSIS — E11.9 TYPE 2 DIABETES MELLITUS WITHOUT COMPLICATION, WITHOUT LONG-TERM CURRENT USE OF INSULIN (HCC): ICD-10-CM

## 2025-05-02 DIAGNOSIS — J42 CHRONIC BRONCHITIS, UNSPECIFIED CHRONIC BRONCHITIS TYPE (HCC): ICD-10-CM

## 2025-05-02 DIAGNOSIS — I48.20 ATRIAL FIBRILLATION, CHRONIC (HCC): ICD-10-CM

## 2025-05-02 DIAGNOSIS — I50.21 ACUTE SYSTOLIC CHF (CONGESTIVE HEART FAILURE) (HCC): ICD-10-CM

## 2025-05-02 DIAGNOSIS — I42.0 DILATED CARDIOMYOPATHY (HCC): ICD-10-CM

## 2025-05-02 DIAGNOSIS — I50.33 ACUTE ON CHRONIC DIASTOLIC (CONGESTIVE) HEART FAILURE (HCC): ICD-10-CM

## 2025-05-02 PROBLEM — K51.90 ULCERATIVE COLITIS (HCC): Status: RESOLVED | Noted: 2021-04-23 | Resolved: 2025-05-02

## 2025-05-02 ASSESSMENT — PATIENT HEALTH QUESTIONNAIRE - PHQ9
5. POOR APPETITE OR OVEREATING: NOT AT ALL
3. TROUBLE FALLING OR STAYING ASLEEP: NOT AT ALL
2. FEELING DOWN, DEPRESSED OR HOPELESS: NOT AT ALL
SUM OF ALL RESPONSES TO PHQ QUESTIONS 1-9: 0
SUM OF ALL RESPONSES TO PHQ QUESTIONS 1-9: 0
1. LITTLE INTEREST OR PLEASURE IN DOING THINGS: NOT AT ALL
8. MOVING OR SPEAKING SO SLOWLY THAT OTHER PEOPLE COULD HAVE NOTICED. OR THE OPPOSITE, BEING SO FIGETY OR RESTLESS THAT YOU HAVE BEEN MOVING AROUND A LOT MORE THAN USUAL: NOT AT ALL
9. THOUGHTS THAT YOU WOULD BE BETTER OFF DEAD, OR OF HURTING YOURSELF: NOT AT ALL
4. FEELING TIRED OR HAVING LITTLE ENERGY: NOT AT ALL
7. TROUBLE CONCENTRATING ON THINGS, SUCH AS READING THE NEWSPAPER OR WATCHING TELEVISION: NOT AT ALL
SUM OF ALL RESPONSES TO PHQ QUESTIONS 1-9: 0
SUM OF ALL RESPONSES TO PHQ QUESTIONS 1-9: 0
10. IF YOU CHECKED OFF ANY PROBLEMS, HOW DIFFICULT HAVE THESE PROBLEMS MADE IT FOR YOU TO DO YOUR WORK, TAKE CARE OF THINGS AT HOME, OR GET ALONG WITH OTHER PEOPLE: NOT DIFFICULT AT ALL
6. FEELING BAD ABOUT YOURSELF - OR THAT YOU ARE A FAILURE OR HAVE LET YOURSELF OR YOUR FAMILY DOWN: NOT AT ALL

## 2025-05-02 NOTE — PATIENT INSTRUCTIONS
Monitor bp at home and decr midodrine from 3x/day to 2x/day.    Then if bp is staying >110 systolic in the next week, decr to 1/2 tab twice/day.

## 2025-05-05 ENCOUNTER — TELEPHONE (OUTPATIENT)
Dept: CARDIOLOGY CLINIC | Age: 65
End: 2025-05-05

## 2025-05-05 NOTE — H&P
Chief complaints: patient is here for management of CAD, HTN, AFIB, DYSLPIDEMIA, myasthenia gravisCAD, HTN, AFIB, DYSLPIDEMIA, myasthenia gravis, stroke,     Subjective: + shortness of breath, no dizziness, no palpitations     HPI [facility] is a 60 y. o.year old who  has a past medical history of Arthritis, Atrial fibrillation, chronic ([facility] [address] Cerebrovascular accident [facility]), Closed displaced fracture of anterior column of right acetabulum ([facility] [address] COPD (chronic obstructive pulmonary disease) ([facility] Coronary artery disease involving native coronary artery of native heart without angina pectoris, Diabetes ([facility] H/O echocardiogram, Heart disease, History of blood transfusion, Hx of blood clots, Hx of cardiovascular stress test, Hx of Doppler ultrasound, Hypertension, Myasthenia gravis ([facility] [address] NSTEMI (non-ST elevated myocardial infarction) ([facility] Post PTCA, S/P ablation of atrial fibrillation, TIA (transient ischemic attack), and Ulcerative colitis ([facility]  and presents for management of CAD, HTN, AFIB, DYSLPIDEMIA, myasthenia gravis which are well controlled        Current Facility-Administered Medications          Current Outpatient Medications   Medication Sig Dispense Refill    magnesium oxide (MAG-OX) 400 (240 Mg) MG tablet Take 1 tablet by mouth daily 30 tablet 3    sotalol (BETAPACE) 80 MG tablet Take 1 tablet by mouth 2 times daily 60 tablet 2    ELIQUIS 5 MG TABS tablet Take 1 tablet by mouth 2 times daily        clopidogrel (PLAVIX) 75 MG tablet Take 1 tablet by mouth        DULoxetine (CYMBALTA) 60 MG extended release capsule Take 1 capsule by mouth        metFORMIN (GLUCOPHAGE-XR) 500 MG extended release tablet Take 1 tablet by mouth 2 times daily        GLUCOSAMINE-CALCIUM-VIT D PO Take by mouth daily         budesonide-formoterol (SYMBICORT) 160-4.5 MCG/ACT AERO Inhale 2 puffs into the lungs 2 times daily 1 Inhaler 3    atorvastatin (LIPITOR) 40 MG tablet Take 1 tablet by mouth nightly START 6/25/2021 30 tablet 3 tiotropium (SPIRIVA HANDIHALER) 18 MCG inhalation capsule Inhale 1 capsule into the lungs daily 90 capsule 1    pantoprazole (PROTONIX) 40 MG tablet Take 1 tablet by mouth 2 times daily 60 tablet 1    NONFORMULARY Inject 40 mg/L into the skin every evening Zilucoplan  - patient provided experimental drug from [facility] for Myasthenia Gravis        vitamin B-12 (CYANOCOBALAMIN) 500 MCG tablet Take 1,000 mcg by mouth daily Indications: 8am         PREDNISONE PO Take 13 mg by mouth daily Indications: Takes at  8am         acetaminophen (TYLENOL) 325 MG tablet Take 650 mg by mouth every 4 hours as needed for Pain or Fever        pyridostigmine (MESTINON) 60 MG tablet Take 1 tablet by mouth 4 times daily (Patient taking differently: Take 60 mg by mouth 5 times daily Indications: Takes at 0800, 1200, 1600, 2000) 120 tablet 0      No current facility-administered medications for this visit. Facility-Administered Medications Ordered in Other Visits   Medication Dose Route Frequency Provider Last Rate Last Admin    Immune Globulin (Human) solution 20 g  20 g IntraVENous Once Campbell Marquez MD         And    Immune Globulin (Human) solution 20 g  20 g IntraVENous Once Campbell Marquez MD        Immune Globulin (Human) solution 20 g  20 g IntraVENous Once Campbell Marquez MD         And    Immune Globulin (Human) solution 20 g  20 g IntraVENous Once Campbell Marquez MD             Allergies: Patient has no known allergies.   Past Medical History        Past Medical History:   Diagnosis Date    Arthritis 4/23/2021    Atrial fibrillation, chronic ([address] 12/2020    Cerebrovascular accident ([address] 9/9/2021    Closed displaced fracture of anterior column of right acetabulum ([address] 3/7/2022    COPD (chronic obstructive pulmonary disease) ([address] 06/15/2021    Coronary artery disease involving native coronary artery of native heart without angina pectoris 06/15/2021    Diabetes ([address] 9/10/2021    H/O echocardiogram 2021     EF is estimated at 50%. presence of PVC affected LVEF estimation, Mild TR. Heart disease       stint    History of blood transfusion      Hx of blood clots      Hx of cardiovascular stress test 2021     Small size severe intensity,perfusion defect in apical wall. Hx of Doppler ultrasound 2021     Left distal SSV shows occlusive chronic SVT. Hypertension      Myasthenia gravis ([facility] [address]      NSTEMI (non-ST elevated myocardial infarction) ([facility] [address] 2020    Post PTCA 2020     Ramus Stented. S/P ablation of atrial fibrillation 2021     S/P ablation of afib PVI performed by Dr. Bernice Jordan. TIA (transient ischemic attack)      Ulcerative colitis ([facility] [address]           Past Surgical History         Past Surgical History:   Procedure Laterality Date    CARDIAC SURGERY        ILEOSTOMY OR JEJUNOSTOMY        PACEMAKER INSERTION N/A 2021     PACEMAKER INSERTION PERMANENT performed by Fer Santiago MD at [address] [place] [address],[address]. [address] conditional    PTCA   2020     Ramus Stented. TONSILLECTOMY        UPPER GASTROINTESTINAL ENDOSCOPY N/A 06/15/2021     EGD DIAGNOSTIC ONLY performed by Sherron Guillen MD at [place] [address] N/A 2021     EGD DIAGNOSTIC ONLY performed by Danyel Ford MD at [facility] ENDOSCOPY         Family History   Family History   Family history unknown: Yes         Social History            Tobacco Use    Smoking status: Former       Packs/day: 0.25       Types: Cigarettes       Quit date: 2021       Years since quittin.4    Smokeless tobacco: Never   Substance Use Topics    Alcohol use:  Yes       Alcohol/week: 1.0 standard drink       Types: 1 Cans of beer per week       Comment: occ      [unfilled]  Review of Systems:   Constitutional: No Fever or Weight Loss   Eyes: No Decreased Vision  ENT: No Headaches, Hearing Loss or Vertigo  Cardiovascular: No chest pain,+ dyspnea on exertion, palpitations or loss of consciousness  Respiratory: No cough or wheezing    Gastrointestinal: No abdominal pain, appetite loss, blood in stools, constipation, diarrhea or heartburn  Genitourinary: No dysuria, trouble voiding, or hematuria  Musculoskeletal:  No gait disturbance, weakness or joint complaints  Integumentary: No rash or pruritis  Neurological: No TIA or stroke symptoms  Psychiatric: No anxiety or depression  Endocrine: No malaise, fatigue or temperature intolerance  Hematologic/Lymphatic: No bleeding problems, blood clots or swollen lymph nodes  Allergic/Immunologic: No nasal congestion or hives  All systems negative except as marked. Objective:  /82 (Site: Left Upper Arm, Position: Sitting, Cuff Size: Medium Adult)   Pulse 73   Resp 18   Ht 5' 11\" (1.803 m)   Wt 281 lb 6.4 oz (127.6 kg)   SpO2 95%   BMI 39.25 kg/m²       Wt Readings from Last 3 Encounters:   09/12/22 281 lb 6.4 oz (127.6 kg)   08/05/22 280 lb 12.8 oz (127.4 kg)   06/02/22 286 lb (129.7 kg)      Body mass index is 39.25 kg/m². GENERAL - Alert, oriented, pleasant, in no apparent distress,normal grooming  HEENT - pupils are intact, cornea intact, conjunctive normal color, ears are normal in exam,  Neck - Supple. No jugular venous distention noted. No carotid bruits, no apical -carotid delay  Respiratory:  Normal breath sounds, No respiratory distress, No wheezing, No chest tenderness. ,no use of accessory muscles, diaphragm movement is normal  Cardiovascular: (PMI) apex non displaced,no lifts no thrills, no s3,no s4, Normal heart rate, Normal rhythm, No murmurs, No rubs, No gallops.  Carotid arteries pulse and amplitude are normal no bruit, no abdominal bruit noted ( normal abdominal aorta ausculation),   Extremities - No cyanosis, clubbing, or significant edema, no varicose veins    Abdomen - No masses, tenderness, or organomegaly, no hepato-splenomegally, no bruits  Musculoskeletal - No significant edema, no kyphosis or scoliosis, no deformity in any extremity noted, muscle strength and tone are normal  Skin: no ulcer,no scar,no stasis dermatitis   Neurologic - alert oriented times 3,Cranial nerves II through XII are grossly intact. There were no gross focal neurologic abnormalities. Psychiatric: normal mood and affect     No results found for: CKTOTAL, CKMB, CKMBINDEX, TROPONINI  BNP:  No results found for: BNP  PT/INR:  No results found for: PTINR        Lab Results   Component Value Date     LABA1C 7.2 (H) 03/07/2022      No results found for: CHOL, TRIG, HDL, LDLCALC, LDLDIRECT        Lab Results   Component Value Date     ALT 23 03/08/2022     AST 17 03/08/2022      TSH:  No results found for: TSH     Impression:  Taiwo Simons is a 60 y. o.year old who  has a past medical history of Arthritis, Atrial fibrillation, chronic ([facility] [address] Cerebrovascular accident [facility]), Closed displaced fracture of anterior column of right acetabulum ([facility] [address] COPD (chronic obstructive pulmonary disease) ([facility] [address] Coronary artery disease involving native coronary artery of native heart without angina pectoris, Diabetes ([facility] H/O echocardiogram, Heart disease, History of blood transfusion, Hx of blood clots, Hx of cardiovascular stress test, Hx of Doppler ultrasound, Hypertension, Myasthenia gravis ([facility] [address] NSTEMI (non-ST elevated myocardial infarction) ([facility] [address] Post PTCA, S/P ablation of atrial fibrillation, TIA (transient ischemic attack), and Ulcerative colitis ([facility] [address]  and presents with      Plan:  Possible stroke after afib ablation/l recovered now but has trouble talking,   Dyspnea on exerion, with history ofnstemi AND CAD:will get stress test. Has h/oPCI OF RAMUS,he did  Not do cardiac rehab, stress test ordered,a echo ordered, cxr ordered  PPM;stable,interrogated on 8/5/22, no afib noted  Tiredness: could be due to medications and combination of Low LVEF AND MYASTHENIS GRAVIS, recommend to get TSH  LV dysfinction with NSTEMI, echo is checked and LVEF is 50% now,karen hold off on ace inhibitor or ARB  Paroxysmal afib NOW SINUS, will refill xarelto, continue plavix, and ok to to stop aspirin( he is not on aspirin)  Dyslipidemia: continue statins  LEG SWELLING and stasis dermatitis: venous doppler showed left SSS chronic svt, recommend compression socks  HTN: stable,   myasthenia gravis\": stable  Ulcerative colitis:stable patient

## 2025-05-05 NOTE — TELEPHONE ENCOUNTER
Spoke with Patients wife and scheduled 1 month office visit follow up with Karen Gamino NP on 6/10/25 @ 1215pm.    Patients wife, Laura advised understanding.

## 2025-05-09 ENCOUNTER — CLINICAL SUPPORT (OUTPATIENT)
Dept: CARDIOLOGY CLINIC | Age: 65
End: 2025-05-09

## 2025-05-09 VITALS — TEMPERATURE: 97.1 F

## 2025-05-09 DIAGNOSIS — Z95.0 STATUS POST BIVENTRICULAR PACEMAKER: Primary | ICD-10-CM

## 2025-05-09 PROCEDURE — 99024 POSTOP FOLLOW-UP VISIT: CPT | Performed by: INTERNAL MEDICINE

## 2025-05-09 NOTE — PROGRESS NOTES
Patient seen for site check post pacer implant. Dressing removed. No signs of inflammation or infection noted.   Edges well approximated. Patient has no complaints of pain or discomfort. Single steri strip applied. Patient instructed to not lift arm higher than shoulder level, not to sleep on side of implanted device and to not lift anything heavier than a gallon of milk for 30 days after implant. Instructions given on the use of Panelfly monitor for home device checks.

## 2025-05-13 DIAGNOSIS — J42 CHRONIC BRONCHITIS, UNSPECIFIED CHRONIC BRONCHITIS TYPE (HCC): ICD-10-CM

## 2025-05-13 RX ORDER — FLUTICASONE PROPIONATE AND SALMETEROL 250; 50 UG/1; UG/1
POWDER RESPIRATORY (INHALATION)
Qty: 60 EACH | Refills: 0 | Status: SHIPPED | OUTPATIENT
Start: 2025-05-13

## 2025-05-14 DIAGNOSIS — I42.0 DILATED CARDIOMYOPATHY (HCC): ICD-10-CM

## 2025-05-14 DIAGNOSIS — I48.91 ATRIAL FIBRILLATION WITH RAPID VENTRICULAR RESPONSE (HCC): ICD-10-CM

## 2025-05-14 DIAGNOSIS — I50.21 ACUTE SYSTOLIC CHF (CONGESTIVE HEART FAILURE) (HCC): ICD-10-CM

## 2025-05-14 RX ORDER — METOPROLOL TARTRATE 25 MG/1
25 TABLET, FILM COATED ORAL 2 TIMES DAILY
Qty: 180 TABLET | Refills: 1 | Status: SHIPPED | OUTPATIENT
Start: 2025-05-14

## 2025-05-16 DIAGNOSIS — J42 CHRONIC BRONCHITIS, UNSPECIFIED CHRONIC BRONCHITIS TYPE (HCC): ICD-10-CM

## 2025-05-16 RX ORDER — FLUTICASONE PROPIONATE AND SALMETEROL 250; 50 UG/1; UG/1
POWDER RESPIRATORY (INHALATION)
Qty: 60 EACH | Refills: 0 | OUTPATIENT
Start: 2025-05-16

## 2025-06-06 DIAGNOSIS — I50.21 ACUTE SYSTOLIC CHF (CONGESTIVE HEART FAILURE) (HCC): ICD-10-CM

## 2025-06-06 RX ORDER — MIDODRINE HYDROCHLORIDE 5 MG/1
5 TABLET ORAL 3 TIMES DAILY
Qty: 90 TABLET | Refills: 2 | Status: SHIPPED | OUTPATIENT
Start: 2025-06-06

## 2025-06-14 DIAGNOSIS — E11.9 TYPE 2 DIABETES MELLITUS WITHOUT COMPLICATION, WITHOUT LONG-TERM CURRENT USE OF INSULIN (HCC): ICD-10-CM

## 2025-06-16 RX ORDER — ATORVASTATIN CALCIUM 40 MG/1
40 TABLET, FILM COATED ORAL NIGHTLY
Qty: 30 TABLET | Refills: 3 | Status: SHIPPED | OUTPATIENT
Start: 2025-06-16

## 2025-07-31 DIAGNOSIS — G70.00 MYASTHENIA GRAVIS (HCC): ICD-10-CM

## 2025-08-01 RX ORDER — PREDNISONE 10 MG/1
10 TABLET ORAL DAILY
Qty: 30 TABLET | Refills: 2 | Status: SHIPPED | OUTPATIENT
Start: 2025-08-01

## 2025-08-01 RX ORDER — TIZANIDINE 2 MG/1
TABLET ORAL
Qty: 60 TABLET | Refills: 3 | Status: SHIPPED | OUTPATIENT
Start: 2025-08-01

## 2025-08-05 ENCOUNTER — OFFICE VISIT (OUTPATIENT)
Dept: CARDIOLOGY CLINIC | Age: 65
End: 2025-08-05
Payer: MEDICARE

## 2025-08-05 ENCOUNTER — OFFICE VISIT (OUTPATIENT)
Age: 65
End: 2025-08-05
Payer: MEDICARE

## 2025-08-05 VITALS
HEART RATE: 80 BPM | SYSTOLIC BLOOD PRESSURE: 120 MMHG | BODY MASS INDEX: 34.3 KG/M2 | WEIGHT: 245 LBS | HEIGHT: 71 IN | DIASTOLIC BLOOD PRESSURE: 66 MMHG

## 2025-08-05 VITALS
WEIGHT: 245.6 LBS | SYSTOLIC BLOOD PRESSURE: 120 MMHG | HEIGHT: 71 IN | DIASTOLIC BLOOD PRESSURE: 66 MMHG | BODY MASS INDEX: 34.38 KG/M2 | HEART RATE: 80 BPM

## 2025-08-05 DIAGNOSIS — Z95.0 PACEMAKER: ICD-10-CM

## 2025-08-05 DIAGNOSIS — R07.9 CHEST PAIN, UNSPECIFIED TYPE: Primary | ICD-10-CM

## 2025-08-05 DIAGNOSIS — I48.19 PERSISTENT ATRIAL FIBRILLATION WITH RVR (HCC): Primary | ICD-10-CM

## 2025-08-05 PROCEDURE — 99214 OFFICE O/P EST MOD 30 MIN: CPT | Performed by: INTERNAL MEDICINE

## 2025-08-05 PROCEDURE — 1123F ACP DISCUSS/DSCN MKR DOCD: CPT | Performed by: INTERNAL MEDICINE

## 2025-08-05 PROCEDURE — 3017F COLORECTAL CA SCREEN DOC REV: CPT | Performed by: INTERNAL MEDICINE

## 2025-08-05 PROCEDURE — 1159F MED LIST DOCD IN RCRD: CPT | Performed by: INTERNAL MEDICINE

## 2025-08-05 PROCEDURE — G8427 DOCREV CUR MEDS BY ELIG CLIN: HCPCS | Performed by: INTERNAL MEDICINE

## 2025-08-05 PROCEDURE — 93000 ELECTROCARDIOGRAM COMPLETE: CPT | Performed by: NURSE PRACTITIONER

## 2025-08-05 PROCEDURE — 1036F TOBACCO NON-USER: CPT | Performed by: INTERNAL MEDICINE

## 2025-08-05 PROCEDURE — 99024 POSTOP FOLLOW-UP VISIT: CPT | Performed by: NURSE PRACTITIONER

## 2025-08-05 PROCEDURE — G8417 CALC BMI ABV UP PARAM F/U: HCPCS | Performed by: INTERNAL MEDICINE

## 2025-08-05 ASSESSMENT — ENCOUNTER SYMPTOMS
NAUSEA: 0
BLOOD IN STOOL: 0
WHEEZING: 0
BACK PAIN: 0
ABDOMINAL PAIN: 0
CONSTIPATION: 0
COUGH: 0
EYE PAIN: 0
CHEST TIGHTNESS: 0
PHOTOPHOBIA: 0
COLOR CHANGE: 0
DIARRHEA: 0
VOMITING: 0
SHORTNESS OF BREATH: 1

## 2025-08-06 ENCOUNTER — TELEPHONE (OUTPATIENT)
Dept: CARDIOLOGY CLINIC | Age: 65
End: 2025-08-06

## 2025-08-18 DIAGNOSIS — M19.90 ARTHRITIS: ICD-10-CM

## 2025-08-18 RX ORDER — DULOXETIN HYDROCHLORIDE 60 MG/1
60 CAPSULE, DELAYED RELEASE ORAL DAILY
Qty: 90 CAPSULE | Refills: 0 | Status: SHIPPED | OUTPATIENT
Start: 2025-08-18

## 2025-08-20 DIAGNOSIS — E11.9 TYPE 2 DIABETES MELLITUS WITHOUT COMPLICATION, WITHOUT LONG-TERM CURRENT USE OF INSULIN (HCC): ICD-10-CM

## 2025-08-20 RX ORDER — ATORVASTATIN CALCIUM 40 MG/1
40 TABLET, FILM COATED ORAL NIGHTLY
Qty: 90 TABLET | Refills: 0 | Status: SHIPPED | OUTPATIENT
Start: 2025-08-20

## 2025-09-03 ENCOUNTER — OFFICE VISIT (OUTPATIENT)
Dept: INTERNAL MEDICINE CLINIC | Age: 65
End: 2025-09-03
Payer: MEDICARE

## 2025-09-03 VITALS
WEIGHT: 250.4 LBS | OXYGEN SATURATION: 98 % | DIASTOLIC BLOOD PRESSURE: 70 MMHG | HEART RATE: 77 BPM | SYSTOLIC BLOOD PRESSURE: 114 MMHG | BODY MASS INDEX: 34.92 KG/M2

## 2025-09-03 DIAGNOSIS — I50.21 ACUTE SYSTOLIC CHF (CONGESTIVE HEART FAILURE) (HCC): ICD-10-CM

## 2025-09-03 DIAGNOSIS — N18.30 TYPE 2 DIABETES MELLITUS WITH STAGE 3 CHRONIC KIDNEY DISEASE, WITHOUT LONG-TERM CURRENT USE OF INSULIN, UNSPECIFIED WHETHER STAGE 3A OR 3B CKD (HCC): ICD-10-CM

## 2025-09-03 DIAGNOSIS — R39.11 BENIGN PROSTATIC HYPERPLASIA WITH URINARY HESITANCY: ICD-10-CM

## 2025-09-03 DIAGNOSIS — M19.90 ARTHRITIS: ICD-10-CM

## 2025-09-03 DIAGNOSIS — I42.0 DILATED CARDIOMYOPATHY (HCC): ICD-10-CM

## 2025-09-03 DIAGNOSIS — E11.22 TYPE 2 DIABETES MELLITUS WITH STAGE 3 CHRONIC KIDNEY DISEASE, WITHOUT LONG-TERM CURRENT USE OF INSULIN, UNSPECIFIED WHETHER STAGE 3A OR 3B CKD (HCC): ICD-10-CM

## 2025-09-03 DIAGNOSIS — I48.91 ATRIAL FIBRILLATION WITH RAPID VENTRICULAR RESPONSE (HCC): ICD-10-CM

## 2025-09-03 DIAGNOSIS — K29.90 GASTRITIS AND DUODENITIS: ICD-10-CM

## 2025-09-03 DIAGNOSIS — F34.1 DYSTHYMIA: ICD-10-CM

## 2025-09-03 DIAGNOSIS — G70.00 MYASTHENIA GRAVIS (HCC): ICD-10-CM

## 2025-09-03 DIAGNOSIS — E78.2 HYPERLIPEMIA, MIXED: ICD-10-CM

## 2025-09-03 DIAGNOSIS — I48.20 ATRIAL FIBRILLATION, CHRONIC (HCC): ICD-10-CM

## 2025-09-03 DIAGNOSIS — E11.9 TYPE 2 DIABETES MELLITUS WITHOUT COMPLICATION, WITHOUT LONG-TERM CURRENT USE OF INSULIN (HCC): Primary | ICD-10-CM

## 2025-09-03 DIAGNOSIS — F51.01 PRIMARY INSOMNIA: ICD-10-CM

## 2025-09-03 DIAGNOSIS — N40.1 BENIGN PROSTATIC HYPERPLASIA WITH URINARY HESITANCY: ICD-10-CM

## 2025-09-03 LAB — HBA1C MFR BLD: 6.4 %

## 2025-09-03 PROCEDURE — 1036F TOBACCO NON-USER: CPT | Performed by: INTERNAL MEDICINE

## 2025-09-03 PROCEDURE — 1123F ACP DISCUSS/DSCN MKR DOCD: CPT | Performed by: INTERNAL MEDICINE

## 2025-09-03 PROCEDURE — 2022F DILAT RTA XM EVC RTNOPTHY: CPT | Performed by: INTERNAL MEDICINE

## 2025-09-03 PROCEDURE — 83036 HEMOGLOBIN GLYCOSYLATED A1C: CPT | Performed by: INTERNAL MEDICINE

## 2025-09-03 PROCEDURE — G8417 CALC BMI ABV UP PARAM F/U: HCPCS | Performed by: INTERNAL MEDICINE

## 2025-09-03 PROCEDURE — 3017F COLORECTAL CA SCREEN DOC REV: CPT | Performed by: INTERNAL MEDICINE

## 2025-09-03 PROCEDURE — 99214 OFFICE O/P EST MOD 30 MIN: CPT | Performed by: INTERNAL MEDICINE

## 2025-09-03 PROCEDURE — G8427 DOCREV CUR MEDS BY ELIG CLIN: HCPCS | Performed by: INTERNAL MEDICINE

## 2025-09-03 PROCEDURE — 1159F MED LIST DOCD IN RCRD: CPT | Performed by: INTERNAL MEDICINE

## 2025-09-03 PROCEDURE — 3044F HG A1C LEVEL LT 7.0%: CPT | Performed by: INTERNAL MEDICINE

## 2025-09-03 PROCEDURE — 1160F RVW MEDS BY RX/DR IN RCRD: CPT | Performed by: INTERNAL MEDICINE

## 2025-09-03 PROCEDURE — G2211 COMPLEX E/M VISIT ADD ON: HCPCS | Performed by: INTERNAL MEDICINE

## 2025-09-03 RX ORDER — ATORVASTATIN CALCIUM 40 MG/1
40 TABLET, FILM COATED ORAL NIGHTLY
Qty: 90 TABLET | Refills: 1 | Status: SHIPPED | OUTPATIENT
Start: 2025-09-03

## 2025-09-03 RX ORDER — PYRIDOSTIGMINE BROMIDE 60 MG/1
60 TABLET ORAL 3 TIMES DAILY
Qty: 270 TABLET | Refills: 1 | Status: SHIPPED | OUTPATIENT
Start: 2025-09-03

## 2025-09-03 RX ORDER — PREDNISONE 10 MG/1
10 TABLET ORAL DAILY
Qty: 90 TABLET | Refills: 1 | Status: SHIPPED | OUTPATIENT
Start: 2025-09-03

## 2025-09-03 RX ORDER — LOSARTAN POTASSIUM 25 MG/1
25 TABLET ORAL EVERY EVENING
Qty: 90 TABLET | Refills: 1 | Status: SHIPPED | OUTPATIENT
Start: 2025-09-03

## 2025-09-03 RX ORDER — FUROSEMIDE 40 MG/1
40 TABLET ORAL DAILY
Qty: 90 TABLET | Refills: 1 | Status: SHIPPED | OUTPATIENT
Start: 2025-09-03

## 2025-09-03 RX ORDER — TAMSULOSIN HYDROCHLORIDE 0.4 MG/1
0.4 CAPSULE ORAL DAILY
Qty: 90 CAPSULE | Refills: 1 | Status: SHIPPED | OUTPATIENT
Start: 2025-09-03

## 2025-09-03 RX ORDER — PANTOPRAZOLE SODIUM 40 MG/1
40 TABLET, DELAYED RELEASE ORAL DAILY
Qty: 90 TABLET | Refills: 1 | Status: SHIPPED | OUTPATIENT
Start: 2025-09-03

## 2025-09-03 RX ORDER — METOPROLOL TARTRATE 25 MG/1
25 TABLET, FILM COATED ORAL 2 TIMES DAILY
Qty: 180 TABLET | Refills: 1 | Status: SHIPPED | OUTPATIENT
Start: 2025-09-03

## 2025-09-03 RX ORDER — TRAZODONE HYDROCHLORIDE 50 MG/1
50 TABLET ORAL NIGHTLY
Qty: 90 TABLET | Refills: 1 | Status: SHIPPED | OUTPATIENT
Start: 2025-09-03

## 2025-09-03 RX ORDER — DULOXETIN HYDROCHLORIDE 60 MG/1
60 CAPSULE, DELAYED RELEASE ORAL 2 TIMES DAILY
Qty: 180 CAPSULE | Refills: 1 | Status: SHIPPED | OUTPATIENT
Start: 2025-09-03

## 2025-09-03 RX ORDER — MIDODRINE HYDROCHLORIDE 2.5 MG/1
2.5 TABLET ORAL 2 TIMES DAILY
Qty: 180 TABLET | Refills: 1 | Status: SHIPPED | OUTPATIENT
Start: 2025-09-03

## 2025-09-03 RX ORDER — SPIRONOLACTONE 25 MG/1
12.5 TABLET ORAL DAILY
Qty: 45 TABLET | Refills: 1 | Status: SHIPPED | OUTPATIENT
Start: 2025-09-03

## 2025-09-03 RX ORDER — GLIPIZIDE 5 MG/1
5 TABLET ORAL
Qty: 180 TABLET | Refills: 1 | Status: SHIPPED | OUTPATIENT
Start: 2025-09-03

## (undated) DEVICE — DECANTER BAG 9": Brand: MEDLINE INDUSTRIES, INC.

## (undated) DEVICE — DECANTER FLD 9IN ST BG FOR ASEP TRNSF OF FLD

## (undated) DEVICE — CONTAINER,SPECIMEN,OR STERILE,4OZ: Brand: MEDLINE

## (undated) DEVICE — MARKER SURG SKIN UTIL REGULAR/FINE 2 TIP W/ RUL AND 9 LBL

## (undated) DEVICE — TOWEL OR BLUEE 16X26IN ST 8 PACK ORB08 16X26ORTWL

## (undated) DEVICE — GOWN,SIRUS,POLYRNF,BRTHSLV,XLN/XL,20/CS: Brand: MEDLINE

## (undated) DEVICE — JELLY,LUBE,STERILE,FLIP TOP,TUBE,2-OZ: Brand: MEDLINE

## (undated) DEVICE — TUBING, SUCTION, 9/32" X 10', STRAIGHT: Brand: MEDLINE

## (undated) DEVICE — ZINACTIVE USE 2539609 APPLICATOR MEDICATED 10.5 CC SOLUTION HI LT ORNG CHLORAPREP

## (undated) DEVICE — INTENDED FOR TISSUE SEPARATION, AND OTHER PROCEDURES THAT REQUIRE A SHARP SURGICAL BLADE TO PUNCTURE OR CUT.: Brand: BARD-PARKER ® STAINLESS STEEL BLADES

## (undated) DEVICE — DBD-PACK,CYSTOSCOPY,PK VI,AURORA: Brand: MEDLINE

## (undated) DEVICE — TUBING, SUCTION, 9/32" X 20', STRAIGHT: Brand: MEDLINE INDUSTRIES, INC.

## (undated) DEVICE — MEDI-TRACE CADENCE ADULT, DEFIBRILLATION ELECTRODE -RTS  (10 PR/PK) - ZOLL: Brand: MEDI-TRACE CADENCE

## (undated) DEVICE — SINGLE-USE DIGITAL FLEXIBLE URETEROSCOPE: Brand: LITHOVUE

## (undated) DEVICE — GLOVE SURG SZ 7 L12IN FNGR THK94MIL TRNSLUC YEL LTX HYDRGEL

## (undated) DEVICE — SUTURE VICRYL SZ 4-0 L18IN ABSRB UD L16MM PS-4 1/2 CIR PRIM J507G

## (undated) DEVICE — Device

## (undated) DEVICE — SYRINGE IRRIG 60ML SFT PLIABLE BLB EZ TO GRP 1 HND USE W/

## (undated) DEVICE — YANKAUER,BULB TIP,W/O VENT,RIGID,STERILE: Brand: MEDLINE

## (undated) DEVICE — APPLICATOR MEDICATED 26 CC SOLUTION HI LT ORNG CHLORAPREP

## (undated) DEVICE — AMD ANTIMICROBIAL NON-ADHERENT PAD,0.2% POLYHEXAMETHYLENE BIGUANIDE HCI (PHMB): Brand: TELFA

## (undated) DEVICE — SHEET PT TRANSFER 80X40 IN LAT AIR NYL GRY COMFORT GLIDE

## (undated) DEVICE — CATHETER EP 6FR L92CM 2-5-2MM SPC TIP 1MM 4 ELECTRD D CRV

## (undated) DEVICE — GAUZE,SPONGE,4"X4",16PLY,XRAY,STRL,LF: Brand: MEDLINE

## (undated) DEVICE — UROLOGIC DRAIN BAG: Brand: UNBRANDED

## (undated) DEVICE — ADHESIVE SKIN CLSR 0.7ML TOP DERMBND ADV

## (undated) DEVICE — ELECTRODE ES AD CRDLSS PT RET REM POLYHESIVE

## (undated) DEVICE — PATCH REF EXT FOR CARTO 3 SYS (EA = 6 PACKS)

## (undated) DEVICE — SINGLE PORT MANIFOLD: Brand: NEPTUNE 2

## (undated) DEVICE — TOWEL,OR,DSP,ST,BLUE,STD,6/PK,12PK/CS: Brand: MEDLINE

## (undated) DEVICE — SYRINGE MED 20ML STD CLR PLAS LUERLOCK TIP N CTRL DISP

## (undated) DEVICE — INTRODUCER SHTH 8.5FR L63CM 8.5FR DIL GWIRE L145CM

## (undated) DEVICE — 3M™ IOBAN™ 2 ANTIMICROBIAL INCISE DRAPE 6650EZ: Brand: IOBAN™ 2

## (undated) DEVICE — CATHETER ABLATN F-J CRV 1-7-4 MM 8 MM 7 FRX115 CM EZ STEER

## (undated) DEVICE — RADIFOCUS OPTITORQUE ANGIOGRAPHIC CATHETER: Brand: OPTITORQUE

## (undated) DEVICE — BLADE CLIPPER GEN PURP NS

## (undated) DEVICE — GLOVE ORANGE PI 8   MSG9080

## (undated) DEVICE — GLOVE SURG SZ 65 THK91MIL LTX FREE SYN POLYISOPRENE

## (undated) DEVICE — SUTURE MCRYL SZ 4-0 L18IN ABSRB UD L19MM PS-2 3/8 CIR PRIM Y496G

## (undated) DEVICE — GUIDEWIRE VASC L175CM DIA0035IN PTFE MOD S STL COIL PLAT

## (undated) DEVICE — TRAY PREP DRY W/ PREM GLV 2 APPL 6 SPNG 2 UNDPD 1 OVERWRAP

## (undated) DEVICE — COUNTER NDL 30 COUNT FOAM STRP SGL MAG

## (undated) DEVICE — SUTURE ETHIBOND EXCEL SZ 0 L30IN NONABSORBABLE GRN CT1 L36MM X424H

## (undated) DEVICE — GLOVE SURG SZ 7 L12IN FNGR THK79MIL GRN LTX FREE

## (undated) DEVICE — SET IRRIG L94IN ID0.281IN W/ 4.5IN DST FLX CONN 2 LD ON OFF

## (undated) DEVICE — DRESSING TRNSPAR W5XL4.5IN FLM SHT SEMIPERMEABLE WIND

## (undated) DEVICE — ANGIOGRAPHY KIT CUST MANIFOLD

## (undated) DEVICE — CATHETER GUID 7.1X5.7FR L65CM 90DEG L VENT COR SNUS PEBA

## (undated) DEVICE — NEEDLE ANGIO L1IN DIA21GA 1 THN WALL SMOOTH STD HUB

## (undated) DEVICE — PENCIL ES CRD L10FT HND SWCHING ROCK SWCH W/ EDGE COAT BLDE

## (undated) DEVICE — GUIDEWIRE UROLOGICAL STR 3 CM 0.038 INX150 CM NIT SENSOR

## (undated) DEVICE — SEAL ENDOSCP INSTR 7FR BX SELF SEAL

## (undated) DEVICE — NITINOL STONE RETRIEVAL BASKET: Brand: ZERO TIP

## (undated) DEVICE — GUIDEWIRE ENDOSCP L150CM DIA0.035IN TIP 3CM PTFE NIT

## (undated) DEVICE — ELECTRODE ES L2.75IN S STL INSUL BLDE W/ SL EDGE

## (undated) DEVICE — APPLICATOR MEDICATED 10.5 CC SOLUTION HI LT ORNG CHLORAPREP

## (undated) DEVICE — SUTURE VICRYL SZ 2-0 L27IN ABSRB UD L26MM CT-2 1/2 CIR J269H

## (undated) DEVICE — SET, IRRIGATION CYSTO, Y-TYPE, 81": Brand: MEDLINE

## (undated) DEVICE — Z INACTIVE USE 2660664 SOLUTION IRRIG 3000ML 0.9% SOD CHL USP UROMATIC PLAS CONT

## (undated) DEVICE — INTRODUCER SHTH 6FR L11CM 0.038IN STD SIDEPRT EXTN 3 W

## (undated) DEVICE — SUTURE PERMAHAND SZ 2-0 L30IN NONABSORBABLE BLK SH L26MM C016D

## (undated) DEVICE — SUTURE ABSORBABLE BRAIDED 2-0 CT-1 27 IN UD VICRYL J259H

## (undated) DEVICE — MAT ABSRB W28XL48IN C6L FLR ULT W POLY BK

## (undated) DEVICE — SOLUTION IV IRRIG WATER 1000ML POUR BRL 2F7114

## (undated) DEVICE — Z INACTIVE USE 2635503 SOLUTION IRRIG 3000ML ST H2O USP UROMATIC PLAS CONT

## (undated) DEVICE — VALVED PEELABLE PTFE INTRODUCER: Brand: OPTISEAL™

## (undated) DEVICE — INTRODUCER SHTH 8FR L12CM DIA0.038IN STD HEMSTAS CLOSE TOL

## (undated) DEVICE — GOWN,ECLIPSE,POLYRNF,BRTHSLV,L,30/CS: Brand: MEDLINE

## (undated) DEVICE — PERCUTANEOUS ENTRY THINWALL NEEDLE  ONE-PART: Brand: COOK

## (undated) DEVICE — CATHETER GUID 9X7.2FR L50CM LT VENT INTEGR HEMSTAS SUREVALVE

## (undated) DEVICE — SUTURE PROL SZ 3-0 L30IN NONABSORBABLE BLU L26MM CT-2 1/2 8422H

## (undated) DEVICE — CATHETER URET 5FR L70CM TIP 8FR OPN END CONE TIP INJ HUB

## (undated) DEVICE — Z DISCONTINUED (USE MFG CAT MVABO)  TUBING GAS SAMPLING STD 6.5 FT FEMALE CONN SMRT CAPNOLINE

## (undated) DEVICE — DRAPE,UTILITY,XL,4/PK,STERILE: Brand: MEDLINE

## (undated) DEVICE — WIRE .035 3MM J TIP 260CM

## (undated) DEVICE — INTRODUCER SHTH 6FR L12CM DIA0.038IN STD HEMSTAS CLOSE TOL

## (undated) DEVICE — GOWN,ECLIPSE,POLYRNF,BRTHSLV,XL,30/CS: Brand: MEDLINE

## (undated) DEVICE — C-ARM: Brand: UNBRANDED

## (undated) DEVICE — CATHETER EP 6FR L115CM 2-8-2MM SPC TIP 2MM 10 ELECTRD CSD

## (undated) DEVICE — DRAINBAG,ANTI-REFLUX TOWER,L/F,2000ML,LL: Brand: MEDLINE

## (undated) DEVICE — SHEET,T,THYROID,STERILE: Brand: MEDLINE

## (undated) DEVICE — SHEATH INTRO 9FR L12CM GWIRE L150CM DIA0.038IN STD HEMSTAS

## (undated) DEVICE — VASC-BAND REG